# Patient Record
Sex: MALE | Race: WHITE | NOT HISPANIC OR LATINO | Employment: OTHER | URBAN - METROPOLITAN AREA
[De-identification: names, ages, dates, MRNs, and addresses within clinical notes are randomized per-mention and may not be internally consistent; named-entity substitution may affect disease eponyms.]

---

## 2017-01-19 ENCOUNTER — COMMUNICATION - HEALTHEAST (OUTPATIENT)
Dept: FAMILY MEDICINE | Facility: CLINIC | Age: 62
End: 2017-01-19

## 2017-01-19 DIAGNOSIS — K20.90 ESOPHAGITIS: ICD-10-CM

## 2017-06-02 ENCOUNTER — OFFICE VISIT - HEALTHEAST (OUTPATIENT)
Dept: FAMILY MEDICINE | Facility: CLINIC | Age: 62
End: 2017-06-02

## 2017-06-02 ENCOUNTER — COMMUNICATION - HEALTHEAST (OUTPATIENT)
Dept: TELEHEALTH | Facility: CLINIC | Age: 62
End: 2017-06-02

## 2017-06-02 DIAGNOSIS — E03.9 UNSPECIFIED HYPOTHYROIDISM: ICD-10-CM

## 2017-06-02 DIAGNOSIS — Z01.818 PREOP GENERAL PHYSICAL EXAM: ICD-10-CM

## 2017-06-02 LAB
ATRIAL RATE - MUSE: 54 BPM
DIASTOLIC BLOOD PRESSURE - MUSE: NORMAL MMHG
INTERPRETATION ECG - MUSE: NORMAL
P AXIS - MUSE: 54 DEGREES
PR INTERVAL - MUSE: 150 MS
QRS DURATION - MUSE: 76 MS
QT - MUSE: 410 MS
QTC - MUSE: 388 MS
R AXIS - MUSE: -3 DEGREES
SYSTOLIC BLOOD PRESSURE - MUSE: NORMAL MMHG
T AXIS - MUSE: 31 DEGREES
VENTRICULAR RATE- MUSE: 54 BPM

## 2017-06-02 ASSESSMENT — MIFFLIN-ST. JEOR: SCORE: 1923.56

## 2017-06-05 ENCOUNTER — AMBULATORY - HEALTHEAST (OUTPATIENT)
Dept: LAB | Facility: CLINIC | Age: 62
End: 2017-06-05

## 2017-06-05 DIAGNOSIS — Z01.818 PREOP GENERAL PHYSICAL EXAM: ICD-10-CM

## 2017-06-16 ASSESSMENT — MIFFLIN-ST. JEOR: SCORE: 1905.76

## 2017-06-20 ENCOUNTER — ANESTHESIA - HEALTHEAST (OUTPATIENT)
Dept: SURGERY | Facility: CLINIC | Age: 62
End: 2017-06-20

## 2017-06-21 ENCOUNTER — SURGERY - HEALTHEAST (OUTPATIENT)
Dept: SURGERY | Facility: CLINIC | Age: 62
End: 2017-06-21

## 2017-06-21 ASSESSMENT — MIFFLIN-ST. JEOR
SCORE: 1928.44
SCORE: 1928.44

## 2017-06-23 ENCOUNTER — COMMUNICATION - HEALTHEAST (OUTPATIENT)
Dept: FAMILY MEDICINE | Facility: CLINIC | Age: 62
End: 2017-06-23

## 2017-06-26 ENCOUNTER — OFFICE VISIT - HEALTHEAST (OUTPATIENT)
Dept: PHYSICAL THERAPY | Facility: REHABILITATION | Age: 62
End: 2017-06-26

## 2017-06-26 ENCOUNTER — AMBULATORY - HEALTHEAST (OUTPATIENT)
Dept: ADMINISTRATIVE | Facility: REHABILITATION | Age: 62
End: 2017-06-26

## 2017-06-26 DIAGNOSIS — M25.40 EFFUSION OF JOINT: ICD-10-CM

## 2017-06-26 DIAGNOSIS — E03.9 HYPOTHYROIDISM: ICD-10-CM

## 2017-06-26 DIAGNOSIS — E66.9 OBESITY, UNSPECIFIED: ICD-10-CM

## 2017-06-26 DIAGNOSIS — Z98.890 STATUS POST ABLATION OF ATRIAL FIBRILLATION: ICD-10-CM

## 2017-06-26 DIAGNOSIS — M25.661 KNEE STIFFNESS, RIGHT: ICD-10-CM

## 2017-06-26 DIAGNOSIS — R53.1 DECREASED STRENGTH: ICD-10-CM

## 2017-06-26 DIAGNOSIS — R26.89 ANTALGIC GAIT: ICD-10-CM

## 2017-06-26 DIAGNOSIS — Z96.659 KNEE JOINT REPLACEMENT STATUS: ICD-10-CM

## 2017-06-26 DIAGNOSIS — I71.20 ANEURYSM OF THORACIC AORTA (H): ICD-10-CM

## 2017-06-26 DIAGNOSIS — Z86.79 STATUS POST ABLATION OF ATRIAL FIBRILLATION: ICD-10-CM

## 2017-06-26 DIAGNOSIS — Z96.651 STATUS POST TOTAL RIGHT KNEE REPLACEMENT: ICD-10-CM

## 2017-06-28 ENCOUNTER — OFFICE VISIT - HEALTHEAST (OUTPATIENT)
Dept: PHYSICAL THERAPY | Facility: REHABILITATION | Age: 62
End: 2017-06-28

## 2017-06-28 DIAGNOSIS — I71.20 ANEURYSM OF THORACIC AORTA (H): ICD-10-CM

## 2017-06-28 DIAGNOSIS — R26.89 ANTALGIC GAIT: ICD-10-CM

## 2017-06-28 DIAGNOSIS — Z98.890 STATUS POST ABLATION OF ATRIAL FIBRILLATION: ICD-10-CM

## 2017-06-28 DIAGNOSIS — M25.40 EFFUSION OF JOINT: ICD-10-CM

## 2017-06-28 DIAGNOSIS — E66.9 OBESITY, UNSPECIFIED: ICD-10-CM

## 2017-06-28 DIAGNOSIS — M25.661 KNEE STIFFNESS, RIGHT: ICD-10-CM

## 2017-06-28 DIAGNOSIS — R53.1 DECREASED STRENGTH: ICD-10-CM

## 2017-06-28 DIAGNOSIS — Z96.651 STATUS POST TOTAL RIGHT KNEE REPLACEMENT: ICD-10-CM

## 2017-06-28 DIAGNOSIS — E03.9 HYPOTHYROIDISM: ICD-10-CM

## 2017-06-28 DIAGNOSIS — Z86.79 STATUS POST ABLATION OF ATRIAL FIBRILLATION: ICD-10-CM

## 2017-07-05 ENCOUNTER — OFFICE VISIT - HEALTHEAST (OUTPATIENT)
Dept: PHYSICAL THERAPY | Facility: REHABILITATION | Age: 62
End: 2017-07-05

## 2017-07-05 DIAGNOSIS — Z96.651 STATUS POST TOTAL RIGHT KNEE REPLACEMENT: ICD-10-CM

## 2017-07-05 DIAGNOSIS — R53.1 DECREASED STRENGTH: ICD-10-CM

## 2017-07-05 DIAGNOSIS — M25.40 EFFUSION OF JOINT: ICD-10-CM

## 2017-07-05 DIAGNOSIS — M25.661 KNEE STIFFNESS, RIGHT: ICD-10-CM

## 2017-07-10 ENCOUNTER — OFFICE VISIT - HEALTHEAST (OUTPATIENT)
Dept: PHYSICAL THERAPY | Facility: REHABILITATION | Age: 62
End: 2017-07-10

## 2017-07-10 DIAGNOSIS — R53.1 DECREASED STRENGTH: ICD-10-CM

## 2017-07-10 DIAGNOSIS — Z96.651 STATUS POST TOTAL RIGHT KNEE REPLACEMENT: ICD-10-CM

## 2017-07-10 DIAGNOSIS — M25.661 KNEE STIFFNESS, RIGHT: ICD-10-CM

## 2017-07-10 DIAGNOSIS — M25.40 EFFUSION OF JOINT: ICD-10-CM

## 2017-07-13 ENCOUNTER — OFFICE VISIT - HEALTHEAST (OUTPATIENT)
Dept: PHYSICAL THERAPY | Facility: REHABILITATION | Age: 62
End: 2017-07-13

## 2017-07-13 DIAGNOSIS — M25.661 KNEE STIFFNESS, RIGHT: ICD-10-CM

## 2017-07-13 DIAGNOSIS — R53.1 DECREASED STRENGTH: ICD-10-CM

## 2017-07-13 DIAGNOSIS — M25.40 EFFUSION OF JOINT: ICD-10-CM

## 2017-07-13 DIAGNOSIS — Z96.651 STATUS POST TOTAL RIGHT KNEE REPLACEMENT: ICD-10-CM

## 2017-07-13 DIAGNOSIS — R26.89 ANTALGIC GAIT: ICD-10-CM

## 2017-07-17 ENCOUNTER — OFFICE VISIT - HEALTHEAST (OUTPATIENT)
Dept: PHYSICAL THERAPY | Facility: REHABILITATION | Age: 62
End: 2017-07-17

## 2017-07-17 DIAGNOSIS — M25.40 EFFUSION OF JOINT: ICD-10-CM

## 2017-07-17 DIAGNOSIS — R26.89 ANTALGIC GAIT: ICD-10-CM

## 2017-07-17 DIAGNOSIS — M25.661 KNEE STIFFNESS, RIGHT: ICD-10-CM

## 2017-07-17 DIAGNOSIS — R53.1 DECREASED STRENGTH: ICD-10-CM

## 2017-07-17 DIAGNOSIS — Z96.651 STATUS POST TOTAL RIGHT KNEE REPLACEMENT: ICD-10-CM

## 2017-07-20 ENCOUNTER — OFFICE VISIT - HEALTHEAST (OUTPATIENT)
Dept: PHYSICAL THERAPY | Facility: REHABILITATION | Age: 62
End: 2017-07-20

## 2017-07-20 ENCOUNTER — COMMUNICATION - HEALTHEAST (OUTPATIENT)
Dept: ADMINISTRATIVE | Facility: CLINIC | Age: 62
End: 2017-07-20

## 2017-07-20 DIAGNOSIS — R53.1 DECREASED STRENGTH: ICD-10-CM

## 2017-07-20 DIAGNOSIS — R26.89 ANTALGIC GAIT: ICD-10-CM

## 2017-07-20 DIAGNOSIS — M25.661 KNEE STIFFNESS, RIGHT: ICD-10-CM

## 2017-07-20 DIAGNOSIS — M25.40 EFFUSION OF JOINT: ICD-10-CM

## 2017-07-20 DIAGNOSIS — Z96.651 STATUS POST TOTAL RIGHT KNEE REPLACEMENT: ICD-10-CM

## 2017-07-24 ENCOUNTER — OFFICE VISIT - HEALTHEAST (OUTPATIENT)
Dept: PHYSICAL THERAPY | Facility: REHABILITATION | Age: 62
End: 2017-07-24

## 2017-07-24 DIAGNOSIS — M25.661 KNEE STIFFNESS, RIGHT: ICD-10-CM

## 2017-07-24 DIAGNOSIS — Z96.651 STATUS POST TOTAL RIGHT KNEE REPLACEMENT: ICD-10-CM

## 2017-07-24 DIAGNOSIS — R53.1 DECREASED STRENGTH: ICD-10-CM

## 2017-07-27 ENCOUNTER — OFFICE VISIT - HEALTHEAST (OUTPATIENT)
Dept: PHYSICAL THERAPY | Facility: REHABILITATION | Age: 62
End: 2017-07-27

## 2017-07-27 DIAGNOSIS — Z96.651 STATUS POST TOTAL RIGHT KNEE REPLACEMENT: ICD-10-CM

## 2017-07-27 DIAGNOSIS — R26.89 ANTALGIC GAIT: ICD-10-CM

## 2017-07-27 DIAGNOSIS — R53.1 DECREASED STRENGTH: ICD-10-CM

## 2017-07-27 DIAGNOSIS — M25.661 KNEE STIFFNESS, RIGHT: ICD-10-CM

## 2017-07-27 DIAGNOSIS — M25.40 EFFUSION OF JOINT: ICD-10-CM

## 2017-07-31 ENCOUNTER — OFFICE VISIT - HEALTHEAST (OUTPATIENT)
Dept: PHYSICAL THERAPY | Facility: REHABILITATION | Age: 62
End: 2017-07-31

## 2017-07-31 DIAGNOSIS — R53.1 DECREASED STRENGTH: ICD-10-CM

## 2017-07-31 DIAGNOSIS — M25.40 EFFUSION OF JOINT: ICD-10-CM

## 2017-07-31 DIAGNOSIS — M25.661 KNEE STIFFNESS, RIGHT: ICD-10-CM

## 2017-07-31 DIAGNOSIS — Z96.651 STATUS POST TOTAL RIGHT KNEE REPLACEMENT: ICD-10-CM

## 2017-08-02 ENCOUNTER — OFFICE VISIT - HEALTHEAST (OUTPATIENT)
Dept: PHYSICAL THERAPY | Facility: REHABILITATION | Age: 62
End: 2017-08-02

## 2017-08-02 ENCOUNTER — COMMUNICATION - HEALTHEAST (OUTPATIENT)
Dept: FAMILY MEDICINE | Facility: CLINIC | Age: 62
End: 2017-08-02

## 2017-08-02 DIAGNOSIS — M25.40 EFFUSION OF JOINT: ICD-10-CM

## 2017-08-02 DIAGNOSIS — M25.661 KNEE STIFFNESS, RIGHT: ICD-10-CM

## 2017-08-02 DIAGNOSIS — K20.90 ESOPHAGITIS: ICD-10-CM

## 2017-08-02 DIAGNOSIS — R53.1 DECREASED STRENGTH: ICD-10-CM

## 2017-08-02 DIAGNOSIS — R26.89 ANTALGIC GAIT: ICD-10-CM

## 2017-08-02 DIAGNOSIS — Z96.651 STATUS POST TOTAL RIGHT KNEE REPLACEMENT: ICD-10-CM

## 2017-08-09 ENCOUNTER — OFFICE VISIT - HEALTHEAST (OUTPATIENT)
Dept: PHYSICAL THERAPY | Facility: REHABILITATION | Age: 62
End: 2017-08-09

## 2017-08-09 DIAGNOSIS — R26.89 ANTALGIC GAIT: ICD-10-CM

## 2017-08-09 DIAGNOSIS — M25.661 KNEE STIFFNESS, RIGHT: ICD-10-CM

## 2017-08-09 DIAGNOSIS — Z96.651 STATUS POST TOTAL RIGHT KNEE REPLACEMENT: ICD-10-CM

## 2017-08-09 DIAGNOSIS — R53.1 DECREASED STRENGTH: ICD-10-CM

## 2017-08-09 DIAGNOSIS — M25.40 EFFUSION OF JOINT: ICD-10-CM

## 2017-08-14 ENCOUNTER — OFFICE VISIT - HEALTHEAST (OUTPATIENT)
Dept: PHYSICAL THERAPY | Facility: REHABILITATION | Age: 62
End: 2017-08-14

## 2017-08-14 DIAGNOSIS — M25.661 KNEE STIFFNESS, RIGHT: ICD-10-CM

## 2017-08-14 DIAGNOSIS — Z96.651 STATUS POST TOTAL RIGHT KNEE REPLACEMENT: ICD-10-CM

## 2017-08-14 DIAGNOSIS — R53.1 DECREASED STRENGTH: ICD-10-CM

## 2017-08-21 ENCOUNTER — OFFICE VISIT - HEALTHEAST (OUTPATIENT)
Dept: PHYSICAL THERAPY | Facility: REHABILITATION | Age: 62
End: 2017-08-21

## 2017-08-21 DIAGNOSIS — R53.1 DECREASED STRENGTH: ICD-10-CM

## 2017-08-21 DIAGNOSIS — M25.40 EFFUSION OF JOINT: ICD-10-CM

## 2017-08-21 DIAGNOSIS — Z96.651 STATUS POST TOTAL RIGHT KNEE REPLACEMENT: ICD-10-CM

## 2017-08-21 DIAGNOSIS — M25.661 KNEE STIFFNESS, RIGHT: ICD-10-CM

## 2017-08-21 DIAGNOSIS — R26.89 ANTALGIC GAIT: ICD-10-CM

## 2017-08-28 ENCOUNTER — OFFICE VISIT - HEALTHEAST (OUTPATIENT)
Dept: PHYSICAL THERAPY | Facility: REHABILITATION | Age: 62
End: 2017-08-28

## 2017-08-28 DIAGNOSIS — R53.1 DECREASED STRENGTH: ICD-10-CM

## 2017-08-28 DIAGNOSIS — M25.40 EFFUSION OF JOINT: ICD-10-CM

## 2017-08-28 DIAGNOSIS — Z96.651 STATUS POST TOTAL RIGHT KNEE REPLACEMENT: ICD-10-CM

## 2017-08-28 DIAGNOSIS — M25.661 KNEE STIFFNESS, RIGHT: ICD-10-CM

## 2017-08-28 DIAGNOSIS — R26.89 ANTALGIC GAIT: ICD-10-CM

## 2017-10-04 ENCOUNTER — COMMUNICATION - HEALTHEAST (OUTPATIENT)
Dept: FAMILY MEDICINE | Facility: CLINIC | Age: 62
End: 2017-10-04

## 2017-10-04 DIAGNOSIS — E03.9 HYPOTHYROIDISM: ICD-10-CM

## 2017-10-31 ENCOUNTER — OFFICE VISIT - HEALTHEAST (OUTPATIENT)
Dept: CARDIOLOGY | Facility: CLINIC | Age: 62
End: 2017-10-31

## 2017-10-31 DIAGNOSIS — Z86.79 STATUS POST ABLATION OF ATRIAL FIBRILLATION: ICD-10-CM

## 2017-10-31 DIAGNOSIS — Z98.890 STATUS POST ABLATION OF ATRIAL FIBRILLATION: ICD-10-CM

## 2017-10-31 DIAGNOSIS — I48.0 PAROXYSMAL ATRIAL FIBRILLATION (H): ICD-10-CM

## 2017-10-31 DIAGNOSIS — I71.20 ANEURYSM OF THORACIC AORTA (H): ICD-10-CM

## 2017-10-31 ASSESSMENT — MIFFLIN-ST. JEOR: SCORE: 2001.02

## 2017-11-01 ENCOUNTER — COMMUNICATION - HEALTHEAST (OUTPATIENT)
Dept: FAMILY MEDICINE | Facility: CLINIC | Age: 62
End: 2017-11-01

## 2017-11-01 DIAGNOSIS — K20.90 ESOPHAGITIS: ICD-10-CM

## 2017-11-09 ENCOUNTER — RECORDS - HEALTHEAST (OUTPATIENT)
Dept: ADMINISTRATIVE | Facility: OTHER | Age: 62
End: 2017-11-09

## 2018-02-06 ENCOUNTER — COMMUNICATION - HEALTHEAST (OUTPATIENT)
Dept: FAMILY MEDICINE | Facility: CLINIC | Age: 63
End: 2018-02-06

## 2018-02-06 DIAGNOSIS — K20.90 ESOPHAGITIS: ICD-10-CM

## 2018-05-18 ENCOUNTER — RECORDS - HEALTHEAST (OUTPATIENT)
Dept: ADMINISTRATIVE | Facility: OTHER | Age: 63
End: 2018-05-18

## 2018-06-21 ENCOUNTER — RECORDS - HEALTHEAST (OUTPATIENT)
Dept: ADMINISTRATIVE | Facility: OTHER | Age: 63
End: 2018-06-21

## 2018-07-13 ENCOUNTER — COMMUNICATION - HEALTHEAST (OUTPATIENT)
Dept: FAMILY MEDICINE | Facility: CLINIC | Age: 63
End: 2018-07-13

## 2018-07-13 DIAGNOSIS — E03.9 HYPOTHYROIDISM: ICD-10-CM

## 2018-07-26 ENCOUNTER — RECORDS - HEALTHEAST (OUTPATIENT)
Dept: ADMINISTRATIVE | Facility: OTHER | Age: 63
End: 2018-07-26

## 2018-08-14 ENCOUNTER — COMMUNICATION - HEALTHEAST (OUTPATIENT)
Dept: FAMILY MEDICINE | Facility: CLINIC | Age: 63
End: 2018-08-14

## 2018-08-14 DIAGNOSIS — K20.90 ESOPHAGITIS: ICD-10-CM

## 2018-08-28 ENCOUNTER — RECORDS - HEALTHEAST (OUTPATIENT)
Dept: ADMINISTRATIVE | Facility: OTHER | Age: 63
End: 2018-08-28

## 2018-09-10 ENCOUNTER — OFFICE VISIT - HEALTHEAST (OUTPATIENT)
Dept: FAMILY MEDICINE | Facility: CLINIC | Age: 63
End: 2018-09-10

## 2018-09-10 ENCOUNTER — COMMUNICATION - HEALTHEAST (OUTPATIENT)
Dept: FAMILY MEDICINE | Facility: CLINIC | Age: 63
End: 2018-09-10

## 2018-09-10 DIAGNOSIS — E66.9 OBESITY: ICD-10-CM

## 2018-09-10 DIAGNOSIS — M79.672 LEFT FOOT PAIN: ICD-10-CM

## 2018-09-10 DIAGNOSIS — I71.20 THORACIC AORTIC ANEURYSM WITHOUT RUPTURE (H): ICD-10-CM

## 2018-09-10 DIAGNOSIS — Z12.5 SCREENING FOR PROSTATE CANCER: ICD-10-CM

## 2018-09-10 DIAGNOSIS — E03.9 HYPOTHYROIDISM: ICD-10-CM

## 2018-09-10 DIAGNOSIS — Z13.220 SCREENING FOR LIPID DISORDERS: ICD-10-CM

## 2018-09-10 DIAGNOSIS — Z12.11 SCREEN FOR COLON CANCER: ICD-10-CM

## 2018-09-10 DIAGNOSIS — I83.90 ASYMPTOMATIC VARICOSE VEINS: ICD-10-CM

## 2018-09-10 DIAGNOSIS — I48.0 PAROXYSMAL ATRIAL FIBRILLATION (H): ICD-10-CM

## 2018-09-10 DIAGNOSIS — K21.9 GERD (GASTROESOPHAGEAL REFLUX DISEASE): ICD-10-CM

## 2018-09-10 DIAGNOSIS — K20.90 ESOPHAGITIS: ICD-10-CM

## 2018-09-10 DIAGNOSIS — Z00.00 PHYSICAL EXAM: ICD-10-CM

## 2018-09-10 DIAGNOSIS — L63.9 AA (ALOPECIA AREATA): ICD-10-CM

## 2018-09-10 LAB
ANION GAP SERPL CALCULATED.3IONS-SCNC: 8 MMOL/L (ref 5–18)
BUN SERPL-MCNC: 17 MG/DL (ref 8–22)
CALCIUM SERPL-MCNC: 9.3 MG/DL (ref 8.5–10.5)
CHLORIDE BLD-SCNC: 104 MMOL/L (ref 98–107)
CHOLEST SERPL-MCNC: 149 MG/DL
CO2 SERPL-SCNC: 30 MMOL/L (ref 22–31)
CREAT SERPL-MCNC: 0.98 MG/DL (ref 0.7–1.3)
FASTING STATUS PATIENT QL REPORTED: YES
GFR SERPL CREATININE-BSD FRML MDRD: >60 ML/MIN/1.73M2
GLUCOSE BLD-MCNC: 92 MG/DL (ref 70–125)
HDLC SERPL-MCNC: 54 MG/DL
LDLC SERPL CALC-MCNC: 82 MG/DL
MAGNESIUM SERPL-MCNC: 2.1 MG/DL (ref 1.8–2.6)
POTASSIUM BLD-SCNC: 4.9 MMOL/L (ref 3.5–5)
PSA SERPL-MCNC: 1.3 NG/ML (ref 0–4.5)
SODIUM SERPL-SCNC: 142 MMOL/L (ref 136–145)
TRIGL SERPL-MCNC: 67 MG/DL
TSH SERPL DL<=0.005 MIU/L-ACNC: 3.12 UIU/ML (ref 0.3–5)
VIT B12 SERPL-MCNC: 919 PG/ML (ref 213–816)

## 2018-09-10 ASSESSMENT — MIFFLIN-ST. JEOR: SCORE: 2012.35

## 2018-10-30 ENCOUNTER — RECORDS - HEALTHEAST (OUTPATIENT)
Dept: ADMINISTRATIVE | Facility: OTHER | Age: 63
End: 2018-10-30

## 2018-11-06 ENCOUNTER — RECORDS - HEALTHEAST (OUTPATIENT)
Dept: ADMINISTRATIVE | Facility: OTHER | Age: 63
End: 2018-11-06

## 2019-01-10 ENCOUNTER — RECORDS - HEALTHEAST (OUTPATIENT)
Dept: ADMINISTRATIVE | Facility: OTHER | Age: 64
End: 2019-01-10

## 2019-04-03 ENCOUNTER — COMMUNICATION - HEALTHEAST (OUTPATIENT)
Dept: CARDIOLOGY | Facility: CLINIC | Age: 64
End: 2019-04-03

## 2019-04-03 DIAGNOSIS — I71.20 THORACIC AORTIC ANEURYSM WITHOUT RUPTURE (H): ICD-10-CM

## 2019-04-03 DIAGNOSIS — Z01.818 PREOP TESTING: ICD-10-CM

## 2019-04-17 ENCOUNTER — HOSPITAL ENCOUNTER (OUTPATIENT)
Dept: MRI IMAGING | Facility: HOSPITAL | Age: 64
Discharge: HOME | End: 2019-04-17
Attending: INTERNAL MEDICINE

## 2019-04-17 DIAGNOSIS — Z01.818 PREOP TESTING: ICD-10-CM

## 2019-04-17 DIAGNOSIS — I71.20 THORACIC AORTIC ANEURYSM WITHOUT RUPTURE (H): ICD-10-CM

## 2019-04-19 LAB
CCTA EJECTION FRACTION: 49 %
CCTA INTERVENTRICULAR SETPUM: 1.4 CM (ref 0.6–1.1)
CCTA LEFT INTERNAL DIMENSION IN SYSTOLE: 3.5 CM (ref 2.1–4)
CCTA LEFT VENTRICULAR INTERNAL DIMENSION IN DIASTOLE: 4.9 CM (ref 3.5–6)
CCTA LEFT VENTRICULAR MASS: 239.86 G
CCTA POSTERIOR WALL: 1.1 CM (ref 0.6–1.1)
MR CARDIAC LEFT VENTRIAL CARDIAC INDEX: 3.9 L/MIN/M2 (ref 1.7–4.2)
MR CARDIAC LEFT VENTRICAL CARDIAC OUTPUT: 9.1 L/MIN (ref 2.8–8.8)
MR CARDIAC LEFT VENTRICULAR DIASTOLIC VOLUME INDEX: 71.19 ML/M2 (ref 47–92)
MR CARDIAC LEFT VENTRICULAR MASS INDEX: 69.49 G/M2 (ref 70–113)
MR CARDIAC LEFT VENTRICULAR MASS: 164 G (ref 118–238)
MR CARDIAC LEFT VENTRICULAR STROKE VOLUME INDEX: 35.59 ML/M2 (ref 32–62)
MR CARDIAC LEFT VENTRICULAR SYSTOLIC VOLUME INDEX: 35.59 ML/M2 (ref 13–30)
MR EJECTION FRACTION: 50 %
MR HEIGHT: 1.78 M
MR LEFT VENTRICULAR DYSTOLIC VOLUMEN: 168 ML (ref 77–195)
MR LEFT VENTRICULAR STROKE VOLUMEN: 84 ML (ref 51–133)
MR LEFT VENTRICULAR SYSTOLIC VOLUME: 84 ML (ref 19–72)
MR WEIGHT: 121.1 KG

## 2019-04-22 ASSESSMENT — MIFFLIN-ST. JEOR: SCORE: 1980.6

## 2019-04-23 ENCOUNTER — OFFICE VISIT - HEALTHEAST (OUTPATIENT)
Dept: FAMILY MEDICINE | Facility: CLINIC | Age: 64
End: 2019-04-23

## 2019-04-23 ENCOUNTER — COMMUNICATION - HEALTHEAST (OUTPATIENT)
Dept: FAMILY MEDICINE | Facility: CLINIC | Age: 64
End: 2019-04-23

## 2019-04-23 DIAGNOSIS — M16.11 PRIMARY OSTEOARTHRITIS OF RIGHT HIP: ICD-10-CM

## 2019-04-23 DIAGNOSIS — I48.91 A-FIB (H): ICD-10-CM

## 2019-04-23 DIAGNOSIS — I48.0 PAROXYSMAL ATRIAL FIBRILLATION (H): ICD-10-CM

## 2019-04-23 DIAGNOSIS — Z01.818 PREOP EXAMINATION: ICD-10-CM

## 2019-04-23 LAB
ANION GAP SERPL CALCULATED.3IONS-SCNC: 8 MMOL/L (ref 5–18)
ATRIAL RATE - MUSE: 108 BPM
BUN SERPL-MCNC: 25 MG/DL (ref 8–22)
CALCIUM SERPL-MCNC: 9.4 MG/DL (ref 8.5–10.5)
CHLORIDE BLD-SCNC: 106 MMOL/L (ref 98–107)
CO2 SERPL-SCNC: 29 MMOL/L (ref 22–31)
CREAT SERPL-MCNC: 1.07 MG/DL (ref 0.7–1.3)
DIASTOLIC BLOOD PRESSURE - MUSE: NORMAL MMHG
ERYTHROCYTE [DISTWIDTH] IN BLOOD BY AUTOMATED COUNT: 12.8 % (ref 11–14.5)
GFR SERPL CREATININE-BSD FRML MDRD: >60 ML/MIN/1.73M2
GLUCOSE BLD-MCNC: 110 MG/DL (ref 70–125)
HCT VFR BLD AUTO: 46.5 % (ref 40–54)
HGB BLD-MCNC: 15.2 G/DL (ref 14–18)
INTERPRETATION ECG - MUSE: NORMAL
MCH RBC QN AUTO: 30.2 PG (ref 27–34)
MCHC RBC AUTO-ENTMCNC: 32.6 G/DL (ref 32–36)
MCV RBC AUTO: 93 FL (ref 80–100)
P AXIS - MUSE: NORMAL DEGREES
PLATELET # BLD AUTO: 222 THOU/UL (ref 140–440)
PMV BLD AUTO: 8.2 FL (ref 7–10)
POTASSIUM BLD-SCNC: 4.7 MMOL/L (ref 3.5–5)
PR INTERVAL - MUSE: NORMAL MS
QRS DURATION - MUSE: 72 MS
QT - MUSE: 322 MS
QTC - MUSE: 452 MS
R AXIS - MUSE: -17 DEGREES
RBC # BLD AUTO: 5.03 MILL/UL (ref 4.4–6.2)
SODIUM SERPL-SCNC: 143 MMOL/L (ref 136–145)
SYSTOLIC BLOOD PRESSURE - MUSE: NORMAL MMHG
T AXIS - MUSE: 29 DEGREES
TSH SERPL DL<=0.005 MIU/L-ACNC: 3.39 UIU/ML (ref 0.3–5)
VENTRICULAR RATE- MUSE: 119 BPM
WBC: 6.7 THOU/UL (ref 4–11)

## 2019-04-24 ENCOUNTER — COMMUNICATION - HEALTHEAST (OUTPATIENT)
Dept: FAMILY MEDICINE | Facility: CLINIC | Age: 64
End: 2019-04-24

## 2019-04-26 ENCOUNTER — OFFICE VISIT - HEALTHEAST (OUTPATIENT)
Dept: CARDIOLOGY | Facility: CLINIC | Age: 64
End: 2019-04-26

## 2019-04-26 DIAGNOSIS — Z86.79 STATUS POST ABLATION OF ATRIAL FIBRILLATION: ICD-10-CM

## 2019-04-26 DIAGNOSIS — I48.0 PAROXYSMAL ATRIAL FIBRILLATION (H): ICD-10-CM

## 2019-04-26 DIAGNOSIS — I10 BENIGN ESSENTIAL HYPERTENSION: ICD-10-CM

## 2019-04-26 DIAGNOSIS — Z98.890 STATUS POST ABLATION OF ATRIAL FIBRILLATION: ICD-10-CM

## 2019-04-26 DIAGNOSIS — I71.20 THORACIC AORTIC ANEURYSM WITHOUT RUPTURE (H): ICD-10-CM

## 2019-04-26 ASSESSMENT — MIFFLIN-ST. JEOR: SCORE: 2030.5

## 2019-05-08 ENCOUNTER — SURGERY - HEALTHEAST (OUTPATIENT)
Dept: SURGERY | Facility: CLINIC | Age: 64
End: 2019-05-08

## 2019-05-08 ENCOUNTER — ANESTHESIA - HEALTHEAST (OUTPATIENT)
Dept: SURGERY | Facility: CLINIC | Age: 64
End: 2019-05-08

## 2019-05-09 ENCOUNTER — COMMUNICATION - HEALTHEAST (OUTPATIENT)
Dept: CARDIOLOGY | Facility: CLINIC | Age: 64
End: 2019-05-09

## 2019-05-09 DIAGNOSIS — I48.0 PAROXYSMAL ATRIAL FIBRILLATION (H): ICD-10-CM

## 2019-05-10 ENCOUNTER — COMMUNICATION - HEALTHEAST (OUTPATIENT)
Dept: CARE COORDINATION | Facility: CLINIC | Age: 64
End: 2019-05-10

## 2019-05-13 ENCOUNTER — OFFICE VISIT - HEALTHEAST (OUTPATIENT)
Dept: FAMILY MEDICINE | Facility: CLINIC | Age: 64
End: 2019-05-13

## 2019-05-13 DIAGNOSIS — I48.0 PAROXYSMAL ATRIAL FIBRILLATION (H): ICD-10-CM

## 2019-05-13 DIAGNOSIS — Z96.649 AFTERCARE FOLLOWING HIP JOINT REPLACEMENT SURGERY, UNSPECIFIED LATERALITY: ICD-10-CM

## 2019-05-13 DIAGNOSIS — Z47.1 AFTERCARE FOLLOWING HIP JOINT REPLACEMENT SURGERY, UNSPECIFIED LATERALITY: ICD-10-CM

## 2019-05-13 DIAGNOSIS — I10 HYPERTENSION, UNSPECIFIED TYPE: ICD-10-CM

## 2019-05-17 ENCOUNTER — RECORDS - HEALTHEAST (OUTPATIENT)
Dept: ADMINISTRATIVE | Facility: OTHER | Age: 64
End: 2019-05-17

## 2019-06-03 ENCOUNTER — COMMUNICATION - HEALTHEAST (OUTPATIENT)
Dept: FAMILY MEDICINE | Facility: CLINIC | Age: 64
End: 2019-06-03

## 2019-06-03 ENCOUNTER — OFFICE VISIT - HEALTHEAST (OUTPATIENT)
Dept: FAMILY MEDICINE | Facility: CLINIC | Age: 64
End: 2019-06-03

## 2019-06-03 DIAGNOSIS — I10 HYPERTENSION, UNSPECIFIED TYPE: ICD-10-CM

## 2019-06-03 DIAGNOSIS — I48.0 PAROXYSMAL ATRIAL FIBRILLATION (H): ICD-10-CM

## 2019-06-13 ENCOUNTER — RECORDS - HEALTHEAST (OUTPATIENT)
Dept: ADMINISTRATIVE | Facility: OTHER | Age: 64
End: 2019-06-13

## 2019-06-13 ENCOUNTER — AMBULATORY - HEALTHEAST (OUTPATIENT)
Dept: CARDIOLOGY | Facility: CLINIC | Age: 64
End: 2019-06-13

## 2019-06-17 ENCOUNTER — HOSPITAL ENCOUNTER (OUTPATIENT)
Dept: CARDIOLOGY | Facility: CLINIC | Age: 64
Discharge: HOME | End: 2019-06-17
Attending: INTERNAL MEDICINE

## 2019-06-17 DIAGNOSIS — I48.0 PAROXYSMAL ATRIAL FIBRILLATION (H): ICD-10-CM

## 2019-06-20 ENCOUNTER — RECORDS - HEALTHEAST (OUTPATIENT)
Dept: ADMINISTRATIVE | Facility: OTHER | Age: 64
End: 2019-06-20

## 2019-06-25 ENCOUNTER — AMBULATORY - HEALTHEAST (OUTPATIENT)
Dept: CARDIOLOGY | Facility: CLINIC | Age: 64
End: 2019-06-25

## 2019-06-25 ENCOUNTER — OFFICE VISIT - HEALTHEAST (OUTPATIENT)
Dept: CARDIOLOGY | Facility: CLINIC | Age: 64
End: 2019-06-25

## 2019-06-25 ENCOUNTER — COMMUNICATION - HEALTHEAST (OUTPATIENT)
Dept: CARDIOLOGY | Facility: CLINIC | Age: 64
End: 2019-06-25

## 2019-06-25 DIAGNOSIS — Z98.890 STATUS POST ABLATION OF ATRIAL FIBRILLATION: ICD-10-CM

## 2019-06-25 DIAGNOSIS — I10 HYPERTENSION, UNSPECIFIED TYPE: ICD-10-CM

## 2019-06-25 DIAGNOSIS — Z86.79 STATUS POST ABLATION OF ATRIAL FIBRILLATION: ICD-10-CM

## 2019-06-25 DIAGNOSIS — I48.19 PERSISTENT ATRIAL FIBRILLATION (H): ICD-10-CM

## 2019-06-25 ASSESSMENT — MIFFLIN-ST. JEOR: SCORE: 2003.28

## 2019-07-10 ENCOUNTER — ANESTHESIA - HEALTHEAST (OUTPATIENT)
Dept: CARDIOLOGY | Facility: CLINIC | Age: 64
End: 2019-07-10

## 2019-07-15 ENCOUNTER — COMMUNICATION - HEALTHEAST (OUTPATIENT)
Dept: CARDIOLOGY | Facility: CLINIC | Age: 64
End: 2019-07-15

## 2019-07-15 ENCOUNTER — AMBULATORY - HEALTHEAST (OUTPATIENT)
Dept: CARDIOLOGY | Facility: CLINIC | Age: 64
End: 2019-07-15

## 2019-07-15 DIAGNOSIS — I48.19 PERSISTENT ATRIAL FIBRILLATION (H): ICD-10-CM

## 2019-07-15 LAB
ATRIAL RATE - MUSE: NORMAL BPM
DIASTOLIC BLOOD PRESSURE - MUSE: NORMAL MMHG
INTERPRETATION ECG - MUSE: NORMAL
P AXIS - MUSE: NORMAL DEGREES
PR INTERVAL - MUSE: NORMAL MS
QRS DURATION - MUSE: 88 MS
QT - MUSE: 380 MS
QTC - MUSE: 469 MS
R AXIS - MUSE: -25 DEGREES
SYSTOLIC BLOOD PRESSURE - MUSE: NORMAL MMHG
T AXIS - MUSE: 16 DEGREES
VENTRICULAR RATE- MUSE: 92 BPM

## 2019-07-15 ASSESSMENT — MIFFLIN-ST. JEOR: SCORE: 1980.6

## 2019-07-23 ENCOUNTER — OFFICE VISIT - HEALTHEAST (OUTPATIENT)
Dept: CARDIOLOGY | Facility: CLINIC | Age: 64
End: 2019-07-23

## 2019-07-23 DIAGNOSIS — Z98.890 STATUS POST ABLATION OF ATRIAL FIBRILLATION: ICD-10-CM

## 2019-07-23 DIAGNOSIS — Z86.79 STATUS POST ABLATION OF ATRIAL FIBRILLATION: ICD-10-CM

## 2019-07-23 DIAGNOSIS — I10 ESSENTIAL HYPERTENSION: ICD-10-CM

## 2019-07-23 DIAGNOSIS — I48.19 PERSISTENT ATRIAL FIBRILLATION (H): ICD-10-CM

## 2019-07-23 ASSESSMENT — MIFFLIN-ST. JEOR: SCORE: 1989.67

## 2019-07-24 ENCOUNTER — HOSPITAL ENCOUNTER (OUTPATIENT)
Dept: CARDIOLOGY | Facility: CLINIC | Age: 64
Discharge: HOME | End: 2019-07-24
Attending: INTERNAL MEDICINE

## 2019-07-24 DIAGNOSIS — I48.19 PERSISTENT ATRIAL FIBRILLATION (H): ICD-10-CM

## 2019-07-25 ENCOUNTER — RECORDS - HEALTHEAST (OUTPATIENT)
Dept: ADMINISTRATIVE | Facility: OTHER | Age: 64
End: 2019-07-25

## 2019-07-31 ENCOUNTER — OFFICE VISIT - HEALTHEAST (OUTPATIENT)
Dept: CARDIOLOGY | Facility: CLINIC | Age: 64
End: 2019-07-31

## 2019-07-31 DIAGNOSIS — Z86.79 STATUS POST ABLATION OF ATRIAL FIBRILLATION: ICD-10-CM

## 2019-07-31 DIAGNOSIS — I10 HYPERTENSION, UNSPECIFIED TYPE: ICD-10-CM

## 2019-07-31 DIAGNOSIS — I48.19 PERSISTENT ATRIAL FIBRILLATION (H): ICD-10-CM

## 2019-07-31 DIAGNOSIS — Z98.890 STATUS POST ABLATION OF ATRIAL FIBRILLATION: ICD-10-CM

## 2019-07-31 DIAGNOSIS — I10 ESSENTIAL HYPERTENSION: ICD-10-CM

## 2019-07-31 ASSESSMENT — MIFFLIN-ST. JEOR: SCORE: 1971.53

## 2019-08-19 ENCOUNTER — COMMUNICATION - HEALTHEAST (OUTPATIENT)
Dept: CARDIOLOGY | Facility: CLINIC | Age: 64
End: 2019-08-19

## 2019-08-19 DIAGNOSIS — I48.0 PAROXYSMAL ATRIAL FIBRILLATION (H): ICD-10-CM

## 2019-08-19 DIAGNOSIS — Z86.79 STATUS POST ABLATION OF ATRIAL FIBRILLATION: ICD-10-CM

## 2019-08-19 DIAGNOSIS — Z98.890 STATUS POST ABLATION OF ATRIAL FIBRILLATION: ICD-10-CM

## 2019-08-26 ENCOUNTER — OFFICE VISIT - HEALTHEAST (OUTPATIENT)
Dept: CARDIOLOGY | Facility: CLINIC | Age: 64
End: 2019-08-26

## 2019-08-26 ENCOUNTER — AMBULATORY - HEALTHEAST (OUTPATIENT)
Dept: CARDIOLOGY | Facility: CLINIC | Age: 64
End: 2019-08-26

## 2019-08-26 DIAGNOSIS — Z98.890 STATUS POST ABLATION OF ATRIAL FIBRILLATION: ICD-10-CM

## 2019-08-26 DIAGNOSIS — I10 ESSENTIAL HYPERTENSION: ICD-10-CM

## 2019-08-26 DIAGNOSIS — I48.19 PERSISTENT ATRIAL FIBRILLATION (H): ICD-10-CM

## 2019-08-26 DIAGNOSIS — Z86.79 STATUS POST ABLATION OF ATRIAL FIBRILLATION: ICD-10-CM

## 2019-08-26 ASSESSMENT — MIFFLIN-ST. JEOR: SCORE: 1996.48

## 2019-09-05 ENCOUNTER — AMBULATORY - HEALTHEAST (OUTPATIENT)
Dept: CARDIOLOGY | Facility: CLINIC | Age: 64
End: 2019-09-05

## 2019-09-09 ENCOUNTER — RECORDS - HEALTHEAST (OUTPATIENT)
Dept: ADMINISTRATIVE | Facility: OTHER | Age: 64
End: 2019-09-09

## 2019-09-10 ENCOUNTER — AMBULATORY - HEALTHEAST (OUTPATIENT)
Dept: LAB | Facility: CLINIC | Age: 64
End: 2019-09-10

## 2019-09-10 DIAGNOSIS — M25.551 RIGHT HIP PAIN: ICD-10-CM

## 2019-09-10 LAB
C REACTIVE PROTEIN LHE: 0.5 MG/DL (ref 0–0.8)
ERYTHROCYTE [DISTWIDTH] IN BLOOD BY AUTOMATED COUNT: 12.9 % (ref 11–14.5)
ERYTHROCYTE [SEDIMENTATION RATE] IN BLOOD BY WESTERGREN METHOD: 3 MM/HR (ref 0–15)
HCT VFR BLD AUTO: 44.3 % (ref 40–54)
HGB BLD-MCNC: 14.3 G/DL (ref 14–18)
MCH RBC QN AUTO: 29.1 PG (ref 27–34)
MCHC RBC AUTO-ENTMCNC: 32.2 G/DL (ref 32–36)
MCV RBC AUTO: 91 FL (ref 80–100)
PLATELET # BLD AUTO: 248 THOU/UL (ref 140–440)
PMV BLD AUTO: 9.1 FL (ref 7–10)
RBC # BLD AUTO: 4.89 MILL/UL (ref 4.4–6.2)
WBC: 6.3 THOU/UL (ref 4–11)

## 2019-09-12 ENCOUNTER — ANESTHESIA - HEALTHEAST (OUTPATIENT)
Dept: CARDIOLOGY | Facility: CLINIC | Age: 64
End: 2019-09-12

## 2019-09-30 ENCOUNTER — OFFICE VISIT - HEALTHEAST (OUTPATIENT)
Dept: CARDIOLOGY | Facility: CLINIC | Age: 64
End: 2019-09-30

## 2019-09-30 DIAGNOSIS — Z98.890 STATUS POST ABLATION OF ATRIAL FIBRILLATION: ICD-10-CM

## 2019-09-30 DIAGNOSIS — I10 ESSENTIAL HYPERTENSION: ICD-10-CM

## 2019-09-30 DIAGNOSIS — Z86.79 STATUS POST ABLATION OF ATRIAL FIBRILLATION: ICD-10-CM

## 2019-09-30 DIAGNOSIS — I48.19 PERSISTENT ATRIAL FIBRILLATION (H): ICD-10-CM

## 2019-09-30 ASSESSMENT — MIFFLIN-ST. JEOR: SCORE: 1990.33

## 2019-10-02 ENCOUNTER — COMMUNICATION - HEALTHEAST (OUTPATIENT)
Dept: FAMILY MEDICINE | Facility: CLINIC | Age: 64
End: 2019-10-02

## 2019-10-02 DIAGNOSIS — E03.9 HYPOTHYROIDISM: ICD-10-CM

## 2019-10-03 ENCOUNTER — RECORDS - HEALTHEAST (OUTPATIENT)
Dept: ADMINISTRATIVE | Facility: OTHER | Age: 64
End: 2019-10-03

## 2019-10-11 ENCOUNTER — COMMUNICATION - HEALTHEAST (OUTPATIENT)
Dept: CARDIOLOGY | Facility: CLINIC | Age: 64
End: 2019-10-11

## 2019-11-06 ENCOUNTER — OFFICE VISIT - HEALTHEAST (OUTPATIENT)
Dept: FAMILY MEDICINE | Facility: CLINIC | Age: 64
End: 2019-11-06

## 2019-11-06 DIAGNOSIS — Z01.818 PREOP EXAMINATION: ICD-10-CM

## 2019-11-06 DIAGNOSIS — Z96.651 HISTORY OF TOTAL RIGHT KNEE REPLACEMENT: ICD-10-CM

## 2019-11-06 DIAGNOSIS — I10 ESSENTIAL HYPERTENSION: ICD-10-CM

## 2019-11-06 DIAGNOSIS — I48.19 PERSISTENT ATRIAL FIBRILLATION (H): ICD-10-CM

## 2019-11-06 LAB
ANION GAP SERPL CALCULATED.3IONS-SCNC: 8 MMOL/L (ref 5–18)
BUN SERPL-MCNC: 21 MG/DL (ref 8–22)
CALCIUM SERPL-MCNC: 9.2 MG/DL (ref 8.5–10.5)
CHLORIDE BLD-SCNC: 106 MMOL/L (ref 98–107)
CO2 SERPL-SCNC: 28 MMOL/L (ref 22–31)
CREAT SERPL-MCNC: 0.94 MG/DL (ref 0.7–1.3)
ERYTHROCYTE [DISTWIDTH] IN BLOOD BY AUTOMATED COUNT: 13.6 % (ref 11–14.5)
GFR SERPL CREATININE-BSD FRML MDRD: >60 ML/MIN/1.73M2
GLUCOSE BLD-MCNC: 77 MG/DL (ref 70–125)
HCT VFR BLD AUTO: 46.6 % (ref 40–54)
HGB BLD-MCNC: 15.5 G/DL (ref 14–18)
MCH RBC QN AUTO: 30.4 PG (ref 27–34)
MCHC RBC AUTO-ENTMCNC: 33.2 G/DL (ref 32–36)
MCV RBC AUTO: 92 FL (ref 80–100)
PLATELET # BLD AUTO: 219 THOU/UL (ref 140–440)
PMV BLD AUTO: 8.1 FL (ref 7–10)
POTASSIUM BLD-SCNC: 4.8 MMOL/L (ref 3.5–5)
RBC # BLD AUTO: 5.09 MILL/UL (ref 4.4–6.2)
SODIUM SERPL-SCNC: 142 MMOL/L (ref 136–145)
WBC: 5.8 THOU/UL (ref 4–11)

## 2019-11-11 ENCOUNTER — COMMUNICATION - HEALTHEAST (OUTPATIENT)
Dept: FAMILY MEDICINE | Facility: CLINIC | Age: 64
End: 2019-11-11

## 2019-11-17 ENCOUNTER — COMMUNICATION - HEALTHEAST (OUTPATIENT)
Dept: FAMILY MEDICINE | Facility: CLINIC | Age: 64
End: 2019-11-17

## 2019-11-17 DIAGNOSIS — K20.90 ESOPHAGITIS: ICD-10-CM

## 2019-12-06 ENCOUNTER — RECORDS - HEALTHEAST (OUTPATIENT)
Dept: ADMINISTRATIVE | Facility: OTHER | Age: 64
End: 2019-12-06

## 2019-12-19 ENCOUNTER — COMMUNICATION - HEALTHEAST (OUTPATIENT)
Dept: CARDIOLOGY | Facility: CLINIC | Age: 64
End: 2019-12-19

## 2019-12-19 DIAGNOSIS — I10 BENIGN ESSENTIAL HYPERTENSION: ICD-10-CM

## 2019-12-30 ENCOUNTER — OFFICE VISIT - HEALTHEAST (OUTPATIENT)
Dept: CARDIOLOGY | Facility: CLINIC | Age: 64
End: 2019-12-30

## 2019-12-30 DIAGNOSIS — Z86.79 STATUS POST ABLATION OF ATRIAL FIBRILLATION: ICD-10-CM

## 2019-12-30 DIAGNOSIS — I48.0 PAROXYSMAL ATRIAL FIBRILLATION (H): ICD-10-CM

## 2019-12-30 DIAGNOSIS — I48.19 PERSISTENT ATRIAL FIBRILLATION (H): ICD-10-CM

## 2019-12-30 DIAGNOSIS — Z98.890 STATUS POST ABLATION OF ATRIAL FIBRILLATION: ICD-10-CM

## 2019-12-30 DIAGNOSIS — I10 ESSENTIAL HYPERTENSION: ICD-10-CM

## 2019-12-30 DIAGNOSIS — I10 BENIGN ESSENTIAL HYPERTENSION: ICD-10-CM

## 2019-12-30 ASSESSMENT — MIFFLIN-ST. JEOR: SCORE: 1991.95

## 2020-01-07 ENCOUNTER — RECORDS - HEALTHEAST (OUTPATIENT)
Dept: ADMINISTRATIVE | Facility: OTHER | Age: 65
End: 2020-01-07

## 2020-02-07 ENCOUNTER — RECORDS - HEALTHEAST (OUTPATIENT)
Dept: ADMINISTRATIVE | Facility: OTHER | Age: 65
End: 2020-02-07

## 2020-02-21 ENCOUNTER — RECORDS - HEALTHEAST (OUTPATIENT)
Dept: ADMINISTRATIVE | Facility: OTHER | Age: 65
End: 2020-02-21

## 2020-04-08 ENCOUNTER — COMMUNICATION - HEALTHEAST (OUTPATIENT)
Dept: FAMILY MEDICINE | Facility: CLINIC | Age: 65
End: 2020-04-08

## 2020-04-08 DIAGNOSIS — E03.9 HYPOTHYROIDISM: ICD-10-CM

## 2020-05-15 ENCOUNTER — COMMUNICATION - HEALTHEAST (OUTPATIENT)
Dept: CARDIOLOGY | Facility: CLINIC | Age: 65
End: 2020-05-15

## 2020-05-18 ENCOUNTER — COMMUNICATION - HEALTHEAST (OUTPATIENT)
Dept: VASCULAR SURGERY | Facility: CLINIC | Age: 65
End: 2020-05-18

## 2020-05-29 ENCOUNTER — COMMUNICATION - HEALTHEAST (OUTPATIENT)
Dept: VASCULAR SURGERY | Facility: CLINIC | Age: 65
End: 2020-05-29

## 2020-06-01 ENCOUNTER — OFFICE VISIT - HEALTHEAST (OUTPATIENT)
Dept: VASCULAR SURGERY | Facility: CLINIC | Age: 65
End: 2020-06-01

## 2020-06-01 ENCOUNTER — RECORDS - HEALTHEAST (OUTPATIENT)
Dept: VASCULAR ULTRASOUND | Facility: CLINIC | Age: 65
End: 2020-06-01

## 2020-06-01 DIAGNOSIS — M79.605 PAIN IN LEFT LEG: ICD-10-CM

## 2020-06-01 DIAGNOSIS — M24.561 KNEE CONTRACTURE, RIGHT: ICD-10-CM

## 2020-06-01 DIAGNOSIS — M79.604 LEG PAIN, BILATERAL: ICD-10-CM

## 2020-06-01 DIAGNOSIS — M79.89 OTHER SPECIFIED SOFT TISSUE DISORDERS: ICD-10-CM

## 2020-06-01 DIAGNOSIS — L90.5 SCAR CONDITION AND FIBROSIS OF SKIN: ICD-10-CM

## 2020-06-01 DIAGNOSIS — M79.604 PAIN IN RIGHT LEG: ICD-10-CM

## 2020-06-01 DIAGNOSIS — M79.604 RIGHT LEG PAIN: ICD-10-CM

## 2020-06-01 DIAGNOSIS — I89.0 LYMPHEDEMA, NOT ELSEWHERE CLASSIFIED: ICD-10-CM

## 2020-06-01 DIAGNOSIS — M79.89 LEG SWELLING: ICD-10-CM

## 2020-06-01 DIAGNOSIS — M79.605 LEG PAIN, BILATERAL: ICD-10-CM

## 2020-06-01 DIAGNOSIS — I87.303 CHRONIC VENOUS HYPERTENSION (IDIOPATHIC) WITHOUT COMPLICATIONS OF BILATERAL LOWER EXTREMITY: ICD-10-CM

## 2020-06-01 DIAGNOSIS — R14.0 ABDOMINAL DISTENTION: ICD-10-CM

## 2020-06-01 DIAGNOSIS — I89.0 ACQUIRED LYMPHEDEMA OF LEG: ICD-10-CM

## 2020-06-01 DIAGNOSIS — R26.89 FUNCTIONAL GAIT ABNORMALITY: ICD-10-CM

## 2020-06-01 DIAGNOSIS — M21.70 ACQUIRED LEG LENGTH DISCREPANCY: ICD-10-CM

## 2020-06-01 DIAGNOSIS — I87.303 VENOUS HYPERTENSION OF LOWER EXTREMITY, BILATERAL: ICD-10-CM

## 2020-06-01 LAB — PSA SERPL-MCNC: 1.3 NG/ML (ref 0–4.5)

## 2020-06-01 ASSESSMENT — MIFFLIN-ST. JEOR: SCORE: 1973.8

## 2020-06-02 ENCOUNTER — AMBULATORY - HEALTHEAST (OUTPATIENT)
Dept: VASCULAR SURGERY | Facility: CLINIC | Age: 65
End: 2020-06-02

## 2020-06-02 ENCOUNTER — COMMUNICATION - HEALTHEAST (OUTPATIENT)
Dept: OTHER | Facility: CLINIC | Age: 65
End: 2020-06-02

## 2020-06-02 ENCOUNTER — COMMUNICATION - HEALTHEAST (OUTPATIENT)
Dept: VASCULAR SURGERY | Facility: CLINIC | Age: 65
End: 2020-06-02

## 2020-06-03 ENCOUNTER — COMMUNICATION - HEALTHEAST (OUTPATIENT)
Dept: VASCULAR SURGERY | Facility: CLINIC | Age: 65
End: 2020-06-03

## 2020-06-05 ENCOUNTER — OFFICE VISIT - HEALTHEAST (OUTPATIENT)
Dept: PHYSICAL THERAPY | Facility: REHABILITATION | Age: 65
End: 2020-06-05

## 2020-06-05 DIAGNOSIS — I89.0 LYMPHEDEMA: ICD-10-CM

## 2020-06-09 ENCOUNTER — COMMUNICATION - HEALTHEAST (OUTPATIENT)
Dept: PHYSICAL THERAPY | Facility: REHABILITATION | Age: 65
End: 2020-06-09

## 2020-07-20 ENCOUNTER — RECORDS - HEALTHEAST (OUTPATIENT)
Dept: ADMINISTRATIVE | Facility: OTHER | Age: 65
End: 2020-07-20

## 2020-07-24 ENCOUNTER — AMBULATORY - HEALTHEAST (OUTPATIENT)
Dept: CARDIOLOGY | Facility: CLINIC | Age: 65
End: 2020-07-24

## 2020-07-30 ENCOUNTER — COMMUNICATION - HEALTHEAST (OUTPATIENT)
Dept: CARDIOLOGY | Facility: CLINIC | Age: 65
End: 2020-07-30

## 2020-07-31 ENCOUNTER — OFFICE VISIT - HEALTHEAST (OUTPATIENT)
Dept: CARDIOLOGY | Facility: CLINIC | Age: 65
End: 2020-07-31

## 2020-07-31 DIAGNOSIS — I10 ESSENTIAL HYPERTENSION: ICD-10-CM

## 2020-07-31 DIAGNOSIS — I48.19 PERSISTENT ATRIAL FIBRILLATION (H): ICD-10-CM

## 2020-07-31 DIAGNOSIS — Z98.890 STATUS POST ABLATION OF ATRIAL FIBRILLATION: ICD-10-CM

## 2020-07-31 DIAGNOSIS — Z86.79 STATUS POST ABLATION OF ATRIAL FIBRILLATION: ICD-10-CM

## 2020-08-28 ENCOUNTER — COMMUNICATION - HEALTHEAST (OUTPATIENT)
Dept: VASCULAR SURGERY | Facility: CLINIC | Age: 65
End: 2020-08-28

## 2020-08-31 ENCOUNTER — OFFICE VISIT - HEALTHEAST (OUTPATIENT)
Dept: VASCULAR SURGERY | Facility: CLINIC | Age: 65
End: 2020-08-31

## 2020-08-31 ENCOUNTER — COMMUNICATION - HEALTHEAST (OUTPATIENT)
Dept: SCHEDULING | Facility: CLINIC | Age: 65
End: 2020-08-31

## 2020-08-31 ENCOUNTER — COMMUNICATION - HEALTHEAST (OUTPATIENT)
Dept: VASCULAR SURGERY | Facility: CLINIC | Age: 65
End: 2020-08-31

## 2020-08-31 ENCOUNTER — COMMUNICATION - HEALTHEAST (OUTPATIENT)
Dept: CARDIOLOGY | Facility: CLINIC | Age: 65
End: 2020-08-31

## 2020-08-31 DIAGNOSIS — L90.5 SCAR CONDITION AND FIBROSIS OF SKIN: ICD-10-CM

## 2020-08-31 DIAGNOSIS — I89.0 ACQUIRED LYMPHEDEMA OF LEG: ICD-10-CM

## 2020-08-31 DIAGNOSIS — R26.89 FUNCTIONAL GAIT ABNORMALITY: ICD-10-CM

## 2020-08-31 DIAGNOSIS — M24.561 KNEE CONTRACTURE, RIGHT: ICD-10-CM

## 2020-08-31 DIAGNOSIS — M21.70 ACQUIRED LEG LENGTH DISCREPANCY: ICD-10-CM

## 2020-08-31 DIAGNOSIS — M79.89 LEG SWELLING: ICD-10-CM

## 2020-08-31 DIAGNOSIS — I87.303 VENOUS HYPERTENSION OF LOWER EXTREMITY, BILATERAL: ICD-10-CM

## 2020-08-31 ASSESSMENT — MIFFLIN-ST. JEOR: SCORE: 1971.08

## 2020-09-01 ENCOUNTER — OFFICE VISIT - HEALTHEAST (OUTPATIENT)
Dept: FAMILY MEDICINE | Facility: CLINIC | Age: 65
End: 2020-09-01

## 2020-09-01 ENCOUNTER — COMMUNICATION - HEALTHEAST (OUTPATIENT)
Dept: ADMINISTRATIVE | Facility: CLINIC | Age: 65
End: 2020-09-01

## 2020-09-01 DIAGNOSIS — I10 ESSENTIAL HYPERTENSION: ICD-10-CM

## 2020-09-01 DIAGNOSIS — R00.1 BRADYCARDIA: ICD-10-CM

## 2020-09-02 ENCOUNTER — OFFICE VISIT - HEALTHEAST (OUTPATIENT)
Dept: FAMILY MEDICINE | Facility: CLINIC | Age: 65
End: 2020-09-02

## 2020-09-02 DIAGNOSIS — I10 ESSENTIAL HYPERTENSION: ICD-10-CM

## 2020-09-02 DIAGNOSIS — L63.9 AA (ALOPECIA AREATA): ICD-10-CM

## 2020-09-08 ENCOUNTER — AMBULATORY - HEALTHEAST (OUTPATIENT)
Dept: OTHER | Facility: CLINIC | Age: 65
End: 2020-09-08

## 2020-09-09 ENCOUNTER — COMMUNICATION - HEALTHEAST (OUTPATIENT)
Dept: FAMILY MEDICINE | Facility: CLINIC | Age: 65
End: 2020-09-09

## 2020-09-11 ENCOUNTER — AMBULATORY - HEALTHEAST (OUTPATIENT)
Dept: VASCULAR SURGERY | Facility: CLINIC | Age: 65
End: 2020-09-11

## 2020-09-11 DIAGNOSIS — I89.0 ACQUIRED LYMPHEDEMA OF LEG: ICD-10-CM

## 2020-09-11 DIAGNOSIS — M79.89 LEG SWELLING: ICD-10-CM

## 2020-09-22 ENCOUNTER — AMBULATORY - HEALTHEAST (OUTPATIENT)
Dept: OTHER | Facility: CLINIC | Age: 65
End: 2020-09-22

## 2020-10-02 ENCOUNTER — AMBULATORY - HEALTHEAST (OUTPATIENT)
Dept: FAMILY MEDICINE | Facility: CLINIC | Age: 65
End: 2020-10-02

## 2020-10-02 ENCOUNTER — COMMUNICATION - HEALTHEAST (OUTPATIENT)
Dept: FAMILY MEDICINE | Facility: CLINIC | Age: 65
End: 2020-10-02

## 2020-10-02 DIAGNOSIS — I10 ESSENTIAL HYPERTENSION: ICD-10-CM

## 2020-10-02 DIAGNOSIS — I10 BENIGN ESSENTIAL HYPERTENSION: ICD-10-CM

## 2020-10-02 RX ORDER — LISINOPRIL 40 MG/1
TABLET ORAL
Qty: 90 TABLET | Refills: 3 | Status: SHIPPED | OUTPATIENT
Start: 2020-10-02 | End: 2021-09-22

## 2020-10-06 ENCOUNTER — COMMUNICATION - HEALTHEAST (OUTPATIENT)
Dept: FAMILY MEDICINE | Facility: CLINIC | Age: 65
End: 2020-10-06

## 2020-10-06 DIAGNOSIS — E03.9 HYPOTHYROIDISM: ICD-10-CM

## 2020-10-18 ENCOUNTER — AMBULATORY - HEALTHEAST (OUTPATIENT)
Dept: FAMILY MEDICINE | Facility: CLINIC | Age: 65
End: 2020-10-18

## 2020-10-18 DIAGNOSIS — I10 ESSENTIAL HYPERTENSION: ICD-10-CM

## 2020-11-10 ENCOUNTER — COMMUNICATION - HEALTHEAST (OUTPATIENT)
Dept: VASCULAR SURGERY | Facility: CLINIC | Age: 65
End: 2020-11-10

## 2020-11-11 ENCOUNTER — AMBULATORY - HEALTHEAST (OUTPATIENT)
Dept: FAMILY MEDICINE | Facility: CLINIC | Age: 65
End: 2020-11-11

## 2020-11-11 DIAGNOSIS — I10 ESSENTIAL HYPERTENSION: ICD-10-CM

## 2020-11-12 ENCOUNTER — AMBULATORY - HEALTHEAST (OUTPATIENT)
Dept: VASCULAR SURGERY | Facility: CLINIC | Age: 65
End: 2020-11-12

## 2020-11-12 DIAGNOSIS — M79.89 LEG SWELLING: ICD-10-CM

## 2020-11-12 DIAGNOSIS — R14.0 ABDOMINAL DISTENTION: ICD-10-CM

## 2020-11-13 ENCOUNTER — HOSPITAL ENCOUNTER (OUTPATIENT)
Dept: CT IMAGING | Facility: CLINIC | Age: 65
Discharge: HOME | End: 2020-11-13
Attending: PHYSICAL MEDICINE & REHABILITATION

## 2020-11-13 DIAGNOSIS — M79.89 LEG SWELLING: ICD-10-CM

## 2020-11-13 DIAGNOSIS — R14.0 ABDOMINAL DISTENTION: ICD-10-CM

## 2020-11-17 ENCOUNTER — COMMUNICATION - HEALTHEAST (OUTPATIENT)
Dept: VASCULAR SURGERY | Facility: CLINIC | Age: 65
End: 2020-11-17

## 2020-11-20 ENCOUNTER — OFFICE VISIT - HEALTHEAST (OUTPATIENT)
Dept: FAMILY MEDICINE | Facility: CLINIC | Age: 65
End: 2020-11-20

## 2020-11-20 DIAGNOSIS — L63.9 AA (ALOPECIA AREATA): ICD-10-CM

## 2020-11-20 DIAGNOSIS — K21.9 GASTROESOPHAGEAL REFLUX DISEASE WITHOUT ESOPHAGITIS: ICD-10-CM

## 2020-11-20 DIAGNOSIS — K20.90 ESOPHAGITIS: ICD-10-CM

## 2020-11-20 DIAGNOSIS — I48.19 PERSISTENT ATRIAL FIBRILLATION (H): ICD-10-CM

## 2020-11-20 DIAGNOSIS — E03.9 HYPOTHYROIDISM: ICD-10-CM

## 2020-11-20 DIAGNOSIS — Z00.00 PHYSICAL EXAM: ICD-10-CM

## 2020-11-20 DIAGNOSIS — I89.0 LYMPHEDEMA: ICD-10-CM

## 2020-11-20 DIAGNOSIS — Z23 NEED FOR VACCINATION: ICD-10-CM

## 2020-11-20 DIAGNOSIS — I10 ESSENTIAL HYPERTENSION: ICD-10-CM

## 2020-11-20 DIAGNOSIS — E66.01 MORBID OBESITY (H): ICD-10-CM

## 2020-11-20 LAB
ANION GAP SERPL CALCULATED.3IONS-SCNC: 8 MMOL/L (ref 5–18)
BUN SERPL-MCNC: 25 MG/DL (ref 8–22)
CALCIUM SERPL-MCNC: 9.4 MG/DL (ref 8.5–10.5)
CHLORIDE BLD-SCNC: 105 MMOL/L (ref 98–107)
CHOLEST SERPL-MCNC: 147 MG/DL
CO2 SERPL-SCNC: 29 MMOL/L (ref 22–31)
CREAT SERPL-MCNC: 0.92 MG/DL (ref 0.7–1.3)
FASTING STATUS PATIENT QL REPORTED: YES
GFR SERPL CREATININE-BSD FRML MDRD: >60 ML/MIN/1.73M2
GLUCOSE BLD-MCNC: 104 MG/DL (ref 70–125)
HDLC SERPL-MCNC: 43 MG/DL
LDLC SERPL CALC-MCNC: 82 MG/DL
POTASSIUM BLD-SCNC: 4.9 MMOL/L (ref 3.5–5)
SODIUM SERPL-SCNC: 142 MMOL/L (ref 136–145)
TRIGL SERPL-MCNC: 111 MG/DL
TSH SERPL DL<=0.005 MIU/L-ACNC: 0.33 UIU/ML (ref 0.3–5)

## 2020-11-20 ASSESSMENT — MIFFLIN-ST. JEOR: SCORE: 1937.39

## 2020-11-23 ENCOUNTER — COMMUNICATION - HEALTHEAST (OUTPATIENT)
Dept: FAMILY MEDICINE | Facility: CLINIC | Age: 65
End: 2020-11-23

## 2020-11-24 ENCOUNTER — RECORDS - HEALTHEAST (OUTPATIENT)
Dept: ADMINISTRATIVE | Facility: OTHER | Age: 65
End: 2020-11-24

## 2020-12-11 ENCOUNTER — COMMUNICATION - HEALTHEAST (OUTPATIENT)
Dept: FAMILY MEDICINE | Facility: CLINIC | Age: 65
End: 2020-12-11

## 2020-12-15 ENCOUNTER — AMBULATORY - HEALTHEAST (OUTPATIENT)
Dept: FAMILY MEDICINE | Facility: CLINIC | Age: 65
End: 2020-12-15

## 2020-12-15 DIAGNOSIS — I89.0 LYMPHEDEMA: ICD-10-CM

## 2020-12-21 ENCOUNTER — COMMUNICATION - HEALTHEAST (OUTPATIENT)
Dept: SCHEDULING | Facility: CLINIC | Age: 65
End: 2020-12-21

## 2020-12-21 ENCOUNTER — NURSE TRIAGE (OUTPATIENT)
Dept: NURSING | Facility: CLINIC | Age: 65
End: 2020-12-21

## 2020-12-22 ENCOUNTER — RECORDS - HEALTHEAST (OUTPATIENT)
Dept: ADMINISTRATIVE | Facility: OTHER | Age: 65
End: 2020-12-22

## 2020-12-29 ENCOUNTER — COMMUNICATION - HEALTHEAST (OUTPATIENT)
Dept: FAMILY MEDICINE | Facility: CLINIC | Age: 65
End: 2020-12-29

## 2020-12-29 DIAGNOSIS — E03.9 HYPOTHYROIDISM: ICD-10-CM

## 2020-12-30 ENCOUNTER — OFFICE VISIT - HEALTHEAST (OUTPATIENT)
Dept: FAMILY MEDICINE | Facility: CLINIC | Age: 65
End: 2020-12-30

## 2020-12-30 DIAGNOSIS — I89.0 LYMPHEDEMA: ICD-10-CM

## 2020-12-30 DIAGNOSIS — H53.2 DOUBLE VISION: ICD-10-CM

## 2020-12-30 RX ORDER — LEVOTHYROXINE SODIUM 112 UG/1
TABLET ORAL
Qty: 90 TABLET | Refills: 3 | Status: SHIPPED | OUTPATIENT
Start: 2020-12-30 | End: 2021-11-23 | Stop reason: DRUGHIGH

## 2020-12-31 ENCOUNTER — COMMUNICATION - HEALTHEAST (OUTPATIENT)
Dept: FAMILY MEDICINE | Facility: CLINIC | Age: 65
End: 2020-12-31

## 2021-01-06 ENCOUNTER — TELEPHONE (OUTPATIENT)
Dept: PHYSICAL MEDICINE AND REHAB | Facility: CLINIC | Age: 66
End: 2021-01-06

## 2021-01-06 NOTE — TELEPHONE ENCOUNTER
[facility] Call Center    Phone Message    May a detailed message be left on voicemail: yes     Reason for Call: Appointment Intake    Referring Provider Name: Dr. Magdaleno Bennett  Diagnosis and/or Symptoms: Lymphedema    Floresita calling from [facility] in regards to patient. Wondering if any MD in PM&R clinic sees for Lymphedema.     Diagnosis not listed on protocols.    Please advise and call Floresita at your earliest convenience to discuss further     Action Taken: Other: [facility] PM&R    Travel Screening: Not Applicable

## 2021-01-07 NOTE — TELEPHONE ENCOUNTER
Attempted to reach Floresita at the number provided. The phone continues to ring- no VM or answer.    We do not see patients for diagnosis listed below. Dr. Susy Orlando, at [place], may be a good fit. This is per Dr. Johnston, one of the PMR providers within our clinic.

## 2021-01-12 ENCOUNTER — COMMUNICATION - HEALTHEAST (OUTPATIENT)
Dept: CARDIOLOGY | Facility: CLINIC | Age: 66
End: 2021-01-12

## 2021-01-12 DIAGNOSIS — I48.19 PERSISTENT ATRIAL FIBRILLATION (H): ICD-10-CM

## 2021-01-27 ENCOUNTER — OFFICE VISIT - HEALTHEAST (OUTPATIENT)
Dept: CARDIOLOGY | Facility: CLINIC | Age: 66
End: 2021-01-27

## 2021-01-27 DIAGNOSIS — Z86.79 STATUS POST ABLATION OF ATRIAL FIBRILLATION: ICD-10-CM

## 2021-01-27 DIAGNOSIS — Z98.890 STATUS POST ABLATION OF ATRIAL FIBRILLATION: ICD-10-CM

## 2021-01-27 DIAGNOSIS — I10 ESSENTIAL HYPERTENSION: ICD-10-CM

## 2021-01-27 DIAGNOSIS — I48.19 PERSISTENT ATRIAL FIBRILLATION (H): ICD-10-CM

## 2021-01-28 ENCOUNTER — AMBULATORY - HEALTHEAST (OUTPATIENT)
Dept: CARDIOLOGY | Facility: CLINIC | Age: 66
End: 2021-01-28

## 2021-01-28 DIAGNOSIS — Z00.6 EXAMINATION OF PARTICIPANT IN CLINICAL TRIAL: ICD-10-CM

## 2021-01-28 ASSESSMENT — MIFFLIN-ST. JEOR: SCORE: 1977.57

## 2021-02-01 ENCOUNTER — COMMUNICATION - HEALTHEAST (OUTPATIENT)
Dept: FAMILY MEDICINE | Facility: CLINIC | Age: 66
End: 2021-02-01

## 2021-02-01 DIAGNOSIS — I10 ESSENTIAL HYPERTENSION: ICD-10-CM

## 2021-02-18 ENCOUNTER — AMBULATORY - HEALTHEAST (OUTPATIENT)
Dept: CARDIOLOGY | Facility: CLINIC | Age: 66
End: 2021-02-18

## 2021-02-18 DIAGNOSIS — Z00.6 EXAMINATION OF PARTICIPANT IN CLINICAL TRIAL: ICD-10-CM

## 2021-02-18 ASSESSMENT — MIFFLIN-ST. JEOR: SCORE: 1951.12

## 2021-02-24 ENCOUNTER — RECORDS - HEALTHEAST (OUTPATIENT)
Dept: ADMINISTRATIVE | Facility: OTHER | Age: 66
End: 2021-02-24

## 2021-03-05 ENCOUNTER — AMBULATORY - HEALTHEAST (OUTPATIENT)
Dept: CARDIOLOGY | Facility: CLINIC | Age: 66
End: 2021-03-05

## 2021-03-05 DIAGNOSIS — Z00.6 EXAMINATION OF PARTICIPANT IN CLINICAL TRIAL: ICD-10-CM

## 2021-03-05 ASSESSMENT — MIFFLIN-ST. JEOR: SCORE: 1977.2

## 2021-03-08 ENCOUNTER — COMMUNICATION - HEALTHEAST (OUTPATIENT)
Dept: CARDIOLOGY | Facility: CLINIC | Age: 66
End: 2021-03-08

## 2021-03-08 DIAGNOSIS — Z98.890 STATUS POST ABLATION OF ATRIAL FIBRILLATION: ICD-10-CM

## 2021-03-08 DIAGNOSIS — Z86.79 STATUS POST ABLATION OF ATRIAL FIBRILLATION: ICD-10-CM

## 2021-03-09 RX ORDER — APIXABAN 5 MG/1
TABLET, FILM COATED ORAL
Qty: 180 TABLET | Refills: 1 | Status: SHIPPED | OUTPATIENT
Start: 2021-03-09 | End: 2021-09-20

## 2021-03-18 ENCOUNTER — RECORDS - HEALTHEAST (OUTPATIENT)
Dept: ADMINISTRATIVE | Facility: OTHER | Age: 66
End: 2021-03-18

## 2021-03-26 ENCOUNTER — RECORDS - HEALTHEAST (OUTPATIENT)
Dept: ADMINISTRATIVE | Facility: OTHER | Age: 66
End: 2021-03-26

## 2021-04-05 ENCOUNTER — AMBULATORY - HEALTHEAST (OUTPATIENT)
Dept: NURSING | Facility: CLINIC | Age: 66
End: 2021-04-05

## 2021-04-16 ENCOUNTER — COMMUNICATION - HEALTHEAST (OUTPATIENT)
Dept: CARDIOLOGY | Facility: CLINIC | Age: 66
End: 2021-04-16

## 2021-04-16 DIAGNOSIS — I48.19 PERSISTENT ATRIAL FIBRILLATION (H): ICD-10-CM

## 2021-04-16 RX ORDER — SOTALOL HYDROCHLORIDE 80 MG/1
TABLET ORAL
Qty: 180 TABLET | Refills: 1 | Status: SHIPPED | OUTPATIENT
Start: 2021-04-16 | End: 2021-09-20

## 2021-04-26 ENCOUNTER — AMBULATORY - HEALTHEAST (OUTPATIENT)
Dept: NURSING | Facility: CLINIC | Age: 66
End: 2021-04-26

## 2021-05-04 ENCOUNTER — COMMUNICATION - HEALTHEAST (OUTPATIENT)
Dept: FAMILY MEDICINE | Facility: CLINIC | Age: 66
End: 2021-05-04

## 2021-05-04 DIAGNOSIS — I10 ESSENTIAL HYPERTENSION: ICD-10-CM

## 2021-05-04 RX ORDER — HYDROCHLOROTHIAZIDE 25 MG/1
TABLET ORAL
Qty: 90 TABLET | Refills: 0 | Status: SHIPPED | OUTPATIENT
Start: 2021-05-04 | End: 2021-09-01

## 2021-05-21 ENCOUNTER — RECORDS - HEALTHEAST (OUTPATIENT)
Dept: ADMINISTRATIVE | Facility: OTHER | Age: 66
End: 2021-05-21

## 2021-05-27 NOTE — TELEPHONE ENCOUNTER
Spoke to Dr Barfield  He is willing to see pt on 4/26/19 at 11am during his vacation time  He also requests that pt get MR of Aorta to eval AAA  Pt was called  Discussed above information  He again voiced his concerns that this timeframe was very tight and has concerns all of this will be completed by his surgery  Reassurance was given, and discussed limits and set limits with pt regarding acceptable time frame of above  Pt verbalized understanding  Mary

## 2021-05-27 NOTE — TELEPHONE ENCOUNTER
Bobby EGAN from pt  He is having hip replacement surgery on 5/8/19, and his PMD is requesting cardiac clearance prior to surgery  Pt has hx of PVI from 2013, and denies reoccurrence  He does have a hx of Ascending Aortic Aneurysm, last eval by MR 8/23/16-There is a small size of mid ascending aortic aneurysm with maximal measurement of 4.1 cm, no evidence of dissection or coarctation.  He was last seen by Dr Sheets in Oct 2017, and was requested to be seen for yrly f/u in Oct 2018 with yourself  Also every 2-3 yr eval of AAA  Pt is quite upset about issues that he has had in setting up his follow up and requested appointments, this is directed to scheduling leadership to follow up on  I apologized for the frustration and difficulty  He is requesting input from only you or Dr Sheets    Would you advise another MR of the aorta prior to surgery?  Also would you be able to see him after for clearance? Or would he not need to come in for clearance?    Please advise  Thank you  Mary    ----- Message from Magda Smith sent at 4/2/2019  2:58 PM CDT -----  Contact: PATIENT  General phone call:  PATIENT WOULD LIKE DR SHEETS TO CALL HIM PERSONALLY BECAUSE HE IS VERY ANGRY WITH HIS STAFF.  PATIENT STATES THAT HE HAS CALLED THREE TIMES, ALL OF WHICH DR SHEETS EITHER DID NOT HAVE A CALENDAR AVAILABLE OR HIS CALENDAR WAS BOOKED AND HIS STAFF WOULD NOT SCHEDULE HIM FOR A FOLLOW UP. HE ALSO NEEDS TO BE SEEN BETWEEN April 16TH  AND PRIOR TO MAY 8TH, AT WHICH TIME HE IS HAVING HIP SURGERY. I DID OFFER  TO SCHEDULE HIM WITH A GENERAL CARDIOLOGIST FOR A SURGERY CLEARANCE AND PATIENT REFUSED. PLEASE HAVE DR SHEETS CALL PATIENT. THANKS, KARLI  Caller: PATIENT  Primary cardiologist: DR SHEETS  Detailed reason for call: SEE ABOVE  New or active symptoms? NO  Best phone number: [phone]  Best time to contact: ANY TIME  Ok to leave a detailed message? UNKNOWN  Device? NO    Additional Info:

## 2021-05-27 NOTE — TELEPHONE ENCOUNTER
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

## 2021-05-28 ENCOUNTER — OFFICE VISIT - HEALTHEAST (OUTPATIENT)
Dept: FAMILY MEDICINE | Facility: CLINIC | Age: 66
End: 2021-05-28

## 2021-05-28 DIAGNOSIS — I48.19 PERSISTENT ATRIAL FIBRILLATION (H): ICD-10-CM

## 2021-05-28 DIAGNOSIS — H26.9 CATARACT OF BOTH EYES, UNSPECIFIED CATARACT TYPE: ICD-10-CM

## 2021-05-28 DIAGNOSIS — Z01.818 PREOP EXAMINATION: ICD-10-CM

## 2021-05-28 DIAGNOSIS — I10 ESSENTIAL HYPERTENSION: ICD-10-CM

## 2021-05-28 NOTE — TELEPHONE ENCOUNTER
Phone call from patient, he is currently admitted to [facility] post hip replacement.  He states that the Orthopedic nurse from [facility] questioned why he was on ASA 81 mg and Eliquis.  Pt was seen by Dr. Barfield on 4-26-19 and started on Eliquis 5 mg for recent  recurrence of afib.    Will review necessity of ASA with Dr. Barfield and call patient with recommendations.    Pt is scheduled to see Glenna Cardenas on 6-25-19.

## 2021-05-28 NOTE — ANESTHESIA POSTPROCEDURE EVALUATION
Patient: Brett Hopkins  RIGHT TOTAL HIP ARTHROPLASTY DIRECT ANTERIOR APPROACH  Anesthesia type: spinal    Patient location: PACU  Last vitals:   Vitals Value Taken Time   /80 5/8/2019 12:50 PM   Temp 36.6  C (97.8  F) 5/8/2019 12:24 PM   Pulse 90 5/8/2019 12:58 PM   Resp 11 5/8/2019 12:58 PM   SpO2 98 % 5/8/2019 12:58 PM   Vitals shown include unvalidated device data.  Post vital signs: stable  Level of consciousness: awake and responds to simple questions  Post-anesthesia pain: pain controlled  Post-anesthesia nausea and vomiting: no  Pulmonary: unassisted, return to baseline  Cardiovascular: stable and blood pressure at baseline  Hydration: adequate  Anesthetic events: no    QCDR Measures:  ASA# 11 - [place]-op Cardiac Arrest: ASA11B - Patient did NOT experience unanticipated cardiac arrest  ASA# 12 - [place]-op Mortality Rate: ASA12B - Patient did NOT die  ASA# 13 - PACU Re-Intubation Rate: ASA13B - Patient did NOT require a new airway mgmt  ASA# 10 - Composite Anes Safety: ASA10A - No serious adverse event    Additional Notes:

## 2021-05-28 NOTE — PROGRESS NOTES
Preoperative Exam    Scheduled Procedure: R hip replacement   Surgery Date:  5/8/2019   Surgery Location: [facility], fax [phone]    Surgeon:  Dr. Jamison     Assessment/Plan:     1. Preop examination  For right total hip arthroplasty.  - Basic Metabolic Panel  - HM2(CBC w/o Differential)  - Electrocardiogram Perform and Read    2. Primary osteoarthritis of right hip  Patient now with known osteoarthritis, and pain symptoms from the right hip.  - Basic Metabolic Panel  - HM2(CBC w/o Differential)  - Electrocardiogram Perform and Read    3. Paroxysmal atrial fibrillation (H)  Patient had an ablation in 2013 but he is now in atrial fibrillation with rapid ventricular response, asymptomatic.  It is not clear how long the patient has been now in atrial fibrillation.    - metoprolol tartrate (LOPRESSOR) 25 MG tablet; Take 1 pill twice daily for atrial fibrillation.  Dispense: 60 tablet; Refill: 0     PLAN:  1.  Twelve-lead EKG reviewed, patient is in atrial fibrillation with rapid ventricular response with a rate of 119.  2.  Reinstitute metoprolol 25 mg twice daily  3.  I discussed with the patient that it is not clear to me if he will be able to have surgery or not it largely depends on whether or not he remains in atrial fibrillation.  4.  The patient has a follow-up with Dr. Barfield on April 26, for reevaluation of his atrial fibrillation also Dr. Barfield is to have input as to whether or not the patient will be able to undergo surgery, or if he needs other therapy interventions or work-up.  5.  CBC and basic metabolic profile, also add TSH.  Labs essentially normal see below.  6.  If the patient does not fact undergo right hip arthroplasty as scheduled May 8 he is been instructed to hold aspirin and all NSAIDs 1 week prior to surgery, but he can continue on the levothyroxine as well as omeprazole.  7.  Of note, the patient is not yet cleared for surgery, he will need cardiology input as to whether or not he  can have his upcoming right hip arthroplasty or not given his recurrence of atrial fibrillation.      Surgical Procedure Risk: Low (reported cardiac risk generally < 1%)  Have you had prior anesthesia?: Yes  Have you or any family members had a previous anesthesia reaction:  No  Do you or any family members have a history of a clotting or bleeding disorder?: No  Cardiac Risk Assessment: no increased risk for major cardiac complications    Patient is not approved for surgery Patient will need cardiology clearance to determine if he still is a candidate for surgery.    Please Note:  No history of any surgical or anesthetic complications.    Functional Status: Independent  Patient plans to recover in hopsital for 1-2 days and then going home      Subjective:      Brett Hopkins is a 63 y.o. male who presents for a preoperative consultation.  Patient is having right total hip replacement in 15 days (5/08/19). Patient explains that he has been experiencing right thigh pain so he went to an orthopedic specialist. The pain was found to be due to his right hip, not his right knee. He denies any issues with anesthesia during previous surgeries. His blood pressure is 148/106 in the office today. 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 He has a history of atrial fibrillation. He is unable to tell when he is in it.     PFSH:  Surgery: He had right total knee replacement.   Family: He has a strong family history of atrial fibrillation. Two of his siblings have had strokes.     All other systems reviewed and are negative, other than those listed in the HPI.    Pertinent History  Do you have difficulty breathing or chest pain after walking up a flight of stairs: No  History of obstructive sleep apnea: No  Steroid use in the last 6 months: No  Frequent Aspirin/NSAID use: Yes: takes daily ASA   Prior Blood Transfusion: No  Prior Blood Transfusion Reaction: N/A   If for some  reason prior to, during or after the procedure, if it is medically indicated, would you be willing to have a blood transfusion?:  There is no transfusion refusal.    Current Outpatient Medications   Medication Sig Dispense Refill     acetaminophen (TYLENOL) 500 MG tablet Take 1,000 mg by mouth every 6 (six) hours as needed for pain.       aspirin 81 MG EC tablet Take 81 mg by mouth daily.       levothyroxine (SYNTHROID, LEVOTHROID) 112 MCG tablet Take 1 tablet (112 mcg total) by mouth daily. Needs to be seen for more refills. 90 tablet 3     naproxen sodium (ALEVE) 220 MG tablet Take 440 mg by mouth 2 (two) times a day with meals.       omeprazole (PRILOSEC) 20 MG capsule Take 1 capsule (20 mg total) by mouth daily. 90 capsule 3     metoprolol tartrate (LOPRESSOR) 25 MG tablet Take 1 pill twice daily for atrial fibrillation. 60 tablet 0     No current facility-administered medications for this visit.         Allergies   Allergen Reactions     Sulfa (Sulfonamide Antibiotics) Rash     Rash - turned purple    Around age 32       Patient Active Problem List   Diagnosis     Obesity     Varicose Veins     Paroxysmal atrial fibrillation (H)     Aneurysm Of The Ascending Aorta     Hypothyroidism     Status post ablation of atrial fibrillation     GERD (gastroesophageal reflux disease)     AA (alopecia areata)       Past Medical History:   Diagnosis Date     Disease of thyroid gland      Status post ablation of atrial fibrillation 07/28/2015    PVI August 19, 2013 (cryo-PVI only)       Past Surgical History:   Procedure Laterality Date     RI ABLATE HEART DYSRHYTHM FOCUS  08/19/2013    Description: Catheter Ablation Atrial Fibrillation;  Recorded: 11/16/2013;  Comments: PVI Aug 19, 2013 (Cryo to all 4 PV's)     RI DENTAL SURGERY PROCEDURE      Description: Oral Surgery Tooth Extraction;  Recorded: 08/12/2013;     RI TOTAL KNEE ARTHROPLASTY Right 6/21/2017    Procedure: 2)  RIGHT TOTAL KNEE ARTHROPLASTY;  Surgeon: Mario  Kacy VICKERS MD;  Location: [facility] Main OR;  Service: Orthopedics       Social History     Socioeconomic History     Marital status:      Spouse name: Not on file     Number of children: 2     Years of education: Not on file     Highest education level: Not on file   Occupational History     Occupation: CPA   Social Needs     Financial resource strain: Not on file     Food insecurity:     Worry: Not on file     Inability: Not on file     Transportation needs:     Medical: Not on file     Non-medical: Not on file   Tobacco Use     Smoking status: Never Smoker     Smokeless tobacco: Never Used   Substance and Sexual Activity     Alcohol use: Yes     Alcohol/week: 4.2 oz     Types: 7 Cans of beer per week     Drug use: No     Sexual activity: Yes     Partners: Female   Lifestyle     Physical activity:     Days per week: Not on file     Minutes per session: Not on file     Stress: Not on file   Relationships     Social connections:     Talks on phone: Not on file     Gets together: Not on file     Attends Gnosticist service: Not on file     Active member of club or organization: Not on file     Attends meetings of clubs or organizations: Not on file     Relationship status: Not on file     Intimate partner violence:     Fear of current or ex partner: Not on file     Emotionally abused: Not on file     Physically abused: Not on file     Forced sexual activity: Not on file   Other Topics Concern     Not on file   Social History Narrative    Diet-regular        Exercise-personal emelia, walk       Patient Care Team:  Magdaleno Bennett MD as PCP - General (Family Medicine)          Objective:     Vitals:    04/23/19 0746   BP: (!) 148/106   Pulse: (!) 112   SpO2: 98%   Weight: (!) 271 lb 12.8 oz (123.3 kg)         Physical Exam:  Constitutional:   Reveals a healthy appearing male.  Vitals: per nursing notes.  HEENT: Right Ear: External ear normal.   Left Ear: External ear normal.   Nose: Nose normal.   Mouth/Throat:  Oropharynx is clear and moist.   Eyes: Conjunctivae and EOM are normal. Pupils are equal, round, and reactive to light. Right eye exhibits no discharge. Left eye exhibits no discharge.   Neck:  Supple, no carotid bruits or adenopathy.  Back:  No spine or CVA pain.  Thorax:  No bony deformities.  Lungs: Clear to A&P without rales or wheezes.  Respiratory effort normal.  Cardiac:   Heart rate upper 90s some occasional irregular beats.   Abdomen:  Soft, active bowel sounds without bruits, mass, or tenderness.  Extremities:   No peripheral edema, pulses in the feet intact.    Skin:  No jaundice, peripheral cyanosis or lesions to suggest malignancy.  Neuro:  Alert and oriented. Cranial nerves, motor, sensory exams are intact.  No gross focal deficits.  Psychiatric:  Memory intact, mood appropriate.    ADDITIONAL HISTORY SUMMARIZED (2): Reviewed orthopedic note from 1/10/19: Significant arthritis of right knee.   DECISION TO OBTAIN EXTRA INFORMATION (1): None.   RADIOLOGY TESTS (1): None.  LABS (1): Ordered labs.  MEDICINE TESTS (1): Ordered EKG.  INDEPENDENT REVIEW (2 each): Reviewed EKG: I personally reviewed the twelve-lead EKG as well as official cardiology interpretation, atrial fibrillation with rapid ventricular response.    Total data points = 6    Total time was 12 minutes, greater than 50% counseling and coordinating care regarding the above issues.      There are no Patient Instructions on file for this visit.    EKG: I personally reviewed the twelve-lead EKG as well as the official cardiology interpretation.  Atrial fibrillation with rapid ventricular response.    Labs:  Recent Results (from the past 48 hour(s))   Electrocardiogram Perform and Read    Collection Time: 04/23/19  8:11 AM   Result Value Ref Range    SYSTOLIC BLOOD PRESSURE  mmHg    DIASTOLIC BLOOD PRESSURE  mmHg    VENTRICULAR RATE 119 BPM    ATRIAL RATE 108 BPM    P-R INTERVAL  ms    QRS DURATION 72 ms    Q-T INTERVAL 322 ms    QTC CALCULATION  (BEZET) 452 ms    P Axis  degrees    R AXIS -17 degrees    T AXIS 29 degrees    MUSE DIAGNOSIS       Atrial fibrillation with rapid ventricular response  Possible Inferior infarct (cited on or before 26-OCT-2007)  Abnormal ECG  When compared with ECG of 02-JUN-2017 09:35,  Atrial fibrillation has replaced Sinus rhythm  Vent. rate has increased BY  65 BPM  Confirmed by DEEPAK GIANG MD LOC:[facility] ([id number]) on 4/23/2019 4:20:40 PM     Basic Metabolic Panel    Collection Time: 04/23/19  8:16 AM   Result Value Ref Range    Sodium 143 136 - 145 mmol/L    Potassium 4.7 3.5 - 5.0 mmol/L    Chloride 106 98 - 107 mmol/L    CO2 29 22 - 31 mmol/L    Anion Gap, Calculation 8 5 - 18 mmol/L    Glucose 110 70 - 125 mg/dL    Calcium 9.4 8.5 - 10.5 mg/dL    BUN 25 (H) 8 - 22 mg/dL    Creatinine 1.07 0.70 - 1.30 mg/dL    GFR MDRD Af Amer >60 >60 mL/min/1.73m2    GFR MDRD Non Af Amer >60 >60 mL/min/1.73m2   HM2(CBC w/o Differential)    Collection Time: 04/23/19  8:16 AM   Result Value Ref Range    WBC 6.7 4.0 - 11.0 thou/uL    RBC 5.03 4.40 - 6.20 mill/uL    Hemoglobin 15.2 14.0 - 18.0 g/dL    Hematocrit 46.5 40.0 - 54.0 %    MCV 93 80 - 100 fL    MCH 30.2 27.0 - 34.0 pg    MCHC 32.6 32.0 - 36.0 g/dL    RDW 12.8 11.0 - 14.5 %    Platelets 222 140 - 440 thou/uL    MPV 8.2 7.0 - 10.0 fL   Thyroid Stimulating Hormone (TSH)    Collection Time: 04/23/19  8:16 AM   Result Value Ref Range    TSH 3.39 0.30 - 5.00 uIU/mL        Immunization History   Administered Date(s) Administered     DT (pediatric) 05/25/2004     Influenza, Seasonal, Inj PF IIV3 11/05/2010, 01/16/2012     Influenza, inj, historic,unspecified 10/19/2007, 11/23/2012, 10/03/2016     Influenza, seasonal,quad inj 36+ mos 09/10/2018     Influenza, seasonal,quad inj 6-35 mos 10/07/2014     Influenza,seasonal quad, PF, 36+MOS 12/21/2015     Td,adult,historic,unspecified 06/25/2008     Tdap 06/25/2008, 09/10/2018       By signing my name below, I, Yeison Vargas, attest that this  documentation has been prepared under the direction and in the presence of Dr. Magdaleno Bennett.  Electronic Signature: Donna Maldonado. 4/23/2019 7:48 AM.    I, Dr. Bennett, personally performed the services described in this documentation. All medical record entries made by the scribe were at my direction and in my presence. I have reviewed the chart and discharge instructions (if applicable) and agree that the record reflects my personal performance and is accurate and complete.      Electronically signed by Magdaleno Bennett MD 04/23/19 7:40 AM

## 2021-05-28 NOTE — PROGRESS NOTES
"TCM DISCHARGE FOLLOW UP CALL    Discharge Date:  5/9/2019  Reason for hospital stay (discharge diagnosis)::  Right YA  Are you feeling better, the same or worse since your discharge?:  Patient is feeling better (Went outside today, fresh air felt good. Last night had \"horrible pain,\" could hardly walk, worse after laying in bed, better after moving to his recliner; slept 4 hrs. Today moving hourly, doing home exercises. Icing. Had a good BM this morning.)  Do you feel like you have a plan in the event of a health emergency?: Yes (Wife.  RN informed pt of 24/7 nurse triage)    As part of your discharge plan, were  home care services ordered for you?: No    Did you receive any new medications, or was there a change to your medications?: Yes    Are you taking those medications, or do you have any established regiment?:  RN reviewed discharge medications w/pt.  Pt states he is taking 1 oxycodone q 6 hours, and 2 ES Tylenol q 6 hours - takes it about an hour after oxycodone, senna-docusate twice a day & Miralax once a day as prescribed.  RN explained total daily Tylenol dose is 3000 mg/24 hours, & advised he cut back ES Tylenol from 2 tabs q 6 hours to 2 tabs q 8 hours.  Pt verbalized understanding & agrees w/plan.   Do you have any follow up visits scheduled with your PCP or Specialist?:  Yes, with PCP and Yes, with Specialist (Dr. Bennett 5/13/19)  (RN) Is PCP appt scheduled soon enough (within 14 days of discharge date)?: Yes    Who are you seeing and when is it scheduled?:  Dr. Woodruff (ortho) 5/17/19  RN NOTES::  PT scheduled twice next week at [facility]      Tena Colon RN Care Manager, Population Health    "

## 2021-05-28 NOTE — PROGRESS NOTES
ASSESSMENT:  1. Aftercare following hip joint replacement surgery, unspecified laterality  Patient is now status post right total hip arthroplasty overall doing well and as expected.    2. Hypertension  Now well controlled.  - metoprolol tartrate (LOPRESSOR) 50 MG tablet; Take one twice daily for heart rate and blood pressure  Dispense: 60 tablet; Refill: 1    3. Paroxysmal atrial fibrillation (H)  Patient with recent diagnosis of recurrence of atrial fibrillation, he is still in atrial fibrillation with rapid ventricular response with pulse in the low 100s, asymptomatic.  - metoprolol tartrate (LOPRESSOR) 50 MG tablet; Take one twice daily for heart rate and blood pressure  Dispense: 60 tablet; Refill: 1        PLAN:  1.  Patient is going to be doing outpatient physical therapy for his right hip arthroplasty.  2.  Increase metoprolol from 25 mg to 50 mg twice daily, and discussed with the patient the possibility of hypotension.  3.  The patient has follow-up with cardiology end of June.  4.  I would like to see the patient in 2 to 3 weeks, follow-up on heart rate blood pressure and overall symptoms.    No orders of the defined types were placed in this encounter.    There are no discontinued medications.    Return in about 3 months (around 8/13/2019) for Recheck.    CHIEF COMPLAINT:  Chief Complaint   Patient presents with     Follow-up     from hip replacement        SUBJECTIVE:  Brett is a 63 y.o. male who presents for a right hip replacement follow-up. Patient had right total hip replacement five days ago and was in the hospital from 5/08/19-5/09/19. He states that his recovery is going as expected. He has been taking half a 5 mg tablet of oxycodone every six hours for the pain. He has been walking short distances and he starts physical therapy tomorrow. He denies any bowel issues following surgery.      Patient is in atrial fibrillation. He is being followed by cardiology and has a follow-up appointment with  the atrial fibrillation clinic next month. He is unable to feel when he is in atrial fibrillation. He denies any side effects from his blood pressure medications.    REVIEW OF SYSTEMS:   All other systems are negative.    PFSH:  Family: He has a family history of atrial fibrillation.     Immunization History   Administered Date(s) Administered     DT (pediatric) 05/25/2004     Influenza, Seasonal, Inj PF IIV3 11/05/2010, 01/16/2012     Influenza, inj, historic,unspecified 10/19/2007, 11/23/2012, 10/03/2016     Influenza, seasonal,quad inj 36+ mos 09/10/2018     Influenza, seasonal,quad inj 6-35 mos 10/07/2014     Influenza,seasonal quad, PF, 36+MOS 12/21/2015     Td,adult,historic,unspecified 06/25/2008     Tdap 06/25/2008, 09/10/2018     Social History     Socioeconomic History     Marital status:      Spouse name: Not on file     Number of children: 2     Years of education: Not on file     Highest education level: Not on file   Occupational History     Occupation: CPA   Social Needs     Financial resource strain: Not on file     Food insecurity:     Worry: Not on file     Inability: Not on file     Transportation needs:     Medical: Not on file     Non-medical: Not on file   Tobacco Use     Smoking status: Never Smoker     Smokeless tobacco: Never Used   Substance and Sexual Activity     Alcohol use: Yes     Alcohol/week: 4.2 oz     Types: 7 Cans of beer per week     Drug use: No     Sexual activity: Yes     Partners: Female   Lifestyle     Physical activity:     Days per week: Not on file     Minutes per session: Not on file     Stress: Not on file   Relationships     Social connections:     Talks on phone: Not on file     Gets together: Not on file     Attends Tenriism service: Not on file     Active member of club or organization: Not on file     Attends meetings of clubs or organizations: Not on file     Relationship status: Not on file     Intimate partner violence:     Fear of current or ex partner:  Not on file     Emotionally abused: Not on file     Physically abused: Not on file     Forced sexual activity: Not on file   Other Topics Concern     Not on file   Social History Narrative    Diet-regular        Exercise-personal emelia, walk     Past Medical History:   Diagnosis Date     Aortic aneurysm (H)      Benign essential hypertension 4/26/2019     Dx Apr 2019     Disease of thyroid gland      Hiatal hernia      Status post ablation of atrial fibrillation 07/28/2015    PVI August 19, 2013 (cryo-PVI only)     Family History   Problem Relation Age of Onset     Atrial fibrillation Mother      Dementia Mother         dx 80s     Diabetes type II Mother         dx 60s/70s     Atrial fibrillation Father      Rheum arthritis Father      Atrial fibrillation Sister      Stroke Sister      Atrial fibrillation Brother      Rheum arthritis Brother      Diabetes type II Brother         dx 40s     Stroke Brother      Atrial fibrillation Brother      Parkinsonism Sister        MEDICATIONS:  Current Outpatient Medications   Medication Sig Dispense Refill     apixaban (ELIQUIS) 5 mg Tab tablet Take 1 tablet (5 mg total) by mouth 2 (two) times a day. 60 tablet 3     aspirin 81 MG EC tablet Take 1 tablet (81 mg total) by mouth daily.  0     bisacodyl (DULCOLAX) 10 mg suppository Take if no bowel movement in 2- 3 days for relief of constipation.  0     levothyroxine (SYNTHROID, LEVOTHROID) 112 MCG tablet Take 1 tablet (112 mcg total) by mouth daily. Needs to be seen for more refills. 90 tablet 3     lisinopril (PRINIVIL) 10 MG tablet Take 0.5 tablets (5 mg total) by mouth daily. 30 tablet 3     metoprolol tartrate (LOPRESSOR) 25 MG tablet Take 1 pill twice daily for atrial fibrillation. 60 tablet 0     omeprazole (PRILOSEC) 20 MG capsule Take 1 capsule (20 mg total) by mouth daily. 90 capsule 3     oxyCODONE (ROXICODONE) 5 MG immediate release tablet Take 0.5-1 tablets (2.5-5 mg total) by mouth every 6 (six) hours. 20 tablet 0      polyethylene glycol (MIRALAX) 17 gram packet Take 1 packet (17 g total) by mouth daily.  0     senna-docusate (PERICOLACE) 8.6-50 mg tablet Take 1 tablet by mouth 2 (two) times a day.  0     acetaminophen (TYLENOL) 500 MG tablet Take 2 tablets (1,000 mg total) by mouth 3 (three) times a day.  0     metoprolol tartrate (LOPRESSOR) 50 MG tablet Take one twice daily for heart rate and blood pressure 60 tablet 1     No current facility-administered medications for this visit.        TOBACCO USE:  Social History     Tobacco Use   Smoking Status Never Smoker   Smokeless Tobacco Never Used       VITALS:  Vitals:    05/13/19 1320   BP: 114/77   Pulse: (!) 104   SpO2: 94%   Weight: (!) 268 lb 6.4 oz (121.7 kg)     Wt Readings from Last 3 Encounters:   05/13/19 (!) 268 lb 6.4 oz (121.7 kg)   04/22/19 (!) 260 lb (117.9 kg)   04/26/19 (!) 271 lb (122.9 kg)       PHYSICAL EXAM:  Constitutional:   Reveals a healthy appearing male.  Vitals: per nursing notes.  HEENT:  Ears:  External canals, TMs clear.    Eyes:  EOMs full, PERRL.  Lungs: Clear to A&P without rales or wheezes.  Respiratory effort normal.  Cardiac: Rate low 100s, irregular.   Musculoskeletal: No peripheral swelling.  Neuro:  Alert and oriented. Cranial nerves, motor, sensory exams are intact.  No gross focal deficits.  Psychiatric:  Memory intact, mood appropriate.    QUALITY MEASURES:      DATA REVIEWED:  Additional History from Old Records Summarized (2): Reviewed cardiology note from 4/26/19: Atrial fibrillation and hypertension. Follow-up in atrial fibrillation clinic in 6-8 weeks.   Decision to Obtain Records (1):   Radiology Tests Summarized or Ordered (1):   Labs Reviewed or Ordered (1):   Medicine Test Summarized or Ordered (1):   Independent Review of EKG, X-RAY, or RAPID STREP (2 each):     The visit lasted a total of 17 minutes face to face with the patient. Over 50% of the time was spent counseling and educating the patient about his above  concerns.    By signing my name below, I, Yeison Vargas, attest that this documentation has been prepared under the direction and in the presence of Dr. Magdaleno Bennett.  Electronic Signature: Donna Maldonado. 5/13/2019 1:20 PM.    I, Dr. Bennett, personally performed the services described in this documentation. All medical record entries made by the scribe were at my direction and in my presence. I have reviewed the chart and discharge instructions (if applicable) and agree that the record reflects my personal performance and is accurate and complete.      Total data points: 2

## 2021-05-28 NOTE — PROGRESS NOTES
[facility] HEART [facility]  Arrhythmia Clinic  Benedictort MADELAINE Barfield    Referring:      Assessment:         Persistent atrial fibrillation: The patient underwent ablation of his atrial fibrillation approximately 5 years ago.  On recent presentation to his primary care physician, Dr. Magdaleno Bennett he was noted to be back in atrial fibrillation and asymptomatic at that time.  Review of the patient's Fit Bit data suggests that he has been in it at least since mid March.  The addition of beta-blocker therapy is up to slow his ventricular response which is reasonable at this point.  He does have a [id number] score of 1 and we talked about oral anticoagulant therapy.  He is agreeable to starting that therapy today.    Essential hypertension: The patient's blood pressure has been off and on elevated and certainly demonstrates both systolic and diastolic hypertension today.  His recent visit with Dr. Bennett also demonstrates elevation of his blood pressure.  I do think that he should begin medical therapy.    Ascending aortic aneurysm: The patient's recent cardiac MRI demonstrates stable ascending aortic dilatation at 41 mm.  Left ventricular function is normal.      Recommendations:    Okay to proceed with planned hip replacement surgery in 10 days.    Initiate Eliquis 5 mg twice daily.  The patient will stop his Eliquis 3 days prior to his planned surgery and I will ask the orthopedic service to resume his Eliquis as soon as possible post procedure.      Initiate lisinopril 5 mg daily.  The patient should follow-up with Dr. Bennett for ongoing management of his antihypertensive medication.    Follow-up in the atrial fibrillation clinic in 6-8 weeks.  It may be reasonable to have the patient undergo cardioversion to see if he notes any symptomatic change.    Patient Active Problem List   Diagnosis     Obesity     Varicose Veins     Paroxysmal atrial fibrillation (H)     Aneurysm Of The Ascending Aorta     Hypothyroidism      Status post ablation of atrial fibrillation     GERD (gastroesophageal reflux disease)     AA (alopecia areata)     Benign essential hypertension       Subjective:  Brett Hopkins (63 y.o. male) returns to the arrhythmia clinic for interval reevaluation of his atrial fibrillation status.  The patient is approximately 5 and half years out from his ablation procedure to treat his atrial fibrillation.  The patient reported no symptoms of recurrent palpitations.  His initial presentation in atrial fibrillation was dyspnea on exertion with rapid ventricular response with exercise.  He has had none of those symptoms.  When he was seen by Dr. Bennett the patient was noted to be in atrial fibrillation at that time.  The patient does wear a Fit Bit monitor and I reviewed the previous 4-5 weeks of data and it suggests he has been in atrial fibrillation all the time.  Metoprolol was added to his medical regimen and this is been tolerated.  He said no lightheadedness presyncope or syncope.  He has had no chest pain or pressure.    Current Outpatient Medications   Medication Sig Dispense Refill     acetaminophen (TYLENOL) 500 MG tablet Take 1,000 mg by mouth every 6 (six) hours as needed for pain.       aspirin 81 MG EC tablet Take 81 mg by mouth daily.       levothyroxine (SYNTHROID, LEVOTHROID) 112 MCG tablet Take 1 tablet (112 mcg total) by mouth daily. Needs to be seen for more refills. 90 tablet 3     metoprolol tartrate (LOPRESSOR) 25 MG tablet Take 1 pill twice daily for atrial fibrillation. 60 tablet 0     naproxen sodium (ALEVE) 220 MG tablet Take 440 mg by mouth 2 (two) times a day with meals.       omeprazole (PRILOSEC) 20 MG capsule Take 1 capsule (20 mg total) by mouth daily. 90 capsule 3     apixaban (ELIQUIS) 5 mg Tab tablet Take 1 tablet (5 mg total) by mouth 2 (two) times a day. 60 tablet 3     lisinopril (PRINIVIL) 10 MG tablet Take 0.5 tablets (5 mg total) by mouth daily. 30 tablet 3     No current  "facility-administered medications for this visit.        Review of Systems:   General: WNL  Eyes: WNL  Ears/Nose/Throat: WNL  Lungs: WNL  Heart: Irregular Heartbeat  Stomach: WNL  Bladder: WNL  Muscle/Joints: WNL  Skin: WNL  Nervous System: WNL  Mental Health: WNL     Blood: WNL    Family History  Family History   Problem Relation Age of Onset     Atrial fibrillation Mother      Dementia Mother         dx 80s     Diabetes type II Mother         dx 60s/70s     Atrial fibrillation Father      Rheum arthritis Father      Atrial fibrillation Sister      Stroke Sister      Atrial fibrillation Brother      Rheum arthritis Brother      Diabetes type II Brother         dx 40s     Stroke Brother      Atrial fibrillation Brother      Parkinsonism Sister        Social History   reports that he has never smoked. He has never used smokeless tobacco. He reports that he drinks about 4.2 oz of alcohol per week. He reports that he does not use drugs.    Objective:   Vital signs in last 24 hours:  BP (!) 160/108 (Patient Site: Left Arm, Patient Position: Sitting, Cuff Size: Adult Large)   Pulse 60   Resp 16   Ht 5' 10\" (1.778 m)   Wt (!) 271 lb (122.9 kg)   BMI 38.88 kg/m    Weight: Weight: (!) 271 lb (122.9 kg)     Physical Exam:  General: The patient is alert oriented to person place and situation.  The patient is in no acute distress at the time of my evaluation.  Eyes: Pupils are equal, round, and reactive to light.  Conjunctiva and sclera are clear.  ENT: Oral mucosa is moist and without redness. No evident nasal discharge.  Pulmonary: Lungs are clear bilaterally with no rales, rhonchi, or wheezes.    Cardiovascular exam: Rhythm is irregular.  S1 and S2 are normal. No significant murmur is present. JVP is normal. Lower extremities demonstrate trace pretibial edema bilaterally.  Distal pulses are intact bilaterally.  Abdomen is obese, soft, and nontender.  Musculoskeletal: Spine is straight. Extremities without " deformity.  Neuro: Gait is stiff and somewhat painful with the patient favoring his right knee and hip.     Skin is warm, dry, and otherwise intact.      Cardiographics:   Twelve-lead EKG from his recent primary care visit is reviewed and demonstrates atrial fibrillation with a relatively controlled ventricular response.    Lab Results:   Lab Results   Component Value Date     04/23/2019    K 4.7 04/23/2019     04/23/2019    CO2 29 04/23/2019    BUN 25 (H) 04/23/2019    CREATININE 1.07 04/23/2019    CALCIUM 9.4 04/23/2019     Lab Results   Component Value Date    WBC 6.7 04/23/2019    HGB 15.2 04/23/2019    HCT 46.5 04/23/2019    MCV 93 04/23/2019     04/23/2019     Lab Results   Component Value Date    INR 1.04 06/22/2017         Outside record review:

## 2021-05-28 NOTE — ANESTHESIA PROCEDURE NOTES
Spinal Block    Start time: 5/8/2019 10:48 AM  End time: 5/8/2019 10:52 AM  Reason for block: primary anesthetic    Staffing:  Performing  Anesthesiologist: Henry Bonilla MD    Preanesthetic Checklist  Completed: patient identified, risks, benefits, and alternatives discussed, timeout performed, consent obtained, at patient's request, airway assessed, oxygen available, suction available, emergency drugs available and hand hygiene performed  Spinal Block  Patient position: sitting  Prep: ChloraPrep  Patient monitoring: heart rate, cardiac monitor, continuous pulse ox and blood pressure  Approach: midline  Location: L3-4  Injection technique: single-shot  Needle type: pencil-tip   Needle gauge: 24 G

## 2021-05-28 NOTE — PATIENT INSTRUCTIONS - HE
Stop Eliquis after Tavo May 5th morning dose.  Take Lisinopril tonight and then every morning  Check with Ortho clinic regarding whether to take metoprolol and lisinopril morning of your surgery.

## 2021-05-28 NOTE — PATIENT INSTRUCTIONS - HE
See us again in 2 to 3 weeks, if possible have pulse and blood pressure checked with a blood pressure cuff.

## 2021-05-28 NOTE — TELEPHONE ENCOUNTER
Pt is in clinic and waiting to hear back regarding msg below. Will someone please call patient to discuss this.

## 2021-05-28 NOTE — ANESTHESIA PREPROCEDURE EVALUATION
Anesthesia Evaluation      Patient summary reviewed   No history of anesthetic complications     Airway   Mallampati: II   Pulmonary - normal exam    breath sounds clear to auscultation                         Cardiovascular - normal exam  (+) hypertension, dysrhythmias, ,     Rhythm: regular  Rate: normal,      ROS comment: Ascending aortic aneurysm     Neuro/Psych - negative ROS     Endo/Other    (+) hypothyroidism,      GI/Hepatic/Renal    (+) hiatal hernia, GERD well controlled,             Dental                         Anesthesia Plan  Planned anesthetic: spinal    ASA 3     Anesthetic plan and risks discussed with: patient    Post-op plan: routine recovery

## 2021-05-29 NOTE — TELEPHONE ENCOUNTER
I did the renewal, 1-1/2 pills twice a day should be 3 pills a day or 90  for 1 month with 1 refill which should be adequate.

## 2021-05-29 NOTE — TELEPHONE ENCOUNTER
Discussed the below information with Dr. Barfield.  Pt to wear Holter monitor early next week especially during exercise to determine heart rates throughout the day, also to ensure the patient does not have any nocturnal bradycardia.    Phone call to patient and reviewed the below information, he states understanding.  Holter order placed and call transferred to scheduling.

## 2021-05-29 NOTE — TELEPHONE ENCOUNTER
Medication Question or Clarification  Who is calling: Pharmacy: [facility]  What medication are you calling about? (include dose and sig)   metoprolol tartrate (LOPRESSOR) 50 MG tablet 60 tablet 1 6/3/2019     Sig: Take 1-1/2 or 75 mg twice daily for heart rate and blood pressure        Who prescribed the medication?: Dr Bennett  What is your question/concern?: Pharmacy states dispense does not match the sig.  Please clarify and re send to pharmacy.  Pharmacy: [facility].  Okay to leave a detailed message?: Yes  Site CMT - Please call the pharmacy to obtain any additional needed information.

## 2021-05-29 NOTE — TELEPHONE ENCOUNTER
How many months would you like this medication to go out to? I pended 90 pills which is 2 months.

## 2021-05-29 NOTE — PROGRESS NOTES
ASSESSMENT:  1. Hypertension  Suboptimal control  - metoprolol tartrate (LOPRESSOR) 50 MG tablet; Take 1-1/2 or 75 mg twice daily for heart rate and blood pressure  Dispense: 60 tablet; Refill: 1    2. Paroxysmal atrial fibrillation (H)  Patient continues to be in atrial fibrillation with resting pulse rate in the low 100s.  - metoprolol tartrate (LOPRESSOR) 50 MG tablet; Take 1-1/2 or 75 mg twice daily for heart rate and blood pressure  Dispense: 60 tablet; Refill: 1        PLAN:  1.  Increase metoprolol from 50 mg twice a day to 75 mg twice daily.  2.  Patient has follow-up with cardiology later this month for his atrial fibrillation.  3.  I would anticipate seeing the patient sometime after cardiology but either July or August for follow-up.       No orders of the defined types were placed in this encounter.    Medications Discontinued During This Encounter   Medication Reason     senna-docusate (PERICOLACE) 8.6-50 mg tablet Therapy completed     polyethylene glycol (MIRALAX) 17 gram packet Therapy completed     oxyCODONE (ROXICODONE) 5 MG immediate release tablet Therapy completed     bisacodyl (DULCOLAX) 10 mg suppository Therapy completed     acetaminophen (TYLENOL) 500 MG tablet Therapy completed     metoprolol tartrate (LOPRESSOR) 50 MG tablet        Return in about 3 months (around 9/3/2019) for Next scheduled follow up.    CHIEF COMPLAINT:  Chief Complaint   Patient presents with     Hypertension     recheck from a few weeks ago        SUBJECTIVE:  Brett is a 63 y.o. male who presents for hypertension management follow-up. Patient's blood pressure is 137/92 in the office today. He recently went back into atrial fibrillation and has a follow-up cardiology appointment later this month. He usually cannot notice when he is in atrial fibrillation, however, last night he could feel his heart racing while he was sitting in his car.  He is currently on 50 mg of metoprolol twice daily. He had right total hip  replacement 3-4 weeks ago and recovery has been going well.     REVIEW OF SYSTEMS:   All other systems are negative.    PFSH:  Immunization History   Administered Date(s) Administered     DT (pediatric) 05/25/2004     Influenza, Seasonal, Inj PF IIV3 11/05/2010, 01/16/2012     Influenza, inj, historic,unspecified 10/19/2007, 11/23/2012, 10/03/2016     Influenza, seasonal,quad inj 36+ mos 09/10/2018     Influenza, seasonal,quad inj 6-35 mos 10/07/2014     Influenza,seasonal quad, PF, 36+MOS 12/21/2015     Td,adult,historic,unspecified 06/25/2008     Tdap 06/25/2008, 09/10/2018     Social History     Socioeconomic History     Marital status:      Spouse name: Not on file     Number of children: 2     Years of education: Not on file     Highest education level: Not on file   Occupational History     Occupation: CPA   Social Needs     Financial resource strain: Not on file     Food insecurity:     Worry: Not on file     Inability: Not on file     Transportation needs:     Medical: Not on file     Non-medical: Not on file   Tobacco Use     Smoking status: Never Smoker     Smokeless tobacco: Never Used   Substance and Sexual Activity     Alcohol use: Yes     Alcohol/week: 4.2 oz     Types: 7 Cans of beer per week     Drug use: No     Sexual activity: Yes     Partners: Female   Lifestyle     Physical activity:     Days per week: Not on file     Minutes per session: Not on file     Stress: Not on file   Relationships     Social connections:     Talks on phone: Not on file     Gets together: Not on file     Attends [employer] service: Not on file     Active member of club or organization: Not on file     Attends meetings of clubs or organizations: Not on file     Relationship status: Not on file     Intimate partner violence:     Fear of current or ex partner: Not on file     Emotionally abused: Not on file     Physically abused: Not on file     Forced sexual activity: Not on file   Other Topics Concern     Not on file    Social History Narrative    Diet-regular        Exercise-personal emelia, walk     Past Medical History:   Diagnosis Date     Aortic aneurysm (H)      Benign essential hypertension 4/26/2019     Dx Apr 2019     Disease of thyroid gland      Hiatal hernia      Status post ablation of atrial fibrillation 07/28/2015    PVI August 19, 2013 (cryo-PVI only)     Family History   Problem Relation Age of Onset     Atrial fibrillation Mother      Dementia Mother         dx 80s     Diabetes type II Mother         dx 60s/70s     Atrial fibrillation Father      Rheum arthritis Father      Atrial fibrillation Sister      Stroke Sister      Atrial fibrillation Brother      Rheum arthritis Brother      Diabetes type II Brother         dx 40s     Stroke Brother      Atrial fibrillation Brother      Parkinsonism Sister        MEDICATIONS:  Current Outpatient Medications   Medication Sig Dispense Refill     apixaban (ELIQUIS) 5 mg Tab tablet Take 1 tablet (5 mg total) by mouth 2 (two) times a day. 60 tablet 3     aspirin 81 MG EC tablet Take 1 tablet (81 mg total) by mouth daily.  0     levothyroxine (SYNTHROID, LEVOTHROID) 112 MCG tablet Take 1 tablet (112 mcg total) by mouth daily. Needs to be seen for more refills. 90 tablet 3     lisinopril (PRINIVIL) 10 MG tablet Take 0.5 tablets (5 mg total) by mouth daily. 30 tablet 3     metoprolol tartrate (LOPRESSOR) 50 MG tablet Take 1-1/2 or 75 mg twice daily for heart rate and blood pressure 60 tablet 1     omeprazole (PRILOSEC) 20 MG capsule Take 1 capsule (20 mg total) by mouth daily. 90 capsule 3     No current facility-administered medications for this visit.        TOBACCO USE:  Social History     Tobacco Use   Smoking Status Never Smoker   Smokeless Tobacco Never Used       VITALS:  Vitals:    06/03/19 1326   BP: (!) 137/92   Pulse: (!) 106   SpO2: 98%   Weight: (!) 270 lb 6.4 oz (122.7 kg)     Wt Readings from Last 3 Encounters:   06/03/19 (!) 270 lb 6.4 oz (122.7 kg)   05/13/19  (!) 268 lb 6.4 oz (121.7 kg)   04/22/19 (!) 260 lb (117.9 kg)       PHYSICAL EXAM:  Constitutional:   Reveals a healthy appearing male.  Vitals: per nursing notes.  HEENT:  Ears:  External canals, TMs clear.    Eyes:  EOMs full, PERRL.  Lungs: Clear to A&P without rales or wheezes.  Respiratory effort normal.  Cardiac:   Rate irregular, pulse around 110.  Musculoskeletal: No peripheral swelling.  Neuro:  Alert and oriented. Cranial nerves, motor, sensory exams are intact.  No gross focal deficits.  Psychiatric:  Memory intact, mood appropriate.    QUALITY MEASURES:      DATA REVIEWED:  Additional History from Old Records Summarized (2): Reviewed cardiology note from 4/26/19: Persistent atrial fibrillation, cleared for hip replacement surgery.   Decision to Obtain Records (1):   Radiology Tests Summarized or Ordered (1):   Labs Reviewed or Ordered (1):   Medicine Test Summarized or Ordered (1):   Independent Review of EKG, X-RAY, or RAPID STREP (2 each):     The visit lasted a total of 14 minutes face to face with the patient. Over 50% of the time was spent counseling and educating the patient about his above concerns.    By signing my name below, I, Yeison Vargas, attest that this documentation has been prepared under the direction and in the presence of Dr. Magdaleno Bennett.  Electronic Signature: Donna Maldonado. 6/03/2019 1:56 PM.    I, Dr. Bennett, personally performed the services described in this documentation. All medical record entries made by the scribe were at my direction and in my presence. I have reviewed the chart and discharge instructions (if applicable) and agree that the record reflects my personal performance and is accurate and complete.    Total data points: 2

## 2021-05-29 NOTE — TELEPHONE ENCOUNTER
Patient question reviewed.  Patient with documented recurrence of persistent atrial fibrillation and a [id number] score 1.  Recommended oral anticoagulant for this patient which was started.  History of vascular disease (ascending aortic aneurysm) as an indicator for low-dose aspirin therapy.

## 2021-05-29 NOTE — TELEPHONE ENCOUNTER
Return call from patient.  He states understanding of the below information.  He does mention that he is disappointed with the follow up regarding his question.  He has also had trouble with scheduling.    He also is wondering about his heart rates.  He was noted to be in afib prior to his hip surgery, was seen in clinic with Dr. Barfield on 4-26-19 and was instructed to start Eliquis 5 mg and follow up with an NP in 6-8 weeks.  Pt did have hip replacement on 5-8-19.  He has been trying to rehab his leg and is going to the [facility].  He notes yesterday that when he was doing passive ROM with a bike his heart rates elevated to 170 bpm.  He is concerned as he knows he needs to exercise his leg but is worried about his heart rates with exercise.    Pt continues to take Metoprolol Tartrate 75 mg two times a day and Lisinopril 5 mg.    His blood pressure has been normal, 120's systolic.  Pt is scheduled to see Glenna Cardenas on 6-25-19.  Will review with Dr. Barfield and call  patient with recommendations.

## 2021-05-29 NOTE — PATIENT INSTRUCTIONS - HE
Depending on the results of the cardiology visit, I would anticipate having to see you sometime in July or August at the latest.

## 2021-05-30 ENCOUNTER — RECORDS - HEALTHEAST (OUTPATIENT)
Dept: ADMINISTRATIVE | Facility: CLINIC | Age: 66
End: 2021-05-30

## 2021-05-30 NOTE — PROGRESS NOTES
Patient present to the clinic today for follow up post cardioversion. Cardioversion completed on 7/10/19. EKG shows patient is back in atrial fibrillation. Per Glenna Cardenas's cardioversion note if patient in a-fib on f/u EKG reschedule cardioversion. Patient notified of recommendations per Glenna. Patient verbalized understanding, but does not want to have another cardioversion and wondering what other options he has. Spoke with Glenna Cardenas CNP in clinic notified her of  patient's request for other options. Glenna recommended patient be seen by EP MD to discuss treatment options further. Patient notified of recommendations and agrees with plan. No further questions or concerns at this time.     Patient has appointment with Dr. Barfield on 8/21/19 to discuss options further.

## 2021-05-30 NOTE — ANESTHESIA PREPROCEDURE EVALUATION
Anesthesia Evaluation      Patient summary reviewed   No history of anesthetic complications     Airway   Mallampati: II  Neck ROM: full   Pulmonary - normal exam                          Cardiovascular   (+) hypertension, dysrhythmias, ,     ECG reviewed  Rhythm: irregular        Neuro/Psych    (+) neuromuscular disease,      Endo/Other    (+) hypothyroidism, obesity,      GI/Hepatic/Renal    (+) hiatal hernia, GERD,             Dental - normal exam                        Anesthesia Plan  Planned anesthetic: general mask    ASA 2   Induction: intravenous   Anesthetic plan and risks discussed with: patient    Post-op plan: routine recovery

## 2021-05-30 NOTE — ANESTHESIA POSTPROCEDURE EVALUATION
Patient: Brett Hopkins  * No procedures listed *  Anesthesia type: general    Patient location: Telemetry/Step Down Unit  Last vitals: No vitals data found for the desired time range.    Post vital signs: stable  Level of consciousness: awake and responds to simple questions  Post-anesthesia pain: pain controlled  Post-anesthesia nausea and vomiting: no  Pulmonary: unassisted, return to baseline  Cardiovascular: stable and blood pressure at baseline  Hydration: adequate  Anesthetic events: no    QCDR Measures:  ASA# 11 - Catina-op Cardiac Arrest: ASA11B - Patient did NOT experience unanticipated cardiac arrest  ASA# 12 - Catina-op Mortality Rate: ASA12B - Patient did NOT die  ASA# 13 - PACU Re-Intubation Rate: ASA13B - Patient did NOT require a new airway mgmt  ASA# 10 - Composite Anes Safety: ASA10A - No serious adverse event    Additional Notes:

## 2021-05-30 NOTE — ANESTHESIA CARE TRANSFER NOTE
Last vitals:   Vitals:    07/10/19 1317   BP: (!) 170/108   Pulse: 60   Resp: 24   Temp:    SpO2: 100%     Patient's level of consciousness is awake  Spontaneous respirations: yes  Maintains airway independently: yes  Dentition unchanged: yes  Oropharynx: oropharynx clear of all foreign objects    QCDR Measures:  ASA# 20 - Surgical Safety Checklist: WHO surgical safety checklist completed prior to induction    PQRS# 430 - Adult PONV Prevention: NA - Not adult patient, not GA or 3 or more risk factors NOT present  ASA# 8 - Peds PONV Prevention: NA - Not pediatric patient, not GA or 2 or more risk factors NOT present  PQRS# 424 - Catina-op Temp Management: NA - MAC anesthesia or case < 60 minutes  PQRS# 426 - PACU Transfer Protocol: - Transfer of care checklist used  ASA# 14 - Acute Post-op Pain: ASA14B - Patient did NOT experience pain >= 7 out of 10   Declined

## 2021-05-30 NOTE — PATIENT INSTRUCTIONS - HE
Brett Hopkins,    It was a pleasure to see you today at the [facility].     My recommendations after this visit include:    Continue Eliquis 5 mg two times a day.     Increase metoprolol to 100 mg two times a day to slow heart rate.    Plan for cardioversion after 7/4/2019.  AM of cardioversion, decrease metoprolol to 25 mg (1/2 tab)    Follow up in 4-5 weeks    Glenna Cardenas, [facility], Electrophysiology  [phone]  EP nurses [phone]

## 2021-05-30 NOTE — PROGRESS NOTES
[facility] HEART [facility]  Arrhythmia Clinic  Benedicto Barfield    Referring:      Assessment:         Persistent atrial fibrillation: The patient is 6 years out from his ablation procedure to treat his atrial fibrillation.  More recently the patient has had recurrence of atrial fibrillation and underwent cardioversion on July 10 of this year.  The patient had been placed on flecainide prior to that cardioversion however he had early reversion to atrial fibrillation.  The patient's symptoms are somewhat vague and it is not entirely clear to me whether or not he needs better rate control versus restoration of AV synchrony to treat his current symptoms.  As such I recommended initially a trial of rate control to see to what degree his symptoms improve.  If the patient is not significantly better than I think a trial of sinus rhythm using a would be reasonable for this patient.  Long-term if restoration of AV synchrony is successful in improving his symptoms then I would certainly offer a repeat ablation procedure.    Hypertension: The patient's blood pressure is at target on his current medical therapy.        Recommendations:    The patient will remain on current dose of beta-blocker and flecainide.    24-hour Holter monitor to assess his heart rate response to activities of daily living.    Patient will be contacted for further recommendations regarding control of his heart rate response as needed following the Holter monitor.    Follow-up as scheduled in the atrial for ablation clinic with Glenna Cardenas NP.  At that point a decision would be made as to whether or not he will move forward with a trial of sinus rhythm as outlined above.      Patient Active Problem List   Diagnosis     Obesity     Varicose Veins     Persistent atrial fibrillation (H)     Aneurysm Of The Ascending Aorta     Hypothyroidism     Status post ablation of atrial fibrillation     GERD (gastroesophageal reflux disease)     AA (alopecia  "areata)     Hypertension       Subjective:  Brett Hopkins (63 y.o. male) returns to the arrhythmia clinic for interval reevaluation of his persistent atrial fibrillation post ablation.  The patient is 6 years out from his pulmonary vein isolation procedure to treat his atrial fibrillation.  He has had recurrence of atrial fibrillation with most recent cardioversion in July of this year.  He had early reversion of atrial fibrillation within the first 15-20 minutes following his cardioversion procedure.  The patient's symptoms are somewhat vague and he states he feels \"overall fatigue sensation\" over the past few months.  He does report that his knee is not healing following total arthroplasty and that it is \"locking\".  He is not sleeping well and plans to follow-up with his orthopedic surgeon later this week.  He reports no chest pain or pressure.  He is not having any orthopnea PND or change in his trace peripheral edema.    Current Outpatient Medications   Medication Sig Dispense Refill     apixaban (ELIQUIS) 5 mg Tab tablet Take 1 tablet (5 mg total) by mouth 2 (two) times a day. 60 tablet 3     flecainide (TAMBOCOR) 100 MG tablet Take 1 tablet (100 mg total) by mouth 2 (two) times a day. 60 tablet 3     levothyroxine (SYNTHROID, LEVOTHROID) 112 MCG tablet Take 1 tablet (112 mcg total) by mouth daily. Needs to be seen for more refills. 90 tablet 3     lisinopril (PRINIVIL) 10 MG tablet Take 0.5 tablets (5 mg total) by mouth daily. 30 tablet 3     metoprolol tartrate (LOPRESSOR) 50 MG tablet Take 0.5 tablets (25 mg total) by mouth 2 (two) times a day. (Patient taking differently: Take 50 mg by mouth 2 (two) times a day.       ) 90 tablet 3     omeprazole (PRILOSEC) 20 MG capsule Take 1 capsule (20 mg total) by mouth daily. 90 capsule 3     No current facility-administered medications for this visit.        Review of Systems:   General: WNL  Eyes: WNL  Ears/Nose/Throat: WNL  Lungs: WNL  Heart: WNL  Stomach: " "WNL  Bladder: WNL  Muscle/Joints: WNL  Skin: WNL  Nervous System: WNL  Mental Health: WNL     Blood: WNL    Family History  Family History   Problem Relation Age of Onset     Atrial fibrillation Mother      Dementia Mother         dx 80s     Diabetes type II Mother         dx 60s/70s     Atrial fibrillation Father      Rheum arthritis Father      Atrial fibrillation Sister      Stroke Sister      Atrial fibrillation Brother      Rheum arthritis Brother      Diabetes type II Brother         dx 40s     Stroke Brother      Atrial fibrillation Brother      Parkinsonism Sister        Social History   reports that he has never smoked. He has never used smokeless tobacco. He reports that he drinks about 4.2 oz of alcohol per week. He reports that he does not use drugs.    Objective:   Vital signs in last 24 hours:  /72 (Patient Site: Left Arm, Patient Position: Sitting, Cuff Size: Adult Large)   Pulse 100   Resp 16   Ht 5' 10\" (1.778 m)   Wt (!) 262 lb (118.8 kg)   BMI 37.59 kg/m    Weight: Weight: (!) 262 lb (118.8 kg)     Physical Exam:  General: The patient is alert oriented to person place and situation.  The patient is in no acute distress at the time of my evaluation.  Eyes: Pupils are equal, round, and reactive to light.  Conjunctiva and sclera are clear.  ENT: Oral mucosa is moist and without redness. No evident nasal discharge.  Pulmonary: Lungs are clear bilaterally with no rales, rhonchi, or wheezes.    Cardiovascular exam: Rhythm is irregular. S1 and S2 are normal. No significant murmur is present. JVP is normal. Lower extremities demonstrate trace pretibial edema bilaterally.  Distal pulses are intact bilaterally.  Abdomen is morbidly obese, soft, and nontender.  Musculoskeletal: Spine is straight. Extremities without deformity.  Neuro: Gait is notable for a significant limp.     Skin is warm, dry, and otherwise intact.      Cardiographics:       Lab Results:   Lab Results   Component Value Date    "  04/23/2019    K 4.4 05/09/2019     04/23/2019    CO2 29 04/23/2019    BUN 25 (H) 04/23/2019    CREATININE 1.07 04/23/2019    CALCIUM 9.4 04/23/2019     Lab Results   Component Value Date    WBC 6.7 04/23/2019    HGB 13.6 (L) 05/09/2019    HCT 46.5 04/23/2019    MCV 93 04/23/2019     04/23/2019     Lab Results   Component Value Date    INR 1.04 06/22/2017         Outside record review:

## 2021-05-30 NOTE — PROGRESS NOTES
1955  Home:[phone] (home) Cell:[phone] (mobile)  Emergency Contact: Ankita Hopkins [phone]    +++Important patient information for [facility] [place] [place]/Cath Lab staff : None+++    [facility] EP Cath Lab Procedure Order   Cardioversion:  Cardioversion    Diagnosis:  AF  Anticipated Case Duration:  Standard  Scheduling Needs/Timeframe:  Pt ok to have scheduled after 7-4    Current Device: None None  Device Company/Device Rep Needed for Procedure: None    Anesthesia:  General-CV Only  Research Protocol:  No    [facility] EP Cath Lab Prep   Ordering Provider: Glenna Cardenas NP  Ordering Date: 6/25/2019  Orders Status: Intial order placed and Order set placed    H&P:  Compled by Glenna Cardenas  on 6-25-19 if scheduled within 30 days, pt to schedule with PMD if procedure outside of this timeframe  PCP: Magdaleno Bennett MD, [phone]    Pre-op Labs: Ordered AM of procedure    Medical Records Pertinent for Procedure:  Holter 6-17-19 persistent afib with elevated rates, Echo 6-13-19 EF 55%-in media, EKG 4-23-19 Afib @119 and Ablation Record PVI w/ SWA 8-19-13    Patient Education:    Teach with Patient: Will be completed via phone prior to procedure, and letter was also sent to pt via mail/Melodigram with written pre-procedural instructions.    Risks Reviewed:     Cardioversion    >90% acute success rate, <10% failure to convert or   reverts shortly after cardioversion.    <1% embolic event of (CVA, pulmonary embolism, or   other site).    75% risk for superficial burn.  Risks associated with general anesthesia will be addressed by the Anesthesiology Department    Consent: Will be obtained in [facility] [place] [place] day of procedure    Pre-Procedure Instructions that were Reviewed with Patient:  NPO after midnight, Remove all jewelry prior to coming in for procedure, Shower prior to arrival, Notified patient of time and date of procedure by CV , Transportation arrangements needed s/p procedure, Post-procedure follow up process, Sedation  plan/orders and Pre-procedure letter was sent to pt by CV     Pre-Procedure Medication Instructions:  Instructions given to pt regarding anticoagulants: Eliquis- instructed to continue anticoagulation uninterrupted through their procedure  Instructions given to pt regarding antiarrhythmic medication: Beta Blocker; Decrease dose of Metoprolol to 25 mg AM of procedure  Instructions for medication, other than anticoagulants/antiarrhythmics listed above, given to pt: to take all morning medications with small sips of water, with the exception of OTC supplements and MVI    Allergies   Allergen Reactions     Sulfa (Sulfonamide Antibiotics) Rash     Rash - turned purple    Around age 32       Current Outpatient Medications:      apixaban (ELIQUIS) 5 mg Tab tablet, Take 1 tablet (5 mg total) by mouth 2 (two) times a day., Disp: 60 tablet, Rfl: 3     aspirin 81 MG EC tablet, Take 1 tablet (81 mg total) by mouth daily., Disp: , Rfl: 0     levothyroxine (SYNTHROID, LEVOTHROID) 112 MCG tablet, Take 1 tablet (112 mcg total) by mouth daily. Needs to be seen for more refills., Disp: 90 tablet, Rfl: 3     lisinopril (PRINIVIL) 10 MG tablet, Take 0.5 tablets (5 mg total) by mouth daily., Disp: 30 tablet, Rfl: 3     metoprolol tartrate (LOPRESSOR) 50 MG tablet, Take 2 tablets (100 mg total) by mouth 2 (two) times a day., Disp: 180 tablet, Rfl: 6     omeprazole (PRILOSEC) 20 MG capsule, Take 1 capsule (20 mg total) by mouth daily., Disp: 90 capsule, Rfl: 3    Documentation Date:6/25/2019 11:16 AM  Jayna Lopez RN

## 2021-05-31 VITALS — HEIGHT: 71 IN | WEIGHT: 245 LBS | BODY MASS INDEX: 34.3 KG/M2

## 2021-05-31 VITALS — BODY MASS INDEX: 36.54 KG/M2 | HEIGHT: 71 IN | WEIGHT: 261 LBS

## 2021-05-31 VITALS — BODY MASS INDEX: 35.55 KG/M2 | HEIGHT: 70 IN | WEIGHT: 248.3 LBS

## 2021-05-31 NOTE — PATIENT INSTRUCTIONS - HE
Brett Hopkins,    It was a pleasure to see you today at the [facility].     My recommendations after this visit include:    Continue Eliquis 5 mg every 12 hours.    Stay in A fib, but with better controlled heart rates to re-assess symptoms.  How do you feel, especially with activity?    Stop taking flecainide.  Increase metoprolol to 75 mg (1.5 tabs) twice daily.  Check blood pressure and heart rate daily.  Call if heart rate is consistently > 90 at rest or blood pressure is < 100/x    Follow up in 1 month    Glenna Cardenas, [facility], Electrophysiology  [phone]  EP nurses [phone]

## 2021-05-31 NOTE — PROGRESS NOTES
Pt seen in clinic with Glenna and CV ordered today, will be a new sotalol prep  Pt is on Eliquis missed a dose can go on after 9/3  Pt would like the week of 9/16, will call tomorrow to confirm a date  Pt is aware and has my direct number for contact

## 2021-05-31 NOTE — PATIENT INSTRUCTIONS - HE
Brett Hopkins,    It was a pleasure to see you today at the [facility].     My recommendations after this visit include:    Continue Eliquis 5 mg every 12 hours.  Continue lisinopril 5 mg daily.  Will re-evaluate blood pressure after cardioversion.    Plan for cardioversion to see if you feel better in normal rhythm.  Continue metoprolol 75 mg twice daily.    2 days before cardioversion, stop metoprolol and start sotalol 80 mg twice daily.    Instructions for the day of cardioversion:  This is an outpatient procedure done at [facility].  Plan on being at [facility] for 3-4 hrs.  Nothing to eat or drink for 8 hours before your procedure.  This includes gum and/or hard candies.  Take your morning medications with sips of water.  If you take your blood thinner in the morning, please take it.  Please hold vitamins and supplements the day of cardioversion.  You will need a .      You need 21 days of continuous use of Eliquis before cardioversion.  Due to missed dose on 8/13/19, you are eligible for cardioversion after 9/3/19.  *Please call Sharmila ([phone]) if you have questions about the cardioversion or you have missed tablets of your blood thinner.    Cardioversion will be ordered today and you will get a call from the  to schedule your cardioversion.      Follow up 3-4 weeks after cardioversion.    Glenna Cardenas, CNP  [facility], Electrophysiology  [phone]  EP nurses [phone]  Sharmila, nurse for cardioversions, [phone]

## 2021-06-01 NOTE — ANESTHESIA POSTPROCEDURE EVALUATION
Patient: Brett Hopkins  * No procedures listed *  Anesthesia type: general    Patient location: Cardiac care unit   Last vitals:   Vitals Value Taken Time   BP  9/12/2019  2:15 PM   Temp  9/12/2019  2:15 PM   Pulse 75 9/12/2019  2:07 PM   Resp  9/12/2019  2:15 PM   SpO2  9/12/2019  2:15 PM   Vitals shown include unvalidated device data.  Post vital signs: stable  Level of consciousness: awake and responds to simple questions  Post-anesthesia pain: pain controlled  Post-anesthesia nausea and vomiting: no  Pulmonary: unassisted, return to baseline  Cardiovascular: stable and blood pressure at baseline  Hydration: adequate  Anesthetic events: no    QCDR Measures:  ASA# 11 - Catina-op Cardiac Arrest: ASA11B - Patient did NOT experience unanticipated cardiac arrest  ASA# 12 - Catina-op Mortality Rate: ASA12B - Patient did NOT die  ASA# 13 - PACU Re-Intubation Rate: ASA13B - Patient did NOT require a new airway mgmt  ASA# 10 - Composite Anes Safety: ASA10A - No serious adverse event    Additional Notes:

## 2021-06-01 NOTE — ANESTHESIA PREPROCEDURE EVALUATION
Anesthesia Evaluation      Patient summary reviewed   No history of anesthetic complications     Airway   Mallampati: II  Neck ROM: full   Pulmonary - normal exam    breath sounds clear to auscultation                         Cardiovascular   Exercise tolerance: > or = 4 METS  (+) hypertension, dysrhythmias, ,     ECG reviewed  Rhythm: irregular  Rate: normal,      ROS comment: Echo 6/2019: EF 55%, no RWMA, no valvular disease     Neuro/Psych    (+) neuromuscular disease,      Endo/Other    (+) hypothyroidism, obesity,      GI/Hepatic/Renal    (+) hiatal hernia, GERD,             Dental - normal exam                          Anesthesia Plan  Planned anesthetic: general mask    ASA 2   Induction: intravenous   Anesthetic plan and risks discussed with: patient and spouse    Post-op plan: routine recovery

## 2021-06-01 NOTE — ANESTHESIA CARE TRANSFER NOTE
Last vitals:   Vitals:    09/12/19 1315   BP: (!) 131/92   Pulse: 73   Resp: 20   Temp:    SpO2: 100%     Patient's level of consciousness is drowsy  Spontaneous respirations: yes  Maintains airway independently: yes  Dentition unchanged: yes  Oropharynx: oropharynx clear of all foreign objects    QCDR Measures:  ASA# 20 - Surgical Safety Checklist: WHO surgical safety checklist completed prior to induction    PQRS# 430 - Adult PONV Prevention: NA - Not adult patient, not GA or 3 or more risk factors NOT present  ASA# 8 - Peds PONV Prevention: NA - Not pediatric patient, not GA or 2 or more risk factors NOT present  PQRS# 424 - Catina-op Temp Management: NA - MAC anesthesia or case < 60 minutes  PQRS# 426 - PACU Transfer Protocol: - Transfer of care checklist used  ASA# 14 - Acute Post-op Pain: ASA14B - Patient did NOT experience pain >= 7 out of 10

## 2021-06-01 NOTE — PATIENT INSTRUCTIONS - HE
Brett Hopkins,    It was a pleasure to see you today at the [facility].     My recommendations after this visit include:    Continue current medications.    Call if you have frequent or prolonged episodes of A fib.    Taking NSAIDs with Eliquis increases risk for bleed.  Lower risk NSAIDs: meloxicam, Celebrex, ibuprofen    Follow up in 3 months, or sooner if needed.    Glenna Cardenas, [facility], Electrophysiology  [phone]  EP nurses [phone]

## 2021-06-01 NOTE — TELEPHONE ENCOUNTER
Refill Approved    Rx renewed per Medication Renewal Policy. Medication was last renewed on 9/10/18.    Inessa Arroyo, [facility] Triage/Med Refill 10/2/2019     Requested Prescriptions   Pending Prescriptions Disp Refills     levothyroxine (SYNTHROID, LEVOTHROID) 112 MCG tablet [Pharmacy Med Name: LEVOTHYROXINE 0.112MG (112MCG) TABS] 90 tablet 0     Sig: TAKE 1 TABLET(112 MCG) BY MOUTH DAILY       Thyroid Hormones Protocol Passed - 10/2/2019  8:08 AM        Passed - Provider visit in past 12 months or next 3 months     Last office visit with prescriber/PCP: 6/3/2019 Magdaleno Bennett MD OR same dept: 6/3/2019 Magdaleno Bennett MD OR same specialty: 6/3/2019 Magdaleno Bennett MD  Last physical: 4/23/2019 Last MTM visit: Visit date not found   Next visit within 3 mo: Visit date not found  Next physical within 3 mo: Visit date not found  Prescriber OR PCP: Magdaleno Bennett MD  Last diagnosis associated with med order: 1. Hypothyroidism  - levothyroxine (SYNTHROID, LEVOTHROID) 112 MCG tablet [Pharmacy Med Name: LEVOTHYROXINE 0.112MG (112MCG) TABS]; TAKE 1 TABLET(112 MCG) BY MOUTH DAILY  Dispense: 90 tablet; Refill: 0    If protocol passes may refill for 12 months if within 3 months of last provider visit (or a total of 15 months).             Passed - TSH on file in past 12 months for patient age 12 & older     TSH   Date Value Ref Range Status   04/23/2019 3.39 0.30 - 5.00 uIU/mL Final

## 2021-06-01 NOTE — PROGRESS NOTES
1955  Home:[phone] (home) Cell:[phone] (mobile)  Emergency Contact: Ankita Hopkins [phone]    +++Important patient information for CSC/Cath Lab staff : PT IS ON ELIQUIS AND NEW SOTALOL START 9/10+++    [facility] EP Cath Lab Procedure Order     Cardioversion:  Cardioversion   PT IS ON ELIQUIS AND NO MISSED DOSES SINCE 8/13/2019      Diagnosis:  AF  Anticipated Case Duration:  Standard  Scheduling Needs/Timeframe:  PT IS SET FOR THURS 9/12/2019 AT 12 WITH PAOLA      Current Device: None None  Device Company/Device Rep Needed for Procedure: None    Anesthesia:  General-CV Only  Research Protocol:  No    [facility] EP Cath Lab Prep   Ordering Provider: Paola Mora NP  Ordering Date: 8/26/2019  Orders Status: Intial order placed and Order set placed    H&P:  Compled by PAOLA MORA CNP on 8/26/2019 if scheduled within 30 days, pt to schedule with PMD if procedure outside of this timeframe  PCP: Magdaleno Bennett MD, [phone]    Pre-op Labs: N/A for procedure    Medical Records Pertinent for Procedure:  N/A    Patient Education:    PT HAS A  FOR PROCEDURE THAT WILL STAY WITH PT AND BE PRESENT FOR DISCHARGE AND POST CV 24 HR MONITORING  PT INSTRUCTED TO HOLD ANY VITAMINS,MINERALS, CALCIUM, IRON OR SUPPLEMENTS THE MORNING OF CV  PT IS ON ELIQUIS   PT INSTRUCTED TO STOP HIS METOPROLOL ON 9/10/2019 AND START SOTALOL 80 MG two times a day ON 9/10/2019  PT INSTRUCTED TO BATHE OR SHOWER BEFORE COMING IN  PT INSTRUCTED TO LEAVE JEWELRY AT HOME  PT IS TO HAVE FOLLOW UP APPT IN 3-4 WEEKS POST CV WITH PAOLA AND  HAS BEEN NOTIFIED    Teach with Patient: Completed via phone on 9/5/2019    Risks Reviewed:     Cardioversion    >90% acute success rate, <10% failure to convert or   reverts shortly after cardioversion.    <1% embolic event of (CVA, pulmonary embolism, or   1. other site).    75% risk for superficial burn.  Risks associated with general anesthesia will be addressed by the Anesthesiology  Department    Consent: Will be obtained in CSC day of procedure    Pre-Procedure Instructions that were Reviewed with Patient:  NPO after midnight, Remove all jewelry prior to coming in for procedure, Shower prior to arrival, Transportation arrangements needed s/p procedure, Post-procedure follow up process and Sedation plan/orders    Pre-Procedure Medication Instructions:  Instructions given to pt regarding anticoagulants: Eliquis- instructed to continue anticoagulation uninterrupted through their procedure  Instructions given to pt regarding antiarrhythmic medication: Sotalol; Pt instructed to continue medication prior to procedure  Instructions for medication, other than anticoagulants/antiarrhythmics listed above, given to pt: to take all morning medications with small sips of water, with the exception of OTC supplements and MVI    Allergies   Allergen Reactions     Sulfa (Sulfonamide Antibiotics) Rash     Rash - turned purple    Around age 32       Current Outpatient Medications:      ELIQUIS 5 mg Tab tablet, TAKE 1 TABLET(5 MG) BY MOUTH TWICE DAILY, Disp: 180 tablet, Rfl: 2     levothyroxine (SYNTHROID, LEVOTHROID) 112 MCG tablet, Take 1 tablet (112 mcg total) by mouth daily. Needs to be seen for more refills., Disp: 90 tablet, Rfl: 3     lisinopril (PRINIVIL) 10 MG tablet, Take 0.5 tablets (5 mg total) by mouth daily., Disp: 30 tablet, Rfl: 3     metoprolol tartrate (LOPRESSOR) 50 MG tablet, Take 1.5 tablets (75 mg total) by mouth 2 (two) times a day., Disp: 90 tablet, Rfl: 3     omeprazole (PRILOSEC) 20 MG capsule, Take 1 capsule (20 mg total) by mouth daily., Disp: 90 capsule, Rfl: 3     sotalol (BETAPACE) 80 MG tablet, Take 1 tablet (80 mg total) by mouth 2 (two) times a day., Disp: 60 tablet, Rfl: 11    Documentation Date:9/5/2019 4:56 PM  Modesto Suresh LPN

## 2021-06-02 VITALS — BODY MASS INDEX: 39 KG/M2 | WEIGHT: 271.8 LBS

## 2021-06-02 VITALS — WEIGHT: 267 LBS | BODY MASS INDEX: 38.22 KG/M2 | HEIGHT: 70 IN

## 2021-06-03 VITALS
HEIGHT: 71 IN | WEIGHT: 258.7 LBS | SYSTOLIC BLOOD PRESSURE: 138 MMHG | BODY MASS INDEX: 36.22 KG/M2 | RESPIRATION RATE: 16 BRPM | HEART RATE: 64 BPM | DIASTOLIC BLOOD PRESSURE: 82 MMHG

## 2021-06-03 VITALS — WEIGHT: 260 LBS | HEIGHT: 71 IN | BODY MASS INDEX: 36.4 KG/M2

## 2021-06-03 VITALS
DIASTOLIC BLOOD PRESSURE: 85 MMHG | OXYGEN SATURATION: 98 % | BODY MASS INDEX: 36.81 KG/M2 | WEIGHT: 263.8 LBS | SYSTOLIC BLOOD PRESSURE: 144 MMHG | HEART RATE: 58 BPM

## 2021-06-03 VITALS — BODY MASS INDEX: 37.22 KG/M2 | WEIGHT: 260 LBS | HEIGHT: 70 IN

## 2021-06-03 VITALS — BODY MASS INDEX: 37.51 KG/M2 | WEIGHT: 262 LBS | HEIGHT: 70 IN

## 2021-06-03 VITALS — WEIGHT: 265 LBS | HEIGHT: 70 IN | BODY MASS INDEX: 37.94 KG/M2

## 2021-06-03 VITALS — WEIGHT: 271 LBS | BODY MASS INDEX: 38.8 KG/M2 | HEIGHT: 70 IN

## 2021-06-03 VITALS — WEIGHT: 268.4 LBS | BODY MASS INDEX: 38.51 KG/M2

## 2021-06-03 VITALS — WEIGHT: 258 LBS | BODY MASS INDEX: 36.94 KG/M2 | HEIGHT: 70 IN

## 2021-06-03 VITALS — BODY MASS INDEX: 38.8 KG/M2 | WEIGHT: 270.4 LBS

## 2021-06-03 NOTE — TELEPHONE ENCOUNTER
Refill Approved    Rx renewed per Medication Renewal Policy. Medication was last renewed on 9/10/18.    Genoveva Chung, [facility] Triage/Med Refill 11/17/2019     Requested Prescriptions   Pending Prescriptions Disp Refills     omeprazole (PRILOSEC) 20 MG capsule [Pharmacy Med Name: OMEPRAZOLE 20MG CAPSULES] 90 capsule 0     Sig: TAKE 1 CAPSULE(20 MG) BY MOUTH DAILY       GI Medications Refill Protocol Passed - 11/17/2019  9:23 PM        Passed - PCP or prescribing provider visit in last 12 or next 3 months.     Last office visit with prescriber/PCP: 6/3/2019 Magdaleno Bennett MD OR same dept: 6/3/2019 Magdaleno Bennett MD OR same specialty: 6/3/2019 Magdaleno Bennett MD  Last physical: 11/6/2019 Last MTM visit: Visit date not found   Next visit within 3 mo: Visit date not found  Next physical within 3 mo: Visit date not found  Prescriber OR PCP: Magdaleno Bennett MD  Last diagnosis associated with med order: 1. Esophagitis  - omeprazole (PRILOSEC) 20 MG capsule [Pharmacy Med Name: OMEPRAZOLE 20MG CAPSULES]; TAKE 1 CAPSULE(20 MG) BY MOUTH DAILY  Dispense: 90 capsule; Refill: 0    If protocol passes may refill for 12 months if within 3 months of last provider visit (or a total of 15 months).

## 2021-06-03 NOTE — PATIENT INSTRUCTIONS - HE
As discussed, the last dose of Eliquis will be Saturday morning November 23, surgery in the morning of the 25th, resume the Eliquis the 26th.  We should see you for a physical sometime next summer or fall

## 2021-06-03 NOTE — PROGRESS NOTES
Preoperative Exam    Scheduled Procedure: R knee revision    Surgery Date:  11/25/19  Surgery Location: [facility]     Surgeon: Dr. Jamison     Assessment/Plan:     1. Preop examination  For revision of a right knee status post right total knee arthroplasty, patient with ongoing pain inflammation and functional limitation.  - Basic Metabolic Panel  - HM2(CBC w/o Differential)    2. Persistent atrial fibrillation  Longstanding history of atrial fibrillation, status post cardioversion in September, now in sinus rhythm.    3. History of total right knee replacement  Patient is having complications and difficulties and has had suboptimal response to his right knee replacement.  - Basic Metabolic Panel  - HM2(CBC w/o Differential)      PLAN:  1.  Mild lead EKG from September 12, 2019 reviewed, normal sinus rhythm.  2.  CBC and basic metabolic profile reviewed.  Results normal.  3.  Cardiology input about this patient, I felt that patient can safely go off Eliquis and does not need bridging anticoagulation.  4.  Hold Eliquis prior to surgery, 48 hours, last dose of Eliquis will be Saturday morning November 23.  Anticipate resumption Tuesday, November 26.  5.  Patient is otherwise cleared for surgery.  6.  Patient will be due for a physical summer or fall 2020        Surgical Procedure Risk: Low (reported cardiac risk generally < 1%)  Have you had prior anesthesia?: Yes  Have you or any family members had a previous anesthesia reaction:  No  Do you or any family members have a history of a clotting or bleeding disorder?: No  Cardiac Risk Assessment: no increased risk for major cardiac complications    APPROVAL GIVEN to proceed with proposed procedure, without further diagnostic evaluation    Please Note:  No history of any surgical or anesthetic complications.    Functional Status: Independent  Patient plans to recover at home with family.     Subjective:      Brett Hopkins is a 64 y.o. male who presents for a  preoperative consultation.      The exam is requested by Dr. Jamison in preparation for right knee revision to be performed at [facility] on 11/25/2019. Today s examination on 11/6/2019 is done to review the underlying surgical condition of arthrofibrosis, clear for anesthesia, and review medical problems with appropriate changes in medications.    Brett has tolerated previous surgeries well without bleeding or anesthesia difficulty.     Right Knee Pain: It has been getting worse. It is difficult to stand up, walk, and use stairs. He had right total hip arthroplasty in 2019 and a total right knee arthroplasty in 2017.     Hypertension: He does not monitor his blood pressure at home.     Atrial Fibrillation: He is unable to tell if he is in Afib. He had a cardioversion in July that was not successful and another in September that was successful. He was in normal sinus rhythm at his cardioversion follow-up on 9/30/2019.    ROS:  All other systems reviewed and are negative, other than those listed in the HPI.    PSFH:  Never smoked. He drinks occasionally. He works full-time as a CPA.     Pertinent History  Do you have difficulty breathing or chest pain after walking up a flight of stairs: No  History of obstructive sleep apnea: No  Steroid use in the last 6 months: No  Frequent Aspirin/NSAID use: No  Prior Blood Transfusion: No  Prior Blood Transfusion Reaction: No  If for some reason prior to, during or after the procedure, if it is medically indicated, would you be willing to have a blood transfusion?:  There is no transfusion refusal.    Current Outpatient Medications   Medication Sig Dispense Refill     ELIQUIS 5 mg Tab tablet TAKE 1 TABLET(5 MG) BY MOUTH TWICE DAILY 180 tablet 2     levothyroxine (SYNTHROID, LEVOTHROID) 112 MCG tablet TAKE 1 TABLET(112 MCG) BY MOUTH DAILY 90 tablet 1     lisinopril (PRINIVIL) 10 MG tablet Take 0.5 tablets (5 mg total) by mouth daily. 30 tablet 3     omeprazole (PRILOSEC) 20 MG  capsule Take 1 capsule (20 mg total) by mouth daily. 90 capsule 3     sotalol (BETAPACE) 80 MG tablet Take 1 tablet (80 mg total) by mouth 2 (two) times a day. 60 tablet 11     No current facility-administered medications for this visit.         Allergies   Allergen Reactions     Sulfa (Sulfonamide Antibiotics) Rash     Rash - turned purple    Around age 32       Patient Active Problem List   Diagnosis     Obesity     Varicose Veins     Persistent atrial fibrillation     Aneurysm Of The Ascending Aorta     Hypothyroidism     Status post ablation of atrial fibrillation     GERD (gastroesophageal reflux disease)     AA (alopecia areata)     Hypertension       Past Medical History:   Diagnosis Date     Status post ablation of atrial fibrillation 07/28/2015    PVI August 19, 2013 (cryo-PVI only)       Past Surgical History:   Procedure Laterality Date     CARDIOVERSION  07/2019    7/10/2019     CARDIOVERSION  09/12/2019     DENTAL SURGERY      Oral Surgery Tooth Extraction;  Implant     OH ABLATE HEART DYSRHYTHM FOCUS  08/19/2013    Description: Catheter Ablation Atrial Fibrillation;  Recorded: 11/16/2013;  Comments: PVI Aug 19, 2013 (Cryo to all 4 PV's)     OH TOTAL HIP ARTHROPLASTY Right 5/8/2019    Procedure: RIGHT TOTAL HIP ARTHROPLASTY DIRECT ANTERIOR APPROACH;  Surgeon: Mario Woodruff MD;  Location: [facility] OR;  Service: Orthopedics     OH TOTAL KNEE ARTHROPLASTY Right 6/21/2017    Procedure: 2)  RIGHT TOTAL KNEE ARTHROPLASTY;  Surgeon: Mario VICKERS MD;  Location: [facility] OR;  Service: Orthopedics     REVISION TOTAL HIP ARTHROPLASTY Right 05/2019     TOOTH EXTRACTION      implant       Social History     Socioeconomic History     Marital status:      Spouse name: Not on file     Number of children: 2     Years of education: Not on file     Highest education level: Not on file   Occupational History     Occupation: CPA   Social Needs     Financial resource strain: Not on file     Food  insecurity:     Worry: Not on file     Inability: Not on file     Transportation needs:     Medical: Not on file     Non-medical: Not on file   Tobacco Use     Smoking status: Never Smoker     Smokeless tobacco: Never Used   Substance and Sexual Activity     Alcohol use: Yes     Alcohol/week: 7.0 standard drinks     Types: 7 Cans of beer per week     Drug use: No     Sexual activity: Yes     Partners: Female   Lifestyle     Physical activity:     Days per week: Not on file     Minutes per session: Not on file     Stress: Not on file   Relationships     Social connections:     Talks on phone: Not on file     Gets together: Not on file     Attends Bahai service: Not on file     Active member of club or organization: Not on file     Attends meetings of clubs or organizations: Not on file     Relationship status: Not on file     Intimate partner violence:     Fear of current or ex partner: Not on file     Emotionally abused: Not on file     Physically abused: Not on file     Forced sexual activity: Not on file   Other Topics Concern     Not on file   Social History Narrative    Diet-regular        Exercise-personal emelia, walk       Patient Care Team:  Magdaleno Bennett MD as PCP - General (Family Medicine)  Magdaleno Bennett MD as Assigned PCP          Objective:     Vitals:    11/06/19 1543 11/06/19 1554   BP: (!) 150/92 144/85   Pulse: (!) 58    SpO2: 98%    Weight: (!) 263 lb 12.8 oz (119.7 kg)          Physical Exam:  Constitutional:   Reveals an alert and pleasant male.  Vitals: per nursing notes.  HEENT: Right Ear: External ear normal.   Left Ear: External ear normal.   Nose: Nose normal.   Mouth/Throat: Oropharynx is clear and moist.   Eyes: Conjunctivae and EOM are normal. Pupils are equal, round, and reactive to light. Right eye exhibits no discharge. Left eye exhibits no discharge.   Neck:  Supple, no carotid bruits or adenopathy.  Back:  No spine or CVA pain.  Thorax:  No bony deformities.  Lungs:  Clear to A&P without rales or wheezes.  Respiratory effort normal.  Cardiac:   Regular rate and rhythm, normal S1, S2, no murmur or gallop.  Abdomen:  Soft, active bowel sounds without bruits, mass, or tenderness.  Extremities:   No peripheral edema, pulses in the feet intact.    Skin:  No jaundice, peripheral cyanosis or lesions to suggest malignancy.  Neuro:  Alert and oriented. Cranial nerves, motor, sensory exams are intact.  No gross focal deficits.  Psychiatric:  Memory intact, mood appropriate.    There are no Patient Instructions on file for this visit.    Labs:  Recent Results (from the past 240 hour(s))   Basic Metabolic Panel    Collection Time: 11/06/19  4:11 PM   Result Value Ref Range    Sodium 142 136 - 145 mmol/L    Potassium 4.8 3.5 - 5.0 mmol/L    Chloride 106 98 - 107 mmol/L    CO2 28 22 - 31 mmol/L    Anion Gap, Calculation 8 5 - 18 mmol/L    Glucose 77 70 - 125 mg/dL    Calcium 9.2 8.5 - 10.5 mg/dL    BUN 21 8 - 22 mg/dL    Creatinine 0.94 0.70 - 1.30 mg/dL    GFR MDRD Af Amer >60 >60 mL/min/1.73m2    GFR MDRD Non Af Amer >60 >60 mL/min/1.73m2   HM2(CBC w/o Differential)    Collection Time: 11/06/19  4:11 PM   Result Value Ref Range    WBC 5.8 4.0 - 11.0 thou/uL    RBC 5.09 4.40 - 6.20 mill/uL    Hemoglobin 15.5 14.0 - 18.0 g/dL    Hematocrit 46.6 40.0 - 54.0 %    MCV 92 80 - 100 fL    MCH 30.4 27.0 - 34.0 pg    MCHC 33.2 32.0 - 36.0 g/dL    RDW 13.6 11.0 - 14.5 %    Platelets 219 140 - 440 thou/uL    MPV 8.1 7.0 - 10.0 fL       Immunization History   Administered Date(s) Administered     DT (pediatric) 05/25/2004     Influenza, Seasonal, Inj PF IIV3 11/05/2010, 01/16/2012     Influenza, inj, historic,unspecified 10/19/2007, 11/23/2012, 10/03/2016, 09/29/2019     Influenza, seasonal,quad inj 6-35 mos 10/07/2014     Influenza,seasonal quad, PF, =/> 6months 12/21/2015     Influenza,seasonal,quad inj =/> 6months 09/10/2018     Td,adult,historic,unspecified 06/25/2008     Tdap 06/25/2008, 09/10/2018      ADDITIONAL HISTORY SUMMARIZED (2): Reviewed 10/03/2019 Ortho note.  DECISION TO OBTAIN EXTRA INFORMATION (1): None.   RADIOLOGY TESTS (1): Reviewed 9/12/2019 EKG.   LABS (1): Labs ordered.  MEDICINE TESTS (1): None.  INDEPENDENT REVIEW (2 each): None.       The visit lasted a total of 15 minutes face to face with the patient. Over 50% of the time was spent counseling and educating the patient about knee revision.    ILili, am scribing for and in the presence of, Dr. Bennett.    I, Dr. Bennett, personally performed the services described in this documentation, as scribed by Lili Mayer in my presence, and it is both accurate and complete.    Total data points: 4       Electronically signed by Magdaleno Bennett MD 11/06/19 3:35 PM

## 2021-06-04 VITALS
BODY MASS INDEX: 36.26 KG/M2 | RESPIRATION RATE: 16 BRPM | SYSTOLIC BLOOD PRESSURE: 138 MMHG | WEIGHT: 259 LBS | HEART RATE: 68 BPM | HEIGHT: 71 IN | DIASTOLIC BLOOD PRESSURE: 88 MMHG

## 2021-06-04 VITALS
HEIGHT: 71 IN | TEMPERATURE: 98.5 F | SYSTOLIC BLOOD PRESSURE: 172 MMHG | WEIGHT: 254.4 LBS | RESPIRATION RATE: 16 BRPM | HEART RATE: 48 BPM | BODY MASS INDEX: 35.61 KG/M2 | DIASTOLIC BLOOD PRESSURE: 100 MMHG

## 2021-06-04 VITALS
BODY MASS INDEX: 35.43 KG/M2 | DIASTOLIC BLOOD PRESSURE: 101 MMHG | SYSTOLIC BLOOD PRESSURE: 163 MMHG | WEIGHT: 254 LBS | RESPIRATION RATE: 20 BRPM | HEART RATE: 48 BPM | OXYGEN SATURATION: 95 % | TEMPERATURE: 98.5 F

## 2021-06-04 VITALS
BODY MASS INDEX: 35.7 KG/M2 | HEART RATE: 64 BPM | RESPIRATION RATE: 20 BRPM | SYSTOLIC BLOOD PRESSURE: 138 MMHG | TEMPERATURE: 98.3 F | HEIGHT: 71 IN | WEIGHT: 255 LBS | DIASTOLIC BLOOD PRESSURE: 90 MMHG

## 2021-06-04 VITALS
OXYGEN SATURATION: 97 % | WEIGHT: 252 LBS | SYSTOLIC BLOOD PRESSURE: 116 MMHG | RESPIRATION RATE: 16 BRPM | HEART RATE: 64 BPM | BODY MASS INDEX: 35.15 KG/M2 | DIASTOLIC BLOOD PRESSURE: 80 MMHG

## 2021-06-04 NOTE — PATIENT INSTRUCTIONS - HE
Brett Hopkins,    It was a pleasure to see you today at the [facility].     My recommendations after this visit include:    Continue current medications.    Call if you have frequent or prolonged episodes of A fib.    Follow up in 6 months, or sooner if needed.    Glenna Cardenas, CNP  [facility], Electrophysiology  [phone]  EP nurses [phone]        \

## 2021-06-05 ENCOUNTER — HEALTH MAINTENANCE LETTER (OUTPATIENT)
Age: 66
End: 2021-06-05

## 2021-06-05 VITALS
HEART RATE: 57 BPM | HEIGHT: 69 IN | SYSTOLIC BLOOD PRESSURE: 138 MMHG | BODY MASS INDEX: 37.49 KG/M2 | DIASTOLIC BLOOD PRESSURE: 89 MMHG | WEIGHT: 253.1 LBS

## 2021-06-05 VITALS
DIASTOLIC BLOOD PRESSURE: 79 MMHG | TEMPERATURE: 97.7 F | HEART RATE: 53 BPM | BODY MASS INDEX: 36.05 KG/M2 | WEIGHT: 257.5 LBS | SYSTOLIC BLOOD PRESSURE: 129 MMHG | HEIGHT: 71 IN | OXYGEN SATURATION: 100 % | RESPIRATION RATE: 16 BRPM

## 2021-06-05 VITALS
OXYGEN SATURATION: 99 % | HEIGHT: 71 IN | DIASTOLIC BLOOD PRESSURE: 76 MMHG | TEMPERATURE: 97.8 F | SYSTOLIC BLOOD PRESSURE: 121 MMHG | WEIGHT: 256.3 LBS | BODY MASS INDEX: 35.88 KG/M2 | HEART RATE: 54 BPM | RESPIRATION RATE: 16 BRPM

## 2021-06-05 VITALS
HEART RATE: 59 BPM | OXYGEN SATURATION: 100 % | TEMPERATURE: 97.6 F | RESPIRATION RATE: 16 BRPM | HEIGHT: 71 IN | BODY MASS INDEX: 35 KG/M2 | SYSTOLIC BLOOD PRESSURE: 131 MMHG | WEIGHT: 250 LBS | DIASTOLIC BLOOD PRESSURE: 95 MMHG

## 2021-06-08 NOTE — TELEPHONE ENCOUNTER
Physical therapist (PT) called patient today as we discussed at initial evaluation to discuss a plan.  PT explained to patient again today that working through physical therapy to try volume reduction with wrapping, MLD, exercise and eventually appropriate fitting compression garments is an important part in his LE improving.   Patient asked if the order for the compression pump was placed, PT explained that the above physical therapy needs to take place for that to occur. Patient states he would just like to get a compression pump and verbalizing frustration with the medical system in general.   PT did explain that a compression pump will likely not be the only fix to a lymphedema extremity and that he will need to look into compression garments/exercise and self care strategies along with the use of the pump.  PT discussed that typically we need to go through a few sessions/weeks of physical therapy to get to a place where a pump would be ordered/indicated.   Patient verbalizes that his schedule doesn't allow for multiple visits for 3-5 weeks (or as long as is indicated).   PT offered patient early appointment times (7:00 am or 7:30 am) to be able to come into clinic in [place]. PT also suggested a trial of 1 time per week for even 3 weeks (3 more sessions) with the help of his spouse wrapping at home or teaching patient how to self wrap. He verbalized this is not an option either.  At this time, patient states he is just going to buy his compression pump and pay out of pocket for it. He declined adding further PT sessions at this time.  He was encouraged to reach back out to referring physician if he had further questions that she could answer or to call back to PT if he decides to schedule further PT sessions.

## 2021-06-08 NOTE — TELEPHONE ENCOUNTER
Patient states that his orthopedic surgeon, Dr. Woodruff, spoke with Dr. Orlando last week and approved a consultation with her for lymphedema. Nothing noted in chart. Should this be scheduled as a virtual consult, or OK to see patient in clinic? Please advise.

## 2021-06-08 NOTE — PATIENT INSTRUCTIONS - HE
Ultrasound    Thank you for choosing [facility] as partners in your care.  Please read the following information before your appointment.  Feel free to ask questions.    An ultrasound is an exam that uses sound waves to create pictures.  The special camera that takes the pictures is called a transducer.  An ultrasound technologist will perform the exam under the direction of a physician.    Preparation  Ultrasound preps vary.  If you are scheduled for an Aorta Ultrasound, please do not eat or drink anything 8 hours before your exam.  You may take daily medications with a small sip of water.  There is no preparation required for any of the other ultrasound exams performed at [facility].    The Examination  You may or may not need to change clothes for your exam.  The technologist will explain the exam to you.  He or she will ask you a few questions and answer any questions you may have.    You will lie on a table and a gel will be applied to your skin.  A small handheld instrument called a transducer will be moved across the area we are looking at while pictures are taken.  Also, your exam may include measuring your blood pressure on your arms, legs and/or toes.    When the Exam Is Completed  Your physician will receive the results of the exam and explain them to you.  You may return to your normal diet and activities unless otherwise directed by your doctor.  Feel free to ask questions if something is not clear to you.  We welcome comments.    At [facility], we are dedicated to providing the best possible care.  Thank you for taking time to read these instructions.  We hope your experience is as pleasant as possible.      You are scheduled for the following exam(s):    []Carotid Duplex:  Evaluates the carotid arteries in the neck that feed the brain with blood.  Gel is applied to the skin of the neck.  A transducer will be placed on the gel covered areas to obtain images and evaluate  and listen to the blood flow in the arteries.  This exam takes approximately 30 minutes.    [x]Venous Duplex:  Evaluates the veins that carry blood to the heart from the arms and/or legs.  Gel is applied to the skin of the arms and/or legs.  A transducer will be placed on the gel covered areas to obtain images and evaluate flow in the veins.  This exam takes approximately 30 minutes per arm/leg.    []Arterial Duplex:  Evaluates the arteries that feed the arms and legs with blood.  Gel is applied to the skin of the arms and/or legs.  A transducer will be placed on the gel covered areas to obtain images and listen to the blood flow in the arms and/or legs.  This exam takes approximately 30 minutes per arm/leg.    []MADIHA or Segmental Pressures:  Ultrasound and blood pressure cuffs are used to evaluate the arteries that supply the arms and legs with blood.  Several blood pressure cuffs will be place on your arms and legs.  When inflated, the cuffs will provide blood pressure readings that are used to determine where blockages in the arteries may be.  You may be asked to do a moderate level of exercise during this exam.  This exam takes approximately 30-60 minutes.    []Aorta:  Evaluates the abdominal aorta for blockages and aneurysm.  Gel is applied to the stomach.  A transducer will be placed on the gel covered areas to obtain images of the aorta.  This exam takes approximately 30 minutes.    Prep instructions:  Do not eat or drink anything 8 hours before procedure.  You may take daily medications with a small sip of water.      Please call one of the [place] locations below to schedule an appointment. If you received a prescription please bring it with you to your appointment. Some locations are limited to what they carry.    Office Locations    [facility]  [address] [place] [address]  [facility], [address]   Phone: [phone]   Orthotics and  Prosthetics, [address]   Phone: [phone]    [facility]  [address], [address], [place] [address]   Phone: [phone]    Orthotics and Prosthetics ([facility])    [address], [address], [place] [address]  Phone: [phone]        A referral was sent to [facility]. You will receive a phone call in 1-2 business days to schedule you appointment. If for some reason you do not hear from them or wish to schedule sooner please call [phone] to schedule.  [facility] [place]    Address: [address] [place], [place] [address]   Phone: [phone]    If you have wound care that must be done, you will need to bring your wound care supplies with you to your therapy appointments (the very first appointment will not include wound care). If you have not received the supplies, please contact the company they were ordered through or call our clinic for help ([phone])    LYMPHEDEMA THERAPY TREATMENT     - What to expect your first visit     Based on your initial evaluation, you will have an individualized program designed by a certified lymphedema therapist.     It will consist of discussing your prior activity level, goals and limitations.     The therapist will assess your edema, evaluate for swelling, ,measure your motion and strength.      - Treatment usually includes     Lymphedema education and prevention strategies.     Lymphedema massage- A special decompressive massage to help improve the lymphatic drainage.     Lymphatic stimulation exercises     Bandaging or compression garments- To provide increased tissue pressure in the swollen extremity.     Home exercise program instruction     Stretching exercises     Education in how to independently manage edema.        - Follow up care     ONCE FORMAL THERAPY HAS BEEN COMPLETED, THE PATIENT WILL BE EXPECTED TO WEAR THE APPROPRIATE COMPRESSION BANDAGES, BE  CONSISTENT WITH THE SKIN CARE PROGRAM  AND PERFORM THE PRESCRIBED SELF -MASSAGE AND /OR EXERCISES AS PRESCRIBED.

## 2021-06-08 NOTE — TELEPHONE ENCOUNTER
"Writer informed pt that Dr. Orlando recommended to try lymphedema therapy a few times to learn the technique. 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 I'm in so much pain I would love to walk around the block\". He also mentioned that he worked with [facility] in the past and he didn't like them.     Writer informed him to check with his insurance to see if other companies offer it and he said he already reached out to [facility] and [facility] and they don't offer lymphedema therapy.     He said he wants a lymphatic pumps ordered, \"that is the only answer I will take and Dr. Orlando made an agreement with my orthopedic surgeon to order one.     Message with be forwarded to Dr. Orlando.  "

## 2021-06-08 NOTE — TELEPHONE ENCOUNTER
Wellness Screening Tool  Symptom Screening:  Do you have a:    Fever?  no    Cough?  no    Shortness of breath?  no  ? If yes, is this a change? no    Skin rash?  no  Within the past 14 days, have you been in contact with someone who:    Is currently being ruled out for COVID-19 (novel coronavirus)?  no    Has tested positive for COVID-19?  no    Has symptoms of a respiratory illness (fever, cough, shortness of breath)?  no  Have you recently traveled to an area with COVID-19 areas: no    Refer to the [facility] Coronavirus webpage for COVID-19 areas:    Within the past 3 weeks, have you been exposed to the following:    Pertussis?  no    Chicken pox?  no    Measles? no  Patient's appointment status: Pt to come in to clinic to see provider  Patient reminded that no visitors are allowed at this time due to COVID-19 concerns. If determined pt has symptoms and is still needed to be seen pt notified they must wear a mask upon entering the clinic.   GIVEN VIRTUAL CHECK IN #

## 2021-06-08 NOTE — TELEPHONE ENCOUNTER
PC back to pt   Left detailed msg per pt request  I do not see mention by Dr Barfield or our group requesting pump, or ordering this  Advised pt due to the nature of the request and referral, pt was to contact his PMD to arrange further  Contact information provided if further concerns or questions  5/15/2020 8:32 AM  Glo Carcamo RN      ----- Message from Park Bennett sent at 5/15/2020  8:04 AM CDT -----  Regarding: RODDY pt  Caller: Brett     Primary cardiologist: Dr. Barfield     Detailed reason for call: Brett states he his insurance requires he sees a lymphedema specialist in order for him to get a pneumatic pump from [employer] and its to use for his hip to foot edema. Does Dr. Barfield have a recommendation or is this something he can do? Please call back.     Best phone number: [phone]    Best time to contact: Today if possible    Ok to leave a detailed message? Yes    Device? No    Additional Info: Due for f/u with Gl[facility] 6/30/2020, pt declined making appt at this time.

## 2021-06-08 NOTE — TELEPHONE ENCOUNTER
----- Message from Susan Blankenship RN sent at 6/1/2020  8:05 PM CDT -----    ----- Message -----  From: Susy Orlando MD  Sent: 6/1/2020   4:09 PM CDT  To: [facility] Support Pool    Let Mr. Hopkins know his PSA was normal.

## 2021-06-08 NOTE — TELEPHONE ENCOUNTER
Pt was updated by Dr. Orlando's response. He will cancel the CT  I think with the normal PSA we can hold on the CT if he would prefer.  We actually discussed this at his visit.        He also stated that he will not be able to do lymphedema therapy due to his work schedule. He wants to know what the next steps would be?

## 2021-06-08 NOTE — PROGRESS NOTES
Patient upset therapy can't see him before the 15th and can't see him earlier in the morning.  He just wants the pump.  I explained the need to learn the bandaging, massage and exercise techniques to see if he even would need the pump.  If swelling decreases he still needs compression to counteract gravity effects.  Also, for insurance to authorize any pump a conservative program needs to be tried.  After much discussion, he agreed to at least give therapy two tries.

## 2021-06-08 NOTE — TELEPHONE ENCOUNTER
"Pt called with concern and raised his voice several times, he wants to know if it is 100% clinically necessary to get a CT scan done? Writer explained per Dr. Orlando's note why it was ordered-He is at slightly higher risk of proximal obstruction causing the increased swelling in the right leg but most likely it is due to the multiple surgeries he has required.  However, with his age I will check a PSA as that has not been done since 2018 and I will also check a CT scan of the abdomen and pelvis.  This is been ordered.      He stated that he is worried about cost and, \"this is another way for [facility] to get money\".     "

## 2021-06-08 NOTE — PROGRESS NOTES
[facility] Discharge Summary    Patient Name: Brett Hopkins  Date: 7/15/2020  Referral Diagnosis: Lymphedema  Referring provider: Susy Orlando, *      Patient was seen for initial visit in PT.  See most recent PT note for updated status.     Goals Below were not assessed d/t patient not returning for further PT:  Patient will have a decreased volume in : right;LE;by 5%  Patient will have decreased fibrosis, scar tissue for improved lymphatic mobilitiy : in 4 weeks  Patient will perform, verbalize self-management of: exercise;massage;self-compression;compression wear;in 4 weeks    See telephone encounter with PT for further details if needed.    Physical Therapy will be discharged at this time.    Thank you for your referral.  Salina Hou, DPT, CLT  7/15/2020      [facility]  Lymphedema Initial Evaluation  Patient Name: Brett Hopkins  Date of evaluation: 6/5/2020  Today's Date: 6/5/2020  Visit Number: 1 of 12 per PT POC  Referring provider: Dr. Orlando   Referral Diagnosis:   Leg swelling [M79.89]  - Primary        Venous hypertension of lower extremity, bilateral [I87.303]        Leg pain, bilateral [M79.604, M79.605]        Acquired lymphedema of leg [I89.0]        Abdominal distention [R14.0]       Visit Diagnosis:     ICD-10-CM    1. Lymphedema  I89.0        Assessment:        Brett is a 64 y.o. male who presents to physical therapy today with R LE swelling that began over the past few years after having Right TKA, right YA, right TKA revision. Clinical exam today reveals significant limitation in right knee ROM, pitting edema. Due to patient's work schedule (50-60 hours per week at least), he is very limited on what he can do with regard to coming in for further PT sessions. PT discussed that trialing wrapping and doing PT sessions is an important way to help reduce volume, but a long term plan for wearing compression garments and self  management is ultimately what we get to. Patient still reports that he is unable to do multiple sessions in a week for multiple weeks.   He would benefit from compression garments in the future, though he states these are nearly impossible to don d/t his knee ROM.   He was instructed to work on increasing his activity level/knee exercises again to help with fluid movement in his leg.   He also may benefit from a compression pump d/t the limited knee ROM as a long term help to his LE swelling.     At this time, we did a knee high wrap with education on us wrapping up to the thigh if he tolerated the knee high wrap. We did not schedule further visits, but PT will call him next week to check on how the wrap went and if he would like to continue with further PT if at all possible.     Lymphedema Category: VIII - Stage 3 with Mod-High Functional Demand    Patient's expectations/goals are: Realistic  Barriers to Learning or Achieving Goals: chronicity, significant decrease in ROM     Patient's expectations/goals are realistic.    Goals:  Patient will have a decreased volume in : right;LE;by 5%  Patient will have decreased fibrosis, scar tissue for improved lymphatic mobilitiy : in 4 weeks  Patient will perform, verbalize self-management of: exercise;massage;self-compression;compression wear;in 4 weeks         Plan:        Plan for next visit: no further PT scheduled d/t patient's work life, PT will call patient next week to check in on how he did with the wrap.    Plan of Care:   Communication with: Referral Source  Patient Related Instruction: Nature of Condition;Treatment plan and rationale;Self Care instruction;Basis of treatment;Body mechanics;Posture  Times per Week: 1-2  Number of Weeks: 4-6  Number of Visits: 12  Discharge Planning: to self care strategies ie: compression pump, garments, exercise  Therapeutic Exercise: ROM;Stretching;Strengthening;Lymphedema  Neuromuscular Reeducation:  balance/proprioception;core  Manual Therapy: soft tissue mobilization;myofascial release;joint mobilization;muscle energy  Equipment: compression bandages;other  Equipment: compression pump         Subjective:         History of Present Illness:      Dec 2012 - severe right knee pain, May 2013 revealed meniscus tear  2017 - TKA, did PT here at the time. Did not do home exercises, states he wasn't given any home exercises at that time.   Fall of 2017 - went to , then [facility] PT since then. Struggling with pain this whole time.   Jan 2019 - went back to surgeon again and found right hip was severely arthritic  May 2019 - Right YA, didn't feel much better following this, Saw orther surgeons for second opinions.  Nov 2019 - Right TKA revision.   Still having issues with right leg swelling and pain.     PT on/off regularly at [facility] this whole time.     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     Was prescribed a pump for home by ortho, but there has been some issues with being able to get it.    Patient has not had lymphedema therapy in the past.  Patient's swelling doesn't reduce over night    Social information:   Living Situation: lives with [place]   Occupation:works many hours during the week as CPA, sedentary   Equipment Available: has trialed compression stockings in the past, didn't bring them in today. Doesn't use AD for walking.    Pertinent PMH:   Patient Active Problem List   Diagnosis     Obesity     Varicose Veins     Persistent atrial fibrillation     Aneurysm Of The Ascending Aorta     Hypothyroidism     Status post ablation of atrial fibrillation     GERD (gastroesophageal reflux disease)     AA (alopecia areata)     Hypertension     Leg swelling     Venous hypertension of lower extremity, bilateral     Right leg pain     Acquired lymphedema of leg     Abdominal distention     Knee contracture, right     Acquired leg length discrepancy      Functional gait abnormality     Scar condition and fibrosis of skin       Pain rating/Quality:  High levels of pain in his leg when walking too much or when he is initiating walking. Not too much pain at rest.    Functional limitations: wearing shoes, socks, walking       Objective:       Examination  1. Lymphedema       Involved side: Right  Posture Observation:      General sitting posture is  fair.  Surgery: right YA, Right TKA with revision in Nov 2019  Treatments: many rounds of PT at [facility] for his right leg. No previous lymphedema treatment  Precautions/Restrictions: none  Involved area: Right Leg  Edema: Pitting at grade and 1+  Induration: Yes  Fibrosis: mild  Temperature: Normal  Sensation: Normal  Skin color: Normal  Skin texture: Dry  Scars: TKA scar    Wounds: Absent  Muscle tone: normal  Gait: impaired d/t limited knee ROM on R LE.  Tests:  Right knee ROM: lacking 27 degrees of extension to 95 degrees flexion  Lower Extremity Volume measurements:  Right Lower Extremity Total Estimated Volume (cm3): 8818.57  Left Lower Extremity Total Estimated Volume (cm3): 7629.72  Lower Extremity Swelling Comparison (%): 15.58 %    Treatment Today     TREATMENT MINUTES COMMENTS   Evaluation 25 Right LE swelling   Self-care/ Home management 15 Explained how lymphedema therapy works and the importance of wrapping/compresison stockings, exercise and manual lymphatic drainage/compression pump to help improve and manage his condition.   Discussed that patient would need to wear some sort of compression garment in the long term even if he gets a compression pump at home and would need to work on exercising more.    Manual therapy 15 Wrapped right LE as follows:   Non elasticized stockinette 4 inch foot to knee  rosidal foam ankle to knee  [id number] foot to calf (not many layears on the foot d/t no swelling on foot)  [id number] ankle to knee    Di trial comperm tube size G on knee/thigh, but rolled down too much, so decided to  remove it.     Discussed trialing wrapping into thigh in future if he is up for it, but wanted to see his tolerance for the knee high wrap first.   Neuromuscular Re-education     Therapeutic Activity     Therapeutic Exercises     Gait training     Modality__________________                Total 55    Blank areas are intentional and mean the treatment did not include these items.     PT Evaluation Code: (Please list factors)  Patient History/Comorbidities:   Patient Active Problem List   Diagnosis     Obesity     Varicose Veins     Persistent atrial fibrillation     Aneurysm Of The Ascending Aorta     Hypothyroidism     Status post ablation of atrial fibrillation     GERD (gastroesophageal reflux disease)     AA (alopecia areata)     Hypertension     Leg swelling     Venous hypertension of lower extremity, bilateral     Right leg pain     Acquired lymphedema of leg     Abdominal distention     Knee contracture, right     Acquired leg length discrepancy     Functional gait abnormality     Scar condition and fibrosis of skin   Examination: right LE swelling  Clinical Presentation: stable  Clinical Decision Making: low    Patient History/  Comorbidities Examination  (body structures and functions, activity limitations, and/or participation restrictions) Clinical Presentation Clinical Decision Making (Complexity)   No documented Comorbidities or personal factors 1-2 Elements Stable and/or uncomplicated Low   1-2 documented comorbidities or personal factor 3 Elements Evolving clinical presentation with changing characteristics Moderate   3-4 documented comorbidities or personal factors 4 or more Unstable and unpredictable High                Salina Hou, PT, CLT  6/5/2020

## 2021-06-10 NOTE — PATIENT INSTRUCTIONS - HE
Brett Hopkins,    It was a pleasure to see you today at the [facility].     My recommendations after this visit include:    Continue current medications.    Consider repeat ablation as an alternative to sotalol.    Call if you have frequent or prolonged episodes of A fib    Follow up in 6 months, or sooner if needed    Glenna Cardenas, CNP  [facility], Electrophysiology  [phone]  EP nurses [phone]

## 2021-06-10 NOTE — TELEPHONE ENCOUNTER
Wellness Screening Tool  Symptom Screening:  Do you have one of the following NEW symptoms:    Fever (subjective or >100.0)?  No    A new cough?  No    Shortness of breath?  No     Chills? No     New loss of taste or smell? No     Generalized body aches? No     New persistent headache? No     New sore throat? No     Nausea, vomiting, or diarrhea?  No    Within the past 3 weeks, have you been exposed to someone with a known positive illness below:    COVID-19 (known or suspected)?  No    Chicken pox?  No    Mealses?  No    Pertussis?  No    Patient notified of visitor policy- They may have one person accompany them to their appointment, but they will need to wear a mask and will be screened upon arrival for symptoms: Yes  Pt informed to wear a mask: Yes  Pt notified if they develop any symptoms listed above, prior to their appointment, they are to call the clinic directly at [phone] for further instructions.  Yes  Patient's appointment status: Patient will be seen in clinic as scheduled on PT WILL SEE PAOLA AT DT OFFICE ARRIVE AT 5 [phone]

## 2021-06-11 NOTE — PROGRESS NOTES
"SUBJECTIVE:   Brett Hopkins is a 64 y.o. male presenting with a chief complaint of   Chief Complaint   Patient presents with     Hypertension     Unsure for How many days      Patient is on prednisone for a month for alopecia and states that is why his blood pressure has been increased. He has been taking the steroid for 6 days and tapered down to 40 mg today from 60 mg. He states he contacted his cardiologist and states his cardiologist told him to contact his PCP. He called his PCP office and was advised to follow-up in clinic within the week. He contacted his dermatologist who told him to double his lisinopril dose, which he didn't do. He does check his blood pressure at home and it's been running in 160's-170's/100's this past week. He states he is unable to make an appointment with his PCP due to work, as he works as a CPA. He has a history of Afib and had an ablation in 2013. He states he has been taking lisinopril 5 mg since last May. Brett has right leg lymphedema and does not wear compression stockings, though he states he should. He states he is tired of \"getting the run around\" and would like to have Dr. Bennett adjust his blood pressure medications over the phone with this visit in Walk[facility]in Clinic.    He denies headache, blurring of vision, chest pain, shortness of breath, dizziness, numbness, weakness, calf pain, change in leg edema    ROS:  Review of systems negative except for noted above      Past Medical History:   Diagnosis Date     Benign essential hypertension      Paroxysmal atrial fibrillation (H)      Status post ablation of atrial fibrillation 07/28/2015    PVI August 19, 2013 (cryo-PVI only)     Family History   Problem Relation Age of Onset     Atrial fibrillation Mother      Dementia Mother         dx 80s     Diabetes type II Mother         dx 60s/70s     Atrial fibrillation Father      Rheum arthritis Father      Atrial fibrillation Sister      Stroke Sister      Atrial fibrillation " Brother      Rheum arthritis Brother      Diabetes type II Brother         dx 40s     Stroke Brother      Atrial fibrillation Brother      Parkinsonism Sister      Current Outpatient Medications   Medication Sig Dispense Refill     acetaminophen (TYLENOL) 500 MG tablet Take 500 mg by mouth every 6 (six) hours as needed for pain.       apixaban (ELIQUIS) 5 mg Tab tablet Take 1 tablet (5 mg total) by mouth 2 (two) times a day. 180 tablet 3     levothyroxine (SYNTHROID, LEVOTHROID) 112 MCG tablet TAKE 1 TABLET(112 MCG) BY MOUTH DAILY 90 tablet 1     lisinopril (PRINIVIL,ZESTRIL) 5 MG tablet Take 1 tablet (5 mg total) by mouth daily. 90 tablet 3     omeprazole (PRILOSEC) 20 MG capsule TAKE 1 CAPSULE(20 MG) BY MOUTH DAILY 90 capsule 3     predniSONE (DELTASONE) 20 MG tablet Take 20 mg by mouth daily Sliding scale, 60mg, 40mg, 20mg   8/31/20 on 60mg.       sotalol (BETAPACE) 80 MG tablet Take 1 tablet (80 mg total) by mouth 2 (two) times a day. 180 tablet 3     No current facility-administered medications for this visit.      Social History     Tobacco Use     Smoking status: Never Smoker     Smokeless tobacco: Never Used   Substance Use Topics     Alcohol use: Yes     Alcohol/week: 7.0 standard drinks     Types: 7 Cans of beer per week       OBJECTIVE  BP (!) 163/101 (Patient Site: Left Arm, Patient Position: Sitting, Cuff Size: Adult Large)   Pulse (!) 48   Temp 98.5  F (36.9  C) (Oral)   Resp 20   Wt (!) 254 lb (115.2 kg)   SpO2 95%   BMI 35.43 kg/m      Physical Exam  Constitutional:       General: He is not in acute distress.     Appearance: Normal appearance. He is not toxic-appearing.   HENT:      Head: Normocephalic and atraumatic.      Mouth/Throat:      Mouth: Mucous membranes are moist.      Pharynx: Oropharynx is clear. No oropharyngeal exudate.   Eyes:      Extraocular Movements: Extraocular movements intact.      Pupils: Pupils are equal, round, and reactive to light.   Cardiovascular:      Rate and  Rhythm: Regular rhythm. Bradycardia present.      Pulses: Normal pulses.   Pulmonary:      Effort: Pulmonary effort is normal. No respiratory distress.      Breath sounds: Normal breath sounds. No wheezing, rhonchi or rales.   Musculoskeletal:      Right lower leg: He exhibits no tenderness. Edema present.      Left lower leg: He exhibits no tenderness.   Neurological:      General: No focal deficit present.      Mental Status: He is alert and oriented to person, place, and time.      Cranial Nerves: Cranial nerves are intact.      Motor: Motor function is intact.   Psychiatric:         Behavior: Behavior is agitated.       ASSESSMENT:      ICD-10-CM    1. Essential hypertension  I10    2. Bradycardia  R00.1       PLAN:    Patient presenting with elevated blood pressure and bradycardia, asymptomatic. No headache, blurring of vision, chest pain, shortness of breath, dizziness, numbness, or weakness. Second BP reading was also above goal. Discussed chronic medications adjustments like HTN are best managed through primary care and patient agrees not to have walk-in clinic adjust blood pressure medication. Discussed he is stable in clinic today.    Patient instructed to follow up with PCP this week for BP recheck. Instructed to go to emergency department if develops chest pain, shortness of breath, dizziness, vision changes, numbness, weakness. AVS reviewed with patient and questions answered. He requests to have staff escort him to  to schedule appointment with PCP which is completed, but patient did not schedule appointment.      Kathleen Lynn NP

## 2021-06-11 NOTE — TELEPHONE ENCOUNTER
Who is calling:  Patient  Reason for Call:    Patient states he is declining an appointment as he already knows why his blood pressure is high.  He is on prednisone temporarily.  Patient is wanting to know if he needs to adjust his lisinopril dosage.  Patient will leave for the day tomorrow at 7:30am.  Please reach out to patient and advise.  Date of last appointment with primary care: 11/6/2019  Okay to leave a detailed message: Yes

## 2021-06-11 NOTE — TELEPHONE ENCOUNTER
Patient Returning Call  Reason for call:  Patient called back.  Information relayed to patient:  Informed of message listed below.  Patient states understanding.  Patient has additional questions:  No  If YES, what are your questions/concerns:    Okay to leave a detailed message?: No call back needed

## 2021-06-11 NOTE — PROGRESS NOTES
[facility] Daily Progress     Patient Name: Brett Hopkins  Date: 7/5/2017  Visit #: 3  Referral Diagnosis: Primary osteoarthritis of right knee, s/p TKA  Referring provider: Mario Woodruff MD  Visit Diagnosis:     ICD-10-CM    1. Status post total right knee replacement Z96.651    2. Decreased strength M62.81    3. Knee stiffness, right M25.661    4. Effusion of joint M25.40          Assessment:   Patient presented feeling that there has been continuous improvement everyday since surgery. He expressed the want to speed up progression of ROM and strengthening. PT progressed HEP with knee ROM and LE strengthening. Knee flexion ROM increased to 75 degrees this session. PT plan to progress exercise and include more squatting exercise when patient gets staples removed.     Response to Intervention:  Excellent.  Patient is very receptive to cuing and suggestion.  He is motivated for his rehabilitation.    Symptoms are consistent with:  Post op recovery of right TKA.    Patient is appropriate to continue with skilled physical therapy intervention, as indicated by initial plan of care.    Goal Status:  Pt. will demonstrate/verbalize independence in self-management of condition in : 4 weeks  Pt. will be independent with home exercise program in : 4 weeks  Pt. will have improved quality of sleep: with less pain;waking less times/night  Pt. will be able to walk : 10 minutes;on even surfaces;with less pain;with less difficulty;for exercise/recreation;for community mobility;in 4 weeks  Patient will stand : 10 minutes;with less pain;with less difficultty;for home chores;in 4 weeks  Patient will ascend / descend: stairs;with railing;with recipricol gait;with less pain;with less difficulty;in 4 weeks  Patient will sit: 30 minutes;for driving;for work;with less pain;with less difficultty;in 4 weeks  No Data Recorded  Other functional progress:    Patient is able to transfer from sit-supine-sit without using leg   or lifting leg with his hands, if cued and encouraged.      Plan / Patient Education:     Continue with initial plan of care.  Progress with home program as tolerated.  Continue NuStep for ROM, continue wall slides, strengthening and cuing for right leg use without assistance, squats when staples out, progress step ups    Subjective:   Patient is doing okay, he is getting a little better everyday. He has been doing all of his exercises and feels as if he is doing well. Patient feels comfortable walking with his cane and wants to focus on ROM and progressing strengthening of his knee.   Patient is no longer using the leg .     Pain Ratin-2, still taking the oxycodone but has decreased frequency to once every 5 hours.       Objective:       Patient has tendency use cane to raise from sitting.  Instruction given for balancing out weightbearing during transfers and using both legs to stand and sit and if needed putting arm on chair armrest.    Patient instructed on continuing to perform exercises given in the past with progression      Knee ROM              Date:  17    AROM in degrees  Right Right  Right   Right  Right   Right       Knee Flexion  (130 )   45   73 during wall slide   75 during wall slide                Knee Extension  (0 )   Lacking 15                  Peripatellar circumference   58 cm          PROM in degrees  Right Right  Right    Right  Right   Right       Knee Flexion  (130 )   pain                    Knee Extension  (0 )                     Exercises below represent all exercise the patient has done in the clinic and HEP.    Please see today's exercise flow-sheet for specifics of today's exercise performance.   Exercises:  Exercise #1: wall slide  Comment #1: x8 with stationary hold 5 seconds.  No assist given.  Exercise #2: Short arc quad  Comment #2: x 10 with min A for full motion.  Exercise #3: SLR flexion x10 tiffanie no assisst  Comment #3: 5 independently, 5  with min A  Exercise #4: nustep [id number] for knee ROM  Comment #4: SLS tiffanie x30 seconds  Exercise #5: step ups 4 inch x10 tiffanie       Treatment Today    TREATMENT MINUTES COMMENTS   Evaluation     Self-care/ Home management     Manual therapy     Neuromuscular Re-education     Therapeutic Activity     Therapeutic Exercises 25 See exercise flow-sheet for details.    Gait training Not performed this session. See above.   Modality__________________                Total 25    Blank areas are intentional and mean the treatment did not include these items.       Nicolle Rosa, PT  7/5/2017

## 2021-06-11 NOTE — PROGRESS NOTES
[facility] Daily Progress     Patient Name: Brett Hopkins  Date: 7/10/2017  Visit #: 4  Referral Diagnosis: Primary osteoarthritis of right knee, s/p TKA  Referring provider: Mario Woodruff MD  Visit Diagnosis:     ICD-10-CM    1. Status post total right knee replacement Z96.651    2. Decreased strength M62.81    3. Knee stiffness, right M25.661    4. Effusion of joint M25.40          Assessment:   Patient ROM continues to improve.  Swelling is down 6 cm.  Gait is improving.    Response to Intervention:  Excellent.  Patient is very receptive to cuing and suggestion.  He is motivated for his rehabilitation.    Symptoms are consistent with:  Post op recovery of right TKA.    Patient is appropriate to continue with skilled physical therapy intervention, as indicated by initial plan of care.    Goal Status:  Pt. will demonstrate/verbalize independence in self-management of condition in : 4 weeks  Pt. will be independent with home exercise program in : 4 weeks  Pt. will have improved quality of sleep: with less pain;waking less times/night  Pt. will be able to walk : 10 minutes;on even surfaces;with less pain;with less difficulty;for exercise/recreation;for community mobility;in 4 weeks  Patient will stand : 10 minutes;with less pain;with less difficultty;for home chores;in 4 weeks  Patient will ascend / descend: stairs;with railing;with recipricol gait;with less pain;with less difficulty;in 4 weeks  Patient will sit: 30 minutes;for driving;for work;with less pain;with less difficultty;in 4 weeks  No Data Recorded  Other functional progress:    Patient is able to transfer from sit-supine-sit without using leg  or lifting leg with his hands, if cued and encouraged.      Plan / Patient Education:     Continue with initial plan of care.  Progress with home program as tolerated.  Continue NuStep for ROM, continue wall slides, strengthening and cuing for right leg use without assistance, squats when  staples out, progress step ups    Subjective:   Patient is doing okay, he is getting a little better everyday.     Pain Ratin-2, stopped taking Oxycodone.      Objective:       Flexed gait looking at the ground with SEC.  Cues for posture and gaze.      Knee ROM              Date:  6/26/17      6/28/17    7/10/17    AROM in degrees  Right Right  Right   Right  Right   Right       Knee Flexion  (130 )   45   73 during wall slide   75 during wall slide   90             Knee Extension  (0 )   Lacking 15     Lacking  8             Peripatellar circumference   58 cm   52       PROM in degrees  Right Right  Right    Right  Right   Right       Knee Flexion  (130 )   pain                    Knee Extension  (0 )                     Exercises below represent all exercise the patient has done in the clinic and HEP.    Please see today's exercise flow-sheet for specifics of today's exercise performance.   Exercises:  Exercise #1: gravity slide  Comment #1: x10  Exercise #2: LAQ  Comment #2: x 10   Exercise #3: SLR flexion x10 tiffanie no assisst  Comment #3: 5 independently, 5 with min A  Exercise #4: nustep x4 minutes for knee ROM  Comment #4: SLS tiffanie x30 seconds  Exercise #5: step ups 4 inch x10 tiffanie       Treatment Today    TREATMENT MINUTES COMMENTS   Evaluation     Self-care/ Home management     Manual therapy 5   MFR layer 1-3 right hamstrings.   Neuromuscular Re-education     Therapeutic Activity     Therapeutic Exercises 25 See exercise flow-sheet for details.    Gait training     Modality__________________                Total 30    Blank areas are intentional and mean the treatment did not include these items.       Javi Man, PT  7/10/2017

## 2021-06-11 NOTE — PROGRESS NOTES
S: Patient was seen in [place] to be measured for knee high compression stockings/Velcro in 20-30 mmHg and/or 30-40 mmHg. Rx is on-file from Dr. Orlando but I will be requesting an additional modified order.    O: Goal is to evaluate and measure patient for a compression garment that will help control his right-sided lymphedema as well as her bilateral venous hypertension. Observations show there is swelling present from lymphatic fluid. The expected outcome with compliant use is to reduce swelling and control the patient s condition.     A: Champ was very frustrated throughout the appointment. He liked the Compreflex Transition Velcro calf pieces in black as an option to wear during the day but was very upset because he did not want to wear them at night because of the color of the liners. He is insistent on wearing white socks at the end of the day and wants compression stockings but states that compression stockings are impossible for him to don. I showed him different donning options, all of which he states are impossible for him to use. I told him I could talk to Dr. Orlando and see what she thinks of [employer] knee highs 8-15 mmHg to potentially wear under the Velcro pieces and/or on their own. He seems happy with this plan. He also ordered three additional [employer] Compreflex Transition liners (medium) in black that he will pay for OOP with the 20% discount. I measured both legs for the garments (size medium, tall) and pulled these from the  stock supply. I assisted him in donning and once on, they appeared to be fitting well and he stated they were comfortable. He was instructed on the donning and doffing process, the uses of the garments, and how to care for the items. He went home wearing two [employer] Compreflex Transition Velcro calf pieces.     P: He is to call with any further needs. I will call when I receive Dr. Newman's confirmation for the [employer] stockings leading to me ordering  them in. Once those are in and the transition liners, I will call to determine how we will do deliveries.  Goal is to maintain a home program.

## 2021-06-11 NOTE — PATIENT INSTRUCTIONS - HE
"For new compression (if covered by insurance - they will check)  [facility] (Please call to make an appointment)    [facility]  [address]  [place], [place].  Phone: [phone]    [facility]  [address]  [place] [address]  [phone]              If ordering over the counter (online or at medical supply):     Compression needed:  20-30 mmHg    Length:  knee high    Toe Piece:  open toe or closed toe    Measures:        Inches (in)  Centimeters (cm)    Ankle R - 10.5  L - 10.4 R - 26.6  L - 26.3   Calf (Widest Part) R- 18.4  L- 17.8 R - 46.7  L- 45.3                 You may need to search \"compression sock large or extra large\"        Swelling in the legs can be caused by many reasons. No matter what the reason, treatment usually includes some type of compression. You should wear your compression socks as much as you can. Your compression should be put on first thing in the morning. Take the compression off at night. It is especially important to wear them with long periods of sitting/standing, long car rides or if you will be flying. Going without compression for even brief periods of time can be damaging to your legs and your health.  Compression socks should get replaced usually every 4-6 months. They do not need to be worn at night while in bed. If we have seen you in the last year, we can refill your sock prescription, otherwise your primary care provider is able to refill them for you. Call us with any problems or questions.   Compression Velcro may need to be replaced every 9-12 months.  Often the liners and socks need to be replaced every three or four months.  The neoprene velcro may last up to 2 years.  If the velcro becomes worn a tailor may be able to repair it for you inexpensively.    If you do a lot of standing, it is good to do calf raises to help keep the blood pumping. If " "you sit a lot at work, it is good to get up periodically to walk around. Elevation of the foot of your bed 4-6\" helps the blood return back to where it is needed.   Please call us if you have any questions [phone]    Thank you for choosing [facility].      Full list of locations:  [facility] (Please call to make an appointment)    [facility]  [address], [address]  [place], [place].  Phone: [phone]    Other Locations: (Depending on location may be limited in what products are available)    [facility]  [address] [place] [address]  Phone [phone]    [facility]  [address]  [place] [address]  Phone: [phone]    [facility]   [address]  [place] [address]  Phone: [phone]    [facility]  [address]  [place] [address]  Phone: [phone]    [facility]  [address]  [address]  [place] [address]  Phone: [phone]    [facility]  [address]  [address]   [place] [address]   Phone: [phone]    [facility]  [address]  [place] [address] [phone]    [facility]  [address] [place] [address]   Phone: [phone]      "

## 2021-06-11 NOTE — ANESTHESIA PREPROCEDURE EVALUATION
Anesthesia Evaluation      Patient summary reviewed   No history of anesthetic complications     Airway   Mallampati: I  Neck ROM: full   Pulmonary - negative ROS and normal exam    breath sounds clear to auscultation                         Cardiovascular - negative ROS and normal exam  Rhythm: regular  Rate: normal,         Neuro/Psych - negative ROS     Endo/Other       GI/Hepatic/Renal - negative ROS           Dental - normal exam                        Anesthesia Plan  Planned anesthetic: spinal    ASA 2     Anesthetic plan and risks discussed with: patient

## 2021-06-11 NOTE — TELEPHONE ENCOUNTER
Glenna,     Patient was last seen by you 7/31 for persistent afib and hypertension.  Blood pressure well controlled at that visit.  He is on sotalol 80mg two times a day and lisinopril 5mg daily.     He recently saw a dermatologist for alopecia and was started on prednisone, tapering down for a total of 1 month.    Today at his office visit with vascular surgery his BP was noted to be 172/100.    He is wondering if the steroid use it contributing.    He is otherwise feeling well.     He is tapering down on his initial loading dose today and has been taking it for 5 days.    Thoughts/recommendations?    Thanks  Francisca

## 2021-06-11 NOTE — TELEPHONE ENCOUNTER
Called and spoke with patient.  Reviewed Glenna's recommendations.  No further issues or concerns.  JW

## 2021-06-11 NOTE — TELEPHONE ENCOUNTER
Patient calling about his elevated BP while on a tapering dose of Prednisone.  Put on temporary Prednisone last week for alopecia by dermatology consultants in [place] and is on a tapering dose of 60, 40, 30, 20.     In today for lymphedema, and his BP was  172/100 in clinic.  He says he was told to call his clinic, and has been transferred a few times and expressed frustration at not being able to reach his clinic.      Explained that triage was to make sure he was not having red-flag symptoms.  Guidelines recommend seeing provider within three days.      He says he has already spoken to the clinic who prescribed the Prednisone, and that they sent him to his PCP.    He would appreciate a call from someone at his PCP at [phone].    Routed to Dr Sabrina Marti, RN  [facility]    Reason for Disposition    Systolic BP >= 160 OR Diastolic >= 100    Additional Information    Negative: Sounds like a life-threatening emergency to the triager    Negative: Systolic BP >= 160 OR Diastolic >= 100, and any cardiac or neurologic symptoms (e.g., chest pain, difficulty breathing, unsteady gait, blurred vision)    Negative: Patient sounds very sick or weak to the triager    Negative: BP Systolic BP >= 140 OR Diastolic >= 90 and postpartum (from 0 to 6 weeks after delivery)    Negative: Systolic BP >= 180 OR Diastolic >= 110, and missed most recent dose of blood pressure medication    Negative: Systolic BP >= 180 OR Diastolic >= 110    Negative: Patient wants to be seen    Negative: Ran out of BP medications    Negative: Taking BP medications and feels is having side effects (e.g., impotence, cough, dizziness)    Protocols used: HIGH BLOOD PRESSURE-A-OH

## 2021-06-11 NOTE — TELEPHONE ENCOUNTER
Called to Champ to update him that Dr. Bennett's office called our clinic back and said Champ should call to make an appointment with Dr. Bennett. Chmap states he will either do that or see his cardiologist.

## 2021-06-11 NOTE — PROGRESS NOTES
[facility] Daily Progress     Patient Name: Brett Hopkins  Date: 7/13/2017  Visit #: 4  Referral Diagnosis: Primary osteoarthritis of right knee, s/p TKA  Referring provider: Mario Woodruff MD  Visit Diagnosis:     ICD-10-CM    1. Status post total right knee replacement Z96.651    2. Decreased strength M62.81    3. Knee stiffness, right M25.661    4. Effusion of joint M25.40    5. Antalgic gait R26.89          Assessment:   Patient ROM continues to improve.  Swelling is down 6 cm.  Gait is improving.    Response to Intervention:  Excellent.  Patient is very receptive to cuing and suggestion.  He is motivated for his rehabilitation.    Symptoms are consistent with:  Post op recovery of right TKA.    Patient is appropriate to continue with skilled physical therapy intervention, as indicated by initial plan of care.    Goal Status:  Pt. will demonstrate/verbalize independence in self-management of condition in : 4 weeks  Pt. will be independent with home exercise program in : 4 weeks  Pt. will have improved quality of sleep: with less pain;waking less times/night  Pt. will be able to walk : 10 minutes;on even surfaces;with less pain;with less difficulty;for exercise/recreation;for community mobility;in 4 weeks  Patient will stand : 10 minutes;with less pain;with less difficultty;for home chores;in 4 weeks  Patient will ascend / descend: stairs;with railing;with recipricol gait;with less pain;with less difficulty;in 4 weeks  Patient will sit: 30 minutes;for driving;for work;with less pain;with less difficultty;in 4 weeks  No Data Recorded  Other functional progress:    Patient is able to transfer from sit-supine-sit without using leg  or lifting leg with his hands, if cued and encouraged.      Plan / Patient Education:     Continue with initial plan of care.  Progress with home program as tolerated.  Continue NuStep for ROM, continue wall slides, strengthening and cuing for right leg use  "without assistance, squats when staples out, progress step ups    Subjective:   Patient is doing okay, he is getting a little better everyday.     Pain Ratin-2, stopped taking Oxycodone.      Objective:       Flexed gait looking at the ground with SEC.  Cues for posture and gaze.      Knee ROM              Date:  6/26/17      6/28/17    7/10/17    AROM in degrees  Right Right  Right   Right  Right   Right       Knee Flexion  (130 )   45   73 during wall slide   75 during wall slide   90             Knee Extension  (0 )   Lacking 15     Lacking  8             Peripatellar circumference   58 cm   52       PROM in degrees  Right Right  Right    Right  Right   Right       Knee Flexion  (130 )   pain                    Knee Extension  (0 )                     Exercises below represent all exercise the patient has done in the clinic and HEP.    Please see today's exercise flow-sheet for specifics of today's exercise performance.   Exercises:  Exercise #1: gravity slide  Comment #1: x10  Exercise #2: LAQ  Comment #2: x 10   Exercise #3: SLR flexion x10 tiffanie no assisst  Comment #3: 5 independently, 5 with min A  Exercise #4: nustep x3 minutes for knee ROM  Comment #4: bike seat height 7 half circles 5 minutes  Exercise #5: step ups 4 inch x10 tiffanie  Exercise #6: heel/toe raises  Comment #6: 10 bilateral  Exercise #7: gastroc stretch  Comment #7: 30\" x 2  wedge.  Exercise #8: marching and  knee flexion  Comment #8: 10 bilateral each  Exercise #9: standing hamstring stretch  Comment #9: 30\" x 2   Exercise #10: standing knee flexion lunge stretch on plyobox  Comment #10: 30\" x 2   Exercise #11: manual hip flexor stretch in standing  Comment #11: 30\" x 2 right.  Exercise #12: gait assessment:  decreased pushoff on right , decreased knee extension during swing, using cane, flexoed posture, hip flexture contraction       Treatment Today    TREATMENT MINUTES COMMENTS   Evaluation     Self-care/ Home management     Manual therapy  "    Neuromuscular Re-education     Therapeutic Activity     Therapeutic Exercises 25 See exercise flow-sheet for details.    Gait training     Modality__________________                Total 25    Blank areas are intentional and mean the treatment did not include these items.       Javi Man, PT  7/13/2017

## 2021-06-11 NOTE — TELEPHONE ENCOUNTER
I was already contacted about this patient by the vascular clinic and they were instructed to get the patient on my clinic appointment this week.

## 2021-06-11 NOTE — TELEPHONE ENCOUNTER
In the past 14 days have you been in contact with anyone with Covid 19 or Suspected of Covid 19?  no    Have you been experiencing any of the following symptoms?     Fever?  no    Cough?  no    Shortness of breath?  no  ? If yes, is this a change? no    Skin rash?  no  In the past 30 days have you  traveled internationally: no

## 2021-06-11 NOTE — PROGRESS NOTES
[facility] Daily Progress     Patient Name: Brett Hopkins  Date: 7/17/2017  Visit #: 6  Referral Diagnosis: Primary osteoarthritis of right knee, s/p TKA  Referring provider: Mario Woodruff MD  Visit Diagnosis:     ICD-10-CM    1. Status post total right knee replacement Z96.651    2. Decreased strength M62.81    3. Knee stiffness, right M25.661    4. Effusion of joint M25.40    5. Antalgic gait R26.89          Assessment:   Patient ROM continues to improve.  Swelling is down 6 cm.  Gait is improving.    Response to Intervention:  Excellent.  Patient is very receptive to cuing and suggestion.  He is motivated for his rehabilitation.    Symptoms are consistent with:  Post op recovery of right TKA.    Patient is appropriate to continue with skilled physical therapy intervention, as indicated by initial plan of care.    Goal Status:  Pt. will demonstrate/verbalize independence in self-management of condition in : 4 weeks  Pt. will be independent with home exercise program in : 4 weeks  Pt. will have improved quality of sleep: with less pain;waking less times/night  Pt. will be able to walk : 10 minutes;on even surfaces;with less pain;with less difficulty;for exercise/recreation;for community mobility;in 4 weeks  Patient will stand : 10 minutes;with less pain;with less difficultty;for home chores;in 4 weeks  Patient will ascend / descend: stairs;with railing;with recipricol gait;with less pain;with less difficulty;in 4 weeks  Patient will sit: 30 minutes;for driving;for work;with less pain;with less difficultty;in 4 weeks  No Data Recorded  Other functional progress:    Patient is able to transfer from sit-supine-sit without using leg  or lifting leg with his hands, if cued and encouraged.      Plan / Patient Education:     Continue with initial plan of care.  Progress with home program as tolerated.  Continue bike for ROM, continue wall slides, strengthening and cuing for right leg use without  "assistance, squats when staples out, progress step ups    Subjective:     Yesterday was more sore and felt more swollen.  Today is better. No pain today.    Pain Ratin-2,       Objective:       Flexed gait looking at the ground with SEC.  Cues for posture and gaze.      Knee ROM              Date:  6/26/17      6/28/17    7/10/17    AROM in degrees  Right Right  Right   Right  Right   Right       Knee Flexion  (130 )   45   73 during wall slide   75 during wall slide   90             Knee Extension  (0 )   Lacking 15     Lacking  8             Peripatellar circumference   58 cm   52       PROM in degrees  Right Right  Right    Right  Right   Right       Knee Flexion  (130 )   pain                    Knee Extension  (0 )                     Exercises below represent all exercise the patient has done in the clinic and HEP.    Please see today's exercise flow-sheet for specifics of today's exercise performance.   Exercises:  Exercise #1: gravity slide  Comment #1: x10  Exercise #2: LAQ  Comment #2: x 10   Exercise #3: SLR flexion x10 tiffanie no assisst  Comment #3: 5 independently, 5 with min A  Exercise #4: nustep x3 minutes for knee ROM  Comment #4: bike seat height 7 half circles 5 minutes  Exercise #5: step ups 4 inch x10 tiffanie  Exercise #6: heel/toe raises  Comment #6: 10 bilateral  Exercise #7: gastroc stretch  Comment #7: 30\" x 2  wedge.  Exercise #8: marching and  knee flexion  Comment #8: 10 bilateral each  Exercise #9: standing hamstring stretch  Comment #9: 30\" x 2   Exercise #10: standing knee flexion lunge stretch on plyobox  Comment #10: 30\" x 2   Exercise #11: manual hip flexor stretch in left sidelying  Comment #11: 30\" x 2 right.  Exercise #12: gait assessment:  decreased pushoff on right , decreased knee extension during swing, using cane, flexoed posture, hip flexture contraction       Treatment Today    TREATMENT MINUTES COMMENTS   Evaluation     Self-care/ Home management     Manual therapy 15 See " above.   Neuromuscular Re-education     Therapeutic Activity     Therapeutic Exercises 10 See exercise flow-sheet for details.    Gait training     Modality__________________                Total 25    Blank areas are intentional and mean the treatment did not include these items.       Javi Man, PT  7/17/2017

## 2021-06-11 NOTE — PROGRESS NOTES
Reviewed instructions and made follow up appointment. Notified Dr. Bennett's office of elevated blood pressure. Patient to follow up with Dr. Bennett.

## 2021-06-11 NOTE — ANESTHESIA POSTPROCEDURE EVALUATION
Patient: Brett Hopkins  2)  RIGHT TOTAL KNEE ARTHROPLASTY  Anesthesia type: spinal    Patient location: PACU  Last vitals:   Vitals:    06/21/17 1130   BP: (!) 158/95   Pulse: 74   Resp: 19   Temp:    SpO2: 99%     Post vital signs: stable  Level of consciousness: awake, alert, oriented and responds to simple questions  Post-anesthesia pain: pain controlled  Post-anesthesia nausea and vomiting: no  Pulmonary: unassisted, return to baseline  Cardiovascular: stable and blood pressure at baseline  Hydration: adequate  Anesthetic events: no    QCDR Measures:  ASA# 11 - Catina-op Cardiac Arrest: ASA11B - Patient did NOT experience unanticipated cardiac arrest  ASA# 12 - Catina-op Mortality Rate: ASA12B - Patient did NOT die  ASA# 13 - PACU Re-Intubation Rate: NA - No ETT / LMA used for case  ASA# 10 - Composite Anes Safety: ASA10A - No serious adverse event  ASA# 38 - New Corneal Injury: ASA38A - No new exposure keratitis or corneal abrasion in PACU    Additional Notes:spinal receding

## 2021-06-11 NOTE — TELEPHONE ENCOUNTER
This reading is significantly higher than last month in clinic.  Could be due to steroid.  Agree with recommendation to follow up with Dr. Bennett (message between vascular clinic and Dr. Higgins's office).   Thanks,   [place]

## 2021-06-11 NOTE — PROGRESS NOTES
S: I have received Brett' Esther Compreflex Transition liners and BSN [employer] knee highs 8-15 mmHg. RX on-file is current.    A: No assessment was made. Brett paid for the liners over the phone with the 20% discount. I will be shipping items to his chosen address he had me write down.     P: He is to call with any further needs.  Goal is to maintain a home program.

## 2021-06-11 NOTE — TELEPHONE ENCOUNTER
Pt Is very upset. Saying that no one cares about him. Has been calling for hours trying to talk with PCP. He has a visit tomorrow and provided the Customer [employer]acy number as well.

## 2021-06-11 NOTE — ANESTHESIA PROCEDURE NOTES
Spinal Block    Patient location during procedure: OR  Start time: 6/21/2017 8:50 AM  End time: 6/21/2017 8:54 AM  Reason for block: primary anesthetic    Staffing:  Performing  Anesthesiologist: OLVE CASTAÑEDA    Preanesthetic Checklist  Completed: patient identified, risks, benefits, and alternatives discussed, timeout performed, consent obtained, at patient's request, airway assessed, oxygen available, suction available, emergency drugs available and hand hygiene performed  Spinal Block  Patient position: sitting  Prep: ChloraPrep  Patient monitoring: heart rate, cardiac monitor, continuous pulse ox and blood pressure  Approach: right paramedian  Location: L3-4  Injection technique: single-shot  Needle type: pencil-tip   Needle gauge: 24 G      Additional Notes:  Clear csf

## 2021-06-11 NOTE — PROGRESS NOTES
Patient is in clinic today to see MD Susy Orlando.      Patient is here for a 3 month lymphedema follow up to discuss lymphedema    The patient does not smoke.    Pt is currently taking Eliquis.    The patient states he/she are NOT wearing compression. Patient is using flexitouch for his right leg.    Swelling is observed in right leg.  Pt rates pain 3/10 located at right leg.  Pts symptoms include Swelling in Right extremity    Patients condition is improved.    The provider has been notified that the patient has no complaints.       At the end of the visit the patient was provided with education both verbally and on their AVS

## 2021-06-11 NOTE — PROGRESS NOTES
[facility]   Knee Initial Evaluation    Patient Name: Brett Hopkins  Date of evaluation: 6/26/2017  Referral Diagnosis: Primary osteoarthritis of right knee  Referring provider: Mario Woodruff MD  Visit Diagnosis:     ICD-10-CM    1. Status post total right knee replacement Z96.651    2. Effusion of joint M25.40    3. Decreased strength M62.81    4. Knee stiffness, right M25.661    5. Antalgic gait R26.89        Assessment:      Impairments in  pain, posture, ROM, joint mobility, strength, gait/locomotion and balance, swelling  Patient's signs and symptoms are consistent with post op recovery from TKA.  Patient responded well to exercise and manual therapy.  Patient tolerated it well, commenting after today's session that, that was the best he's felt since surgery..  Prognosis to achieve goals is  good   Pt. is appropriate for skilled PT intervention as outlined in the Plan of Care (POC).    Goals:  Pt. will demonstrate/verbalize independence in self-management of condition in : 4 weeks  Pt. will be independent with home exercise program in : 4 weeks  Pt. will have improved quality of sleep: with less pain;waking less times/night  Pt. will be able to walk : 10 minutes;on even surfaces;with less pain;with less difficulty;for exercise/recreation;for community mobility;in 4 weeks  Patient will stand : 10 minutes;with less pain;with less difficultty;for home chores;in 4 weeks  Patient will ascend / descend: stairs;with railing;with recipricol gait;with less pain;with less difficulty;in 4 weeks  Patient will sit: 30 minutes;for driving;for work;with less pain;with less difficultty;in 4 weeks  No Data Recorded    Patient's expectations/goals are realistic.    Barriers to Learning or Achieving Goals:  No Barriers.       Plan / Patient Instructions:      Plan of Care:   Communication with: Referral Source  Patient Related Instruction: Nature of Condition;Treatment plan and rationale;Self Care  instruction;Basis of treatment;Body mechanics;Posture;Precautions;Next steps;Expected outcome  Times per Week: 2  Number of Weeks: 4-6  Number of Visits: 12  Discharge Planning: HEP, assistive device training, pain and swelling management strategies  Precautions / Restrictions :    Therapeutic Exercise: ROM;Stretching  Neuromuscular Reeducation: posture;balance/proprioception;core  Manual Therapy: soft tissue mobilization;myofascial release;joint mobilization;muscle energy  Modalities: cold pack  Gait Training:       Plan for next visit: review HEP, Nustep continue with manual therapy as appropriate.     Subjective:        Social information:   Living Situation:single family home, lives with others , stairs  with railing and has assistance Yes   Occupation:  [employer]   Work Status:Working full time   Equipment Available: SE cane and crutches    History of Present Illness:    Brett is a 61 y.o. male who presents to therapy today with complaints of right knee pain, stiffness and swelling. Date of onset/duration of symptoms is 17. Onset was surgical repair of right knee joint. Symptoms are constant and getting better. He denies history of similar symptoms. He describes their previous level of function as not limited    Pain Ratin  Pain rating at best: 3  Pain rating at worst: 8  Pain description:aching, burning, pain, sharp, soreness, tingling and weakness    Functional limitations are described as occurring with:   ascending and descending stairs or curbs  balance  sitting    sleeping  sports or recreation    standing    transitional movements getting in  bed, chair and car and getting out of  bed, chair and car  walking             Objective:      Note: Items left blank indicates the item was not performed or not indicated at the time of the evaluation.    Patient Outcome Measures :    Lower Extremity Functional Scale (_/80): 21   Scores range from 0-80, where a score of 80 represents maximum function. The minimal  clinically important difference is a positive change of 9 points.    Knee Examination  1. Status post total right knee replacement     2. Effusion of joint     3. Decreased strength     4. Knee stiffness, right     5. Antalgic gait       Precautions/Restrictions:  None  Involved Side: Right  Posture Observation:      Lower extremity:  Knee:  Right genu valgus.  Assistive Device: Crutches  Gait Observation: slightly antalgic, decreased knee extension,  Lumbar Clearing: Does not provoke symptoms  Hip Clearing: Does not provoke symptoms    Knee ROM       Date:  6/26/17    AROM in degrees  Right   Left  Right   Left  Right   Left       Knee Flexion  (130 )   45                      Knee Extension  (0 )   Lacking 15                    PROM in degrees  Right   Left  Right   Left  Right   Left       Knee Flexion  (130 )   pain                      Knee Extension  (0 )                       LE Strength                  Date:  6/26/17 not tested secondary to recent surgery.   Strength (MMT/5)  Right   Left  Right   Left  Right   Left       Hip Flexion                         Hip Abduction                         Hip Adduction                         Hip Extension                         Hip Internal Rotation                         Hip External Rotation                         Knee Extension                         Knee Flexion                         Ankle Dorsiflexion                         Ankle Plantarflexion                       Flexibility:  Limited quad, hamstring gastroc, and likely hip flexors based on postural assessment.    Palpation:  Myofascial restriction right: quad, hamstring, gastroc    Circumferential measurement:  Left 48 cm,   Right 58 cm.     Knee Special Tests (+/-):  NT    Knee OA Cluster   Right   Left   Ligament Tests   Right   Left    1. > 49 y/o           Lachman          2. Stiffness > 30 min.           Anterior Drawer          3. Crepitus           Posterior Drawer          4. Bony tenderness    "        Posterior Sag          5. Bone enlargement           Valgus Stress          6. No warmth to the touch           Varus Stress           Meniscal Tests   Right   Left    Other   Right    Left       Chidi's           Ely's             Joint line tenderness           Maximo             Thessaly Thomas Apley's                      Exercises:  Exercise #1: heel slide, SLR, SAQ  Comment #1: x 10 with mod to min assist each  Exercise #2: ankle pumps, quad sets, glut sets  Comment #2: 5\" x 10 each  Exercise #3: seated march  Comment #3: x 10     MFR layers 1,2 right:  Quad, hamstring  Decongestive massage    Treatment Today      TREATMENT MINUTES COMMENTS   Evaluation 20    Self-care/ Home management     Manual therapy 15 See above   Neuromuscular Re-education     Therapeutic Activity     Therapeutic Exercises 15 See exercise flow-sheet for details.    Gait training     Modality__________________                Total 50    Blank areas are intentional and mean the treatment did not include these items.     PT Evaluation Code: (Please list factors)  Patient History/Comorbidities: AAA, s/p ablation of A-fib  Examination: decreased joint range of motion, joint effusion, decreased strength, difficulty walking, sitting, standing,   Clinical Presentation: stable  Clinical Decision Making:  low    Patient History/  Comorbidities Examination  (body structures and functions, activity limitations, and/or participation restrictions) Clinical Presentation Clinical Decision Making (Complexity)   No documented Comorbidities or personal factors 1-2 Elements Stable and/or uncomplicated Low   1-2 documented comorbidities or personal factor 3 Elements Evolving clinical presentation with changing characteristics Moderate   3-4 documented comorbidities or personal factors 4 or more Unstable and unpredictable High              Javi Man  6/26/2017  10:24 AM                "

## 2021-06-11 NOTE — PATIENT INSTRUCTIONS - HE
As discussed, contact me via my chart in the next 7 to 10 days or so with both your blood pressure values and your dose of prednisone and we can dose adjust the lisinopril based on that.  For now take 10 mg in the morning and 5 mg in the evening.

## 2021-06-11 NOTE — PROGRESS NOTES
ASSESSMENT:  1. Preop general physical exam  - HM2(CBC w/o Differential)  - INR  - metoprolol succinate (TOPROL XL) 25 MG; Take 1 tablet (25 mg total) by mouth daily.  Dispense: 15 tablet; Refill: 0  - Electrocardiogram Perform and Read  - Urinalysis-UC if Indicated; Future    2. Osteoarthritis Of The Knee Right,    3. Hypothyroidism  - Thyroid Stimulating Hormone (TSH)       61 y.o. male with planned surgery right knee total arthroplasty due to severe osteoarthritis.  Known risk factors for perioperative complications: Does not have any major chronic symptom but has a prior history of atrial fibrillation status post ablation.    Difficulty with intubation is not anticipated.    Cardiac Risk Estimation: As noted he has a prior history of atrial fibrillation which is resolved now post ablation.  He is active and has more than 4METs metabolic equivalent.  Limitation is due to pain in his knee.  His surgery is category 1 with perioperative cardiac risk less than 1%.  There is no contraindication for patients of surgery which he understands and is willing to go ahead with.    Current medications which may produce withdrawal symptoms if withheld perioperatively: None.    PLAN:  There are no Patient Instructions on file for this visit.    Orders Placed This Encounter   Procedures     HM2(CBC w/o Differential)     INR     Thyroid Stimulating Hormone (TSH)     Urinalysis-UC if Indicated     Standing Status:   Future     Standing Expiration Date:   6/2/2018     Electrocardiogram Perform and Read     There are no discontinued medications.    No Follow-up on file.       1. Preoperative workup as follows: ECG, urinalysis (urinary tract instrumentation planned), HM2(CBC w/o Differential), INR.   2. Change in medication regimen before surgery: Discussed his medications advised to take metoprolol which was prescribed to lower perioperative risk, he will stop naproxen and aspirin about a week prior to surgery.  He was informed of  these..  3. Prophylaxis for cardiac events with perioperative beta-blockers: Metoprolol 25 mg daily was prescribed.  4. Invasive hemodynamic monitoring perioperatively: at the discretion of anesthesiologist.  5. Deep vein thrombosis prophylaxis postoperatively:regimen to be chosen by surgical team.     Mallampati score: II (hard and soft palate, upper portion of tonsils anduvula visible)    Dentition: No chipped, loose, or missing teeth.       Patient was prescribed 25 mg metoprolol to take from June 14th to 28th. If metoprolol causes him to feel tired, dizzy, or lightheaded, patient will stop taking it and let us know. Advised patient to watch out for lower extremity swelling after surgery.     CHIEF COMPLAINT:  Chief Complaint   Patient presents with     Pre-op Exam     right knee total replacement, DOS: 6/21/17, SHAHA[place], Dr Woodruff       HISTORY OF PRESENT ILLNESS:  Brett is a 61 y.o. male here for a pre-operative consultation. The exam is requested by Dr. Woodruff in preparation for right total knee arthroplasty to be performed at [facility] on June 21, 2017. Today s examination on 6/2/2017 is done to review the underlying surgical condition of osteoarthritis of right knee, clear for anesthesia, and review medical problems with appropriate changes in medications.    Brett has tolerated previous surgeries well without bleeding or anesthesia difficulty.      He has arthritis in both of his knees that is worse is his right knee. He has right knee pain. His wife and daughter will help him at home after surgery during the recovery.     History of Atrial Fibrillation: He was first diagnosed with atrial fibrillation 2007. He took warfarin from 2007 to 2009, and he then switched to aspirin. He had ablation in 2013, and he has not had a recurrence of atrial fibrillation since. He used to take metoprolol for years, and he did not have any problems with metoprolol when he took it.     Hypothyroidism: He uses 112 mcg levothyroxine  daily.     REVIEW OF SYSTEMS:   He had a little right ear pain last week without drainage, and the pain has since cleared. He snores. He has had a sleep study in the past, and he was told that he only has sleep apnea if he sleeps on his back. He does not sleep on his back. He denies neck and lower back pain. He sometimes has pain behind his calves. All other systems are negative.    PFSH:  Family: Two of his siblings have had strokes due to atrial fibrillation.     Reviewed, as below.     History   Smoking Status     Never Smoker   Smokeless Tobacco     Never Used       Family History   Problem Relation Age of Onset     Atrial fibrillation Mother      Dementia Mother      dx 80s     Diabetes type II Mother      dx 60s/70s     Atrial fibrillation Father      Rheum arthritis Father      Atrial fibrillation Sister      Stroke Sister      Atrial fibrillation Brother      Rheum arthritis Brother      Diabetes type II Brother      dx 40s       Past Surgical History:   Procedure Laterality Date     WI ABLATE HEART DYSRHYTHM FOCUS  08/19/2013    Description: Catheter Ablation Atrial Fibrillation;  Recorded: 11/16/2013;  Comments: PVI Aug 19, 2013 (Cryo to all 4 PV's)     WI DENTAL SURGERY PROCEDURE      Description: Oral Surgery Tooth Extraction;  Recorded: 08/12/2013;       Allergies   Allergen Reactions     Sulfa (Sulfonamide Antibiotics)      turned purple         Active Ambulatory Problems     Diagnosis Date Noted     Obesity      Varicose Veins      Osteoarthritis Of The Knee      Aneurysm Of The Ascending Aorta      Hypothyroidism      Status post ablation of atrial fibrillation 07/28/2015     Resolved Ambulatory Problems     Diagnosis Date Noted     Paroxysmal Atrial Fibrillation      Chronic Reflux Esophagitis      Knee Sprain      Past Medical History:   Diagnosis Date     Arthritis      Disease of thyroid gland      Status post ablation of atrial fibrillation 7/28/2015       Current Outpatient Prescriptions  "  Medication Sig Dispense Refill     aspirin 81 MG EC tablet Take 81 mg by mouth daily.       levothyroxine (SYNTHROID, LEVOTHROID) 112 MCG tablet TAKE ONE TABLET BY MOUTH EVERY DAY 90 tablet 2     MULTIVIT &MINERALS/FERROUS FUM (MULTI VITAMIN ORAL) Take 1 tablet by mouth daily.       naproxen sodium (ALEVE) 220 MG tablet Take 440 mg by mouth 2 (two) times a day with meals.       omeprazole (PRILOSEC) 20 MG capsule Take 1 capsule (20 mg total) by mouth daily. 90 capsule 1     metoprolol succinate (TOPROL XL) 25 MG Take 1 tablet (25 mg total) by mouth daily. 15 tablet 0     No current facility-administered medications for this visit.        VITALS:  Vitals:    06/02/17 0852   BP: 124/80   Pulse: 64   Temp: 98  F (36.7  C)   TempSrc: Oral   SpO2: 96%   Weight: (!) 248 lb 4.8 oz (112.6 kg)   Height: 5' 9.75\" (1.772 m)     Wt Readings from Last 3 Encounters:   06/02/17 (!) 248 lb 4.8 oz (112.6 kg)   12/14/16 (!) 228 lb 4.8 oz (103.6 kg)   08/03/16 219 lb (99.3 kg)     Body mass index is 35.88 kg/(m^2).    PHYSICAL EXAM:  General Appearance: Alert, cooperative, no distress, appears stated age  Head: Normocephalic, without obvious abnormality, atraumatic  Eyes: Pupils equal, symmetric  Ears: Normal TMs and external ear canals, both ears  Nose: Nares normal, septum midline, mucosa normal, no drainage  Throat: Lips, mucosa, and tongue normal; teeth and gums normal  Neck: Supple, symmetrical, trachea midline, no adenopathy;  thyroid: not enlarged, symmetric.  Lungs: Clear to auscultation bilaterally, respirations unlabored  Heart: Regular rate and rhythm, S1 and S2 normal, no murmur, rub, or gallop  Abdomen: Soft, non-tender, bowel sounds active all four quadrants, no masses, no organomegaly  Musculoskeletal: Normal range of motion except for the right knee which is also slightly swollen.  Lower Extremities: Right-sided pedal edema.   Lymph nodes: Cervical nodes normal  Neurologic:  Alert and oriented times 3. Normal " reflexes. Cranial nerves II-XII intact.   Psychiatric: Normal mood and affect.    EKG: Reviewed and unchanged compared to EKG on 10/26/2007.       ADDITIONAL HISTORY SUMMARIZED (2): None.  DECISION TO OBTAIN EXTRA INFORMATION (1): None.   RADIOLOGY TESTS (1): Reviewed 8/23/2016 Cardiac Aorta MRI report, indication: Ascending aorta dilation.  LABS (1): Ordered labs.   MEDICINE TESTS (1): Ordered EKG. Reviewed 10/26/2007 EKG report.   INDEPENDENT REVIEW (2 each): Reviewed EKG, as above.       The visit lasted a total of 23 minutes face to face with the patient. Over 50% of the time was spent counseling and educating the patient about osteoarthritis and preoperative measures.    August BANKS, am scribing for and in the presence of, Dr. Antunez.    IDr. Antunez, personally performed the services described in this documentation, as scribed by August Hendricks in my presence, and it is both accurate and complete.    MEDICATIONS:  Current Outpatient Prescriptions   Medication Sig Dispense Refill     aspirin 81 MG EC tablet Take 81 mg by mouth daily.       levothyroxine (SYNTHROID, LEVOTHROID) 112 MCG tablet TAKE ONE TABLET BY MOUTH EVERY DAY 90 tablet 2     MULTIVIT &MINERALS/FERROUS FUM (MULTI VITAMIN ORAL) Take 1 tablet by mouth daily.       naproxen sodium (ALEVE) 220 MG tablet Take 440 mg by mouth 2 (two) times a day with meals.       omeprazole (PRILOSEC) 20 MG capsule Take 1 capsule (20 mg total) by mouth daily. 90 capsule 1     metoprolol succinate (TOPROL XL) 25 MG Take 1 tablet (25 mg total) by mouth daily. 15 tablet 0     No current facility-administered medications for this visit.        Total data points: 5

## 2021-06-11 NOTE — PROGRESS NOTES
[facility] Daily Progress     Patient Name: Brett Hopkins  Date: 6/28/2017  Visit #: 2  Referral Diagnosis: Primary osteoarthritis of right knee, s/p TKA  Referring provider: Mario Woodruff MD  Visit Diagnosis:     ICD-10-CM    1. Status post total right knee replacement Z96.651    2. Decreased strength M62.81    3. Knee stiffness, right M25.661    4. Effusion of joint M25.40    5. Antalgic gait R26.89          Assessment:     Response to Intervention:  Excellent.  Patient is very receptive to cuing and suggestion.  He is motivated for his rehabilitation.    Symptoms are consistent with:  Post op recovery of right TKA.  Patient is appropriate to continue with skilled physical therapy intervention, as indicated by initial plan of care.    Goal Status:  Pt. will demonstrate/verbalize independence in self-management of condition in : 4 weeks  Pt. will be independent with home exercise program in : 4 weeks  Pt. will have improved quality of sleep: with less pain;waking less times/night  Pt. will be able to walk : 10 minutes;on even surfaces;with less pain;with less difficulty;for exercise/recreation;for community mobility;in 4 weeks  Patient will stand : 10 minutes;with less pain;with less difficultty;for home chores;in 4 weeks  Patient will ascend / descend: stairs;with railing;with recipricol gait;with less pain;with less difficulty;in 4 weeks  Patient will sit: 30 minutes;for driving;for work;with less pain;with less difficultty;in 4 weeks  No Data Recorded  Other functional progress:    Patient is able to transfer from sit-supine-sit without using leg  or lifting leg with his hands, if cued and encouraged.      Plan / Patient Education:     Continue with initial plan of care.  Progress with home program as tolerated.  NuStep for ROM?, continue wall slides, strengthening and cuing for right leg use without assistance.    Subjective:     Pain Rating: 3    Patient reports feeling pretty good.   He still has a rare occasion where his leg feels like it might give out while walking, but more and more infrequently.    He continues to sleep in his recliner for comfort, rather than on his side in bed.  Therapist encouraged him to try sleeping in bed again.    His son has been assisting him with his physical therapy exercises 3x per day.    He is no longer using the leg  to help with his exercises, but unfortunately is still using it for help moving his leg with transfers.   Patient was instructed to stop using the leg  if at all possible.  He reports that his knee was so sore before the surgery he had already starting using his hands to help  that leg and move it around.    He is feeling comfortable with walking, and is wondering if he is ready to transition to the cane.      Objective:       Multiple Trials of walking 40' with the SEC went well.  At first, he had tendency to try to use the cane too much for weightbearing, but with cueing for increased weightbearing through his surgical leg, his gait pattern and stability improved.  Gait pattern and posture is better with the SEC than axillary crutches.    Patient has tendency to put leg out in front as he sits down or stands up.  Instruction given for balancing out weightbearing during transfers and using both legs to stand and sit.      Knee ROM              Date:  6/26/17 6/28/17    AROM in degrees  Right Right  Right   Right  Right   Right       Knee Flexion  (130 )   45   73 during wall slide                 Knee Extension  (0 )   Lacking 15                  Peripatellar circumference   58 cm          PROM in degrees  Right Right  Right    Right  Right   Right       Knee Flexion  (130 )   pain                    Knee Extension  (0 )                           Exercises below represent all exercise the patient has done in the clinic and HEP.    Please see today's exercise flow-sheet for specifics of today's exercise performance.    Exercises:  Exercise #1: wall slide  Comment #1: x5 with stationary hold.  Assist given to move patient's leg up.  Exercise #2: Short arc quad  Comment #2: x 10 with min A for full motion.  Exercise #3: SLR flexion  Comment #3: 5 independently, 5 with min A       Treatment Today    TREATMENT MINUTES COMMENTS   Evaluation     Self-care/ Home management     Manual therapy     Neuromuscular Re-education     Therapeutic Activity     Therapeutic Exercises 17 See exercise flow-sheet for details.    Gait training 10 See above.   Modality__________________                Total 27    Blank areas are intentional and mean the treatment did not include these items.       Javi Man, PT  6/28/2017

## 2021-06-11 NOTE — PROGRESS NOTES
"Brett Hopkins is a 64 y.o. male who is being evaluated via a billable telephone visit.      The patient has been notified of following:     \"This telephone visit will be conducted via a call between you and your physician/provider. We have found that certain health care needs can be provided without the need for a physical exam.  This service lets us provide the care you need with a short phone conversation.  If a prescription is necessary we can send it directly to your pharmacy.  If lab work is needed we can place an order for that and you can then stop by our lab to have the test done at a later time.    Telephone visits are billed at different rates depending on your insurance coverage. During this emergency period, for some insurers they may be billed the same as an in-person visit.  Please reach out to your insurance provider with any questions.    If during the course of the call the physician/provider feels a telephone visit is not appropriate, you will not be charged for this service.\"    Patient has given verbal consent to a Telephone visit? Yes    What phone number would you like to be contacted at? [phone]    Patient would like to receive their AVS by AVS Preference: [place].    Additional provider notes: This is a telephone conversation that began at 9:10 AM and ended at 9:22 AM.    The patient has a known history of underlying hypertension, he also has a history of alopecia followed by dermatology, he is on a one-month course of prednisone because of the autoimmune aspect of his alopecia, and has noticed that his blood pressures running quite high he is getting systolics 160s 170s or so, though he is asymptomatic.    He voices frustration at how he is supposed to manage this he was actually seen in walk-in care yesterday they did not do any dose adjustment.    I discussed with the patient that the prednisone is the most likely culprit of his acute elevation of blood pressure, on the other hand " I told him that most likely the blood pressure will normalize with him eventually being off of the prednisone though I cannot guarantee that but in the meantime I want to temporarily dose adjust for now but working to need to continue to dose adjust over the month depending on his blood pressure response and the dose of prednisone.    I discussed with the patient that he does not need to go to the emergency room unless he is having elevated blood pressure in combination with chest pain chest pressure shortness of breath or any other similar concerning symptoms.    Assessment/Plan:  1. Hypertension  Patient has a temporary increase in blood pressure most likely secondary to prednisone superimposed on pre-existing hypertension.    2. AA (alopecia areata)  Followed by dermatology currently on prednisone.    PLAN:  1.  The patient will take 10 mg of lisinopril in the morning and 5 mg in the evening, this is an increase from his baseline of 5 mg daily.  2.  I instructed the patient to contact me via my chart in the next 7 to 10 days both with blood pressure values and his current dose of prednisone and we can continue to dose adjust.        Phone call duration:  12 minutes

## 2021-06-11 NOTE — ANESTHESIA CARE TRANSFER NOTE
Last vitals:   Vitals:    06/21/17 0724   BP: (!) 168/95   Pulse: 74   Resp: 18   Temp: 36.7  C (98  F)   SpO2: 98%     Patient's level of consciousness is drowsy  Spontaneous respirations: yes  Maintains airway independently: yes  Dentition unchanged: yes  Oropharynx: oropharynx clear of all foreign objects    QCDR Measures:  ASA# 20 - Surgical Safety Checklist: ASA20A - Safety Checks Done  PQRS# 430 - Adult PONV Prevention: 4558F - Pt received => 2 anti-emetic agents (different classes) preop & intraop  ASA# 8 - Peds PONV Prevention: NA - Not pediatric patient, not GA or 2 or more risk factors NOT present  PQRS# 424 - [place]-op Temp Management: 4559F - At least one body temp DOCUMENTED => 35.5C or 95.9F within required timeframe  PQRS# 426 - PACU Transfer Protocol: - Transfer of care checklist used  ASA# 14 - Acute Post-op Pain: ASA14B - Patient did NOT experience pain >= 7 out of 10

## 2021-06-12 NOTE — TELEPHONE ENCOUNTER
RN cannot approve Refill Request    RN can NOT refill this medication Protocol failed and NO refill given. Last office visit: 6/3/2019 Magdaleno Bennett MD Last Physical: 11/6/2019 Last MTM visit: Visit date not found Last visit same specialty: 6/3/2019 Magdaleno Bennett MD.  Next visit within 3 mo: Visit date not found  Next physical within 3 mo: Visit date not found      Inessa Arroyo, [facility] Triage/Med Refill 10/8/2020    Requested Prescriptions   Pending Prescriptions Disp Refills     levothyroxine (SYNTHROID, LEVOTHROID) 112 MCG tablet [Pharmacy Med Name: LEVOTHYROXINE 0.112MG (112MCG) TABS] 90 tablet 1     Sig: TAKE 1 TABLET(112 MCG) BY MOUTH DAILY       Thyroid Hormones Protocol Failed - 10/6/2020  7:55 AM        Failed - TSH on file in past 12 months for patient age 12 & older     TSH   Date Value Ref Range Status   04/23/2019 3.39 0.30 - 5.00 uIU/mL Final                   Passed - Provider visit in past 12 months or next 3 months     Last office visit with prescriber/PCP: 6/3/2019 Magdaleno Bennett MD OR same dept: Visit date not found OR same specialty: 6/3/2019 Magdaleno Bennett MD  Last physical: 11/6/2019 Last MTM visit: Visit date not found   Next visit within 3 mo: Visit date not found  Next physical within 3 mo: Visit date not found  Prescriber OR PCP: Magdaleno Bennett MD  Last diagnosis associated with med order: 1. Hypothyroidism  - levothyroxine (SYNTHROID, LEVOTHROID) 112 MCG tablet [Pharmacy Med Name: LEVOTHYROXINE 0.112MG (112MCG) TABS]; TAKE 1 TABLET(112 MCG) BY MOUTH DAILY  Dispense: 90 tablet; Refill: 1    If protocol passes may refill for 12 months if within 3 months of last provider visit (or a total of 15 months).

## 2021-06-12 NOTE — PROGRESS NOTES
[facility] Discharge Summary  Patient Name: Brett Hopkins  Date: 11/22/2017  Referral Diagnosis: Primary osteoarthritis of right knee  Referring provider: Mario Woodruff MD  Visit Diagnosis:   1. Status post total right knee replacement     2. Decreased strength     3. Knee stiffness, right     4. Effusion of joint     5. Antalgic gait         Goals:  No Data Recorded  No Data Recorded    Patient was seen for 14 visits ending on 8/28/17.  A trial of independent self management was initiated.  The patient did not return to continue any physical therapy.  Please see attached note for patient status.    Therapy will be discontinued at this time.     Thank you for your referral.  Javi Man  11/22/2017  10:37 AM           [facility] Daily Progress     Patient Name: Brett Hopkins  Date: 8/28/2017  Visit #: 14  Referral Diagnosis: Primary osteoarthritis of right knee, s/p TKA  Referring provider: Mario Woodruff MD  Visit Diagnosis:     ICD-10-CM    1. Status post total right knee replacement Z96.651    2. Decreased strength M62.81    3. Knee stiffness, right M25.661    4. Effusion of joint M25.40    5. Antalgic gait R26.89          Assessment:   Excellent progress in ROM, strength, and gait.    Response to Intervention:  Knee feels looser post manual therapy.    Symptoms are consistent with:  Post op recovery of right TKA.    Patient is appropriate to continue with skilled physical therapy intervention, as indicated by initial plan of care.    Goal Status:  Pt. will demonstrate/verbalize independence in self-management of condition in : 4 weeks  Pt. will be independent with home exercise program in : 4 weeks  Pt. will have improved quality of sleep: with less pain;waking less times/night  Pt. will be able to walk : 10 minutes;on even surfaces;with less pain;with less difficulty;for exercise/recreation;for community mobility;in 4 weeks  Patient will stand : 10 minutes;with  less pain;with less difficultty;for home chores;in 4 weeks  Patient will ascend / descend: stairs;with railing;with recipricol gait;with less pain;with less difficulty;in 4 weeks  Patient will sit: 30 minutes;for driving;for work;with less pain;with less difficultty;in 4 weeks  No Data Recorded  Other functional progress:    Patient is independent with all transfers.    He is walking well without AD.    Gait on stairs much improved.      Plan / Patient Education:       Trial of independent self-management of condition initiated.  Patient to contact PT by phone or schedule an appointment as needed if symptoms increase or progress stops.  If patient has not returned to continue therapy in 30 days then physical therapy will be discharged.      Subjective:       Pain Ratin/10    No pain, just stiffness.    Walked a mile this morning.    Stairs are still challenging to perform normally and getting out of a low chair.      Objective:       Right knee AROM  5-115    MFR  Right quad, hamstring, gastroc    Manual therapy:  Right  knee mobilization for extension.  Posterior glide of distal femur.    Rate/grade Target  Direction  Relative movement Location in range Patient position   3 Anterior distal thigh. Posterior force  Posterior glide of femur on tibial plateau. End range knee extension Supine.      Manual therapy:  right patella medial mobilization    Rate/grade Target  Direction  Relative movement Location in range Patient position   2,3 Lateral border of patella Medial   Medial glide of patella on the femoral groove. Full knee extension  Supine         Manual therapy:  Right  patella superior mobilization    Rate/grade Target  Direction  Relative movement Location in range Patient position   2,3 Inferior border of patella Superior   Superior glide of patella in the femoral groove. Full knee extension  Supine        Manual therapy:  Right  patella inferior mobilization for knee flexion    Rate/grade Target   "Direction  Relative movement Location in range Patient position   2,3 Superior pole of patella Inferior    Inferior glide of patella in the femoral groove. Varying degrees of knee flexion from full extension to end-range flexion. Supine            Exercises below represent all exercise the patient has done in the clinic and HEP.    Please see today's exercise flow-sheet for specifics of today's exercise performance.     Exercises:  Exercises:  Exercise #1: seated knee flexion  Comment #1: 10  Exercise #2: LAQ  Comment #2: x 10   Exercise #3: SLR flexion x10 tiffanie no assisst  Comment #3: 5 independently, 5 with min A  Exercise #4: nustep x3 minutes for knee ROM  Comment #4: bike seat height 7 full circles 2 min level 5, (1 min level 10-1 min level 5) x 2   Exercise #5: step ups 4 inch x10 tiffanie  Exercise #6: heel/toe raises  Comment #6: 20 bilateral  Exercise #7: gastroc stretch  Comment #7: 30\" x 2  wedge.  Exercise #8: marching and  knee flexion  Comment #8: 10 bilateral each  Exercise #9: standing hamstring stretch  Comment #9: 30\" x 2   Exercise #10: standing knee flexion lunge stretch on plyobox  Comment #10: 30\" x 2   Exercise #11: manual stretching: hip flexor, quad, piriforimis, hamstring  Comment #11: 30\" x 2 bilateral  Exercise #12: gait assessment:  improving posture and push off.  less antalgic looking gait.  Exercise #13: total gym  Comment #13: squats, level 22 x 15, single leg squats level 12 x 15 right.  Exercise #14: step ups/downs  Comment #14: 8\" x 20 with UE support, 4\" x 15 without UE, 6\" x 15 without UE   Exercise #15: stairs 4  Comment #15: x2 with rail and reciprocal gait pattern 1 x without rail          Treatment Today    TREATMENT MINUTES COMMENTS   Evaluation     Self-care/ Home management     Manual therapy 14 MFR right quad and hamstring.   Neuromuscular Re-education     Therapeutic Activity     Therapeutic Exercises 10 See exercise flow-sheet for details.    Gait training   "   Modality__________________                Total 24    Blank areas are intentional and mean the treatment did not include these items.       Javi Man, PT  8/28/2017

## 2021-06-12 NOTE — PROGRESS NOTES
[facility] Daily Progress     Patient Name: Brett Hopkins  Date: 2017  Visit #: 13  Referral Diagnosis: Primary osteoarthritis of right knee, s/p TKA  Referring provider: Mario Woodruff MD  Visit Diagnosis:     ICD-10-CM    1. Status post total right knee replacement Z96.651    2. Decreased strength M62.81    3. Knee stiffness, right M25.661    4. Effusion of joint M25.40    5. Antalgic gait R26.89          Assessment:   Excellent progress in ROM, strength, and gait.    Response to Intervention:  Knee feels looser post manual therapy.    Symptoms are consistent with:  Post op recovery of right TKA.    Patient is appropriate to continue with skilled physical therapy intervention, as indicated by initial plan of care.    Goal Status:  Pt. will demonstrate/verbalize independence in self-management of condition in : 4 weeks  Pt. will be independent with home exercise program in : 4 weeks  Pt. will have improved quality of sleep: with less pain;waking less times/night  Pt. will be able to walk : 10 minutes;on even surfaces;with less pain;with less difficulty;for exercise/recreation;for community mobility;in 4 weeks  Patient will stand : 10 minutes;with less pain;with less difficultty;for home chores;in 4 weeks  Patient will ascend / descend: stairs;with railing;with recipricol gait;with less pain;with less difficulty;in 4 weeks  Patient will sit: 30 minutes;for driving;for work;with less pain;with less difficultty;in 4 weeks  No Data Recorded  Other functional progress:    Patient is independent with all transfers.    He is walking well without AD.    Gait on stairs much improved.      Plan / Patient Education:     Continue with initial plan of care.  Progress with home program as tolerated.  Continue bike for ROM, Squats, progress step ups    Subjective:       Pain Ratin/10    No pain, just stiffness.    Walked a mile this morning.    He also performed many stairs [place] the weekend [place]      Objective:       MFR  Right quad, hamstring, gastroc    Manual therapy:  Right  knee mobilization for extension.  Posterior glide of distal femur.    Rate/grade Target  Direction  Relative movement Location in range Patient position   3 Anterior distal thigh. Posterior force  Posterior glide of femur on tibial plateau. End range knee extension Supine.      Manual therapy:  right patella medial mobilization    Rate/grade Target  Direction  Relative movement Location in range Patient position   2,3 Lateral border of patella Medial   Medial glide of patella on the femoral groove. Full knee extension  Supine         Manual therapy:  Right  patella superior mobilization    Rate/grade Target  Direction  Relative movement Location in range Patient position   2,3 Inferior border of patella Superior   Superior glide of patella in the femoral groove. Full knee extension  Supine        Manual therapy:  Right  patella inferior mobilization for knee flexion    Rate/grade Target  Direction  Relative movement Location in range Patient position   2,3 Superior pole of patella Inferior    Inferior glide of patella in the femoral groove. Varying degrees of knee flexion from full extension to end-range flexion. Supine            Exercises below represent all exercise the patient has done in the clinic and HEP.    Please see today's exercise flow-sheet for specifics of today's exercise performance.     Exercises:  Exercises:  Exercise #1: seated knee flexion  Comment #1: 10  Exercise #2: LAQ  Comment #2: x 10   Exercise #3: SLR flexion x10 tiffanie no assisst  Comment #3: 5 independently, 5 with min A  Exercise #4: nustep x3 minutes for knee ROM  Comment #4: bike seat height 7 full circles 2 min level 5, (1 min level 10-1 min level 5) x 2   Exercise #5: step ups 4 inch x10 tiffanie  Exercise #6: heel/toe raises  Comment #6: 20 bilateral  Exercise #7: gastroc stretch  Comment #7: 30\" x 2  wedge.  Exercise #8: marching and  knee " "flexion  Comment #8: 10 bilateral each  Exercise #9: standing hamstring stretch  Comment #9: 30\" x 2   Exercise #10: standing knee flexion lunge stretch on plyobox  Comment #10: 30\" x 2   Exercise #11: manual stretching: hip flexor, quad, piriforimis, hamstring  Comment #11: 30\" x 2 bilateral  Exercise #12: gait assessment:  improving posture and push off.  less antalgic looking gait.  Exercise #13: total gym  Comment #13: squats, level 22 x 15, single leg squats level 12 x 15 right.  Exercise #14: step ups/downs  Comment #14: 8\" x 20 with UE support, 4\" x 15 without UE, 6\" x 15 without UE   Exercise #15: stairs 4  Comment #15: x2 with rail and reciprocal gait pattern 1 x without rail          Treatment Today    TREATMENT MINUTES COMMENTS   Evaluation     Self-care/ Home management     Manual therapy 12 MFR right quad and hamstring.   Neuromuscular Re-education     Therapeutic Activity     Therapeutic Exercises 15 See exercise flow-sheet for details.    Gait training     Modality__________________                Total 27    Blank areas are intentional and mean the treatment did not include these items.       Javi Man, PT  8/21/2017     "

## 2021-06-12 NOTE — PROGRESS NOTES
[facility] Daily Progress     Patient Name: Brett Hopkins  Date: 7/31/2017  Visit #: 9  Referral Diagnosis: Primary osteoarthritis of right knee, s/p TKA  Referring provider: Mario Woodruff MD  Visit Diagnosis:     ICD-10-CM    1. Status post total right knee replacement Z96.651    2. Decreased strength M62.81    3. Knee stiffness, right M25.661    4. Effusion of joint M25.40          Assessment:   Patient ROM continues to improve.  Gait is improving.  Walking well without AD.  Excellent functional progress.    Response to Intervention:  Very well.  No increased pain reported.    Symptoms are consistent with:  Post op recovery of right TKA.    Patient is appropriate to continue with skilled physical therapy intervention, as indicated by initial plan of care.    Goal Status:  Pt. will demonstrate/verbalize independence in self-management of condition in : 4 weeks  Pt. will be independent with home exercise program in : 4 weeks  Pt. will have improved quality of sleep: with less pain;waking less times/night  Pt. will be able to walk : 10 minutes;on even surfaces;with less pain;with less difficulty;for exercise/recreation;for community mobility;in 4 weeks  Patient will stand : 10 minutes;with less pain;with less difficultty;for home chores;in 4 weeks  Patient will ascend / descend: stairs;with railing;with recipricol gait;with less pain;with less difficulty;in 4 weeks  Patient will sit: 30 minutes;for driving;for work;with less pain;with less difficultty;in 4 weeks  No Data Recorded  Other functional progress:    Patient is independent with all transfers.    He is walking well without AD.    Gait on stairs much improved.      Plan / Patient Education:     Continue with initial plan of care.  Progress with home program as tolerated.  Continue bike for ROM, continue wall slides, strengthening and cuing for right leg use without assistance, squats when staples out, progress step ups    Subjective:  "      Pain Ratin/10    No lateral knee pain.    Able to mow the flat levels of his yard with the push mower.    Able to use reciprocal gait pattern on the stairs with rails.    Able to walk the dog short distance.  Several times over the last week.      Objective:     Knee ROM              Date:  6/26/17      6/28/17    7/10/17    7/20/17   7/24    AROM in degrees  Right Right  Right   Right  Right   Right       Knee Flexion  (130 )   45   73 during wall slide   75 during wall slide   90   102          Knee Extension  (0 )   Lacking 15     Lacking  8  Lacking 8          Peripatellar circumference   58 cm   52 52      PROM in degrees  Right Right  Right    Right  Right   Right       Knee Flexion  (130 )   pain                    Knee Extension  (0 )                           Exercises below represent all exercise the patient has done in the clinic and HEP.    Please see today's exercise flow-sheet for specifics of today's exercise performance.     Exercises:  Exercises:  Exercise #1: seated knee flexion  Comment #1: 10  Exercise #2: LAQ  Comment #2: x 10   Exercise #3: SLR flexion x10 tiffanie no assisst  Comment #3: 5 independently, 5 with min A  Exercise #4: nustep x3 minutes for knee ROM  Comment #4: bike seat height 7 full circles 2 min level 5, (1 min level 10-1 min level 5) x 2   Exercise #5: step ups 4 inch x10 tiffanie  Exercise #6: heel/toe raises  Comment #6: 20 bilateral  Exercise #7: gastroc stretch  Comment #7: 30\" x 2  wedge.  Exercise #8: marching and  knee flexion  Comment #8: 10 bilateral each  Exercise #9: standing hamstring stretch  Comment #9: 30\" x 2   Exercise #10: standing knee flexion lunge stretch on plyobox  Comment #10: 30\" x 2   Exercise #11: manual stretching: hip flexor, quad, piriforimis, hamstring  Comment #11: 30\" x 2 bilateral  Exercise #12: gait assessment:  improving posture and push off.  less antalgic looking gait.  Exercise #13: total gym  Comment #13: squats, level 22 x 15, single " "leg squats level 12 x 15 right.  Exercise #14: step ups/downs  Comment #14: 8\" x 20 with UE support, 4\" x 15 without UE, 6\" x 15 without UE   Exercise #15: stairs 4  Comment #15: x2 with rail and reciprocal gait pattern 1 x without rail          Treatment Today    TREATMENT MINUTES COMMENTS   Evaluation     Self-care/ Home management     Manual therapy     Neuromuscular Re-education     Therapeutic Activity     Therapeutic Exercises 25 See exercise flow-sheet for details.    Gait training     Modality__________________                Total 25    Blank areas are intentional and mean the treatment did not include these items.       Javi Man, PT  7/31/2017     "

## 2021-06-12 NOTE — PROGRESS NOTES
[facility] Daily Progress     Patient Name: Brett Hopkins  Date: 7/24/2017  Visit #: 7  Referral Diagnosis: Primary osteoarthritis of right knee, s/p TKA  Referring provider: Mariam Antunez*  Visit Diagnosis:     ICD-10-CM    1. Status post total right knee replacement Z96.651    2. Decreased strength M62.81    3. Knee stiffness, right M25.661          Assessment:   Patient ROM continues to improve.  Gait is improving.  Recommend SEC for stairs and uneven terrain, but otherwise without AD.    Response to Intervention:  Excellent. No pain or tenderness post treatment.    Symptoms are consistent with:  Post op recovery of right TKA.    Patient is appropriate to continue with skilled physical therapy intervention, as indicated by initial plan of care.    Goal Status:  Pt. will demonstrate/verbalize independence in self-management of condition in : 4 weeks  Pt. will be independent with home exercise program in : 4 weeks  Pt. will have improved quality of sleep: with less pain;waking less times/night  Pt. will be able to walk : 10 minutes;on even surfaces;with less pain;with less difficulty;for exercise/recreation;for community mobility;in 4 weeks  Patient will stand : 10 minutes;with less pain;with less difficultty;for home chores;in 4 weeks  Patient will ascend / descend: stairs;with railing;with recipricol gait;with less pain;with less difficulty;in 4 weeks  Patient will sit: 30 minutes;for driving;for work;with less pain;with less difficultty;in 4 weeks  No Data Recorded  Other functional progress:    Patient is able to transfer from sit-supine-sit without using leg  or lifting leg with his hands, if cued and encouraged.      Plan / Patient Education:     Continue with initial plan of care.  Progress with home program as tolerated.  Continue bike for ROM, continue wall slides, strengthening and cuing for right leg use without assistance, squats when staples out, progress step  ups    Subjective:       Pain Ratin/10    Walked about 0.5 miles yesterday without extra pain.    No pain today, just stiffness.    Able to do some kayaking and swimming over the weekend, that felt good.    Some tenderness and tension right lateral knee.      Objective:     Gait:  Stiff at first this morning.  Slow and limping.  Significant myofascial restriction in goth right quqad and hamstring.    Knee ROM              Date:  6/26/17      6/28/17    7/10/17    7/20/17   7/24    AROM in degrees  Right Right  Right   Right  Right   Right       Knee Flexion  (130 )   45   73 during wall slide   75 during wall slide   90   102          Knee Extension  (0 )   Lacking 15     Lacking  8  Lacking 8          Peripatellar circumference   58 cm   52 52      PROM in degrees  Right Right  Right    Right  Right   Right       Knee Flexion  (130 )   pain                    Knee Extension  (0 )                     Manual therapy   MFR layers 1-3 right: Quad and hamstring.    Manual therapy:  right patella inferior mobilization for knee flexion    Rate/grade Target  Direction  Relative movement Location in range Patient position   2,3 Superior pole of patella Inferior    Inferior glide of patella in the femoral groove. Varying degrees of knee flexion from full extension to end-range flexion. Supine        Manual therapy:  right patella medial mobilization    Rate/grade Target  Direction  Relative movement Location in range Patient position   2,3 Lateral border of patella Medial   Medial glide of patella on the femoral groove. Full knee extension  Supine          Exercises below represent all exercise the patient has done in the clinic and HEP.    Please see today's exercise flow-sheet for specifics of today's exercise performance.   Exercises:  Exercise #1: seated knee flexion  Comment #1: 10  Exercise #2: LAQ  Comment #2: x 10   Exercise #3: SLR flexion x10 tiffanie no assisst  Comment #3: 5 independently, 5 with min A  Exercise  "#4: nustep x3 minutes for knee ROM  Comment #4: bike seat height 7 full circles 3.5 minutes  Exercise #5: step ups 4 inch x10 tiffanie  Exercise #6: heel/toe raises  Comment #6: 20 bilateral  Exercise #7: gastroc stretch  Comment #7: 30\" x 2  wedge.  Exercise #8: marching and  knee flexion  Comment #8: 10 bilateral each  Exercise #9: standing hamstring stretch  Comment #9: 30\" x 2   Exercise #10: standing knee flexion lunge stretch on plyobox  Comment #10: 30\" x 2   Exercise #11: manual hip flexor stretch in left sidelying  Comment #11: 30\" x 2 right.  Exercise #12: gait assessment:  improving posture and push off.  less antalgic looking gait.  Exercise #13: sit to stand  Comment #13: x 15 from lowest setting on manual tan table in gym  Exercise #14: step ups/downs  Comment #14: 8\" x 20 with UE support, 4\" x 15 without UE, 6\" x 15 without UE      MFR layer 1-3 right quad.      Treatment Today    TREATMENT MINUTES COMMENTS   Evaluation     Self-care/ Home management     Manual therapy 15 See above.   Neuromuscular Re-education     Therapeutic Activity     Therapeutic Exercises 10 See exercise flow-sheet for details.    Gait training     Modality__________________                Total 25    Blank areas are intentional and mean the treatment did not include these items.       Javi Man, PT  7/24/2017     "

## 2021-06-12 NOTE — PROGRESS NOTES
[facility] Daily Progress     Patient Name: Brett Hopkins  Date: 8/2/2017  Visit #: 10  Referral Diagnosis: Primary osteoarthritis of right knee, s/p TKA  Referring provider: Mario Woodruff MD  Visit Diagnosis:     ICD-10-CM    1. Status post total right knee replacement Z96.651    2. Decreased strength M62.81    3. Knee stiffness, right M25.661    4. Effusion of joint M25.40    5. Antalgic gait R26.89          Assessment:   Excellent progress in ROM, strength, and gait.    Response to Intervention:  Some discomfort with exercise and mobs, but tolerable.    Symptoms are consistent with:  Post op recovery of right TKA.    Patient is appropriate to continue with skilled physical therapy intervention, as indicated by initial plan of care.    Goal Status:  Pt. will demonstrate/verbalize independence in self-management of condition in : 4 weeks  Pt. will be independent with home exercise program in : 4 weeks  Pt. will have improved quality of sleep: with less pain;waking less times/night  Pt. will be able to walk : 10 minutes;on even surfaces;with less pain;with less difficulty;for exercise/recreation;for community mobility;in 4 weeks  Patient will stand : 10 minutes;with less pain;with less difficultty;for home chores;in 4 weeks  Patient will ascend / descend: stairs;with railing;with recipricol gait;with less pain;with less difficulty;in 4 weeks  Patient will sit: 30 minutes;for driving;for work;with less pain;with less difficultty;in 4 weeks  No Data Recorded  Other functional progress:    Patient is independent with all transfers.    He is walking well without AD.    Gait on stairs much improved.      Plan / Patient Education:     Continue with initial plan of care.  Progress with home program as tolerated.  Continue bike for ROM, continue wall slides, strengthening and cuing for right leg use without assistance, squats when staples out, progress step ups    Subjective:       Pain Rating:  "0/10    70% overall improvement since starting.      Objective:     Knee ROM              Date:  6/26/17      6/28/17    7/10/17    7/20/17   7/24         8/2    AROM in degrees  Right Right  Right   Right  Right   Right right       Knee Flexion  (130 )   45   73 during wall slide   75 during wall slide   90   102           Knee Extension  (0 )   Lacking 15     Lacking  8  Lacking 8    Lacking 5       Peripatellar circumference   58 cm   52 52       PROM in degrees  Right Right  Right    Right  Right   Right        Knee Flexion  (130 )   pain                     Knee Extension  (0 )                            Exercises below represent all exercise the patient has done in the clinic and HEP.    Please see today's exercise flow-sheet for specifics of today's exercise performance.     Exercises:  Exercises:  Exercise #1: seated knee flexion  Comment #1: 10  Exercise #2: LAQ  Comment #2: x 10   Exercise #3: SLR flexion x10 tiffanie no assisst  Comment #3: 5 independently, 5 with min A  Exercise #4: nustep x3 minutes for knee ROM  Comment #4: bike seat height 7 full circles 2 min level 5, (1 min level 10-1 min level 5) x 2   Exercise #5: step ups 4 inch x10 tiffanie  Exercise #6: heel/toe raises  Comment #6: 20 bilateral  Exercise #7: gastroc stretch  Comment #7: 30\" x 2  wedge.  Exercise #8: marching and  knee flexion  Comment #8: 10 bilateral each  Exercise #9: standing hamstring stretch  Comment #9: 30\" x 2   Exercise #10: standing knee flexion lunge stretch on plyobox  Comment #10: 30\" x 2   Exercise #11: manual stretching: hip flexor, quad, piriforimis, hamstring  Comment #11: 30\" x 2 bilateral  Exercise #12: gait assessment:  improving posture and push off.  less antalgic looking gait.  Exercise #13: total gym  Comment #13: squats, level 22 x 15, single leg squats level 12 x 15 right.  Exercise #14: step ups/downs  Comment #14: 8\" x 20 with UE support, 4\" x 15 without UE, 6\" x 15 without UE   Exercise #15: stairs 4  Comment " #15: x2 with rail and reciprocal gait pattern 1 x without rail          Treatment Today    TREATMENT MINUTES COMMENTS   Evaluation     Self-care/ Home management     Manual therapy 2 MFR right quad and hamstring.   Neuromuscular Re-education     Therapeutic Activity     Therapeutic Exercises 25 See exercise flow-sheet for details.    Gait training     Modality__________________                Total 27    Blank areas are intentional and mean the treatment did not include these items.       Javi Man, PT  8/2/2017

## 2021-06-12 NOTE — TELEPHONE ENCOUNTER
Refill Approved    Rx renewed per Medication Renewal Policy. Medication was last renewed on 12/30/19.    Inessa Arroyo, [facility] Triage/Med Refill 10/2/2020     Requested Prescriptions   Pending Prescriptions Disp Refills     lisinopriL (PRINIVIL,ZESTRIL) 5 MG tablet 90 tablet 3     Sig: Take 1 tablet (5 mg total) by mouth daily.       [facility] Inhibitors Refill Protocol Passed - 10/2/2020  8:53 AM        Passed - PCP or prescribing provider visit in past 12 months       Last office visit with prescriber/PCP: 6/3/2019 Magdaleno Bennett MD OR same dept: Visit date not found OR same specialty: 6/3/2019 Magdaleno Bennett MD  Last physical: 11/6/2019 Last MTM visit: Visit date not found   Next visit within 3 mo: Visit date not found  Next physical within 3 mo: Visit date not found  Prescriber OR PCP: Magdaleno Bennett MD  Last diagnosis associated with med order: 1. Benign essential hypertension  - lisinopriL (PRINIVIL,ZESTRIL) 5 MG tablet; Take 1 tablet (5 mg total) by mouth daily.  Dispense: 90 tablet; Refill: 3    If protocol passes may refill for 12 months if within 3 months of last provider visit (or a total of 15 months).             Passed - Serum Potassium in past 12 months     Lab Results   Component Value Date    Potassium 4.8 11/06/2019             Passed - Blood pressure filed in past 12 months     BP Readings from Last 1 Encounters:   09/01/20 (!) 163/101             Passed - Serum Creatinine in past 12 months     Creatinine   Date Value Ref Range Status   11/06/2019 0.94 0.70 - 1.30 mg/dL Final

## 2021-06-12 NOTE — PROGRESS NOTES
[facility] Daily Progress     Patient Name: Brett Hopkins  Date: 7/27/2017  Visit #: 8  Referral Diagnosis: Primary osteoarthritis of right knee, s/p TKA  Referring provider: Mario Woodruff MD  Visit Diagnosis:     ICD-10-CM    1. Status post total right knee replacement Z96.651    2. Decreased strength M62.81    3. Knee stiffness, right M25.661    4. Effusion of joint M25.40    5. Antalgic gait R26.89          Assessment:   Patient ROM continues to improve.  Gait is improving.  Walking well without AD.    Response to Intervention:  Excellent. No pain or tenderness post treatment.    Symptoms are consistent with:  Post op recovery of right TKA.    Patient is appropriate to continue with skilled physical therapy intervention, as indicated by initial plan of care.    Goal Status:  Pt. will demonstrate/verbalize independence in self-management of condition in : 4 weeks  Pt. will be independent with home exercise program in : 4 weeks  Pt. will have improved quality of sleep: with less pain;waking less times/night  Pt. will be able to walk : 10 minutes;on even surfaces;with less pain;with less difficulty;for exercise/recreation;for community mobility;in 4 weeks  Patient will stand : 10 minutes;with less pain;with less difficultty;for home chores;in 4 weeks  Patient will ascend / descend: stairs;with railing;with recipricol gait;with less pain;with less difficulty;in 4 weeks  Patient will sit: 30 minutes;for driving;for work;with less pain;with less difficultty;in 4 weeks  No Data Recorded  Other functional progress:    Patient is able to transfer from sit-supine-sit without using leg  or lifting leg with his hands, if cued and encouraged.      Plan / Patient Education:     Continue with initial plan of care.  Progress with home program as tolerated.  Continue bike for ROM, continue wall slides, strengthening and cuing for right leg use without assistance, squats when staples out, progress step  ups    Subjective:       Pain Ratin/10    No lateral knee tenderness since last visit.    Bed is comfortable for the first time.    He feels like he is getting better everyday.      Objective:     Gait:  Taller more upright gait after manual stretching.  Significant myofascial restriction in goth right quqad and hamstring.    Knee ROM              Date:  6/26/17      6/28/17    7/10/17    7/20/17   7/24    AROM in degrees  Right Right  Right   Right  Right   Right       Knee Flexion  (130 )   45   73 during wall slide   75 during wall slide   90   102          Knee Extension  (0 )   Lacking 15     Lacking  8  Lacking 8          Peripatellar circumference   58 cm   52 52      PROM in degrees  Right Right  Right    Right  Right   Right       Knee Flexion  (130 )   pain                    Knee Extension  (0 )                     Manual therapy   MFR layers 1-3 right: Quad and hamstring.    Manual therapy:  Right knee mobilization for extension.  Posterior glide of distal femur.    Rate/grade Target  Direction  Relative movement Location in range Patient position   3 Anterior distal thigh. Posterior force  Posterior glide of femur on tibial plateau. End range knee extension Supine.         Manual therapy:  right patella medial mobilization    Rate/grade Target  Direction  Relative movement Location in range Patient position   2,3 Lateral border of patella Medial   Medial glide of patella on the femoral groove. Full knee extension  Supine          Exercises below represent all exercise the patient has done in the clinic and HEP.    Please see today's exercise flow-sheet for specifics of today's exercise performance.   Exercises:  Exercise #1: seated knee flexion  Comment #1: 10  Exercise #2: LAQ  Comment #2: x 10   Exercise #3: SLR flexion x10 tiffanie no assisst  Comment #3: 5 independently, 5 with min A  Exercise #4: nustep x3 minutes for knee ROM  Comment #4: bike seat height 7 full circles 3.5 minutes  Exercise #5:  "step ups 4 inch x10 tiffanie  Exercise #6: heel/toe raises  Comment #6: 20 bilateral  Exercise #7: gastroc stretch  Comment #7: 30\" x 2  wedge.  Exercise #8: marching and  knee flexion  Comment #8: 10 bilateral each  Exercise #9: standing hamstring stretch  Comment #9: 30\" x 2   Exercise #10: standing knee flexion lunge stretch on plyobox  Comment #10: 30\" x 2   Exercise #11: manual stretching: hip flexor, quad, piriforimis  Comment #11: 30\" x 2 bilateral  Exercise #12: gait assessment:  improving posture and push off.  less antalgic looking gait.  Exercise #13: total gym  Comment #13: squats, level 20 x 15, single leg squats level 10 x 15 right.  Exercise #14: step ups/downs  Comment #14: 8\" x 20 with UE support, 4\" x 15 without UE, 6\" x 15 without UE      MFR layer 1-3 right quad, hamstrings.      Treatment Today    TREATMENT MINUTES COMMENTS   Evaluation     Self-care/ Home management     Manual therapy 15 See above.   Neuromuscular Re-education     Therapeutic Activity     Therapeutic Exercises 10 See exercise flow-sheet for details.    Gait training     Modality__________________                Total 25    Blank areas are intentional and mean the treatment did not include these items.       Javi Man, PT  7/27/2017     "

## 2021-06-12 NOTE — PROGRESS NOTES
[facility] Daily Progress     Patient Name: Brett Hopkins  Date: 2017  Visit #: 12  Referral Diagnosis: Primary osteoarthritis of right knee, s/p TKA  Referring provider: Mario Woodruff MD  Visit Diagnosis:     ICD-10-CM    1. Status post total right knee replacement Z96.651    2. Decreased strength M62.81    3. Knee stiffness, right M25.661          Assessment:   Excellent progress in ROM, strength, and gait.    Response to Intervention:  Some discomfort with exercise and mobs, but tolerable.    Symptoms are consistent with:  Post op recovery of right TKA.    Patient is appropriate to continue with skilled physical therapy intervention, as indicated by initial plan of care.    Goal Status:  Pt. will demonstrate/verbalize independence in self-management of condition in : 4 weeks  Pt. will be independent with home exercise program in : 4 weeks  Pt. will have improved quality of sleep: with less pain;waking less times/night  Pt. will be able to walk : 10 minutes;on even surfaces;with less pain;with less difficulty;for exercise/recreation;for community mobility;in 4 weeks  Patient will stand : 10 minutes;with less pain;with less difficultty;for home chores;in 4 weeks  Patient will ascend / descend: stairs;with railing;with recipricol gait;with less pain;with less difficulty;in 4 weeks  Patient will sit: 30 minutes;for driving;for work;with less pain;with less difficultty;in 4 weeks  No Data Recorded  Other functional progress:    Patient is independent with all transfers.    He is walking well without AD.    Gait on stairs much improved.      Plan / Patient Education:     Continue with initial plan of care.  Progress with home program as tolerated.  Continue bike for ROM, Squats, progress step ups    Subjective:       Pain Ratin/10    Feeling  Better and better.    Able to walk over a mile with minimal soreness or stiffness.      Objective:       MFR  Right quad, hamstring,     Manual  "therapy:  Right  knee mobilization for extension.  Posterior glide of distal femur.    Rate/grade Target  Direction  Relative movement Location in range Patient position   3 Anterior distal thigh. Posterior force  Posterior glide of femur on tibial plateau. End range knee extension Supine.      Manual therapy:  right patella medial mobilization    Rate/grade Target  Direction  Relative movement Location in range Patient position   2,3 Lateral border of patella Medial   Medial glide of patella on the femoral groove. Full knee extension  Supine               Exercises below represent all exercise the patient has done in the clinic and HEP.    Please see today's exercise flow-sheet for specifics of today's exercise performance.     Exercises:  Exercises:  Exercise #1: seated knee flexion  Comment #1: 10  Exercise #2: LAQ  Comment #2: x 10   Exercise #3: SLR flexion x10 tiffanie no assisst  Comment #3: 5 independently, 5 with min A  Exercise #4: nustep x3 minutes for knee ROM  Comment #4: bike seat height 7 full circles 2 min level 5, (1 min level 10-1 min level 5) x 2   Exercise #5: step ups 4 inch x10 tiffanie  Exercise #6: heel/toe raises  Comment #6: 20 bilateral  Exercise #7: gastroc stretch  Comment #7: 30\" x 2  wedge.  Exercise #8: marching and  knee flexion  Comment #8: 10 bilateral each  Exercise #9: standing hamstring stretch  Comment #9: 30\" x 2   Exercise #10: standing knee flexion lunge stretch on plyobox  Comment #10: 30\" x 2   Exercise #11: manual stretching: hip flexor, quad, piriforimis, hamstring  Comment #11: 30\" x 2 bilateral  Exercise #12: gait assessment:  improving posture and push off.  less antalgic looking gait.  Exercise #13: total gym  Comment #13: squats, level 22 x 15, single leg squats level 12 x 15 right.  Exercise #14: step ups/downs  Comment #14: 8\" x 20 with UE support, 4\" x 15 without UE, 6\" x 15 without UE   Exercise #15: stairs 4  Comment #15: x2 with rail and reciprocal gait pattern 1 x " without rail          Treatment Today    TREATMENT MINUTES COMMENTS   Evaluation     Self-care/ Home management     Manual therapy 10 MFR right quad and hamstring.   Neuromuscular Re-education     Therapeutic Activity     Therapeutic Exercises 15 See exercise flow-sheet for details.    Gait training     Modality__________________                Total 25    Blank areas are intentional and mean the treatment did not include these items.       Javi Man, PT  8/14/2017

## 2021-06-12 NOTE — PROGRESS NOTES
[facility] Daily Progress     Patient Name: Brett Hopkins  Date: 8/9/2017  Visit #: 11  Referral Diagnosis: Primary osteoarthritis of right knee, s/p TKA  Referring provider: Mario Woodruff MD  Visit Diagnosis:     ICD-10-CM    1. Status post total right knee replacement Z96.651    2. Decreased strength M62.81    3. Knee stiffness, right M25.661    4. Effusion of joint M25.40    5. Antalgic gait R26.89          Assessment:   Excellent progress in ROM, strength, and gait.    Response to Intervention:  Some discomfort with exercise and mobs, but tolerable.    Symptoms are consistent with:  Post op recovery of right TKA.    Patient is appropriate to continue with skilled physical therapy intervention, as indicated by initial plan of care.    Goal Status:  Pt. will demonstrate/verbalize independence in self-management of condition in : 4 weeks  Pt. will be independent with home exercise program in : 4 weeks  Pt. will have improved quality of sleep: with less pain;waking less times/night  Pt. will be able to walk : 10 minutes;on even surfaces;with less pain;with less difficulty;for exercise/recreation;for community mobility;in 4 weeks  Patient will stand : 10 minutes;with less pain;with less difficultty;for home chores;in 4 weeks  Patient will ascend / descend: stairs;with railing;with recipricol gait;with less pain;with less difficulty;in 4 weeks  Patient will sit: 30 minutes;for driving;for work;with less pain;with less difficultty;in 4 weeks  No Data Recorded  Other functional progress:    Patient is independent with all transfers.    He is walking well without AD.    Gait on stairs much improved.      Plan / Patient Education:     Continue with initial plan of care.  Progress with home program as tolerated.  Continue bike for ROM, continue wall slides, strengthening and cuing for right leg use without assistance, squats when staples out, progress step ups    Subjective:       Pain Rating:  "0/10    80% overall improvement since starting.      Objective:     Right knee AROM pre-treatment:  4-114. Post treatment 4-117      MFR  Right quad, hamstring, gastroc fibularis longus,     Manual therapy:  Right  knee mobilization for extension.  Posterior glide of distal femur.    Rate/grade Target  Direction  Relative movement Location in range Patient position   3 Anterior distal thigh. Posterior force  Posterior glide of femur on tibial plateau. End range knee extension Supine.      Manual therapy:  Right  knee mobilization for flexion.  Posterior glide of proximal tibia.    Rate/grade Target  Direction  Relative movement Location in range Patient position   3 Anterior proximal tibia Posterior force  Posterior glide of tibial plateau on femur. 75 degrees of knee flexion  Supine            Exercises below represent all exercise the patient has done in the clinic and HEP.    Please see today's exercise flow-sheet for specifics of today's exercise performance.     Exercises:  Exercises:  Exercise #1: seated knee flexion  Comment #1: 10  Exercise #2: LAQ  Comment #2: x 10   Exercise #3: SLR flexion x10 tiffanie no assisst  Comment #3: 5 independently, 5 with min A  Exercise #4: nustep x3 minutes for knee ROM  Comment #4: bike seat height 7 full circles 2 min level 5, (1 min level 10-1 min level 5) x 2   Exercise #5: step ups 4 inch x10 tiffanie  Exercise #6: heel/toe raises  Comment #6: 20 bilateral  Exercise #7: gastroc stretch  Comment #7: 30\" x 2  wedge.  Exercise #8: marching and  knee flexion  Comment #8: 10 bilateral each  Exercise #9: standing hamstring stretch  Comment #9: 30\" x 2   Exercise #10: standing knee flexion lunge stretch on plyobox  Comment #10: 30\" x 2   Exercise #11: manual stretching: hip flexor, quad, piriforimis, hamstring  Comment #11: 30\" x 2 bilateral  Exercise #12: gait assessment:  improving posture and push off.  less antalgic looking gait.  Exercise #13: total gym  Comment #13: squats, level " "22 x 15, single leg squats level 12 x 15 right.  Exercise #14: step ups/downs  Comment #14: 8\" x 20 with UE support, 4\" x 15 without UE, 6\" x 15 without UE   Exercise #15: stairs 4  Comment #15: x2 with rail and reciprocal gait pattern 1 x without rail          Treatment Today    TREATMENT MINUTES COMMENTS   Evaluation     Self-care/ Home management     Manual therapy 15 MFR right quad and hamstring.   Neuromuscular Re-education     Therapeutic Activity     Therapeutic Exercises 10 See exercise flow-sheet for details.    Gait training     Modality__________________                Total 25    Blank areas are intentional and mean the treatment did not include these items.       Javi Man, PT  8/9/2017     "

## 2021-06-12 NOTE — PROGRESS NOTES
[facility] Daily Progress     Patient Name: Brett Hopkins  Date: 7/20/2017  Visit #: 7  Referral Diagnosis: Primary osteoarthritis of right knee, s/p TKA  Referring provider: Mario Woodruff MD  Visit Diagnosis:     ICD-10-CM    1. Status post total right knee replacement Z96.651    2. Decreased strength M62.81    3. Knee stiffness, right M25.661    4. Effusion of joint M25.40    5. Antalgic gait R26.89          Assessment:   Patient ROM continues to improve.  Gait is improving.  Recommend SEC for stairs and uneven terrain, but otherwise without AD.    Response to Intervention:  Excellent.  Patient is very receptive to cuing and suggestion.  He is motivated for his rehabilitation.    Symptoms are consistent with:  Post op recovery of right TKA.    Patient is appropriate to continue with skilled physical therapy intervention, as indicated by initial plan of care.    Goal Status:  Pt. will demonstrate/verbalize independence in self-management of condition in : 4 weeks  Pt. will be independent with home exercise program in : 4 weeks  Pt. will have improved quality of sleep: with less pain;waking less times/night  Pt. will be able to walk : 10 minutes;on even surfaces;with less pain;with less difficulty;for exercise/recreation;for community mobility;in 4 weeks  Patient will stand : 10 minutes;with less pain;with less difficultty;for home chores;in 4 weeks  Patient will ascend / descend: stairs;with railing;with recipricol gait;with less pain;with less difficulty;in 4 weeks  Patient will sit: 30 minutes;for driving;for work;with less pain;with less difficultty;in 4 weeks  No Data Recorded  Other functional progress:    Patient is able to transfer from sit-supine-sit without using leg  or lifting leg with his hands, if cued and encouraged.      Plan / Patient Education:     Continue with initial plan of care.  Progress with home program as tolerated.  Continue bike for ROM, continue wall slides,  "strengthening and cuing for right leg use without assistance, squats when staples out, progress step ups    Subjective:       Pain Ratin/10     [facility] has [facility] to [facility] since last visit.    He is able to do full circles on the bike.      Objective:     Gait:  More upright, quicker pace.      Knee ROM              Date:  6/26/17      6/28/17    7/10/17    7/20/17    AROM in degrees  Right Right  Right   Right  Right   Right       Knee Flexion  (130 )   45   73 during wall slide   75 during wall slide   90   102          Knee Extension  (0 )   Lacking 15     Lacking  8  Lacking 8          Peripatellar circumference   58 cm   52 52      PROM in degrees  Right Right  Right    Right  Right   Right       Knee Flexion  (130 )   pain                    Knee Extension  (0 )                     Exercises below represent all exercise the patient has done in the clinic and HEP.    Please see today's exercise flow-sheet for specifics of today's exercise performance.   Exercises:  Exercise #1: gravity slide  Comment #1: 5  Exercise #2: LAQ  Comment #2: x 10   Exercise #3: SLR flexion x10 tiffanie no assisst  Comment #3: 5 independently, 5 with min A  Exercise #4: nustep x3 minutes for knee ROM  Comment #4: bike seat height 7 full circles 3.5 minutes  Exercise #5: step ups 4 inch x10 tiffanie  Exercise #6: heel/toe raises  Comment #6: 20 bilateral  Exercise #7: gastroc stretch  Comment #7: 30\" x 2  wedge.  Exercise #8: marching and  knee flexion  Comment #8: 10 bilateral each  Exercise #9: standing hamstring stretch  Comment #9: 30\" x 2   Exercise #10: standing knee flexion lunge stretch on plyobox  Comment #10: 30\" x 2   Exercise #11: manual hip flexor stretch in left sidelying  Comment #11: 30\" x 2 right.  Exercise #12: gait assessment:  improving posture and push off.  less antalgic looking gait.  Exercise #13: sit to stand  Comment #13: x 15 from lowest setting on manual tan table in gym  Exercise #14: step ups/downs  Comment #14: " "8\" x 20 with UE support, 4\" x 15 without UE, 6\" x 15 without UE      MFR layer 1-3 right quad.      Treatment Today    TREATMENT MINUTES COMMENTS   Evaluation     Self-care/ Home management     Manual therapy 5 See above.   Neuromuscular Re-education     Therapeutic Activity     Therapeutic Exercises 23 See exercise flow-sheet for details.    Gait training     Modality__________________                Total 28    Blank areas are intentional and mean the treatment did not include these items.       Javi Man, PT  7/20/2017     "

## 2021-06-13 NOTE — TELEPHONE ENCOUNTER
Triage Call:   Pt is calling stating he has double vision, happening since Friday and happening more in the evenings. Pt reported having some dizziness, irritations, mild headache. /89. Pt started on Metolazone on 12/15/2020. Pt was advised to go to the ED if he did not hear back within the next 30 minutes.     Paging Dr Janet Rojas @ 8:59pm Advised for pt to be seen in the ED now.    Pt was called back and advised to go to the ED now, Pt stated he will have his wife bring him to the ED.     COVID 19 Nurse Triage Plan/Patient Instructions    Please be aware that novel coronavirus (COVID-19) may be circulating in the community. If you develop symptoms such as fever, cough, or SOB or if you have concerns about the presence of another infection including coronavirus (COVID-19), please contact your health care provider or visit [email or web address].     Disposition/Instructions    ED Visit recommended. Follow protocol based instructions.      Bring Your Own Device:  Please also bring your smart device(s) (smart phones, tablets, laptops) and their charging cables for your personal use and to communicate with your care team during your visit.      Thank you for taking steps to prevent the spread of this virus.  o Limit your contact with others.  o Wear a simple mask to cover your cough.  o Wash your hands well and often.    Resources    [facility]: About COVID-19: [email or web address]    CDC: What to Do If You're Sick: www.cdc.gov/coronavirus/2019-ncov/about/steps-when-sick.html    CDC: Ending Home Isolation: www.cdc.gov/coronavirus/2019-ncov/hcp/disposition-in-home-patients.html     CDC: Caring for Someone: www.cdc.gov/coronavirus/2019-ncov/if-you-are-sick/care-for-someone.html     [facility]: Interim Guidance for Hospital Discharge to Home: www.health.[facility].mn.us/diseases/coronavirus/hcp/hospdischarge.pdf    [facility] clinical trials (COVID-19 research studies):  clinicalaffairs.[email or web address]-clinical-trials     Below are the COVID-19 hotlines at the [place] [facility] ([facility]). Interpreters are available.   o For health questions: Call 283-085-5054 or 1-981.360.5466 (7 a.m. to 7 p.m.)  o For questions about schools and childcare: Call 782-670-4512 or 1-563.909.8093 (7 a.m. to 7 p.m.)     Glo Brown RN Nurse Triage 12/21/2020 9:10 PM     Reason for Disposition    Double vision    Additional Information    Negative: Weakness of the face, arm or leg on one side of the body    Negative: Followed getting substance in the eye    Negative: Foreign body or object is or was lodged in the eye    Negative: Followed an eye injury    Negative: Followed sun lamp or sun exposure (UV keratitis)    Negative: Yellow or green discharge (pus) in the eye    Negative: Pregnant    Negative: Postpartum (from 0 to 6 weeks after delivery)    Negative: Complete loss of vision in 1 or both eyes    Negative: Severe eye pain    Negative: SEVERE headache    Protocols used: VISION LOSS OR CHANGE-A-AH

## 2021-06-13 NOTE — PROGRESS NOTES
On license of [facility]  Arrhythmia Clinic  Benedicto Barfield    Referring:      Assessment:         Paroxysmal atrial fibrillation: The patient is status post ablation of his atrial fibrillation 4 years ago.  He has had no symptomatic recurrence of atrial for ablation or flutter.  He has had no ECG documented recurrence of atrial fibrillation or flutter.  The patient remains off membrane active antiarrhythmic drug therapy.  He has a low PIB8LL5-IQMn score and remains on low-dose aspirin therapy.    Ascending aorta aneurysm: The patient has minimal dilatation of his ascending aorta.  Screening at this point can be done every 2-3 years.      Recommendations:    No change in current medical therapy.    Follow-up in the A. fib clinic with the EP nurse practitioner in 1 year.      Patient Active Problem List   Diagnosis     Obesity     Varicose Veins     Aneurysm Of The Ascending Aorta     Hypothyroidism     Status post ablation of atrial fibrillation       Subjective:  Brett Hopkins (62 y.o. male) returns to the arrhythmia clinic for interval reevaluation of his atrial fibrillation post ablation.  The patient is 4 years out now from his ablation procedure.  He has had no recurrent symptoms of atrial fibrillation or flutter.  The patient reports that he underwent right knee replacement without any cardiac symptoms earlier this year.  He reports no exertional dyspnea or chest pain.  He is not having any orthopnea PND or ankle edema.    Current Outpatient Prescriptions   Medication Sig Dispense Refill     aspirin 81 MG EC tablet Take 81 mg by mouth daily.       levothyroxine (SYNTHROID, LEVOTHROID) 112 MCG tablet Take 1 tablet (112 mcg total) by mouth daily. 90 tablet 2     naproxen sodium (ALEVE) 220 MG tablet Take 440 mg by mouth 2 (two) times a day with meals.       omeprazole (PRILOSEC) 20 MG capsule Take 1 capsule (20 mg total) by mouth daily. 90 capsule 0     MULTIVIT &MINERALS/FERROUS FUM (MULTI VITAMIN  "ORAL) Take 1 tablet by mouth daily.       No current facility-administered medications for this visit.        Review of Systems:   General: WNL  Eyes: WNL  Ears/Nose/Throat: WNL  Lungs: WNL  Heart: WNL  Stomach: WNL  Bladder: WNL  Muscle/Joints: WNL  Skin: WNL  Nervous System: WNL  Mental Health: WNL     Blood: WNL    Family History  Family History   Problem Relation Age of Onset     Atrial fibrillation Mother      Dementia Mother      dx 80s     Diabetes type II Mother      dx 60s/70s     Atrial fibrillation Father      Rheum arthritis Father      Atrial fibrillation Sister      Stroke Sister      Atrial fibrillation Brother      Rheum arthritis Brother      Diabetes type II Brother      dx 40s       Social History   reports that he has never smoked. He has never used smokeless tobacco. He reports that he drinks about 4.2 oz of alcohol per week  He reports that he does not use illicit drugs.    Objective:   Vital signs in last 24 hours:  /84 (Patient Site: Left Arm, Patient Position: Sitting, Cuff Size: Adult Large)  Pulse 76  Resp 16  Ht 5' 11\" (1.803 m)  Wt (!) 261 lb (118.4 kg)  BMI 36.4 kg/m2  Weight: Weight: (!) 261 lb (118.4 kg)     Physical Exam:  General: The patient is alert oriented to person place and situation.  The patient is in no acute distress at the time of my evaluation.  Eyes: Pupils are equal, round, and reactive to light.  Conjunctiva and sclera are clear.  ENT: Oral mucosa is moist and without redness. No evident nasal discharge.  Pulmonary: Lungs are clear bilaterally with no rales, rhonchi, or wheezes.    Cardiovascular exam: Rhythm is regular. S1 and S2 are normal. No significant murmur is present. JVP is normal. Lower extremities demonstrate no significant edema. Distal pulses are intact bilaterally.  Abdomen is obese, soft, and nontender.  Musculoskeletal: Spine is straight. Extremities notable for mild swelling of the right knee but otherwise no significant abnormality.  "   Neuro: Gait is normal.   Skin is warm, dry, and otherwise intact.      Cardiographics:       Lab Results:   Lab Results   Component Value Date     06/22/2017    K 4.1 06/22/2017     06/22/2017    CO2 26 06/22/2017    BUN 22 06/22/2017    CREATININE 0.93 06/22/2017    CALCIUM 8.4 (L) 06/22/2017     Lab Results   Component Value Date    WBC 12.8 (H) 06/22/2017    HGB 13.6 (L) 06/22/2017    HCT 38.9 (L) 06/22/2017    MCV 93 06/22/2017     06/22/2017     Lab Results   Component Value Date    INR 1.04 06/22/2017         Outside record review:

## 2021-06-14 NOTE — TELEPHONE ENCOUNTER
Refill Approved    Rx renewed per Medication Renewal Policy. Medication was last renewed on 10/9/20.    Inessa Arroyo, [facility] Triage/Med Refill 12/30/2020     Requested Prescriptions   Pending Prescriptions Disp Refills     levothyroxine (SYNTHROID, LEVOTHROID) 112 MCG tablet [Pharmacy Med Name: LEVOTHYROXINE 0.112MG (112MCG) TABS] 90 tablet 0     Sig: TAKE 1 TABLET(112 MCG) BY MOUTH DAILY       Thyroid Hormones Protocol Passed - 12/29/2020  7:34 AM        Passed - Provider visit in past 12 months or next 3 months     Last office visit with prescriber/PCP: 6/3/2019 Magdaleno Bennett MD OR same dept: Visit date not found OR same specialty: 6/3/2019 Magdaleno Bennett MD  Last physical: 11/20/2020 Last MTM visit: Visit date not found   Next visit within 3 mo: Visit date not found  Next physical within 3 mo: Visit date not found  Prescriber OR PCP: Magdaleno Bennett MD  Last diagnosis associated with med order: 1. Hypothyroidism  - levothyroxine (SYNTHROID, LEVOTHROID) 112 MCG tablet [Pharmacy Med Name: LEVOTHYROXINE 0.112MG (112MCG) TABS]; TAKE 1 TABLET(112 MCG) BY MOUTH DAILY  Dispense: 90 tablet; Refill: 0    If protocol passes may refill for 12 months if within 3 months of last provider visit (or a total of 15 months).             Passed - TSH on file in past 12 months for patient age 12 & older     TSH   Date Value Ref Range Status   11/20/2020 0.33 0.30 - 5.00 uIU/mL Final

## 2021-06-14 NOTE — PROGRESS NOTES
Vitals - Patient Reported  Systolic (Patient Reported): 107  Diastolic (Patient Reported): 88  Weight (Patient Reported): 250 lb (113.4 kg)  Pulse (Patient Reported): 72    Review of Systems - ALL WNL    COVID VACCINE QUESTIONS    NO OTHER CONCERNS    VIDEO VISIT  Val Merritt, CMA

## 2021-06-14 NOTE — PROGRESS NOTES
"Brett Hopkins is a 65 y.o. male who is being evaluated via a billable telephone visit.      The patient has been notified of following:     \"This telephone visit will be conducted via a call between you and your physician/provider. We have found that certain health care needs can be provided without the need for a physical exam.  This service lets us provide the care you need with a short phone conversation.  If a prescription is necessary we can send it directly to your pharmacy.  If lab work is needed we can place an order for that and you can then stop by our lab to have the test done at a later time.    Telephone visits are billed at different rates depending on your insurance coverage. During this emergency period, for some insurers they may be billed the same as an in-person visit.  Please reach out to your insurance provider with any questions.    If during the course of the call the physician/provider feels a telephone visit is not appropriate, you will not be charged for this service.\"    Patient has given verbal consent to a Telephone visit? Yes    What phone number would you like to be contacted at?  See Above     Patient would like to receive their AVS by AVS Preference: [place].    Additional provider notes: This is a telephone conversation that began at 2:45 PM and ended at 3:02 PM.    Patient has a significant history of lymphedema he has been seen by a specialist for this he uses compression stockings and a pneumatic device this is somewhat helpful but I started him on Lasix in November this was not helpful I switched him over to metaxalone I gave him 2 weeks of this his last pill was yesterday, unfortunately he did not find this helpful either.    I discussed with the patient that there are other diuretics however I think it is highly unlikely anything else would be more effective and at this point he would like to see a different lymphedema specialist.    He also has recently developed double " vision he was seen in the emergency room on December 21 he had an MRI performed which was negative, laboratory studies including kidney tests and blood counts were also normal.  He saw his optometrist who thought he might have some type of underlying palsy.  Whether or not this is related to the metaxalone is not clear since he is just stopped it yesterday however I think it is more likely that this is some type of cranial nerve palsy in which case there is no specific treatment for this other than time of note the optometrist is going to have him try prism glasses.    I told the patient I would do watchful waiting on this particular issue to see if this does not resolve on its own, if need be we may need to have neurology referral but only if he fails to improve with time or other interventions.    Assessment/Plan:  1. Lymphedema  Right leg after surgery has not responded to diuretic medication.  - Ambulatory referral to Lymphedema Care    2. Double vision  New onset double vision possibly a cranial nerve palsy.    PLAN:  1.  In terms of the double vision, patient is following up with his optometrist, neurology referral only the patient's symptoms persist.  2.  Referral to a different lymphedema specialist.          Phone call duration:  17 minutes    Glo Rowe, [facility]

## 2021-06-14 NOTE — PATIENT INSTRUCTIONS - HE
Brett Hopkins,    It was a pleasure to see you today at the [facility].     My recommendations after this visit include:    Continue current medications    Call if you have recurrent A. Fib    Follow-up in 6 months, or sooner if needed    Glenna Cardenas, [facility], Electrophysiology  [phone]  EP nurses [phone]

## 2021-06-15 NOTE — PROGRESS NOTES
Living Situation  How many people live with the subject (other than subject)? 1  Total people under 18 years of age 0  Total people between 18-64 years of age 1  Total people 65 years of age or older  0      NOVAVAX Day 1 notes updated

## 2021-06-15 NOTE — PROGRESS NOTES
Vitals:    01/28/21 0923   BP: 121/76   Pulse: (!) 54 NCS   Resp: 16   Temp: 97.8  F (36.6  C)   SpO2: 99%

## 2021-06-16 PROBLEM — E66.01 MORBID OBESITY (H): Status: ACTIVE | Noted: 2020-11-20

## 2021-06-16 PROBLEM — L63.9 AA (ALOPECIA AREATA): Status: ACTIVE | Noted: 2018-09-10

## 2021-06-16 PROBLEM — I89.0 LYMPHEDEMA: Status: ACTIVE | Noted: 2020-06-01

## 2021-06-17 NOTE — TELEPHONE ENCOUNTER
Refill Request  Medication name:   Requested Prescriptions     Pending Prescriptions Disp Refills     hydroCHLOROthiazide (HYDRODIURIL) 25 MG tablet 90 tablet 0     Sig: Take 1 tablet each morning for high blood pressure.     Who prescribed the medication: PCP  Last refill on medication: 2/1/21  [facility]ed [facility]  Last appointment with PCP: [facility] 12/20/20  Next appointment: Appointment scheduled for 5/28/21

## 2021-06-18 NOTE — PATIENT INSTRUCTIONS - HE
Patient Instructions by Irvin Goldstein at 2/18/2021 12:30 PM     Author: Irvin Goldstein Service: -- Author Type: Research Associate    Filed: 2/18/2021  3:35 PM Encounter Date: 2/18/2021 Status: Signed    : Irvin Goldstein (Research Associate)           PREVENT-19 Day 21  Thank you for coming in today for your 2nd injection.  Please continue to add your temperature daily in the jorge.  If you have symptoms, please wait to start the nasal swab collections until you have been contacted by the study team and instructed to do so.    Your next visit is Day 35. We have scheduled this for 3/5/21 at 10:30 AM.   If you have questions, you may call the [place]'s Research department at [phone]    Best regards,  Irvin Goldstein, Research Associate

## 2021-06-18 NOTE — PATIENT INSTRUCTIONS - HE
Patient Instructions by Kavitha Cohn RN at 3/5/2021 10:30 AM     Author: Kavitha Cohn RN Service: -- Author Type: Registered Nurse    Filed: 3/5/2021 10:39 AM Encounter Date: 3/5/2021 Status: Signed    : Kavitha Cohn RN (Registered Nurse)           Thank you for coming in today for the Day 35 visit.  Your next study visit is Month 3.   Reminder: this visit will be at the [place], [place] Clinical Research Unit ([facility]).  The address for the D[facility] is: [address] [place] [address]  The phone number is [phone]  We have provided you with a map for your reference too.    The study team from the  [facility] will be in contact with you to schedule your next study visit.     The PREVENT-19 team at [facility] wishes you all the best and appreciates the opportunity to partner with you in this vaccine trial.  We wish you all the best in the future.    Kavitha Cohn RN

## 2021-06-18 NOTE — PATIENT INSTRUCTIONS - HE
Patient Instructions by Kathleen Lynn NP at 9/1/2020  1:00 PM     Author: Kathleen Lynn NP Service: -- Author Type: Nurse Practitioner    Filed: 9/1/2020  1:55 PM Encounter Date: 9/1/2020 Status: Addendum    : Kathleen Lynn NP (Nurse Practitioner)    Related Notes: Original Note by Kathleen Lynn NP (Nurse Practitioner) filed at 9/1/2020  1:37 PM       Follow-up with your primary care provider within the week.     Patient Education     Established High Blood Pressure    High blood pressure (hypertension) is a chronic disease. Often, healthcare providers dont know what causes it. But it can be caused by certain health conditions and medicines.  If you have high blood pressure, you may not have any symptoms. If you do have symptoms, they may include headache, dizziness, changes in your vision, chest pain, and shortness of breath. But even without symptoms, high blood pressure thats not treated raises your risk for heart attack, heart failure, and stroke. High blood pressure is a serious health risk and shouldnt be ignored.  Blood pressure measurements are given as 2 numbers. Systolic blood pressure is the upper number. This is the pressure when the heart contracts. Diastolic blood pressure is the lower number. This is the pressure when the heart relaxes between beats. You will see your blood pressure readings written together. For example, a person with a systolic pressure of 118 and a diastolic pressure of 78 will have 118/78 written in the medical record.  Blood pressure is categorized as normal, elevated, or stage 1 or stage 2 high blood pressure:    Normal blood pressure is systolic of less than 120 and diastolic of less than 80 (120/80)    Elevated blood pressure is systolic of 120 to 129 and diastolic less than 80    Stage 1 high blood pressure is systolic is 130 to 139 or diastolic between 80 to 89    Stage 2 high blood pressure is when systolic is 140 or higher or the diastolic is  90 or higher  Home care  If you have high blood pressure, follow these home care guidelines to help lower your blood pressure. If you are taking medicines for high blood pressure, these methods may reduce or end your need for medicines in the future.    Start a weight-loss program if you are overweight.    Cut back on how much salt you get in your diet. Heres how to do this:  ? Dont eat foods that have a lot of salt. These include olives, pickles, smoked meats, and salted potato chips.  ? Dont add salt to your food at the table.  ? Use only small amounts of salt when cooking.    Start an exercise program. Talk with your healthcare provider about the type of exercise program that would be best for you. It doesn't have to be hard. Even brisk walking for 20 minutes 3 times a week is a good form of exercise.    Dont take medicines that stimulate the heart. This includes many over-the-counter cold and sinus decongestant pills and sprays, as well as diet pills. Check the warnings about high blood pressure on the label. Before buying any over-the-counter medicines or supplements, always ask the pharmacist about the product's potential interaction with your high blood pressure and your high blood pressure medicines.    Stimulants such as amphetamine or cocaine could be deadly for someone with high blood pressure. Never take these.    Limit how much caffeine you get in your diet. Switch to caffeine-free products.    Stop smoking. If you are a long-time smoker, this can be hard. Talk to your healthcare provider about medicines and nicotine replacement options to help you. Also, enroll in a stop-smoking program to make it more likely that you will quit for good.    Learn how to handle stress. This is an important part of any program to lower blood pressure. Learn about relaxation methods like meditation, yoga, or biofeedback.    If your provider prescribed medicines, take them exactly as directed. Missing doses may cause your  blood pressure get out of control.    If you miss a dose or doses, check with your healthcare provider or pharmacist about what to do.    Consider buying an automatic blood pressure machine to check your blood pressure at home. Ask your provider for a recommendation. You can get one of these at most pharmacies.     The American Heart Association recommends the following guidelines for home blood pressure monitoring:    Don't smoke or drink coffee for 30 minutes before taking your blood pressure.    Go to the bathroom before the test.    Relax for 5 minutes before taking the measurement.    Sit with your back supported (don't sit on a couch or soft chair); keep your feet on the floor uncrossed. Place your arm on a solid flat surface (like a table) with the upper part of the arm at heart level. Place the middle of the cuff directly above the bend of the elbow. Check the monitor's instruction manual for an illustration.    Take multiple readings. When you measure, take 2 to 3 readings one minute apart and record all of the results.    Take your blood pressure at the same time every day, or as your healthcare provider recommends.    Record the date, time, and blood pressure reading.    Take the record with you to your next medical appointment. If your blood pressure monitor has a built-in memory, simply take the monitor with you to your next appointment.    Call your provider if you have several high readings. Don't be frightened by a single high blood pressure reading, but if you get several high readings, check in with your healthcare provider.    Note: When blood pressure reaches a systolic (top number) of 180 or higher OR diastolic (bottom number) of 110 or higher, seek emergency medical treatment.  Follow-up care  You will need to see your healthcare provider regularly. This is to check your blood pressure and to make changes to your medicines. Make a follow-up appointment as directed. Bring the record of your home  blood pressure readings to the appointment.  When to seek medical advice  Call your healthcare provider right away if any of these occur:    Blood pressure reaches a systolic (upper number) of 180 or higher OR a diastolic (bottom number) of 110 or higher    Chest pain or shortness of breath    Severe headache    Throbbing or rushing sound in the ears    Nosebleed    Sudden severe pain in your belly (abdomen)    Extreme drowsiness, confusion, or fainting    Dizziness or spinning sensation (vertigo)    Weakness of an arm or leg or one side of the face    You have problems speaking or seeing   Date Last Reviewed: 12/1/2016 2000-2017 [employer]. [address] [place] [id number]. All rights reserved. This information is not intended as a substitute for professional medical care. Always follow your healthcare professional's instructions.

## 2021-06-19 NOTE — LETTER
Letter by Magdaleno Bennett MD at      Author: Magdaleno Bennett MD Service: -- Author Type: --    Filed:  Encounter Date: 4/23/2019 Status: (Other)         Brett Hopkins  [address] [place] [address]             April 23, 2019         Dear Mr. Hopkins,    Below are the results from your recent visit: The EKG confirms that you are now in atrial fibrillation, heart rate of 119, this is why I am restarting you on the metoprolol.    Resulted Orders   Electrocardiogram Perform and Read   Result Value Ref Range    SYSTOLIC BLOOD PRESSURE  mmHg    DIASTOLIC BLOOD PRESSURE  mmHg    VENTRICULAR RATE 119 BPM    ATRIAL RATE 108 BPM    P-R INTERVAL  ms    QRS DURATION 72 ms    Q-T INTERVAL 322 ms    QTC CALCULATION (BEZET) 452 ms    P Axis  degrees    R AXIS -17 degrees    T AXIS 29 degrees    MUSE DIAGNOSIS       Atrial fibrillation with rapid ventricular response  Possible Inferior infarct (cited on or before 26-OCT-2007)  Abnormal ECG  When compared with ECG of 02-JUN-2017 09:35,  Atrial fibrillation has replaced Sinus rhythm  Vent. rate has increased BY  65 BPM  Confirmed by PADMAJA MEDINA, DEEPAK LOC:JN ([id number]) on 4/23/2019 4:20:40 PM              Please call with questions or contact us using [employer].    Sincerely,        Electronically signed by Magdaleno Bennett MD

## 2021-06-19 NOTE — LETTER
Letter by Glo Rose RN at      Author: Glo Rose RN Service: -- Author Type: --    Filed:  Encounter Date: 6/25/2019 Status: (Other)       Dear Brett Hopkins,    Important Information for Your Procedure    You are having a Cardioversion Procedure:    You will be contacted by our cardiovascular procedural  to set this procedure up within the next few days    If your procedure is not scheduled within 30 days from today's date, you will need to contact your primary doctor Magdaleno Bennett MD ([phone]) and schedule a pre-operative history within 30 days of your procedure.  Getting Ready for Your Procedure:    You will need to bring a current list of your medications with you for our admissions process the day of your procedure, please do not bring any of your own medications  with you to the hospital    The hospital cannot store your valuables, so please leave them at home    You will need to take off your jewelry prior to your procedure, we advise leaving your jewelry at home, as we cannot store your valuables    Please shower the morning of your procedure, or the night before    This is a same day procedure, in which you can expect to go home the same day. In any unforseen circumstanced this plan can change, but is unlikely.    Please make arrange for transportation home, as you will not be able to drive following your procedure  The Night Prior to Your Procedure:    Please do not have anything to eat or drink after Midnight (12:00am) prior to your procedure    It is important to stay well hydrated, and consume balanced meals the day prior to your procedure  Arriving for Your Procedure:    Please arrive at [facility], located at: [address] [place] [address]      parking is available after 6:00am for your convenience, parking is also available in the parking ramp located off of [facility] street attached to the hospital    If you park in the ramp located on  [address], take the elevator to level one. Enter the doors to the atrium/lobby area and check in at the main reception desk.     Please check in at the main reception desk in the lobby    You will be assisted to Cardiac [facility] on the third floor.  Going Home:    The physician and/or nurse will review any restrictions, follow-up requirements, and medication instructions prior to going home from the hospital in detail  Clinic Contact Information:    You can contact our main clinic line at any time with questions, concerns, or if you need further information: [facility] [phone]    If you have further questions in regards to the scheduling of you procedure, please contact: The Cardiovascular Procedural Schedulers at [phone]    Medication prior to procedure:    Please take your morning medications the day of your procedure with sips of water, except any multivitamins or supplements.    You may not be able to have this procedure if you skip a dose of your eliquis within 30 days of your procedure. We encourage you to make sure you are taking this medication without skipping any doses, if you happen to miss a dose prior to your procedure please contact us to make further arrangements. and If you have any questions regarding your medication prior to your procedure please contact me at the number listed below.    Sincerely,  Glo Rose RN  Please call with any questions or concerns regarding the procedure at : [phone]    Cardioversion  Cardioversion is a procedure to restore your heart's normal rhythm from a fast or irregular rhythm (arrhythmia) in the top or bottom chambers of your heart. You may have the procedure in a hospital or surgery center. It's often done on an outpatient (same day) basis. During the procedure, your doctor will give you medication to keep you free from pain. Then the doctor gives you a brief electric shock. This helps your heartbeat become normal  again. In most cases, you can go home within hours of the procedure.    Before Your Procedure    Tell your doctor what over-the-counter and prescription medications, herbs, and supplements you are taking.    Take medication as directed. Your doctor may prescribe anticoagulants (blood thinners), depending on your situation. They help prevent blood clots from forming.    Ask your doctor about the risks and benefits of cardioversion.    Sign your consent form.    Dont eat or drink anything for 8  hours before your procedure.    Follow any other instructions your doctor gives you.     Arrange for an adult to drive you home after the procedure.     During Your Procedure    Your health care provider will place small pads (electrodes) on your chest to record your heartbeat at all times.    Your health care provider will place an intravenous (IV) line in your arm. This gives you medication (sedation) that keeps you free of pain. Youll feel sleepy.      After Your Procedure    Your health care provider will monitor you until you are fully awake. Then youll be able to sit up, walk, and eat.    In most cases, youll be able to go home after the sedation wears off. This usually takes a few hours.    For a few days, the skin on your chest may feel a little sore, like a mild sunburn.    DO NOT  drive or operate heavy machinery for 24 hours after the procedure.    The day after your procedure, try to take it easy. Take medication as directed.    Call your doctor if you notice skipped beats, a rapid heartbeat, or chest tightness. These may be signs that an irregular heartbeat has returned.

## 2021-06-19 NOTE — LETTER
Letter by Magdaleno Bennett MD at      Author: Magdaleno Bennett MD Service: -- Author Type: --    Filed:  Encounter Date: 4/24/2019 Status: (Other)         Brett Hopkins  [address] [place] [address]             April 24, 2019         Dear Mr. Hopkins,    Below are the results from your recent visit: Kidney tests are normal.  Blood counts are normal, no anemia.  TSH her thyroid checked, normal, checked both because of the return of atrial fibrillation as well as you being on a thyroid medication, stay on same dose of thyroid medication.    Resulted Orders   Basic Metabolic Panel   Result Value Ref Range    Sodium 143 136 - 145 mmol/L    Potassium 4.7 3.5 - 5.0 mmol/L    Chloride 106 98 - 107 mmol/L    CO2 29 22 - 31 mmol/L    Anion Gap, Calculation 8 5 - 18 mmol/L    Glucose 110 70 - 125 mg/dL    Calcium 9.4 8.5 - 10.5 mg/dL    BUN 25 (H) 8 - 22 mg/dL    Creatinine 1.07 0.70 - 1.30 mg/dL    GFR MDRD Af Amer >60 >60 mL/min/1.73m2    GFR MDRD Non Af Amer >60 >60 mL/min/1.73m2    Narrative    Fasting Glucose reference range is 70-99 mg/dL per  American Diabetes Association (ADA) guidelines.   HM2(CBC w/o Differential)   Result Value Ref Range    WBC 6.7 4.0 - 11.0 thou/uL    RBC 5.03 4.40 - 6.20 mill/uL    Hemoglobin 15.2 14.0 - 18.0 g/dL    Hematocrit 46.5 40.0 - 54.0 %    MCV 93 80 - 100 fL    MCH 30.2 27.0 - 34.0 pg    MCHC 32.6 32.0 - 36.0 g/dL    RDW 12.8 11.0 - 14.5 %    Platelets 222 140 - 440 thou/uL    MPV 8.2 7.0 - 10.0 fL   Thyroid Stimulating Hormone (TSH)   Result Value Ref Range    TSH 3.39 0.30 - 5.00 uIU/mL            Please call with questions or contact us using [employer].    Sincerely,        Electronically signed by Magdaleno Bennett MD

## 2021-06-19 NOTE — LETTER
Letter by Magdaleno Bennett MD at      Author: Magdaleno Bennett MD Service: -- Author Type: --    Filed:  Encounter Date: 11/11/2019 Status: Signed         [place] [address]             November 11, 2019         Dear Mr. Hopkins,    Below are the results from your recent visit: Kidney tests are normal, blood counts are normal, no anemia.    Resulted Orders   Basic Metabolic Panel   Result Value Ref Range    Sodium 142 136 - 145 mmol/L    Potassium 4.8 3.5 - 5.0 mmol/L    Chloride 106 98 - 107 mmol/L    CO2 28 22 - 31 mmol/L    Anion Gap, Calculation 8 5 - 18 mmol/L    Glucose 77 70 - 125 mg/dL    Calcium 9.2 8.5 - 10.5 mg/dL    BUN 21 8 - 22 mg/dL    Creatinine 0.94 0.70 - 1.30 mg/dL    GFR MDRD Af Amer >60 >60 mL/min/1.73m2    GFR MDRD Non Af Amer >60 >60 mL/min/1.73m2    Narrative    Fasting Glucose reference range is 70-99 mg/dL per  American Diabetes Association (ADA) guidelines.   HM2(CBC w/o Differential)   Result Value Ref Range    WBC 5.8 4.0 - 11.0 thou/uL    RBC 5.09 4.40 - 6.20 mill/uL    Hemoglobin 15.5 14.0 - 18.0 g/dL    Hematocrit 46.6 40.0 - 54.0 %    MCV 92 80 - 100 fL    MCH 30.4 27.0 - 34.0 pg    MCHC 33.2 32.0 - 36.0 g/dL    RDW 13.6 11.0 - 14.5 %    Platelets 219 140 - 440 thou/uL    MPV 8.1 7.0 - 10.0 fL            Please call with questions or contact us using [employer].    Sincerely,        Electronically signed by Magdaleno Bennett MD

## 2021-06-20 NOTE — PROGRESS NOTES
MALE PREVENTATIVE EXAM    Assessment and Plan:       1. Screen for colon cancer  Patient is due for colon cancer screening  - Ambulatory referral for Colonoscopy    2. Physical exam  Patient overall has relatively good health habits.    3. Obesity  BMI greater than 35    4. Varicose Veins  Asymptomatic    5. Thoracic aortic aneurysm without rupture (H)  Mild followed by cardiology.    6. Hypothyroidism     - Thyroid Stimulating Hormone (TSH)    7. Paroxysmal atrial fibrillation (H)  Status post ablation, sinus rhythm.    8. GERD (gastroesophageal reflux disease)  Prilosec daily.  - Vitamin B12  - Magnesium    9. Left foot pain  Has been evaluated by podiatry, diagnosis is unclear.  - Ambulatory referral to Orthopedic Surgery    10. Screening for lipid disorders     - Basic Metabolic Panel  - Lipid Cascade    11. Screening for prostate cancer  No family history of prostate cancer.  - PSA (Prostatic-Specific Antigen), Annual Screen    12. AA (alopecia areata)  Area of hair loss back of scalp followed by dermatology.    PLAN:  1.  Influenza and Adacel vaccines.  2.  Discussed with the patient that I recommend he check with his insurance about the new shingles vaccine.  3.  Colonoscopy referral.  4.  Referral back to [facility] orthopedics for the patient to see his previous podiatrist for left foot pain.  5.  Patient sees cardiology yearly for the thoracic artery aneurysm and history of atrial fibrillation.  6.  Patient is followed regularly by dermatology.  7.  Laboratory studies as above.  8.  Discussed with the patient that he needs to stay on the Prilosec daily.  9.  Recommend yearly visits.        Next follow up:  No Follow-up on file.    Immunization Review  Adult Imm Review: Missing doses of tdap,zoster,flu vacc   Documented tobacco use.  Website and phone contact for [employer] given to patient in AVS.    I discussed the following with the patient:   Adult Healthy Living: Importance of regular exercise  Healthy  nutrition    I have had an Advance Directives discussion with the patient.    Subjective:   Chief Complaint: Brett Hopkins is an 62 y.o. male here for a preventative health visit.    Patient has a number of concerns.  He does have a history of atrial fibrillation he underwent an ablation procedure for that and has not had any recurrence, he is followed yearly by cardiology.  He is also been noted to have a mild dilatation of the thoracic aorta also followed by cardiology, watchful waiting.    He has a history of underlying hypothyroidism on thyroid replacement  He is a history of GERD with a history of a hiatal hernia and takes omeprazole daily.  Patient reports that if he does not take omeprazole he has breakthrough symptoms by 2 PM, therefore I discussed with him that he really does need to take the omeprazole daily, there are potential side effects with medication however I think the benefits outweigh the risks.    He has an area of alopecia in the back of the scalp actually noted by his sanchez and is now seen dermatology and has had some injections.    The patient has had left foot pain, one podiatrist told him this is a Chen's neuroma recommended orthotics another podiatrist told him that this is not Chen's neuroma possibly a callus he still has some underlying left foot pain, I told him I think it is reasonable to go back to 1 of his podiatrist that he is seen for reevaluation.    Review the patient's allergies, medications, problem list, past medical history, past surgical history family history and social history.     HPI:  PX, pt is fasting, concerns: hernia issues, leg cramps at times, stomach issues     Healthy Habits  Are you taking a daily aspirin? Yes  Do you typically exercising at least 40 min, 3-4 times per week?  Yes- walk 1 mile daily   Do you usually eat at least 4 servings of fruit and vegetables a day, include whole grains and fiber and avoid regularly eating high fat foods? Yes  Have  "you had an eye exam in the past two years? Yes  Do you see a dentist twice per year? Yes  Do you have any concerns regarding sleep? No    Safety Screen  If you own firearms, are they secured in a locked gun cabinet or with trigger locks? The patient does not own any firearms  Do you feel you are safe where you are living?: Yes (9/10/2018  7:55 AM)  Do you feel you are safe in your relationship(s)?: Yes (9/10/2018  7:55 AM)    Review of Systems:  Please see above.  The rest of the review of systems are negative for all systems.     Cancer Screening       Status Date      COLONOSCOPY Overdue 7/30/2018      Done 7/30/2008 ENDOSCOPY, COLON          Patient Care Team:  Magdaleno Bennett MD as PCP - General (Family Medicine)        History     Reviewed By Date/Time Sections Reviewed    Magdaleno Bennett MD 9/10/2018  8:08 AM Medical, Surgical, Tobacco, Alcohol, Drug Use, Sexual Activity, Family, Social Documentation, Socioeconomic    Glo Mccullough [facility] 9/10/2018  8:02 AM Tobacco            Objective:   Vital Signs:   Visit Vitals     /82     Pulse 78     Ht 5' 10\" (1.778 m)     Wt (!) 267 lb (121.1 kg)     BMI 38.31 kg/m2          PHYSICAL EXAM  Physical Exam   Constitutional: He appears well-developed and well-nourished.   HENT:   Head: Normocephalic and atraumatic.   Right Ear: External ear normal.   Left Ear: External ear normal.   Nose: Nose normal.   Mouth/Throat: Oropharynx is clear and moist.   Eyes: Conjunctivae and EOM are normal. Pupils are equal, round, and reactive to light.   Neck: Normal range of motion. Neck supple.   Cardiovascular: Normal rate, regular rhythm and normal heart sounds.    Pulmonary/Chest: Effort normal and breath sounds normal.   Abdominal: Soft.   Musculoskeletal: Normal range of motion.   Neurological: He is alert.   Skin: Skin is warm and dry.   Psychiatric: He has a normal mood and affect. His behavior is normal. Judgment and thought content normal.       The 10-year ASCVD risk score " (Naomi JORDAN Jr, et al., 2013) is: 6%    Values used to calculate the score:      Age: 62 years      Sex: Male      Is Non- : No      Diabetic: No      Tobacco smoker: No      Systolic Blood Pressure: 130 mmHg      Is BP treated: No      HDL Cholesterol: 73 mg/dL      Total Cholesterol: 175 mg/dL         Medication List          These changes are accurate as of 9/10/18  9:26 AM.  If you have any questions, ask your nurse or doctor.               CONTINUE taking these medications          aspirin 81 MG EC tablet   INSTRUCTIONS:  Take 81 mg by mouth daily.           levothyroxine 112 MCG tablet   Also known as:  SYNTHROID, LEVOTHROID   INSTRUCTIONS:  Take 1 tablet (112 mcg total) by mouth daily. Needs to be seen for more refills.           MULTI VITAMIN ORAL   INSTRUCTIONS:  Take 1 tablet by mouth daily.           naproxen sodium 220 MG tablet   Also known as:  ALEVE   INSTRUCTIONS:  Take 440 mg by mouth 2 (two) times a day with meals.           omeprazole 20 MG capsule   Also known as:  PriLOSEC   INSTRUCTIONS:  Take 1 capsule (20 mg total) by mouth daily.           tacrolimus 0.1 % ointment   Also known as:  PROTOPIC   INSTRUCTIONS:  CHIQUITA EXT AA HS                 Additional Screenings Completed Today:

## 2021-06-21 NOTE — LETTER
Letter by Magdaleno Bennett MD at      Author: Magdaleno Bennett MD Service: -- Author Type: --    Filed:  Encounter Date: 11/23/2020 Status: (Other)         Brett Hopkins  [address] [place] [address]             November 23, 2020         Dear Mr. Hopkins,    Below are the results from your recent visit: Sugar is just the slightest high at 104 ideally under 100, this is not diabetes, but focus on good diet and exercise to help lower.  Kidney tests are normal.  The cholesterol is well controlled.  The TSH or thyroid test is normal, stay on same dose of thyroid medication.    Resulted Orders   Basic Metabolic Panel   Result Value Ref Range    Sodium 142 136 - 145 mmol/L    Potassium 4.9 3.5 - 5.0 mmol/L    Chloride 105 98 - 107 mmol/L    CO2 29 22 - 31 mmol/L    Anion Gap, Calculation 8 5 - 18 mmol/L    Glucose 104 70 - 125 mg/dL    Calcium 9.4 8.5 - 10.5 mg/dL    BUN 25 (H) 8 - 22 mg/dL    Creatinine 0.92 0.70 - 1.30 mg/dL    GFR MDRD Af Amer >60 >60 mL/min/1.73m2    GFR MDRD Non Af Amer >60 >60 mL/min/1.73m2    Narrative    Fasting Glucose reference range is 70-99 mg/dL per  American Diabetes Association (ADA) guidelines.   Lipid Cascade   Result Value Ref Range    Cholesterol 147 <=199 mg/dL    Triglycerides 111 <=149 mg/dL    HDL Cholesterol 43 >=40 mg/dL    LDL Calculated 82 <=129 mg/dL    Patient Fasting > 8hrs? Yes    Thyroid Stimulating Hormone (TSH)   Result Value Ref Range    TSH 0.33 0.30 - 5.00 uIU/mL            Please call with questions or contact us using [employer].    Sincerely,        Electronically signed by Magdaleno Bennett MD

## 2021-06-25 NOTE — PROGRESS NOTES
[facility]  [address] [facility] [place] [address]  Dept: [phone]  Dept Fax: [phone]  Primary Provider: Magdaleno Russo MD  Pre-op Performing Provider: MAGDALENO RUSSO       PREOPERATIVE EVALUATION:  Today's date: 5/28/2021    Brett Hopkins is a 65 y.o. male who presents for a preoperative evaluation.    Surgical Information:  Surgery/Procedure: RT and LFT cataracts.  Surgery Location: [facility] [place]  Surgeon: Dr. Fowler  Surgery Date: 06/04/2021 and 06/18/2021  Time of Surgery: TBD  Where patient plans to recover: At home with family  Fax number for surgical facility: [phone]    Type of Anesthesia Anticipated: Local with MAC    Assessment & Plan      The proposed surgical procedure is considered LOW risk.    Preop examination  For bilateral cataract repair.    Cataract of both eyes  Both eyes affected    Essential hypertension  Currently well controlled.    PLAN:  1.  Patient is otherwise cleared for surgery he does not need any laboratory or other studies.  2.  Patient will be due for a physical later in the year.          Implanted Device:    Risks and Recommendations:  The patient has the following additional risks and recommendations for perioperative complications:   - No identified additional risk factors other than previously addressed    Medication Instructions:  Patient is to take all scheduled medications on the day of surgery EXCEPT for modifications listed below:    RECOMMENDATION:  APPROVAL GIVEN to proceed with proposed procedure, without further diagnostic evaluation.          Subjective     HPI related to upcoming procedure: Patient noticed double vision actually fairly acutely last December he had a work-up in the emergency room, which was essentially negative after evaluation it was determined that he had cataracts both right and left though the right is clearly worse, he has noticed some difficulty with seen and night vision otherwise  he is generally functioning well.    Patient has a history of hypertension currently very well controlled, he has a history of atrial fibrillation status post ablation he is also on sotalol he is in normal sinus rhythm.    Barbi patient has not noticed any change in his health.        No flowsheet data found.    Health Care Directive:  Patient does not have a Health Care Directive or Living Will: Advance Directive received and scanned. Click on Code in the patient header to view.    Preoperative Review of :    reviewed - no record of controlled substances prescribed.     See problem list for active medical problems.  Problems all longstanding and stable, except as noted/documented.  See ROS for pertinent symptoms related to these conditions.      Review of Systems  CONSTITUTIONAL: NEGATIVE for fever, chills, change in weight  INTEGUMENTARY/SKIN: NEGATIVE for worrisome rashes, moles or lesions  EYES: NEGATIVE for vision changes or irritation  ENT/MOUTH: NEGATIVE for ear, mouth and throat problems  RESP: NEGATIVE for significant cough or SOB  BREAST: NEGATIVE for masses, tenderness or discharge  CV: NEGATIVE for chest pain, palpitations or peripheral edema  GI: NEGATIVE for nausea, abdominal pain, heartburn, or change in bowel habits  : NEGATIVE for frequency, dysuria, or hematuria  MUSCULOSKELETAL: NEGATIVE for significant arthralgias or myalgia  NEURO: NEGATIVE for weakness, dizziness or paresthesias  ENDOCRINE: NEGATIVE for temperature intolerance, skin/hair changes  HEME: NEGATIVE for bleeding problems  PSYCHIATRIC: NEGATIVE for changes in mood or affect    Patient Active Problem List    Diagnosis Date Noted     Obesity (BMI 35.0-39.9) with comorbidity (H) 11/20/2020     Lymphedema 06/01/2020     Hypertension 04/26/2019     GERD (gastroesophageal reflux disease) 09/10/2018     AA (alopecia areata) 09/10/2018     Status post ablation of atrial fibrillation 07/28/2015     Varicose Veins      Persistent  atrial fibrillation      Aneurysm Of The Ascending Aorta      Hypothyroidism      Past Medical History:   Diagnosis Date     Status post ablation of atrial fibrillation 07/28/2015    PVI August 19, 2013 (cryo-PVI only)     Past Surgical History:   Procedure Laterality Date     CARDIOVERSION  07/2019    7/10/2019     CARDIOVERSION  09/12/2019     DENTAL SURGERY      Oral Surgery Tooth Extraction;  Implant     GA ABLATE HEART DYSRHYTHM FOCUS  08/19/2013    Description: Catheter Ablation Atrial Fibrillation;  Recorded: 11/16/2013;  Comments: PVI Aug 19, 2013 (Cryo to all 4 PV's)     GA TOTAL HIP ARTHROPLASTY Right 5/8/2019    Procedure: RIGHT TOTAL HIP ARTHROPLASTY DIRECT ANTERIOR APPROACH;  Surgeon: Mario Woodruff MD;  Location: [facility] OR;  Service: Orthopedics     GA TOTAL KNEE ARTHROPLASTY Right 6/21/2017    Procedure: 2)  RIGHT TOTAL KNEE ARTHROPLASTY;  Surgeon: Mario VICKERS MD;  Location: [facility] OR;  Service: Orthopedics     REVISION TOTAL HIP ARTHROPLASTY Right 05/2019     TOOTH EXTRACTION      implant     Current Outpatient Medications   Medication Sig Dispense Refill     ELIQUIS 5 mg Tab tablet TAKE 1 TABLET(5 MG) BY MOUTH TWICE DAILY 180 tablet 1     hydroCHLOROthiazide (HYDRODIURIL) 25 MG tablet Take 1 tablet each morning for high blood pressure. 90 tablet 0     levothyroxine (SYNTHROID, LEVOTHROID) 112 MCG tablet TAKE 1 TABLET(112 MCG) BY MOUTH DAILY 90 tablet 3     lisinopriL (PRINIVIL,ZESTRIL) 40 MG tablet Take 1 pill daily for high blood pressure. 90 tablet 3     omeprazole (PRILOSEC) 20 MG capsule Take 1 capsule (20 mg total) by mouth daily before breakfast. 90 capsule 3     sotaloL (BETAPACE) 80 MG tablet TAKE 1 TABLET(80 MG) BY MOUTH TWICE DAILY 180 tablet 1     No current facility-administered medications for this visit.        Allergies   Allergen Reactions     Other Environmental Allergy      seasonal     Sulfa (Sulfonamide Antibiotics) Rash     Rash - turned purple     Around age 32       Social History     Tobacco Use     Smoking status: Never Smoker     Smokeless tobacco: Never Used   Substance Use Topics     Alcohol use: Yes     Alcohol/week: 7.0 standard drinks     Types: 7 Cans of beer per week      Family History   Problem Relation Age of Onset     Atrial fibrillation Mother      Dementia Mother         dx 80s     Diabetes type II Mother         dx 60s/70s     Atrial fibrillation Father      Rheum arthritis Father      Atrial fibrillation Sister      Stroke Sister      Atrial fibrillation Brother      Rheum arthritis Brother      Diabetes type II Brother         dx 40s     Stroke Brother      Atrial fibrillation Brother      Parkinsonism Sister      Social History     Substance and Sexual Activity   Drug Use No        Objective     /72   Pulse 63   Wt (!) 248 lb 9.6 oz (112.8 kg)   SpO2 98%   BMI 35.17 kg/m    Physical Exam    GENERAL APPEARANCE: healthy, alert and no distress     EYES: EOMI,  PERRL     HENT: ear canals and TM's normal and nose and mouth without ulcers or lesions     NECK: no adenopathy, no asymmetry, masses, or scars and thyroid normal to palpation     RESP: lungs clear to auscultation - no rales, rhonchi or wheezes     CV: regular rates and rhythm, normal S1 S2, no S3 or S4 and no murmur, click or rub     ABDOMEN:  soft, nontender, no HSM or masses and bowel sounds normal     MS: extremities normal- no gross deformities noted, no evidence of inflammation in joints, FROM in all extremities.     SKIN: no suspicious lesions or rashes     NEURO: Normal strength and tone, sensory exam grossly normal, mentation intact and speech normal     PSYCH: mentation appears normal. and affect normal/bright     LYMPHATICS: No cervical adenopathy    Recent Labs   Lab Test 12/21/20  2232 11/20/20  0915 11/06/19  1611   HGB 14.8  --  15.5     --  219   INR 1.12*  --   --     142 142   K 4.4 4.9 4.8   CREATININE 1.16 0.92 0.94        PRE-OP  Diagnostics:   No labs were ordered during this visit.  No EKG required for low risk surgery (cataract, skin procedure, breast biopsy, etc).    REVISED CARDIAC RISK INDEX (RCRI)   The patient has the following serious cardiovascular risks for perioperative complications:   - No serious cardiac risks = 0 points    RCRI INTERPRETATION: 0 points: Class I (very low risk - 0.4% complication rate)         Signed Electronically by: Magdaleno Bennett MD    Copy of this evaluation report is provided to requesting physician.

## 2021-06-27 NOTE — PROGRESS NOTES
Progress Notes by Glenna Cardenas CNP at 6/25/2019  8:30 AM     Author: Glenna Cardenas CNP Service: -- Author Type: Nurse Practitioner    Filed: 6/25/2019 10:46 AM Encounter Date: 6/25/2019 Status: Signed    : Glenna Cardenas CNP (Nurse Practitioner)           Click to link to [facility] Heart Care     [facility] HEART [facility] ELECTROPHYSIOLOGY NOTE      Assessment/Recommendations   Assessment/Plan:    1.  Persistent atrial fibrillation: Recurrence of persistent atrial fibrillation since approximately March.  This is his first recurrence since PVI in August 2013.  Symptoms consist of fatigue and palpitations.  Holter monitor shows elevated ventricular rates, particularly with activity.  Increase metoprolol tartrate to 100 mg twice daily.  We reviewed the physiology natural progression of atrial fibrillation as well as treatment options.  As time goes on, he is more symptomatic, so recommend proceeding with a rhythm control strategy.  Proceed with cardioversion without prior initiation of membrane active antiarrhythmic drug therapy as this is his first recurrence in 6 years.    Reviewed cardioversion, risks, and expected recovery.  Given some information to review at home.  Instructed to decrease his metoprolol to 25 mg in the morning of cardioversion as he has a previous tendency for sinus bradycardia.  He states that his questions were answered to his satisfaction and he wishes to proceed.    He has a [id number] score of 1 for hypertension.  He is on Eliquis 5 mg twice daily for stroke prophylaxis.  He missed a dose on 6/12/2019, but has not missed any doses since that time.  He will be eligible for cardioversion after 7/4/2019.  Continue Eliquis for 1 month following cardioversion.    2.  Hypertension: Blood pressure at target today.  His recent blood pressure elevation may be due to atrial fibrillation and acute knee pain.  Increase metoprolol as above and continue lisinopril 5 mg twice daily.  Reevaluate once  he is back in sinus rhythm.    Follow up in 4 to 5 weeks.     History of Present Illness    Mr. Brett Hopkins is a very pleasant 63 y.o. male who comes in today for EP follow-up of atrial fibrillation.  He was diagnosed with paroxysmal atrial fibrillation in 2007.  He failed antiarrhythmic therapy with propafenone and flecainide.  He underwent pulmonary vein isolation ablation with Dr. Barfield on 8/19/2013 with cryo-to all 4 pulmonary veins.  Preprocedural MRI noted mild dilatation of the ascending aorta, measuring 4.1 cm which has been stable.  He was noted to be back in atrial fibrillation at a preoperative exam in April 2019 at which point he was started on metoprolol.  Review of FitBit data suggests that recurrence was in March.  In hindsight, he reports noting worsening fatigue and occasional palpitations since that time that he had attributed to increased stress.  Blood pressures have also been elevated necessitating up titration of metoprolol and starting low-dose lisinopril.  He was started on Eliquis 5 mg twice daily for stroke prophylaxis.  This was briefly held for hip replacement on 5/8/2019 and restarted postoperatively.    Champ states that he continues to be tired with intermittent episodes of a racing heartbeat.  He denies chest discomfort, abdominal fullness/bloating or peripheral edema, shortness of breath, paroxysmal nocturnal dyspnea, orthopnea, lightheadedness, dizziness, pre-syncope, or syncope.  He states that he missed a dose of Eliquis the evening of 6/12/2019, but has not missed any doses since that time.    Cardiographics (personally reviewed):  EKG, Done 4/23/2019 shows atrial fibrillation with rapid ventricular response at 190 bpm  EKG done 6/2/2017 shows sinus bradycardia 54 bpm, QT/QTc interval measures 410/388 ms    24-hour Holter monitor worn 6/17/2019 shows persistent atrial fibrillation with rapid ventricular response, particularly during the day.  Average ventricular rate of 106  bpm with a range of 61 to 171 bpm.  No significant bradycardia or pauses.  Occasional PVCs.    MR cardiac aorta done 4/17/2019:  1. Normal left ventricular size, wall thickness and slightly reduced global systolic function. The quantified left ventricular ejection fraction is 50%. No myocardial scar is identified.   2. Normal right ventricular size function.   3. Moderately enlarged left atrium.  4. Mild mitral valve regurgitation.  5. Tricuspid aortic valve with no stenosis or regurgitation per velocity encoded images.  6. Mildly dilated mid ascending aorta with maximal measurement 41 mm.   When compared to previous study in 2016, there is no interval change in mid ascending aortic size.       Physical Examination Review of Systems   Vitals:    06/25/19 0833   BP: 122/84   Pulse: 64   Resp: 16     Body mass index is 38.02 kg/m .  Wt Readings from Last 3 Encounters:   06/25/19 (!) 265 lb (120.2 kg)   06/03/19 (!) 270 lb 6.4 oz (122.7 kg)   05/13/19 (!) 268 lb 6.4 oz (121.7 kg)     General Appearance:   Alert, well-appearing and in no acute distress.   HEENT: Atraumatic, normocephalic.  No scleral icterus, normal conjunctivae, EOM intact, PERRL; mucous membranes pink and moist.     Chest: Chest symmetric, spine straight.   Lungs:   Respirations unlabored: Lungs are clear to auscultation.   Cardiovascular:   Normal first and second heart sounds with no murmurs, rubs, or gallops.  Irregularly irregular rate and rhythm.  Radial and posterior tibial pulses are intact, trace bilateral lower extremity edema.   Abdomen:  Soft, nontender, nondistended, bowel sounds present   Extremities: No cyanosis or clubbing   Musculoskeletal: Moves all extremities   Skin: Warm, dry, intact.    Neurologic: Mood and affect are appropriate, alert and oriented to person, place, time, and situation    General: WNL  Eyes: WNL  Ears/Nose/Throat: WNL  Lungs: WNL  Heart: WNL  Stomach: WNL  Bladder: WNL  Muscle/Joints: WNL  Skin: WNL  Nervous  System: WNL  Mental Health: WNL     Blood: WNL     Medical History  Surgical History Family History Social History   Past Medical History:   Diagnosis Date     Aortic aneurysm (H)      Benign essential hypertension 4/26/2019     Dx Apr 2019     Disease of thyroid gland      Hiatal hernia      Status post ablation of atrial fibrillation 07/28/2015    PVI August 19, 2013 (cryo-PVI only)    Past Surgical History:   Procedure Laterality Date     JOINT REPLACEMENT  2017    TKA     IN ABLATE HEART DYSRHYTHM FOCUS  08/19/2013    Description: Catheter Ablation Atrial Fibrillation;  Recorded: 11/16/2013;  Comments: PVI Aug 19, 2013 (Cryo to all 4 PV's)     IN DENTAL SURGERY PROCEDURE      Description: Oral Surgery Tooth Extraction;  Recorded: 08/12/2013;     IN TOTAL HIP ARTHROPLASTY Right 5/8/2019    Procedure: RIGHT TOTAL HIP ARTHROPLASTY DIRECT ANTERIOR APPROACH;  Surgeon: Mario Woodruff MD;  Location: [facility] OR;  Service: Orthopedics     IN TOTAL KNEE ARTHROPLASTY Right 6/21/2017    Procedure: 2)  RIGHT TOTAL KNEE ARTHROPLASTY;  Surgeon: Mario VICKERS MD;  Location: [facility] OR;  Service: Orthopedics     REVISION TOTAL HIP ARTHROPLASTY Right 05/2019    Family History   Problem Relation Age of Onset     Atrial fibrillation Mother      Dementia Mother         dx 80s     Diabetes type II Mother         dx 60s/70s     Atrial fibrillation Father      Rheum arthritis Father      Atrial fibrillation Sister      Stroke Sister      Atrial fibrillation Brother      Rheum arthritis Brother      Diabetes type II Brother         dx 40s     Stroke Brother      Atrial fibrillation Brother      Parkinsonism Sister     Social History     Socioeconomic History     Marital status:      Spouse name: Not on file     Number of children: 2     Years of education: Not on file     Highest education level: Not on file   Occupational History     Occupation: CPA   Social Needs     Financial resource strain: Not on file      Food insecurity:     Worry: Not on file     Inability: Not on file     Transportation needs:     Medical: Not on file     Non-medical: Not on file   Tobacco Use     Smoking status: Never Smoker     Smokeless tobacco: Never Used   Substance and Sexual Activity     Alcohol use: Yes     Alcohol/week: 4.2 oz     Types: 7 Cans of beer per week     Drug use: No     Sexual activity: Yes     Partners: Female   Lifestyle     Physical activity:     Days per week: Not on file     Minutes per session: Not on file     Stress: Not on file   Relationships     Social connections:     Talks on phone: Not on file     Gets together: Not on file     Attends [employer] service: Not on file     Active member of club or organization: Not on file     Attends meetings of clubs or organizations: Not on file     Relationship status: Not on file     Intimate partner violence:     Fear of current or ex partner: Not on file     Emotionally abused: Not on file     Physically abused: Not on file     Forced sexual activity: Not on file   Other Topics Concern     Not on file   Social History Narrative    Diet-regular        Exercise-personal emelia, walk          Medications  Allergies   Current Outpatient Medications   Medication Sig Dispense Refill     apixaban (ELIQUIS) 5 mg Tab tablet Take 1 tablet (5 mg total) by mouth 2 (two) times a day. 60 tablet 3     aspirin 81 MG EC tablet Take 1 tablet (81 mg total) by mouth daily.  0     levothyroxine (SYNTHROID, LEVOTHROID) 112 MCG tablet Take 1 tablet (112 mcg total) by mouth daily. Needs to be seen for more refills. 90 tablet 3     lisinopril (PRINIVIL) 10 MG tablet Take 0.5 tablets (5 mg total) by mouth daily. 30 tablet 3     metoprolol tartrate (LOPRESSOR) 50 MG tablet Take 2 tablets (100 mg total) by mouth 2 (two) times a day. 180 tablet 6     omeprazole (PRILOSEC) 20 MG capsule Take 1 capsule (20 mg total) by mouth daily. 90 capsule 3     No current facility-administered medications for this  visit.       Allergies   Allergen Reactions     Sulfa (Sulfonamide Antibiotics) Rash     Rash - turned purple    Around age 32      Medical, surgical, family, social history, and medications were all reviewed and updated as necessary.   Lab Results    Chemistry CBC Other   Lab Results   Component Value Date    CREATININE 1.07 04/23/2019    BUN 25 (H) 04/23/2019     04/23/2019    K 4.4 05/09/2019     04/23/2019    CO2 29 04/23/2019     Creatinine (mg/dL)   Date Value   04/23/2019 1.07   09/10/2018 0.98   06/22/2017 0.93   06/21/2017 1.07    Lab Results   Component Value Date    WBC 6.7 04/23/2019    HGB 13.6 (L) 05/09/2019    HCT 46.5 04/23/2019    MCV 93 04/23/2019     04/23/2019    Lab Results   Component Value Date    INR 1.04 06/22/2017    INR 1.03 06/02/2017     Lab Results   Component Value Date    TSH 3.39 04/23/2019            This note has been dictated using voice recognition software. Any grammatical, typographical, or context distortions are unintentional and inherent to the software.    Glenna Cardenas, [facility]   Electrophysiology

## 2021-06-27 NOTE — PROGRESS NOTES
Progress Notes by Glenna Cardenas CNP at 7/31/2019  8:30 AM     Author: Glenna Cardenas CNP Service: -- Author Type: Nurse Practitioner    Filed: 7/31/2019  9:15 AM Encounter Date: 7/31/2019 Status: Signed    : Glenna Cardenas CNP (Nurse Practitioner)           Click to link to [facility] HEART [facility] ELECTROPHYSIOLOGY NOTE      Assessment/Recommendations   Assessment/Plan:    1.  Persistent atrial fibrillation: Early recurrence of A. fib after cardioversion.  He remains in A. fib despite flecainide.  We again discussed that medications are not good at converting rhythms. We again reviewed the physiology and natural progression of atrial fibrillation as well as treatment options of rate control versus rhythm control depending upon the presence of symptoms.  As symptoms are vague, we are doing a trial of rate control.  Holter monitor showed that ventricular rates are still mildly elevated.  Discontinue flecainide and increase metoprolol tartrate to 75 mg twice daily.  He will monitor his heart rates for the next week and call if they are consistently >90 at rest.    Plan to reevaluate symptoms status after 1 month of optimal rate control.  At that point, we will decide whether to continue with rate control or switch to rhythm control with sotalol and repeat cardioversion.    He has a [id number] score of 1 for hypertension.  He also has 2 siblings who have had strokes due to A. fib.  Continue Eliquis 5 mg twice daily for stroke prophylaxis.  He denies any side effects, bleeding issues, or missed doses.    2.  Hypertension: Blood pressure at target today.  Check blood pressure daily.  He was instructed to call if systolic blood pressure is less than 100.  Lisinopril may need to be discontinued to allow for up titration of metoprolol.    Follow up in 1 month.     History of Present Illness    Mr. Brett Hopkins is a very pleasant 63 y.o. male who comes in today for EP follow-up of atrial  fibrillation.  He was diagnosed with paroxysmal atrial fibrillation in 2007.  He failed antiarrhythmic therapy with propafenone and flecainide.  He underwent pulmonary vein isolation ablation with Dr. Barfield on 8/19/2013 with cryo-to all 4 pulmonary veins.  Preprocedural MRI noted mild dilatation of the ascending aorta, measuring 4.1 cm which has been stable.  He was noted to be back in atrial fibrillation at a preoperative exam in April 2019 at which point he was started on metoprolol.  Review of FitBit data suggests that recurrence was in March.  In hindsight, he reports noting worsening fatigue and occasional palpitations since that time that he had attributed to increased stress.  Blood pressures were also elevated necessitating up titration of metoprolol and starting low-dose lisinopril.  He was started on Eliquis 5 mg twice daily for stroke prophylaxis.  This was briefly held for hip replacement on 5/8/2019 and restarted postoperatively.  He underwent cardioversion on 7/10/2019, but with early recurrence of atrial fibrillation.  He was started on flecainide 100 mg twice daily, but remained in atrial fibrillation.  He is currently undergoing a trial of rate control for symptom clarification.    Champ states that he feels okay.  He has slept better the last couple of nights, so has a bit more energy.  He has had occasional episodes of a racing heartbeat.  He denies chest discomfort, abdominal fullness/bloating or worsened peripheral edema, shortness of breath, paroxysmal nocturnal dyspnea, orthopnea, lightheadedness, dizziness, pre-syncope, or syncope.  He has a baseline of trace bilateral lower extremity edema.    Cardiographics (personally reviewed):  EKG done 7/15/2019 shows atrial fibrillation with controlled ventricular response at 92 bpm  EKG, Done 4/23/2019 shows atrial fibrillation with rapid ventricular response at 190 bpm  EKG done 6/2/2017 shows sinus bradycardia 54 bpm, QT/QTc interval measures 410/388  ms    24-hour Holter monitor worn 7/24/2019 shows persistent atrial fibrillation with mildly elevated ventricular response with an average ventricular rate of 99 bpm and a range of 63 to 148 bpm.  No significant bradycardia or pauses.    MR cardiac aorta done 4/17/2019:  1. Normal left ventricular size, wall thickness and slightly reduced global systolic function. The quantified left ventricular ejection fraction is 50%. No myocardial scar is identified.   2. Normal right ventricular size function.   3. Moderately enlarged left atrium.  4. Mild mitral valve regurgitation.  5. Tricuspid aortic valve with no stenosis or regurgitation per velocity encoded images.  6. Mildly dilated mid ascending aorta with maximal measurement 41 mm.   When compared to previous study in 2016, there is no interval change in mid ascending aortic size.       Physical Examination Review of Systems   Vitals:    07/31/19 0823   BP: 116/78   Pulse: 80   Resp: 16     Body mass index is 37.02 kg/m .  Wt Readings from Last 3 Encounters:   07/31/19 (!) 258 lb (117 kg)   07/23/19 (!) 262 lb (118.8 kg)   07/15/19 (!) 260 lb (117.9 kg)     General Appearance:   Alert, well-appearing and in no acute distress.   HEENT: Atraumatic, normocephalic.  No scleral icterus, normal conjunctivae, EOM intact, PERRL; mucous membranes pink and moist.     Chest: Chest symmetric, spine straight.   Lungs:   Respirations unlabored: Lungs are clear to auscultation.   Cardiovascular:   Normal first and second heart sounds with no murmurs, rubs, or gallops.  Irregularly irregular rate and rhythm.  Radial and posterior tibial pulses are intact, trace bilateral lower extremity edema.   Abdomen:  Soft, nontender, nondistended, bowel sounds present   Extremities: No cyanosis or clubbing   Musculoskeletal: Moves all extremities   Skin: Warm, dry, intact.    Neurologic: Mood and affect are appropriate, alert and oriented to person, place, time, and situation    General:  WNL  Eyes: WNL  Ears/Nose/Throat: WNL  Lungs: WNL  Heart: WNL  Stomach: WNL  Bladder: WNL  Muscle/Joints: WNL  Skin: WNL  Nervous System: WNL  Mental Health: WNL     Blood: WNL     Medical History  Surgical History Family History Social History   Past Medical History:   Diagnosis Date     Aortic aneurysm (H)      Benign essential hypertension 4/26/2019     Dx Apr 2019     Disease of thyroid gland      Hiatal hernia      Status post ablation of atrial fibrillation 07/28/2015    PVI August 19, 2013 (cryo-PVI only)    Past Surgical History:   Procedure Laterality Date     CARDIOVERSION      7/10/2019     JOINT REPLACEMENT  2017    TKA     ND ABLATE HEART DYSRHYTHM FOCUS  08/19/2013    Description: Catheter Ablation Atrial Fibrillation;  Recorded: 11/16/2013;  Comments: PVI Aug 19, 2013 (Cryo to all 4 PV's)     ND DENTAL SURGERY PROCEDURE      Description: Oral Surgery Tooth Extraction;  Recorded: 08/12/2013;     ND TOTAL HIP ARTHROPLASTY Right 5/8/2019    Procedure: RIGHT TOTAL HIP ARTHROPLASTY DIRECT ANTERIOR APPROACH;  Surgeon: Mario Woodruff MD;  Location: [facility] Main OR;  Service: Orthopedics     ND TOTAL KNEE ARTHROPLASTY Right 6/21/2017    Procedure: 2)  RIGHT TOTAL KNEE ARTHROPLASTY;  Surgeon: Mario VICKERS MD;  Location: [facility] Main OR;  Service: Orthopedics     REVISION TOTAL HIP ARTHROPLASTY Right 05/2019    Family History   Problem Relation Age of Onset     Atrial fibrillation Mother      Dementia Mother         dx 80s     Diabetes type II Mother         dx 60s/70s     Atrial fibrillation Father      Rheum arthritis Father      Atrial fibrillation Sister      Stroke Sister      Atrial fibrillation Brother      Rheum arthritis Brother      Diabetes type II Brother         dx 40s     Stroke Brother      Atrial fibrillation Brother      Parkinsonism Sister     Social History     Socioeconomic History     Marital status:      Spouse name: Not on file     Number of children: 2     Years of  education: Not on file     Highest education level: Not on file   Occupational History     Occupation: CPA   Social Needs     Financial resource strain: Not on file     Food insecurity:     Worry: Not on file     Inability: Not on file     Transportation needs:     Medical: Not on file     Non-medical: Not on file   Tobacco Use     Smoking status: Never Smoker     Smokeless tobacco: Never Used   Substance and Sexual Activity     Alcohol use: Yes     Alcohol/week: 4.2 oz     Types: 7 Cans of beer per week     Drug use: No     Sexual activity: Yes     Partners: Female   Lifestyle     Physical activity:     Days per week: Not on file     Minutes per session: Not on file     Stress: Not on file   Relationships     Social connections:     Talks on phone: Not on file     Gets together: Not on file     Attends Confucianism service: Not on file     Active member of club or organization: Not on file     Attends meetings of clubs or organizations: Not on file     Relationship status: Not on file     Intimate partner violence:     Fear of current or ex partner: Not on file     Emotionally abused: Not on file     Physically abused: Not on file     Forced sexual activity: Not on file   Other Topics Concern     Not on file   Social History Narrative    Diet-regular        Exercise-personal emelia, walk          Medications  Allergies   Current Outpatient Medications   Medication Sig Dispense Refill     apixaban (ELIQUIS) 5 mg Tab tablet Take 1 tablet (5 mg total) by mouth 2 (two) times a day. 60 tablet 3     levothyroxine (SYNTHROID, LEVOTHROID) 112 MCG tablet Take 1 tablet (112 mcg total) by mouth daily. Needs to be seen for more refills. 90 tablet 3     lisinopril (PRINIVIL) 10 MG tablet Take 0.5 tablets (5 mg total) by mouth daily. 30 tablet 3     metoprolol tartrate (LOPRESSOR) 50 MG tablet Take 1.5 tablets (75 mg total) by mouth 2 (two) times a day. 90 tablet 3     omeprazole (PRILOSEC) 20 MG capsule Take 1 capsule (20 mg  total) by mouth daily. 90 capsule 3     No current facility-administered medications for this visit.       Allergies   Allergen Reactions     Sulfa (Sulfonamide Antibiotics) Rash     Rash - turned purple    Around age 32      Medical, surgical, family, social history, and medications were all reviewed and updated as necessary.   Lab Results    Chemistry CBC Other   Lab Results   Component Value Date    CREATININE 1.07 04/23/2019    BUN 25 (H) 04/23/2019     04/23/2019    K 4.4 05/09/2019     04/23/2019    CO2 29 04/23/2019     Creatinine (mg/dL)   Date Value   04/23/2019 1.07   09/10/2018 0.98   06/22/2017 0.93   06/21/2017 1.07    Lab Results   Component Value Date    WBC 6.7 04/23/2019    HGB 13.6 (L) 05/09/2019    HCT 46.5 04/23/2019    MCV 93 04/23/2019     04/23/2019    Lab Results   Component Value Date    INR 1.04 06/22/2017    INR 1.03 06/02/2017     Lab Results   Component Value Date    TSH 3.39 04/23/2019            This note has been dictated using voice recognition software. Any grammatical, typographical, or context distortions are unintentional and inherent to the software.    Glenna Cardenas, [facility]   Electrophysiology

## 2021-06-27 NOTE — PROGRESS NOTES
Progress Notes by Glenna Cardenas CNP at 8/26/2019  9:50 AM     Author: Glenna Cardenas CNP Service: -- Author Type: Nurse Practitioner    Filed: 8/26/2019 10:50 AM Encounter Date: 8/26/2019 Status: Signed    : Glenna Cardenas CNP (Nurse Practitioner)           Click to link to [facility] Care     [facility] HEART [facility] ELECTROPHYSIOLOGY NOTE      Assessment/Recommendations   Assessment/Plan:    1.  Persistent atrial fibrillation: Ventricular rates appear fairly well controlled based on FitBit data with average heart rates in the 80s and 90s, minimally above 110 bpm.  No bradycardia noted.  We again reviewed the physiology and natural progression of atrial fibrillation as well as treatment options of rate control versus rhythm control depending upon the presence of symptoms.  He continues to have intermittent vague symptoms and is really having difficulty with the decision for rate control versus rhythm control as he feels fine right now, but other days is aware of the A. fib and wants to get rid of it.  Recommend switching to rhythm control with sotalol and repeating cardioversion for symptom clarification.  2 days prior to cardioversion, discontinue metoprolol and start sotalol 80 mg twice daily.    We reviewed the cardioversion procedure, risks, preprocedure instructions, and follow-up.  He states that his questions were answered to his satisfaction and he wishes to proceed.  He is going on a big fishing trip 9/6/2019 2 9/8/2019, so may want to wait until after he returns to do his cardioversion.    He has a [id number] score of 1 for hypertension.  He also has 2 siblings who have had strokes due to A. fib.  Continue Eliquis 5 mg twice daily for stroke prophylaxis.  He denies any side effects, bleeding issues, or missed doses.  He missed a dose of Eliquis on 8/13/2019, therefore he will be eligible for cardioversion after 9/3/2019.    2.  Hypertension: Blood pressure at target today.  Continue lisinopril  "5 mg daily.  Will reevaluate blood pressures and need for lisinopril after cardioversion.    Follow up 3 to 4 weeks after cardioversion.     History of Present Illness    Mr. Brett Hopkins is a very pleasant 63 y.o. male who comes in today for EP follow-up of atrial fibrillation.  He was diagnosed with paroxysmal atrial fibrillation in 2007.  He failed antiarrhythmic therapy with propafenone and flecainide.  He underwent pulmonary vein isolation ablation with Dr. Barfield on 8/19/2013 with cryo-to all 4 pulmonary veins.  Preprocedural MRI noted mild dilatation of the ascending aorta, measuring 4.1 cm which has been stable.  He was noted to be back in atrial fibrillation at a preoperative exam in April 2019 at which point he was started on metoprolol.  Review of FitBit data suggests that recurrence was in March.  In hindsight, he reports noting worsening fatigue and occasional palpitations since that time that he had attributed to increased stress.  Blood pressures were also elevated necessitating up titration of metoprolol and starting low-dose lisinopril.  He was started on Eliquis 5 mg twice daily for stroke prophylaxis.  This was briefly held for hip replacement on 5/8/2019 and restarted postoperatively.  He underwent cardioversion on 7/10/2019, but with early recurrence of atrial fibrillation.  He remained in atrial fibrillation despite being started on flecainide.  His symptoms are vague, so he is undergoing a trial of rate control for symptom clarification; flecainide was discontinued and metoprolol tartrate was increased to 75 mg twice daily.    Champ states that he feels fine.  He has had occasional episodes of a pounding heartbeat associated with chest discomfort.  He also finds it emotionally disturbing that his heart is \"not working right\".   He denies exertional chest discomfort, abdominal fullness/bloating or worsened peripheral edema, shortness of breath, paroxysmal nocturnal dyspnea, orthopnea, " lightheadedness, dizziness, pre-syncope, or syncope.  He has a baseline of trace bilateral lower extremity edema.    Cardiographics (personally reviewed):  EKG done 7/15/2019 shows atrial fibrillation with controlled ventricular response at 92 bpm  EKG, Done 4/23/2019 shows atrial fibrillation with rapid ventricular response at 190 bpm  EKG done 6/2/2017 shows sinus bradycardia 54 bpm, QT/QTc interval measures 410/388 ms    24-hour Holter monitor worn 7/24/2019 shows persistent atrial fibrillation with mildly elevated ventricular response with an average ventricular rate of 99 bpm and a range of 63 to 148 bpm.  No significant bradycardia or pauses.    MR cardiac aorta done 4/17/2019:  1. Normal left ventricular size, wall thickness and slightly reduced global systolic function. The quantified left ventricular ejection fraction is 50%. No myocardial scar is identified.   2. Normal right ventricular size function.   3. Moderately enlarged left atrium.  4. Mild mitral valve regurgitation.  5. Tricuspid aortic valve with no stenosis or regurgitation per velocity encoded images.  6. Mildly dilated mid ascending aorta with maximal measurement 41 mm.   When compared to previous study in 2016, there is no interval change in mid ascending aortic size.       Physical Examination Review of Systems   Vitals:    08/26/19 1006   BP: 128/86   Pulse: 84   Resp: 20     Body mass index is 36.26 kg/m .  Wt Readings from Last 3 Encounters:   08/26/19 (!) 260 lb (117.9 kg)   07/31/19 (!) 258 lb (117 kg)   07/23/19 (!) 262 lb (118.8 kg)     General Appearance:   Alert, well-appearing and in no acute distress.   HEENT: Atraumatic, normocephalic.  No scleral icterus, normal conjunctivae, EOM intact, PERRL; mucous membranes pink and moist.     Chest: Chest symmetric, spine straight.   Lungs:   Respirations unlabored: Lungs are clear to auscultation.   Cardiovascular:   Normal first and second heart sounds with no murmurs, rubs, or gallops.   Irregularly irregular rate and rhythm.  Radial and posterior tibial pulses are intact, trace bilateral lower extremity edema.   Abdomen:  Soft, nontender, nondistended, bowel sounds present   Extremities: No cyanosis or clubbing   Musculoskeletal: Moves all extremities   Skin: Warm, dry, intact.    Neurologic: Mood and affect are appropriate, alert and oriented to person, place, time, and situation    General: WNL  Eyes: WNL  Ears/Nose/Throat: WNL  Lungs: WNL  Heart: WNL  Stomach: WNL  Bladder: WNL  Muscle/Joints: WNL  Skin: WNL  Nervous System: WNL  Mental Health: WNL     Blood: WNL     Medical History  Surgical History Family History Social History   Past Medical History:   Diagnosis Date     Aortic aneurysm (H)      Benign essential hypertension 4/26/2019     Dx Apr 2019     Disease of thyroid gland      Hiatal hernia      Status post ablation of atrial fibrillation 07/28/2015    PVI August 19, 2013 (cryo-PVI only)    Past Surgical History:   Procedure Laterality Date     CARDIOVERSION      7/10/2019     JOINT REPLACEMENT  2017    TKA     DC ABLATE HEART DYSRHYTHM FOCUS  08/19/2013    Description: Catheter Ablation Atrial Fibrillation;  Recorded: 11/16/2013;  Comments: PVI Aug 19, 2013 (Cryo to all 4 PV's)     DC DENTAL SURGERY PROCEDURE      Description: Oral Surgery Tooth Extraction;  Recorded: 08/12/2013;     DC TOTAL HIP ARTHROPLASTY Right 5/8/2019    Procedure: RIGHT TOTAL HIP ARTHROPLASTY DIRECT ANTERIOR APPROACH;  Surgeon: Mario Woodruff MD;  Location: [facility] OR;  Service: Orthopedics     DC TOTAL KNEE ARTHROPLASTY Right 6/21/2017    Procedure: 2)  RIGHT TOTAL KNEE ARTHROPLASTY;  Surgeon: Mario VICKERS MD;  Location: [facility];  Service: Orthopedics     REVISION TOTAL HIP ARTHROPLASTY Right 05/2019    Family History   Problem Relation Age of Onset     Atrial fibrillation Mother      Dementia Mother         dx 80s     Diabetes type II Mother         dx 60s/70s     Atrial fibrillation  Father      Rheum arthritis Father      Atrial fibrillation Sister      Stroke Sister      Atrial fibrillation Brother      Rheum arthritis Brother      Diabetes type II Brother         dx 40s     Stroke Brother      Atrial fibrillation Brother      Parkinsonism Sister     Social History     Tobacco Use     Smoking status: Never Smoker     Smokeless tobacco: Never Used   Substance Use Topics     Alcohol use: Yes     Alcohol/week: 4.2 oz     Types: 7 Cans of beer per week     Drug use: No          Medications  Allergies   Current Outpatient Medications   Medication Sig Dispense Refill     ELIQUIS 5 mg Tab tablet TAKE 1 TABLET(5 MG) BY MOUTH TWICE DAILY 180 tablet 2     levothyroxine (SYNTHROID, LEVOTHROID) 112 MCG tablet Take 1 tablet (112 mcg total) by mouth daily. Needs to be seen for more refills. 90 tablet 3     lisinopril (PRINIVIL) 10 MG tablet Take 0.5 tablets (5 mg total) by mouth daily. 30 tablet 3     metoprolol tartrate (LOPRESSOR) 50 MG tablet Take 1.5 tablets (75 mg total) by mouth 2 (two) times a day. 90 tablet 3     omeprazole (PRILOSEC) 20 MG capsule Take 1 capsule (20 mg total) by mouth daily. 90 capsule 3     sotalol (BETAPACE) 80 MG tablet Take 1 tablet (80 mg total) by mouth 2 (two) times a day. 60 tablet 11     No current facility-administered medications for this visit.       Allergies   Allergen Reactions     Sulfa (Sulfonamide Antibiotics) Rash     Rash - turned purple    Around age 32      Medical, surgical, family, social history, and medications were all reviewed and updated as necessary.   Lab Results    Chemistry CBC Other   Lab Results   Component Value Date    CREATININE 1.07 04/23/2019    BUN 25 (H) 04/23/2019     04/23/2019    K 4.4 05/09/2019     04/23/2019    CO2 29 04/23/2019     Creatinine (mg/dL)   Date Value   04/23/2019 1.07   09/10/2018 0.98   06/22/2017 0.93   06/21/2017 1.07    Lab Results   Component Value Date    WBC 6.7 04/23/2019    HGB 13.6 (L) 05/09/2019     HCT 46.5 04/23/2019    MCV 93 04/23/2019     04/23/2019    Lab Results   Component Value Date    INR 1.04 06/22/2017    INR 1.03 06/02/2017     Lab Results   Component Value Date    TSH 3.39 04/23/2019            This note has been dictated using voice recognition software. Any grammatical, typographical, or context distortions are unintentional and inherent to the software.    Glenna Cardenas, [facility]   Electrophysiology

## 2021-06-28 NOTE — PROGRESS NOTES
"Progress Notes by Glenna Cardenas CNP at 9/30/2019 10:30 AM     Author: Glenna Cardenas CNP Service: -- Author Type: Nurse Practitioner    Filed: 9/30/2019 11:33 AM Encounter Date: 9/30/2019 Status: Signed    : Glenna Cardenas CNP (Nurse Practitioner)           Click to link to [facility]     [facility] HEART [facility] ELECTROPHYSIOLOGY NOTE      Assessment/Recommendations   Assessment/Plan:    1.  Persistent atrial fibrillation: He reports feeling better \"at times\" since cardioversion, though continues to have very brief episodes of the sensation that his heart is racing.  No sustained heart rates noted on his Fitbit.  We again reviewed the physiology and natural progression of atrial fibrillation as well as treatment options of rate control versus rhythm control depending upon the presence of symptoms.  We also discussed the importance of avoiding possible triggers, in particular dehydration.  Symptom status continues to be vague.  Continue sotalol 80 mg twice daily.    He has a [id number] score of 1 for hypertension.  He also has 2 siblings who have had strokes due to A. fib.  We had a lengthy discussion regarding the risk for stroke with A. fib versus the risk for bleeding on a blood thinner, particularly as he has need for NSAID use.  We discussed the increased risk for bleed with Eliquis and NSAIDs, though meloxicam and Celebrex have a lower risk for GI bleed than other NSAIDs.  He was advised to use with caution.  Continue Eliquis 5 mg twice daily for stroke prophylaxis.  He denies any side effects, bleeding issues, or missed doses.      2.  Hypertension: Blood pressure at target today.  Continue lisinopril 5 mg daily with sotalol as above.      Follow up in 3 months.     History of Present Illness    Mr. Brett Hopkins is a very pleasant 63 y.o. male who comes in today for EP follow-up of atrial fibrillation.  He was diagnosed with paroxysmal atrial fibrillation in 2007.  He failed " antiarrhythmic therapy with propafenone and flecainide.  He underwent pulmonary vein isolation ablation with Dr. Barfield on 8/19/2013 with cryo-to all 4 pulmonary veins.  Preprocedural MRI noted mild dilatation of the ascending aorta, measuring 4.1 cm which has been stable.  He was noted to be back in atrial fibrillation at a preoperative exam in April 2019 at which point he was started on metoprolol.  Review of FitBit data suggests that recurrence was in March.  In hindsight, he reports noting worsening fatigue and occasional palpitations since that time that he had attributed to increased stress.  Blood pressures were also elevated necessitating up titration of metoprolol and starting low-dose lisinopril.  He was started on Eliquis 5 mg twice daily for stroke prophylaxis.  This was briefly held for hip replacement on 5/8/2019 and restarted postoperatively.  He underwent cardioversion on 7/10/2019, but with early recurrence of atrial fibrillation.  He remained in atrial fibrillation despite being started on flecainide.  His symptoms are vague; we did a trial of rate control during which he felt that he was at least intermittently symptomatic with pounding heartbeat associated with chest discomfort.  He was started on sotalol and underwent cardioversion on 9/12/2019.    Champ states that he thinks he feels better since his cardioversion, but continues to have occasional brief symptoms of his heart racing, though no sustained arrhythmia.   He denies exertional chest discomfort, sustained palpitations, abdominal fullness/bloating or worsened peripheral edema, shortness of breath, paroxysmal nocturnal dyspnea, orthopnea, lightheadedness, dizziness, pre-syncope, or syncope.  He has a baseline of trace bilateral lower extremity edema.  His most limiting factor at this time is knee pain.    Cardiographics (personally reviewed):  EKG done 9/12/2019, post cardioversion, shows sinus rhythm at 73 bpm, QT/QTc interval measures  436/480 ms  EKG done 7/15/2019 shows atrial fibrillation with controlled ventricular response at 92 bpm  EKG, Done 4/23/2019 shows atrial fibrillation with rapid ventricular response at 190 bpm  EKG done 6/2/2017 shows sinus bradycardia 54 bpm, QT/QTc interval measures 410/388 ms    24-hour Holter monitor worn 7/24/2019 shows persistent atrial fibrillation with mildly elevated ventricular response with an average ventricular rate of 99 bpm and a range of 63 to 148 bpm.  No significant bradycardia or pauses.    MR cardiac aorta done 4/17/2019:  1. Normal left ventricular size, wall thickness and slightly reduced global systolic function. The quantified left ventricular ejection fraction is 50%. No myocardial scar is identified.   2. Normal right ventricular size function.   3. Moderately enlarged left atrium.  4. Mild mitral valve regurgitation.  5. Tricuspid aortic valve with no stenosis or regurgitation per velocity encoded images.  6. Mildly dilated mid ascending aorta with maximal measurement 41 mm.   When compared to previous study in 2016, there is no interval change in mid ascending aortic size.       Physical Examination Review of Systems   Vitals:    09/30/19 1037   BP: 138/82   Pulse: 64   Resp: 16     Body mass index is 36.1 kg/m .  Wt Readings from Last 3 Encounters:   09/30/19 (!) 258 lb 11.2 oz (117.3 kg)   09/12/19 (!) 266 lb (120.7 kg)   08/26/19 (!) 260 lb (117.9 kg)     General Appearance:   Alert, well-appearing and in no acute distress.   HEENT: Atraumatic, normocephalic.  No scleral icterus, normal conjunctivae, EOM intact, PERRL; mucous membranes pink and moist.     Chest: Chest symmetric, spine straight.   Lungs:   Respirations unlabored: Lungs are clear to auscultation.   Cardiovascular:   Normal first and second heart sounds with no murmurs, rubs, or gallops.  Regular rate and rhythm.  Radial and posterior tibial pulses are intact, trace bilateral lower extremity edema.   Abdomen:  Soft,  nontender, nondistended, bowel sounds present   Extremities: No cyanosis or clubbing   Musculoskeletal: Moves all extremities   Skin: Warm, dry, intact.    Neurologic: Mood and affect are appropriate, alert and oriented to person, place, time, and situation    General: WNL  Eyes: WNL  Ears/Nose/Throat: WNL  Lungs: WNL  Heart: WNL  Stomach: WNL  Bladder: WNL  Muscle/Joints: WNL  Skin: WNL  Nervous System: WNL  Mental Health: WNL     Blood: WNL     Medical History  Surgical History Family History Social History   Past Medical History:   Diagnosis Date     Aortic aneurysm (H)      Atrial fibrillation (H)      Benign essential hypertension 4/26/2019     Dx Apr 2019     Disease of thyroid gland      Hiatal hernia      Status post ablation of atrial fibrillation 07/28/2015    PVI August 19, 2013 (cryo-PVI only)    Past Surgical History:   Procedure Laterality Date     CARDIOVERSION      7/10/2019     CARDIOVERSION  09/12/2019     JOINT REPLACEMENT  2017    TKA     IA ABLATE HEART DYSRHYTHM FOCUS  08/19/2013    Description: Catheter Ablation Atrial Fibrillation;  Recorded: 11/16/2013;  Comments: PVI Aug 19, 2013 (Cryo to all 4 PV's)     IA DENTAL SURGERY PROCEDURE      Description: Oral Surgery Tooth Extraction;  Recorded: 08/12/2013;     IA TOTAL HIP ARTHROPLASTY Right 5/8/2019    Procedure: RIGHT TOTAL HIP ARTHROPLASTY DIRECT ANTERIOR APPROACH;  Surgeon: Mario Woodruff MD;  Location: [facility] OR;  Service: Orthopedics     IA TOTAL KNEE ARTHROPLASTY Right 6/21/2017    Procedure: 2)  RIGHT TOTAL KNEE ARTHROPLASTY;  Surgeon: Mario VICKERS MD;  Location: [facility] OR;  Service: Orthopedics     REVISION TOTAL HIP ARTHROPLASTY Right 05/2019     TOOTH EXTRACTION      implant    Family History   Problem Relation Age of Onset     Atrial fibrillation Mother      Dementia Mother         dx 80s     Diabetes type II Mother         dx 60s/70s     Atrial fibrillation Father      Rheum arthritis Father      Atrial  fibrillation Sister      Stroke Sister      Atrial fibrillation Brother      Rheum arthritis Brother      Diabetes type II Brother         dx 40s     Stroke Brother      Atrial fibrillation Brother      Parkinsonism Sister     Social History     Tobacco Use     Smoking status: Never Smoker     Smokeless tobacco: Never Used   Substance Use Topics     Alcohol use: Yes     Alcohol/week: 7.0 standard drinks     Types: 7 Cans of beer per week     Drug use: No          Medications  Allergies   Current Outpatient Medications   Medication Sig Dispense Refill     ELIQUIS 5 mg Tab tablet TAKE 1 TABLET(5 MG) BY MOUTH TWICE DAILY 180 tablet 2     levothyroxine (SYNTHROID, LEVOTHROID) 112 MCG tablet Take 1 tablet (112 mcg total) by mouth daily. Needs to be seen for more refills. 90 tablet 3     lisinopril (PRINIVIL) 10 MG tablet Take 0.5 tablets (5 mg total) by mouth daily. 30 tablet 3     omeprazole (PRILOSEC) 20 MG capsule Take 1 capsule (20 mg total) by mouth daily. 90 capsule 3     sotalol (BETAPACE) 80 MG tablet Take 1 tablet (80 mg total) by mouth 2 (two) times a day. 60 tablet 11     No current facility-administered medications for this visit.       Allergies   Allergen Reactions     Sulfa (Sulfonamide Antibiotics) Rash     Rash - turned purple    Around age 32      Medical, surgical, family, social history, and medications were all reviewed and updated as necessary.   Lab Results    Chemistry CBC Other   Lab Results   Component Value Date    CREATININE 1.07 04/23/2019    BUN 25 (H) 04/23/2019     04/23/2019    K 4.4 05/09/2019     04/23/2019    CO2 29 04/23/2019     Creatinine (mg/dL)   Date Value   04/23/2019 1.07   09/10/2018 0.98   06/22/2017 0.93   06/21/2017 1.07    Lab Results   Component Value Date    WBC 6.3 09/10/2019    HGB 14.3 09/10/2019    HCT 44.3 09/10/2019    MCV 91 09/10/2019     09/10/2019    Lab Results   Component Value Date    INR 1.04 06/22/2017    INR 1.03 06/02/2017     Lab  Results   Component Value Date    TSH 3.39 04/23/2019            This note has been dictated using voice recognition software. Any grammatical, typographical, or context distortions are unintentional and inherent to the software.    Glenna Cardenas, [facility]   Electrophysiology

## 2021-06-28 NOTE — PROGRESS NOTES
Progress Notes by Glenna Cardenas CNP at 12/30/2019  8:30 AM     Author: Glenna Cardenas CNP Service: -- Author Type: Nurse Practitioner    Filed: 12/30/2019  8:57 AM Encounter Date: 12/30/2019 Status: Signed    : Glenna Cardenas CNP (Nurse Practitioner)             Assessment/Recommendations   Assessment/Plan:    1.  Persistent atrial fibrillation: No symptomatology or evidence of recurrence of A. fib.  No inappropriately elevated heart rates noted on his Fitbit.  We again reviewed the physiology and natural progression of atrial fibrillation as well as treatment options of rate control versus rhythm control depending upon the presence of symptoms.  We also discussed the importance of avoiding possible triggers, in particular dehydration.  Symptom status continues to be vague.  Continue sotalol 80 mg twice daily.     He has a [id number] score of 1 for hypertension.  He has 2 siblings who have had strokes due to A. fib, so wishes to remain on anticoagulation.  Since his knee surgery, he is no longer using NSAIDs.  Continue Eliquis 5 mg twice daily for stroke prophylaxis.  He denies any side effects, bleeding issues, or missed doses.       2.  Hypertension: Blood pressure at target today.  Continue lisinopril 5 mg daily with sotalol as above.       Follow up in 6 months.     History of Present Illness/Subjective    Mr. Brett Hopkins is a 64 y.o. male who comes in today for EP follow-up of atrial fibrillation.  He was diagnosed with paroxysmal atrial fibrillation in 2007.  He failed antiarrhythmic therapy with propafenone and flecainide.  He underwent pulmonary vein isolation ablation with Dr. Barfield on 8/19/2013 with cryo-to all 4 pulmonary veins.  Preprocedural MRI noted mild dilatation of the ascending aorta, measuring 4.1 cm which has been stable.  He was noted to be back in atrial fibrillation at a preoperative exam in April 2019 at which point he was started on metoprolol.  Review of FitBit data suggests  "that recurrence was in March.  In hindsight, he reports noting worsening fatigue and occasional palpitations since that time that he had attributed to increased stress.  Blood pressures were also elevated necessitating up titration of metoprolol and starting low-dose lisinopril.  He was started on Eliquis 5 mg twice daily for stroke prophylaxis.  This was briefly held for hip replacement on 5/8/2019 and restarted postoperatively.  He underwent cardioversion on 7/10/2019, but with early recurrence of atrial fibrillation.  He remained in atrial fibrillation despite being started on flecainide.  His symptoms are vague; we did a trial of rate control during which he felt that he was at least intermittently symptomatic with pounding heartbeat associated with chest discomfort.  He was started on sotalol and underwent cardioversion on 9/12/2019 after which he reported some symptomatic improvement.  He underwent right total knee revision on 11/25/2019.     Champ states that he has been feeling well.  He states his recovery from surgery is \"on schedule\".  He has not had any symptomatic episodes of A. fib and his Fitbit consistently reports resting heart rates in the 60s without unexplained heart rate elevations.   He denies exertional chest discomfort, sustained palpitations, abdominal fullness/bloating or worsened peripheral edema, shortness of breath, paroxysmal nocturnal dyspnea, orthopnea, lightheadedness, dizziness, pre-syncope, or syncope.      Cardiographics (EKGs personally reviewed):  EKG done 9/12/2019, post cardioversion, shows sinus rhythm at 73 bpm, QT/QTc interval measures 436/480 ms  EKG done 7/15/2019 shows atrial fibrillation with controlled ventricular response at 92 bpm  EKG, Done 4/23/2019 shows atrial fibrillation with rapid ventricular response at 190 bpm  EKG done 6/2/2017 shows sinus bradycardia 54 bpm, QT/QTc interval measures 410/388 ms     24-hour Holter monitor worn 7/24/2019 shows persistent atrial " fibrillation with mildly elevated ventricular response with an average ventricular rate of 99 bpm and a range of 63 to 148 bpm.  No significant bradycardia or pauses.     MR cardiac aorta done 4/17/2019:  1. Normal left ventricular size, wall thickness and slightly reduced global systolic function. The quantified left ventricular ejection fraction is 50%. No myocardial scar is identified.   2. Normal right ventricular size function.   3. Moderately enlarged left atrium.  4. Mild mitral valve regurgitation.  5. Tricuspid aortic valve with no stenosis or regurgitation per velocity encoded images.  6. Mildly dilated mid ascending aorta with maximal measurement 41 mm.   When compared to previous study in 2016, there is no interval change in mid ascending aortic size.       Physical Examination Review of Systems   Vitals:    12/30/19 0830   BP: 138/88   Pulse: 68   Resp: 16     Body mass index is 36.12 kg/m .  Wt Readings from Last 3 Encounters:   12/30/19 (!) 259 lb (117.5 kg)   11/06/19 (!) 263 lb 12.8 oz (119.7 kg)   09/30/19 (!) 258 lb 11.2 oz (117.3 kg)       General Appearance:   Alert, well-appearing and in no acute distress.   HEENT: Atraumatic, normocephalic.  No scleral icterus, normal conjunctivae, EOMs intact, PERRL.  Mucous membranes pink and moist.    Chest/Lungs:   Chest symmetric, spine straight.  Respirations unlabored.  Lungs are clear to auscultation.   Cardiovascular:   Regular rate and rhythm.  Normal first and second heart sounds with no murmurs, rubs, or gallops; radial and posterior tibial pulses are intact, No edema.   Abdomen:  Soft, nontender, nondistended, bowel sounds present.   Extremities: No cyanosis or clubbing.   Musculoskeletal: Moves all extremities.  Ambulating with cane due to knee surgery.   Skin: Warm, dry, intact.    Neurologic: Mood and affect are appropriate.  Alert and oriented to person, place, time, and situation.    General: WNL  Eyes: WNL  Ears/Nose/Throat: WNL  Lungs:  WNL  Heart: WNL  Stomach: WNL  Bladder: WNL  Muscle/Joints: WNL  Skin: WNL  Nervous System: WNL  Mental Health: WNL     Blood: WNL       Medical History  Surgical History Family History Social History   Past Medical History:   Diagnosis Date     Status post ablation of atrial fibrillation 07/28/2015    PVI August 19, 2013 (cryo-PVI only)    Past Surgical History:   Procedure Laterality Date     CARDIOVERSION  07/2019    7/10/2019     CARDIOVERSION  09/12/2019     DENTAL SURGERY      Oral Surgery Tooth Extraction;  Implant     MD ABLATE HEART DYSRHYTHM FOCUS  08/19/2013    Description: Catheter Ablation Atrial Fibrillation;  Recorded: 11/16/2013;  Comments: PVI Aug 19, 2013 (Cryo to all 4 PV's)     MD TOTAL HIP ARTHROPLASTY Right 5/8/2019    Procedure: RIGHT TOTAL HIP ARTHROPLASTY DIRECT ANTERIOR APPROACH;  Surgeon: Mario Woodruff MD;  Location: [facility] Main OR;  Service: Orthopedics     MD TOTAL KNEE ARTHROPLASTY Right 6/21/2017    Procedure: 2)  RIGHT TOTAL KNEE ARTHROPLASTY;  Surgeon: Mario VICKERS MD;  Location: [facility] Main OR;  Service: Orthopedics     REVISION TOTAL HIP ARTHROPLASTY Right 05/2019     TOOTH EXTRACTION      implant    Family History   Problem Relation Age of Onset     Atrial fibrillation Mother      Dementia Mother         dx 80s     Diabetes type II Mother         dx 60s/70s     Atrial fibrillation Father      Rheum arthritis Father      Atrial fibrillation Sister      Stroke Sister      Atrial fibrillation Brother      Rheum arthritis Brother      Diabetes type II Brother         dx 40s     Stroke Brother      Atrial fibrillation Brother      Parkinsonism Sister     Social History     Tobacco Use     Smoking status: Never Smoker     Smokeless tobacco: Never Used   Substance Use Topics     Alcohol use: Yes     Alcohol/week: 7.0 standard drinks     Types: 7 Cans of beer per week     Drug use: No          Medications  Allergies   Current Outpatient Medications   Medication Sig  Dispense Refill     acetaminophen (TYLENOL) 500 MG tablet Take 500 mg by mouth every 6 (six) hours as needed for pain.       apixaban (ELIQUIS) 5 mg Tab tablet Take 1 tablet (5 mg total) by mouth 2 (two) times a day. 180 tablet 3     levothyroxine (SYNTHROID, LEVOTHROID) 112 MCG tablet TAKE 1 TABLET(112 MCG) BY MOUTH DAILY 90 tablet 1     lisinopril (PRINIVIL,ZESTRIL) 5 MG tablet Take 1 tablet (5 mg total) by mouth daily. 90 tablet 3     omeprazole (PRILOSEC) 20 MG capsule TAKE 1 CAPSULE(20 MG) BY MOUTH DAILY 90 capsule 3     sotalol (BETAPACE) 80 MG tablet Take 1 tablet (80 mg total) by mouth 2 (two) times a day. 180 tablet 3     No current facility-administered medications for this visit.     Allergies   Allergen Reactions     Sulfa (Sulfonamide Antibiotics) Rash     Rash - turned purple    Around age 32         Lab Results    Chemistry/lipid CBC Other   Lab Results   Component Value Date    CREATININE 0.94 11/06/2019    BUN 21 11/06/2019     11/06/2019    K 4.8 11/06/2019     11/06/2019    CO2 28 11/06/2019     Creatinine (mg/dL)   Date Value   11/06/2019 0.94   04/23/2019 1.07   09/10/2018 0.98   06/22/2017 0.93    Lab Results   Component Value Date    WBC 5.8 11/06/2019    HGB 15.5 11/06/2019    HCT 46.6 11/06/2019    MCV 92 11/06/2019     11/06/2019    Lab Results   Component Value Date    TSH 3.39 04/23/2019     Lab Results   Component Value Date    INR 1.04 06/22/2017    INR 1.03 06/02/2017        This note has been dictated using voice recognition software. Any grammatical, typographical, or context distortions are unintentional and inherent to the software.

## 2021-06-29 NOTE — PROGRESS NOTES
Progress Notes by Glenna Cardenas CNP at 7/31/2020  8:30 AM     Author: Glenna Cardenas CNP Service: -- Author Type: Nurse Practitioner    Filed: 7/31/2020  9:01 AM Encounter Date: 7/31/2020 Status: Signed    : Glenna Cardenas CNP (Nurse Practitioner)             Assessment/Recommendations   Assessment/Plan:    1.  Persistent atrial fibrillation: No symptomatology or evidence of recurrence of A. fib.  No inappropriately elevated heart rates noted on his Fitbit.  We again reviewed the physiology and natural progression of atrial fibrillation as well as treatment options of rate control versus rhythm control depending upon the presence of symptoms.  We also discussed rhythm control with antiarrhythmic medications versus repeat ablation.  We  discussed the importance of avoiding possible triggers, in particular dehydration.  Symptom status continues to be vague.  Continue sotalol 80 mg twice daily.     He has a [id number] score of 1 for hypertension.  He has 2 siblings who have had strokes due to A. fib, so wishes to remain on anticoagulation.  Since his knee surgery, he is no longer using NSAIDs.  Continue Eliquis 5 mg twice daily for stroke prophylaxis.  He denies any side effects, bleeding issues, or missed doses.       2.  Hypertension: Blood pressure at target today.  Continue lisinopril 5 mg daily with sotalol as above.       Follow up in 6 months.     History of Present Illness/Subjective    Mr. Brett Hopkins is a 64 y.o. male who comes in today for EP follow-up of atrial fibrillation.  He was diagnosed with paroxysmal atrial fibrillation in 2007.  He failed antiarrhythmic therapy with propafenone and flecainide.  He underwent pulmonary vein isolation ablation with Dr. Barfield on 8/19/2013 with cryo-to all 4 pulmonary veins.  Preprocedural MRI noted mild dilatation of the ascending aorta, measuring 4.1 cm which has been stable.  He was noted to be back in atrial fibrillation at a preoperative exam in  April 2019 at which point he was started on metoprolol.  Review of FitBit data suggests that recurrence was in March.  In hindsight, he reports noting worsening fatigue and occasional palpitations since that time that he had attributed to increased stress.  Blood pressures were also elevated necessitating up titration of metoprolol and starting low-dose lisinopril.  He was started on Eliquis 5 mg twice daily for stroke prophylaxis.  This was briefly held for hip replacement on 5/8/2019 and restarted postoperatively.  He underwent cardioversion on 7/10/2019, but with early recurrence of atrial fibrillation.  He remained in atrial fibrillation despite being started on flecainide.  His symptoms are vague; we did a trial of rate control during which he felt that he was at least intermittently symptomatic with pounding heartbeat associated with chest discomfort.  He was started on sotalol and underwent cardioversion on 9/12/2019 after which he reported some symptomatic improvement.  He underwent right total knee revision on 11/25/2019.     Champ states that he has been feeling well.  He has been dealing with lymphedema in his left upper leg since his knee surgery, though it is improving with treatment.  He has not had any symptomatic episodes of A. fib and his Fitbit consistently reports resting heart rates in the 60s without unexplained heart rate elevations.   He denies exertional chest discomfort, sustained palpitations, abdominal fullness/bloating or worsened peripheral edema, shortness of breath, paroxysmal nocturnal dyspnea, orthopnea, lightheadedness, dizziness, pre-syncope, or syncope.      Cardiographics (EKGs personally reviewed):  EKG done 2/21/2020 shows sinus rhythm at 74 bpm, QT/QTc interval measures 368/408 ms  EKG done 7/15/2019 shows atrial fibrillation with controlled ventricular response at 92 bpm  EKG, Done 4/23/2019 shows atrial fibrillation with rapid ventricular response at 190 bpm  EKG done 6/2/2017  shows sinus bradycardia 54 bpm, QT/QTc interval measures 410/388 ms     24-hour Holter monitor worn 7/24/2019 shows persistent atrial fibrillation with mildly elevated ventricular response with an average ventricular rate of 99 bpm and a range of 63 to 148 bpm.  No significant bradycardia or pauses.     MR cardiac aorta done 4/17/2019:  1. Normal left ventricular size, wall thickness and slightly reduced global systolic function. The quantified left ventricular ejection fraction is 50%. No myocardial scar is identified.   2. Normal right ventricular size function.   3. Moderately enlarged left atrium.  4. Mild mitral valve regurgitation.  5. Tricuspid aortic valve with no stenosis or regurgitation per velocity encoded images.  6. Mildly dilated mid ascending aorta with maximal measurement 41 mm.   When compared to previous study in 2016, there is no interval change in mid ascending aortic size.       Physical Examination Review of Systems   Vitals:    07/31/20 0830   BP: 116/80   Pulse: 64   Resp: 16   SpO2: 97%     Body mass index is 35.15 kg/m .  Wt Readings from Last 3 Encounters:   07/31/20 (!) 252 lb (114.3 kg)   06/01/20 (!) 255 lb (115.7 kg)   12/30/19 (!) 259 lb (117.5 kg)       General Appearance:   Alert, well-appearing and in no acute distress.   HEENT: Atraumatic, normocephalic.  No scleral icterus, normal conjunctivae, EOMs intact, PERRL.  Mucous membranes pink and moist.    Chest/Lungs:   Chest symmetric, spine straight.  Respirations unlabored.  Lungs are clear to auscultation.   Cardiovascular:   Regular rate and rhythm.  Normal first and second heart sounds with no murmurs, rubs, or gallops; radial and posterior tibial pulses are intact, left upper leg lymphedema, no other peripheral edema.   Abdomen:  Soft, nontender, nondistended, bowel sounds present.   Extremities: No cyanosis or clubbing.   Musculoskeletal: Moves all extremities.    Skin: Warm, dry, intact.    Neurologic: Mood and affect are  appropriate.  Alert and oriented to person, place, time, and situation.    General: WNL  Eyes: WNL  Ears/Nose/Throat: WNL  Lungs: WNL  Heart: Leg Swelling  Stomach: WNL  Bladder: WNL  Muscle/Joints: WNL  Skin: WNL  Nervous System: WNL  Mental Health: WNL     Blood: WNL       Medical History  Surgical History Family History Social History   Past Medical History:   Diagnosis Date     Benign essential hypertension      Paroxysmal atrial fibrillation (H)      Status post ablation of atrial fibrillation 07/28/2015    PVI August 19, 2013 (cryo-PVI only)    Past Surgical History:   Procedure Laterality Date     CARDIOVERSION  07/2019    7/10/2019     CARDIOVERSION  09/12/2019     DENTAL SURGERY      Oral Surgery Tooth Extraction;  Implant     SC ABLATE HEART DYSRHYTHM FOCUS  08/19/2013    Description: Catheter Ablation Atrial Fibrillation;  Recorded: 11/16/2013;  Comments: PVI Aug 19, 2013 (Cryo to all 4 PV's)     SC TOTAL HIP ARTHROPLASTY Right 5/8/2019    Procedure: RIGHT TOTAL HIP ARTHROPLASTY DIRECT ANTERIOR APPROACH;  Surgeon: Mario Woodruff MD;  Location: [facility] Main OR;  Service: Orthopedics     SC TOTAL KNEE ARTHROPLASTY Right 6/21/2017    Procedure: 2)  RIGHT TOTAL KNEE ARTHROPLASTY;  Surgeon: Mario VICKERS MD;  Location: [facility] Main OR;  Service: Orthopedics     REVISION TOTAL HIP ARTHROPLASTY Right 05/2019     TOOTH EXTRACTION      implant    Family History   Problem Relation Age of Onset     Atrial fibrillation Mother      Dementia Mother         dx 80s     Diabetes type II Mother         dx 60s/70s     Atrial fibrillation Father      Rheum arthritis Father      Atrial fibrillation Sister      Stroke Sister      Atrial fibrillation Brother      Rheum arthritis Brother      Diabetes type II Brother         dx 40s     Stroke Brother      Atrial fibrillation Brother      Parkinsonism Sister     Social History     Tobacco Use     Smoking status: Never Smoker     Smokeless tobacco: Never Used    Substance Use Topics     Alcohol use: Yes     Alcohol/week: 7.0 standard drinks     Types: 7 Cans of beer per week     Drug use: No          Medications  Allergies   Current Outpatient Medications   Medication Sig Dispense Refill     apixaban (ELIQUIS) 5 mg Tab tablet Take 1 tablet (5 mg total) by mouth 2 (two) times a day. 180 tablet 3     levothyroxine (SYNTHROID, LEVOTHROID) 112 MCG tablet TAKE 1 TABLET(112 MCG) BY MOUTH DAILY 90 tablet 1     lisinopril (PRINIVIL,ZESTRIL) 5 MG tablet Take 1 tablet (5 mg total) by mouth daily. 90 tablet 3     omeprazole (PRILOSEC) 20 MG capsule TAKE 1 CAPSULE(20 MG) BY MOUTH DAILY 90 capsule 3     sotalol (BETAPACE) 80 MG tablet Take 1 tablet (80 mg total) by mouth 2 (two) times a day. 180 tablet 3     acetaminophen (TYLENOL) 500 MG tablet Take 500 mg by mouth every 6 (six) hours as needed for pain.       No current facility-administered medications for this visit.     Allergies   Allergen Reactions     Sulfa (Sulfonamide Antibiotics) Rash     Rash - turned purple    Around age 32         Lab Results    Chemistry/lipid CBC Other   Lab Results   Component Value Date    CREATININE 0.94 11/06/2019    BUN 21 11/06/2019     11/06/2019    K 4.8 11/06/2019     11/06/2019    CO2 28 11/06/2019     Creatinine (mg/dL)   Date Value   11/06/2019 0.94   04/23/2019 1.07   09/10/2018 0.98   06/22/2017 0.93    Lab Results   Component Value Date    WBC 5.8 11/06/2019    HGB 15.5 11/06/2019    HCT 46.6 11/06/2019    MCV 92 11/06/2019     11/06/2019    Lab Results   Component Value Date    TSH 3.39 04/23/2019     Lab Results   Component Value Date    INR 1.04 06/22/2017    INR 1.03 06/02/2017        This note has been dictated using voice recognition software. Any grammatical, typographical, or context distortions are unintentional and inherent to the software.

## 2021-06-29 NOTE — PROGRESS NOTES
Progress Notes by Susy Orlando MD at 2020 10:15 AM     Author: uSsy Orlando MD Service: -- Author Type: Physician    Filed: 2020 12:31 PM Encounter Date: 2020 Status: Signed    : Susy Orlando MD (Physician)             Date of Service: 2020     Date last seen by Dr. Orlando:  2020    PCP: Magdaleno Bennett MD    Impression:    1. Right leg swelling  2. Venous insufficiency and hypertension of both legs  3. Acquired lymphedema mainly affecting right leg  4. Osteoarthritis status post right total knee and right total hip replacements.  5. Scarring and fibrosis  6. History of atrial fibrillation    Plan:  1. Questions were answered.  2. Venous insufficiency study with bilateral insufficiency demonstrated-reviewed.   3. PSA reviewed-normal.    4. Continue pump and compression.  New compression written for.  OTC info for extra socks also given. Techniques to ease donning and doffing reviewed.   5. Patient will follow up in 6 months, or when needed.  If any questions or concerns they are to contact the clinic.    Time spent with patient 15 minutes with greater than 50% time in consultation, education and coordination of care, excluding procedures.     ---------------------------------------------------------------------------------------------------------------------    Chief Complaint: Right leg swelling    History of Present Illness:    Brett Hopkins returns to the [facility] for his right leg swelling due to venous insufficiency and hypertension with acquired lymphedema of the right leg after multiple surgeries for osteoarthritis with right total knee and right total hip replacements.  This was further complicated by scarring and fibrosis with hip and right knee contracture along with significant gait impairment with right leg shortening.  Venous insufficiency study was ordered and with his past medical history of  PSA was ordered along with abdominal pelvic CT scan to look for any proximal obstruction especially in view of the right leg being so much more enlarged than the left.  I then sent him to therapy to get the swelling down followed by compression and then look at the possibility of a lymphedema pump.  Today at follow-up he reports the swelling feels better with the pump.  He is not wearing compression because of difficulty getting it on.  He did not get any new compression.  He went to one therapy session but felt he didn't have time so he ordered the pump himself.  There has been no new numbness, tingling or weakness.  There have been no new masses, rashes, or swellings of any other joints. There has been no new decrease in appetite, unexplained weight loss, abdominal bloating and bowel or bladder changes      Past Medical History:   Diagnosis Date   ? Benign essential hypertension    ? Paroxysmal atrial fibrillation (H)    ? Status post ablation of atrial fibrillation 07/28/2015    PVI August 19, 2013 (cryo-PVI only)       Past Surgical History:   Procedure Laterality Date   ? CARDIOVERSION  07/2019    7/10/2019   ? CARDIOVERSION  09/12/2019   ? DENTAL SURGERY      Oral Surgery Tooth Extraction;  Implant   ? CO ABLATE HEART DYSRHYTHM FOCUS  08/19/2013    Description: Catheter Ablation Atrial Fibrillation;  Recorded: 11/16/2013;  Comments: PVI Aug 19, 2013 (Cryo to all 4 PV's)   ? CO TOTAL HIP ARTHROPLASTY Right 5/8/2019    Procedure: RIGHT TOTAL HIP ARTHROPLASTY DIRECT ANTERIOR APPROACH;  Surgeon: Mario Woodruff MD;  Location: [facility];  Service: Orthopedics   ? CO TOTAL KNEE ARTHROPLASTY Right 6/21/2017    Procedure: 2)  RIGHT TOTAL KNEE ARTHROPLASTY;  Surgeon: Mario VICKERS MD;  Location: [facility] OR;  Service: Orthopedics   ? REVISION TOTAL HIP ARTHROPLASTY Right 05/2019   ? TOOTH EXTRACTION      implant         Current Outpatient Medications:   ?  acetaminophen (TYLENOL) 500 MG tablet,  Take 500 mg by mouth every 6 (six) hours as needed for pain., Disp: , Rfl:   ?  apixaban (ELIQUIS) 5 mg Tab tablet, Take 1 tablet (5 mg total) by mouth 2 (two) times a day., Disp: 180 tablet, Rfl: 3  ?  levothyroxine (SYNTHROID, LEVOTHROID) 112 MCG tablet, TAKE 1 TABLET(112 MCG) BY MOUTH DAILY, Disp: 90 tablet, Rfl: 1  ?  lisinopril (PRINIVIL,ZESTRIL) 5 MG tablet, Take 1 tablet (5 mg total) by mouth daily., Disp: 90 tablet, Rfl: 3  ?  omeprazole (PRILOSEC) 20 MG capsule, TAKE 1 CAPSULE(20 MG) BY MOUTH DAILY, Disp: 90 capsule, Rfl: 3  ?  predniSONE (DELTASONE) 20 MG tablet, Take 20 mg by mouth daily Sliding scale, 60mg, 40mg, 20mg  8/31/20 on 60mg., Disp: , Rfl:   ?  sotalol (BETAPACE) 80 MG tablet, Take 1 tablet (80 mg total) by mouth 2 (two) times a day., Disp: 180 tablet, Rfl: 3    Allergies   Allergen Reactions   ? Other Environmental Allergy      seasonal   ? Sulfa (Sulfonamide Antibiotics) Rash     Rash - turned purple    Around age 32       Social History     Socioeconomic History   ? Marital status:      Spouse name: Not on file   ? Number of children: 2   ? Years of education: Not on file   ? Highest education level: Not on file   Occupational History   ? Occupation: CPA   Social Needs   ? Financial resource strain: Not on file   ? Food insecurity     Worry: Not on file     Inability: Not on file   ? Transportation needs     Medical: Not on file     Non-medical: Not on file   Tobacco Use   ? Smoking status: Never Smoker   ? Smokeless tobacco: Never Used   Substance and Sexual Activity   ? Alcohol use: Yes     Alcohol/week: 7.0 standard drinks     Types: 7 Cans of beer per week   ? Drug use: No   ? Sexual activity: Yes     Partners: Female   Lifestyle   ? Physical activity     Days per week: Not on file     Minutes per session: Not on file   ? Stress: Not on file   Relationships   ? Social connections     Talks on phone: Not on file     Gets together: Not on file     Attends Islam service: Not on  file     Active member of club or organization: Not on file     Attends meetings of clubs or organizations: Not on file     Relationship status: Not on file   ? Intimate partner violence     Fear of current or ex partner: Not on file     Emotionally abused: Not on file     Physically abused: Not on file     Forced sexual activity: Not on file   Other Topics Concern   ? Not on file   Social History Narrative    Diet-regular        Exercise-personal emelia, walk       Family History   Problem Relation Age of Onset   ? Atrial fibrillation Mother    ? Dementia Mother         dx 80s   ? Diabetes type II Mother         dx 60s/70s   ? Atrial fibrillation Father    ? Rheum arthritis Father    ? Atrial fibrillation Sister    ? Stroke Sister    ? Atrial fibrillation Brother    ? Rheum arthritis Brother    ? Diabetes type II Brother         dx 40s   ? Stroke Brother    ? Atrial fibrillation Brother    ? Parkinsonism Sister        Review of Systems:    See HPI    Imaging:    I personally reviewed the following imaging results today and those on care everywhere, if indicated    MR cardiac aorta done 4/17/2019:  1. Normal left ventricular size, wall thickness and slightly reduced global systolic function. The quantified left ventricular ejection fraction is 50%. No myocardial scar is identified.   2. Normal right ventricular size function.   3. Moderately enlarged left atrium.  4. Mild mitral valve regurgitation.  5. Tricuspid aortic valve with no stenosis or regurgitation per velocity encoded images.  6. Mildly dilated mid ascending aorta with maximal measurement 41 mm.   When compared to previous study in 2016, there is no interval change in mid ascending aortic size    Nothing new to review    Labs:    I personally reviewed the following lab results today and those on care everywhere, if indicated    Lab Results   Component Value Date    SEDRATE 3 09/10/2019         Lab Results   Component Value Date    CRP 0.5 09/10/2019            Lab Results   Component Value Date    CREATININE 0.94 11/06/2019      No results found for: HGBA1C        Lab Results   Component Value Date    BUN 21 11/06/2019              Lab Results   Component Value Date    ALBUMIN 3.9 12/14/2016       No results found for: [id number]    Lab Results   Component Value Date    TSH 3.39 04/23/2019     Lab Results   Component Value Date    WBC 5.8 11/06/2019    HGB 15.5 11/06/2019    HCT 46.6 11/06/2019    MCV 92 11/06/2019     11/06/2019     Nothing new to review    Physical Exam:  Vitals:    08/31/20 1124   BP: (!) 172/100   Pulse:    Resp:    Temp:      BMI 35.48   Weight 254 pounds     Circumferential measures:    Vasc Edema 6/1/2020 8/31/2020   Right just above MTP 25 24.5   Right Ankle 26.6 26.6   Right Widest Calf 46.8 46.7   Right Thigh Up 10cm 59.4 58.6   Left - just above MTP 24.3 24.6   Left Ankle 27 26.3   Left Widest Calf 45 45.3   Left Thigh Up 10cm 54.8 53       Incision 06/21/17 Knee Right (Active)       Incision 05/08/19 Surgical Hip Right (Active)      Measures stable.    General:  64 y.o. male in no apparent distress.      Psych: Alert and oriented x 3.  Cooperative. Affect normal.    HEENT: Atraumatic, normocephalic    Musculoskeletal:  Normal range of motion in ankles bilaterally.     Neurological:  Sensation is intact to pin prick and light touch in both legs.  Strength testing is normal in hip flexion, knee flexion, knee extension, ankle dorsiflexion and great toe extension bilaterally.      Vascular: Dorsalis pedis and posterior tibialis pulses are strong and equal bilaterally. There are significant telangietasias, medial ankle venous flares, venous varicosities  and spider veins .     Integumentary: Skin of the legs is uniformly warm and dry, and hyperpigmentated and with positive Stemmer's Sign which is slight bilaterally.  There is tightness with fibrosis in the right knee and proximal calf area, but this has decreased.  Nails are  thickened, decreased hair growth on toes, discolored and brittle.         6/1/2020      Susy Orlando MD, Diplomate ABWMS, FACCWS, FAAPMR  Medical Director Wound Care and Lymphedema  [facility]  [phone]      This note was dictated using a voice recognition software.  Any grammatical or context distortion are unintentional and inherent to the software.

## 2021-06-29 NOTE — PROGRESS NOTES
Progress Notes by Susy Orlando MD at 6/1/2020  8:15 AM     Author: Susy Orlando MD Service: -- Author Type: Physician    Filed: 6/1/2020  9:23 AM Encounter Date: 6/1/2020 Status: Signed    : Susy Orlando MD (Physician)       Referred By: Magdaleno Bennett MD    Date Of Service: 06/01/2020    Chief Complaint: Right leg swelling    History of Present Illness:    Brett Hopkins presents to the [facility] as a new consult to evaluate right leg swelling.   He has had problems with osteoarthritis.  He had a right total knee replacement originally in June 2017 followed by revision in November 2019.  It is also noted on that same side he had a right total hip replacement in May 2019 . The swelling began after the second right knee surgery.  Before that he noted some swelling in both ankles rarely.  He tried compression stockings without benefit.  His course is been further complicated by underlying atrial fibrillation which now is under good control.  He continues to be on anticoagulation with Eliquis because of family history of clots.  There was no other associated trauma, medication change or major illness.  Previous treatments have included elevation and compression socks.  The swelling been stable since onset.  Pain is rated a 3 on a 10 point scale.  Pain is described as stabbing and achey in the right knee.  The swelling is unchanged with elevation.  The patient sleeps in a bed.  There has been no new numbness, tingling or weakness.  There have been no new masses, rashes, or swellings of any other joints. There has been no new decrease in appetite, unexplained weight loss, abdominal bloating and bowel or bladder changes    Past Medical History:   Diagnosis Date   ? Benign essential hypertension    ? Paroxysmal atrial fibrillation (H)    ? Status post ablation of atrial fibrillation 07/28/2015    PVI August 19, 2013 (cryo-PVI only)       Past  Surgical History:   Procedure Laterality Date   ? CARDIOVERSION  07/2019    7/10/2019   ? CARDIOVERSION  09/12/2019   ? DENTAL SURGERY      Oral Surgery Tooth Extraction;  Implant   ? NH ABLATE HEART DYSRHYTHM FOCUS  08/19/2013    Description: Catheter Ablation Atrial Fibrillation;  Recorded: 11/16/2013;  Comments: PVI Aug 19, 2013 (Cryo to all 4 PV's)   ? NH TOTAL HIP ARTHROPLASTY Right 5/8/2019    Procedure: RIGHT TOTAL HIP ARTHROPLASTY DIRECT ANTERIOR APPROACH;  Surgeon: Mario Woodruff MD;  Location: [facility] OR;  Service: Orthopedics   ? NH TOTAL KNEE ARTHROPLASTY Right 6/21/2017    Procedure: 2)  RIGHT TOTAL KNEE ARTHROPLASTY;  Surgeon: Mario VICKERS MD;  Location: [facility] OR;  Service: Orthopedics   ? REVISION TOTAL HIP ARTHROPLASTY Right 05/2019   ? TOOTH EXTRACTION      implant         Current Outpatient Medications:   ?  apixaban (ELIQUIS) 5 mg Tab tablet, Take 1 tablet (5 mg total) by mouth 2 (two) times a day., Disp: 180 tablet, Rfl: 3  ?  levothyroxine (SYNTHROID, LEVOTHROID) 112 MCG tablet, TAKE 1 TABLET(112 MCG) BY MOUTH DAILY, Disp: 90 tablet, Rfl: 1  ?  lisinopril (PRINIVIL,ZESTRIL) 5 MG tablet, Take 1 tablet (5 mg total) by mouth daily., Disp: 90 tablet, Rfl: 3  ?  omeprazole (PRILOSEC) 20 MG capsule, TAKE 1 CAPSULE(20 MG) BY MOUTH DAILY, Disp: 90 capsule, Rfl: 3  ?  sotalol (BETAPACE) 80 MG tablet, Take 1 tablet (80 mg total) by mouth 2 (two) times a day., Disp: 180 tablet, Rfl: 3  ?  acetaminophen (TYLENOL) 500 MG tablet, Take 500 mg by mouth every 6 (six) hours as needed for pain., Disp: , Rfl:     Allergies   Allergen Reactions   ? Sulfa (Sulfonamide Antibiotics) Rash     Rash - turned purple    Around age 32       Social History     Socioeconomic History   ? Marital status:      Spouse name: Not on file   ? Number of children: 2   ? Years of education: Not on file   ? Highest education level: Not on file   Occupational History   ? Occupation: CPA   Social Needs   ?  Financial resource strain: Not on file   ? Food insecurity     Worry: Not on file     Inability: Not on file   ? Transportation needs     Medical: Not on file     Non-medical: Not on file   Tobacco Use   ? Smoking status: Never Smoker   ? Smokeless tobacco: Never Used   Substance and Sexual Activity   ? Alcohol use: Yes     Alcohol/week: 7.0 standard drinks     Types: 7 Cans of beer per week   ? Drug use: No   ? Sexual activity: Yes     Partners: Female   Lifestyle   ? Physical activity     Days per week: Not on file     Minutes per session: Not on file   ? Stress: Not on file   Relationships   ? Social connections     Talks on phone: Not on file     Gets together: Not on file     Attends Sabianism service: Not on file     Active member of club or organization: Not on file     Attends meetings of clubs or organizations: Not on file     Relationship status: Not on file   ? Intimate partner violence     Fear of current or ex partner: Not on file     Emotionally abused: Not on file     Physically abused: Not on file     Forced sexual activity: Not on file   Other Topics Concern   ? Not on file   Social History Narrative    Diet-regular        Exercise-personal emelia, walk       Family History   Problem Relation Age of Onset   ? Atrial fibrillation Mother    ? Dementia Mother         dx 80s   ? Diabetes type II Mother         dx 60s/70s   ? Atrial fibrillation Father    ? Rheum arthritis Father    ? Atrial fibrillation Sister    ? Stroke Sister    ? Atrial fibrillation Brother    ? Rheum arthritis Brother    ? Diabetes type II Brother         dx 40s   ? Stroke Brother    ? Atrial fibrillation Brother    ? Parkinsonism Sister        Review of Systems:  Review of systems is otherwise negative, except as noted above.  Full 12 point review of systems was completed.    Radiology/Diagnostic Studies:    I personally reviewed the following imaging results today and those on care everywhere, if indicated    MR cardiac aorta  done 4/17/2019:  1. Normal left ventricular size, wall thickness and slightly reduced global systolic function. The quantified left ventricular ejection fraction is 50%. No myocardial scar is identified.   2. Normal right ventricular size function.   3. Moderately enlarged left atrium.  4. Mild mitral valve regurgitation.  5. Tricuspid aortic valve with no stenosis or regurgitation per velocity encoded images.  6. Mildly dilated mid ascending aorta with maximal measurement 41 mm.   When compared to previous study in 2016, there is no interval change in mid ascending aortic size       Laboratory Studies:  I personally reviewed the following lab results today and those on care everywhere, if indicated      Lab Results   Component Value Date    SEDRATE 3 09/10/2019         Lab Results   Component Value Date    CRP 0.5 09/10/2019           Lab Results   Component Value Date    CREATININE 0.94 11/06/2019      No results found for: HGBA1C        Lab Results   Component Value Date    BUN 21 11/06/2019              Lab Results   Component Value Date    ALBUMIN 3.9 12/14/2016       No results found for: [id number]    Lab Results   Component Value Date    TSH 3.39 04/23/2019     Lab Results   Component Value Date    WBC 5.8 11/06/2019    HGB 15.5 11/06/2019    HCT 46.6 11/06/2019    MCV 92 11/06/2019     11/06/2019 2/21/2020 common chemistry within normal limits BUN 17 creatinine 0.9 glucose 108 CBC essentially within normal limits    Physical Exam:  Vitals:    06/01/20 0809   BP: 138/90   Pulse: 64   Resp: 20   Temp: 98.3  F (36.8  C)     BMI 35.57 weight 255 pounds    See flow chart for circumferential measures.    Vasc Edema 6/1/2020   Right just above MTP 25   Right Ankle 26.6   Right Widest Calf 46.8   Right Thigh Up 10cm 59.4   Left - just above MTP 24.3   Left Ankle 27   Left Widest Calf 45   Left Thigh Up 10cm 54.8       General:  64 y.o. male in no apparent distress.      Psych: Alert and oriented x 3.   Cooperative. Affect normal.    HEENT: Atraumatic, normocephalic    Respiratory:  Lungs are clear to auscultation throughout with full inspiration    Cardiovascular: Normal S1, S2 without murmur, gallop or rub.  No audible carotid bruits. Regular rhythm.     Abdominal: Normal bowel sounds without any pain, guarding or rigidity.  Slight abdominal fullness.  No inguinal lymphadenopathy palpated.     Musculoskeletal:  Normal range of motion in ankles bilaterally.  Right hip to 3 degrees external rotation and 5 degrees internal rotation.  Left 10 degrees internal rotation 15 degrees external rotation.  Left knee range of motion full.  Right knee range of motion to approximately 90 flexion and missing 13 degrees from full extension.  There is no active joint synovitis, erythema, swelling or joint laxity.  There is a good quarter inch shortening of the right leg with gait.  He has an antalgic gait pattern leaning to the right.     Neurological:  Sensation is intact to pin prick and light touch in both legs.  Strength testing is normal in hip flexion, knee flexion, knee extension, ankle dorsiflexion and great toe extension bilaterally. Deep tendon reflexes knee jerks and ankle jerks are normal bilaterally except for the right knee jerk which is not present.      Vascular: Dorsalis pedis and posterior tibialis pulses are strong and equal bilaterally. There are significant telangietasias, medial ankle venous flares, venous varicosities  and spider veins .     Integumentary: Skin of the legs is uniformly warm and dry, and hyperpigmentated and with positive Stemmer's Sign Which is slight bilaterally.  There is significant tightness with fibrosis in the right knee and proximal calf area.  Nails are thickened, decreased hair growth on toes, discolored and brittle         6/1/2020      Impression:    1. Right leg swelling  2. Venous insufficiency and hypertension of both legs  3. Acquired lymphedema mainly affecting right  leg  4. Osteoarthritis status post right total knee and right total hip replacements.  5. Scarring and fibrosis  6. Bilateral hip and right knee contracture.  7. Significant gait impairment with resultant right leg shortening  8. Right knee pain  9. Slight abdominal distention  10. History of atrial fibrillation    Plan:  1. Type of swelling was reviewed in detail with the patient.  Questions were answered and education was completed.  2. On first can order a venous insufficiency study to check status of the veins and also to rule out DVT.  This was ordered.  3. He is at slightly higher risk of proximal obstruction causing the increased swelling in the right leg but most likely it is due to the multiple surgeries he has required.  However, with his age I will check a PSA as that has not been done since 2018 and I will also check a CT scan of the abdomen and pelvis.  This is been ordered.  I am going to send him to therapy to first really work on getting swelling down.  The swelling can be better controlled then work on range of motion and gait mechanics can be also, hopefully, improved.    4. Once the swelling is down I want to see if we can get in better compression.  We talked about compression in detail.  We discussed how often it has to be replaced and why it needs to be worn.  We also discussed why the swelling first needs to be controlled before it is ordered.  5. He was interested in the lymphatic pumps.  We discussed the need to first see how he responds to the treatment as outlined above and see if we can get his gait and his other symptoms better.  He may or may not even need the compression pump.  6. Patient will follow up in 3 months, or when needed.  If any questions or concerns they are to contact the clinic.     Thank you very much for referring Brett Hopkins.  If you have any questions please feel free to contact me at [phone].    Time spent with patient 60 minutes with greater than 50% time  in consultation, education and coordination of care, excluding procedures.     Susy Orlando MD, FABWMS, FACCWS, FAAPMR  Medical Director Wound Care and Lymphedema  [facility]  [phone]    This note was dictated using a voice recognition software.  Any grammatical or context distortion are unintentional and inherent to the software.

## 2021-06-30 NOTE — PROGRESS NOTES
Progress Notes by Iva Gonzales PA-C at 1/28/2021  8:00 AM     Author: Iva Gonzales PA-C Service: -- Author Type: Physician Assistant    Filed: 1/28/2021  9:03 AM Encounter Date: 1/28/2021 Status: Signed    : Iva Gonzales PA-C (Physician Assistant)             What was the body system examined: Defaulted value based on protocol requirements and will not require .   System  Normal/Abnormal  If abnormal, CS?   If abnormal, describe    Skin  Normal  Not applicable     HEENT Normal  Not applicable     Neck  Normal  Not applicable     Thyroid  Normal  Not applicable      Lungs  Normal  Not applicable     Heart  Normal  Not applicable     Abdomen  Normal  Not applicable     Lymph Nodes  Normal  Not applicable     Musculoskeletal/Extremities  Abnormal  No Moderate non pitting swelling medial right knee; no erythema; not warm to touch; no pain. Trace edema left lower leg. Mild varicose veins bilateral lower legs.    Other  Normal  Not applicable        What was the overall interpretation: Please select from the dropdown list.  Normal    BRITTNI Pascual PA-C

## 2021-06-30 NOTE — PROGRESS NOTES
"Progress Notes by Irvin Goldstein at 2/18/2021 12:30 PM     Author: Irvin Goldstein Service: -- Author Type: Research Associate    Filed: 3/3/2021  2:23 PM Encounter Date: 2/18/2021 Status: Addendum    : Irvin Goldstein (Research Associate)    Related Notes: Original Note by Irvin Goldstein (Research Associate) filed at 2/26/2021  1:46 PM           Visit 2  2/18/21  In person, scheduled  Time seated: 12:13 PM   In the last 2 weeks, did the participant attend any large gatherings (> 10 people), or come in close contact (<6 feet) with a person known to have COVID-19, returned to school or to work in-person?  [x] Yes   [] No    Reviewed Inclusion/Exclusion criteria--please refer to that note for details and for co-sign by Dr. Adame.   Participants meeting the following criterion may be delayed for subsequent vaccination:     Respiratory symptoms in the past 3 days (ie, body temperature of > 38.0 C, cough, sore throat, difficulty breathing).   o Participant may be vaccinated when all symptoms have been resolved for > 3 days.   o Out of window vaccination is allowed for this reason.     Ongoing consent process: review schedule of events for today, and upcoming appointments; provided him with an update on known overall study progress; reviewed eDiary expectations; reiterated expected AEs (especially associated with injection site).  he has no further questions or concerns at this time.     Adverse Event/Serious Adverse Event: asked participant if any AE/SAEs occurred since last contact (1/28/21)  Patient reports no AE/SAEs since the first injection.         Physical Exam   Please refer to Iva Gonzales) note for PE details    Visit Vitals  BP (!) 131/95 (Patient Site: Left Arm, Patient Position: Sitting, Cuff Size: Adult Large) (NCS)   Pulse (!) 59 (NCS)   Temp 97.6  F (36.4  C) (Oral)   Resp 16   Ht 5' 11\" (1.803 m)   Wt (!) 250 lb (113.4 kg)   SpO2 100%   BMI 34.87 kg/m               Study labs SARS-CoV-2 " vaccine immunogenicity (IgG antibody to SARS-CoV-2 S protein, MN, hACE2 inhibition) drawn   [x] Yes   Time 1:20 PM [] No, why?   Requisition number: [id number]    Administrations This Visit     Study SARS-CoV-2 vaccine vs. placebo (IDS# [id number]) injection injection 0.5 mL     Admin Date  02/18/2021  @ 1:55 PM Action  Given Dose  0.5 mL Route  Intramuscular Administered By  Iva Gonzales PA-C                Reviewed eDiary with participant.  Study team reviewed completion instructions with participant and questions answered to his satisfaction.  Participant waited in Clinical Research Unit (CRU) for @ least 30 minutes after receiving injection.  Participant experienced no adverse symptoms prior to leaving CRU  Discharge time: 2:25 PM    Plan: Next study visit (Visit 3) scheduled Visit date not found      Irvin Goldstein

## 2021-06-30 NOTE — PROGRESS NOTES
Progress Notes by Magdaleno Bennett MD at 11/20/2020  8:40 AM     Author: Magdaleno Bennett MD Service: -- Author Type: Physician    Filed: 11/20/2020  9:21 AM Encounter Date: 11/20/2020 Status: Signed    : Magdaleno Bennett MD (Physician)       MALE PREVENTATIVE EXAM    Assessment and Plan:     Patient has been advised of split billing requirements and indicates understanding: No    1. GERD  Patient benefits and needs daily omeprazole.  - omeprazole (PRILOSEC) 20 MG capsule; Take 1 capsule (20 mg total) by mouth daily before breakfast.  Dispense: 90 capsule; Refill: 3    2. Hypertension  Adequate though perhaps not optimal control.  - Basic Metabolic Panel  - Lipid Cascade    3. Hypothyroidism  On thyroid replacement.  - Thyroid Stimulating Hormone (TSH)    4. Physical exam  Patient overall does have good health habits.    5. Morbid obesity (H)  BMI greater than 35.    6. AA (alopecia areata)  Followed by dermatology, underwent a month of prednisone.    7. Lymphedema  Right leg knee areas, causes prior surgery.  - furosemide (LASIX) 20 MG tablet; Take 1 pill each morning for right leg swelling.  Dispense: 30 tablet; Refill: 0    8. Persistent atrial fibrillation  Patient is currently in sinus rhythm, followed by cardiology, on both rate control and blood thinner.    9. Need for vaccination     - Influenza, Recombinant, Inj, Quadrivalent, PF, 18+YRS    PLAN:  1.  Influenza vaccine.  2.  For the right leg lymphedema, the patient will continue his pneumatic pump, compression stockings, I am adding Lasix 20 mg each morning.  3.  The patient will give me an update in 3 to 4 weeks about the status of his lymphedema, at that time I will make a decision whether or not to continue the Lasix or not, if so he would need a basic metabolic profile.  4.  Laboratory studies as above.  5.  Continue the patient on his same medications, I anticipate some type of interval follow-up given the hypertension lymphedema.    Next  follow up:  No follow-ups on file.    Immunization Review  Adult Imm Review: Due today, orders placed  Documented tobacco use.  Website and phone contact for [employer] given to patient in AVS.    I discussed the following with the patient:   Adult Healthy Living: Importance of regular exercise  Healthy nutrition        Subjective:   Chief Complaint: Brett Hopkins is an 65 y.o. male here for a preventative health visit.     Patient has been advised of split billing requirements and indicates understanding: Yes     HPI: Patient has a history of hypertension we have been doing adjustments to his high blood pressure medication, he is now on the maximum dose of hydrochlorothiazide and lisinopril, his blood pressure is at least adequately controlled at this time.    He has a history of alopecia areata followed by dermatology underwent a month of prednisone, he is off of that now he is not sure he is noticed any improvement.    He has a history of reflux he is on daily omeprazole if he misses even 1 dose he does have breakthrough symptoms.    Patient's main concern is that he has lymphedema of the right leg in the vicinity of the right knee this is from right knee surgery.  He has been followed by a lymphedema specialist he has been on a pneumatic pump and compression stockings he did get some not full relief from this.  He is spoken to his specialist on this who basically says there is really not any other options, I told the patient the only thing I can think of is trying him on a water pill, I would give this 3 to 4 weeks and reassess.    Patient has a history of atrial fibrillation he does not have a sense of when he is or is not in this rhythm he is on sotalol as well as Eliquis and followed by cardiology.    He also has a history of a thoracic aneurysm followed by cardiology.    Otherwise no other change in the patient's health status, I reviewed the patient's allergies medications active medical problems past  "medical history past surgical history family history and social history.    Healthy Habits  Are you taking a daily aspirin? No   Do you typically exercising at least 40 min, 3-4 times per week?  Yes  Do you usually eat at least 4 servings of fruit and vegetables a day, include whole grains and fiber and avoid regularly eating high fat foods? Yes  Have you had an eye exam in the past two years? Yes  Do you see a dentist twice per year? Yes  Do you have any concerns regarding sleep? No    Safety Screen  If you own firearms, are they secured in a locked gun cabinet or with trigger locks? The patient does not own any firearms  Do you feel you are safe where you are living?: Yes (11/20/2020  8:32 AM)  Do you feel you are safe in your relationship(s)?: Yes (11/20/2020  8:32 AM)      Review of Systems:  Please see above.  The rest of the review of systems are negative for all systems.     Cancer Screening     Patient has no health maintenance due at this time          Patient Care Team:  Magdaleno Bennett MD as PCP - General (Family Medicine)  Magdaleno Bennett MD as Assigned PCP  Glenna Cardenas CNP as Assigned Heart and Vascular Provider        History     Reviewed By Date/Time Sections Reviewed    Magdaleno Bennett MD 11/20/2020  8:55 AM Medical    Magdaleno Bennett MD 11/20/2020  8:46 AM Surgical    Terry Rasheed CMA 11/20/2020  8:34 AM Tobacco            Objective:   Vital Signs:   Visit Vitals  /89   Pulse (!) 57   Ht 5' 9.25\" (1.759 m)   Wt (!) 253 lb 1.6 oz (114.8 kg)   BMI 37.11 kg/m           PHYSICAL EXAM  Physical Exam   Constitutional: He is oriented to person, place, and time. He appears well-developed and well-nourished.   HENT:   Head: Normocephalic and atraumatic.   Right Ear: External ear normal.   Left Ear: External ear normal.   Eyes: Pupils are equal, round, and reactive to light. EOM are normal.   Cardiovascular: Normal rate, regular rhythm and normal heart sounds.   Pulmonary/Chest: Effort " normal.   Musculoskeletal: Normal range of motion.      Comments: There is obvious visible as well as palpable diffuse swelling the medial aspect of the right knee and the lower thigh.   Neurological: He is alert and oriented to person, place, and time.   Skin: Skin is warm and dry.   Psychiatric: He has a normal mood and affect. His behavior is normal. Judgment and thought content normal.         The 10-year ASCVD risk score ([place] JORDAN Jr., et al., 2013) is: 11%    Values used to calculate the score:      Age: 65 years      Sex: Male      Is Non- : No      Diabetic: No      Tobacco smoker: No      Systolic Blood Pressure: 138 mmHg      Is BP treated: Yes      HDL Cholesterol: 54 mg/dL      Total Cholesterol: 149 mg/dL         Medication List          Accurate as of November 20, 2020  9:17 AM. If you have any questions, ask your nurse or doctor.            START taking these medications    furosemide 20 MG tablet  Also known as: Lasix  INSTRUCTIONS: Take 1 pill each morning for right leg swelling.  Started by: Magdaleno Bennett MD           CHANGE how you take these medications    omeprazole 20 MG capsule  Also known as: PriLOSEC  INSTRUCTIONS: Take 1 capsule (20 mg total) by mouth daily before breakfast.  What changed: when to take this  Changed by: Magdaleno Bennett MD           CONTINUE taking these medications    apixaban ANTICOAGULANT 5 mg Tab tablet  Also known as: Eliquis  INSTRUCTIONS: Take 1 tablet (5 mg total) by mouth 2 (two) times a day.        hydroCHLOROthiazide 25 MG tablet  Also known as: HYDRODIURIL  INSTRUCTIONS: Take 1 tablet each morning for high blood pressure.        levothyroxine 112 MCG tablet  Also known as: SYNTHROID, LEVOTHROID  INSTRUCTIONS: TAKE 1 TABLET(112 MCG) BY MOUTH DAILY  Doctor's comments: NO MORE REFILLS UNTIL SEEN FOR A PHYSICAL        lisinopriL 40 MG tablet  Also known as: PRINIVIL,ZESTRIL  INSTRUCTIONS: Take 1 pill daily for high blood pressure.         sotaloL 80 MG tablet  Also known as: BETAPACE  INSTRUCTIONS: Take 1 tablet (80 mg total) by mouth 2 (two) times a day.           STOP taking these medications    acetaminophen 500 MG tablet  Also known as: TYLENOL  Stopped by: Magdaleno Bennett MD     predniSONE 20 MG tablet  Also known as: DELTASONE  Stopped by: Magdaleno Bennett MD           Where to Get Your Medications      These medications were sent to [id number] - [place], [place] - [address] AT [facility] [address] [place] [address] [address] [facility] [address]    Hours: 24-hours Phone: [phone]     furosemide 20 MG tablet    omeprazole 20 MG capsule         Additional Screenings Completed Today:

## 2021-06-30 NOTE — PROGRESS NOTES
Progress Notes by Iva Gonzales PA-C at 2/18/2021 12:30 PM     Author: Iva Gonzales PA-C Service: -- Author Type: Physician Assistant    Filed: 2/18/2021  1:20 PM Encounter Date: 2/18/2021 Status: Signed    : Iva Gonzales PA-C (Physician Assistant)             What was the body system examined: Defaulted value based on protocol requirements and will not require .   System  Normal/Abnormal  If abnormal, CS?   If abnormal, describe    Skin  Normal  Not applicable     HEENT Normal  Not applicable     Neck  Normal  Not applicable     Thyroid  Normal  Not applicable      Lungs  Normal  Not applicable     Heart  Normal  Not applicable     Abdomen  Normal  Not applicable     Lymph Nodes  Normal  Not applicable     Musculoskeletal/Extremities  Abnormal  No Moderate non pitting swelling medial right knee; no erythema; not warm to touch; no pain. Trace edema left lower leg. Mild varicose veins bilateral lower legs.    Other  Normal  Not applicable        What was the overall interpretation: Please select from the dropdown list.  Normal    BRITTNI Pascual PA-C

## 2021-06-30 NOTE — PROGRESS NOTES
Progress Notes by Iva Gonzales PA-C at 3/5/2021 10:30 AM     Author: Iva Gonzales PA-C Service: -- Author Type: Physician Assistant    Filed: 3/5/2021 11:06 AM Encounter Date: 3/5/2021 Status: Signed    : Iva Gonzales PA-C (Physician Assistant)             What was the body system examined: Defaulted value based on protocol requirements and will not require .   System  Normal/Abnormal  If abnormal, CS?   If abnormal, describe    Skin  Normal  Not applicable     HEENT Normal  Not applicable     Neck  Normal  Not applicable     Thyroid  Normal  Not applicable      Lungs  Normal  Not applicable     Heart  Normal  Not applicable     Abdomen  Normal  Not applicable     Lymph Nodes  Normal  Not applicable     Musculoskeletal/Extremities  Abnormal  No Moderate non pitting swelling medial right knee; no erythema; not warm to touch; no pain.  Mild varicose veins bilateral lower legs.    Other  Normal  Not applicable        What was the overall interpretation: Please select from the dropdown list.  Normal    BRITTNI Pascual PA-C

## 2021-06-30 NOTE — PROGRESS NOTES
"Progress Notes by Kavitha Cohn RN at 3/5/2021 10:30 AM     Author: Kavitha Cohn RN Service: -- Author Type: Registered Nurse    Filed: 3/5/2021 10:44 AM Encounter Date: 3/5/2021 Status: Signed    : Kavitha Cohn RN (Registered Nurse)           Visit 3  3/5/2021   In person, scheduled  Time seated: 10:12   In the last 2 weeks, did the participant attend any large gatherings (> 10 people), or come in close contact (<6 feet) with a person known to  have COVID-19, returned to school or to work in-person?  [] Yes   [x] No    Ongoing consent process: review schedule of events for today, and upcoming appointments; provided him with an update on known overall study progress; reviewed eDiary expectations; reiterated expected AEs (especially associated with injection site).  he has no further questions or concerns at this time.     Adverse Event/Serious Adverse Event: asked participant if any AE/SAEs occurred since last contact (18 Feb 2021 date of last contact)    he denies AE/SAEs    Physical Exam   Please refer to Advanced Practice Provider note for PE details  VS-T,P, R, BP  Visit Vitals  /79 (Patient Site: Left Arm, Patient Position: Sitting, Cuff Size: Adult Regular)   Pulse (!) 53   Temp 97.7  F (36.5  C) (Oral)   Resp 16   Ht 5' 10.5\" (1.791 m)   Wt (!) 257 lb 8 oz (116.8 kg)   SpO2 100%   BMI 36.43 kg/m          Study labs SARS-CoV-2 vaccine immunogenicity (IgG antibody to SARS-CoV-2 S protein, [place], hACE2 inhibition) drawn   [x] Yes   Time 10:34 [] No, why?   Requisition number: [id number]      Reviewed eDiary with participant.  Study team reviewed completion instructions with participant and questions answered to his satisfaction.    Plan: Next study visit (Visit 4) will be scheduled for next visit by [facility] research staff.       Kavitha Cohn RN       "

## 2021-06-30 NOTE — PROGRESS NOTES
"Progress Notes by Adia Austin RN at 1/28/2021  8:00 AM     Author: Adia Austin RN Service: -- Author Type: Registered Nurse    Filed: 1/28/2021 10:37 AM Encounter Date: 1/28/2021 Status: Cosign Needed    : Adia Austin RN (Registered Nurse) Cosign Required: Yes       .    Study Name: PREVENT-19  : Inessa Lares MD   Sub Investigator: Elliott Adame MD    Protocol version: 3.0, 16 NOV 2020    Inclusion Criteria  Criteria #   Inclusion Criteria (all must be yes)    1  Adults ? 18 years of age at screening who, by virtue of age, race, ethnicity or life circumstances, are considered at substantial risk of exposure to and infection with SARS-CoV-2   Yes   2  Willing and able to give informed consent prior to study enrollment and to comply with study procedu   Yes   3  Participants of childbearing potential (defined as any participant who has experienced menarche and who is NOT surgically sterile [ie, hysterectomy, bilateral tubal ligation, or bilateral oophorectomy] or postmenopausal [defined as amenorrhea at least 12 consecutive months]) must agree to be heterosexually inactive from at least 28 days prior to enrollment and through 3 months after the last vaccination OR agree to consistently use a medically acceptable method of contraception from at least 28 days prior to enrollment and through 3 months after the last vaccination   Yes   4  Is medically stable, as determined by the investigator (based on review of health status, vital signs (TPRBP) medical history, & targeted physical examination (weight)  VS must be within medically acceptable ranges prior to the first vaccination.   Yes   5  Agree to not participate in any other SARS-CoV-2 prevention trial during the study follow-up.   Yes     Exclusion Criteria  Criteria #  -all must be \"no\"    1  Unstable acute or chronic illness. Criteria for unstable medical conditions include   a. Substantive changes in chronic " prescribed medication (change in class or significant change in dose) in the past 2 months  b. Currently undergoing workup of undiagnosed illness that could lead to diagnosis of a new condition   Note: Well-controlled human immunodeficiency virus [HIV] with undetectable HIV RNA and CD4 count > 200 cells/?L for at least 1 year, documented within the last 6 months, is NOT considered an unstable chronic illness.    No   2  Participation in research involving an investigational product (drug/biologic/device) within 45 days prior to first study vaccination   No   3  History of a previous laboratory-confirmed diagnosis of SARS-CoV-2 infection or COVID-19   No   4  Received influenza vaccination or any other adult vaccine within 4 days prior to or within 7 days after either study vaccination   No   5  Autoimmune or immunodeficiency disease/condition (iatrogenic or congenital) requiring ongoing immunomodulatory therapy NOTE: Stable endocrine disorders (eg, thyroiditis, pancreatitis), including stable diabetes mellitus with no history of diabetic ketoacidosis) are NOT excluded   No   6  Chronic administration (defined as > 14 continuous days) of immunosuppressant, systemic glucocorticoids, or other immune-modifying drugs within 90 days prior to first study vaccination. NOTE: An immunosuppressant dose of glucocorticoid is defined as a systemic dose ? 20 mg of prednisone per day or equivalent. The use of topical, inhaled, and nasal glucocorticoids is permitted. Topical tacrolimus and ocular cyclosporin are permitted   No   7  Received immunoglobulin, blood-derived products, or immunosuppressant drugs within 90 days prior to first study vaccination   No   8  Active cancer (malignancy) on therapy within 1 year prior to first study vaccination (with the exception of malignancy cured via excision, at the discretion of the investigator)   No   9  Any known allergies to products contained in the investigational product.   No   10   Participants who are breastfeeding, pregnant or who plan to become pregnant within 3 months following last study vaccination   No   11  Any other condition that, in the opinion of the investigator, would pose a health risk to the participant if enrolled or could interfere with evaluation of the trial vaccine or interpretation of study results.   No   12  Study team member or first-degree relative of any study team member (inclusive of Sponsor, and study site personnel involved in the study)   No   13  Current participation in any other COVID-19 prevention clinical trial.   No       Subject has met all inclusion criteria and no exclusion criteria have been met.   Subject is ready to fully enrolled in the PREVENT-19 study.    Adia Austin RN

## 2021-06-30 NOTE — PROGRESS NOTES
"Progress Notes by Adia Austin RN at 1/28/2021  8:00 AM     Author: Adia Austin RN Service: -- Author Type: Registered Nurse    Filed: 2/4/2021 10:16 AM Encounter Date: 1/28/2021 Status: Addendum    : Adia Austin RN (Registered Nurse)    Related Notes: Original Note by Adia Austin RN (Registered Nurse) filed at 1/28/2021 10:37 AM           Visits Screening/Baseline (1)    Time seated: 0800    The study discussion continued with an introduction of the study purpose and the qualifications for participation.     The consent discussion included the following:    Description of trial    Number of Participants    Risks and Discomforts    Unforeseeable Risks    Benefits    Alternative Procedures or Treatments    Confidentiality    Compensation and Medical Treatments in Event of Injury    Contacts    Voluntary Participation    Involuntary Termination of Participant's Participation    Additional Costs to Participant    Consequences of Participant's Decision to Withdraw    Providing Significant New Findings to Participants      Brett Hopkins was provided time to consider participation and ask questions.  All questions answered to his satisfaction.  Brett Hopkins was queried regarding their understanding of the trial. he was able to correctly answer the following questions:    Double blind placebo control    Follow up visits    Need to report side effects and/or possible COVID-19 symptoms       Brett Hopkins has agreed to participate in the \"PREVENT-19\" COVID-19 vaccine clinical trial.  Consent form signed (version 3, IRB approved Nov 25, 2020), copies of consent signatures provided to participant.  No study procedures performed prior to obtaining consent to participate.       In person, scheduled  In the last 2 weeks, did the participant attend any large gatherings (> 10 people), or come in close contact (<6 feet) with a person known to  have COVID-19, returned to school or to work " "in-person?  [] Yes   [x] No    Reviewed Inclusion/Exclusion criteria--please refer to that note for details and for co-sign by Dr. Adame/CHIQUITA.    1955   male  Ethnicity   []  or    [x] Not  or   Race   []  or    []    [] Black or   []  or other    [x] White    Occupation   Attending school in person   [] Yes   [x] No   Currently working    [x] Yes   [] No    If yes: Required to be in close proximity (<6 ft) to other people?   [x] Yes   [] No   How often is participant required to be present in workplace (I.e., not work from home [[employer]])    [] 0 days/week  [] 1 day/week  [] 2-4 days/week  [x] >5 days/week  Do people in participant's main workplace use PPE? [x] Yes   [] No    Living Situation  How many people live with the subject (other than subject)? 1  Total people under 18 years of age 0  Total people between 18-64 years of age 2  Total people 65 years of age or older  0    How many people that you live with, currently attend school or  facilities outside of the home? 0  If more than 1 person reported in question above,   Does anyone that the subject lives with, work in a job that requires in close proximity (less than 6 Unknown  feet) to other people, such as the jobs listed earlier [x] Yes   [] No    Lifestyle  Does the subject have a history of smoking or vaping? [] Yes   [x] No  Is the subject currently smoking or vaping?   [] Yes   [x] No      Has the subject experienced any past and/or concomitant medical conditions or significant surgical procedures? [x] Yes   [] No  If yes, please see medical history below.    Randomization  Date of randomization: 1/28/21  Randomization number: [id number]  Age group  [x] 18-64 years  [] > 65 years    Physical Exam   Please refer to Iva Gonzales PA-C note for PE details  97.8, 54,16, 121/76, 99%, 256.3lb    Height: 5' 10.8\"        Study labs " (Immunogenicity, SARS-CoV-2 (anti-NP))  drawn   [x] Yes   Time 0910 [] No, why?     Nasal swab collection:  [x] Yes   Time 0928 [] No, why?     Administrations This Visit     Study SARS-CoV-2 vaccine vs. placebo (IDS# [id number]) injection injection 0.5 mL     Admin Date  01/28/2021 at 0953 Action  Given Left Deltoid Dose  0.5 mL Route  Intramuscular Administered By  Adia Austin RN              09:53        Participant waited in Clinical Research Unit (CRU) for @ least 30 minutes after receiving injection.  Participant experienced no adverse symptoms prior to leaving CRU  Study team reviewed completion instructions with participant  Participant provided with eDiary to record all subsequent AEs and COVID-19 symptomatology.  Participant provided with nasal swab home collection kit and instructions on completion (refer to XXX for complete details)  Participant provided with thermometer and reviewed instructions on how to take oral temperature.  Participant provided with a ruler to measure any site reactions that occur; instructed on how to use.  Participant provided with After Visit Summary that includes these instructions and next appointments.  he confirms that all questions have been answered to his satisfaction.    Plan: Next study visit (Visit 2) scheduled Visit date 2-18-21 12:30    Discharge time 10:30    Adia Austin RN    Current Outpatient Medications   Medication Sig   ? apixaban ANTICOAGULANT (ELIQUIS) 5 mg Tab tablet Take 1 tablet (5 mg total) by mouth 2 (two) times a day. Indication:  Atrial Fibrillation   ? hydroCHLOROthiazide (HYDRODIURIL) 25 MG tablet Take 1 tablet each morning for high blood pressure.  Indication:  Hypertension   ? levothyroxine (SYNTHROID, LEVOTHROID) 112 MCG tablet TAKE 1 TABLET(112 MCG) BY MOUTH DAILY  Indication:  Low Thyroid   ? lisinopriL (PRINIVIL,ZESTRIL) 40 MG tablet Take 1 pill daily for high blood pressure.  Hypertension   ? omeprazole (PRILOSEC) 20 MG capsule Take 1  capsule (20 mg total) by mouth daily before breakfast.  Indication:  Hiatal Hernia   ? sotaloL (BETAPACE) 80 MG tablet TAKE 1 TABLET(80 MG) BY MOUTH TWICE DAILY  Indication:  Atrial Fibrillation       Past Medical History:   Hypertension 2019   Cardioversion   2019   Hypothyroidism 2019   Diagnosis Date   ? Status post ablation of atrial fibrillation 07/28/2015    PVI August 19, 2013 (cryo-PVI only)

## 2021-06-30 NOTE — PROGRESS NOTES
"Progress Notes by Glenna Cardenas CNP at 1/27/2021  7:50 AM     Author: Glenna Cardenas CNP Service: -- Author Type: Nurse Practitioner    Filed: 1/27/2021  8:28 AM Encounter Date: 1/27/2021 Status: Signed    : Glenna Cardenas CNP (Nurse Practitioner)           The patient has been notified of following:     \"This video visit will be conducted via a call between you and your physician/provider. We have found that certain health care needs can be provided without the need for an in-person physical exam.  This service lets us provide the care you need with a video conversation.  If a prescription is necessary we can send it directly to your pharmacy.  If lab work is needed we can place an order for that and you can then stop by our lab to have the test done at a later time.      Patient has given verbal consent to a Video visit? Yes    HEART CARE VIDEO ENCOUNTER        The patient has chosen to have the visit conducted as a video visit, to reduce risk of exposure given the current status of Coronavirus in our community. This video visit is being conducted via a call between the patient and physician/provider. Health care needs are being provided without a physical exam.     Assessment/Recommendations   Assessment/Plan:  1.  Persistent atrial fibrillation: No symptomatology or evidence of recurrence of A. fib.  No inappropriately elevated heart rates noted on his Fitbit.  We again reviewed the physiology and natural progression of atrial fibrillation as well as treatment options of rate control versus rhythm control depending upon the presence of symptoms.  We also discussed rhythm control with antiarrhythmic medications versus repeat ablation.  Symptom status continues to be vague.  We will revisit the possibility of repeat ablation after his busy season at work and once his other medical issues have been taken care of.  Continue sotalol 80 mg twice daily.     He has a [id number] score of 1 for hypertension.  He " has 2 siblings who have had strokes due to A. fib, so wishes to remain on anticoagulation.  Continue Eliquis 5 mg twice daily for stroke prophylaxis.  He denies any side effects, bleeding issues, or missed doses.       2.  Hypertension: Blood pressure at target today.  Continue lisinopril, hydrochlorothiazide, and sotalol.    Follow Up Plan:  6 months  I have reviewed the note as documented.  This accurately captures the substance of my conversation with the patient.    Total time of video between patient and provider was 13 minutes   Start Time:0759   Stop Time: 0812    Originating Location (pt. Location): Home    Distant Location (provider location):  [facility]     Mode of Communication:  Video Conference via [employer]       History of Present Illness/Subjective    Brett Hopkins is a 65 y.o. male who is being evaluated via a billable video visit and has consented to a video visit.  He has a history of atrial fibrillation, hypertension, hypothyroidism, and chronic right lower extremity lymphedema since his right total knee revision in November 2019.  Diuretics were unhelpful.  He continues to use a pneumatic boot and compression stockings.  He is potentially going to [place] for evaluation.  He was seen in the ED in December 2020 for double vision and has been diagnosed with cataract as well as some sort of cranial nerve palsy for which he has new glasses with prisms.    He was diagnosed with paroxysmal atrial fibrillation in 2007.  He failed antiarrhythmic therapy with propafenone and flecainide.  He underwent pulmonary vein isolation ablation with Dr. Barfield on 8/19/2013 with cryo-to all 4 pulmonary veins.  Preprocedural MRI noted mild dilatation of the ascending aorta, measuring 4.1 cm which has been stable.  He was noted to be back in atrial fibrillation at a preoperative exam in April 2019 at which point he was started on metoprolol.  Review of FitBit data suggests that recurrence  was in March.  In hindsight, he reports noting worsening fatigue and occasional palpitations since that time that he had attributed to increased stress.  Blood pressures were also elevated necessitating up titration of metoprolol and starting low-dose lisinopril.  He was started on Eliquis 5 mg twice daily for stroke prophylaxis.  This was briefly held for hip replacement on 5/8/2019 and restarted postoperatively.  He underwent cardioversion on 7/10/2019, but with early recurrence of atrial fibrillation.  He remained in atrial fibrillation despite being started on flecainide.  His symptoms are vague; we did a trial of rate control during which he felt that he was at least intermittently symptomatic with pounding heartbeat associated with chest discomfort.  He was started on sotalol and underwent cardioversion on 9/12/2019 after which he reported some symptomatic improvement.        Champ states that he has been feeling well.  He has not had any symptomatic episodes of A. fib and his Fitbit consistently reports resting heart rates in the 60s without unexplained heart rate elevations.   He denies exertional chest discomfort, sustained palpitations, abdominal fullness/bloating or worsened peripheral edema, shortness of breath, paroxysmal nocturnal dyspnea, orthopnea, lightheadedness, dizziness, pre-syncope, or syncope.       Cardiographics (EKGs personally reviewed):  EKG done 12/21/2020 shows sinus rhythm at 60 bpm, QT/QTc interval measures 426/426 ms  EKG done 7/15/2019 shows atrial fibrillation with controlled ventricular response at 92 bpm  EKG, Done 4/23/2019 shows atrial fibrillation with rapid ventricular response at 190 bpm  EKG done 6/2/2017 shows sinus bradycardia 54 bpm, QT/QTc interval measures 410/388 ms     24-hour Holter monitor worn 7/24/2019 shows persistent atrial fibrillation with mildly elevated ventricular response with an average ventricular rate of 99 bpm and a range of 63 to 148 bpm.  No  significant bradycardia or pauses.     MR cardiac aorta done 4/17/2019:  1. Normal left ventricular size, wall thickness and slightly reduced global systolic function. The quantified left ventricular ejection fraction is 50%. No myocardial scar is identified.   2. Normal right ventricular size function.   3. Moderately enlarged left atrium.  4. Mild mitral valve regurgitation.  5. Tricuspid aortic valve with no stenosis or regurgitation per velocity encoded images.  6. Mildly dilated mid ascending aorta with maximal measurement 41 mm.   When compared to previous study in 2016, there is no interval change in mid ascending aortic size.    I have reviewed and updated the patient's Past Medical History, Social History, Family History and Medication List.     Physical Examination performed via live video encounter Review of Systems   General Appearance:    And, in no distress, normal body habitus, upright.   ENT/Mouth: membranes moist, no nasal discharge or bleeding gums.  Normal head shape, no evidence of injury or laceration.     EYES:  no scleral icterus, normal conjunctivae   Neck: no evidence of thyromegaly.  Supple   Chest/Lungs:   No audible wheezing equal chest wall expansion. Non labored breathing.  No cough.   Cardiovascular:   No evidence of elevated jugular venous pressure.  No evidence of pitting edema, right lower extremity lymphedema   Abdomen:  no evidence of abdominal distention. No observe juandice.     Extremities: no cyanosis or clubbing noted.    Skin: no xanthelasma, normal skin color. No evidence of facial lacerations.      Neurologic: Normal arm motion bilateral, no tremors.  No evidence of focal defect.       Psychiatric: alert and oriented x3, calm, mood and affect appropriate           See CMA note attached, personally reviewed                                   Medical History  Surgical History Family History Social History   Past Medical History:   Diagnosis Date     Status post ablation of  atrial fibrillation 07/28/2015    PVI August 19, 2013 (cryo-PVI only)    Past Surgical History:   Procedure Laterality Date     CARDIOVERSION  07/2019    7/10/2019     CARDIOVERSION  09/12/2019     DENTAL SURGERY      Oral Surgery Tooth Extraction;  Implant     KS ABLATE HEART DYSRHYTHM FOCUS  08/19/2013    Description: Catheter Ablation Atrial Fibrillation;  Recorded: 11/16/2013;  Comments: PVI Aug 19, 2013 (Cryo to all 4 PV's)     KS TOTAL HIP ARTHROPLASTY Right 5/8/2019    Procedure: RIGHT TOTAL HIP ARTHROPLASTY DIRECT ANTERIOR APPROACH;  Surgeon: Mario Woodruff MD;  Location: [facility] Main OR;  Service: Orthopedics     KS TOTAL KNEE ARTHROPLASTY Right 6/21/2017    Procedure: 2)  RIGHT TOTAL KNEE ARTHROPLASTY;  Surgeon: Mario VICKERS MD;  Location: [facility] Main OR;  Service: Orthopedics     REVISION TOTAL HIP ARTHROPLASTY Right 05/2019     TOOTH EXTRACTION      implant    Family History   Problem Relation Age of Onset     Atrial fibrillation Mother      Dementia Mother         dx 80s     Diabetes type II Mother         dx 60s/70s     Atrial fibrillation Father      Rheum arthritis Father      Atrial fibrillation Sister      Stroke Sister      Atrial fibrillation Brother      Rheum arthritis Brother      Diabetes type II Brother         dx 40s     Stroke Brother      Atrial fibrillation Brother      Parkinsonism Sister       Social History     Socioeconomic History     Marital status:      Spouse name: Not on file     Number of children: 2     Years of education: Not on file     Highest education level: Not on file   Occupational History     Occupation: CPA   Social Needs     Financial resource strain: Not on file     Food insecurity     Worry: Not on file     Inability: Not on file     Transportation needs     Medical: Not on file     Non-medical: Not on file   Tobacco Use     Smoking status: Never Smoker     Smokeless tobacco: Never Used   Substance and Sexual Activity     Alcohol use: Yes      Alcohol/week: 7.0 standard drinks     Types: 7 Cans of beer per week     Drug use: No     Sexual activity: Yes     Partners: Female   Lifestyle     Physical activity     Days per week: Not on file     Minutes per session: Not on file     Stress: Not on file   Relationships     Social connections     Talks on phone: Not on file     Gets together: Not on file     Attends Synagogue service: Not on file     Active member of club or organization: Not on file     Attends meetings of clubs or organizations: Not on file     Relationship status: Not on file     Intimate partner violence     Fear of current or ex partner: Not on file     Emotionally abused: Not on file     Physically abused: Not on file     Forced sexual activity: Not on file   Other Topics Concern     Not on file   Social History Narrative    Diet-regular        Exercise-personal emelia, walk          Medications  Allergies   Current Outpatient Medications   Medication Sig Dispense Refill     apixaban ANTICOAGULANT (ELIQUIS) 5 mg Tab tablet Take 1 tablet (5 mg total) by mouth 2 (two) times a day. 180 tablet 3     hydroCHLOROthiazide (HYDRODIURIL) 25 MG tablet Take 1 tablet each morning for high blood pressure. 90 tablet 0     levothyroxine (SYNTHROID, LEVOTHROID) 112 MCG tablet TAKE 1 TABLET(112 MCG) BY MOUTH DAILY 90 tablet 3     lisinopriL (PRINIVIL,ZESTRIL) 40 MG tablet Take 1 pill daily for high blood pressure. 90 tablet 3     omeprazole (PRILOSEC) 20 MG capsule Take 1 capsule (20 mg total) by mouth daily before breakfast. 90 capsule 3     sotaloL (BETAPACE) 80 MG tablet TAKE 1 TABLET(80 MG) BY MOUTH TWICE DAILY 180 tablet 3     No current facility-administered medications for this visit.     Allergies   Allergen Reactions     Other Environmental Allergy      seasonal     Sulfa (Sulfonamide Antibiotics) Rash     Rash - turned purple    Around age 32         Lab Results    Chemistry/lipid CBC Cardiac Enzymes/BNP/TSH/INR   Lab Results   Component Value  Date    CHOL 147 11/20/2020    HDL 43 11/20/2020    LDLCALC 82 11/20/2020    TRIG 111 11/20/2020    CREATININE 1.16 12/21/2020    BUN 37 (H) 12/21/2020    K 4.4 12/21/2020     12/21/2020     12/21/2020    CO2 25 12/21/2020    Lab Results   Component Value Date    WBC 7.1 12/21/2020    HGB 14.8 12/21/2020    HCT 45.3 12/21/2020    MCV 91 12/21/2020     12/21/2020    Lab Results   Component Value Date    TSH 0.33 11/20/2020    INR 1.12 (H) 12/21/2020        Glenna Cardenas CNP  Cardiac Electrophysiology

## 2021-07-06 VITALS
OXYGEN SATURATION: 98 % | HEART RATE: 63 BPM | SYSTOLIC BLOOD PRESSURE: 128 MMHG | WEIGHT: 248.6 LBS | DIASTOLIC BLOOD PRESSURE: 72 MMHG | BODY MASS INDEX: 35.17 KG/M2

## 2021-08-30 ENCOUNTER — TRANSFERRED RECORDS (OUTPATIENT)
Dept: HEALTH INFORMATION MANAGEMENT | Facility: CLINIC | Age: 66
End: 2021-08-30

## 2021-08-31 DIAGNOSIS — I10 ESSENTIAL HYPERTENSION: ICD-10-CM

## 2021-08-31 RX ORDER — HYDROCHLOROTHIAZIDE 12.5 MG/1
TABLET ORAL
Qty: 30 TABLET | OUTPATIENT
Start: 2021-08-31

## 2021-08-31 NOTE — TELEPHONE ENCOUNTER
Disp Refills Start End LEANDRA    hydroCHLOROthiazide (HYDRODIURIL) 12.5 MG tablet (Discontinued) 30 tablet 1 10/18/2020 11/11/2020 --   Sig: Take one tablet each morning for high blood pressure   Sent to pharmacy as: hydroCHLOROthiazide 12.5 mg tablet (HYDRODIURIL)   Reason for Discontinue: Alternate therapy   E-Prescribing Status: Receipt confirmed by pharmacy (10/18/2020 11:36 AM CDT)   hydroCHLOROthiazide (HYDRODIURIL) 12.5 MG tablet [[id number]]    Electronically signed by: Magdaleno Bennett MD on 10/18/20 1135 Status: Discontinued   Ordering user: Magdaleno Bennett MD 10/18/20 1135 Authorized by: Magdaleno Bennett MD   Frequency:  10/18/20 - 11/11/20 Discontinued by: Magdaleno Bennett MD 11/11/20 1040 [Alternate therapy]   Diagnoses  Essential hypertension [I10]

## 2021-09-01 RX ORDER — HYDROCHLOROTHIAZIDE 25 MG/1
TABLET ORAL
Qty: 30 TABLET | Refills: 0 | Status: SHIPPED | OUTPATIENT
Start: 2021-09-01 | End: 2021-09-28

## 2021-09-01 NOTE — TELEPHONE ENCOUNTER
Patient called checking on status of refill request for  hydroCHLOROthiazide (HYDRODIURIL) 25 MG tablet.  Patient is out of his medication.    RN declined stating discontinued, patient not aware that this was ever discontinued would like MD to review.

## 2021-09-01 NOTE — TELEPHONE ENCOUNTER
I called and spoke with patient, informing him that once Dr. Bennett is back in office to address we will have him look at the remaining follow up.    Patient is unhappy with his care here, says it absolutely sucks, patient disconnected the call.

## 2021-09-01 NOTE — TELEPHONE ENCOUNTER
I called and spoke with patient, and stated that I have a pended refill sent to PCP / covering provider for his 25 mg. Yes, his 12.5 mg dose was denied because that is not accurate for his dose. Patient verbalized understanding.

## 2021-09-01 NOTE — TELEPHONE ENCOUNTER
Patient called back and has scheduled his annual wellness visit for 11/22/2021.  He would like his RX refill through this date  hydroCHLOROthiazide (HYDRODIURIL) 25 MG tablet  Take 1 tablet each morning for high blood pressure.

## 2021-09-20 ENCOUNTER — OFFICE VISIT (OUTPATIENT)
Dept: CARDIOLOGY | Facility: CLINIC | Age: 66
End: 2021-09-20
Payer: COMMERCIAL

## 2021-09-20 VITALS
HEART RATE: 58 BPM | HEIGHT: 71 IN | BODY MASS INDEX: 34.86 KG/M2 | SYSTOLIC BLOOD PRESSURE: 102 MMHG | RESPIRATION RATE: 12 BRPM | WEIGHT: 249 LBS | DIASTOLIC BLOOD PRESSURE: 60 MMHG

## 2021-09-20 DIAGNOSIS — I10 ESSENTIAL HYPERTENSION: ICD-10-CM

## 2021-09-20 DIAGNOSIS — I48.19 PERSISTENT ATRIAL FIBRILLATION (H): ICD-10-CM

## 2021-09-20 DIAGNOSIS — I48.19 PERSISTENT ATRIAL FIBRILLATION (H): Primary | ICD-10-CM

## 2021-09-20 DIAGNOSIS — Z86.79 STATUS POST ABLATION OF ATRIAL FIBRILLATION: ICD-10-CM

## 2021-09-20 DIAGNOSIS — Z98.890 STATUS POST ABLATION OF ATRIAL FIBRILLATION: ICD-10-CM

## 2021-09-20 PROCEDURE — 99214 OFFICE O/P EST MOD 30 MIN: CPT | Performed by: NURSE PRACTITIONER

## 2021-09-20 RX ORDER — SOTALOL HYDROCHLORIDE 80 MG/1
TABLET ORAL
Qty: 180 TABLET | Refills: 3 | Status: SHIPPED | OUTPATIENT
Start: 2021-09-20 | End: 2022-11-22

## 2021-09-20 ASSESSMENT — MIFFLIN-ST. JEOR: SCORE: 1946.59

## 2021-09-20 NOTE — PATIENT INSTRUCTIONS
Brett Hopkins,    It was a pleasure to see you today at the [facility].     My recommendations after this visit include:    Continue current medications    Call if you have frequent or prolonged A fib    Follow up in 6 months    Glenna Cardenas CNP  [facility], Electrophysiology  [phone]  EP nurses [phone]

## 2021-09-20 NOTE — LETTER
9/20/2021    Magdaleno Bennett MD  [address] [place] [address]    RE: Brett Hopkins       Dear Colleague,    I had the pleasure of seeing Brett Hopkins in the [facility].      HEART CARE ENCOUNTER CONSULTATON NOTE      [facility]  [phone]      Assessment/Recommendations   Assessment/Plan:  1.  Persistent atrial fibrillation: No symptomatology or evidence of recurrence of A. fib.  No inappropriately elevated heart rates noted on his Fitbit.  We again reviewed the physiology and natural progression of atrial fibrillation as well as treatment options of rate control versus rhythm control depending upon the presence of symptoms.  We also discussed rhythm control with antiarrhythmic medications versus repeat ablation.  Symptom status continues to be vague.  Continue sotalol 80 mg twice daily.     He has a [id number] score of 1 for hypertension.  He has 2 siblings who have had strokes due to A. fib, so wishes to remain on anticoagulation.  Continue Eliquis 5 mg twice daily for stroke prophylaxis.  He denies any side effects, bleeding issues, or missed doses.        2.  Hypertension: Blood pressure at target.  Continue lisinopril, hydrochlorothiazide, and sotalol.    Follow up in 6 months     History of Present Illness/Subjective    HPI: Brett Hopkins is a 65 year old male who comes in today for EP follow-up of atrial fibrillation.  He has a history of atrial fibrillation, hypertension, hypothyroidism, and chronic right lower extremity lymphedema since his right total knee revision in November 2019.  Diuretics were unhelpful.  He continues to use a pneumatic boot and compression stockings.  He is now also trying therapeutic lymphedema massage.  He was seen in the ED in December 2020 for double vision and has been diagnosed with cataract as well as some sort of cranial nerve palsy for which he has new glasses with  stevan.     He was diagnosed with paroxysmal atrial fibrillation in 2007.  He failed antiarrhythmic therapy with propafenone and flecainide.  He underwent pulmonary vein isolation ablation with Dr. Barfield on 8/19/2013 with cryo-to all 4 pulmonary veins.  Preprocedural MRI noted mild dilatation of the ascending aorta, measuring 4.1 cm which has been stable.  He was noted to be back in atrial fibrillation at a preoperative exam in April 2019 at which point he was started on metoprolol.  Review of FitBit data suggests that recurrence was in March.  In hindsight, he reports noting worsening fatigue and occasional palpitations since that time that he had attributed to increased stress.  Blood pressures were also elevated necessitating up titration of metoprolol and starting low-dose lisinopril.  He was started on Eliquis 5 mg twice daily for stroke prophylaxis.  This was briefly held for hip replacement on 5/8/2019 and restarted postoperatively.  He underwent cardioversion on 7/10/2019, but with early recurrence of atrial fibrillation.  He remained in atrial fibrillation despite being started on flecainide.  His symptoms are vague; we did a trial of rate control during which he felt that he was at least intermittently symptomatic with pounding heartbeat associated with chest discomfort.  He was started on sotalol and underwent cardioversion on 9/12/2019 after which he reported some symptomatic improvement.        Champ states that he has been feeling well.  He has not had any symptomatic episodes of A. fib and his Fitbit consistently reports resting heart rates in the 60s without unexplained heart rate elevations.   He denies exertional chest discomfort, sustained palpitations, abdominal fullness/bloating or worsened peripheral edema, shortness of breath, paroxysmal nocturnal dyspnea, orthopnea, lightheadedness, dizziness, pre-syncope, or syncope.       Cardiographics (EKGs personally reviewed):  EKG done 12/21/2020 shows  "sinus rhythm at 60 bpm, QT/QTc interval measures 426/426 ms  EKG done 7/15/2019 shows atrial fibrillation with controlled ventricular response at 92 bpm  EKG, Done 4/23/2019 shows atrial fibrillation with rapid ventricular response at 190 bpm  EKG done 6/2/2017 shows sinus bradycardia 54 bpm, QT/QTc interval measures 410/388 ms     24-hour Holter monitor worn 7/24/2019 shows persistent atrial fibrillation with mildly elevated ventricular response with an average ventricular rate of 99 bpm and a range of 63 to 148 bpm.  No significant bradycardia or pauses.     MR cardiac aorta done 4/17/2019:  1. Normal left ventricular size, wall thickness and slightly reduced global systolic function. The quantified left ventricular ejection fraction is 50%. No myocardial scar is identified.   2. Normal right ventricular size function.   3. Moderately enlarged left atrium.  4. Mild mitral valve regurgitation.  5. Tricuspid aortic valve with no stenosis or regurgitation per velocity encoded images.  6. Mildly dilated mid ascending aorta with maximal measurement 41 mm.   When compared to previous study in 2016, there is no interval change in mid ascending aortic size.    I have reviewed and updated the patient's Past Medical History, Social History, Family History and Medication List.  Outside records personally reviewed.     Physical Examination  Review of Systems   Vitals: /60 (BP Location: Left arm, Patient Position: Sitting, Cuff Size: Adult Regular)   Pulse 58   Resp 12   Ht 1.803 m (5' 11\")   Wt 112.9 kg (249 lb)   BMI 34.73 kg/m    BMI= Body mass index is 34.73 kg/m .  Wt Readings from Last 3 Encounters:   09/20/21 112.9 kg (249 lb)   05/28/21 112.8 kg (248 lb 9.6 oz)   03/05/21 116.8 kg (257 lb 8 oz)       General Appearance:   Alert, well-appearing and in no acute distress.   HEENT: Atraumatic, normocephalic.  No scleral icterus, normal conjunctivae, EOMs intact, PERRL.  Wearing a mask.   Chest/Lungs:   Chest " symmetric, spine straight.  Respirations unlabored.  Lungs are clear to auscultation.   Cardiovascular:   Regular rate and rhythm.  Normal first and second heart sounds with no murmurs, rubs, or gallops; radial and posterior tibial pulses are intact, right lower extremity lymphedema.   Abdomen:  Soft, nondistended, bowel sounds present.   Extremities: No cyanosis or clubbing.   Musculoskeletal: Moves all extremities.   Skin: Warm, dry, intact.    Neurologic: Mood and affect are appropriate.  Alert and oriented to person, place, time, and situation.        ROS: 10 point ROS neg other than the symptoms noted above in the HPI.         Medical History  Surgical History Family History Social History   Past Medical History:   Diagnosis Date     Status post ablation of atrial fibrillation 07/28/2015    PVI August 19, 2013 (cryo-PVI only)     Past Surgical History:   Procedure Laterality Date     ARTHROPLASTY REVISION HIP Right 05/2019     C TOTAL HIP ARTHROPLASTY Right 5/8/2019    Procedure: RIGHT TOTAL HIP ARTHROPLASTY DIRECT ANTERIOR APPROACH;  Surgeon: Mario Woodruff MD;  Location: [facility] OR;  Service: Orthopedics     C TOTAL KNEE ARTHROPLASTY Right 6/21/2017    Procedure: 2)  RIGHT TOTAL KNEE ARTHROPLASTY;  Surgeon: Mario VICKERS MD;  Location: [facility] OR;  Service: Orthopedics     CARDIOVERSION  07/2019    7/10/2019     CARDIOVERSION  09/12/2019     DENTAL SURGERY      Oral Surgery Tooth Extraction;  Implant     [place] ABLATE HEART DYSRHYTHM FOCUS  08/19/2013    Description: Catheter Ablation Atrial Fibrillation;  Recorded: 11/16/2013;  Comments: PVI Aug 19, 2013 (Cryo to all 4 PV's)     TOOTH EXTRACTION      implant     Family History   Problem Relation Age of Onset     Atrial fibrillation Mother      Dementia Mother         dx 80s     Diabetes Type 2  Mother         dx 60s/70s     Atrial fibrillation Father      Rheumatoid Arthritis Father      Atrial fibrillation Sister      Cerebrovascular  Disease Sister      Atrial fibrillation Brother      Rheumatoid Arthritis Brother      Diabetes Type 2  Brother         dx 40s     Cerebrovascular Disease Brother      Atrial fibrillation Brother      Parkinsonism Sister         Social History     Socioeconomic History     Marital status:      Spouse name: Not on file     Number of children: 2     Years of education: Not on file     Highest education level: Not on file   Occupational History     Not on file   Tobacco Use     Smoking status: Never Smoker     Smokeless tobacco: Never Used   Substance and Sexual Activity     Alcohol use: Yes     Alcohol/week: 7.0 standard drinks     Drug use: No     Sexual activity: Yes     Partners: Female   Other Topics Concern     Not on file   Social History Narrative    Diet-regular    Exercise-personal emelia, walk     Social Determinants of Health     Financial Resource Strain:      Difficulty of Paying Living Expenses:    Food Insecurity:      Worried About Running Out of Food in the Last Year:      Ran Out of Food in the Last Year:    Transportation Needs:      Lack of Transportation (Medical):      Lack of Transportation (Non-Medical):    Physical Activity:      Days of Exercise per Week:      Minutes of Exercise per Session:    Stress:      Feeling of Stress :    Social Connections:      Frequency of Communication with Friends and Family:      Frequency of Social Gatherings with Friends and Family:      Attends [employer] Services:      Active Member of Clubs or Organizations:      Attends Club or Organization Meetings:      Marital Status:    Intimate Partner Violence:      Fear of Current or Ex-Partner:      Emotionally Abused:      Physically Abused:      Sexually Abused:            Medications  Allergies   Current Outpatient Medications   Medication Sig Dispense Refill     apixaban ANTICOAGULANT (ELIQUIS ANTICOAGULANT) 5 MG tablet [ELIQUIS 5 MG TAB TABLET] TAKE 1 TABLET(5 MG) BY MOUTH TWICE DAILY 180 tablet 3      hydrochlorothiazide (HYDRODIURIL) 25 MG tablet [HYDROCHLOROTHIAZIDE (HYDRODIURIL) 25 MG TABLET] Take 1 tablet each morning for high blood pressure. 30 tablet 0     levothyroxine (SYNTHROID, LEVOTHROID) 112 MCG tablet [LEVOTHYROXINE (SYNTHROID, LEVOTHROID) 112 MCG TABLET] TAKE 1 TABLET(112 MCG) BY MOUTH DAILY 90 tablet 3     lisinopriL (PRINIVIL,ZESTRIL) 40 MG tablet [LISINOPRIL (PRINIVIL,ZESTRIL) 40 MG TABLET] Take 1 pill daily for high blood pressure. 90 tablet 3     omeprazole (PRILOSEC) 20 MG capsule [OMEPRAZOLE (PRILOSEC) 20 MG CAPSULE] Take 1 capsule (20 mg total) by mouth daily before breakfast. 90 capsule 3     sotalol (BETAPACE) 80 MG tablet [SOTALOL (BETAPACE) 80 MG TABLET] TAKE 1 TABLET(80 MG) BY MOUTH TWICE DAILY 180 tablet 3       Allergies   Allergen Reactions     Other Environmental Allergy Unknown     seasonal     Sulfa (Sulfonamide Antibiotics) [Sulfa Drugs] Rash     Rash - turned purple  , Around age 32          Lab Results    Chemistry/lipid CBC Cardiac Enzymes/BNP/TSH/INR   Recent Labs   Lab Test 11/20/20  0915   CHOL 147   HDL 43   LDL 82   TRIG 111     Recent Labs   Lab Test 11/20/20  0915 09/10/18  0852 12/14/16  0854   LDL 82 82 92     Recent Labs   Lab Test 12/21/20 2232      POTASSIUM 4.4   CHLORIDE 105   CO2 25      BUN 37*   CR 1.16   GFRESTIMATED >60   YFN 8.9     Recent Labs   Lab Test 12/21/20 2232 11/20/20  0915 11/06/19  1611   CR 1.16 0.92 0.94     No results for input(s): A1C in the last 91117 hours.   Recent Labs   Lab Test 12/21/20 2232   WBC 7.1   HGB 14.8   HCT 45.3   MCV 91        Recent Labs   Lab Test 12/21/20 2232 11/06/19  1611 09/10/19  1045   HGB 14.8 15.5 14.3    No results for input(s): TROPONINI in the last 82591 hours.  No results for input(s): BNP, NTBNPI, NTBNP in the last 16829 hours.  Recent Labs   Lab Test 11/20/20  0915   TSH 0.33     Recent Labs   Lab Test 12/21/20  2232   INR 1.12*                                                  Thank  you for allowing me to participate in the care of your patient.      Sincerely,     CRISTIANO Rasmussen [facility]  cc:   No referring provider defined for this encounter.

## 2021-09-20 NOTE — PROGRESS NOTES
HEART CARE ENCOUNTER CONSULTATON NOTE      [facility]  [phone]      Assessment/Recommendations   Assessment/Plan:  1.  Persistent atrial fibrillation: No symptomatology or evidence of recurrence of A. fib.  No inappropriately elevated heart rates noted on his Fitbit.  We again reviewed the physiology and natural progression of atrial fibrillation as well as treatment options of rate control versus rhythm control depending upon the presence of symptoms.  We also discussed rhythm control with antiarrhythmic medications versus repeat ablation.  Symptom status continues to be vague.  Continue sotalol 80 mg twice daily.     He has a [id number] score of 1 for hypertension.  He has 2 siblings who have had strokes due to A. fib, so wishes to remain on anticoagulation.  Continue Eliquis 5 mg twice daily for stroke prophylaxis.  He denies any side effects, bleeding issues, or missed doses.        2.  Hypertension: Blood pressure at target.  Continue lisinopril, hydrochlorothiazide, and sotalol.    Follow up in 6 months     History of Present Illness/Subjective    HPI: Brett Hopkins is a 65 year old male who comes in today for EP follow-up of atrial fibrillation.  He has a history of atrial fibrillation, hypertension, hypothyroidism, and chronic right lower extremity lymphedema since his right total knee revision in November 2019.  Diuretics were unhelpful.  He continues to use a pneumatic boot and compression stockings.  He is now also trying therapeutic lymphedema massage.  He was seen in the ED in December 2020 for double vision and has been diagnosed with cataract as well as some sort of cranial nerve palsy for which he has new glasses with prisms.     He was diagnosed with paroxysmal atrial fibrillation in 2007.  He failed antiarrhythmic therapy with propafenone and flecainide.  He underwent pulmonary vein isolation ablation with Dr. Barfield on 8/19/2013 with cryo-to all 4 pulmonary veins.   Preprocedural MRI noted mild dilatation of the ascending aorta, measuring 4.1 cm which has been stable.  He was noted to be back in atrial fibrillation at a preoperative exam in April 2019 at which point he was started on metoprolol.  Review of FitBit data suggests that recurrence was in March.  In hindsight, he reports noting worsening fatigue and occasional palpitations since that time that he had attributed to increased stress.  Blood pressures were also elevated necessitating up titration of metoprolol and starting low-dose lisinopril.  He was started on Eliquis 5 mg twice daily for stroke prophylaxis.  This was briefly held for hip replacement on 5/8/2019 and restarted postoperatively.  He underwent cardioversion on 7/10/2019, but with early recurrence of atrial fibrillation.  He remained in atrial fibrillation despite being started on flecainide.  His symptoms are vague; we did a trial of rate control during which he felt that he was at least intermittently symptomatic with pounding heartbeat associated with chest discomfort.  He was started on sotalol and underwent cardioversion on 9/12/2019 after which he reported some symptomatic improvement.        Champ states that he has been feeling well.  He has not had any symptomatic episodes of A. fib and his Fitbit consistently reports resting heart rates in the 60s without unexplained heart rate elevations.   He denies exertional chest discomfort, sustained palpitations, abdominal fullness/bloating or worsened peripheral edema, shortness of breath, paroxysmal nocturnal dyspnea, orthopnea, lightheadedness, dizziness, pre-syncope, or syncope.       Cardiographics (EKGs personally reviewed):  EKG done 12/21/2020 shows sinus rhythm at 60 bpm, QT/QTc interval measures 426/426 ms  EKG done 7/15/2019 shows atrial fibrillation with controlled ventricular response at 92 bpm  EKG, Done 4/23/2019 shows atrial fibrillation with rapid ventricular response at 190 bpm  EKG done  "6/2/2017 shows sinus bradycardia 54 bpm, QT/QTc interval measures 410/388 ms     24-hour Holter monitor worn 7/24/2019 shows persistent atrial fibrillation with mildly elevated ventricular response with an average ventricular rate of 99 bpm and a range of 63 to 148 bpm.  No significant bradycardia or pauses.     MR cardiac aorta done 4/17/2019:  1. Normal left ventricular size, wall thickness and slightly reduced global systolic function. The quantified left ventricular ejection fraction is 50%. No myocardial scar is identified.   2. Normal right ventricular size function.   3. Moderately enlarged left atrium.  4. Mild mitral valve regurgitation.  5. Tricuspid aortic valve with no stenosis or regurgitation per velocity encoded images.  6. Mildly dilated mid ascending aorta with maximal measurement 41 mm.   When compared to previous study in 2016, there is no interval change in mid ascending aortic size.    I have reviewed and updated the patient's Past Medical History, Social History, Family History and Medication List.  Outside records personally reviewed.     Physical Examination  Review of Systems   Vitals: /60 (BP Location: Left arm, Patient Position: Sitting, Cuff Size: Adult Regular)   Pulse 58   Resp 12   Ht 1.803 m (5' 11\")   Wt 112.9 kg (249 lb)   BMI 34.73 kg/m    BMI= Body mass index is 34.73 kg/m .  Wt Readings from Last 3 Encounters:   09/20/21 112.9 kg (249 lb)   05/28/21 112.8 kg (248 lb 9.6 oz)   03/05/21 116.8 kg (257 lb 8 oz)       General Appearance:   Alert, well-appearing and in no acute distress.   HEENT: Atraumatic, normocephalic.  No scleral icterus, normal conjunctivae, EOMs intact, PERRL.  Wearing a mask.   Chest/Lungs:   Chest symmetric, spine straight.  Respirations unlabored.  Lungs are clear to auscultation.   Cardiovascular:   Regular rate and rhythm.  Normal first and second heart sounds with no murmurs, rubs, or gallops; radial and posterior tibial pulses are intact, right " lower extremity lymphedema.   Abdomen:  Soft, nondistended, bowel sounds present.   Extremities: No cyanosis or clubbing.   Musculoskeletal: Moves all extremities.   Skin: Warm, dry, intact.    Neurologic: Mood and affect are appropriate.  Alert and oriented to person, place, time, and situation.        ROS: 10 point ROS neg other than the symptoms noted above in the HPI.         Medical History  Surgical History Family History Social History   Past Medical History:   Diagnosis Date     Status post ablation of atrial fibrillation 07/28/2015    PVI August 19, 2013 (cryo-PVI only)     Past Surgical History:   Procedure Laterality Date     ARTHROPLASTY REVISION HIP Right 05/2019     C TOTAL HIP ARTHROPLASTY Right 5/8/2019    Procedure: RIGHT TOTAL HIP ARTHROPLASTY DIRECT ANTERIOR APPROACH;  Surgeon: Mario Woodruff MD;  Location: [facility] Main OR;  Service: Orthopedics     C TOTAL KNEE ARTHROPLASTY Right 6/21/2017    Procedure: 2)  RIGHT TOTAL KNEE ARTHROPLASTY;  Surgeon: Mario VICKERS MD;  Location: [facility] OR;  Service: Orthopedics     CARDIOVERSION  07/2019    7/10/2019     CARDIOVERSION  09/12/2019     DENTAL SURGERY      Oral Surgery Tooth Extraction;  Implant     [place] ABLATE HEART DYSRHYTHM FOCUS  08/19/2013    Description: Catheter Ablation Atrial Fibrillation;  Recorded: 11/16/2013;  Comments: PVI Aug 19, 2013 (Cryo to all 4 PV's)     TOOTH EXTRACTION      implant     Family History   Problem Relation Age of Onset     Atrial fibrillation Mother      Dementia Mother         dx 80s     Diabetes Type 2  Mother         dx 60s/70s     Atrial fibrillation Father      Rheumatoid Arthritis Father      Atrial fibrillation Sister      Cerebrovascular Disease Sister      Atrial fibrillation Brother      Rheumatoid Arthritis Brother      Diabetes Type 2  Brother         dx 40s     Cerebrovascular Disease Brother      Atrial fibrillation Brother      Parkinsonism Sister         Social History     Socioeconomic  History     Marital status:      Spouse name: Not on file     Number of children: 2     Years of education: Not on file     Highest education level: Not on file   Occupational History     Not on file   Tobacco Use     Smoking status: Never Smoker     Smokeless tobacco: Never Used   Substance and Sexual Activity     Alcohol use: Yes     Alcohol/week: 7.0 standard drinks     Drug use: No     Sexual activity: Yes     Partners: Female   Other Topics Concern     Not on file   Social History Narrative    Diet-regular    Exercise-personal emelia, walk     Social Determinants of Health     Financial Resource Strain:      Difficulty of Paying Living Expenses:    Food Insecurity:      Worried About Running Out of Food in the Last Year:      Ran Out of Food in the Last Year:    Transportation Needs:      Lack of Transportation (Medical):      Lack of Transportation (Non-Medical):    Physical Activity:      Days of Exercise per Week:      Minutes of Exercise per Session:    Stress:      Feeling of Stress :    Social Connections:      Frequency of Communication with Friends and Family:      Frequency of Social Gatherings with Friends and Family:      Attends [employer] Services:      Active Member of Clubs or Organizations:      Attends Club or Organization Meetings:      Marital Status:    Intimate Partner Violence:      Fear of Current or Ex-Partner:      Emotionally Abused:      Physically Abused:      Sexually Abused:            Medications  Allergies   Current Outpatient Medications   Medication Sig Dispense Refill     apixaban ANTICOAGULANT (ELIQUIS ANTICOAGULANT) 5 MG tablet [ELIQUIS 5 MG TAB TABLET] TAKE 1 TABLET(5 MG) BY MOUTH TWICE DAILY 180 tablet 3     hydrochlorothiazide (HYDRODIURIL) 25 MG tablet [HYDROCHLOROTHIAZIDE (HYDRODIURIL) 25 MG TABLET] Take 1 tablet each morning for high blood pressure. 30 tablet 0     levothyroxine (SYNTHROID, LEVOTHROID) 112 MCG tablet [LEVOTHYROXINE (SYNTHROID, LEVOTHROID) 112  MCG TABLET] TAKE 1 TABLET(112 MCG) BY MOUTH DAILY 90 tablet 3     lisinopriL (PRINIVIL,ZESTRIL) 40 MG tablet [LISINOPRIL (PRINIVIL,ZESTRIL) 40 MG TABLET] Take 1 pill daily for high blood pressure. 90 tablet 3     omeprazole (PRILOSEC) 20 MG capsule [OMEPRAZOLE (PRILOSEC) 20 MG CAPSULE] Take 1 capsule (20 mg total) by mouth daily before breakfast. 90 capsule 3     sotalol (BETAPACE) 80 MG tablet [SOTALOL (BETAPACE) 80 MG TABLET] TAKE 1 TABLET(80 MG) BY MOUTH TWICE DAILY 180 tablet 3       Allergies   Allergen Reactions     Other Environmental Allergy Unknown     seasonal     Sulfa (Sulfonamide Antibiotics) [Sulfa Drugs] Rash     Rash - turned purple  , Around age 32          Lab Results    Chemistry/lipid CBC Cardiac Enzymes/BNP/TSH/INR   Recent Labs   Lab Test 11/20/20 0915   CHOL 147   HDL 43   LDL 82   TRIG 111     Recent Labs   Lab Test 11/20/20 0915 09/10/18  0852 12/14/16  0854   LDL 82 82 92     Recent Labs   Lab Test 12/21/20 2232      POTASSIUM 4.4   CHLORIDE 105   CO2 25      BUN 37*   CR 1.16   GFRESTIMATED >60   YFN 8.9     Recent Labs   Lab Test 12/21/20 2232 11/20/20  0915 11/06/19  1611   CR 1.16 0.92 0.94     No results for input(s): A1C in the last 03522 hours.   Recent Labs   Lab Test 12/21/20 2232   WBC 7.1   HGB 14.8   HCT 45.3   MCV 91        Recent Labs   Lab Test 12/21/20 2232 11/06/19  1611 09/10/19  1045   HGB 14.8 15.5 14.3    No results for input(s): TROPONINI in the last 73601 hours.  No results for input(s): BNP, NTBNPI, NTBNP in the last 45218 hours.  Recent Labs   Lab Test 11/20/20 0915   TSH 0.33     Recent Labs   Lab Test 12/21/20 2232   INR 1.12*

## 2021-09-22 DIAGNOSIS — I10 ESSENTIAL HYPERTENSION: ICD-10-CM

## 2021-09-22 RX ORDER — LISINOPRIL 40 MG/1
TABLET ORAL
Qty: 90 TABLET | Refills: 2 | Status: SHIPPED | OUTPATIENT
Start: 2021-09-22 | End: 2022-06-21

## 2021-09-22 NOTE — TELEPHONE ENCOUNTER
"Last Written Prescription Date:  10/2/20  Last Fill Quantity: 90,  # refills: 3   Last office visit provider:  5/28/21     Requested Prescriptions   Pending Prescriptions Disp Refills     lisinopril (ZESTRIL) 40 MG tablet [Pharmacy Med Name: LISINOPRIL 40MG TABLETS] 90 tablet 3     Sig: TAKE 1 TABLET BY MOUTH DAILY FOR HIGH BLOOD PRESSURE       ACE Inhibitors (Including Combos) Protocol Passed - 9/22/2021  7:48 AM        Passed - Blood pressure under 140/90 in past 12 months     BP Readings from Last 3 Encounters:   09/20/21 102/60   05/28/21 128/72   03/05/21 129/79                 Passed - Recent (12 mo) or future (30 days) visit within the authorizing provider's specialty     Patient has had an office visit with the authorizing provider or a provider within the authorizing providers department within the previous 12 mos or has a future within next 30 days. See \"Patient Info\" tab in inbasket, or \"Choose Columns\" in Meds & Orders section of the refill encounter.              Passed - Medication is active on med list        Passed - Patient is age 18 or older        Passed - Normal serum creatinine on file in past 12 months     Recent Labs   Lab Test 12/21/20  2232   CR 1.16       Ok to refill medication if creatinine is low          Passed - Normal serum potassium on file in past 12 months     Recent Labs   Lab Test 12/21/20  2232   POTASSIUM 4.4                  Haim Baptiste RN 09/22/21 10:59 AM  "

## 2021-09-25 ENCOUNTER — HEALTH MAINTENANCE LETTER (OUTPATIENT)
Age: 66
End: 2021-09-25

## 2021-11-22 ENCOUNTER — OFFICE VISIT (OUTPATIENT)
Dept: FAMILY MEDICINE | Facility: CLINIC | Age: 66
End: 2021-11-22
Payer: COMMERCIAL

## 2021-11-22 VITALS
WEIGHT: 249.8 LBS | BODY MASS INDEX: 35.76 KG/M2 | HEART RATE: 72 BPM | SYSTOLIC BLOOD PRESSURE: 126 MMHG | HEIGHT: 70 IN | DIASTOLIC BLOOD PRESSURE: 68 MMHG

## 2021-11-22 DIAGNOSIS — I10 PRIMARY HYPERTENSION: ICD-10-CM

## 2021-11-22 DIAGNOSIS — Z23 NEED FOR VACCINATION: ICD-10-CM

## 2021-11-22 DIAGNOSIS — Z23 HIGH PRIORITY FOR 2019-NCOV VACCINE: ICD-10-CM

## 2021-11-22 DIAGNOSIS — Z00.00 PHYSICAL EXAM: Primary | ICD-10-CM

## 2021-11-22 DIAGNOSIS — E06.3 HYPOTHYROIDISM DUE TO HASHIMOTO'S THYROIDITIS: ICD-10-CM

## 2021-11-22 DIAGNOSIS — I71.20 THORACIC AORTIC ANEURYSM WITHOUT RUPTURE (H): ICD-10-CM

## 2021-11-22 DIAGNOSIS — I89.0 LYMPHEDEMA: ICD-10-CM

## 2021-11-22 DIAGNOSIS — I10 ESSENTIAL HYPERTENSION: ICD-10-CM

## 2021-11-22 LAB
ANION GAP SERPL CALCULATED.3IONS-SCNC: 8 MMOL/L (ref 5–18)
BUN SERPL-MCNC: 26 MG/DL (ref 8–22)
CALCIUM SERPL-MCNC: 9.4 MG/DL (ref 8.5–10.5)
CHLORIDE BLD-SCNC: 105 MMOL/L (ref 98–107)
CHOLEST SERPL-MCNC: 157 MG/DL
CO2 SERPL-SCNC: 28 MMOL/L (ref 22–31)
CREAT SERPL-MCNC: 0.97 MG/DL (ref 0.7–1.3)
FASTING STATUS PATIENT QL REPORTED: YES
GFR SERPL CREATININE-BSD FRML MDRD: 82 ML/MIN/1.73M2
GLUCOSE BLD-MCNC: 104 MG/DL (ref 70–125)
HDLC SERPL-MCNC: 51 MG/DL
LDLC SERPL CALC-MCNC: 93 MG/DL
POTASSIUM BLD-SCNC: 4.7 MMOL/L (ref 3.5–5)
SODIUM SERPL-SCNC: 141 MMOL/L (ref 136–145)
TRIGL SERPL-MCNC: 66 MG/DL
TSH SERPL DL<=0.005 MIU/L-ACNC: 0.02 UIU/ML (ref 0.3–5)

## 2021-11-22 PROCEDURE — 84443 ASSAY THYROID STIM HORMONE: CPT | Performed by: FAMILY MEDICINE

## 2021-11-22 PROCEDURE — 90662 IIV NO PRSV INCREASED AG IM: CPT | Performed by: FAMILY MEDICINE

## 2021-11-22 PROCEDURE — 91300 COVID-19,PF,PFIZER (12+ YRS): CPT | Performed by: FAMILY MEDICINE

## 2021-11-22 PROCEDURE — 90471 IMMUNIZATION ADMIN: CPT | Performed by: FAMILY MEDICINE

## 2021-11-22 PROCEDURE — 80061 LIPID PANEL: CPT | Performed by: FAMILY MEDICINE

## 2021-11-22 PROCEDURE — 36415 COLL VENOUS BLD VENIPUNCTURE: CPT | Performed by: FAMILY MEDICINE

## 2021-11-22 PROCEDURE — 0004A COVID-19,PF,PFIZER (12+ YRS): CPT | Performed by: FAMILY MEDICINE

## 2021-11-22 PROCEDURE — 80048 BASIC METABOLIC PNL TOTAL CA: CPT | Performed by: FAMILY MEDICINE

## 2021-11-22 PROCEDURE — 99396 PREV VISIT EST AGE 40-64: CPT | Mod: 25 | Performed by: FAMILY MEDICINE

## 2021-11-22 RX ORDER — HYDROCHLOROTHIAZIDE 25 MG/1
TABLET ORAL
Qty: 90 TABLET | Refills: 0 | Status: SHIPPED | OUTPATIENT
Start: 2021-11-22 | End: 2022-02-22

## 2021-11-22 ASSESSMENT — MIFFLIN-ST. JEOR: SCORE: 1921.4

## 2021-11-22 NOTE — LETTER
November 23, 2021      Brett Hopkins  [address] [place] [address]        Dear MichaelSandeep,    We are writing to inform you of your test results.  TSH or thyroid is off, this indicates actually getting too much thyroid medication.  I decreased the thyroid from 112 mcg to 100 mcg and fax in a new prescription.  Sugar just barely high at 104 ideally under 100, this is not diabetes, but focus on good diet and exercise to help control.  Liver and kidney tests are normal.  Overall the cholesterol is well controlled.      Resulted Orders   Basic metabolic panel   Result Value Ref Range    Sodium 141 136 - 145 mmol/L    Potassium 4.7 3.5 - 5.0 mmol/L    Chloride 105 98 - 107 mmol/L    Carbon Dioxide (CO2) 28 22 - 31 mmol/L    Anion Gap 8 5 - 18 mmol/L    Urea Nitrogen 26 (H) 8 - 22 mg/dL    Creatinine 0.97 0.70 - 1.30 mg/dL    Calcium 9.4 8.5 - 10.5 mg/dL    Glucose 104 70 - 125 mg/dL    GFR Estimate 82 >60 mL/min/1.73m2      Comment:      As of July 11, 2021, eGFR is calculated by the CKD-EPI creatinine equation, without race adjustment. eGFR can be influenced by muscle mass, exercise, and diet. The reported eGFR is an estimation only and is only applicable if the renal function is stable.   Lipid panel reflex to direct LDL Fasting   Result Value Ref Range    Cholesterol 157 <=199 mg/dL    Triglycerides 66 <=149 mg/dL    Direct Measure HDL 51 >=40 mg/dL      Comment:      HDL Cholesterol Reference Range:     0-2 years:   No reference ranges established for patients under 2 years old  [facility] for lipid analytes.    2-8 years:  Greater than 45 mg/dL     18 years and older:   Female: Greater than or equal to 50 mg/dL   Male:   Greater than or equal to 40 mg/dL    LDL Cholesterol Calculated 93 <=129 mg/dL    Patient Fasting > 8hrs? Yes    TSH   Result Value Ref Range    TSH 0.02 (L) 0.30 - 5.00 uIU/mL       If you have any questions or concerns, please call the clinic at the number listed above.        Sincerely,      Magdaleno Bennett MD
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
## 2021-11-23 DIAGNOSIS — E06.3 HYPOTHYROIDISM DUE TO HASHIMOTO'S THYROIDITIS: Primary | ICD-10-CM

## 2021-11-23 RX ORDER — LEVOTHYROXINE SODIUM 100 UG/1
100 TABLET ORAL DAILY
Qty: 90 TABLET | Refills: 3 | Status: SHIPPED | OUTPATIENT
Start: 2021-11-23 | End: 2022-11-02

## 2021-12-02 ENCOUNTER — HOSPITAL ENCOUNTER (OUTPATIENT)
Dept: CT IMAGING | Facility: CLINIC | Age: 66
Discharge: HOME | End: 2021-12-02
Attending: FAMILY MEDICINE | Admitting: FAMILY MEDICINE
Payer: COMMERCIAL

## 2021-12-02 DIAGNOSIS — I71.20 THORACIC AORTIC ANEURYSM WITHOUT RUPTURE (H): ICD-10-CM

## 2021-12-02 PROCEDURE — 250N000011 HC RX IP 250 OP 636: Performed by: FAMILY MEDICINE

## 2021-12-02 PROCEDURE — 71275 CT ANGIOGRAPHY CHEST: CPT

## 2021-12-02 RX ORDER — IOPAMIDOL 755 MG/ML
100 INJECTION, SOLUTION INTRAVASCULAR ONCE
Status: COMPLETED | OUTPATIENT
Start: 2021-12-02 | End: 2021-12-02

## 2021-12-02 RX ADMIN — IOPAMIDOL 100 ML: 755 INJECTION, SOLUTION INTRAVENOUS at 20:21

## 2021-12-03 ENCOUNTER — TELEPHONE (OUTPATIENT)
Dept: FAMILY MEDICINE | Facility: CLINIC | Age: 66
End: 2021-12-03
Payer: COMMERCIAL

## 2021-12-03 ENCOUNTER — TRANSFERRED RECORDS (OUTPATIENT)
Dept: HEALTH INFORMATION MANAGEMENT | Facility: CLINIC | Age: 66
End: 2021-12-03
Payer: COMMERCIAL

## 2021-12-03 NOTE — TELEPHONE ENCOUNTER
Reason for Call:  Other call back    Detailed comments: Pt is requesting for a preop to be virtual due to him having surgery 12/30/21. He just came in 11/22/21 for an annual. Please call back and advise.    Phone Number Patient can be reached at: Home number on file [phone] (home)    Best Time: any    Can we leave a detailed message on this number? YES    Call taken on 12/3/2021 at 11:49 AM by Eusebio Chua

## 2021-12-07 NOTE — TELEPHONE ENCOUNTER
Patient returned call. TC relayed message and offered to help schedule a virtual visit for pre op. Patient stated he would call back to schedule a virtual visit for his pre op. Patient understood message. Nothing else needed at this time.     Yue Rapp

## 2021-12-07 NOTE — TELEPHONE ENCOUNTER
I called and LVMTCB, if/when patient calls back please assist in scheduling his pre-op VIRTUALLY.

## 2021-12-10 DIAGNOSIS — E04.1 THYROID NODULE: Primary | ICD-10-CM

## 2021-12-28 ENCOUNTER — VIRTUAL VISIT (OUTPATIENT)
Dept: FAMILY MEDICINE | Facility: CLINIC | Age: 66
End: 2021-12-28
Payer: COMMERCIAL

## 2021-12-28 DIAGNOSIS — Z01.818 PREOP EXAMINATION: Primary | ICD-10-CM

## 2021-12-28 PROCEDURE — 99214 OFFICE O/P EST MOD 30 MIN: CPT | Mod: 95 | Performed by: FAMILY MEDICINE

## 2021-12-28 NOTE — PROGRESS NOTES
[address] [address]  Phone: [phone]  Fax: [phone]  Primary Provider: Magdaleno Russo  Pre-op Performing Provider: MAGDALENO RUSSO        PREOPERATIVE EVALUATION:  Today's date: 12/28/2021    Brett Hopkins is a 66 year old male who presents for a preoperative evaluation.    Surgical Information:  Surgery/Procedure: manipulation of right knee  Sur[facility]: [facility]  Surgeon:   Surgery Date: 12/30/21  Time of Surgery: 815am  Where patient plans to recover: At home with family  Fax number for surgical facility: [phone]    Type of Anesthesia Anticipated: General    Assessment & Plan   Encounter Diagnoses   Name Primary?     Preop examination Yes     The patient is scheduled for right knee manipulation presumed underlying scar tissue, status post right total knee arthroplasty.    PLAN:  1.  Patient was seen for a physical on November 22, basic metabolic profile at that time was also normal.  2.  The patient has not had any change in his overall health status, and he is cleared for surgery.  3.  Patient otherwise should be seen again in 1 year for a physical.    The proposed surgical procedure is considered LOW risk.    There are no diagnoses linked to this encounter.         Risks and Recommendations:  The patient has the following additional risks and recommendations for perioperative complications:   - No identified additional risk factors other than previously addressed    Medication Instructions:  Patient is to take all scheduled medications on the day of surgery    RECOMMENDATION:  APPROVAL GIVEN to proceed with proposed procedure, without further diagnostic evaluation.          Subjective     HPI related to upcoming procedure: Patient is being seen via a video conference prior to manipulation of his right knee scheduled for December 30.  Patient had a right total knee arthroplasty in 2017 and revision in 2019.   He has significant diminishment in range of motion, it is felt by his orthopedic surgeon that there is underlying scar tissue and hence is felt that manipulation of the knee under anesthesia may be able to break up that scar tissue and increase range of motion.    Patient has had numerous surgical procedures previously, he has not had any issues or problems with anesthesia.    Patient was seen for physical examination on November 22, he had laboratory studies performed which were within normal limits.    Additionally the patient has had full vaccination against Covid including the booster vaccine.  He has not had any interval change in his health, he does not feel ill or sick in any way.  Additionally the patient has some underlying comorbidities including atrial fibrillation controlled with sotalol he is also on a blood thinner, historically his blood pressure has been well controlled.      The patient has a history of underlying atrial fibrillation he is both on beta-blockers and also an anticoagulant is atrial fibrillation has been well controlled historically his cholesterol and other comorbidities have been well controlled and stable.        Preop Questions 12/28/2021   1. Have you ever had a heart attack or stroke? No   2. Have you ever had surgery on your heart or blood vessels, such as a stent placement, a coronary artery bypass, or surgery on an artery in your head, neck, heart, or legs? No   3. Do you have chest pain with activity? No   4. Do you have a history of  heart failure? No   5. Do you currently have a cold, bronchitis or symptoms of other infection? No   6. Do you have a cough, shortness of breath, or wheezing? No   7. Do you or anyone in your family have previous history of blood clots? No   8. Do you or does anyone in your family have a serious bleeding problem such as prolonged bleeding following surgeries or cuts? No   9. Have you ever had problems with anemia or been told to take iron pills?  No   10. Have you had any abnormal blood loss such as black, tarry or bloody stools? No   11. Have you ever had a blood transfusion? No   12. Are you willing to have a blood transfusion if it is medically needed before, during, or after your surgery? Yes   13. Have you or any of your relatives ever had problems with anesthesia? No   14. Do you have sleep apnea, excessive snoring or daytime drowsiness? No   15. Do you have any artifical heart valves or other implanted medical devices like a pacemaker, defibrillator, or continuous glucose monitor? No   16. Do you have artificial joints? YES -prior right total knee arthroplasty   17. Are you allergic to latex? No       Health Care Directive:  Patient does not have a Health Care Directive or Living Will: Advance Directive received and scanned. Click on Code in the patient header to view.    Preoperative Review of :   reviewed - no record of controlled substances prescribed.       Status of Chronic Conditions:  See problem list for active medical problems.  Problems all longstanding and stable, except as noted/documented.  See ROS for pertinent symptoms related to these conditions.      Review of Systems  CONSTITUTIONAL: NEGATIVE for fever, chills, change in weight  INTEGUMENTARY/SKIN: NEGATIVE for worrisome rashes, moles or lesions  EYES: NEGATIVE for vision changes or irritation  ENT/MOUTH: NEGATIVE for ear, mouth and throat problems  RESP: NEGATIVE for significant cough or SOB  CV: NEGATIVE for chest pain, palpitations or peripheral edema  GI: NEGATIVE for nausea, abdominal pain, heartburn, or change in bowel habits  : NEGATIVE for frequency, dysuria, or hematuria  MUSCULOSKELETAL: NEGATIVE for significant arthralgias or myalgia  NEURO: NEGATIVE for weakness, dizziness or paresthesias  ENDOCRINE: NEGATIVE for temperature intolerance, skin/hair changes  HEME: NEGATIVE for bleeding problems  PSYCHIATRIC: NEGATIVE for changes in mood or affect    Patient Active  Problem List    Diagnosis Date Noted     Obesity (BMI 35.0-39.9) with comorbidity (H) 11/20/2020     Priority: Medium     Lymphedema 06/01/2020     Priority: Medium     Right leg, after surgery  Dr. Orlando, et al  Compression stockings, pneumatic pump-somewhat helpful  Lasix, Nov 2020, but as long December 2020, neither were helpful.  Massage       Persistent atrial fibrillation      Priority: Medium     Dx 2007  Symptomatic, [facility] -NO longer symptomatic  CV with first episode  [id number] - VASc risk score = 1 (HTN)  Failed Rhythmol > Flecainide    PVI August 19, 2013 April 2019 recurrent AF-Eliquis  Cardioversion, September 2019  Eliquis         Hypertension 04/26/2019     Priority: Medium     Dx Apr 2019  Metoprolol, also for atrial fibrillation.  Lisinopril, April 2019  hydrochlorothiazide Oct 2020         GERD (gastroesophageal reflux disease) 09/10/2018     Priority: Medium     History of a hiatal hernia  Omeprazole daily  Breakthrough symptoms by 2 PM daily         AA (alopecia areata) 09/10/2018     Priority: Medium     Area of hair loss back of scalp  Dermatology         Varicose Veins      Priority: Medium     no symptoms         Aneurysm Of The Ascending Aorta      Priority: Medium     Mild dilatation (4.1 cm) by MRI in Aug 2013  CT December 2021, not clearly visualized  [facility]         Hypothyroidism      Priority: Medium     Status post ablation of atrial fibrillation 07/28/2015     Priority: Medium     PVI August 19, 2013 (cryo-PVI only)          Past Medical History:   Diagnosis Date     Status post ablation of atrial fibrillation 07/28/2015    PVI August 19, 2013 (cryo-PVI only)     Past Surgical History:   Procedure Laterality Date     ARTHROPLASTY REVISION HIP Right 05/01/2019     CARDIOVERSION  07/01/2019    7/10/2019     CARDIOVERSION  09/12/2019     CATARACT EXTRACTION Bilateral 06/2021     DENTAL SURGERY      Oral Surgery Tooth Extraction;  Implant     [place] ABLATE HEART DYSRHYTHM FOCUS   08/19/2013    Description: Catheter Ablation Atrial Fibrillation;  Recorded: 11/16/2013;  Comments: PVI Aug 19, 2013 (Cryo to all 4 PV's)     TOOTH EXTRACTION      implant     [facility] TOTAL HIP ARTHROPLASTY Right 05/08/2019    Procedure: RIGHT TOTAL HIP ARTHROPLASTY DIRECT ANTERIOR APPROACH;  Surgeon: Mario Woodruff MD;  Location: [facility];  Service: Orthopedics     [facility] TOTAL KNEE ARTHROPLASTY Right 06/21/2017    Procedure: 2)  RIGHT TOTAL KNEE ARTHROPLASTY;  Surgeon: Mario VICKERS MD;  Location: [facility];  Service: Orthopedics     Current Outpatient Medications   Medication Sig Dispense Refill     apixaban ANTICOAGULANT (ELIQUIS ANTICOAGULANT) 5 MG tablet [ELIQUIS 5 MG TAB TABLET] TAKE 1 TABLET(5 MG) BY MOUTH TWICE DAILY 180 tablet 3     hydrochlorothiazide (HYDRODIURIL) 25 MG tablet [HYDROCHLOROTHIAZIDE (HYDRODIURIL) 25 MG TABLET] Take 1 tablet each morning for high blood pressure. 90 tablet 0     levothyroxine (SYNTHROID/LEVOTHROID) 100 MCG tablet Take 1 tablet (100 mcg) by mouth daily 90 tablet 3     lisinopril (ZESTRIL) 40 MG tablet TAKE 1 TABLET BY MOUTH DAILY FOR HIGH BLOOD PRESSURE 90 tablet 2     omeprazole (PRILOSEC) 20 MG DR capsule TAKE 1 CAPSULE(20 MG) BY MOUTH DAILY BEFORE BREAKFAST 90 capsule 3     sotalol (BETAPACE) 80 MG tablet [SOTALOL (BETAPACE) 80 MG TABLET] TAKE 1 TABLET(80 MG) BY MOUTH TWICE DAILY 180 tablet 3       Allergies   Allergen Reactions     Other Environmental Allergy Unknown     seasonal     Sulfa (Sulfonamide Antibiotics) [Sulfa Drugs] Rash     Rash - turned purple  , Around age 32        Social History     Tobacco Use     Smoking status: Never Smoker     Smokeless tobacco: Never Used   Substance Use Topics     Alcohol use: Yes     Alcohol/week: 5.0 standard drinks     Types: 5 Cans of beer per week     Family History   Problem Relation Age of Onset     Atrial fibrillation Mother      Dementia Mother         dx 80s     Diabetes Type 2  Mother         dx 60s/70s      Atrial fibrillation Father      Rheumatoid Arthritis Father      Atrial fibrillation Sister      Cerebrovascular Disease Sister      Parkinsonism Sister      Parkinsonism Sister      Atrial fibrillation Brother      Rheumatoid Arthritis Brother      Diabetes Type 2  Brother         dx 40s     Cerebrovascular Disease Brother      CABG Brother      Atrial fibrillation Brother      History   Drug Use No         Objective     There were no vitals taken for this visit.    Physical Exam  GENERAL APPEARANCE: healthy, alert and no distress  HENT: ear canals and TM's normal and nose and mouth without ulcers or lesions  RESP: lungs clear to auscultation - no rales, rhonchi or wheezes  CV: regular rate and rhythm, normal S1 S2, no S3 or S4 and no murmur, click or rub   ABDOMEN: soft, nontender, no HSM or masses and bowel sounds normal  NEURO: Normal strength and tone, sensory exam grossly normal, mentation intact and speech normal    Recent Labs   Lab Test 11/22/21  0747 12/21/20  2232   HGB  --  14.8   PLT  --  256   INR  --  1.12*    141   POTASSIUM 4.7 4.4   CR 0.97 1.16        Diagnostics:  No labs were ordered during this visit.   No EKG required for low risk surgery (cataract, skin procedure, breast biopsy, etc).    Revised Cardiac Risk Index (RCRI):  The patient has the following serious cardiovascular risks for perioperative complications:   - No serious cardiac risks = 0 points     RCRI Interpretation: 0 points: Class I (very low risk - 0.4% complication rate)           Signed Electronically by: Magdaelno Bennett MD  Copy of this evaluation report is provided to requesting physician.

## 2022-01-03 ENCOUNTER — HOSPITAL ENCOUNTER (OUTPATIENT)
Dept: ULTRASOUND IMAGING | Facility: CLINIC | Age: 67
Discharge: HOME | End: 2022-01-03
Attending: FAMILY MEDICINE | Admitting: FAMILY MEDICINE
Payer: COMMERCIAL

## 2022-01-03 DIAGNOSIS — E04.1 THYROID NODULE: ICD-10-CM

## 2022-01-03 PROCEDURE — 76536 US EXAM OF HEAD AND NECK: CPT

## 2022-01-05 PROBLEM — E04.1 THYROID NODULE: Status: ACTIVE | Noted: 2022-01-05

## 2022-01-10 ENCOUNTER — TRANSFERRED RECORDS (OUTPATIENT)
Dept: HEALTH INFORMATION MANAGEMENT | Facility: CLINIC | Age: 67
End: 2022-01-10
Payer: COMMERCIAL

## 2022-02-04 ENCOUNTER — OFFICE VISIT (OUTPATIENT)
Dept: FAMILY MEDICINE | Facility: CLINIC | Age: 67
End: 2022-02-04
Payer: COMMERCIAL

## 2022-02-04 ENCOUNTER — NURSE TRIAGE (OUTPATIENT)
Dept: CARDIOLOGY | Facility: CLINIC | Age: 67
End: 2022-02-04

## 2022-02-04 ENCOUNTER — TELEPHONE (OUTPATIENT)
Dept: FAMILY MEDICINE | Facility: CLINIC | Age: 67
End: 2022-02-04
Payer: COMMERCIAL

## 2022-02-04 ENCOUNTER — TELEPHONE (OUTPATIENT)
Dept: CARDIOLOGY | Facility: CLINIC | Age: 67
End: 2022-02-04

## 2022-02-04 ENCOUNTER — ANCILLARY PROCEDURE (OUTPATIENT)
Dept: GENERAL RADIOLOGY | Facility: CLINIC | Age: 67
End: 2022-02-04
Attending: PHYSICIAN ASSISTANT
Payer: COMMERCIAL

## 2022-02-04 VITALS
OXYGEN SATURATION: 100 % | HEART RATE: 50 BPM | DIASTOLIC BLOOD PRESSURE: 99 MMHG | WEIGHT: 252 LBS | SYSTOLIC BLOOD PRESSURE: 160 MMHG | TEMPERATURE: 97.7 F | BODY MASS INDEX: 36.68 KG/M2 | RESPIRATION RATE: 16 BRPM

## 2022-02-04 DIAGNOSIS — R07.89 CHEST TIGHTNESS: ICD-10-CM

## 2022-02-04 DIAGNOSIS — R00.1 SINUS BRADYCARDIA: Primary | ICD-10-CM

## 2022-02-04 DIAGNOSIS — E66.01 MORBID OBESITY (H): ICD-10-CM

## 2022-02-04 DIAGNOSIS — Z79.01 ANTICOAGULATED: ICD-10-CM

## 2022-02-04 DIAGNOSIS — E03.9 HYPOTHYROIDISM (ACQUIRED): ICD-10-CM

## 2022-02-04 DIAGNOSIS — I10 ESSENTIAL HYPERTENSION: ICD-10-CM

## 2022-02-04 LAB
ANION GAP SERPL CALCULATED.3IONS-SCNC: 8 MMOL/L (ref 5–18)
ATRIAL RATE - MUSE: 45 BPM
BNP SERPL-MCNC: 56 PG/ML (ref 0–57)
BUN SERPL-MCNC: 20 MG/DL (ref 8–22)
CALCIUM SERPL-MCNC: 9.4 MG/DL (ref 8.5–10.5)
CHLORIDE BLD-SCNC: 103 MMOL/L (ref 98–107)
CO2 SERPL-SCNC: 29 MMOL/L (ref 22–31)
CREAT SERPL-MCNC: 1.05 MG/DL (ref 0.7–1.3)
DIASTOLIC BLOOD PRESSURE - MUSE: NORMAL MMHG
ERYTHROCYTE [DISTWIDTH] IN BLOOD BY AUTOMATED COUNT: 13.6 % (ref 10–15)
GFR SERPL CREATININE-BSD FRML MDRD: 79 ML/MIN/1.73M2
GLUCOSE BLD-MCNC: 89 MG/DL (ref 70–125)
HCT VFR BLD AUTO: 48.6 % (ref 40–53)
HGB BLD-MCNC: 15.7 G/DL (ref 13.3–17.7)
INTERPRETATION ECG - MUSE: NORMAL
MCH RBC QN AUTO: 30.4 PG (ref 26.5–33)
MCHC RBC AUTO-ENTMCNC: 32.3 G/DL (ref 31.5–36.5)
MCV RBC AUTO: 94 FL (ref 78–100)
P AXIS - MUSE: 9 DEGREES
PLATELET # BLD AUTO: 247 10E3/UL (ref 150–450)
POTASSIUM BLD-SCNC: 4.7 MMOL/L (ref 3.5–5)
PR INTERVAL - MUSE: 154 MS
QRS DURATION - MUSE: 74 MS
QT - MUSE: 462 MS
QTC - MUSE: 399 MS
R AXIS - MUSE: 64 DEGREES
RBC # BLD AUTO: 5.16 10E6/UL (ref 4.4–5.9)
SODIUM SERPL-SCNC: 140 MMOL/L (ref 136–145)
SYSTOLIC BLOOD PRESSURE - MUSE: NORMAL MMHG
T AXIS - MUSE: 36 DEGREES
TROPONIN I SERPL-MCNC: <0.01 NG/ML (ref 0–0.29)
TSH SERPL DL<=0.005 MIU/L-ACNC: 0.26 UIU/ML (ref 0.3–5)
VENTRICULAR RATE- MUSE: 45 BPM
WBC # BLD AUTO: 7.3 10E3/UL (ref 4–11)

## 2022-02-04 PROCEDURE — 84443 ASSAY THYROID STIM HORMONE: CPT | Performed by: PHYSICIAN ASSISTANT

## 2022-02-04 PROCEDURE — 93010 ELECTROCARDIOGRAM REPORT: CPT | Performed by: INTERNAL MEDICINE

## 2022-02-04 PROCEDURE — 36415 COLL VENOUS BLD VENIPUNCTURE: CPT | Performed by: PHYSICIAN ASSISTANT

## 2022-02-04 PROCEDURE — 83880 ASSAY OF NATRIURETIC PEPTIDE: CPT | Performed by: PHYSICIAN ASSISTANT

## 2022-02-04 PROCEDURE — 71046 X-RAY EXAM CHEST 2 VIEWS: CPT | Mod: TC | Performed by: RADIOLOGY

## 2022-02-04 PROCEDURE — 93005 ELECTROCARDIOGRAM TRACING: CPT | Performed by: PHYSICIAN ASSISTANT

## 2022-02-04 PROCEDURE — 80048 BASIC METABOLIC PNL TOTAL CA: CPT | Performed by: PHYSICIAN ASSISTANT

## 2022-02-04 PROCEDURE — 99215 OFFICE O/P EST HI 40 MIN: CPT | Performed by: PHYSICIAN ASSISTANT

## 2022-02-04 PROCEDURE — 84484 ASSAY OF TROPONIN QUANT: CPT | Performed by: PHYSICIAN ASSISTANT

## 2022-02-04 PROCEDURE — 85027 COMPLETE CBC AUTOMATED: CPT | Performed by: PHYSICIAN ASSISTANT

## 2022-02-04 ASSESSMENT — ENCOUNTER SYMPTOMS
WHEEZING: 0
COUGH: 0
FEVER: 0
CHEST TIGHTNESS: 1
NAUSEA: 0
SHORTNESS OF BREATH: 1
LIGHT-HEADEDNESS: 0
PALPITATIONS: 0
BACK PAIN: 0
VOMITING: 0
RHINORRHEA: 1
FATIGUE: 0

## 2022-02-04 NOTE — PROGRESS NOTES
Patient presents with:  Chest Pain: chest tightness on left side for 2 weeks. History of a-fib.      Clinical Decision Making:  Patient w/ left sided chest pain.  Patient has cardiac risk factors that include: HTN, Obesity, hx of afib, aortic aneurysm.      Differential Diagnosis for chest pain includes ischemic chest pain (STEMI, NonSTEMI, or unstable angina), pulmonary embolism, aortic dissection, pericarditis, chest wall pain (rib strain, muscle strain, shingles), pneumonia, esophageal rupture, referred pain from the abdomen (biliary colic, cholecystitis, gastritis, gas pains, pancreatitis), anxiety or other causes of chest pain (GERD, esophageal spasm).       EKG reviewed does not show acute ischemia and no signs of pericarditis. It does show bradycardia. He is asymptomatic fo this and tends to run slow due to his Sotalol    I do not believe this patient has a thoracic aortic dissection, as the pain is not ripping or tearing, it does not go through to his back.       I do not believe the patient's pain represents a PE the pain is not pleuritic and patient's vitals are normal.    This is not chest pain from pneumonia as this patient does not have productive cough or fever and the CXR did not demonstrate infiltrates.     This is not chest pain from esophageal rupture as there was preceding forceful vomiting or retching and the CXR does not show mediastinal free air.     CBC is without severe anemia.     A single troponin was ordered for atypical chest pain. This test was added to 'rule in' an atypical presentation for cardiac ischemia, not to 'rule out' cardiac ischemia. If positive/abnormal, this would have altered the disposition of the patient. Troponin is in process.    BNP and BMP are also in process today    Given patient's heart history he was referred to rapid access clinic for further evaluation.         ICD-10-CM    1. Sinus bradycardia  R00.1 Rapid Access Clinic Referral   2. Chest tightness  R07.89 EKG  12-lead, tracing only     XR Chest 2 Views     Troponin I     B-Type Natriuretic Peptide ( [place] Only)     Basic metabolic panel  (Ca, Cl, CO2, Creat, Gluc, K, Na, BUN)     TSH     CBC with platelets     Rapid Access Clinic Referral     Troponin I     B-Type Natriuretic Peptide ( [place] Only)     Basic metabolic panel  (Ca, Cl, CO2, Creat, Gluc, K, Na, BUN)     TSH     CBC with platelets   3. Anticoagulated  Z79.01    4. Morbid obesity (H)  E66.01    5. Hypothyroidism (acquired)  E03.9    6. Essential hypertension  I10        Patient Instructions   1.  Your complete blood count does not show any signs of severe anemia or infection.  2.  You will be notified of your other lab results when they become available.  3.  Your x-ray looks clear of any signs of infection in your heart border has not had any significant changes in size.  4.  I recommend that you follow-up with our rapid access cardiac clinic for further evaluation.  May be time to get a stress test to make sure there are no ischemic changes to your heart when you are under exertion.  In the meantime I recommend avoiding any significantly strenuous activity such as shoveling heavy snow, long hikes, or significant cardio exercise.  5.  Seek emergency medical attention if you develop severe chest pain, fainting, or worsening/persistent shortness of breath.      HPI:  Brett Hopkins is a 66 year old male with PMHx of HTN, GERD, status post ablation for persistent A fib, Obesity, ascending aortic aneurysm who presents today complaining of left sided chest tightness x 3-4 weeks.  Patient's chest tightness is intermittent and nonexertional.  At times he has felt short of breath with exertion, but it is mainly with extreme exertion such as hiking on a mountain.  He denies any sick symptoms such as fever, cough, wheezing.  He denies any radiation of chest discomfort into the left arm.  He has not had any left arm numbness or tingling.  He denies any nausea or  vomiting.  He denies any radiation of his pain to the back.  He suffers from lymphedema, but has not had any new leg swelling or pain.  He denies any associated lightheadedness.  He suffers from hypothyroidism and had a medication decrease about 2 months ago, but has not had his thyroid rechecked since.  Even though he is previously had an ablation he does have intermittent A. fib still and is on Eliquis.    History obtained from the patient.    Problem List:  2022-01: Thyroid nodule  2020-11: Obesity (BMI 35.0-39.9) with comorbidity (H)  2020-06: Lymphedema  2019-04: Hypertension  2018-09: GERD (gastroesophageal reflux disease)  2018-09: AA (alopecia areata)  2015-07: Status post ablation of atrial fibrillation  Varicose Veins  Persistent atrial fibrillation  Aneurysm Of The Ascending Aorta  Hypothyroidism      Past Medical History:   Diagnosis Date     Status post ablation of atrial fibrillation 07/28/2015    PVI August 19, 2013 (cryo-PVI only)       Social History     Tobacco Use     Smoking status: Never Smoker     Smokeless tobacco: Never Used   Substance Use Topics     Alcohol use: Yes     Alcohol/week: 5.0 standard drinks     Types: 5 Cans of beer per week       Review of Systems   Constitutional: Negative for fatigue and fever.   HENT: Positive for rhinorrhea (constant especially when eating).    Respiratory: Positive for chest tightness and shortness of breath (with excertion). Negative for cough and wheezing.    Cardiovascular: Negative for chest pain, palpitations and leg swelling.   Gastrointestinal: Negative for nausea and vomiting.   Musculoskeletal: Negative for back pain.   Neurological: Negative for light-headedness.       Vitals:    02/04/22 1115   BP: (!) 160/99   Pulse: 50   Resp: 16   Temp: 97.7  F (36.5  C)   TempSrc: Oral   SpO2: 100%   Weight: 114.3 kg (252 lb)       Physical Exam  Vitals and nursing note reviewed.   Constitutional:       General: He is not in acute distress.     Appearance:  He is not toxic-appearing or diaphoretic.   HENT:      Head: Normocephalic and atraumatic.      Right Ear: External ear normal.      Left Ear: External ear normal.   Eyes:      Conjunctiva/sclera: Conjunctivae normal.   Cardiovascular:      Rate and Rhythm: Regular rhythm. Bradycardia present.      Pulses: Normal pulses.      Heart sounds: No murmur heard.      Pulmonary:      Effort: Pulmonary effort is normal. No respiratory distress.      Breath sounds: No stridor. No wheezing, rhonchi or rales.   Chest:      Chest wall: No tenderness.   Neurological:      Mental Status: He is alert.   Psychiatric:         Mood and Affect: Mood normal.         Behavior: Behavior normal.         Thought Content: Thought content normal.         Judgment: Judgment normal.         Labs:  Results for orders placed or performed in visit on 02/04/22   XR Chest 2 Views     Status: None    Narrative    EXAM: XR CHEST 2 VW  LOCATION: [facility] [place]  DATE/TIME: 2/4/2022 12:02 PM    INDICATION: Left sided chest tightness x 3 weeks. Bradycardia currently. Hx of ascending aortic aneurysm  COMPARISON: 02/21/2020      Impression    IMPRESSION: Heart size normal. Moderate-sized hiatal hernia. Lungs appear clear.   Results for orders placed or performed in visit on 02/04/22   CBC with platelets     Status: Normal   Result Value Ref Range    WBC Count 7.3 4.0 - 11.0 10e3/uL    RBC Count 5.16 4.40 - 5.90 10e6/uL    Hemoglobin 15.7 13.3 - 17.7 g/dL    Hematocrit 48.6 40.0 - 53.0 %    MCV 94 78 - 100 fL    MCH 30.4 26.5 - 33.0 pg    MCHC 32.3 31.5 - 36.5 g/dL    RDW 13.6 10.0 - 15.0 %    Platelet Count 247 150 - 450 10e3/uL   EKG 12-lead, tracing only     Status: None (Preliminary result)   Result Value Ref Range    Systolic Blood Pressure  mmHg    Diastolic Blood Pressure  mmHg    Ventricular Rate 45 BPM    Atrial Rate 45 BPM    LA Interval 154 ms    QRS Duration 74 ms     ms    QTc 399 ms    P Axis 9 degrees    R AXIS 64  degrees    T Axis 36 degrees    Interpretation ECG       Sinus bradycardia  Otherwise normal ECG  When compared with ECG of 21-DEC-2020 22:18,  Criteria for Inferior infarct are no longer Present  ST elevation now present in Inferior leads         At the end of the encounter, I discussed results, diagnosis, medications. Discussed red flags for immediate return to clinic/ER, as well as indications for follow up if no improvement. Patient understood and agreed to plan. Patient was stable for discharge.

## 2022-02-04 NOTE — TELEPHONE ENCOUNTER
"Received a call from the call center to discuss A fib.  Spoke to Brett who says for the last 2 weeks he has had an intermittent discomfort in his left upper nicholas that started when he was traveling via car. He wonders if it could be his lung. He denies straining or injuring this area. He says it is mild and intermittent and \"comes and goes\". He is wondering if it could be his A fib. He says he has had A fib since 2007 and has always been asymptomatic, and never has any palpitations. He has a device on his phone and checks his EKG daily which today showed normal sinus rhythm. He denies  dizziness, lightheadedness, feeling faint. He says every once in a while he will get dyspnea on exertion with heavy exertion, but not currently. He says he called his PCP at [facility] today who recommend he go to their urgent care, which he plans to do.   Advised him that urgent care follow up is recommended. He verbalized agreement and understanding of the plan.  Advised him a message will be sent to his [place] team to update.  o1. LOCATION: \"Where does it hurt?\" above left nicholas area  2. RADIATION: \"Does the pain go anywhere else?\" (e.g., into neck, jaw, arms, back) No  3. ONSET: \"When did the chest pain begin?\" (Minutes, hours or days) Two weeks ago while traveling  4. PATTERN \"Does the pain come and go, or has it been constant since it started?\" \"Does it get worse with exertion?\"Comes and goes   5. DURATION: \"How long does it last\" (e.g., seconds, minutes, hours)  6. SEVERITY: \"How bad is the pain?\" (e.g., Scale 1-10; mild, moderate, or severe) mild  - MILD (1-3): doesn't interfere with normal activities   - MODERATE (4-7): interferes with normal activities or awakens from sleep  - SEVERE (8-10): excruciating pain, unable to do any normal activities   7. CARDIAC RISK FACTORS: \"Do you have any history of heart problems or risk factors for heart disease?\" (e.g., angina, prior heart attack; diabetes, high blood pressure, high " "cholesterol, smoker, or strong family history of heart disease) Persistent A fib  8. PULMONARY RISK FACTORS: \"Do you have any history of lung disease?\" (e.g., blood clots in lung, asthma, emphysema, birth control pills)  9. CAUSE: \"What do you think is causing the chest pain?\" possible A fib  10. OTHER SYMPTOMS: \"Do you have any other symptoms?\" (e.g., dizziness, nausea, vomiting, sweating, fever, difficulty breathing, cough) Denies  .    Additional Information    Negative: Severe difficulty breathing (e.g., struggling for each breath, speaks in single words)    Negative: Passed out (i.e., fainted, collapsed and was not responding)    Negative: Difficult to awaken or acting confused (e.g., disoriented, slurred speech)    Negative: Shock suspected (e.g., cold/pale/clammy skin, too weak to stand, low BP, rapid pulse)    Negative: Chest pain lasting longer than 5 minutes and ANY of the following:* Over 45 years old* Over 30 years old and at least one cardiac risk factor (e.g., diabetes, high blood pressure, high cholesterol, smoker, or strong family history of heart disease)* History of heart disease (i.e., angina, heart attack, heart failure, bypass surgery, takes nitroglycerin)* Pain is crushing, pressure-like, or heavy    Negative: Heart beating < 50 beats per minute OR > 140 beats per minute    Negative: Visible sweat on face or sweat dripping down face    Negative: Sounds like a life-threatening emergency to the triager    Negative: SEVERE chest pain    Negative: Pain also in shoulder(s) or arm(s) or jaw    Negative: Difficulty breathing    Negative: Cocaine use within last 3 days    Negative: Major surgery in the past month    Negative: Hip or leg fracture (broken bone) in past month (or had cast on leg or ankle in past month)    Negative: Illness requiring prolonged bedrest in past month (e.g., immobilization, long hospital stay)    Negative: Long-distance travel in past month (e.g., car, bus, train, plane; with " trip lasting 6 or more hours)    Negative: History of prior 'blood clot' in leg or lungs (i.e., deep vein thrombosis, pulmonary embolism)    Negative: History of inherited increased risk of blood clots (e.g., Factor 5 Leiden, Anti-thrombin 3, Protein C or Protein S deficiency, Prothrombin mutation)    Negative: Heart beating irregularly or very rapidly    Negative: Chest pain lasting longer than 5 minutes and occurred in last 3 days (72 hours) (Exception: feels exactly the same as previously diagnosed heartburn and has accompanying sour taste in mouth)    Negative: Chest pain or 'angina' comes and goes and is happening more often (increasing in frequency) or getting worse (increasing in severity) (Exception: chest pains that last only a few seconds)    Negative: Dizziness or lightheadedness    Negative: Coughing up blood    Negative: Patient sounds very sick or weak to the triager    Protocols used: CHEST PAIN-A-[place]

## 2022-02-04 NOTE — PATIENT INSTRUCTIONS
1.  Your complete blood count does not show any signs of severe anemia or infection.  2.  You will be notified of your other lab results when they become available.  3.  Your x-ray looks clear of any signs of infection in your heart border has not had any significant changes in size.  4.  I recommend that you follow-up with our rapid access cardiac clinic for further evaluation.  May be time to get a stress test to make sure there are no ischemic changes to your heart when you are under exertion.  In the meantime I recommend avoiding any significantly strenuous activity such as shoveling heavy snow, long hikes, or significant cardio exercise.  5.  Seek emergency medical attention if you develop severe chest pain, fainting, or worsening/persistent shortness of breath.

## 2022-02-04 NOTE — TELEPHONE ENCOUNTER
[facility] Call Center    Phone Message    May a detailed message be left on voicemail: yes     Reason for Call: Appointment Intake    Referring Provider Name: Allie Dotson PA-C  Diagnosis and/or Symptoms: Chest tightness & Sinus Bradycardia - Pt is currently scheduled for 02/08/22 in [place]. Pt is wondering if we are able to get him in this afternoon or Monday in either [place] or [place]. Please reach out to [facility] to discuss earlier scheduling options. Thank you!    Action Taken: Message routed to:  Other: [facility]    Travel Screening: Not Applicable

## 2022-02-08 ENCOUNTER — OFFICE VISIT (OUTPATIENT)
Dept: CARDIOLOGY | Facility: CLINIC | Age: 67
End: 2022-02-08
Attending: PHYSICIAN ASSISTANT
Payer: COMMERCIAL

## 2022-02-08 VITALS
HEART RATE: 62 BPM | BODY MASS INDEX: 36.39 KG/M2 | OXYGEN SATURATION: 100 % | RESPIRATION RATE: 18 BRPM | WEIGHT: 250 LBS | SYSTOLIC BLOOD PRESSURE: 94 MMHG | DIASTOLIC BLOOD PRESSURE: 60 MMHG

## 2022-02-08 DIAGNOSIS — I25.10 CORONARY ARTERY DISEASE INVOLVING NATIVE CORONARY ARTERY WITHOUT ANGINA PECTORIS, UNSPECIFIED WHETHER NATIVE OR TRANSPLANTED HEART: Primary | ICD-10-CM

## 2022-02-08 DIAGNOSIS — I48.19 PERSISTENT ATRIAL FIBRILLATION (H): ICD-10-CM

## 2022-02-08 DIAGNOSIS — R00.1 SINUS BRADYCARDIA: ICD-10-CM

## 2022-02-08 DIAGNOSIS — R07.89 CHEST TIGHTNESS: ICD-10-CM

## 2022-02-08 PROCEDURE — 99204 OFFICE O/P NEW MOD 45 MIN: CPT | Performed by: INTERNAL MEDICINE

## 2022-02-08 NOTE — LETTER
"2/8/2022    Magdaleno Bennett MD  [address] [place] [address]    RE: Brett Castroenson       Dear Colleague,     I had the pleasure of seeing Brett Hopkins in the [facility].         [facility]   [address], [place] [address]   [email or web address]   OFFICE: [phone]          Thank you Dr. Dotson for asking the [facility] team to participate in the care of your patient, Brett Hopkins.     Impression and Plan     1.  Atrial fibrillation (persistent).  Champ has a history of persistent atrial fibrillation.  He has been followed in the Atrial Fibrillation clinic with his atrial fibrillation history outlined below in History of Present Illness.  He has been maintained on sotalol and also apixaban 5 mg twice daily.  Twelve-lead ECG 4 February 2022 revealed sinus rhythm though bradycardic with heart rate of 45 bpm.  QTc 399 ms.    2.  Chest discomfort.  I agree with Allie Dotson - TERENCE that certain features of Champ's chest discomfort seem somewhat atypical for cardiac etiology, ischemic or otherwise.  He did have a CT scan of chest 2 December 2021 which revealed some coronary calcification though described as \"minimal\". Ultimately, cannot exclude possible ischemic contribution to his symptoms. Plan:    Regadenoson nuclear perfusion study.    3.  Enlargement of proximal ascending aorta.  Cardiac MRI 17 April 2019 revealed mild enlargement of the proximal ascending aorta measuring 41 mm.  Relatively recent CT imaging to December 2021 revealed the following: Aorta measures approximately 20 x 27 mm at the annulus, 32 mm at the sinuses of Valsalva, 28 mm at the sinuses of Valsalva, 38 mm at the mid ascending aorta, 29 mm in the arch and 27 the descending thoracic aorta.    4.  Hypertension. Blood pressure is quite reasonable in the office today at 94/60 mmHg.    Follow-up and further recommendations pending test results.    35 " "minutes spent reviewing prior records (including documentation, laboratory studies, cardiac testing/imaging), interview with patient along with physical exam, planning, and subsequent documentation/crafting of note.       History of Present Illness    Once again I would like to thank you again for asking me to participate in the care of your patient, Brett Hopkins.  As you know, but to reiterate for my own records, Brett Hopkins is a 66 year old male with history of persistent atrial fibrillation.  Review of medical records indicate that he has history of atrial fibrillation dating back to 2007.  Champ had failed antiarrhythmic therapy with propafenone and flecainide.  He underwent pulmonary vein isolation ablation with Dr. Barfield on 19 August 2013.  He had recurrent atrial fibrillation identified in April 2019.  He underwent direct-current cardioversion 10 July 2019, but had fairly early recurrence.  He was started on sotalol therapy and underwent repeat cardioversion 12 September 2019.    More recently, patient had been seen by Allie PRATT for symptoms of chest discomfort.  He had reported left-sided chest pain.  It was felt by Allie PRATT symptoms were somewhat atypical for cardiac etiology.  He had reported chest \"tightness\" x3-4 weeks.  Symptoms were intermittent and not necessarily exertionally related.    On interview, Champ confirms the aforementioned. He denies associated shortness of breath. He states there does not seem to be any predictable relationship with exertion. He denies any fevers, chills, or other constitutional symptoms.    Further review of systems is otherwise negative/noncontributory (medical record and 13 point review of systems reviewed as well and pertinent positives noted).       Cardiac Diagnostics     Cardiac MRI 17 April 2019:  1. Normal left ventricular size, wall thickness and slightly reduced global systolic function. The quantified left ventricular ejection " fraction is 50%. No myocardial scar is identified.  2. Normal right ventricular size function.  3. Moderately enlarged left atrium.  4. Mild mitral valve regurgitation.  5. Tricuspid aortic valve with no stenosis or regurgitation per velocity encoded images.  6. Mildly dilated mid ascending aorta with maximal measurement 41 mm.  o When compared to previous study in 2016, there is no interval change in mid ascending    Holter monitor 24 July 2019:  1. Persistent atrial fibrillation with somewhat elevated ventricular response.  2. No symptoms recorded on diary.    Twelve-lead ECG (personally reviewed) 4 February 2022: Sinus rhythm.  Heart rate 45 bpm.  QTc 399 ms.       Physical Examination       BP 94/60 (BP Location: Right arm, Patient Position: Sitting, Cuff Size: Adult Large)   Pulse 62   Resp 18   Wt 113.4 kg (250 lb)   SpO2 100%   BMI 36.39 kg/m          Wt Readings from Last 3 Encounters:   02/08/22 113.4 kg (250 lb)   02/04/22 114.3 kg (252 lb)   11/22/21 113.3 kg (249 lb 12.8 oz)     The patient is alert and oriented times three. Sclerae are anicteric. Mucosal membranes are moist. Jugular venous pressure is normal. No significant adenopathy/thyromegally appreciated. Lungs are clear with good expansion. On cardiovascular exam, the patient has a regular S1 and S2. Abdomen is soft and non-tender. Extremities reveal no clubbing, cyanosis, or edema.       Imaging     CT scan of chest/abdomen/pelvis 2 December 2021:  1. No acute arterial abnormality. Specifically no arterial dissection or aneurysm.  2. Very mild asymmetric groundglass attenuation in the right upper lobe may reflect an early pulmonary infectious infiltrate.  3. Enlarged right gland of the thyroid with a 28 mm nodule. A dedicated thyroid ultrasound is recommended to better characterize.  4. Large esophageal hiatal hernia.  5. Hepatic cysts.  6. Aorta measures approximately 20 x 27 mm at the annulus, 32 mm at the sinuses of Valsalva, 28 mm at the  "sinuses of Valsalva, 38 mm at the mid ascending aorta, 29 mm in the arch and 27 the descending thoracic aorta.  7. Coronary calcification \"minimal\".    Chest radiograph 4 February 2022:  1. Heart size normal. Moderate-sized hiatal hernia. Lungs appear clear.          Family History/Social History/Risk Factors   Patient does not smoke.  Family history reviewed, and family history includes Atrial fibrillation in his brother, brother, father, mother, and sister; CABG in his brother; Cerebrovascular Disease in his brother and sister; Dementia in his mother; Diabetes Type 2  in his brother and mother; Parkinsonism in his sister and sister; Rheumatoid Arthritis in his brother and father.        Medical History  Surgical History Family History Social History   Past Medical History:   Diagnosis Date     Status post ablation of atrial fibrillation 07/28/2015    PVI August 19, 2013 (cryo-PVI only)     Past Surgical History:   Procedure Laterality Date     ARTHROPLASTY REVISION HIP Right 05/01/2019     ARTHROPLASTY REVISION KNEE Right 2019     CARDIOVERSION  07/01/2019    7/10/2019     CARDIOVERSION  09/12/2019     CATARACT EXTRACTION Bilateral 06/2021     DENTAL SURGERY      Oral Surgery Tooth Extraction;  Implant     HI ABLATE HEART DYSRHYTHM FOCUS  08/19/2013    Description: Catheter Ablation Atrial Fibrillation;  Recorded: 11/16/2013;  Comments: PVI Aug 19, 2013 (Cryo to all 4 PV's)     TOOTH EXTRACTION      implant     [facility] TOTAL HIP ARTHROPLASTY Right 05/08/2019    Procedure: RIGHT TOTAL HIP ARTHROPLASTY DIRECT ANTERIOR APPROACH;  Surgeon: Mario Woodruff MD;  Location: [facility];  Service: Orthopedics     [facility] TOTAL KNEE ARTHROPLASTY Right 06/21/2017    Procedure: 2)  RIGHT TOTAL KNEE ARTHROPLASTY;  Surgeon: Mario VICKERS MD;  Location: [facility] OR;  Service: Orthopedics     Family History   Problem Relation Age of Onset     Atrial fibrillation Mother      Dementia Mother         dx 80s     Diabetes " Type 2  Mother         dx 60s/70s     Atrial fibrillation Father      Rheumatoid Arthritis Father      Atrial fibrillation Sister      Cerebrovascular Disease Sister      Parkinsonism Sister      Parkinsonism Sister      Atrial fibrillation Brother      Rheumatoid Arthritis Brother      Diabetes Type 2  Brother         dx 40s     Cerebrovascular Disease Brother      CABG Brother      Atrial fibrillation Brother         Social History     Socioeconomic History     Marital status:      Spouse name: Not on file     Number of children: 2     Years of education: Not on file     Highest education level: Not on file   Occupational History     Occupation: CPA   Tobacco Use     Smoking status: Never Smoker     Smokeless tobacco: Never Used   Substance and Sexual Activity     Alcohol use: Yes     Alcohol/week: 5.0 standard drinks     Types: 5 Cans of beer per week     Drug use: No     Sexual activity: Yes     Partners: Female   Other Topics Concern     Not on file   Social History Narrative    Diet-regular            Exercise-walk, limited by R leg, R knee     Social Determinants of Health     Financial Resource Strain: Not on file   Food Insecurity: Not on file   Transportation Needs: Not on file   Physical Activity: Not on file   Stress: Not on file   Social Connections: Not on file   Intimate Partner Violence: Not on file   Housing Stability: Not on file           Medications  Allergies   Current Outpatient Medications   Medication Sig Dispense Refill     apixaban ANTICOAGULANT (ELIQUIS ANTICOAGULANT) 5 MG tablet [ELIQUIS 5 MG TAB TABLET] TAKE 1 TABLET(5 MG) BY MOUTH TWICE DAILY 180 tablet 3     hydrochlorothiazide (HYDRODIURIL) 25 MG tablet [HYDROCHLOROTHIAZIDE (HYDRODIURIL) 25 MG TABLET] Take 1 tablet each morning for high blood pressure. 90 tablet 0     levothyroxine (SYNTHROID/LEVOTHROID) 100 MCG tablet Take 1 tablet (100 mcg) by mouth daily 90 tablet 3     lisinopril (ZESTRIL) 40 MG tablet TAKE 1 TABLET BY  MOUTH DAILY FOR HIGH BLOOD PRESSURE 90 tablet 2     omeprazole (PRILOSEC) 20 MG DR capsule TAKE 1 CAPSULE(20 MG) BY MOUTH DAILY BEFORE BREAKFAST 90 capsule 3     sotalol (BETAPACE) 80 MG tablet [SOTALOL (BETAPACE) 80 MG TABLET] TAKE 1 TABLET(80 MG) BY MOUTH TWICE DAILY 180 tablet 3       Allergies   Allergen Reactions     Other Environmental Allergy Unknown     seasonal     Sulfa (Sulfonamide Antibiotics) [Sulfa Drugs] Rash     Rash - turned purple  , Around age 32          Lab Results    Chemistry/lipid CBC Cardiac Enzymes/BNP/TSH/INR   Recent Labs   Lab Test 11/22/21  0747   CHOL 157   HDL 51   LDL 93   TRIG 66     Recent Labs   Lab Test 11/22/21  0747 11/20/20  0915 09/10/18  0852   LDL 93 82 82     Recent Labs   Lab Test 02/04/22  1214      POTASSIUM 4.7   CHLORIDE 103   CO2 29   GLC 89   BUN 20   CR 1.05   GFRESTIMATED 79   YFN 9.4     Recent Labs   Lab Test 02/04/22  1214 11/22/21  0747 12/21/20  2232   CR 1.05 0.97 1.16     No results for input(s): A1C in the last 72053 hours.       Recent Labs   Lab Test 02/04/22  1214   WBC 7.3   HGB 15.7   HCT 48.6   MCV 94        Recent Labs   Lab Test 02/04/22  1214 12/21/20  2232 11/06/19  1611   HGB 15.7 14.8 15.5    Recent Labs   Lab Test 02/04/22  1214   TROPONINI <0.01     Recent Labs   Lab Test 02/04/22  1214   BNP 56     Recent Labs   Lab Test 02/04/22  1214   TSH 0.26*     Recent Labs   Lab Test 12/21/20  2232   INR 1.12*          Medications  Allergies   Current Outpatient Medications   Medication Sig Dispense Refill     apixaban ANTICOAGULANT (ELIQUIS ANTICOAGULANT) 5 MG tablet [ELIQUIS 5 MG TAB TABLET] TAKE 1 TABLET(5 MG) BY MOUTH TWICE DAILY 180 tablet 3     hydrochlorothiazide (HYDRODIURIL) 25 MG tablet [HYDROCHLOROTHIAZIDE (HYDRODIURIL) 25 MG TABLET] Take 1 tablet each morning for high blood pressure. 90 tablet 0     levothyroxine (SYNTHROID/LEVOTHROID) 100 MCG tablet Take 1 tablet (100 mcg) by mouth daily 90 tablet 3     lisinopril (ZESTRIL)  40 MG tablet TAKE 1 TABLET BY MOUTH DAILY FOR HIGH BLOOD PRESSURE 90 tablet 2     omeprazole (PRILOSEC) 20 MG DR capsule TAKE 1 CAPSULE(20 MG) BY MOUTH DAILY BEFORE BREAKFAST 90 capsule 3     sotalol (BETAPACE) 80 MG tablet [SOTALOL (BETAPACE) 80 MG TABLET] TAKE 1 TABLET(80 MG) BY MOUTH TWICE DAILY 180 tablet 3      Allergies   Allergen Reactions     Other Environmental Allergy Unknown     seasonal     Sulfa (Sulfonamide Antibiotics) [Sulfa Drugs] Rash     Rash - turned purple  , Around age 32        Lab Results   Lab Results   Component Value Date     02/04/2022    CO2 29 02/04/2022    BUN 20 02/04/2022     Lab Results   Component Value Date    WBC 7.3 02/04/2022    HGB 15.7 02/04/2022    HCT 48.6 02/04/2022    MCV 94 02/04/2022     02/04/2022     Lab Results   Component Value Date    CHOL 157 11/22/2021    TRIG 66 11/22/2021    HDL 51 11/22/2021     Lab Results   Component Value Date    INR 1.12 12/21/2020     Lab Results   Component Value Date    BNP 56 02/04/2022     Lab Results   Component Value Date    TROPONINI <0.01 02/04/2022     Lab Results   Component Value Date    TSH 0.26 02/04/2022         Medical History  Surgical History   Past Medical History:   Diagnosis Date     Status post ablation of atrial fibrillation 07/28/2015    PVI August 19, 2013 (cryo-PVI only)      Past Surgical History:   Procedure Laterality Date     ARTHROPLASTY REVISION HIP Right 05/01/2019     ARTHROPLASTY REVISION KNEE Right 2019     CARDIOVERSION  07/01/2019    7/10/2019     CARDIOVERSION  09/12/2019     CATARACT EXTRACTION Bilateral 06/2021     DENTAL SURGERY      Oral Surgery Tooth Extraction;  Implant     [facility] ABLATE HEART DYSRHYTHM FOCUS  08/19/2013    Description: Catheter Ablation Atrial Fibrillation;  Recorded: 11/16/2013;  Comments: PVI Aug 19, 2013 (Cryo to all 4 PV's)     TOOTH EXTRACTION      implant     [facility] TOTAL HIP ARTHROPLASTY Right 05/08/2019    Procedure: RIGHT TOTAL HIP ARTHROPLASTY DIRECT ANTERIOR  APPROACH;  Surgeon: Mario Woodruff MD;  Location: [facility];  Service: Orthopedics     [facility] TOTAL KNEE ARTHROPLASTY Right 06/21/2017    Procedure: 2)  RIGHT TOTAL KNEE ARTHROPLASTY;  Surgeon: Mario VICKESR MD;  Location: [facility];  Service: Orthopedics                       [facility]  cc:   Allie Dotson PA-C  [address] [address]

## 2022-02-08 NOTE — PROGRESS NOTES
"       [facility] HEART CARE   [address], [place] [address]   [email or web address]   OFFICE: [phone]          Thank you Dr. Dotson for asking the [facility] Heart Care team to participate in the care of your patient, Brett Hopkins.     Impression and Plan     1.  Atrial fibrillation (persistent).  Champ has a history of persistent atrial fibrillation.  He has been followed in the Atrial Fibrillation clinic with his atrial fibrillation history outlined below in History of Present Illness.  He has been maintained on sotalol and also apixaban 5 mg twice daily.  Twelve-lead ECG 4 February 2022 revealed sinus rhythm though bradycardic with heart rate of 45 bpm.  QTc 399 ms.    2.  Chest discomfort.  I agree with Allie Dotson - TERENCE that certain features of Champ's chest discomfort seem somewhat atypical for cardiac etiology, ischemic or otherwise.  He did have a CT scan of chest 2 December 2021 which revealed some coronary calcification though described as \"minimal\". Ultimately, cannot exclude possible ischemic contribution to his symptoms. Plan:  Regadenoson nuclear perfusion study.    3.  Enlargement of proximal ascending aorta.  Cardiac MRI 17 April 2019 revealed mild enlargement of the proximal ascending aorta measuring 41 mm.  Relatively recent CT imaging to December 2021 revealed the following: Aorta measures approximately 20 x 27 mm at the annulus, 32 mm at the sinuses of Valsalva, 28 mm at the sinuses of Valsalva, 38 mm at the mid ascending aorta, 29 mm in the arch and 27 the descending thoracic aorta.    4.  Hypertension. Blood pressure is quite reasonable in the office today at 94/60 mmHg.    Follow-up and further recommendations pending test results.    35 minutes spent reviewing prior records (including documentation, laboratory studies, cardiac testing/imaging), interview with patient along with physical exam, planning, and subsequent documentation/crafting of " "note.       History of Present Illness    Once again I would like to thank you again for asking me to participate in the care of your patient, Brett Hopkins.  As you know, but to reiterate for my own records, Brett Hopkins is a 66 year old male with history of persistent atrial fibrillation.  Review of medical records indicate that he has history of atrial fibrillation dating back to 2007.  Champ had failed antiarrhythmic therapy with propafenone and flecainide.  He underwent pulmonary vein isolation ablation with Dr. Barfield on 19 August 2013.  He had recurrent atrial fibrillation identified in April 2019.  He underwent direct-current cardioversion 10 July 2019, but had fairly early recurrence.  He was started on sotalol therapy and underwent repeat cardioversion 12 September 2019.    More recently, patient had been seen by Allie PRATT for symptoms of chest discomfort.  He had reported left-sided chest pain.  It was felt by Allie PRATT symptoms were somewhat atypical for cardiac etiology.  He had reported chest \"tightness\" x3-4 weeks.  Symptoms were intermittent and not necessarily exertionally related.    On interview, Champ confirms the aforementioned. He denies associated shortness of breath. He states there does not seem to be any predictable relationship with exertion. He denies any fevers, chills, or other constitutional symptoms.    Further review of systems is otherwise negative/noncontributory (medical record and 13 point review of systems reviewed as well and pertinent positives noted).       Cardiac Diagnostics     Cardiac MRI 17 April 2019:  Normal left ventricular size, wall thickness and slightly reduced global systolic function. The quantified left ventricular ejection fraction is 50%. No myocardial scar is identified.  Normal right ventricular size function.  Moderately enlarged left atrium.  Mild mitral valve regurgitation.  Tricuspid aortic valve with no stenosis or regurgitation " "per velocity encoded images.  Mildly dilated mid ascending aorta with maximal measurement 41 mm.  When compared to previous study in 2016, there is no interval change in mid ascending    Holter monitor 24 July 2019:  Persistent atrial fibrillation with somewhat elevated ventricular response.  No symptoms recorded on diary.    Twelve-lead ECG (personally reviewed) 4 February 2022: Sinus rhythm.  Heart rate 45 bpm.  QTc 399 ms.       Physical Examination       BP 94/60 (BP Location: Right arm, Patient Position: Sitting, Cuff Size: Adult Large)   Pulse 62   Resp 18   Wt 113.4 kg (250 lb)   SpO2 100%   BMI 36.39 kg/m          Wt Readings from Last 3 Encounters:   02/08/22 113.4 kg (250 lb)   02/04/22 114.3 kg (252 lb)   11/22/21 113.3 kg (249 lb 12.8 oz)     The patient is alert and oriented times three. Sclerae are anicteric. Mucosal membranes are moist. Jugular venous pressure is normal. No significant adenopathy/thyromegally appreciated. Lungs are clear with good expansion. On cardiovascular exam, the patient has a regular S1 and S2. Abdomen is soft and non-tender. Extremities reveal no clubbing, cyanosis, or edema.       Imaging     CT scan of chest/abdomen/pelvis 2 December 2021:  No acute arterial abnormality. Specifically no arterial dissection or aneurysm.  Very mild asymmetric groundglass attenuation in the right upper lobe may reflect an early pulmonary infectious infiltrate.  Enlarged right gland of the thyroid with a 28 mm nodule. A dedicated thyroid ultrasound is recommended to better characterize.  Large esophageal hiatal hernia.  Hepatic cysts.  Aorta measures approximately 20 x 27 mm at the annulus, 32 mm at the sinuses of Valsalva, 28 mm at the sinuses of Valsalva, 38 mm at the mid ascending aorta, 29 mm in the arch and 27 the descending thoracic aorta.  Coronary calcification \"minimal\".    Chest radiograph 4 February 2022:  Heart size normal. Moderate-sized hiatal hernia. Lungs appear clear.     "      Family History/Social History/Risk Factors   Patient does not smoke.  Family history reviewed, and family history includes Atrial fibrillation in his brother, brother, father, mother, and sister; CABG in his brother; Cerebrovascular Disease in his brother and sister; Dementia in his mother; Diabetes Type 2  in his brother and mother; Parkinsonism in his sister and sister; Rheumatoid Arthritis in his brother and father.        Medical History  Surgical History Family History Social History   Past Medical History:   Diagnosis Date     Status post ablation of atrial fibrillation 07/28/2015    PVI August 19, 2013 (cryo-PVI only)     Past Surgical History:   Procedure Laterality Date     ARTHROPLASTY REVISION HIP Right 05/01/2019     ARTHROPLASTY REVISION KNEE Right 2019     CARDIOVERSION  07/01/2019    7/10/2019     CARDIOVERSION  09/12/2019     CATARACT EXTRACTION Bilateral 06/2021     DENTAL SURGERY      Oral Surgery Tooth Extraction;  Implant     SD ABLATE HEART DYSRHYTHM FOCUS  08/19/2013    Description: Catheter Ablation Atrial Fibrillation;  Recorded: 11/16/2013;  Comments: PVI Aug 19, 2013 (Cryo to all 4 PV's)     TOOTH EXTRACTION      implant     [facility] TOTAL HIP ARTHROPLASTY Right 05/08/2019    Procedure: RIGHT TOTAL HIP ARTHROPLASTY DIRECT ANTERIOR APPROACH;  Surgeon: Mario Woodruff MD;  Location: [facility] OR;  Service: Orthopedics     [facility] TOTAL KNEE ARTHROPLASTY Right 06/21/2017    Procedure: 2)  RIGHT TOTAL KNEE ARTHROPLASTY;  Surgeon: Mario VICKERS MD;  Location: [facility] OR;  Service: Orthopedics     Family History   Problem Relation Age of Onset     Atrial fibrillation Mother      Dementia Mother         dx 80s     Diabetes Type 2  Mother         dx 60s/70s     Atrial fibrillation Father      Rheumatoid Arthritis Father      Atrial fibrillation Sister      Cerebrovascular Disease Sister      Parkinsonism Sister      Parkinsonism Sister      Atrial fibrillation Brother      Rheumatoid  Arthritis Brother      Diabetes Type 2  Brother         dx 40s     Cerebrovascular Disease Brother      CABG Brother      Atrial fibrillation Brother         Social History     Socioeconomic History     Marital status:      Spouse name: Not on file     Number of children: 2     Years of education: Not on file     Highest education level: Not on file   Occupational History     Occupation: CPA   Tobacco Use     Smoking status: Never Smoker     Smokeless tobacco: Never Used   Substance and Sexual Activity     Alcohol use: Yes     Alcohol/week: 5.0 standard drinks     Types: 5 Cans of beer per week     Drug use: No     Sexual activity: Yes     Partners: Female   Other Topics Concern     Not on file   Social History Narrative    Diet-regular            Exercise-walk, limited by R leg, R knee     Social Determinants of Health     Financial Resource Strain: Not on file   Food Insecurity: Not on file   Transportation Needs: Not on file   Physical Activity: Not on file   Stress: Not on file   Social Connections: Not on file   Intimate Partner Violence: Not on file   Housing Stability: Not on file           Medications  Allergies   Current Outpatient Medications   Medication Sig Dispense Refill     apixaban ANTICOAGULANT (ELIQUIS ANTICOAGULANT) 5 MG tablet [ELIQUIS 5 MG TAB TABLET] TAKE 1 TABLET(5 MG) BY MOUTH TWICE DAILY 180 tablet 3     hydrochlorothiazide (HYDRODIURIL) 25 MG tablet [HYDROCHLOROTHIAZIDE (HYDRODIURIL) 25 MG TABLET] Take 1 tablet each morning for high blood pressure. 90 tablet 0     levothyroxine (SYNTHROID/LEVOTHROID) 100 MCG tablet Take 1 tablet (100 mcg) by mouth daily 90 tablet 3     lisinopril (ZESTRIL) 40 MG tablet TAKE 1 TABLET BY MOUTH DAILY FOR HIGH BLOOD PRESSURE 90 tablet 2     omeprazole (PRILOSEC) 20 MG DR capsule TAKE 1 CAPSULE(20 MG) BY MOUTH DAILY BEFORE BREAKFAST 90 capsule 3     sotalol (BETAPACE) 80 MG tablet [SOTALOL (BETAPACE) 80 MG TABLET] TAKE 1 TABLET(80 MG) BY MOUTH TWICE DAILY  180 tablet 3       Allergies   Allergen Reactions     Other Environmental Allergy Unknown     seasonal     Sulfa (Sulfonamide Antibiotics) [Sulfa Drugs] Rash     Rash - turned purple  , Around age 32          Lab Results    Chemistry/lipid CBC Cardiac Enzymes/BNP/TSH/INR   Recent Labs   Lab Test 11/22/21  0747   CHOL 157   HDL 51   LDL 93   TRIG 66     Recent Labs   Lab Test 11/22/21  0747 11/20/20  0915 09/10/18  0852   LDL 93 82 82     Recent Labs   Lab Test 02/04/22  1214      POTASSIUM 4.7   CHLORIDE 103   CO2 29   GLC 89   BUN 20   CR 1.05   GFRESTIMATED 79   YFN 9.4     Recent Labs   Lab Test 02/04/22  1214 11/22/21  0747 12/21/20  2232   CR 1.05 0.97 1.16     No results for input(s): A1C in the last 37319 hours.       Recent Labs   Lab Test 02/04/22  1214   WBC 7.3   HGB 15.7   HCT 48.6   MCV 94        Recent Labs   Lab Test 02/04/22  1214 12/21/20  2232 11/06/19  1611   HGB 15.7 14.8 15.5    Recent Labs   Lab Test 02/04/22  1214   TROPONINI <0.01     Recent Labs   Lab Test 02/04/22  1214   BNP 56     Recent Labs   Lab Test 02/04/22  1214   TSH 0.26*     Recent Labs   Lab Test 12/21/20  2232   INR 1.12*          Medications  Allergies   Current Outpatient Medications   Medication Sig Dispense Refill     apixaban ANTICOAGULANT (ELIQUIS ANTICOAGULANT) 5 MG tablet [ELIQUIS 5 MG TAB TABLET] TAKE 1 TABLET(5 MG) BY MOUTH TWICE DAILY 180 tablet 3     hydrochlorothiazide (HYDRODIURIL) 25 MG tablet [HYDROCHLOROTHIAZIDE (HYDRODIURIL) 25 MG TABLET] Take 1 tablet each morning for high blood pressure. 90 tablet 0     levothyroxine (SYNTHROID/LEVOTHROID) 100 MCG tablet Take 1 tablet (100 mcg) by mouth daily 90 tablet 3     lisinopril (ZESTRIL) 40 MG tablet TAKE 1 TABLET BY MOUTH DAILY FOR HIGH BLOOD PRESSURE 90 tablet 2     omeprazole (PRILOSEC) 20 MG DR capsule TAKE 1 CAPSULE(20 MG) BY MOUTH DAILY BEFORE BREAKFAST 90 capsule 3     sotalol (BETAPACE) 80 MG tablet [SOTALOL (BETAPACE) 80 MG TABLET] TAKE 1  TABLET(80 MG) BY MOUTH TWICE DAILY 180 tablet 3      Allergies   Allergen Reactions     Other Environmental Allergy Unknown     seasonal     Sulfa (Sulfonamide Antibiotics) [Sulfa Drugs] Rash     Rash - turned purple  , Around age 32        Lab Results   Lab Results   Component Value Date     02/04/2022    CO2 29 02/04/2022    BUN 20 02/04/2022     Lab Results   Component Value Date    WBC 7.3 02/04/2022    HGB 15.7 02/04/2022    HCT 48.6 02/04/2022    MCV 94 02/04/2022     02/04/2022     Lab Results   Component Value Date    CHOL 157 11/22/2021    TRIG 66 11/22/2021    HDL 51 11/22/2021     Lab Results   Component Value Date    INR 1.12 12/21/2020     Lab Results   Component Value Date    BNP 56 02/04/2022     Lab Results   Component Value Date    TROPONINI <0.01 02/04/2022     Lab Results   Component Value Date    TSH 0.26 02/04/2022         Medical History  Surgical History   Past Medical History:   Diagnosis Date     Status post ablation of atrial fibrillation 07/28/2015    PVI August 19, 2013 (cryo-PVI only)      Past Surgical History:   Procedure Laterality Date     ARTHROPLASTY REVISION HIP Right 05/01/2019     ARTHROPLASTY REVISION KNEE Right 2019     CARDIOVERSION  07/01/2019    7/10/2019     CARDIOVERSION  09/12/2019     CATARACT EXTRACTION Bilateral 06/2021     DENTAL SURGERY      Oral Surgery Tooth Extraction;  Implant     [facility] ABLATE HEART DYSRHYTHM FOCUS  08/19/2013    Description: Catheter Ablation Atrial Fibrillation;  Recorded: 11/16/2013;  Comments: PVI Aug 19, 2013 (Cryo to all 4 PV's)     TOOTH EXTRACTION      implant     [facility] TOTAL HIP ARTHROPLASTY Right 05/08/2019    Procedure: RIGHT TOTAL HIP ARTHROPLASTY DIRECT ANTERIOR APPROACH;  Surgeon: Mario Woodruff MD;  Location: [facility];  Service: Orthopedics     [facility] TOTAL KNEE ARTHROPLASTY Right 06/21/2017    Procedure: 2)  RIGHT TOTAL KNEE ARTHROPLASTY;  Surgeon: Mario VICKERS MD;  Location: [facility] OR;  Service:  Orthopedics

## 2022-02-09 ENCOUNTER — TELEPHONE (OUTPATIENT)
Dept: CARDIOLOGY | Facility: CLINIC | Age: 67
End: 2022-02-09
Payer: COMMERCIAL

## 2022-02-11 ENCOUNTER — HOSPITAL ENCOUNTER (OUTPATIENT)
Dept: NUCLEAR MEDICINE | Facility: CLINIC | Age: 67
End: 2022-02-11
Attending: INTERNAL MEDICINE
Payer: COMMERCIAL

## 2022-02-11 ENCOUNTER — HOSPITAL ENCOUNTER (OUTPATIENT)
Dept: CARDIOLOGY | Facility: CLINIC | Age: 67
End: 2022-02-11
Attending: INTERNAL MEDICINE
Payer: COMMERCIAL

## 2022-02-11 DIAGNOSIS — R00.1 SINUS BRADYCARDIA: ICD-10-CM

## 2022-02-11 DIAGNOSIS — I25.10 CORONARY ARTERY DISEASE INVOLVING NATIVE CORONARY ARTERY WITHOUT ANGINA PECTORIS, UNSPECIFIED WHETHER NATIVE OR TRANSPLANTED HEART: ICD-10-CM

## 2022-02-11 DIAGNOSIS — R07.89 CHEST TIGHTNESS: Primary | ICD-10-CM

## 2022-02-11 DIAGNOSIS — I48.19 PERSISTENT ATRIAL FIBRILLATION (H): ICD-10-CM

## 2022-02-11 DIAGNOSIS — R07.89 CHEST TIGHTNESS: ICD-10-CM

## 2022-02-11 LAB
CV BLOOD PRESSURE: 70 MMHG
CV STRESS CURRENT BP HE: NORMAL
CV STRESS CURRENT HR HE: 46
CV STRESS CURRENT HR HE: 47
CV STRESS CURRENT HR HE: 59
CV STRESS CURRENT HR HE: 61
CV STRESS CURRENT HR HE: 62
CV STRESS CURRENT HR HE: 62
CV STRESS CURRENT HR HE: 65
CV STRESS CURRENT HR HE: 68
CV STRESS CURRENT HR HE: 69
CV STRESS CURRENT HR HE: 70
CV STRESS CURRENT HR HE: 72
CV STRESS CURRENT HR HE: 72
CV STRESS DEVIATION TIME HE: NORMAL
CV STRESS ECHO PERCENT HR HE: NORMAL
CV STRESS EXERCISE STAGE HE: NORMAL
CV STRESS FINAL RESTING BP HE: NORMAL
CV STRESS FINAL RESTING HR HE: 62
CV STRESS MAX HR HE: 74
CV STRESS MAX TREADMILL GRADE HE: 0
CV STRESS MAX TREADMILL SPEED HE: 0
CV STRESS PEAK DIA BP HE: NORMAL
CV STRESS PEAK SYS BP HE: NORMAL
CV STRESS PHASE HE: NORMAL
CV STRESS PROTOCOL HE: NORMAL
CV STRESS ST DEVIATION AMOUNT HE: NORMAL
CV STRESS ST DEVIATION ELEVATION HE: NORMAL
CV STRESS ST EVELATION AMOUNT HE: NORMAL
CV STRESS TEST TYPE HE: NORMAL
CV STRESS TOTAL STAGE TIME MIN 1 HE: NORMAL
RATE PRESSURE PRODUCT: 8140
STRESS ECHO BASELINE DIASTOLIC HE: 79
STRESS ECHO BASELINE HR: 48
STRESS ECHO BASELINE SYSTOLIC BP: 140
STRESS ECHO CALCULATED PERCENT HR: 47 %
STRESS ECHO LAST STRESS DIASTOLIC BP: 71
STRESS ECHO LAST STRESS HR: 65
STRESS ECHO LAST STRESS SYSTOLIC BP: 110
STRESS ECHO TARGET HR: 156

## 2022-02-11 PROCEDURE — 343N000001 HC RX 343: Performed by: INTERNAL MEDICINE

## 2022-02-11 PROCEDURE — 250N000011 HC RX IP 250 OP 636: Performed by: INTERNAL MEDICINE

## 2022-02-11 PROCEDURE — A9500 TC99M SESTAMIBI: HCPCS | Performed by: INTERNAL MEDICINE

## 2022-02-11 PROCEDURE — 78452 HT MUSCLE IMAGE SPECT MULT: CPT

## 2022-02-11 PROCEDURE — 93018 CV STRESS TEST I&R ONLY: CPT | Performed by: INTERNAL MEDICINE

## 2022-02-11 PROCEDURE — 93017 CV STRESS TEST TRACING ONLY: CPT | Performed by: INTERNAL MEDICINE

## 2022-02-11 PROCEDURE — 93016 CV STRESS TEST SUPVJ ONLY: CPT | Performed by: INTERNAL MEDICINE

## 2022-02-11 PROCEDURE — 78452 HT MUSCLE IMAGE SPECT MULT: CPT | Mod: 26 | Performed by: INTERNAL MEDICINE

## 2022-02-11 RX ORDER — REGADENOSON 0.08 MG/ML
0.4 INJECTION, SOLUTION INTRAVENOUS ONCE
Status: COMPLETED | OUTPATIENT
Start: 2022-02-11 | End: 2022-02-11

## 2022-02-11 RX ORDER — AMINOPHYLLINE 25 MG/ML
50 INJECTION, SOLUTION INTRAVENOUS
Status: DISCONTINUED | OUTPATIENT
Start: 2022-02-11 | End: 2022-02-11 | Stop reason: HOSPADM

## 2022-02-11 RX ADMIN — Medication 36.1 MILLICURIE: at 08:00

## 2022-02-11 RX ADMIN — Medication 8.2 MILLICURIE: at 07:10

## 2022-02-11 RX ADMIN — REGADENOSON 0.4 MG: 0.08 INJECTION, SOLUTION INTRAVENOUS at 08:00

## 2022-02-11 NOTE — PROGRESS NOTES
C/O left sided chest pain onset for one month.  No associated activity associated with sx.  SANDERS no c/o increase fatigue.  Hx of CAD, hx of A-Fib.  Ailyn Jhaveri RN

## 2022-02-18 ENCOUNTER — TRANSFERRED RECORDS (OUTPATIENT)
Dept: HEALTH INFORMATION MANAGEMENT | Facility: CLINIC | Age: 67
End: 2022-02-18
Payer: COMMERCIAL

## 2022-02-21 DIAGNOSIS — I10 ESSENTIAL HYPERTENSION: ICD-10-CM

## 2022-02-22 RX ORDER — HYDROCHLOROTHIAZIDE 25 MG/1
TABLET ORAL
Qty: 90 TABLET | Refills: 2 | Status: SHIPPED | OUTPATIENT
Start: 2022-02-22 | End: 2022-11-02

## 2022-02-22 NOTE — TELEPHONE ENCOUNTER
"Last Written Prescription Date:  11/22/2021  Last Fill Quantity: 90,  # refills: 0   Last office visit provider:  12/28/2021 Dr. Bennett     Requested Prescriptions   Pending Prescriptions Disp Refills     hydrochlorothiazide (HYDRODIURIL) 25 MG tablet [Pharmacy Med Name: HYDROCHLOROTHIAZIDE 25MG TABLETS] 90 tablet 0     Sig: TAKE 1 TABLET BY MOUTH ONCE DAILY IN THE MORNING FOR HIGH BLOOD PRESSURE       Diuretics (Including Combos) Protocol Passed - 2/21/2022  7:24 AM        Passed - Blood pressure under 140/90 in past 12 months     BP Readings from Last 3 Encounters:   02/08/22 94/60   02/04/22 (!) 160/99   11/22/21 126/68                 Passed - Recent (12 mo) or future (30 days) visit within the authorizing provider's specialty     Patient has had an office visit with the authorizing provider or a provider within the authorizing providers department within the previous 12 mos or has a future within next 30 days. See \"Patient Info\" tab in inbasket, or \"Choose Columns\" in Meds & Orders section of the refill encounter.              Passed - Medication is active on med list        Passed - Patient is age 18 or older        Passed - Normal serum creatinine on file in past 12 months     Recent Labs   Lab Test 02/04/22  1214   CR 1.05              Passed - Normal serum potassium on file in past 12 months     Recent Labs   Lab Test 02/04/22  1214   POTASSIUM 4.7                    Passed - Normal serum sodium on file in past 12 months     Recent Labs   Lab Test 02/04/22  1214                      Genoveva Chung RN 02/22/22 9:14 AM  "

## 2022-02-23 ENCOUNTER — OFFICE VISIT (OUTPATIENT)
Dept: FAMILY MEDICINE | Facility: CLINIC | Age: 67
End: 2022-02-23
Payer: COMMERCIAL

## 2022-02-23 VITALS
DIASTOLIC BLOOD PRESSURE: 60 MMHG | SYSTOLIC BLOOD PRESSURE: 112 MMHG | OXYGEN SATURATION: 99 % | HEART RATE: 51 BPM | WEIGHT: 256 LBS | BODY MASS INDEX: 37.26 KG/M2

## 2022-02-23 DIAGNOSIS — I10 PRIMARY HYPERTENSION: ICD-10-CM

## 2022-02-23 DIAGNOSIS — Z11.59 NEED FOR HEPATITIS C SCREENING TEST: Primary | ICD-10-CM

## 2022-02-23 DIAGNOSIS — R07.89 CHEST DISCOMFORT: ICD-10-CM

## 2022-02-23 DIAGNOSIS — E04.1 THYROID NODULE: ICD-10-CM

## 2022-02-23 PROCEDURE — 99214 OFFICE O/P EST MOD 30 MIN: CPT | Performed by: FAMILY MEDICINE

## 2022-02-23 NOTE — PROGRESS NOTES
ASSESSMENT:       (I10) Hypertension  Comment: Currently well controlled  Plan:      (R07.89) Chest discomfort  Comment: At least a 1 month history of intermittent recurrent left upper chest pain, presumed musculoskeletal  Plan:      (E04.1) Thyroid nodule  Comment: Incidentally noted, surveillance recommended for follow-up  Plan:        PLAN:  1.  Overall reassurance, discussed with the patient that given his significant testing I would not do any further work-up at this time.  2.  Patient is otherwise due for a physical in November see sooner if needed  3.  Of note 30 minutes were spent with the patient reviewing his history previously work-up diagnosis and prognosis.      No orders of the defined types were placed in this encounter.    There are no discontinued medications.    No follow-ups on file.    CHIEF COMPLAINT:  chief complaint    SUBJECTIVE:  Brett is a 66 year old male who comes in for follow-up of some underlying comorbidities and his sensation of chest discomfort.  I saw the patient back in November for his physical examination.  Because of a history of thoracic aneurysm I did a CT scan which showed a stable aneurysm however it showed a groundglass opacity in the right upper lung, since the patient was utterly asymptomatic I did not pursue any further work-up or treatment.  Incidentally thyroid nodules were noted the patient had an ultrasound which shows multiple thyroid nodules but interval follow-up also recommended.    In February 4 patient was seen in walk-in care because of at that time about a month history of intermittent recurrent left upper chest pain, chest x-ray was negative, he was seen by cardiology who felt this was not likely to be cardiac ischemia however he did order a stress test which was completely normal.    I discussed with the patient that since the pulmonic and cardiac work-up are essentially normal I think it is likely that this chest discomfort is musculoskeletal.  Of note  there is not any pattern or trend that can come on, and last for an unknown determine duration.    Patient also has a history of atrial fibrillation status post ablation as well as cardioversion but he checks his pulse with a home device that is linked to his phone and that does an EKG and tells him that he is in normal sinus rhythm.    Overall the patient is reassured that the work-up he has had is sufficient and adequate I would not pursue any further work-up at this time.    REVIEW OF SYSTEMS:      All other systems are negative.    PFSH:  Immunization History   Administered Date(s) Administered     COVID-19,PF,Pfizer (12+ Yrs) 04/05/2021, 04/26/2021, 11/22/2021     DT (PEDS <7y) 05/25/2004     FLU 6-35 months 11/05/2010, 01/16/2012     Flu, Unspecified 10/19/2007, 11/23/2012, 10/03/2016, 09/29/2019     Influenza (IIV3) PF 10/19/2007, 11/23/2012, 10/07/2014     Influenza Quad, Recombinant, pf(RIV4) (Flublok) 11/20/2020     Influenza Vaccine IM > 6 months Valent IIV4 (Alfuria,Fluzone) 12/21/2015, 10/03/2016, 10/02/2019     Influenza Vaccine, 6+MO IM (QUADRIVALENT W/PRESERVATIVES) 10/07/2014, 09/10/2018     Influenza, Quad, High Dose, Pf, 65yr+ (Fluzone HD) 11/22/2021     Td,adult,historic,unspecified 06/25/2008     Tdap (Adacel,Boostrix) 06/25/2008, 09/10/2018     Social History     Socioeconomic History     Marital status:      Spouse name: Not on file     Number of children: 2     Years of education: Not on file     Highest education level: Not on file   Occupational History     Occupation: CPA   Tobacco Use     Smoking status: Never Smoker     Smokeless tobacco: Never Used   Substance and Sexual Activity     Alcohol use: Yes     Alcohol/week: 5.0 standard drinks     Types: 5 Cans of beer per week     Drug use: No     Sexual activity: Yes     Partners: Female   Other Topics Concern     Not on file   Social History Narrative    Diet-regular            Exercise-walk, limited by R leg, R knee     Social  Determinants of Health     Financial Resource Strain: Not on file   Food Insecurity: Not on file   Transportation Needs: Not on file   Physical Activity: Not on file   Stress: Not on file   Social Connections: Not on file   Intimate Partner Violence: Not on file   Housing Stability: Not on file     Past Medical History:   Diagnosis Date     Status post ablation of atrial fibrillation 07/28/2015    PVI August 19, 2013 (cryo-PVI only)     Family History   Problem Relation Age of Onset     Atrial fibrillation Mother      Dementia Mother         dx 80s     Diabetes Type 2  Mother         dx 60s/70s     Atrial fibrillation Father      Rheumatoid Arthritis Father      Atrial fibrillation Sister      Cerebrovascular Disease Sister      Parkinsonism Sister      Parkinsonism Sister      Atrial fibrillation Brother      Rheumatoid Arthritis Brother      Diabetes Type 2  Brother         dx 40s     Cerebrovascular Disease Brother      CABG Brother      Atrial fibrillation Brother        MEDICATIONS:  Current Outpatient Medications   Medication Sig Dispense Refill     apixaban ANTICOAGULANT (ELIQUIS ANTICOAGULANT) 5 MG tablet [ELIQUIS 5 MG TAB TABLET] TAKE 1 TABLET(5 MG) BY MOUTH TWICE DAILY 180 tablet 3     hydrochlorothiazide (HYDRODIURIL) 25 MG tablet TAKE 1 TABLET BY MOUTH ONCE DAILY IN THE MORNING FOR HIGH BLOOD PRESSURE 90 tablet 2     levothyroxine (SYNTHROID/LEVOTHROID) 100 MCG tablet Take 1 tablet (100 mcg) by mouth daily 90 tablet 3     lisinopril (ZESTRIL) 40 MG tablet TAKE 1 TABLET BY MOUTH DAILY FOR HIGH BLOOD PRESSURE 90 tablet 2     omeprazole (PRILOSEC) 20 MG DR capsule TAKE 1 CAPSULE(20 MG) BY MOUTH DAILY BEFORE BREAKFAST 90 capsule 3     sotalol (BETAPACE) 80 MG tablet [SOTALOL (BETAPACE) 80 MG TABLET] TAKE 1 TABLET(80 MG) BY MOUTH TWICE DAILY 180 tablet 3       TOBACCO USE:  History   Smoking Status     Never Smoker   Smokeless Tobacco     Never Used       VITALS:  Vitals:    02/23/22 1737   BP: 112/60   BP  Location: Right arm   Patient Position: Sitting   Cuff Size: Adult Large   Pulse: 51   SpO2: 99%   Weight: 116.1 kg (256 lb)     Wt Readings from Last 3 Encounters:   02/23/22 116.1 kg (256 lb)   02/08/22 113.4 kg (250 lb)   02/04/22 114.3 kg (252 lb)       PHYSICAL EXAM:  Constitutional:   Reveals a male who appears his stated age.  Vitals: per nursing notes.  Eyes:  EOMs full, PERRL  Musculoskeletal: No peripheral swelling.  Neuro:  Alert and oriented. Cranial nerves, motor, sensory exams are intact.  No gross focal deficits.  Psychiatric:  Memory intact, mood appropriate.    QUALITY MEASURES:      DATA REVIEWED:  Answers for HPI/ROS submitted by the patient on 2/21/2022  How many servings of fruits and vegetables do you eat daily?: 2-3  On average, how many sweetened beverages do you drink each day (Examples: soda, juice, sweet tea, etc.  Do NOT count diet or artificially sweetened beverages)?: 0  How many minutes a day do you exercise enough to make your heart beat faster?: 20 to 29  How many days a week do you exercise enough to make your heart beat faster?: 5  How many days per week do you miss taking your medication?: 0  What is the reason for your visit today?: Intermittent chest pains  When did your symptoms begin?: More than a month  What are your symptoms?: Intermittent pain in upper left chest  How would you describe these symptoms?: Mild  Are your symptoms:: Improving  Have you had these symptoms before?: Yes  Have you tried or received treatment for these symptoms before?: Yes  Did that treatment work? : No  Is there anything that makes you feel worse?: Sitting- especially in vehicle  Is there anything that makes you feel better?: Intermittent

## 2022-06-20 DIAGNOSIS — I10 ESSENTIAL HYPERTENSION: ICD-10-CM

## 2022-06-21 RX ORDER — LISINOPRIL 40 MG/1
TABLET ORAL
Qty: 90 TABLET | Refills: 2 | Status: SHIPPED | OUTPATIENT
Start: 2022-06-21 | End: 2023-03-20

## 2022-06-21 NOTE — TELEPHONE ENCOUNTER
"Last Written Prescription Date:  9/22/21  Last Fill Quantity: 90,  # refills: 2   Last office visit provider:  2/23/22     Requested Prescriptions   Pending Prescriptions Disp Refills     lisinopril (ZESTRIL) 40 MG tablet [Pharmacy Med Name: LISINOPRIL 40MG TABLETS] 90 tablet 2     Sig: TAKE 1 TABLET BY MOUTH DAILY FOR HIGH BLOOD PRESSURE       ACE Inhibitors (Including Combos) Protocol Passed - 6/21/2022 11:56 AM        Passed - Blood pressure under 140/90 in past 12 months     BP Readings from Last 3 Encounters:   02/23/22 112/60   02/08/22 94/60   02/04/22 (!) 160/99                 Passed - Recent (12 mo) or future (30 days) visit within the authorizing provider's specialty     Patient has had an office visit with the authorizing provider or a provider within the authorizing providers department within the previous 12 mos or has a future within next 30 days. See \"Patient Info\" tab in inbasket, or \"Choose Columns\" in Meds & Orders section of the refill encounter.              Passed - Medication is active on med list        Passed - Patient is age 18 or older        Passed - Normal serum creatinine on file in past 12 months     Recent Labs   Lab Test 02/04/22  1214   CR 1.05       Ok to refill medication if creatinine is low          Passed - Normal serum potassium on file in past 12 months     Recent Labs   Lab Test 02/04/22  1214   POTASSIUM 4.7                  Haim Baptiste RN 06/21/22 11:56 AM  "

## 2022-07-26 DIAGNOSIS — I48.19 PERSISTENT ATRIAL FIBRILLATION (H): Primary | ICD-10-CM

## 2022-09-02 ENCOUNTER — TRANSFERRED RECORDS (OUTPATIENT)
Dept: HEALTH INFORMATION MANAGEMENT | Facility: CLINIC | Age: 67
End: 2022-09-02

## 2022-09-07 ENCOUNTER — OFFICE VISIT (OUTPATIENT)
Dept: CARDIOLOGY | Facility: CLINIC | Age: 67
End: 2022-09-07
Attending: INTERNAL MEDICINE
Payer: COMMERCIAL

## 2022-09-07 VITALS
DIASTOLIC BLOOD PRESSURE: 58 MMHG | HEIGHT: 71 IN | HEART RATE: 73 BPM | RESPIRATION RATE: 16 BRPM | SYSTOLIC BLOOD PRESSURE: 100 MMHG | WEIGHT: 255 LBS | BODY MASS INDEX: 35.7 KG/M2

## 2022-09-07 DIAGNOSIS — Z86.79 STATUS POST ABLATION OF ATRIAL FIBRILLATION: ICD-10-CM

## 2022-09-07 DIAGNOSIS — I48.19 PERSISTENT ATRIAL FIBRILLATION (H): Primary | ICD-10-CM

## 2022-09-07 DIAGNOSIS — I10 PRIMARY HYPERTENSION: ICD-10-CM

## 2022-09-07 DIAGNOSIS — Z98.890 STATUS POST ABLATION OF ATRIAL FIBRILLATION: ICD-10-CM

## 2022-09-07 PROCEDURE — 99215 OFFICE O/P EST HI 40 MIN: CPT | Performed by: INTERNAL MEDICINE

## 2022-09-07 RX ORDER — AMOXICILLIN 500 MG/1
CAPSULE ORAL
COMMUNITY
Start: 2021-10-26 | End: 2023-07-12

## 2022-09-07 NOTE — LETTER
9/7/2022    Magdaleno Bennett MD  [address] [place] [address]    RE: Brett Hopkins       Dear Colleague,     I had the pleasure of seeing Brett Hopkins in the [facility].    [facility]  Cardiac Electrophysiology     Progress Note: Benedicto Barfield MD    Primary Care: Magdaleno Miranda    Primary Cardiologist: Nicola Cotter MD    Primary Electrophysiologist: Benedicto Barfield MD    Assessment:         Persistent atrial fibrillation: The patient is status post ablation in 2013.  The patient has had recurrent atrial fibrillation and currently remains on sotalol therapy.  He currently reports no symptoms of atrial fibrillation or flutter.  The patient has a [id number] score of 2 which we will increase to 3 next month when he turns 67.  The patient has bilateral knee pain (arthritis left and post knee replacement right) and would like to be able to take NSAID medications.  The patient also notes that his gait is not entirely independent due to his previous knee replacement and he has had a significant fall in the past 12 months.  The patient is a candidate for consideration of left atrial appendage closure.  The risks, benefits and expected outcomes were discussed and he was given additional literature to review.    Chest pain: Work-up by Dr. Cotter included nuclear stress imaging study in February which demonstrated no evidence of fixed or reversible ischemic defect.  Patient currently reports the chest pain resolved entirely shortly after his visit with Dr. Cotter.    Enlargement of the proximal ascending aorta: Mild enlargement, followed by Dr. Cotter.    Essential hypertension: Chronic problem and the patient's blood pressure today is at target on his current medical therapy.      Recommendations:    Holter monitor will be performed next summer prior to follow-up in the arrhythmia clinic.    Follow-up in the arrhythmia clinic with the EP CHIQUITA in 12  months.    Follow-up with Dr. Cotter in February 2023.    Time spent: 40 minutes spent on the date of the encounter doing chart review, history and exam, documentation and further activities as noted above.    Subjective:  Brett Hopkins (66 year old male) returns to the arrhythmia clinic for interval reevaluation of his rhythm status post ablation.  The patient is 9 years out from his ablation procedure to treat his atrial fibrillation.  The patient had late recurrence of atrial fibrillation and has been well suppressed on sotalol therapy.  He reports no side effects on the medication and has had no recurrence of his symptomatic atrial fibrillation.  The patient does have a Kardia at and checks his rhythm periodically with no recordings categorized as atrial fibrillation.  Patient did have some chest pain this past winter and ultimately was seen by Dr. Cotter and additional work-up was performed including nuclear stress imaging study as he has some coronary calcifications.  The patient reports that his symptoms have resolved entirely.  The patient did have a slip and bad fall 18 months ago on the ice.  He struck his head and he reports that he was concerned about potential damage/bleeding as result of his Eliquis therapy.  He also reports that he has chronic bilateral knee pain and would like to be able to take an NSAID but his Eliquis prevents him from taking that medication category.    Current Outpatient Medications   Medication Sig Dispense Refill     apixaban ANTICOAGULANT (ELIQUIS ANTICOAGULANT) 5 MG tablet [ELIQUIS 5 MG TAB TABLET] TAKE 1 TABLET(5 MG) BY MOUTH TWICE DAILY 180 tablet 3     hydrochlorothiazide (HYDRODIURIL) 25 MG tablet TAKE 1 TABLET BY MOUTH ONCE DAILY IN THE MORNING FOR HIGH BLOOD PRESSURE 90 tablet 2     levothyroxine (SYNTHROID/LEVOTHROID) 100 MCG tablet Take 1 tablet (100 mcg) by mouth daily 90 tablet 3     lisinopril (ZESTRIL) 40 MG tablet TAKE 1 TABLET BY MOUTH DAILY FOR HIGH  "BLOOD PRESSURE 90 tablet 2     omeprazole (PRILOSEC) 20 MG DR capsule TAKE 1 CAPSULE(20 MG) BY MOUTH DAILY BEFORE BREAKFAST 90 capsule 3     sotalol (BETAPACE) 80 MG tablet [SOTALOL (BETAPACE) 80 MG TABLET] TAKE 1 TABLET(80 MG) BY MOUTH TWICE DAILY 180 tablet 3       Review of Systems:     Family History  Family History   Problem Relation Age of Onset     Atrial fibrillation Mother      Dementia Mother         dx 80s     Diabetes Type 2  Mother         dx 60s/70s     Atrial fibrillation Father      Rheumatoid Arthritis Father      Atrial fibrillation Sister      Cerebrovascular Disease Sister      Parkinsonism Sister      Parkinsonism Sister      Atrial fibrillation Brother      Rheumatoid Arthritis Brother      Diabetes Type 2  Brother         dx 40s     Cerebrovascular Disease Brother      CABG Brother      Atrial fibrillation Brother        Social History   reports that he has never smoked. He has never used smokeless tobacco. He reports current alcohol use of about 5.0 standard drinks of alcohol per week. He reports that he does not use drugs.    Objective:   Vital signs in last 24 hours:  /58 (BP Location: Left arm, Patient Position: Sitting, Cuff Size: Adult Regular)   Pulse 73   Resp 16   Ht 1.803 m (5' 11\")   Wt 115.7 kg (255 lb)   BMI 35.57 kg/m    Physical Exam:  General: The patient is alert oriented to person place and situation.  The patient is in no acute distress at the time of my evaluation.  Eyes: Pupils are equal, round, and reactive to light.  Conjunctiva and sclera are clear.  ENT: Oral mucosa is moist and without redness. No evident nasal discharge.  Pulmonary: Lungs are clear bilaterally with no rales, rhonchi, or wheezes.    Cardiovascular exam: Rhythm is regular. S1 and S2 are normal. No significant murmur is present. JVP is normal. Lower extremities demonstrate no significant edema. Distal pulses are intact bilaterally.  Abdomen is soft, nontender, no organomegaly, and bowel " sounds present.  Musculoskeletal: Spine is straight. Extremities without deformity.  Neuro: Gait is asymmetric with right knee/leg limp.     Skin is warm, dry, and otherwise intact.      Cardiographics:   Nuclear stress imaging study February 2022     The nuclear stress test is negative for inducible myocardial ischemia.     Left ventricular function is normal.     The left ventricular ejection fraction at rest is 70%.     Nuclear Study Quality: The  images demonstrate diaphragmatic attenuation.    Twelve-lead EKG (February 2022)  Normal sinus rhythm/sinus bradycardia.  No other acute changes.    Lab Results:       Outside record review:      Thank you for allowing me to participate in the care of your patient.      Sincerely,     Benedicto Barfield MD     [facility]  cc:   Benedicto Barfield MD  [address]  [place] [address]

## 2022-09-07 NOTE — PROGRESS NOTES
[facility]  Cardiac Electrophysiology     Progress Note: Benedicto Barfield MD    Primary Care: Magdaleno Miranda    Primary Cardiologist: Nicola Cotter MD    Primary Electrophysiologist: Benedicto Barfield MD    Assessment:         Persistent atrial fibrillation: The patient is status post ablation in 2013.  The patient has had recurrent atrial fibrillation and currently remains on sotalol therapy.  He currently reports no symptoms of atrial fibrillation or flutter.  The patient has a [id number]-[id number] score of 2 which we will increase to 3 next month when he turns 67.  The patient has bilateral knee pain (arthritis left and post knee replacement right) and would like to be able to take NSAID medications.  The patient also notes that his gait is not entirely independent due to his previous knee replacement and he has had a significant fall in the past 12 months.  The patient is a candidate for consideration of left atrial appendage closure.  The risks, benefits and expected outcomes were discussed and he was given additional literature to review.    Chest pain: Work-up by Dr. Cotter included nuclear stress imaging study in February which demonstrated no evidence of fixed or reversible ischemic defect.  Patient currently reports the chest pain resolved entirely shortly after his visit with Dr. Cotter.    Enlargement of the proximal ascending aorta: Mild enlargement, followed by Dr. Cotter.    Essential hypertension: Chronic problem and the patient's blood pressure today is at target on his current medical therapy.      Recommendations:    Holter monitor will be performed next summer prior to follow-up in the arrhythmia clinic.    Follow-up in the arrhythmia clinic with the EP CHIQUITA in 12 months.    Follow-up with Dr. Cotter in February 2023.    Time spent: 40 minutes spent on the date of the encounter doing chart review, history and exam, documentation and further activities as noted  above.    Subjective:  Brett Hopkins (66 year old male) returns to the arrhythmia clinic for interval reevaluation of his rhythm status post ablation.  The patient is 9 years out from his ablation procedure to treat his atrial fibrillation.  The patient had late recurrence of atrial fibrillation and has been well suppressed on sotalol therapy.  He reports no side effects on the medication and has had no recurrence of his symptomatic atrial fibrillation.  The patient does have a Kardia at and checks his rhythm periodically with no recordings categorized as atrial fibrillation.  Patient did have some chest pain this past winter and ultimately was seen by Dr. Cotter and additional work-up was performed including nuclear stress imaging study as he has some coronary calcifications.  The patient reports that his symptoms have resolved entirely.  The patient did have a slip and bad fall 18 months ago on the ice.  He struck his head and he reports that he was concerned about potential damage/bleeding as result of his Eliquis therapy.  He also reports that he has chronic bilateral knee pain and would like to be able to take an NSAID but his Eliquis prevents him from taking that medication category.    Current Outpatient Medications   Medication Sig Dispense Refill     apixaban ANTICOAGULANT (ELIQUIS ANTICOAGULANT) 5 MG tablet [ELIQUIS 5 MG TAB TABLET] TAKE 1 TABLET(5 MG) BY MOUTH TWICE DAILY 180 tablet 3     hydrochlorothiazide (HYDRODIURIL) 25 MG tablet TAKE 1 TABLET BY MOUTH ONCE DAILY IN THE MORNING FOR HIGH BLOOD PRESSURE 90 tablet 2     levothyroxine (SYNTHROID/LEVOTHROID) 100 MCG tablet Take 1 tablet (100 mcg) by mouth daily 90 tablet 3     lisinopril (ZESTRIL) 40 MG tablet TAKE 1 TABLET BY MOUTH DAILY FOR HIGH BLOOD PRESSURE 90 tablet 2     omeprazole (PRILOSEC) 20 MG DR capsule TAKE 1 CAPSULE(20 MG) BY MOUTH DAILY BEFORE BREAKFAST 90 capsule 3     sotalol (BETAPACE) 80 MG tablet [SOTALOL (BETAPACE) 80 MG  "TABLET] TAKE 1 TABLET(80 MG) BY MOUTH TWICE DAILY 180 tablet 3       Review of Systems:     Family History  Family History   Problem Relation Age of Onset     Atrial fibrillation Mother      Dementia Mother         dx 80s     Diabetes Type 2  Mother         dx 60s/70s     Atrial fibrillation Father      Rheumatoid Arthritis Father      Atrial fibrillation Sister      Cerebrovascular Disease Sister      Parkinsonism Sister      Parkinsonism Sister      Atrial fibrillation Brother      Rheumatoid Arthritis Brother      Diabetes Type 2  Brother         dx 40s     Cerebrovascular Disease Brother      CABG Brother      Atrial fibrillation Brother        Social History   reports that he has never smoked. He has never used smokeless tobacco. He reports current alcohol use of about 5.0 standard drinks of alcohol per week. He reports that he does not use drugs.    Objective:   Vital signs in last 24 hours:  /58 (BP Location: Left arm, Patient Position: Sitting, Cuff Size: Adult Regular)   Pulse 73   Resp 16   Ht 1.803 m (5' 11\")   Wt 115.7 kg (255 lb)   BMI 35.57 kg/m    Physical Exam:  General: The patient is alert oriented to person place and situation.  The patient is in no acute distress at the time of my evaluation.  Eyes: Pupils are equal, round, and reactive to light.  Conjunctiva and sclera are clear.  ENT: Oral mucosa is moist and without redness. No evident nasal discharge.  Pulmonary: Lungs are clear bilaterally with no rales, rhonchi, or wheezes.    Cardiovascular exam: Rhythm is regular. S1 and S2 are normal. No significant murmur is present. JVP is normal. Lower extremities demonstrate no significant edema. Distal pulses are intact bilaterally.  Abdomen is soft, nontender, no organomegaly, and bowel sounds present.  Musculoskeletal: Spine is straight. Extremities without deformity.  Neuro: Gait is asymmetric with right knee/leg limp.     Skin is warm, dry, and otherwise intact.      Cardiographics: "   Nuclear stress imaging study February 2022     The nuclear stress test is negative for inducible myocardial ischemia.     Left ventricular function is normal.     The left ventricular ejection fraction at rest is 70%.     Nuclear Study Quality: The  images demonstrate diaphragmatic attenuation.    Twelve-lead EKG (February 2022)  Normal sinus rhythm/sinus bradycardia.  No other acute changes.    Lab Results:         Outside record review:

## 2022-09-30 ENCOUNTER — TELEPHONE (OUTPATIENT)
Dept: CARDIOLOGY | Facility: CLINIC | Age: 67
End: 2022-09-30

## 2022-09-30 NOTE — TELEPHONE ENCOUNTER
[facility] Call Center    Phone Message    May a detailed message be left on voicemail: no     Reason for Call: Other: Champ advised he saw Dr. Barfield in office on 9/7/22, during that visit they discussed Champ being a candidate for the watchman procedure. Please reach out to Champ to assist in scheduling.      Action Taken: Other: [facility]    Travel Screening: Not Applicable

## 2022-10-03 NOTE — TELEPHONE ENCOUNTER
"Phone call to patient regarding LAAC referral from Dr. Barfield. Patient is very upset that he has not heard from anyone since his September 7th appt with Dr. Barfield regarding scheduling the LAAC procedure. He states he was supposed to get a call the next week. Writer apologized profusely, explained that writer just received their information today. We discussed usual LAAC work-up process, including need for consult and possible pre-LAAC imaging. Patient is upset, \"I need another appointment?! How is she more qualified with Dr. Barfield?\" Writer apologized, explained the usual process for LAAC work-up and approval includes a visit with an CHIQUITA to discuss the procedure and plan moving forward. He is agreeable but upset still. Writer informed him scheduling will be in touch to arrange for LAAC Consult but provided him with FYI that procedure is currently booking into January 2023. He is upset again, says \"This is ridiculous, I can't wait. I have arthritis and can't take aleve that I need to because of my blood thinner. I hit my head and I'm afraid of having a brain bleed.\" Writer apologized again, let him know we will accommodate his requests as much as possible. He is agreeable to consult but wants to know how long he will wait until he hears from scheduling since he didn't hear from anyone last time. He also wants to know if he can be seen at [place]. Writer informed him consult is only at [place] location with Scarlett, he is upset, again. Writer apologized, again. Writer explained she will send message directly to  to contact patient today to arrange for consult. Verbalized understanding, no further questions at this time.   "

## 2022-10-03 NOTE — TELEPHONE ENCOUNTER
----- Message -----  From: Alfredito Diana  Sent: 10/3/2022   1:45 PM CDT  To: SOPHIE Camp,     This pt has been scheduled for his LAAC consult on 10/7. Let me know if you need anything else.     Thanks!   Alfredito  ----- Message -----  From: Anila Maradiaga RN  Sent: 10/3/2022   1:05 PM CDT  To: Alfredito Diana    ----- Message from Anila Maradiaga RN sent at 10/3/2022  1:05 PM CDT -----  Please call patient to schedule LAAC consult with Scarlett botello. Thanks!    Anila

## 2022-10-03 NOTE — TELEPHONE ENCOUNTER
[facility] Call Center    Phone Message    May a detailed message be left on voicemail: no     Reason for Call: Other: Champ called to speak with Anila FITCH RN regarding scheduling a watchman procedure, please reach out to him as soon as possible at [phone].     Action Taken: Other: [facility]    Travel Screening: Not Applicable

## 2022-10-07 ENCOUNTER — TELEPHONE (OUTPATIENT)
Dept: CARDIOLOGY | Facility: CLINIC | Age: 67
End: 2022-10-07

## 2022-10-07 ENCOUNTER — OFFICE VISIT (OUTPATIENT)
Dept: CARDIOLOGY | Facility: CLINIC | Age: 67
End: 2022-10-07
Payer: COMMERCIAL

## 2022-10-07 VITALS
HEART RATE: 58 BPM | DIASTOLIC BLOOD PRESSURE: 66 MMHG | RESPIRATION RATE: 16 BRPM | WEIGHT: 260 LBS | SYSTOLIC BLOOD PRESSURE: 106 MMHG | BODY MASS INDEX: 36.4 KG/M2 | HEIGHT: 71 IN

## 2022-10-07 DIAGNOSIS — Z86.79 STATUS POST ABLATION OF ATRIAL FIBRILLATION: ICD-10-CM

## 2022-10-07 DIAGNOSIS — E06.3 HYPOTHYROIDISM DUE TO HASHIMOTO'S THYROIDITIS: ICD-10-CM

## 2022-10-07 DIAGNOSIS — Z98.890 STATUS POST ABLATION OF ATRIAL FIBRILLATION: ICD-10-CM

## 2022-10-07 DIAGNOSIS — I71.21 ANEURYSM OF ASCENDING AORTA WITHOUT RUPTURE (H): ICD-10-CM

## 2022-10-07 DIAGNOSIS — I48.19 PERSISTENT ATRIAL FIBRILLATION (H): Primary | ICD-10-CM

## 2022-10-07 DIAGNOSIS — E66.01 MORBID OBESITY (H): ICD-10-CM

## 2022-10-07 LAB
ANION GAP SERPL CALCULATED.3IONS-SCNC: 7 MMOL/L (ref 7–15)
BUN SERPL-MCNC: 21.6 MG/DL (ref 8–23)
CALCIUM SERPL-MCNC: 9.2 MG/DL (ref 8.8–10.2)
CHLORIDE SERPL-SCNC: 102 MMOL/L (ref 98–107)
CREAT SERPL-MCNC: 1.05 MG/DL (ref 0.67–1.17)
DEPRECATED HCO3 PLAS-SCNC: 31 MMOL/L (ref 22–29)
GFR SERPL CREATININE-BSD FRML MDRD: 79 ML/MIN/1.73M2
GLUCOSE SERPL-MCNC: 85 MG/DL (ref 70–99)
POTASSIUM SERPL-SCNC: 4.5 MMOL/L (ref 3.4–5.3)
SODIUM SERPL-SCNC: 140 MMOL/L (ref 136–145)

## 2022-10-07 PROCEDURE — 80048 BASIC METABOLIC PNL TOTAL CA: CPT | Performed by: INTERNAL MEDICINE

## 2022-10-07 PROCEDURE — 36415 COLL VENOUS BLD VENIPUNCTURE: CPT | Performed by: INTERNAL MEDICINE

## 2022-10-07 PROCEDURE — 99215 OFFICE O/P EST HI 40 MIN: CPT | Performed by: INTERNAL MEDICINE

## 2022-10-07 NOTE — TELEPHONE ENCOUNTER
Phone call to patient. Informed him that based on recent BMP results he may be scheduled for CT scan to assess THOMAS suitability for LAAC procedure. Informed him of need for SDM appt as well. He is agreeable to plan moving forward. Informed him scheduling will be in touch to arrange for appts. Verbalized understanding, no further questions or concerns at this time. [facility]    ----- Message from Scarlett Troncoso PA-C sent at 10/7/2022 11:34 AM CDT -----  Ok for CT pulm vein

## 2022-10-07 NOTE — LETTER
10/7/2022    Magdaleno Bennett MD  [address] [place] [address]    RE: Brett Hopkins       Dear Colleague,     I had the pleasure of seeing Brett Hopkins in the [facility].  HEART CARE ENCOUNTER NOTE       [facility] [place]  [phone]      Assessment/Recommendations   1.  Persistent atrial fibrillation - s/p ablation in 2013, with recurrence in 2019 and now on sotalol.      I have personally reviewed this patient's chart and have spoken with the patient about the treatment options, including THOMAS device.  He has a [id number] score of 3 for age 65-74, hypertension and vascular disease.  He has a HAS-BLED score of 2 for age and bleeding disposition.   He is not a candidate for long-term anticoagulation due to gait instability with falls in the past.  One fall resulted in him hitting the back of his head and fortunately no significant injury.  He will need screening of his anatomy to make sure his appendage is amenable for the device.      Check BMP today.  If kidney function is adequate, will order CT pulmonary vein.  Patient would like to have implant with Dr. Barfield possible     He understands that he will stay on his Eliquis up until and through implant.  After implant, patient will be started on aspirin 81 mg daily along with his Eliquis.  Approximately 45 days after implant, he will have a post procedure DIONTE. If the post DIONTE is negative for leaks and no thrombus is seen on the surface of the device, he would be instructed to stop the Eliquis.  At that time he would continue the aspirin 81 mg and add Plavix 75 mg by mouth daily for an additional 4 months.  After being on DAPT for approximately 4 months, he will stop the Plavix and continue on just aspirin 81 mg daily indefinitely.  He understands that the risks of the procedure are <2% and include, but are not limited to device embolization, air embolism, myocardial perforation, device thrombosis, ASD, stroke, or  death.  We discussed expected recovery and follow-up.       The patient is a good candidate for proceeding with left atrial appendage screening and implant.  His questions were answered to his satisfaction.    2.  Hypertension - blood pressure today is at goal.  Patient should continue taking hydrochlorothiazide 25 mg daily and lisinopril 40 mg daily    3.  Vascular disease -with minimal/mild coronary calcification on CT in December 2021 and with a sending aortic aneurysm being followed yearly by MR or CT       History of Present Illness/Subjective    Brett Hopkins is a 67 year old male who comes in today for discussion regarding his interest in the left atrial pended occlusion device.    Brett Hopkins has a past history of persistent atrial fibrillation and had ablation in 2013.  He had recurrence of his atrial fibrillation in 2019 and has now been on sotalol.  He has gait instability, hypertension, ascending aortic aneurysm and falls in the past.  He is still on Eliquis for stroke prophylaxis but would like to get off of it due to risk of more falls.  He also has osteoarthritis and his gait is poor because of this.  He would like to be able to take NSAIDs to treat his osteoarthritis, which may make his gait/balance better, but can't do it currently b/c of bleeding risk while on OAC.    Brett Hopkins denies chest discomfort, palpitations, shortness of breath, paroxysmal nocturnal dyspnea, orthopnea, lightheadedness, dizziness, pre-syncope, or syncope.  Brett Hopkins also denies any weight loss, changes in appetite, nausea or vomiting.     Medical, surgical, family, social history, and medications were reviewed and updated as necessary.    Nuclear stress test results (from 2/11/22):     The nuclear stress test is negative for inducible myocardial ischemia.     Left ventricular function is normal.     The left ventricular ejection fraction at rest is 70%.     Nuclear Study Quality: The   "images demonstrate diaphragmatic attenuation.     Physical Examination Review of Systems   Vitals: /66 (BP Location: Right arm, Patient Position: Sitting, Cuff Size: Adult Regular)   Pulse 58   Resp 16   Ht 1.803 m (5' 11\")   Wt 117.9 kg (260 lb)   BMI 36.26 kg/m    BMI= Body mass index is 36.26 kg/m .  Wt Readings from Last 3 Encounters:   10/07/22 117.9 kg (260 lb)   09/07/22 115.7 kg (255 lb)   02/23/22 116.1 kg (256 lb)       General Appearance:   Alert, cooperative and in no acute distress   ENT/Mouth: membranes moist, no oral lesions or bleeding gums.      EYES:  no scleral icterus, normal conjunctivae   Neck: Thyroid not visualized   Chest/Lungs:   lungs are clear to auscultation, no rales or wheezing   Cardiovascular:   Regular . Normal first and second heart sounds with no murmurs, rubs or gallops; the carotid, radial and posterior tibial pulses are intact, no edema bilaterally    Abdomen:  Soft and nontender. Bowel sounds are present in all quadrants   Extremities: no cyanosis or clubbing   Skin: no xanthelasma, warm.    Neurologic: normal gait, normal  bilateral, no tremors   Psychiatric: Normal mood and affect       Please refer above for cardiac ROS details.      Medical History  Surgical History Family History Social History   Past Medical History:   Diagnosis Date     Status post ablation of atrial fibrillation 07/28/2015    PVI August 19, 2013 (cryo-PVI only)     Past Surgical History:   Procedure Laterality Date     ARTHROPLASTY REVISION HIP Right 05/01/2019     ARTHROPLASTY REVISION KNEE Right 2019     CARDIOVERSION  07/01/2019    7/10/2019     CARDIOVERSION  09/12/2019     CATARACT EXTRACTION Bilateral 06/2021     DENTAL SURGERY      Oral Surgery Tooth Extraction;  Implant     [place] ABLATE HEART DYSRHYTHM FOCUS  08/19/2013    Description: Catheter Ablation Atrial Fibrillation;  Recorded: 11/16/2013;  Comments: PVI Aug 19, 2013 (Cryo to all 4 PV's)     TOOTH EXTRACTION      implant     " [facility] TOTAL HIP ARTHROPLASTY Right 05/08/2019    Procedure: RIGHT TOTAL HIP ARTHROPLASTY DIRECT ANTERIOR APPROACH;  Surgeon: Mario Woodruff MD;  Location: [facility] Main OR;  Service: Orthopedics     [facility] TOTAL KNEE ARTHROPLASTY Right 06/21/2017    Procedure: 2)  RIGHT TOTAL KNEE ARTHROPLASTY;  Surgeon: Mario VICKERS MD;  Location: [facility] Main OR;  Service: Orthopedics     Family History   Problem Relation Age of Onset     Atrial fibrillation Mother      Dementia Mother         dx 80s     Diabetes Type 2  Mother         dx 60s/70s     Atrial fibrillation Father      Rheumatoid Arthritis Father      Atrial fibrillation Sister      Cerebrovascular Disease Sister      Parkinsonism Sister      Parkinsonism Sister      Atrial fibrillation Brother      Rheumatoid Arthritis Brother      Diabetes Type 2  Brother         dx 40s     Cerebrovascular Disease Brother      CABG Brother      Atrial fibrillation Brother     Social History     Socioeconomic History     Marital status:      Spouse name: Not on file     Number of children: 2     Years of education: Not on file     Highest education level: Not on file   Occupational History     Occupation: CPA   Tobacco Use     Smoking status: Never Smoker     Smokeless tobacco: Never Used   Substance and Sexual Activity     Alcohol use: Yes     Alcohol/week: 5.0 standard drinks     Types: 5 Cans of beer per week     Drug use: No     Sexual activity: Yes     Partners: Female   Other Topics Concern     Not on file   Social History Narrative    Diet-regular            Exercise-walk, limited by R leg, R knee     Social Determinants of Health     Financial Resource Strain: Not on file   Food Insecurity: Not on file   Transportation Needs: Not on file   Physical Activity: Not on file   Stress: Not on file   Social Connections: Not on file   Intimate Partner Violence: Not on file   Housing Stability: Not on file          Medications  Allergies   Current Outpatient Medications    Medication Sig Dispense Refill     amoxicillin (AMOXIL) 500 MG capsule TAKE 4 CAPSULES BY MOUTH 1 HOUR BEFORE DENTAL APPOINTMENT       apixaban ANTICOAGULANT (ELIQUIS ANTICOAGULANT) 5 MG tablet [ELIQUIS 5 MG TAB TABLET] TAKE 1 TABLET(5 MG) BY MOUTH TWICE DAILY 180 tablet 3     hydrochlorothiazide (HYDRODIURIL) 25 MG tablet TAKE 1 TABLET BY MOUTH ONCE DAILY IN THE MORNING FOR HIGH BLOOD PRESSURE 90 tablet 2     levothyroxine (SYNTHROID/LEVOTHROID) 100 MCG tablet Take 1 tablet (100 mcg) by mouth daily 90 tablet 3     lisinopril (ZESTRIL) 40 MG tablet TAKE 1 TABLET BY MOUTH DAILY FOR HIGH BLOOD PRESSURE 90 tablet 2     omeprazole (PRILOSEC) 20 MG DR capsule TAKE 1 CAPSULE(20 MG) BY MOUTH DAILY BEFORE BREAKFAST 90 capsule 3     sotalol (BETAPACE) 80 MG tablet [SOTALOL (BETAPACE) 80 MG TABLET] TAKE 1 TABLET(80 MG) BY MOUTH TWICE DAILY 180 tablet 3    Allergies   Allergen Reactions     Other Environmental Allergy Unknown     seasonal     Sulfa (Sulfonamide Antibiotics) [Sulfa Drugs] Rash     Rash - turned purple  , Around age 32         Lab Results    Chemistry/lipid CBC Cardiac Enzymes/BNP/TSH/INR   Recent Labs   Lab Test 11/22/21  0747   CHOL 157   HDL 51   LDL 93   TRIG 66     Recent Labs   Lab Test 11/22/21  0747 11/20/20  0915 09/10/18  0852   LDL 93 82 82     Recent Labs   Lab Test 02/04/22  1214      POTASSIUM 4.7   CHLORIDE 103   CO2 29   GLC 89   BUN 20   CR 1.05   GFRESTIMATED 79   YFN 9.4     Recent Labs   Lab Test 02/04/22  1214 11/22/21  0747 12/21/20  2232   CR 1.05 0.97 1.16     No results for input(s): A1C in the last 19490 hours. Recent Labs   Lab Test 02/04/22  1214   WBC 7.3   HGB 15.7   HCT 48.6   MCV 94        Recent Labs   Lab Test 02/04/22  1214 12/21/20  2232 11/06/19  1611   HGB 15.7 14.8 15.5    Recent Labs   Lab Test 02/04/22  1214   TROPONINI <0.01     Recent Labs   Lab Test 02/04/22  1214   BNP 56     Recent Labs   Lab Test 02/04/22  1214   TSH 0.26*     Recent Labs   Lab Test  12/21/20  2232   INR 1.12*        45 minutes spent on the date of encounter doing education, chart prep/review, review of outside records, review of test results, patient visit and documentation.      This note has been dictated using voice recognition software. Any grammatical or context distortions are unintentional and inherent to the software.            Thank you for allowing me to participate in the care of your patient.      Sincerely,     Scarlett Troncoso PA-C     [facility]  cc:   No referring provider defined for this encounter.

## 2022-10-07 NOTE — PATIENT INSTRUCTIONS
Brett Hopkins,    It was a pleasure to see you today in the clinic regarding your interest in the Watchman device.     My recommendations after this visit include:     - if labs today show your kidneys are ok, then we will proceed with getting a CT of your chest to make sure your appendage will fit one of the device sizes      If you have questions or concerns, please call using the numbers below:    [facility]  [phone]    After Hours/Scheduling  [phone]    Otherwise you can dial the nurse directly at:                Anila Maradiaga RN  [phone]

## 2022-10-07 NOTE — PROGRESS NOTES
HEART CARE ENCOUNTER NOTE       [facility]  [phone]      Assessment/Recommendations   1.  Persistent atrial fibrillation - s/p ablation in 2013, with recurrence in 2019 and now on sotalol.      I have personally reviewed this patient's chart and have spoken with the patient about the treatment options, including THOMAS device.  He has a [id number] score of 3 for age 65-74, hypertension and vascular disease.  He has a HAS-BLED score of 2 for age and bleeding disposition.   He is not a candidate for long-term anticoagulation due to gait instability with falls in the past.  One fall resulted in him hitting the back of his head and fortunately no significant injury.  He will need screening of his anatomy to make sure his appendage is amenable for the device.      Check BMP today.  If kidney function is adequate, will order CT pulmonary vein.  Patient would like to have implant with Dr. Barfield possible     He understands that he will stay on his Eliquis up until and through implant.  After implant, patient will be started on aspirin 81 mg daily along with his Eliquis.  Approximately 45 days after implant, he will have a post procedure DIONTE. If the post DIONTE is negative for leaks and no thrombus is seen on the surface of the device, he would be instructed to stop the Eliquis.  At that time he would continue the aspirin 81 mg and add Plavix 75 mg by mouth daily for an additional 4 months.  After being on DAPT for approximately 4 months, he will stop the Plavix and continue on just aspirin 81 mg daily indefinitely.  He understands that the risks of the procedure are <2% and include, but are not limited to device embolization, air embolism, myocardial perforation, device thrombosis, ASD, stroke, or death.  We discussed expected recovery and follow-up.       The patient is a good candidate for proceeding with left atrial appendage screening and implant.  His questions were answered to his  satisfaction.    2.  Hypertension - blood pressure today is at goal.  Patient should continue taking hydrochlorothiazide 25 mg daily and lisinopril 40 mg daily    3.  Vascular disease -with minimal/mild coronary calcification on CT in December 2021 and with a sending aortic aneurysm being followed yearly by MR or CT       History of Present Illness/Subjective    Brett Hopkins is a 67 year old male who comes in today for discussion regarding his interest in the left atrial pended occlusion device.    Brett Hopkins has a past history of persistent atrial fibrillation and had ablation in 2013.  He had recurrence of his atrial fibrillation in 2019 and has now been on sotalol.  He has gait instability, hypertension, ascending aortic aneurysm and falls in the past.  He is still on Eliquis for stroke prophylaxis but would like to get off of it due to risk of more falls.  He also has osteoarthritis and his gait is poor because of this.  He would like to be able to take NSAIDs to treat his osteoarthritis, which may make his gait/balance better, but can't do it currently b/c of bleeding risk while on OAC.    Brett Hopkins denies chest discomfort, palpitations, shortness of breath, paroxysmal nocturnal dyspnea, orthopnea, lightheadedness, dizziness, pre-syncope, or syncope.  Brett Hopkins also denies any weight loss, changes in appetite, nausea or vomiting.     Medical, surgical, family, social history, and medications were reviewed and updated as necessary.    Nuclear stress test results (from 2/11/22):     The nuclear stress test is negative for inducible myocardial ischemia.     Left ventricular function is normal.     The left ventricular ejection fraction at rest is 70%.     Nuclear Study Quality: The  images demonstrate diaphragmatic attenuation.     Physical Examination Review of Systems   Vitals: /66 (BP Location: Right arm, Patient Position: Sitting, Cuff Size: Adult Regular)    "Pulse 58   Resp 16   Ht 1.803 m (5' 11\")   Wt 117.9 kg (260 lb)   BMI 36.26 kg/m    BMI= Body mass index is 36.26 kg/m .  Wt Readings from Last 3 Encounters:   10/07/22 117.9 kg (260 lb)   09/07/22 115.7 kg (255 lb)   02/23/22 116.1 kg (256 lb)       General Appearance:   Alert, cooperative and in no acute distress   ENT/Mouth: membranes moist, no oral lesions or bleeding gums.      EYES:  no scleral icterus, normal conjunctivae   Neck: Thyroid not visualized   Chest/Lungs:   lungs are clear to auscultation, no rales or wheezing   Cardiovascular:   Regular . Normal first and second heart sounds with no murmurs, rubs or gallops; the carotid, radial and posterior tibial pulses are intact, no edema bilaterally    Abdomen:  Soft and nontender. Bowel sounds are present in all quadrants   Extremities: no cyanosis or clubbing   Skin: no xanthelasma, warm.    Neurologic: normal gait, normal  bilateral, no tremors   Psychiatric: Normal mood and affect       Please refer above for cardiac ROS details.      Medical History  Surgical History Family History Social History   Past Medical History:   Diagnosis Date     Status post ablation of atrial fibrillation 07/28/2015    PVI August 19, 2013 (cryo-PVI only)     Past Surgical History:   Procedure Laterality Date     ARTHROPLASTY REVISION HIP Right 05/01/2019     ARTHROPLASTY REVISION KNEE Right 2019     CARDIOVERSION  07/01/2019    7/10/2019     CARDIOVERSION  09/12/2019     CATARACT EXTRACTION Bilateral 06/2021     DENTAL SURGERY      Oral Surgery Tooth Extraction;  Implant     [place] ABLATE HEART DYSRHYTHM FOCUS  08/19/2013    Description: Catheter Ablation Atrial Fibrillation;  Recorded: 11/16/2013;  Comments: PVI Aug 19, 2013 (Cryo to all 4 PV's)     TOOTH EXTRACTION      implant     ZZC TOTAL HIP ARTHROPLASTY Right 05/08/2019    Procedure: RIGHT TOTAL HIP ARTHROPLASTY DIRECT ANTERIOR APPROACH;  Surgeon: Mario Woodruff MD;  Location: [facility] OR;  Service: " Orthopedics     RUST TOTAL KNEE ARTHROPLASTY Right 06/21/2017    Procedure: 2)  RIGHT TOTAL KNEE ARTHROPLASTY;  Surgeon: Mario VICKERS MD;  Location: [facility] Main OR;  Service: Orthopedics     Family History   Problem Relation Age of Onset     Atrial fibrillation Mother      Dementia Mother         dx 80s     Diabetes Type 2  Mother         dx 60s/70s     Atrial fibrillation Father      Rheumatoid Arthritis Father      Atrial fibrillation Sister      Cerebrovascular Disease Sister      Parkinsonism Sister      Parkinsonism Sister      Atrial fibrillation Brother      Rheumatoid Arthritis Brother      Diabetes Type 2  Brother         dx 40s     Cerebrovascular Disease Brother      CABG Brother      Atrial fibrillation Brother     Social History     Socioeconomic History     Marital status:      Spouse name: Not on file     Number of children: 2     Years of education: Not on file     Highest education level: Not on file   Occupational History     Occupation: CPA   Tobacco Use     Smoking status: Never Smoker     Smokeless tobacco: Never Used   Substance and Sexual Activity     Alcohol use: Yes     Alcohol/week: 5.0 standard drinks     Types: 5 Cans of beer per week     Drug use: No     Sexual activity: Yes     Partners: Female   Other Topics Concern     Not on file   Social History Narrative    Diet-regular            Exercise-walk, limited by R leg, R knee     Social Determinants of Health     Financial Resource Strain: Not on file   Food Insecurity: Not on file   Transportation Needs: Not on file   Physical Activity: Not on file   Stress: Not on file   Social Connections: Not on file   Intimate Partner Violence: Not on file   Housing Stability: Not on file          Medications  Allergies   Current Outpatient Medications   Medication Sig Dispense Refill     amoxicillin (AMOXIL) 500 MG capsule TAKE 4 CAPSULES BY MOUTH 1 HOUR BEFORE DENTAL APPOINTMENT       apixaban ANTICOAGULANT (ELIQUIS ANTICOAGULANT) 5  MG tablet [ELIQUIS 5 MG TAB TABLET] TAKE 1 TABLET(5 MG) BY MOUTH TWICE DAILY 180 tablet 3     hydrochlorothiazide (HYDRODIURIL) 25 MG tablet TAKE 1 TABLET BY MOUTH ONCE DAILY IN THE MORNING FOR HIGH BLOOD PRESSURE 90 tablet 2     levothyroxine (SYNTHROID/LEVOTHROID) 100 MCG tablet Take 1 tablet (100 mcg) by mouth daily 90 tablet 3     lisinopril (ZESTRIL) 40 MG tablet TAKE 1 TABLET BY MOUTH DAILY FOR HIGH BLOOD PRESSURE 90 tablet 2     omeprazole (PRILOSEC) 20 MG DR capsule TAKE 1 CAPSULE(20 MG) BY MOUTH DAILY BEFORE BREAKFAST 90 capsule 3     sotalol (BETAPACE) 80 MG tablet [SOTALOL (BETAPACE) 80 MG TABLET] TAKE 1 TABLET(80 MG) BY MOUTH TWICE DAILY 180 tablet 3    Allergies   Allergen Reactions     Other Environmental Allergy Unknown     seasonal     Sulfa (Sulfonamide Antibiotics) [Sulfa Drugs] Rash     Rash - turned purple  , Around age 32         Lab Results    Chemistry/lipid CBC Cardiac Enzymes/BNP/TSH/INR   Recent Labs   Lab Test 11/22/21  0747   CHOL 157   HDL 51   LDL 93   TRIG 66     Recent Labs   Lab Test 11/22/21  0747 11/20/20  0915 09/10/18  0852   LDL 93 82 82     Recent Labs   Lab Test 02/04/22  1214      POTASSIUM 4.7   CHLORIDE 103   CO2 29   GLC 89   BUN 20   CR 1.05   GFRESTIMATED 79   YFN 9.4     Recent Labs   Lab Test 02/04/22  1214 11/22/21  0747 12/21/20  2232   CR 1.05 0.97 1.16     No results for input(s): A1C in the last 09914 hours. Recent Labs   Lab Test 02/04/22  1214   WBC 7.3   HGB 15.7   HCT 48.6   MCV 94        Recent Labs   Lab Test 02/04/22  1214 12/21/20  2232 11/06/19  1611   HGB 15.7 14.8 15.5    Recent Labs   Lab Test 02/04/22  1214   TROPONINI <0.01     Recent Labs   Lab Test 02/04/22  1214   BNP 56     Recent Labs   Lab Test 02/04/22  1214   TSH 0.26*     Recent Labs   Lab Test 12/21/20  2232   INR 1.12*        45 minutes spent on the date of encounter doing education, chart prep/review, review of outside records, review of test results, patient visit and  documentation.      This note has been dictated using voice recognition software. Any grammatical or context distortions are unintentional and inherent to the software.

## 2022-10-12 NOTE — TELEPHONE ENCOUNTER
----- Message -----  From: Dacia Oseguera  Sent: 10/12/2022   2:50 PM CDT  To: Anila Maradiaga RN    CT 10/20  White 10/28  Patient would like to go ASAP.  ----- Message -----  From: Anila Maradiaga RN  Sent: 10/10/2022  12:39 PM CDT  To: Dacia Oseguera    ----- Message from Anila Maradiaga RN sent at 10/10/2022 12:39 PM CDT -----  Please call patient to schedule SDM and pre-LAAC CT. Thanks!    Anila

## 2022-10-21 ENCOUNTER — HOSPITAL ENCOUNTER (OUTPATIENT)
Dept: CT IMAGING | Facility: CLINIC | Age: 67
Discharge: HOME | End: 2022-10-21
Attending: INTERNAL MEDICINE | Admitting: INTERNAL MEDICINE
Payer: COMMERCIAL

## 2022-10-21 DIAGNOSIS — I48.19 PERSISTENT ATRIAL FIBRILLATION (H): ICD-10-CM

## 2022-10-21 PROCEDURE — 75572 CT HRT W/3D IMAGE: CPT | Mod: 26 | Performed by: GENERAL ACUTE CARE HOSPITAL

## 2022-10-21 PROCEDURE — 250N000011 HC RX IP 250 OP 636: Performed by: INTERNAL MEDICINE

## 2022-10-21 PROCEDURE — 75572 CT HRT W/3D IMAGE: CPT

## 2022-10-21 RX ORDER — IOPAMIDOL 755 MG/ML
120 INJECTION, SOLUTION INTRAVASCULAR ONCE
Status: COMPLETED | OUTPATIENT
Start: 2022-10-21 | End: 2022-10-21

## 2022-10-21 RX ADMIN — IOPAMIDOL 120 ML: 755 INJECTION, SOLUTION INTRAVENOUS at 17:41

## 2022-10-27 LAB
BSA FOR ECHO PROCEDURE: 2.36 M2
CCTA ASCENDING AORTA: 4
CCTA SINUS: 3.6

## 2022-10-28 ENCOUNTER — TELEPHONE (OUTPATIENT)
Dept: CARDIOLOGY | Facility: CLINIC | Age: 67
End: 2022-10-28

## 2022-10-28 ENCOUNTER — OFFICE VISIT (OUTPATIENT)
Dept: CARDIOLOGY | Facility: CLINIC | Age: 67
End: 2022-10-28
Payer: COMMERCIAL

## 2022-10-28 VITALS
DIASTOLIC BLOOD PRESSURE: 80 MMHG | SYSTOLIC BLOOD PRESSURE: 130 MMHG | HEART RATE: 58 BPM | BODY MASS INDEX: 35.98 KG/M2 | RESPIRATION RATE: 16 BRPM | HEIGHT: 71 IN | WEIGHT: 257 LBS

## 2022-10-28 DIAGNOSIS — I10 ESSENTIAL HYPERTENSION: ICD-10-CM

## 2022-10-28 DIAGNOSIS — I77.810 DILATATION OF THORACIC AORTA (H): ICD-10-CM

## 2022-10-28 DIAGNOSIS — I48.19 PERSISTENT ATRIAL FIBRILLATION (H): Primary | ICD-10-CM

## 2022-10-28 DIAGNOSIS — I25.10 NONOBSTRUCTIVE ATHEROSCLEROSIS OF CORONARY ARTERY: ICD-10-CM

## 2022-10-28 PROCEDURE — 99214 OFFICE O/P EST MOD 30 MIN: CPT | Performed by: GENERAL ACUTE CARE HOSPITAL

## 2022-10-28 NOTE — PROGRESS NOTES
HEART CARE ENCOUNTER NOTE        Assessment/Recommendations   Assessment:    1. Persistent atrial fibrillation status post direct current cardioversion 10/26/2007, pulmonary vein isolation 8/19/2013, and direct current cardioversions 7/10/2019 and 9/12/2019. He is currently in sinus rhythm. [id number] score is 3 (hypertension, age 65-74, coronary artery disease). HAS-BLED score is 2 (age, bleeding predisposition).  2. Mild nonobstructive coronary artery disease seen on cardiac CT angiography 10/21/2022. No angina and nuclear stress testing 2/11/2022 showed no ischemia.  3. Stable mild dilatation of the ascending thoracic aorta last measured at 4.0 cm on CT angiography 10/21/2022.  4. Essential hypertension. Controlled.  5. BMI 35.84.    Plan:  1. Patient is a good candidate for a Watchman left atrial appendage closure device and would like to proceed.  2. Although his cholesterol numbers are actually quite good, he may still benefit from statin therapy given the presence of coronary atherosclerosis. Recommended rosuvastatin 5 mg daily. He will discuss this further with his primary care physician.  3. Continue apixaban 5 mg twice daily and sotalol 80 mg twice daily for now.    (Patient) Has documented nonvalvular atrial fibrillation (NVAF) and is currently on oral anticoagulant therapy Eliquis   [id number] -VASc = 3 (hypertension, age 65-74, coronary artery disease) HAS-BLED risk score: 2 (age, bleeding predisposition)  Indication(s) to consider non-oral anticoagulation therapy: bleeding predisposition  Pt has no contra-indication to come off oral anti-coagulant therapy if LAAC device is successfully placed.     I have personally reviewed the patients chart and discussed the following with the patient/family; 1) The choices available for reducing stroke risk from atrial fibrillation, 2) Treatment options available including respective risk/benefits, and 3) What factors are most important for the patient in making  their decision.  The [facility] shared decision making tool https://www.cardiosmart.org/SDM/Decision-Aids/Find-Decision-Aids/Atrial-Fibrillation  was used to guide this conversation.   The patient was counseled that their decision could be made at this time or in the future if more time was needed to consider their decision.       The patient is an appropriate candidate to proceed with left atrial appendage screening and implant.          History of Present Illness   Mr. Brett Hopkins is a 67 year old male with a significant past history of persistent AF s/p DCCV 10/26/2007, PVI 8/19/2013, then DCCV again 7/10/2019 and 9/12/2019, dilated thoracic aorta, and HTN presenting for a shared decision-making visit prior to possible Watchman left atrial appendage closure device placement.    He has never really felt his AF. He was off anticoagulation since his ablation in 2013, but resumed it once AF recurred in 2019. He takes apixaban and has not had any bleeding issues, but he worries about following and cannot take NSAIDs which he would like to use for knee pain.    He denies any chest pain/pressure/tightness, shortness of breath at rest or with exertion, light headedness/dizziness, pre-syncope, syncope, lower extremity swelling, palpitations, paroxysmal nocturnal dyspnea (PND), or orthopnea.     Cardiac Problems and Cardiac Diagnostics     Most Recent Cardiac testing:  ECG dated 2/4/2022 (personaly reviewed and interpreted): sinus bradycardia, HR 45 bpm, QTc 399 ms    Holter monitor 7/24/2019 (report reviewed):  1.  A 24-hour Holter monitor was applied 07/24/2019 with date of interpretation  07/26/2019.  2.  Atrial fibrillation is seen throughout the recording; the average ventricular  rate is 99 beats per minute and ranges between 63 to 148 beats per minute.  3.  No bradycardia or pauses.  4.  Rare noncomplex ventricular ectopy; only 87 single PVCs are noted without  repetitive forms.  5.  Patient activity diary was  reviewed with a number of entries, but no symptoms.     DISCUSSION:    1.  Persistent atrial fibrillation with somewhat elevated ventricular response.  2.  No symptoms recorded on diary.    Stress test 2/11/2022 (report reviewed):      The nuclear stress test is negative for inducible myocardial ischemia.     Left ventricular function is normal.     The left ventricular ejection fraction at rest is 70%.     Nuclear Study Quality: The  images demonstrate diaphragmatic attenuation.    Cardiac CT angiogram 10/21/2022 (report reviewed):  1. The following measurements were made of the left atrial appendage at 35% cardiac phase at the level of the bifurcation of the left circumflex artery:     -Orifice diameter: 28 x 25 mm (average 26 mm)  -Orifice circumference: 83 mm  -Orifice area: 5.42 cm   -Useful depth: 17 mm  -Maximum depth: 35 mm    2. No thrombus in the left atrium or left atrial appendage.  3. Mild coronary atherosclerosis with no obvious obstructive lesions.  4. Borderline enlargement of the ascending aorta measuring 4.0 cm.       Medications  Allergies   Current Outpatient Medications   Medication Sig Dispense Refill     amoxicillin (AMOXIL) 500 MG capsule TAKE 4 CAPSULES BY MOUTH 1 HOUR BEFORE DENTAL APPOINTMENT       apixaban ANTICOAGULANT (ELIQUIS ANTICOAGULANT) 5 MG tablet [ELIQUIS 5 MG TAB TABLET] TAKE 1 TABLET(5 MG) BY MOUTH TWICE DAILY 180 tablet 3     hydrochlorothiazide (HYDRODIURIL) 25 MG tablet TAKE 1 TABLET BY MOUTH ONCE DAILY IN THE MORNING FOR HIGH BLOOD PRESSURE 90 tablet 2     levothyroxine (SYNTHROID/LEVOTHROID) 100 MCG tablet Take 1 tablet (100 mcg) by mouth daily 90 tablet 3     lisinopril (ZESTRIL) 40 MG tablet TAKE 1 TABLET BY MOUTH DAILY FOR HIGH BLOOD PRESSURE 90 tablet 2     omeprazole (PRILOSEC) 20 MG DR capsule TAKE 1 CAPSULE(20 MG) BY MOUTH DAILY BEFORE BREAKFAST 90 capsule 3     sotalol (BETAPACE) 80 MG tablet [SOTALOL (BETAPACE) 80 MG TABLET] TAKE 1 TABLET(80 MG) BY  "MOUTH TWICE DAILY 180 tablet 3      Allergies   Allergen Reactions     Other Environmental Allergy Unknown     seasonal     Sulfa (Sulfonamide Antibiotics) [Sulfa Drugs] Rash     Rash - turned purple  , Around age 32        Physical Examination Review of Systems   /80 (BP Location: Left arm, Patient Position: Sitting, Cuff Size: Adult Regular)   Pulse 58   Resp 16   Ht 1.803 m (5' 11\")   Wt 116.6 kg (257 lb)   BMI 35.84 kg/m    Body mass index is 35.84 kg/m .  Wt Readings from Last 3 Encounters:   10/28/22 116.6 kg (257 lb)   10/07/22 117.9 kg (260 lb)   09/07/22 115.7 kg (255 lb)       General Appearance:   Pleasant  male, appears  stated age. no acute distress, normal body habitus   ENT/Mouth: Facemask.    EYES:  no scleral icterus, normal conjunctivae   Neck: no carotid bruits. No anterior cervical lymphadenopaty   Respiratory:   lungs are clear to auscultation, no rales or wheezing, equal chest wall expansion    Cardiovascular:   Regular rhythm, normal rate. Normal first and second heart sounds with no murmurs, rubs, or gallops; the carotid, radial and posterior tibial pulses are intact, Jugular venous pressure normal, no edema bilaterally    Abdomen/GI:  no organomegaly, masses, bruits, or tenderness; bowel sounds are present   Extremities: no cyanosis or clubbing   Skin: no xanthelasma, warm.    Heme/lymph/ Immunology No apparent bleeding noted.   Neurologic: Alert and oriented. normal gait, no tremors     Psychiatric: Pleasant, calm, appropriate affect.    A complete 10 system review of systems was performed and is negative except as mentioned in the HPI/subjective.         Past History   Past Medical History:   Past Medical History:   Diagnosis Date     Status post ablation of atrial fibrillation 07/28/2015    PVI August 19, 2013 (cryo-PVI only)       Past Surgical History:   Past Surgical History:   Procedure Laterality Date     ARTHROPLASTY REVISION HIP Right 05/01/2019     ARTHROPLASTY REVISION " KNEE Right 2019     CARDIOVERSION  07/01/2019    7/10/2019     CARDIOVERSION  09/12/2019     CATARACT EXTRACTION Bilateral 06/2021     DENTAL SURGERY      Oral Surgery Tooth Extraction;  Implant     [facility] ABLATE HEART DYSRHYTHM FOCUS  08/19/2013    Description: Catheter Ablation Atrial Fibrillation;  Recorded: 11/16/2013;  Comments: PVI Aug 19, 2013 (Cryo to all 4 PV's)     TOOTH EXTRACTION      implant     [facility] TOTAL HIP ARTHROPLASTY Right 05/08/2019    Procedure: RIGHT TOTAL HIP ARTHROPLASTY DIRECT ANTERIOR APPROACH;  Surgeon: Mario Woodruff MD;  Location: [facility] OR;  Service: Orthopedics     [facility] TOTAL KNEE ARTHROPLASTY Right 06/21/2017    Procedure: 2)  RIGHT TOTAL KNEE ARTHROPLASTY;  Surgeon: Mario VICKERS MD;  Location: [facility] Main OR;  Service: Orthopedics       Family History:   Family History   Problem Relation Age of Onset     Atrial fibrillation Mother      Dementia Mother         dx 80s     Diabetes Type 2  Mother         dx 60s/70s     Atrial fibrillation Father      Rheumatoid Arthritis Father      Atrial fibrillation Sister      Cerebrovascular Disease Sister      Parkinsonism Sister      Parkinsonism Sister      Atrial fibrillation Brother      Rheumatoid Arthritis Brother      Diabetes Type 2  Brother         dx 40s     Cerebrovascular Disease Brother      CABG Brother      Atrial fibrillation Brother        Social History:   Social History     Socioeconomic History     Marital status:      Spouse name: Not on file     Number of children: 2     Years of education: Not on file     Highest education level: Not on file   Occupational History     Occupation: CPA   Tobacco Use     Smoking status: Never     Smokeless tobacco: Never   Substance and Sexual Activity     Alcohol use: Yes     Alcohol/week: 5.0 standard drinks     Types: 5 Cans of beer per week     Drug use: No     Sexual activity: Yes     Partners: Female   Other Topics Concern     Not on file   Social History Narrative     Diet-regular            Exercise-walk, limited by R leg, R knee     Social Determinants of Health     Financial Resource Strain: Not on file   Food Insecurity: Not on file   Transportation Needs: Not on file   Physical Activity: Not on file   Stress: Not on file   Social Connections: Not on file   Intimate Partner Violence: Not on file   Housing Stability: Not on file              Lab Results    Chemistry/lipid CBC Cardiac Enzymes/BNP/TSH/INR   Lab Results   Component Value Date    CHOL 157 11/22/2021    HDL 51 11/22/2021    LDL 93 11/22/2021    TRIG 66 11/22/2021    CR 1.05 10/07/2022    BUN 21.6 10/07/2022    POTASSIUM 4.5 10/07/2022     10/07/2022    CO2 31 (H) 10/07/2022      Lab Results   Component Value Date    WBC 7.3 02/04/2022    HGB 15.7 02/04/2022    HCT 48.6 02/04/2022    MCV 94 02/04/2022     02/04/2022      Lab Results   Component Value Date    TROPONINI <0.01 02/04/2022    BNP 56 02/04/2022    TSH 0.26 (L) 02/04/2022    INR 1.12 (H) 12/21/2020          Wes Del Cid MD [facility]  Non-Invasive Cardiologist  [facility]  Pager [phone]

## 2022-10-28 NOTE — LETTER
10/28/2022    Magdaleno Bennett MD  [address] [place] [address]    RE: Brett Hopkins       Dear Colleague,     I had the pleasure of seeing Brett Hopkins in the [facility].  HEART CARE ENCOUNTER NOTE        Assessment/Recommendations   Assessment:    1. Persistent atrial fibrillation status post direct current cardioversion 10/26/2007, pulmonary vein isolation 8/19/2013, and direct current cardioversions 7/10/2019 and 9/12/2019. He is currently in sinus rhythm. [id number] score is 3 (hypertension, age 65-74, coronary artery disease). HAS-BLED score is 2 (age, bleeding predisposition).  2. Mild nonobstructive coronary artery disease seen on cardiac CT angiography 10/21/2022. No angina and nuclear stress testing 2/11/2022 showed no ischemia.  3. Stable mild dilatation of the ascending thoracic aorta last measured at 4.0 cm on CT angiography 10/21/2022.  4. Essential hypertension. Controlled.  5. BMI 35.84.    Plan:  1. Patient is a good candidate for a Watchman left atrial appendage closure device and would like to proceed.  2. Although his cholesterol numbers are actually quite good, he may still benefit from statin therapy given the presence of coronary atherosclerosis. Recommended rosuvastatin 5 mg daily. He will discuss this further with his primary care physician.  3. Continue apixaban 5 mg twice daily and sotalol 80 mg twice daily for now.    (Patient) Has documented nonvalvular atrial fibrillation (NVAF) and is currently on oral anticoagulant therapy Eliquis   [id number] -VASc = 3 (hypertension, age 65-74, coronary artery disease) HAS-BLED risk score: 2 (age, bleeding predisposition)  Indication(s) to consider non-oral anticoagulation therapy: bleeding predisposition  Pt has no contra-indication to come off oral anti-coagulant therapy if LAAC device is successfully placed.     I have personally reviewed the patients chart and discussed the following with the patient/family; 1)  The choices available for reducing stroke risk from atrial fibrillation, 2) Treatment options available including respective risk/benefits, and 3) What factors are most important for the patient in making their decision.  The [facility] shared decision making tool https://www.cardiosmart.org/SDM/Decision-Aids/Find-Decision-Aids/Atrial-Fibrillation  was used to guide this conversation.   The patient was counseled that their decision could be made at this time or in the future if more time was needed to consider their decision.       The patient is an appropriate candidate to proceed with left atrial appendage screening and implant.          History of Present Illness   Mr. Brett Hopkins is a 67 year old male with a significant past history of persistent AF s/p DCCV 10/26/2007, PVI 8/19/2013, then DCCV again 7/10/2019 and 9/12/2019, dilated thoracic aorta, and HTN presenting for a shared decision-making visit prior to possible Watchman left atrial appendage closure device placement.    He has never really felt his AF. He was off anticoagulation since his ablation in 2013, but resumed it once AF recurred in 2019. He takes apixaban and has not had any bleeding issues, but he worries about following and cannot take NSAIDs which he would like to use for knee pain.    He denies any chest pain/pressure/tightness, shortness of breath at rest or with exertion, light headedness/dizziness, pre-syncope, syncope, lower extremity swelling, palpitations, paroxysmal nocturnal dyspnea (PND), or orthopnea.     Cardiac Problems and Cardiac Diagnostics     Most Recent Cardiac testing:  ECG dated 2/4/2022 (personaly reviewed and interpreted): sinus bradycardia, HR 45 bpm, QTc 399 ms    Holter monitor 7/24/2019 (report reviewed):  1.  A 24-hour Holter monitor was applied 07/24/2019 with date of interpretation  07/26/2019.  2.  Atrial fibrillation is seen throughout the recording; the average ventricular  rate is 99 beats per minute and  ranges between 63 to 148 beats per minute.  3.  No bradycardia or pauses.  4.  Rare noncomplex ventricular ectopy; only 87 single PVCs are noted without  repetitive forms.  5.  Patient activity diary was reviewed with a number of entries, but no symptoms.     DISCUSSION:    1.  Persistent atrial fibrillation with somewhat elevated ventricular response.  2.  No symptoms recorded on diary.    Stress test 2/11/2022 (report reviewed):      The nuclear stress test is negative for inducible myocardial ischemia.     Left ventricular function is normal.     The left ventricular ejection fraction at rest is 70%.     Nuclear Study Quality: The  images demonstrate diaphragmatic attenuation.    Cardiac CT angiogram 10/21/2022 (report reviewed):  1. The following measurements were made of the left atrial appendage at 35% cardiac phase at the level of the bifurcation of the left circumflex artery:     -Orifice diameter: 28 x 25 mm (average 26 mm)  -Orifice circumference: 83 mm  -Orifice area: 5.42 cm   -Useful depth: 17 mm  -Maximum depth: 35 mm    2. No thrombus in the left atrium or left atrial appendage.  3. Mild coronary atherosclerosis with no obvious obstructive lesions.  4. Borderline enlargement of the ascending aorta measuring 4.0 cm.       Medications  Allergies   Current Outpatient Medications   Medication Sig Dispense Refill     amoxicillin (AMOXIL) 500 MG capsule TAKE 4 CAPSULES BY MOUTH 1 HOUR BEFORE DENTAL APPOINTMENT       apixaban ANTICOAGULANT (ELIQUIS ANTICOAGULANT) 5 MG tablet [ELIQUIS 5 MG TAB TABLET] TAKE 1 TABLET(5 MG) BY MOUTH TWICE DAILY 180 tablet 3     hydrochlorothiazide (HYDRODIURIL) 25 MG tablet TAKE 1 TABLET BY MOUTH ONCE DAILY IN THE MORNING FOR HIGH BLOOD PRESSURE 90 tablet 2     levothyroxine (SYNTHROID/LEVOTHROID) 100 MCG tablet Take 1 tablet (100 mcg) by mouth daily 90 tablet 3     lisinopril (ZESTRIL) 40 MG tablet TAKE 1 TABLET BY MOUTH DAILY FOR HIGH BLOOD PRESSURE 90 tablet 2  "    omeprazole (PRILOSEC) 20 MG DR capsule TAKE 1 CAPSULE(20 MG) BY MOUTH DAILY BEFORE BREAKFAST 90 capsule 3     sotalol (BETAPACE) 80 MG tablet [SOTALOL (BETAPACE) 80 MG TABLET] TAKE 1 TABLET(80 MG) BY MOUTH TWICE DAILY 180 tablet 3      Allergies   Allergen Reactions     Other Environmental Allergy Unknown     seasonal     Sulfa (Sulfonamide Antibiotics) [Sulfa Drugs] Rash     Rash - turned purple  , Around age 32        Physical Examination Review of Systems   /80 (BP Location: Left arm, Patient Position: Sitting, Cuff Size: Adult Regular)   Pulse 58   Resp 16   Ht 1.803 m (5' 11\")   Wt 116.6 kg (257 lb)   BMI 35.84 kg/m    Body mass index is 35.84 kg/m .  Wt Readings from Last 3 Encounters:   10/28/22 116.6 kg (257 lb)   10/07/22 117.9 kg (260 lb)   09/07/22 115.7 kg (255 lb)       General Appearance:   Pleasant  male, appears  stated age. no acute distress, normal body habitus   ENT/Mouth: Facemask.    EYES:  no scleral icterus, normal conjunctivae   Neck: no carotid bruits. No anterior cervical lymphadenopaty   Respiratory:   lungs are clear to auscultation, no rales or wheezing, equal chest wall expansion    Cardiovascular:   Regular rhythm, normal rate. Normal first and second heart sounds with no murmurs, rubs, or gallops; the carotid, radial and posterior tibial pulses are intact, Jugular venous pressure normal, no edema bilaterally    Abdomen/GI:  no organomegaly, masses, bruits, or tenderness; bowel sounds are present   Extremities: no cyanosis or clubbing   Skin: no xanthelasma, warm.    Heme/lymph/ Immunology No apparent bleeding noted.   Neurologic: Alert and oriented. normal gait, no tremors     Psychiatric: Pleasant, calm, appropriate affect.    A complete 10 system review of systems was performed and is negative except as mentioned in the HPI/subjective.         Past History   Past Medical History:   Past Medical History:   Diagnosis Date     Status post ablation of atrial fibrillation " 07/28/2015    PVI August 19, 2013 (cryo-PVI only)       Past Surgical History:   Past Surgical History:   Procedure Laterality Date     ARTHROPLASTY REVISION HIP Right 05/01/2019     ARTHROPLASTY REVISION KNEE Right 2019     CARDIOVERSION  07/01/2019    7/10/2019     CARDIOVERSION  09/12/2019     CATARACT EXTRACTION Bilateral 06/2021     DENTAL SURGERY      Oral Surgery Tooth Extraction;  Implant     [facility] ABLATE HEART DYSRHYTHM FOCUS  08/19/2013    Description: Catheter Ablation Atrial Fibrillation;  Recorded: 11/16/2013;  Comments: PVI Aug 19, 2013 (Cryo to all 4 PV's)     TOOTH EXTRACTION      implant     [facility] TOTAL HIP ARTHROPLASTY Right 05/08/2019    Procedure: RIGHT TOTAL HIP ARTHROPLASTY DIRECT ANTERIOR APPROACH;  Surgeon: Mario Woodruff MD;  Location: [facility] Main OR;  Service: Orthopedics     [facility] TOTAL KNEE ARTHROPLASTY Right 06/21/2017    Procedure: 2)  RIGHT TOTAL KNEE ARTHROPLASTY;  Surgeon: Mario VICKERS MD;  Location: [facility] Main OR;  Service: Orthopedics       Family History:   Family History   Problem Relation Age of Onset     Atrial fibrillation Mother      Dementia Mother         dx 80s     Diabetes Type 2  Mother         dx 60s/70s     Atrial fibrillation Father      Rheumatoid Arthritis Father      Atrial fibrillation Sister      Cerebrovascular Disease Sister      Parkinsonism Sister      Parkinsonism Sister      Atrial fibrillation Brother      Rheumatoid Arthritis Brother      Diabetes Type 2  Brother         dx 40s     Cerebrovascular Disease Brother      CABG Brother      Atrial fibrillation Brother        Social History:   Social History     Socioeconomic History     Marital status:      Spouse name: Not on file     Number of children: 2     Years of education: Not on file     Highest education level: Not on file   Occupational History     Occupation: CPA   Tobacco Use     Smoking status: Never     Smokeless tobacco: Never   Substance and Sexual Activity     Alcohol use: Yes      Alcohol/week: 5.0 standard drinks     Types: 5 Cans of beer per week     Drug use: No     Sexual activity: Yes     Partners: Female   Other Topics Concern     Not on file   Social History Narrative    Diet-regular            Exercise-walk, limited by R leg, R knee     Social Determinants of Health     Financial Resource Strain: Not on file   Food Insecurity: Not on file   Transportation Needs: Not on file   Physical Activity: Not on file   Stress: Not on file   Social Connections: Not on file   Intimate Partner Violence: Not on file   Housing Stability: Not on file              Lab Results    Chemistry/lipid CBC Cardiac Enzymes/BNP/TSH/INR   Lab Results   Component Value Date    CHOL 157 11/22/2021    HDL 51 11/22/2021    LDL 93 11/22/2021    TRIG 66 11/22/2021    CR 1.05 10/07/2022    BUN 21.6 10/07/2022    POTASSIUM 4.5 10/07/2022     10/07/2022    CO2 31 (H) 10/07/2022      Lab Results   Component Value Date    WBC 7.3 02/04/2022    HGB 15.7 02/04/2022    HCT 48.6 02/04/2022    MCV 94 02/04/2022     02/04/2022      Lab Results   Component Value Date    TROPONINI <0.01 02/04/2022    BNP 56 02/04/2022    TSH 0.26 (L) 02/04/2022    INR 1.12 (H) 12/21/2020          Wes Del Cid MD [facility]  Non-Invasive Cardiologist  [facility]  Pager [phone]      Thank you for allowing me to participate in the care of your patient.      Sincerely,     Wes Del Cid MD     [facility]  cc:   No referring provider defined for this encounter.

## 2022-10-28 NOTE — PATIENT INSTRUCTIONS
You are a good candidate for a Watchman.  Even though your cholesterol is good, consider starting rosuvastatin 5 mg daily (low perkins statin) to control the early plaque buildup in your heart.

## 2022-10-28 NOTE — TELEPHONE ENCOUNTER
Called patient to give him an update on his work up for watchman implant.  His CT was done on October 21 and just got read yesterday.  It will now be sent to the implanting doctors to review the images and once they look at it and let us know that he is okay for implant, someone will call to schedule.    He was unhappy that there were still no answers and I politely explained that there is a process that we have to follow and we can't schedule his implant until the implanters approve him.  He wanted to know when they would be looking at the images and I told him I hoped it would be by next week.    cSarlett Troncoso PA-C, MPAS  Structural Heart Program  [facility]

## 2022-10-31 ENCOUNTER — TRANSFERRED RECORDS (OUTPATIENT)
Dept: HEALTH INFORMATION MANAGEMENT | Facility: CLINIC | Age: 67
End: 2022-10-31

## 2022-11-01 ENCOUNTER — TELEPHONE (OUTPATIENT)
Dept: CARDIOLOGY | Facility: CLINIC | Age: 67
End: 2022-11-01

## 2022-11-01 DIAGNOSIS — I48.19 PERSISTENT ATRIAL FIBRILLATION (H): Primary | ICD-10-CM

## 2022-11-01 NOTE — TELEPHONE ENCOUNTER
LM for patient to return call. [facility]    ----- Message from Mitesh Graff MD sent at 10/31/2022  2:23 PM CDT -----  I would measure a fair bit smaller at [id number]. Should be doable as LAAO, let's confirm w/ Alpesh Sci.    Thx!  Mitesh  ----- Message -----  From: Anila Maradiaga RN  Sent: 10/30/2022  10:33 AM CDT  To: Anila Maradiaga RN, Mitesh Graff MD, #    Please review patient pre-LAAC CT.    Ok to schedule patient for LAAC procedure? Thanks!    Anila STOVALL RN

## 2022-11-02 ENCOUNTER — TELEPHONE (OUTPATIENT)
Dept: FAMILY MEDICINE | Facility: CLINIC | Age: 67
End: 2022-11-02

## 2022-11-02 DIAGNOSIS — E06.3 HYPOTHYROIDISM DUE TO HASHIMOTO'S THYROIDITIS: ICD-10-CM

## 2022-11-02 DIAGNOSIS — K20.90 ESOPHAGITIS: ICD-10-CM

## 2022-11-02 DIAGNOSIS — I10 ESSENTIAL HYPERTENSION: ICD-10-CM

## 2022-11-02 RX ORDER — LEVOTHYROXINE SODIUM 100 UG/1
100 TABLET ORAL DAILY
Qty: 90 TABLET | Refills: 0 | Status: SHIPPED | OUTPATIENT
Start: 2022-11-02 | End: 2023-02-28

## 2022-11-02 RX ORDER — HYDROCHLOROTHIAZIDE 25 MG/1
TABLET ORAL
Qty: 90 TABLET | Refills: 0 | Status: SHIPPED | OUTPATIENT
Start: 2022-11-02 | End: 2023-02-28

## 2022-11-02 NOTE — TELEPHONE ENCOUNTER
Incoming call from patient. We discussed that his anatomy is borderline, concern for small appendage. Per Dr. Graff, patient CT images to be reviewed by [facility] for [facility] to see if his THOMAS can fit Watchman device. 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 He was very hopeful to have his procedure in December so he and his wife can travel in January before his busy work period in February. Writer extended apologies, told him [facility] imaging/[facility] review could take 3-4 weeks. He verbalized understanding. Writer informed patient his images will be sent to [facility] for review today and that he will receive in call in three weeks to discuss the plan moving forward. He is welcome to call writer with questions or concerns in the meantime. No further questions at this time. [facility]

## 2022-11-02 NOTE — TELEPHONE ENCOUNTER
Reason for Call:  Other prescription    Detailed comments: pt can not get in till January for px. Pt stated he will be out of his meds (some of them) prior to that date and needs and extension to get him through till that date.    Phone Number Patient can be reached at: Home number on file [phone] (home)    Best Time: any    Can we leave a detailed message on this number? Not Applicable    Call taken on 11/2/2022 at 9:22 AM by Nadiya Resendiz

## 2022-11-08 NOTE — TELEPHONE ENCOUNTER
"Patient Images have been reviewed by [facility], as below;                    Images reviewed by Dr. Graff: \"That sounds reasonable for an attempt but with the understanding success would be lower than average.\" Will call patient with update. [facility]  "

## 2022-11-09 ENCOUNTER — IMMUNIZATION (OUTPATIENT)
Dept: FAMILY MEDICINE | Facility: CLINIC | Age: 67
End: 2022-11-09
Payer: COMMERCIAL

## 2022-11-09 PROCEDURE — 90471 IMMUNIZATION ADMIN: CPT

## 2022-11-09 PROCEDURE — 90662 IIV NO PRSV INCREASED AG IM: CPT

## 2022-11-09 NOTE — TELEPHONE ENCOUNTER
Phone call to patient to discuss results of pre-LAAC CT. Informed patient of suitable anatomy for LAAC procedure. We did discuss that per both Dr. Graff and [facility] review, his anatomy may be challenging and he may proceed with LAAC procedure with the understanding that his difficult anatomy may indicate lower than average success rate. He verbalized understanding. Discussed pre and post procedure expectations, including appts the week of the procedure, COVID testing, need for responsible adult at home the night of the procedure, driving restrictions post-procedure, and 6 week post-LAAC appt, and 3 month post-LAAC DIONTE. Patient agreeable to plan moving forward. Would like to have LAAC procedure 1/12/23 with Dr. Grfaf. Informed patient scheduling will be in touch to finalize procedure appointments but that they may return call to writer at their leisure. Patient has writer's direct office number for further questions or concerns. No further questions or concerns at this time.

## 2022-11-10 ENCOUNTER — LAB (OUTPATIENT)
Dept: LAB | Facility: CLINIC | Age: 67
End: 2022-11-10
Payer: COMMERCIAL

## 2022-11-10 DIAGNOSIS — H53.2 DIPLOPIA: Primary | ICD-10-CM

## 2022-11-10 PROCEDURE — 83516 IMMUNOASSAY NONANTIBODY: CPT | Mod: 90 | Performed by: OPTOMETRIST

## 2022-11-10 PROCEDURE — 86255 FLUORESCENT ANTIBODY SCREEN: CPT | Mod: 90 | Performed by: OPTOMETRIST

## 2022-11-10 PROCEDURE — 99000 SPECIMEN HANDLING OFFICE-LAB: CPT | Performed by: OPTOMETRIST

## 2022-11-10 PROCEDURE — 36415 COLL VENOUS BLD VENIPUNCTURE: CPT | Performed by: OPTOMETRIST

## 2022-11-10 PROCEDURE — 83519 RIA NONANTIBODY: CPT | Mod: 90 | Performed by: OPTOMETRIST

## 2022-11-10 NOTE — TELEPHONE ENCOUNTER
----- Message -----  From: Dacia Oseguera  Sent: 11/10/2022   2:57 PM CST  To: Anila Maradiaga RN    done  ----- Message -----  From: Anila Maradiaga RN  Sent: 11/9/2022   4:08 PM CST  To: Dacia Oseguera    ----- Message from Anila Maradiaga RN sent at 11/9/2022  4:08 PM CST -----  Please call patient to schedule pre-ops, post-op, and LAAC procedure 1/12/23 with Dr. Graff  Patient does not have a device. SDM with Dr. Del Cid 10/28/22  Case request, intra-Op DIONTE, and 3 month post-LAAC DIONTE orders in.   Thanks!    Anila STOVALL RN

## 2022-11-13 LAB
ACHR BIND AB SER-SCNC: 0 NMOL/L
STRIA MUS IGG SER QL IF: NORMAL

## 2022-11-15 ENCOUNTER — TELEPHONE (OUTPATIENT)
Dept: CARDIOLOGY | Facility: CLINIC | Age: 67
End: 2022-11-15

## 2022-11-15 LAB — ACHR MOD AB/ACHR TOTAL SFR SER: 0 %

## 2022-11-15 NOTE — TELEPHONE ENCOUNTER
[facility] Call Center    Phone Message    May a detailed message be left on voicemail: yes     Reason for Call: Other:     Pt is requesting a call back to discuss when air travel can begin after watchman appt.    Action Taken: Other: cardio    Travel Screening: Not Applicable     Thank you!  [facility]

## 2022-11-15 NOTE — TELEPHONE ENCOUNTER
11/15/2022  Azalea Marin is a 62 y.o., female.      Pre-op Assessment          Review of Systems      Physical Exam  General: Well nourished, Cooperative, Alert and Oriented    Airway:  Mallampati: II   Mouth Opening: Normal  TM Distance: Normal  Tongue: Normal  Neck ROM: Normal ROM    Dental:  Edentulous        Anesthesia Plan  Type of Anesthesia, risks & benefits discussed:    Anesthesia Type: Gen Natural Airway  Intra-op Monitoring Plan: Standard ASA Monitors  Post Op Pain Control Plan: multimodal analgesia  Induction:  IV  Informed Consent: Informed consent signed with the Patient and all parties understand the risks and agree with anesthesia plan.  All questions answered. Patient consented to blood products? Yes  ASA Score: 3  Day of Surgery Review of History & Physical: H&P Update referred to the surgeon/provider.    Ready For Surgery From Anesthesia Perspective.     .       He needs to see me in order for me to discuss his blood pressure medication.

## 2022-11-15 NOTE — TELEPHONE ENCOUNTER
Return call to patient. Explained that there are lifting restrictions of no pushing, pulling or lifting greater than 10lbs for 5 days after his procedure. If he is feeling well enough he would be able to travel after this time. He reports that he is taking a trip the following Wednesday after the procedure. Patient verbalized understanding and no further questions. -SC

## 2022-11-16 LAB — ACHR BLOCK AB/ACHR TOTAL SFR SER: 6 %

## 2022-11-21 ENCOUNTER — TRANSFERRED RECORDS (OUTPATIENT)
Dept: HEALTH INFORMATION MANAGEMENT | Facility: CLINIC | Age: 67
End: 2022-11-21

## 2022-11-22 DIAGNOSIS — I48.19 PERSISTENT ATRIAL FIBRILLATION (H): ICD-10-CM

## 2022-11-22 RX ORDER — SOTALOL HYDROCHLORIDE 80 MG/1
TABLET ORAL
Qty: 180 TABLET | Refills: 3 | Status: SHIPPED | OUTPATIENT
Start: 2022-11-22 | End: 2023-10-27 | Stop reason: DRUGHIGH

## 2022-11-23 ENCOUNTER — TELEPHONE (OUTPATIENT)
Dept: FAMILY MEDICINE | Facility: CLINIC | Age: 67
End: 2022-11-23

## 2022-11-23 ENCOUNTER — TELEPHONE (OUTPATIENT)
Dept: NEUROLOGY | Facility: CLINIC | Age: 67
End: 2022-11-23

## 2022-11-23 DIAGNOSIS — H53.2 DOUBLE VISION: Primary | ICD-10-CM

## 2022-11-23 NOTE — TELEPHONE ENCOUNTER
I could see the patient on November 30 at 1 PM in the afternoon.  See if that is agreeable with the patient.  Cayetano Traore MD on 11/23/2022 at 3:30 PM

## 2022-11-23 NOTE — TELEPHONE ENCOUNTER
Spoke to pt, appt scheduled on 11/31/22 at 1pm with Dr. Traore. Pt states he was referred by his PCP as well as [facility] in [place]. Writer requested office visit notes from [facility].    Analisa Tang LPN on 11/23/2022 at 3:43 PM

## 2022-11-23 NOTE — TELEPHONE ENCOUNTER
"Dr Lisy Hernandez with [facility].  [phone] main  [phone] \"Direct\" if she's not with a patient.    Patient has been in and out of eye clinic with double visions since June. Negative test for Nystagmus Gravis, but Dr Hernandez believed patient could still have it and just test negative. Dr Hernandez referred patient to [facility], but can't get in until 1/4/2023.    Dr Hernandez was wondering if there is a Neurology group that Dr Bennett could recommend and be able to get pt in sooner than 1/4/2023; she's hoping for a neurologist that works directly with patient with Nystagmus Gravis.    Please advise and call her with any other questions.    Lam Goldstein, ARABELLAN, RN  [facility]    "

## 2022-11-23 NOTE — TELEPHONE ENCOUNTER
[facility] Call Center    Phone Message    May a detailed message be left on voicemail: yes     Reason for Call: Other: Pt calling because he was referred to [facility] for double vision. Priority says 1-2 weeks. Neurology is booked April into May. Pt will only go to [place].     Action Taken: Message routed to:  Other: [facility]    Travel Screening: Not Applicable

## 2022-11-28 DIAGNOSIS — I10 ESSENTIAL HYPERTENSION: ICD-10-CM

## 2022-11-29 RX ORDER — HYDROCHLOROTHIAZIDE 25 MG/1
TABLET ORAL
Qty: 90 TABLET | Refills: 0 | OUTPATIENT
Start: 2022-11-29

## 2022-11-29 NOTE — PROGRESS NOTES
"In person evaluation    HPI  11/30/2022, inpatient consultation      67-year-old being evaluated neurologically for:  Diplopia    A.  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       Onset after starting steroids in September 2020 for alopecia (symptoms came on 2 months later       Variable diplopia followed by ophthalmology       Does have glasses with prism         Patient been following with ophthalmology over the last 2 years       Vision is split more in a skew deviation sometimes can be horizontal sometimes vertical but sometimes mixed       Has had 8-10 different corrections for his glasses seems to work for a while but then it does not       Saw another eye physician and it looks like his prescription was \"backwards for the adjustment\"       They redid the prisms and when he came back they thought that they were done wrong again which would signify that he has changing diplopia not fixed.       This is most consistent with ocular myasthenia gravis         Initial acetylcholine receptor antibodies were negative       We will send MuSK antibodies       Some people have a \"antibody negative ocular myasthenia gravis\"         No dysphagia/no dysarthria/no difficulty shaving/no difficulty with stairs except for his arthritic knees          Discussed the use of low-dose Mestinon for symptomatic treatment.        Reviewed side effects of the medicine such as increased saliva, GI rumbling, increased diarrhea or loose stool        Usually people get used to these after they are on the med for a while          We will start a low-dose Mestinon half of a 60 mg tablet in the evening around 5 PM his diplopia is worse in the evening if it helps with no side effects then when he follows up we can make further adjustments      B.  Autoimmune disorder       Alopecia September 2020       Treated with steroids blood pressure went up.       Diplopia seem to happen 2 months after      Past medical history  Atrial fibrillation " "(pulmonary vein isolation 8/19/2013  CAD  Hypertension  Alopecia autoimmune  Posterior capsular cataract  Lymphedema right leg      Habits  Non-smoker  5 cans of beer per week  Works as a CPA    Family history  Mother atrial fibrillation/diabetes/dementia (80s)  Father rheumatoid arthritis/atrial fibrillation  Sister CVA  Sister parkinsonism  Brother atrial fibrillation/rheumatoid arthritis  Brother diabetes  Brother CVA/CABG/atrial fibrillation        Work-up  HEAD MRI: 12/22/2020  1.  No acute intracranial process.  2.  Generalized brain atrophy and presumed microvascular ischemic.  HEAD MRA: 12/22/2020  1.  No aneurysm, high flow AVM or significant stenosis identified.  2.  Variant [place] of [place] anatomy see official report  B12 919, (9/10/2018)  ESR 3, CRP 0.5, (9/10/2019)  TSH 0.26 (2/4/2022)  Acetylcholine blocking antibody, 6 negative (11/10/2022)  Acetylcholine modulating antibody, 0 negative (11/10/2022)  Acetylcholine binding antibodies, 0 negative (11/10/2022)  Striatal antibodies, <1:40 negative (11/10/2022)  Musk antibody negative (11/30/2022)        Exam    Review of system    Diplopia fluctuating  No dysarthria  No dysphagia  No trouble shaving  Dressing okay  Can walk up and down stairs but difficult due to his arthritic knees    No chest pain no shortness of breath  No nausea vomiting or diarrhea    Does have some mild neck pain  He get headaches more at the end of the day question whether this is \"eyestrain from his diplopia    Does snore    No focal weakness    Otherwise review of systems negative    General exam  Blood pressure 146/89, pulse 53  Lungs clear  Heart rate seemed regular has history of A. Fib  Abdomen soft  Arthritic knees bilaterally    Neurologic exam  Alert orient x3  Normal prosody speech  Normal naming  Normal comprehension  Normal repetition  No aphasia  No neglect  Memory recall normal    Cranials 2 through 12  With glasses on no hemiplegia seen  No ptosis  Eye closure " strong  Visual fields intact  No nystagmus  Tongue twisters good  Lip closure strong  Face symmetrical    MG testing  Talks in long paragraphs  Can whistle  Eye closure strong  No ptosis  Mild closure strong  Neck flexors and extensors good    Upper extremities  No drift no tremor finger-nose okay   reported right over left  Deltoid 5/5  Biceps 5/5  Triceps 5/5  Wrist and finger extensors 5/5  Wrist and finger flexors 5/5    Lower extremities reported right over left  Iliopsoas 5/5  Quadriceps 5/5  Hamstring 5/5  Anterotibial 5/5    Reflexes symmetrical    Gait  Antalgic, stiff right knee limps on left leg      Impression    1.  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  Atrial fibrillation getting watchman procedure in January 2023       Pulse 53 cautious dosing of medication    4.  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

## 2022-11-30 ENCOUNTER — OFFICE VISIT (OUTPATIENT)
Dept: NEUROLOGY | Facility: CLINIC | Age: 67
End: 2022-11-30
Payer: COMMERCIAL

## 2022-11-30 ENCOUNTER — APPOINTMENT (OUTPATIENT)
Dept: LAB | Facility: CLINIC | Age: 67
End: 2022-11-30
Payer: COMMERCIAL

## 2022-11-30 VITALS
SYSTOLIC BLOOD PRESSURE: 146 MMHG | BODY MASS INDEX: 36.4 KG/M2 | DIASTOLIC BLOOD PRESSURE: 89 MMHG | HEIGHT: 71 IN | HEART RATE: 53 BPM | WEIGHT: 260 LBS

## 2022-11-30 DIAGNOSIS — G70.00 MYASTHENIA GRAVIS WITHOUT EXACERBATION (H): Primary | ICD-10-CM

## 2022-11-30 PROCEDURE — 99205 OFFICE O/P NEW HI 60 MIN: CPT | Performed by: PSYCHIATRY & NEUROLOGY

## 2022-11-30 RX ORDER — PYRIDOSTIGMINE BROMIDE 60 MG/1
TABLET ORAL
Qty: 45 TABLET | Refills: 3 | Status: SHIPPED | OUTPATIENT
Start: 2022-11-30 | End: 2023-01-26

## 2022-11-30 NOTE — LETTER
December 9, 2022      Champ Hopkins  [address] [place] [address]        Dear ,    We are writing to inform you of your test results.    Musk antibody negative (11/30/2022)  Continue as planned keep follow-up visit 2/8/2023  If you have any questions or concerns, please call the clinic at the number listed above.       Sincerely,    Dr. Cayetano Traore

## 2022-11-30 NOTE — NURSING NOTE
Chief Complaint   Patient presents with     Eye Problem     Double vision. Pt has had been experiencing double vision for the past 2 years, mainly in the evening.      Analisa Tang LPN on 11/30/2022 at 1:02 PM

## 2022-11-30 NOTE — LETTER
"    11/30/2022         RE: Brett Hopkins  [address] [place] [address]        Dear Colleague,    Thank you for referring your patient, Brett Hopkins, to the [facility] [place]. Please see a copy of my visit note below.    In person evaluation    HPI  11/30/2022, inpatient consultation      67-year-old being evaluated neurologically for:  Diplopia    A.  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       Onset after starting steroids in September 2020 for alopecia (symptoms came on 2 months later       Variable diplopia followed by ophthalmology       Does have glasses with prism         Patient been following with ophthalmology over the last 2 years       Vision is split more in a skew deviation sometimes can be horizontal sometimes vertical but sometimes mixed       Has had 8-10 different corrections for his glasses seems to work for a while but then it does not       Saw another eye physician and it looks like his prescription was \"backwards for the adjustment\"       They redid the prisms and when he came back they thought that they were done wrong again which would signify that he has changing diplopia not fixed.       This is most consistent with ocular myasthenia gravis         Initial acetylcholine receptor antibodies were negative       We will send MuSK antibodies       Some people have a \"antibody negative ocular myasthenia gravis\"         No dysphagia/no dysarthria/no difficulty shaving/no difficulty with stairs except for his arthritic knees          Discussed the use of low-dose Mestinon for symptomatic treatment.        Reviewed side effects of the medicine such as increased saliva, GI rumbling, increased diarrhea or loose stool        Usually people get used to these after they are on the med for a while          We will start a low-dose Mestinon half of a 60 mg tablet in the evening around 5 PM his diplopia is worse in the evening if it helps with no side " "effects then when he follows up we can make further adjustments      B.  Autoimmune disorder       Alopecia September 2020       Treated with steroids blood pressure went up.       Diplopia seem to happen 2 months after      Past medical history  Atrial fibrillation (pulmonary vein isolation 8/19/2013  CAD  Hypertension  Alopecia autoimmune  Posterior capsular cataract  Lymphedema right leg      Habits  Non-smoker  5 cans of beer per week  Works as a CPA    Family history  Mother atrial fibrillation/diabetes/dementia (80s)  Father rheumatoid arthritis/atrial fibrillation  Sister CVA  Sister parkinsonism  Brother atrial fibrillation/rheumatoid arthritis  Brother diabetes  Brother CVA/CABG/atrial fibrillation        Work-up  HEAD MRI: 12/22/2020  1.  No acute intracranial process.  2.  Generalized brain atrophy and presumed microvascular ischemic.  HEAD MRA: 12/22/2020  1.  No aneurysm, high flow AVM or significant stenosis identified.  2.  Variant [place] of [place] anatomy see official report  B12 919, (9/10/2018)  ESR 3, CRP 0.5, (9/10/2019)  TSH 0.26 (2/4/2022)  Acetylcholine blocking antibody, 6 negative (11/10/2022)  Acetylcholine modulating antibody, 0 negative (11/10/2022)  Acetylcholine binding antibodies, 0 negative (11/10/2022)  Striatal antibodies, <1:40 negative (11/10/2022)      Exam    Review of system    Diplopia fluctuating  No dysarthria  No dysphagia  No trouble shaving  Dressing okay  Can walk up and down stairs but difficult due to his arthritic knees    No chest pain no shortness of breath  No nausea vomiting or diarrhea    Does have some mild neck pain  He get headaches more at the end of the day question whether this is \"eyestrain from his diplopia    Does snore    No focal weakness    Otherwise review of systems negative    General exam  Blood pressure 146/89, pulse 53  Lungs clear  Heart rate seemed regular has history of A. Fib  Abdomen soft  Arthritic knees bilaterally    Neurologic " exam  Alert orient x3  Normal prosody speech  Normal naming  Normal comprehension  Normal repetition  No aphasia  No neglect  Memory recall normal    Cranials 2 through 12  With glasses on no hemiplegia seen  No ptosis  Eye closure strong  Visual fields intact  No nystagmus  Tongue twisters good  Lip closure strong  Face symmetrical    MG testing  Talks in long paragraphs  Can whistle  Eye closure strong  No ptosis  Mild closure strong  Neck flexors and extensors good    Upper extremities  No drift no tremor finger-nose okay   reported right over left  Deltoid 5/5  Biceps 5/5  Triceps 5/5  Wrist and finger extensors 5/5  Wrist and finger flexors 5/5    Lower extremities reported right over left  Iliopsoas 5/5  Quadriceps 5/5  Hamstring 5/5  Anterotibial 5/5    Reflexes symmetrical    Gait  Antalgic, stiff right knee limps on left leg      Impression    1.  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  Atrial fibrillation getting watchman procedure in January 2023       Pulse 53 cautious dosing of medication    4.  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        Sincerely,        Cayetano Traore MD

## 2022-12-12 ENCOUNTER — TELEPHONE (OUTPATIENT)
Dept: CARDIOLOGY | Facility: CLINIC | Age: 67
End: 2022-12-12

## 2022-12-12 NOTE — TELEPHONE ENCOUNTER
Called patient to review recent elevated blood pressure reading.  Per [place] Community Measures guidelines, patients blood pressure is out of parameters and recheck blood pressure is recommended. BP is still highly elevated and he stated his PCP is monitoring his BP. Will forward to nurse.       Last Blood Pressure: 146/89  Last Heart Rate: 53  Date: 11/30  Location: Other Specialty    Today's Blood Pressure:140/85  Today's Heart Rate: N/A  Date:12/12/22  Location: Home BP    Patient reported blood pressure updated in Epic. Blood pressure continues to fall outside of the [place] Community Measures guidelines.  Message sent to primary cardiology team for further review.     Kati Gunderson MA

## 2022-12-26 ENCOUNTER — HEALTH MAINTENANCE LETTER (OUTPATIENT)
Age: 67
End: 2022-12-26

## 2023-01-03 ASSESSMENT — ENCOUNTER SYMPTOMS
COUGH: 0
FREQUENCY: 0
EYE PAIN: 0
HEARTBURN: 0
DIZZINESS: 0
HEMATOCHEZIA: 0
DYSURIA: 0
PALPITATIONS: 0
SORE THROAT: 0
ARTHRALGIAS: 1
SHORTNESS OF BREATH: 0
PARESTHESIAS: 0
HEMATURIA: 0
HEADACHES: 0
DIARRHEA: 0
MYALGIAS: 0
CHILLS: 0
JOINT SWELLING: 0
NAUSEA: 0
NERVOUS/ANXIOUS: 0
ABDOMINAL PAIN: 0
CONSTIPATION: 0
WEAKNESS: 0
FEVER: 0

## 2023-01-03 ASSESSMENT — ACTIVITIES OF DAILY LIVING (ADL): CURRENT_FUNCTION: NO ASSISTANCE NEEDED

## 2023-01-04 ENCOUNTER — OFFICE VISIT (OUTPATIENT)
Dept: FAMILY MEDICINE | Facility: CLINIC | Age: 68
End: 2023-01-04
Payer: COMMERCIAL

## 2023-01-04 VITALS
BODY MASS INDEX: 38.36 KG/M2 | WEIGHT: 259 LBS | SYSTOLIC BLOOD PRESSURE: 122 MMHG | OXYGEN SATURATION: 98 % | HEART RATE: 56 BPM | TEMPERATURE: 97.9 F | HEIGHT: 69 IN | DIASTOLIC BLOOD PRESSURE: 80 MMHG | RESPIRATION RATE: 19 BRPM

## 2023-01-04 DIAGNOSIS — Z00.00 PHYSICAL EXAM: ICD-10-CM

## 2023-01-04 DIAGNOSIS — Z11.59 NEED FOR HEPATITIS C SCREENING TEST: Primary | ICD-10-CM

## 2023-01-04 DIAGNOSIS — I71.21 ANEURYSM OF ASCENDING AORTA WITHOUT RUPTURE (H): ICD-10-CM

## 2023-01-04 DIAGNOSIS — I48.19 PERSISTENT ATRIAL FIBRILLATION (H): ICD-10-CM

## 2023-01-04 DIAGNOSIS — I89.0 LYMPHEDEMA: ICD-10-CM

## 2023-01-04 DIAGNOSIS — G70.00 MYASTHENIA GRAVIS (H): ICD-10-CM

## 2023-01-04 DIAGNOSIS — E04.1 THYROID NODULE: ICD-10-CM

## 2023-01-04 DIAGNOSIS — Z23 NEED FOR VACCINATION: ICD-10-CM

## 2023-01-04 DIAGNOSIS — E06.3 HYPOTHYROIDISM DUE TO HASHIMOTO'S THYROIDITIS: ICD-10-CM

## 2023-01-04 DIAGNOSIS — I10 PRIMARY HYPERTENSION: ICD-10-CM

## 2023-01-04 LAB
ANION GAP SERPL CALCULATED.3IONS-SCNC: 10 MMOL/L (ref 7–15)
BUN SERPL-MCNC: 22.4 MG/DL (ref 8–23)
CALCIUM SERPL-MCNC: 9.3 MG/DL (ref 8.8–10.2)
CHLORIDE SERPL-SCNC: 103 MMOL/L (ref 98–107)
CHOLEST SERPL-MCNC: 167 MG/DL
CREAT SERPL-MCNC: 1.03 MG/DL (ref 0.67–1.17)
DEPRECATED HCO3 PLAS-SCNC: 29 MMOL/L (ref 22–29)
ERYTHROCYTE [DISTWIDTH] IN BLOOD BY AUTOMATED COUNT: 13 % (ref 10–15)
GFR SERPL CREATININE-BSD FRML MDRD: 81 ML/MIN/1.73M2
GLUCOSE SERPL-MCNC: 107 MG/DL (ref 70–99)
HCT VFR BLD AUTO: 47.1 % (ref 40–53)
HDLC SERPL-MCNC: 46 MG/DL
HGB BLD-MCNC: 15.3 G/DL (ref 13.3–17.7)
LDLC SERPL CALC-MCNC: 99 MG/DL
MCH RBC QN AUTO: 30.8 PG (ref 26.5–33)
MCHC RBC AUTO-ENTMCNC: 32.5 G/DL (ref 31.5–36.5)
MCV RBC AUTO: 95 FL (ref 78–100)
NONHDLC SERPL-MCNC: 121 MG/DL
PLATELET # BLD AUTO: 237 10E3/UL (ref 150–450)
POTASSIUM SERPL-SCNC: 4.4 MMOL/L (ref 3.4–5.3)
RBC # BLD AUTO: 4.96 10E6/UL (ref 4.4–5.9)
SODIUM SERPL-SCNC: 142 MMOL/L (ref 136–145)
TRIGL SERPL-MCNC: 112 MG/DL
TSH SERPL DL<=0.005 MIU/L-ACNC: 3.14 UIU/ML (ref 0.3–4.2)
WBC # BLD AUTO: 8.3 10E3/UL (ref 4–11)

## 2023-01-04 PROCEDURE — 90677 PCV20 VACCINE IM: CPT | Performed by: FAMILY MEDICINE

## 2023-01-04 PROCEDURE — 36415 COLL VENOUS BLD VENIPUNCTURE: CPT | Performed by: FAMILY MEDICINE

## 2023-01-04 PROCEDURE — 80061 LIPID PANEL: CPT | Performed by: FAMILY MEDICINE

## 2023-01-04 PROCEDURE — 99214 OFFICE O/P EST MOD 30 MIN: CPT | Mod: 25 | Performed by: FAMILY MEDICINE

## 2023-01-04 PROCEDURE — 99397 PER PM REEVAL EST PAT 65+ YR: CPT | Mod: 25 | Performed by: FAMILY MEDICINE

## 2023-01-04 PROCEDURE — 84443 ASSAY THYROID STIM HORMONE: CPT | Performed by: FAMILY MEDICINE

## 2023-01-04 PROCEDURE — 80048 BASIC METABOLIC PNL TOTAL CA: CPT | Performed by: FAMILY MEDICINE

## 2023-01-04 PROCEDURE — 90471 IMMUNIZATION ADMIN: CPT | Performed by: FAMILY MEDICINE

## 2023-01-04 PROCEDURE — 85027 COMPLETE CBC AUTOMATED: CPT | Performed by: FAMILY MEDICINE

## 2023-01-04 ASSESSMENT — ENCOUNTER SYMPTOMS
PARESTHESIAS: 0
PALPITATIONS: 0
FEVER: 0
HEADACHES: 0
DIARRHEA: 0
SORE THROAT: 0
CONSTIPATION: 0
ARTHRALGIAS: 1
WEAKNESS: 0
HEARTBURN: 0
DIZZINESS: 0
SHORTNESS OF BREATH: 0
MYALGIAS: 0
HEMATURIA: 0
ABDOMINAL PAIN: 0
COUGH: 0
NAUSEA: 0
NERVOUS/ANXIOUS: 0
FREQUENCY: 0
DYSURIA: 0
EYE PAIN: 0
CHILLS: 0
JOINT SWELLING: 0
HEMATOCHEZIA: 0

## 2023-01-04 ASSESSMENT — ACTIVITIES OF DAILY LIVING (ADL): CURRENT_FUNCTION: NO ASSISTANCE NEEDED

## 2023-01-04 NOTE — PROGRESS NOTES
"SUBJECTIVE:   CC: Champ is an 67 year old who presents for preventative health visit.   Patient has been advised of split billing requirements and indicates understanding: Yes     HPI:  Patient comes in for his annual physical examination.  Of note the patient is going to start Medicare but not until around April of this year.    Patient has a history of underlying atrial fibrillation followed by cardiology, he is scheduled to have a watchman procedure done in the fairly near future.    Patient has a history of hypertension well controlled on hydrochlorothiazide and lisinopril.    Patient has a history of both hypothyroidism but also incidentally 2 thyroid nodules were noted, recommended to have a 1 3 and then also 5-year follow-up ultrasounds.    Patient also has a history of a thoracic arterial aneurysm this is also being followed and monitored regularly.    Patient has had about a 2-year history of double vision.  Particularly at night, finally after a 2-year work-up he was eventually diagnosed with myasthenia gravis and is being treated for this of note the double vision has essentially resolved.    Patient does try to walk regularly sometimes arthritis does limit him somewhat.    Patient has underlying reflux he does take omeprazole daily if he misses doses he does have breakthrough symptoms.            Healthy Habits:     In general, how would you rate your overall health?  Good    Frequency of exercise:  1 day/week    Duration of exercise:  Less than 15 minutes    Do you usually eat at least 4 servings of fruit and vegetables a day, include whole grains    & fiber and avoid regularly eating high fat or \"junk\" foods?  Yes    Taking medications regularly:  Yes    Medication side effects:  None    Ability to successfully perform activities of daily living:  No assistance needed    Home Safety:  No safety concerns identified    Hearing Impairment:  Difficulty following a conversation in a noisy restaurant or " crowded room    In the past 6 months, have you been bothered by leaking of urine?  No    In general, how would you rate your overall mental or emotional health?  Excellent      PHQ-2 Total Score: 0    Additional concerns today:  No       Today's PHQ-2 Score:   PHQ-2 ( 1999 Pfizer) 1/3/2023   Q1: Little interest or pleasure in doing things 0   Q2: Feeling down, depressed or hopeless 0   PHQ-2 Score 0   Q1: Little interest or pleasure in doing things Not at all   Q2: Feeling down, depressed or hopeless Not at all   PHQ-2 Score 0           Social History     Tobacco Use     Smoking status: Never     Smokeless tobacco: Never   Substance Use Topics     Alcohol use: Yes     Alcohol/week: 5.0 standard drinks     Types: 5 Cans of beer per week     Comment: minimal     If you drink alcohol do you typically have >3 drinks per day or >7 drinks per week? No    Alcohol Use 1/3/2023   Prescreen: >3 drinks/day or >7 drinks/week? No   Prescreen: >3 drinks/day or >7 drinks/week? -       Last PSA:   Prostate Specific Antigen Screen   Date Value Ref Range Status   06/01/2020 1.3 0.0 - 4.5 ng/mL Final       Reviewed orders with patient. Reviewed health maintenance and updated orders accordingly - Yes  Lab work is in process    Reviewed and updated as needed this visit by clinical staff   Tobacco  Allergies  Meds  Problems             Reviewed and updated as needed this visit by Provider    Allergies   Problems            Past Medical History:   Diagnosis Date     Status post ablation of atrial fibrillation 07/28/2015    PVI August 19, 2013 (cryo-PVI only)      Past Surgical History:   Procedure Laterality Date     ARTHROPLASTY REVISION HIP Right 05/01/2019     ARTHROPLASTY REVISION KNEE Right 2019     CARDIOVERSION  07/01/2019    7/10/2019     CARDIOVERSION  09/12/2019     CATARACT EXTRACTION Bilateral 06/2021     DENTAL SURGERY      Oral Surgery Tooth Extraction;  Implant     MI ABLATE HEART DYSRHYTHM FOCUS  08/19/2013     Description: Catheter Ablation Atrial Fibrillation;  Recorded: 11/16/2013;  Comments: PVI Aug 19, 2013 (Cryo to all 4 PV's)     TOOTH EXTRACTION      implant     [facility] TOTAL HIP ARTHROPLASTY Right 05/08/2019    Procedure: RIGHT TOTAL HIP ARTHROPLASTY DIRECT ANTERIOR APPROACH;  Surgeon: Mario Woodruff MD;  Location: [facility];  Service: Orthopedics     [facility] TOTAL KNEE ARTHROPLASTY Right 06/21/2017    Procedure: 2)  RIGHT TOTAL KNEE ARTHROPLASTY;  Surgeon: Mario VICKERS MD;  Location: [facility];  Service: Orthopedics       Review of Systems   Constitutional: Negative for chills and fever.   HENT: Negative for congestion, ear pain, hearing loss and sore throat.    Eyes: Positive for visual disturbance. Negative for pain.   Respiratory: Negative for cough and shortness of breath.    Cardiovascular: Negative for chest pain, palpitations and peripheral edema.   Gastrointestinal: Negative for abdominal pain, constipation, diarrhea, heartburn, hematochezia and nausea.   Genitourinary: Positive for impotence. Negative for dysuria, frequency, genital sores, hematuria, penile discharge and urgency.   Musculoskeletal: Positive for arthralgias. Negative for joint swelling and myalgias.   Skin: Negative for rash.   Neurological: Negative for dizziness, weakness, headaches and paresthesias.   Psychiatric/Behavioral: Negative for mood changes. The patient is not nervous/anxious.      CONSTITUTIONAL: NEGATIVE for fever, chills, change in weight  INTEGUMENTARY/SKIN: NEGATIVE for worrisome rashes, moles or lesions  EYES: NEGATIVE for vision changes or irritation  ENT: NEGATIVE for ear, mouth and throat problems  RESP: NEGATIVE for significant cough or SOB  CV: NEGATIVE for chest pain, palpitations or peripheral edema  GI: NEGATIVE for nausea, abdominal pain, heartburn, or change in bowel habits   male: negative for dysuria, hematuria, decreased urinary stream, erectile dysfunction, urethral  "discharge  MUSCULOSKELETAL: NEGATIVE for significant arthralgias or myalgia  NEURO: NEGATIVE for weakness, dizziness or paresthesias  PSYCHIATRIC: NEGATIVE for changes in mood or affect    OBJECTIVE:   /80   Pulse 56   Temp 97.9  F (36.6  C) (Temporal)   Resp 19   Ht 1.753 m (5' 9\")   Wt 117.5 kg (259 lb)   SpO2 98%   BMI 38.25 kg/m      Physical Exam  GENERAL: healthy, alert and no distress  EYES: Eyes grossly normal to inspection, PERRL and conjunctivae and sclerae normal  HENT: ear canals and TM's normal, nose and mouth without ulcers or lesions  NECK: no adenopathy, no asymmetry, masses, or scars and thyroid normal to palpation  RESP: lungs clear to auscultation - no rales, rhonchi or wheezes  CV: regular rate and rhythm, normal S1 S2, no S3 or S4, no murmur, click or rub, no peripheral edema and peripheral pulses strong  ABDOMEN: soft, nontender, no hepatosplenomegaly, no masses and bowel sounds normal  MS: no gross musculoskeletal defects noted, no edema  SKIN: no suspicious lesions or rashes  NEURO: Normal strength and tone, mentation intact and speech normal  PSYCH: mentation appears normal, affect normal/bright    Diagnostic Test Results:  Labs reviewed in Epic    ASSESSMENT/PLAN:       (I10) Hypertension  Comment: Well-controlled on lisinopril and hydrochlorothiazide   Plan: Basic metabolic panel, Lipid panel reflex to         direct LDL Fasting             (Z00.00) Physical exam  Comment: Patient has suboptimal exercise.  Plan:      (G70.00) Myasthenia gravis (H)  Comment: Followed by ophthalmology ocular involvement only  Plan:      (I48.19) Persistent atrial fibrillation  Comment: On Eliquis and sotalol followed by cardiology  Plan: CBC with platelets               (I71.21) Aneurysm of ascending aorta without rupture  Comment: Stable  Plan:      (E04.1) Thyroid nodule  Comment: Incidentally noted   Plan: US Thyroid             (Z23) Need for vaccination  Comment:    Plan: Pneumococcal 20 " "Valent Conjugate (PCV20)             (E03.8,  E06.3) Hypothyroidism   Comment: On thyroid replaced  Plan: TSH             PLAN:  1.  Patient is scheduled in the near future for placement of a watchman device.  2.  Pneumonia 20 vaccine  3.  Patient is going to in the future get the COVID booster shot  4.  Thyroid ultrasound  5.  Patient is also followed regularly by ophthalmology  6.  Laboratory studies as above  7.  Patient otherwise should be seen yearly, but he also should be seen for a welcome to Medicare exam sometime in the next year.      Patient has been advised of split billing requirements and indicates understanding: Yes      COUNSELING:   Reviewed preventive health counseling, as reflected in patient instructions       Regular exercise       Healthy diet/nutrition      BMI:   Estimated body mass index is 38.25 kg/m  as calculated from the following:    Height as of this encounter: 1.753 m (5' 9\").    Weight as of this encounter: 117.5 kg (259 lb).   Weight management plan: Discussed healthy diet and exercise guidelines      He reports that he has never smoked. He has never used smokeless tobacco.         Magdaleno Bennett MD  [facility]  "

## 2023-01-04 NOTE — LETTER
January 4, 2023      Champ Hopkins  [address] [place] [address]        Dear MichaelSandeep,    We are writing to inform you of your test results.  The TSH or thyroid test is normal stay on same dose of thyroid medication.  Sugar is just slightly high at 107, ideally under 100, this is not diabetes but focus on good diet and exercise to help keep blood sugar low.  Liver and kidney tests are normal.  Cholesterol remains very well controlled.  Blood counts are normal, no anemia.      Resulted Orders   TSH   Result Value Ref Range    TSH 3.14 0.30 - 4.20 uIU/mL   Basic metabolic panel   Result Value Ref Range    Sodium 142 136 - 145 mmol/L    Potassium 4.4 3.4 - 5.3 mmol/L    Chloride 103 98 - 107 mmol/L    Carbon Dioxide (CO2) 29 22 - 29 mmol/L    Anion Gap 10 7 - 15 mmol/L    Urea Nitrogen 22.4 8.0 - 23.0 mg/dL    Creatinine 1.03 0.67 - 1.17 mg/dL    Calcium 9.3 8.8 - 10.2 mg/dL    Glucose 107 (H) 70 - 99 mg/dL    GFR Estimate 81 >60 mL/min/1.73m2      Comment:      Effective December 21, 2021 eGFRcr in adults is calculated using the 2021 CKD-EPI creatinine equation which includes age and gender (Emilia et al., NEJM, DOI: 10.1056/EHMTdd8211674)   Lipid panel reflex to direct LDL Fasting   Result Value Ref Range    Cholesterol 167 <200 mg/dL    Triglycerides 112 <150 mg/dL    Direct Measure HDL 46 >=40 mg/dL    LDL Cholesterol Calculated 99 <=100 mg/dL    Non HDL Cholesterol 121 <130 mg/dL    Narrative    Cholesterol  Desirable:  <200 mg/dL    Triglycerides  Normal:  Less than 150 mg/dL  Borderline High:  150-199 mg/dL  High:  200-499 mg/dL  Very High:  Greater than or equal to 500 mg/dL    Direct Measure HDL  Female:  Greater than or equal to 50 mg/dL   Male:  Greater than or equal to 40 mg/dL    LDL Cholesterol  Desirable:  <100mg/dL  Above Desirable:  100-129 mg/dL   Borderline High:  130-159 mg/dL   High:  160-189 mg/dL   Very High:  >= 190 mg/dL    Non HDL Cholesterol  Desirable:  130 mg/dL  Above  Desirable:  130-159 mg/dL  Borderline High:  160-189 mg/dL  High:  190-219 mg/dL  Very High:  Greater than or equal to 220 mg/dL   CBC with platelets   Result Value Ref Range    WBC Count 8.3 4.0 - 11.0 10e3/uL    RBC Count 4.96 4.40 - 5.90 10e6/uL    Hemoglobin 15.3 13.3 - 17.7 g/dL    Hematocrit 47.1 40.0 - 53.0 %    MCV 95 78 - 100 fL    MCH 30.8 26.5 - 33.0 pg    MCHC 32.5 31.5 - 36.5 g/dL    RDW 13.0 10.0 - 15.0 %    Platelet Count 237 150 - 450 10e3/uL       If you have any questions or concerns, please call the clinic at the number listed above.       Sincerely,      Magdaleno Bennett MD

## 2023-01-04 NOTE — PROGRESS NOTES
"SUBJECTIVE:   Champ is a 67 year old who presents for Preventive Visit.  {Split Bill scripting  The purpose of this visit is to discuss your medical history and prevent health problems before you are sick. You may be responsible for a co-pay, coinsurance, or deductible if your visit today includes services such as checking on a sore throat, having an x-ray or lab test, or treating and evaluating a new or existing condition :992356}  Patient has been advised of split billing requirements and indicates understanding: Yes  Are you in the first 12 months of your Medicare coverage?  No    Healthy Habits:     In general, how would you rate your overall health?  Good    Frequency of exercise:  1 day/week    Duration of exercise:  Less than 15 minutes    Do you usually eat at least 4 servings of fruit and vegetables a day, include whole grains    & fiber and avoid regularly eating high fat or \"junk\" foods?  Yes    Taking medications regularly:  Yes    Medication side effects:  None    Ability to successfully perform activities of daily living:  No assistance needed    Home Safety:  No safety concerns identified    Hearing Impairment:  Difficulty following a conversation in a noisy restaurant or crowded room    In the past 6 months, have you been bothered by leaking of urine?  No    In general, how would you rate your overall mental or emotional health?  Excellent      PHQ-2 Total Score: 0    Additional concerns today:  No      Have you ever done Advance Care Planning? (For example, a Health Directive, POLST, or a discussion with a medical provider or your loved ones about your wishes): Yes, advance care planning is on file.    {Hearing Test Done (Optional):266844}   Fall risk  Fallen 2 or more times in the past year?: Yes  Any fall with injury in the past year?: No    Cognitive Screening   1) Repeat 3 items   2) Clock draw: NORMAL  3) 3 item recall: Recalls 3 objects  Results: NORMAL clock, 1-2 items recalled: COGNITIVE " IMPAIRMENT LESS LIKELY    Mini-CogTM Copyright ADELINE Abdi. Licensed by the author for use in [facility]; reprinted with permission (michell@.[facility]). All rights reserved.      Do you have sleep apnea, excessive snoring or daytime drowsiness?: no    Reviewed and updated as needed this visit by clinical staff   Tobacco  Allergies  Meds              Reviewed and updated as needed this visit by Provider                 Social History     Tobacco Use     Smoking status: Never     Smokeless tobacco: Never   Substance Use Topics     Alcohol use: Yes     Alcohol/week: 5.0 standard drinks     Types: 5 Cans of beer per week     Comment: minimal         Alcohol Use 1/3/2023   Prescreen: >3 drinks/day or >7 drinks/week? No   Prescreen: >3 drinks/day or >7 drinks/week? -       {Outside tests to abstract? :975795}    {additional problems to add (Optional):969750}    Current providers sharing in care for this patient include: {Rooming staff:  Please update Care Team from storyboard, refresh this note and then delete this statement}  Patient Care Team:  Magdaleno Bennett MD as PCP - General  Magdaleno Bennett MD as Assigned PCP  Wes Del Cid MD as Assigned Heart and Vascular Provider  Cayetano Traore MD as Assigned Neuroscience Provider    The following health maintenance items are reviewed in Epic and correct as of today:  Health Maintenance   Topic Date Due     ANNUAL REVIEW OF  ORDERS  Never done     Pneumococcal Vaccine: 65+ Years (1 - PCV) Never done     HIV SCREENING  Never done     HEPATITIS C SCREENING  Never done     COVID-19 Vaccine (5 - Booster for Pfizer series) 08/07/2022     MEDICARE ANNUAL WELLNESS VISIT  11/22/2022     ADVANCE CARE PLANNING  09/10/2023     FALL RISK ASSESSMENT  01/04/2024     LIPID  11/22/2026     DTAP/TDAP/TD IMMUNIZATION (4 - Td or Tdap) 09/10/2028     COLORECTAL CANCER SCREENING  10/30/2028     PHQ-2 (once per calendar year)  Completed     INFLUENZA VACCINE  Completed      "AORTIC ANEURYSM SCREENING (SYSTEM ASSIGNED)  Completed     IPV IMMUNIZATION  Aged Out     MENINGITIS IMMUNIZATION  Aged Out     ZOSTER IMMUNIZATION  Discontinued     {Chronicprobdata (optional):944409}  {Decision Support (Optional):872738}        Review of Systems   Constitutional: Negative for chills and fever.   HENT: Negative for congestion, ear pain, hearing loss and sore throat.    Eyes: Positive for visual disturbance. Negative for pain.   Respiratory: Negative for cough and shortness of breath.    Cardiovascular: Negative for chest pain, palpitations and peripheral edema.   Gastrointestinal: Negative for abdominal pain, constipation, diarrhea, heartburn, hematochezia and nausea.   Genitourinary: Positive for impotence. Negative for dysuria, frequency, genital sores, hematuria, penile discharge and urgency.   Musculoskeletal: Positive for arthralgias. Negative for joint swelling and myalgias.   Skin: Negative for rash.   Neurological: Negative for dizziness, weakness, headaches and paresthesias.   Psychiatric/Behavioral: Negative for mood changes. The patient is not nervous/anxious.      {ROS COMP (Optional):073384}    OBJECTIVE:   /80   Pulse 56   Temp 97.9  F (36.6  C) (Temporal)   Resp 19   Ht 1.753 m (5' 9\")   Wt 117.5 kg (259 lb)   SpO2 98%   BMI 38.25 kg/m   Estimated body mass index is 38.25 kg/m  as calculated from the following:    Height as of this encounter: 1.753 m (5' 9\").    Weight as of this encounter: 117.5 kg (259 lb).  Physical Exam  {Exam (Optional) :465041}    {Diagnostic Test Results (Optional):015164::\"Diagnostic Test Results:\",\"Labs reviewed in Epic\"}    ASSESSMENT / PLAN:   {Diag Picklist:689816}    {Patient advised of split billing (Optional):377872}      COUNSELING:  {Medicare Counselin}      BMI:   Estimated body mass index is 38.25 kg/m  as calculated from the following:    Height as of this encounter: 1.753 m (5' 9\").    Weight as of this encounter: 117.5 kg " (259 lb).   {Weight Management Plan needed for ACO:148938}      He reports that he has never smoked. He has never used smokeless tobacco.      Appropriate preventive services were discussed with this patient, including applicable screening as appropriate for cardiovascular disease, diabetes, osteopenia/osteoporosis, and glaucoma.  As appropriate for age/gender, discussed screening for colorectal cancer, prostate cancer, breast cancer, and cervical cancer. Checklist reviewing preventive services available has been given to the patient.    Reviewed patients plan of care and provided an AVS. The {CarePlan:825200} for Brett meets the Care Plan requirement. This Care Plan has been established and reviewed with the {PATIENT, FAMILY MEMBER, CAREGIVER:872779}.    {Counseling Resources  [place] Preventive Services Task Force  Cholesterol Screening  Health diet/nutrition  Pooled Cohorts Equation Calculator  USDA's MyPlate  ASA Prophylaxis  Lung CA Screening  Osteoporosis prevention/bone health :985871}  {Prostate Cancer Screening  Consider for men 55-69 per guidance from USPSTF :232572}    Magdaleno Bennett MD  [facility]    Identified Health Risks:

## 2023-01-06 DIAGNOSIS — I48.19 PERSISTENT ATRIAL FIBRILLATION (H): Primary | ICD-10-CM

## 2023-01-08 LAB
ABO/RH(D): NORMAL
ANTIBODY SCREEN: NEGATIVE
SPECIMEN EXPIRATION DATE: NORMAL

## 2023-01-09 ENCOUNTER — LAB (OUTPATIENT)
Dept: CARDIOLOGY | Facility: CLINIC | Age: 68
End: 2023-01-09
Payer: COMMERCIAL

## 2023-01-09 ENCOUNTER — OFFICE VISIT (OUTPATIENT)
Dept: CARDIOLOGY | Facility: CLINIC | Age: 68
End: 2023-01-09
Payer: COMMERCIAL

## 2023-01-09 ENCOUNTER — PREP FOR PROCEDURE (OUTPATIENT)
Dept: CARDIOLOGY | Facility: CLINIC | Age: 68
End: 2023-01-09

## 2023-01-09 ENCOUNTER — DOCUMENTATION ONLY (OUTPATIENT)
Dept: CARDIOLOGY | Facility: CLINIC | Age: 68
End: 2023-01-09

## 2023-01-09 ENCOUNTER — ALLIED HEALTH/NURSE VISIT (OUTPATIENT)
Dept: CARDIOLOGY | Facility: CLINIC | Age: 68
End: 2023-01-09
Payer: COMMERCIAL

## 2023-01-09 VITALS
SYSTOLIC BLOOD PRESSURE: 118 MMHG | BODY MASS INDEX: 38.1 KG/M2 | HEART RATE: 56 BPM | OXYGEN SATURATION: 97 % | RESPIRATION RATE: 18 BRPM | DIASTOLIC BLOOD PRESSURE: 68 MMHG | WEIGHT: 258 LBS

## 2023-01-09 DIAGNOSIS — I48.19 PERSISTENT ATRIAL FIBRILLATION (H): Primary | ICD-10-CM

## 2023-01-09 DIAGNOSIS — I48.19 PERSISTENT ATRIAL FIBRILLATION (H): ICD-10-CM

## 2023-01-09 LAB
ANION GAP SERPL CALCULATED.3IONS-SCNC: 9 MMOL/L (ref 7–15)
BUN SERPL-MCNC: 19.8 MG/DL (ref 8–23)
CALCIUM SERPL-MCNC: 9.4 MG/DL (ref 8.8–10.2)
CHLORIDE SERPL-SCNC: 102 MMOL/L (ref 98–107)
CREAT SERPL-MCNC: 1.09 MG/DL (ref 0.67–1.17)
DEPRECATED HCO3 PLAS-SCNC: 29 MMOL/L (ref 22–29)
ERYTHROCYTE [DISTWIDTH] IN BLOOD BY AUTOMATED COUNT: 13.3 % (ref 10–15)
GFR SERPL CREATININE-BSD FRML MDRD: 75 ML/MIN/1.73M2
GLUCOSE SERPL-MCNC: 103 MG/DL (ref 70–99)
HCT VFR BLD AUTO: 47.1 % (ref 40–53)
HGB BLD-MCNC: 15.3 G/DL (ref 13.3–17.7)
MCH RBC QN AUTO: 30.9 PG (ref 26.5–33)
MCHC RBC AUTO-ENTMCNC: 32.5 G/DL (ref 31.5–36.5)
MCV RBC AUTO: 95 FL (ref 78–100)
PLATELET # BLD AUTO: 242 10E3/UL (ref 150–450)
POTASSIUM SERPL-SCNC: 4.9 MMOL/L (ref 3.4–5.3)
RBC # BLD AUTO: 4.95 10E6/UL (ref 4.4–5.9)
SODIUM SERPL-SCNC: 140 MMOL/L (ref 136–145)
WBC # BLD AUTO: 6.6 10E3/UL (ref 4–11)

## 2023-01-09 PROCEDURE — 99215 OFFICE O/P EST HI 40 MIN: CPT | Performed by: INTERNAL MEDICINE

## 2023-01-09 PROCEDURE — 93000 ELECTROCARDIOGRAM COMPLETE: CPT | Performed by: INTERNAL MEDICINE

## 2023-01-09 PROCEDURE — 86900 BLOOD TYPING SEROLOGIC ABO: CPT

## 2023-01-09 PROCEDURE — 86901 BLOOD TYPING SEROLOGIC RH(D): CPT

## 2023-01-09 PROCEDURE — 80048 BASIC METABOLIC PNL TOTAL CA: CPT

## 2023-01-09 PROCEDURE — 86850 RBC ANTIBODY SCREEN: CPT

## 2023-01-09 PROCEDURE — 99207 PR NO CHARGE NURSE ONLY: CPT

## 2023-01-09 PROCEDURE — 85027 COMPLETE CBC AUTOMATED: CPT

## 2023-01-09 PROCEDURE — 36415 COLL VENOUS BLD VENIPUNCTURE: CPT

## 2023-01-09 RX ORDER — CEFAZOLIN SODIUM/WATER 2 G/20 ML
2 SYRINGE (ML) INTRAVENOUS
Status: CANCELLED | OUTPATIENT
Start: 2023-01-12

## 2023-01-09 RX ORDER — LIDOCAINE 40 MG/G
CREAM TOPICAL
Status: CANCELLED | OUTPATIENT
Start: 2023-01-09

## 2023-01-09 RX ORDER — ASPIRIN 81 MG/1
81 TABLET ORAL ONCE
Status: CANCELLED | OUTPATIENT
Start: 2023-01-09 | End: 2023-01-09

## 2023-01-09 RX ORDER — SODIUM CHLORIDE 9 MG/ML
1000 INJECTION, SOLUTION INTRAVENOUS CONTINUOUS
Status: CANCELLED | OUTPATIENT
Start: 2023-01-12

## 2023-01-09 NOTE — PROGRESS NOTES
HEART CARE ENCOUNTER NOTE       [facility]  [phone]      Assessment/Recommendations   1.  Persistent atrial fibrillation - s/p ablation in 2013, with recurrence in 2019 and now on sotalol.      I have personally reviewed this patient's chart and have spoken with the patient about the treatment options, including THOMAS device.  He has a [id number] score of 3 for age 65-74, hypertension and vascular disease.  He has a HAS-BLED score of 2 for age and bleeding disposition.   He is not a candidate for long-term anticoagulation due to gait instability with falls in the past.  One fall resulted in him hitting the back of his head and fortunately no significant injury.  He had a  CT pulmonary vein study and images showed that his anatomy is amenable for a device.    He is scheduled for this Thursday.  He understands that he will stay on Eliquis up until implant.  After implant, patient will stop Eliquis.  He will start taking aspirin 81 mg daily and Plavix 75 mg daily starting on Friday.  He will continue DAPT for 6 months.  After 6 months, patient will be taken off of Plavix and he will continue aspirin 81 mg daily, indefinitely.  He will have a follow-up DIONTE done around 3 months post implant.  He understands that the risks of the procedure are <2% and include, but are not limited to device embolization, air embolism, myocardial perforation, device thrombosis, ASD, stroke, or death.  We discussed expected recovery and follow-up.       The patient is a good candidate for proceeding with left atrial appendage implant.  His questions were answered to his satisfaction.  His consents were signed and witnessed by me.  His labs will be reviewed once resulted.  His EKG has been reviewed.    2.  Hypertension - blood pressure today is at goal.  Patient should continue taking hydrochlorothiazide 25 mg daily and lisinopril 40 mg daily    3.  Vascular disease -with minimal/mild coronary calcification on CT in  December 2021 and with a sending aortic aneurysm being followed yearly by MR or CT    4. Hypothyroidism - stable on current dose of levothyroxine       History of Present Illness/Subjective    Brett Hopkins is a 67 year old male who comes in today for history and physical prior to left atrial appendage occlusion device implantation    Brett Hopkins has a past history of persistent atrial fibrillation and had ablation in 2013.  He had recurrence of his atrial fibrillation in 2019 and has now been on sotalol.  He has gait instability, hypertension, ascending aortic aneurysm and falls in the past.  He is still on Eliquis for stroke prophylaxis but would like to get off of it due to risk of more falls.  He also has osteoarthritis and his gait is poor because of this.  He would like to be able to take NSAIDs to treat his osteoarthritis, which may make his gait/balance better, but can't do it currently b/c of bleeding risk while on OAC.    He has had no significant changes since he saw me in October.    Brett Hopkins denies chest discomfort, palpitations, shortness of breath, paroxysmal nocturnal dyspnea, orthopnea, lightheadedness, dizziness, pre-syncope, or syncope.  Brett Hopkins also denies any weight loss, changes in appetite, nausea or vomiting.     Medical, surgical, family, social history, and medications were reviewed and updated as necessary.    CT pulm vein results (from 10/21/22):  1. The following measurements were made of the left atrial appendage at 35% cardiac phase at the level of the bifurcation of the left circumflex artery:     -Orifice diameter: 28 x 25 mm (average 26 mm)  -Orifice circumference: 83 mm  -Orifice area: 5.42 cm   -Useful depth: 17 mm  -Maximum depth: 35 mm      2. No thrombus in the left atrium or left atrial appendage.  3. Mild coronary atherosclerosis with no obvious obstructive lesions.  4. Borderline enlargement of the ascending aorta measuring 4.0 cm.       Physical  Examination Review of Systems   Vitals: /68 (BP Location: Left arm, Patient Position: Sitting, Cuff Size: Adult Regular)   Pulse 56   Resp 18   Wt 117 kg (258 lb)   SpO2 97%   BMI 38.10 kg/m    BMI= Body mass index is 38.1 kg/m .  Wt Readings from Last 3 Encounters:   01/09/23 117 kg (258 lb)   01/04/23 117.5 kg (259 lb)   11/30/22 117.9 kg (260 lb)       General Appearance:   Alert, cooperative and in no acute distress   ENT/Mouth: membranes moist, no oral lesions or bleeding gums.      EYES:  no scleral icterus, normal conjunctivae   Neck: Thyroid not visualized   Chest/Lungs:   lungs are clear to auscultation, no rales or wheezing   Cardiovascular:   Regular . Normal first and second heart sounds with no murmurs, rubs or gallops; the carotid, radial and posterior tibial pulses are intact, no edema bilaterally    Abdomen:  Soft and nontender. Bowel sounds are present in all quadrants   Extremities: no cyanosis or clubbing   Skin: no xanthelasma, warm.    Neurologic: normal gait, normal  bilateral, no tremors   Psychiatric: Normal mood and affect       Please refer above for cardiac ROS details.      Medical History  Surgical History Family History Social History   Past Medical History:   Diagnosis Date     Status post ablation of atrial fibrillation 07/28/2015    PVI August 19, 2013 (cryo-PVI only)     Past Surgical History:   Procedure Laterality Date     ARTHROPLASTY REVISION HIP Right 05/01/2019     ARTHROPLASTY REVISION KNEE Right 2019     CARDIOVERSION  07/01/2019    7/10/2019     CARDIOVERSION  09/12/2019     CATARACT EXTRACTION Bilateral 06/2021     DENTAL SURGERY      Oral Surgery Tooth Extraction;  Implant     MD ABLATE HEART DYSRHYTHM FOCUS  08/19/2013    Description: Catheter Ablation Atrial Fibrillation;  Recorded: 11/16/2013;  Comments: PVI Aug 19, 2013 (Cryo to all 4 PV's)     TOOTH EXTRACTION      implant     ZZC TOTAL HIP ARTHROPLASTY Right 05/08/2019    Procedure: RIGHT TOTAL HIP  ARTHROPLASTY DIRECT ANTERIOR APPROACH;  Surgeon: Mario Woodruff MD;  Location: [facility] Main OR;  Service: Orthopedics     [facility] TOTAL KNEE ARTHROPLASTY Right 06/21/2017    Procedure: 2)  RIGHT TOTAL KNEE ARTHROPLASTY;  Surgeon: Mario VICKERS MD;  Location: [facility] Main OR;  Service: Orthopedics     Family History   Problem Relation Age of Onset     Atrial fibrillation Mother      Dementia Mother         dx 80s     Diabetes Type 2  Mother         dx 60s/70s     Atrial fibrillation Father      Rheumatoid Arthritis Father      Atrial fibrillation Sister      Cerebrovascular Disease Sister      Parkinsonism Sister      Parkinsonism Sister      Atrial fibrillation Brother      Rheumatoid Arthritis Brother      Cerebrovascular Disease Brother      CABG Brother      Atrial fibrillation Brother      Diabetes Type 2  Brother         Dx 40's    Social History     Socioeconomic History     Marital status:      Spouse name: Not on file     Number of children: 2     Years of education: Not on file     Highest education level: Not on file   Occupational History     Occupation: CPA   Tobacco Use     Smoking status: Never     Smokeless tobacco: Never   Substance and Sexual Activity     Alcohol use: Yes     Alcohol/week: 5.0 standard drinks     Types: 5 Cans of beer per week     Comment: minimal     Drug use: No     Sexual activity: Yes     Partners: Female   Other Topics Concern     Not on file   Social History Narrative    Diet-regular            Exercise-walk, limited by R leg, R knee     Social Determinants of Health     Financial Resource Strain: Not on file   Food Insecurity: Not on file   Transportation Needs: Not on file   Physical Activity: Not on file   Stress: Not on file   Social Connections: Not on file   Intimate Partner Violence: Not on file   Housing Stability: Not on file          Medications  Allergies   Current Outpatient Medications   Medication Sig Dispense Refill     amoxicillin (AMOXIL) 500 MG  capsule TAKE 4 CAPSULES BY MOUTH 1 HOUR BEFORE DENTAL APPOINTMENT       ELIQUIS ANTICOAGULANT 5 MG tablet TAKE 1 TABLET BY MOUTH TWICE DAILY 180 tablet 0     hydrochlorothiazide (HYDRODIURIL) 25 MG tablet TAKE 1 TABLET BY MOUTH ONCE DAILY IN THE MORNING FOR HIGH BLOOD PRESSURE 90 tablet 0     levothyroxine (SYNTHROID/LEVOTHROID) 100 MCG tablet Take 1 tablet (100 mcg) by mouth daily 90 tablet 0     lisinopril (ZESTRIL) 40 MG tablet TAKE 1 TABLET BY MOUTH DAILY FOR HIGH BLOOD PRESSURE 90 tablet 2     omeprazole (PRILOSEC) 20 MG DR capsule TAKE 1 CAPSULE(20 MG) BY MOUTH DAILY BEFORE BREAKFAST Strength: 20 mg 90 capsule 0     pyridostigmine (MESTINON) 60 MG tablet 0.5 tab po q day 45 tablet 3     sotalol (BETAPACE) 80 MG tablet TAKE 1 TABLET BY MOUTH TWICE DAILY 180 tablet 3    Allergies   Allergen Reactions     Sulfa (Sulfonamide Antibiotics) [Sulfa Drugs] Rash     Rash - turned purple  , Around age 32         Lab Results    Chemistry/lipid CBC Cardiac Enzymes/BNP/TSH/INR   Recent Labs   Lab Test 11/22/21  0747   CHOL 157   HDL 51   LDL 93   TRIG 66     Recent Labs   Lab Test 11/22/21  0747 11/20/20  0915 09/10/18  0852   LDL 93 82 82     Recent Labs   Lab Test 02/04/22  1214      POTASSIUM 4.7   CHLORIDE 103   CO2 29   GLC 89   BUN 20   CR 1.05   GFRESTIMATED 79   YFN 9.4     Recent Labs   Lab Test 02/04/22  1214 11/22/21  0747 12/21/20  2232   CR 1.05 0.97 1.16     No results for input(s): A1C in the last 72485 hours. Recent Labs   Lab Test 02/04/22  1214   WBC 7.3   HGB 15.7   HCT 48.6   MCV 94        Recent Labs   Lab Test 02/04/22  1214 12/21/20  2232 11/06/19  1611   HGB 15.7 14.8 15.5    Recent Labs   Lab Test 02/04/22  1214   TROPONINI <0.01     Recent Labs   Lab Test 02/04/22  1214   BNP 56     Recent Labs   Lab Test 02/04/22  1214   TSH 0.26*     Recent Labs   Lab Test 12/21/20  2232   INR 1.12*        41 minutes spent on the date of encounter doing education, chart prep/review, review of outside  records, review of test results, patient visit and documentation, consent signing     This note has been dictated using voice recognition software. Any grammatical or context distortions are unintentional and inherent to the software.

## 2023-01-09 NOTE — H&P (VIEW-ONLY)
HEART CARE ENCOUNTER NOTE       [facility]  [phone]      Assessment/Recommendations   1.  Persistent atrial fibrillation - s/p ablation in 2013, with recurrence in 2019 and now on sotalol.      I have personally reviewed this patient's chart and have spoken with the patient about the treatment options, including THOMAS device.  He has a [id number] score of 3 for age 65-74, hypertension and vascular disease.  He has a HAS-BLED score of 2 for age and bleeding disposition.   He is not a candidate for long-term anticoagulation due to gait instability with falls in the past.  One fall resulted in him hitting the back of his head and fortunately no significant injury.  He had a  CT pulmonary vein study and images showed that his anatomy is amenable for a device.    He is scheduled for this Thursday.  He understands that he will stay on Eliquis up until implant.  After implant, patient will stop Eliquis.  He will start taking aspirin 81 mg daily and Plavix 75 mg daily starting on Friday.  He will continue DAPT for 6 months.  After 6 months, patient will be taken off of Plavix and he will continue aspirin 81 mg daily, indefinitely.  He will have a follow-up DIONTE done around 3 months post implant.  He understands that the risks of the procedure are <2% and include, but are not limited to device embolization, air embolism, myocardial perforation, device thrombosis, ASD, stroke, or death.  We discussed expected recovery and follow-up.       The patient is a good candidate for proceeding with left atrial appendage implant.  His questions were answered to his satisfaction.  His consents were signed and witnessed by me.  His labs will be reviewed once resulted.  His EKG has been reviewed.    2.  Hypertension - blood pressure today is at goal.  Patient should continue taking hydrochlorothiazide 25 mg daily and lisinopril 40 mg daily    3.  Vascular disease -with minimal/mild coronary calcification on CT in  December 2021 and with a sending aortic aneurysm being followed yearly by MR or CT    4. Hypothyroidism - stable on current dose of levothyroxine       History of Present Illness/Subjective    Brett Hopkins is a 67 year old male who comes in today for history and physical prior to left atrial appendage occlusion device implantation    Brett Hopkins has a past history of persistent atrial fibrillation and had ablation in 2013.  He had recurrence of his atrial fibrillation in 2019 and has now been on sotalol.  He has gait instability, hypertension, ascending aortic aneurysm and falls in the past.  He is still on Eliquis for stroke prophylaxis but would like to get off of it due to risk of more falls.  He also has osteoarthritis and his gait is poor because of this.  He would like to be able to take NSAIDs to treat his osteoarthritis, which may make his gait/balance better, but can't do it currently b/c of bleeding risk while on OAC.    He has had no significant changes since he saw me in October.    Brett Hopkins denies chest discomfort, palpitations, shortness of breath, paroxysmal nocturnal dyspnea, orthopnea, lightheadedness, dizziness, pre-syncope, or syncope.  Brett Hopkins also denies any weight loss, changes in appetite, nausea or vomiting.     Medical, surgical, family, social history, and medications were reviewed and updated as necessary.    CT pulm vein results (from 10/21/22):  1. The following measurements were made of the left atrial appendage at 35% cardiac phase at the level of the bifurcation of the left circumflex artery:     -Orifice diameter: 28 x 25 mm (average 26 mm)  -Orifice circumference: 83 mm  -Orifice area: 5.42 cm   -Useful depth: 17 mm  -Maximum depth: 35 mm      2. No thrombus in the left atrium or left atrial appendage.  3. Mild coronary atherosclerosis with no obvious obstructive lesions.  4. Borderline enlargement of the ascending aorta measuring 4.0 cm.       Physical  Examination Review of Systems   Vitals: /68 (BP Location: Left arm, Patient Position: Sitting, Cuff Size: Adult Regular)   Pulse 56   Resp 18   Wt 117 kg (258 lb)   SpO2 97%   BMI 38.10 kg/m    BMI= Body mass index is 38.1 kg/m .  Wt Readings from Last 3 Encounters:   01/09/23 117 kg (258 lb)   01/04/23 117.5 kg (259 lb)   11/30/22 117.9 kg (260 lb)       General Appearance:   Alert, cooperative and in no acute distress   ENT/Mouth: membranes moist, no oral lesions or bleeding gums.      EYES:  no scleral icterus, normal conjunctivae   Neck: Thyroid not visualized   Chest/Lungs:   lungs are clear to auscultation, no rales or wheezing   Cardiovascular:   Regular . Normal first and second heart sounds with no murmurs, rubs or gallops; the carotid, radial and posterior tibial pulses are intact, no edema bilaterally    Abdomen:  Soft and nontender. Bowel sounds are present in all quadrants   Extremities: no cyanosis or clubbing   Skin: no xanthelasma, warm.    Neurologic: normal gait, normal  bilateral, no tremors   Psychiatric: Normal mood and affect       Please refer above for cardiac ROS details.      Medical History  Surgical History Family History Social History   Past Medical History:   Diagnosis Date     Status post ablation of atrial fibrillation 07/28/2015    PVI August 19, 2013 (cryo-PVI only)     Past Surgical History:   Procedure Laterality Date     ARTHROPLASTY REVISION HIP Right 05/01/2019     ARTHROPLASTY REVISION KNEE Right 2019     CARDIOVERSION  07/01/2019    7/10/2019     CARDIOVERSION  09/12/2019     CATARACT EXTRACTION Bilateral 06/2021     DENTAL SURGERY      Oral Surgery Tooth Extraction;  Implant     WA ABLATE HEART DYSRHYTHM FOCUS  08/19/2013    Description: Catheter Ablation Atrial Fibrillation;  Recorded: 11/16/2013;  Comments: PVI Aug 19, 2013 (Cryo to all 4 PV's)     TOOTH EXTRACTION      implant     ZZC TOTAL HIP ARTHROPLASTY Right 05/08/2019    Procedure: RIGHT TOTAL HIP  ARTHROPLASTY DIRECT ANTERIOR APPROACH;  Surgeon: Mario Woodruff MD;  Location: [facility] Main OR;  Service: Orthopedics     [facility] TOTAL KNEE ARTHROPLASTY Right 06/21/2017    Procedure: 2)  RIGHT TOTAL KNEE ARTHROPLASTY;  Surgeon: Mario VICKERS MD;  Location: [facility] Main OR;  Service: Orthopedics     Family History   Problem Relation Age of Onset     Atrial fibrillation Mother      Dementia Mother         dx 80s     Diabetes Type 2  Mother         dx 60s/70s     Atrial fibrillation Father      Rheumatoid Arthritis Father      Atrial fibrillation Sister      Cerebrovascular Disease Sister      Parkinsonism Sister      Parkinsonism Sister      Atrial fibrillation Brother      Rheumatoid Arthritis Brother      Cerebrovascular Disease Brother      CABG Brother      Atrial fibrillation Brother      Diabetes Type 2  Brother         Dx 40's    Social History     Socioeconomic History     Marital status:      Spouse name: Not on file     Number of children: 2     Years of education: Not on file     Highest education level: Not on file   Occupational History     Occupation: CPA   Tobacco Use     Smoking status: Never     Smokeless tobacco: Never   Substance and Sexual Activity     Alcohol use: Yes     Alcohol/week: 5.0 standard drinks     Types: 5 Cans of beer per week     Comment: minimal     Drug use: No     Sexual activity: Yes     Partners: Female   Other Topics Concern     Not on file   Social History Narrative    Diet-regular            Exercise-walk, limited by R leg, R knee     Social Determinants of Health     Financial Resource Strain: Not on file   Food Insecurity: Not on file   Transportation Needs: Not on file   Physical Activity: Not on file   Stress: Not on file   Social Connections: Not on file   Intimate Partner Violence: Not on file   Housing Stability: Not on file          Medications  Allergies   Current Outpatient Medications   Medication Sig Dispense Refill     amoxicillin (AMOXIL) 500 MG  capsule TAKE 4 CAPSULES BY MOUTH 1 HOUR BEFORE DENTAL APPOINTMENT       ELIQUIS ANTICOAGULANT 5 MG tablet TAKE 1 TABLET BY MOUTH TWICE DAILY 180 tablet 0     hydrochlorothiazide (HYDRODIURIL) 25 MG tablet TAKE 1 TABLET BY MOUTH ONCE DAILY IN THE MORNING FOR HIGH BLOOD PRESSURE 90 tablet 0     levothyroxine (SYNTHROID/LEVOTHROID) 100 MCG tablet Take 1 tablet (100 mcg) by mouth daily 90 tablet 0     lisinopril (ZESTRIL) 40 MG tablet TAKE 1 TABLET BY MOUTH DAILY FOR HIGH BLOOD PRESSURE 90 tablet 2     omeprazole (PRILOSEC) 20 MG DR capsule TAKE 1 CAPSULE(20 MG) BY MOUTH DAILY BEFORE BREAKFAST Strength: 20 mg 90 capsule 0     pyridostigmine (MESTINON) 60 MG tablet 0.5 tab po q day 45 tablet 3     sotalol (BETAPACE) 80 MG tablet TAKE 1 TABLET BY MOUTH TWICE DAILY 180 tablet 3    Allergies   Allergen Reactions     Sulfa (Sulfonamide Antibiotics) [Sulfa Drugs] Rash     Rash - turned purple  , Around age 32         Lab Results    Chemistry/lipid CBC Cardiac Enzymes/BNP/TSH/INR   Recent Labs   Lab Test 11/22/21  0747   CHOL 157   HDL 51   LDL 93   TRIG 66     Recent Labs   Lab Test 11/22/21  0747 11/20/20  0915 09/10/18  0852   LDL 93 82 82     Recent Labs   Lab Test 02/04/22  1214      POTASSIUM 4.7   CHLORIDE 103   CO2 29   GLC 89   BUN 20   CR 1.05   GFRESTIMATED 79   YFN 9.4     Recent Labs   Lab Test 02/04/22  1214 11/22/21  0747 12/21/20  2232   CR 1.05 0.97 1.16     No results for input(s): A1C in the last 13773 hours. Recent Labs   Lab Test 02/04/22  1214   WBC 7.3   HGB 15.7   HCT 48.6   MCV 94        Recent Labs   Lab Test 02/04/22  1214 12/21/20  2232 11/06/19  1611   HGB 15.7 14.8 15.5    Recent Labs   Lab Test 02/04/22  1214   TROPONINI <0.01     Recent Labs   Lab Test 02/04/22  1214   BNP 56     Recent Labs   Lab Test 02/04/22  1214   TSH 0.26*     Recent Labs   Lab Test 12/21/20  2232   INR 1.12*        41 minutes spent on the date of encounter doing education, chart prep/review, review of outside  records, review of test results, patient visit and documentation, consent signing     This note has been dictated using voice recognition software. Any grammatical or context distortions are unintentional and inherent to the software.

## 2023-01-09 NOTE — PROGRESS NOTES
MODIFIED SCOTT SCALE   Timepoint: Pre-LAAC    Previous score: initial SCOTT    Score Description   0 No symptoms at all   1 No significant disability despite symptoms; able to carry out all usual duties and activities   2 Slight disability; unable to carry out all previous activities, but able to look after own affairs without assistance   3 Moderate disability; requiring some help, but able to walk without assistance   4 Moderately severe disability; unable to walk without assistance and unable to attend to own bodily needs without assistance   5 Severe disability; bedridden, incontinent and requiring constant nursing care and attention   6 Dead    Total score (0 - 6):  0    Change in score if s/p LAAC? N/A  If yes, notify implanting cardiologist.    No dx of stroke or TIA    Laura Terry RN BSN  Structural Heart Coordinator   [facility]  [phone]

## 2023-01-09 NOTE — PATIENT INSTRUCTIONS
Brett Hopkins,    It was a pleasure to see you today in the clinic regarding your upcoming Watchman implant.     My recommendations after this visit include:     - report to [facility] on Thursday morning at the instructed time      If you have questions or concerns, please call using the numbers below:                Anila Maradiaga RN  [phone]    Laura Terry RN  [phone]

## 2023-01-09 NOTE — NURSING NOTE
Brett Hopkins  [address] [place] [address]  [phone] (home) [phone] (work)    Patient in to see RN for Pre-LAAC visit on 1/9/2023    All pre-procedure labs drawn: 1/9/23   EKG obtained: 1/9/23   Labs reviewed: Labs in process  Renal Issues: No  Diabetic?: No  Device?: No  Type:  N/A    Patient instructed to be NPO after midnight the evening prior to procedure.  Patient instructed to shower the evening before or the morning of the procedure.  Leave all valuables at home (jewlery, rings, watches, large amounts of money).  Patient understands there is one visitor allowed during patients stay. Visitor will need to wear a mask their entire stay and remain in the restricted area per guidelines.   Patient instructed to arrange for transportation home following procedure from a responsible family member of friend. No driving for at least 72 hours post-procedure.  Patient instructed to have a responsible adult with them for 24 hours post-procedure.  Post-procedure follow up process.    Patient instructed on medications:   Take Eliquis, Synthroid, Lisinopril, Sotolol and Omeprazole    Aspirin 81mg morning of procedure: in [facility]    Education was given to patient regarding what to expect pre-procedure.     Patient was informed procedure will be done at [facility], [address] [place] [address]. They have been instructed to check in at the  at the Hospital and their arrival time is at 10:00am.     LAAC procedure, DIONTE  and blood consent signed at the time of the appt: 1/9/23    All questions were answered to family and patient by RN.    No family present at the time of appointment. Massiel-Wife will be present day of procedure.    Laura Terry RN BSN  Structural Heart Coordinator   [facility]  [phone]    Patient Active Problem List   Diagnosis     Varicose Veins     Persistent atrial fibrillation     Aneurysm Of The Ascending Aorta      Hypothyroidism     Status post ablation of atrial fibrillation     GERD (gastroesophageal reflux disease)     AA (alopecia areata)     Hypertension     Lymphedema     Obesity (BMI 35.0-39.9) with comorbidity (H)     Thyroid nodule     Myasthenia gravis (H)     Current Outpatient Medications   Medication Sig     amoxicillin (AMOXIL) 500 MG capsule TAKE 4 CAPSULES BY MOUTH 1 HOUR BEFORE DENTAL APPOINTMENT     ELIQUIS ANTICOAGULANT 5 MG tablet TAKE 1 TABLET BY MOUTH TWICE DAILY     hydrochlorothiazide (HYDRODIURIL) 25 MG tablet TAKE 1 TABLET BY MOUTH ONCE DAILY IN THE MORNING FOR HIGH BLOOD PRESSURE     levothyroxine (SYNTHROID/LEVOTHROID) 100 MCG tablet Take 1 tablet (100 mcg) by mouth daily     lisinopril (ZESTRIL) 40 MG tablet TAKE 1 TABLET BY MOUTH DAILY FOR HIGH BLOOD PRESSURE     omeprazole (PRILOSEC) 20 MG DR capsule TAKE 1 CAPSULE(20 MG) BY MOUTH DAILY BEFORE BREAKFAST Strength: 20 mg     pyridostigmine (MESTINON) 60 MG tablet 0.5 tab po q day     sotalol (BETAPACE) 80 MG tablet TAKE 1 TABLET BY MOUTH TWICE DAILY     No current facility-administered medications for this visit.      Allergies   Allergen Reactions     Sulfa (Sulfonamide Antibiotics) [Sulfa Drugs] Rash     Rash - turned purple  , Around age 32

## 2023-01-09 NOTE — LETTER
1/9/2023    Magdaleno Bennett MD  [address] [place] [address]    RE: Brett Hopkins       Dear Colleague,     [facility] the [facility].  HEART CARE ENCOUNTER NOTE       [facility]  [phone]      Assessment/Recommendations   1.  Persistent atrial fibrillation - s/p ablation in 2013, with recurrence in 2019 and now on sotalol.      I have personally reviewed this patient's chart and have spoken with the patient about the treatment options, including THOMAS device.  He has a [id number] score of 3 for age 65-74, hypertension and vascular disease.  He has a HAS-BLED score of 2 for age and bleeding disposition.   He is not a candidate for long-term anticoagulation due to gait instability with falls in the past.  One fall resulted in him hitting the back of his head and fortunately no significant injury.  He had a  CT pulmonary vein study and images showed that his anatomy is amenable for a device.    He is scheduled for this Thursday.  He understands that he will stay on Eliquis up until implant.  After implant, patient will stop Eliquis.  He will start taking aspirin 81 mg daily and Plavix 75 mg daily starting on Friday.  He will continue DAPT for 6 months.  After 6 months, patient will be taken off of Plavix and he will continue aspirin 81 mg daily, indefinitely.  He will have a follow-up DIONTE done around 3 months post implant.  He understands that the risks of the procedure are <2% and include, but are not limited to device embolization, air embolism, myocardial perforation, device thrombosis, ASD, stroke, or death.  We discussed expected recovery and follow-up.       The patient is a good candidate for proceeding with left atrial appendage implant.  His questions were answered to his satisfaction.  His consents were signed and witnessed by me.  His labs will be reviewed once resulted.  His EKG has been reviewed.    2.   Hypertension - blood pressure today is at goal.  Patient should continue taking hydrochlorothiazide 25 mg daily and lisinopril 40 mg daily    3.  Vascular disease -with minimal/mild coronary calcification on CT in December 2021 and with a sending aortic aneurysm being followed yearly by MR or CT    4. Hypothyroidism - stable on current dose of levothyroxine       History of Present Illness/Subjective    Brett Hopkins is a 67 year old male who comes in today for history and physical prior to left atrial appendage occlusion device implantation    Brett Hopkins has a past history of persistent atrial fibrillation and had ablation in 2013.  He had recurrence of his atrial fibrillation in 2019 and has now been on sotalol.  He has gait instability, hypertension, ascending aortic aneurysm and falls in the past.  He is still on Eliquis for stroke prophylaxis but would like to get off of it due to risk of more falls.  He also has osteoarthritis and his gait is poor because of this.  He would like to be able to take NSAIDs to treat his osteoarthritis, which may make his gait/balance better, but can't do it currently b/c of bleeding risk while on OAC.    He has had no significant changes since he saw me in October.    Brett Hopkins denies chest discomfort, palpitations, shortness of breath, paroxysmal nocturnal dyspnea, orthopnea, lightheadedness, dizziness, pre-syncope, or syncope.  Brett Hopkins also denies any weight loss, changes in appetite, nausea or vomiting.     Medical, surgical, family, social history, and medications were reviewed and updated as necessary.    CT pulm vein results (from 10/21/22):  1. The following measurements were made of the left atrial appendage at 35% cardiac phase at the level of the bifurcation of the left circumflex artery:     -Orifice diameter: 28 x 25 mm (average 26 mm)  -Orifice circumference: 83 mm  -Orifice area: 5.42 cm   -Useful depth: 17 mm  -Maximum depth: 35  mm      2. No thrombus in the left atrium or left atrial appendage.  3. Mild coronary atherosclerosis with no obvious obstructive lesions.  4. Borderline enlargement of the ascending aorta measuring 4.0 cm.       Physical Examination Review of Systems   Vitals: /68 (BP Location: Left arm, Patient Position: Sitting, Cuff Size: Adult Regular)   Pulse 56   Resp 18   Wt 117 kg (258 lb)   SpO2 97%   BMI 38.10 kg/m    BMI= Body mass index is 38.1 kg/m .  Wt Readings from Last 3 Encounters:   01/09/23 117 kg (258 lb)   01/04/23 117.5 kg (259 lb)   11/30/22 117.9 kg (260 lb)       General Appearance:   Alert, cooperative and in no acute distress   ENT/Mouth: membranes moist, no oral lesions or bleeding gums.      EYES:  no scleral icterus, normal conjunctivae   Neck: Thyroid not visualized   Chest/Lungs:   lungs are clear to auscultation, no rales or wheezing   Cardiovascular:   Regular . Normal first and second heart sounds with no murmurs, rubs or gallops; the carotid, radial and posterior tibial pulses are intact, no edema bilaterally    Abdomen:  Soft and nontender. Bowel sounds are present in all quadrants   Extremities: no cyanosis or clubbing   Skin: no xanthelasma, warm.    Neurologic: normal gait, normal  bilateral, no tremors   Psychiatric: Normal mood and affect       Please refer above for cardiac ROS details.      Medical History  Surgical History Family History Social History   Past Medical History:   Diagnosis Date     Status post ablation of atrial fibrillation 07/28/2015    PVI August 19, 2013 (cryo-PVI only)     Past Surgical History:   Procedure Laterality Date     ARTHROPLASTY REVISION HIP Right 05/01/2019     ARTHROPLASTY REVISION KNEE Right 2019     CARDIOVERSION  07/01/2019    7/10/2019     CARDIOVERSION  09/12/2019     CATARACT EXTRACTION Bilateral 06/2021     DENTAL SURGERY      Oral Surgery Tooth Extraction;  Implant     VA ABLATE HEART DYSRHYTHM FOCUS  08/19/2013    Description:  Catheter Ablation Atrial Fibrillation;  Recorded: 11/16/2013;  Comments: PVI Aug 19, 2013 (Cryo to all 4 PV's)     TOOTH EXTRACTION      implant     [facility] TOTAL HIP ARTHROPLASTY Right 05/08/2019    Procedure: RIGHT TOTAL HIP ARTHROPLASTY DIRECT ANTERIOR APPROACH;  Surgeon: Mario Woodruff MD;  Location: [facility] Main OR;  Service: Orthopedics     [facility] TOTAL KNEE ARTHROPLASTY Right 06/21/2017    Procedure: 2)  RIGHT TOTAL KNEE ARTHROPLASTY;  Surgeon: Mario VICKERS MD;  Location: [facility] Main OR;  Service: Orthopedics     Family History   Problem Relation Age of Onset     Atrial fibrillation Mother      Dementia Mother         dx 80s     Diabetes Type 2  Mother         dx 60s/70s     Atrial fibrillation Father      Rheumatoid Arthritis Father      Atrial fibrillation Sister      Cerebrovascular Disease Sister      Parkinsonism Sister      Parkinsonism Sister      Atrial fibrillation Brother      Rheumatoid Arthritis Brother      Cerebrovascular Disease Brother      CABG Brother      Atrial fibrillation Brother      Diabetes Type 2  Brother         Dx 40's    Social History     Socioeconomic History     Marital status:      Spouse name: Not on file     Number of children: 2     Years of education: Not on file     Highest education level: Not on file   Occupational History     Occupation: CPA   Tobacco Use     Smoking status: Never     Smokeless tobacco: Never   Substance and Sexual Activity     Alcohol use: Yes     Alcohol/week: 5.0 standard drinks     Types: 5 Cans of beer per week     Comment: minimal     Drug use: No     Sexual activity: Yes     Partners: Female   Other Topics Concern     Not on file   Social History Narrative    Diet-regular            Exercise-walk, limited by R leg, R knee     Social Determinants of Health     Financial Resource Strain: Not on file   Food Insecurity: Not on file   Transportation Needs: Not on file   Physical Activity: Not on file   Stress: Not on file   Social  Connections: Not on file   Intimate Partner Violence: Not on file   Housing Stability: Not on file          Medications  Allergies   Current Outpatient Medications   Medication Sig Dispense Refill     amoxicillin (AMOXIL) 500 MG capsule TAKE 4 CAPSULES BY MOUTH 1 HOUR BEFORE DENTAL APPOINTMENT       ELIQUIS ANTICOAGULANT 5 MG tablet TAKE 1 TABLET BY MOUTH TWICE DAILY 180 tablet 0     hydrochlorothiazide (HYDRODIURIL) 25 MG tablet TAKE 1 TABLET BY MOUTH ONCE DAILY IN THE MORNING FOR HIGH BLOOD PRESSURE 90 tablet 0     levothyroxine (SYNTHROID/LEVOTHROID) 100 MCG tablet Take 1 tablet (100 mcg) by mouth daily 90 tablet 0     lisinopril (ZESTRIL) 40 MG tablet TAKE 1 TABLET BY MOUTH DAILY FOR HIGH BLOOD PRESSURE 90 tablet 2     omeprazole (PRILOSEC) 20 MG DR capsule TAKE 1 CAPSULE(20 MG) BY MOUTH DAILY BEFORE BREAKFAST Strength: 20 mg 90 capsule 0     pyridostigmine (MESTINON) 60 MG tablet 0.5 tab po q day 45 tablet 3     sotalol (BETAPACE) 80 MG tablet TAKE 1 TABLET BY MOUTH TWICE DAILY 180 tablet 3    Allergies   Allergen Reactions     Sulfa (Sulfonamide Antibiotics) [Sulfa Drugs] Rash     Rash - turned purple  , Around age 32         Lab Results    Chemistry/lipid CBC Cardiac Enzymes/BNP/TSH/INR   Recent Labs   Lab Test 11/22/21  0747   CHOL 157   HDL 51   LDL 93   TRIG 66     Recent Labs   Lab Test 11/22/21  0747 11/20/20  0915 09/10/18  0852   LDL 93 82 82     Recent Labs   Lab Test 02/04/22  1214      POTASSIUM 4.7   CHLORIDE 103   CO2 29   GLC 89   BUN 20   CR 1.05   GFRESTIMATED 79   YFN 9.4     Recent Labs   Lab Test 02/04/22  1214 11/22/21  0747 12/21/20  2232   CR 1.05 0.97 1.16     No results for input(s): A1C in the last 53317 hours. Recent Labs   Lab Test 02/04/22  1214   WBC 7.3   HGB 15.7   HCT 48.6   MCV 94        Recent Labs   Lab Test 02/04/22  1214 12/21/20  2232 11/06/19  1611   HGB 15.7 14.8 15.5    Recent Labs   Lab Test 02/04/22  1214   TROPONINI <0.01     Recent Labs   Lab Test  02/04/22  1214   BNP 56     Recent Labs   Lab Test 02/04/22  1214   TSH 0.26*     Recent Labs   Lab Test 12/21/20  2232   INR 1.12*        41 minutes spent on the date of encounter doing education, chart prep/review, review of outside records, review of test results, patient visit and documentation, consent signing     This note has been dictated using voice recognition software. Any grammatical or context distortions are unintentional and inherent to the software.          Thank you for allowing me to participate in the care of your patient.      Sincerely,     Scarlett Troncoso PA-C     [facility]  cc:   No referring provider defined for this encounter.

## 2023-01-10 LAB
ATRIAL RATE - MUSE: 59 BPM
DIASTOLIC BLOOD PRESSURE - MUSE: NORMAL MMHG
INTERPRETATION ECG - MUSE: NORMAL
P AXIS - MUSE: 36 DEGREES
PR INTERVAL - MUSE: 160 MS
QRS DURATION - MUSE: 82 MS
QT - MUSE: 430 MS
QTC - MUSE: 425 MS
R AXIS - MUSE: -28 DEGREES
SYSTOLIC BLOOD PRESSURE - MUSE: NORMAL MMHG
T AXIS - MUSE: 39 DEGREES
VENTRICULAR RATE- MUSE: 59 BPM

## 2023-01-11 ENCOUNTER — ANESTHESIA EVENT (OUTPATIENT)
Dept: CARDIOLOGY | Facility: HOSPITAL | Age: 68
End: 2023-01-11
Payer: COMMERCIAL

## 2023-01-11 NOTE — DISCHARGE INSTRUCTIONS
Going home after Left Atrial Appendage Occlusion Device Implant    Once Home:  You should arrange for someone to stay with you for the first 24 hours after discharge  Make sure you are taking all of your medications as directed in your discharge summary.  Do not stop taking these medications (especially your blood thinner and baby aspirin) without speaking to your provider.   Increase fluid intake for two days    No lifting more than 10 pounds for 5 days  No aggressive exercise for 5 days  No driving x 3 day  You may shower tomorrow; no bath x 5 days  Return to work in 3 days if you have a sedentary job or 5 days if you do manual labor      Care of groin site  It is normal to have a small bruise or lump at the site.  Do not scrub the site.  For the first 2 days: Do not stoop or squat. When you cough, sneeze or move your bowels, hold your hand over the puncture site and press gently.  Do not use lotion or powder near the puncture site for 3 days.  Ok to use ice packs at groin sites 20 minutes at a time for groin discomfort    If you start bleeding from the site in your groin, lie down flat and press firmly on the site. Call your doctor as soon as you can.    Call your doctor if:  You have a large or growing hard lump around the site.  The site is red, swollen, hot or tender.  Blood or fluid is draining from the site.  You have chills or a fever greater than 101 F (38 C).  Your leg or arm feels numb or cool.  You have hives, a rash or unusual itching.    To reduce the risk of infection, avoid dental procedures (including cleanings) for the first 6 weeks.  Contact your cardiology clinic for an antibiotic should you need to see the dentist in the first 6 weeks post left atrial appendage implant.    Dial 911 if you have bleeding that is heavy or does not stop OR for any NEW signs/symptoms of a stroke:  Visual disturbance  Problems with talking  Smile only occurs on half your face  Numbness on one side of your face or  body  Sudden headache  Confusion  Problems with walking or balance    Follow up appointments:   Post-Op Appointment Date: January 13 at 10:30 am with RN Coordinator   At [address], [address]  [facility] [address]    6 Week Post Implant Visit Date: Feb 16 at 1:30 pm with Scarlett Troncoso PA-C  At [address], [address]  [facility] [address]    Post-procedure Transesophageal Echocardiogram: Please arrange for a responsible adult to drive you to and from this appointment. There will be nothing to eat or drink for at least 6 hours prior to your arrival time for the DIONTE.  Date & Time: TBD -  Location: [facility]      Your Procedural Physician was: Dr. Graff     To reach the THOMAS nurse coordinators please call:   [phone] for Anila Maradiaga    Or  [phone] for Laura Terry        If you have issues tonight when you get home:      Call [phone] and enter your phone number.  Savannah Pantoja will call you back.      If after 9 pm, call the [facility] at [phone] and you will be connected to the on call doctor                                                                    If you are calling after hours, please listen to the entire voicemail, a live  will answer at the end of the message

## 2023-01-12 ENCOUNTER — HOSPITAL ENCOUNTER (INPATIENT)
Facility: HOSPITAL | Age: 68
LOS: 1 days | Discharge: HOME | End: 2023-01-12
Attending: INTERNAL MEDICINE | Admitting: INTERNAL MEDICINE
Payer: COMMERCIAL

## 2023-01-12 ENCOUNTER — HOSPITAL ENCOUNTER (OUTPATIENT)
Dept: CARDIOLOGY | Facility: HOSPITAL | Age: 68
Discharge: HOME | End: 2023-01-12
Attending: INTERNAL MEDICINE | Admitting: INTERNAL MEDICINE
Payer: COMMERCIAL

## 2023-01-12 ENCOUNTER — ANESTHESIA (OUTPATIENT)
Dept: CARDIOLOGY | Facility: HOSPITAL | Age: 68
End: 2023-01-12
Payer: COMMERCIAL

## 2023-01-12 VITALS
WEIGHT: 258 LBS | SYSTOLIC BLOOD PRESSURE: 164 MMHG | OXYGEN SATURATION: 97 % | TEMPERATURE: 97.7 F | RESPIRATION RATE: 18 BRPM | HEART RATE: 74 BPM | HEIGHT: 71 IN | DIASTOLIC BLOOD PRESSURE: 94 MMHG | BODY MASS INDEX: 36.12 KG/M2

## 2023-01-12 DIAGNOSIS — I48.19 PERSISTENT ATRIAL FIBRILLATION (H): ICD-10-CM

## 2023-01-12 DIAGNOSIS — Z95.818 PRESENCE OF WATCHMAN LEFT ATRIAL APPENDAGE CLOSURE DEVICE: Primary | ICD-10-CM

## 2023-01-12 PROBLEM — K21.9 GERD (GASTROESOPHAGEAL REFLUX DISEASE): Status: ACTIVE | Noted: 2018-09-10

## 2023-01-12 PROBLEM — I10 HYPERTENSION: Status: ACTIVE | Noted: 2019-04-26

## 2023-01-12 LAB
ACT BLD: 254 SECONDS (ref 74–150)
ACT BLD: 263 SECONDS (ref 74–150)
CREAT SERPL-MCNC: 1.02 MG/DL (ref 0.67–1.17)
GFR SERPL CREATININE-BSD FRML MDRD: 82 ML/MIN/1.73M2
HGB BLD-MCNC: 13.9 G/DL (ref 13.3–17.7)
LVEF ECHO: NORMAL

## 2023-01-12 PROCEDURE — 82565 ASSAY OF CREATININE: CPT | Performed by: INTERNAL MEDICINE

## 2023-01-12 PROCEDURE — 93355 ECHO TRANSESOPHAGEAL (TEE): CPT

## 2023-01-12 PROCEDURE — 02L73DK OCCLUSION OF LEFT ATRIAL APPENDAGE WITH INTRALUMINAL DEVICE, PERCUTANEOUS APPROACH: ICD-10-PCS | Performed by: INTERNAL MEDICINE

## 2023-01-12 PROCEDURE — 258N000003 HC RX IP 258 OP 636

## 2023-01-12 PROCEDURE — 250N000011 HC RX IP 250 OP 636

## 2023-01-12 PROCEDURE — 93355 ECHO TRANSESOPHAGEAL (TEE): CPT | Performed by: INTERNAL MEDICINE

## 2023-01-12 PROCEDURE — 250N000011 HC RX IP 250 OP 636: Performed by: INTERNAL MEDICINE

## 2023-01-12 PROCEDURE — 272N000001 HC OR GENERAL SUPPLY STERILE: Performed by: INTERNAL MEDICINE

## 2023-01-12 PROCEDURE — 258N000003 HC RX IP 258 OP 636: Performed by: INTERNAL MEDICINE

## 2023-01-12 PROCEDURE — 85018 HEMOGLOBIN: CPT | Performed by: INTERNAL MEDICINE

## 2023-01-12 PROCEDURE — 120N000001 HC R&B MED SURG/OB

## 2023-01-12 PROCEDURE — 250N000013 HC RX MED GY IP 250 OP 250 PS 637: Performed by: INTERNAL MEDICINE

## 2023-01-12 PROCEDURE — 250N000009 HC RX 250

## 2023-01-12 PROCEDURE — 33340 PERQ CLSR TCAT L ATR APNDGE: CPT | Performed by: INTERNAL MEDICINE

## 2023-01-12 PROCEDURE — 370N000017 HC ANESTHESIA TECHNICAL FEE, PER MIN: Performed by: INTERNAL MEDICINE

## 2023-01-12 PROCEDURE — 710N000010 HC RECOVERY PHASE 1, LEVEL 2, PER MIN

## 2023-01-12 PROCEDURE — C1894 INTRO/SHEATH, NON-LASER: HCPCS | Performed by: INTERNAL MEDICINE

## 2023-01-12 PROCEDURE — 255N000002 HC RX 255 OP 636: Performed by: INTERNAL MEDICINE

## 2023-01-12 PROCEDURE — 36415 COLL VENOUS BLD VENIPUNCTURE: CPT | Performed by: INTERNAL MEDICINE

## 2023-01-12 PROCEDURE — C1760 CLOSURE DEV, VASC: HCPCS | Performed by: INTERNAL MEDICINE

## 2023-01-12 PROCEDURE — 85347 COAGULATION TIME ACTIVATED: CPT

## 2023-01-12 DEVICE — OCCLUDER CV WATCHMAN FLX OD27 MM M635WU50270: Type: IMPLANTABLE DEVICE | Site: HEART | Status: FUNCTIONAL

## 2023-01-12 RX ORDER — SODIUM CHLORIDE, SODIUM LACTATE, POTASSIUM CHLORIDE, CALCIUM CHLORIDE 600; 310; 30; 20 MG/100ML; MG/100ML; MG/100ML; MG/100ML
INJECTION, SOLUTION INTRAVENOUS CONTINUOUS
Status: DISCONTINUED | OUTPATIENT
Start: 2023-01-12 | End: 2023-01-12 | Stop reason: HOSPADM

## 2023-01-12 RX ORDER — ONDANSETRON 4 MG/1
4 TABLET, ORALLY DISINTEGRATING ORAL EVERY 6 HOURS PRN
Status: DISCONTINUED | OUTPATIENT
Start: 2023-01-12 | End: 2023-01-12 | Stop reason: HOSPADM

## 2023-01-12 RX ORDER — HYDROMORPHONE HYDROCHLORIDE 1 MG/ML
0.4 INJECTION, SOLUTION INTRAMUSCULAR; INTRAVENOUS; SUBCUTANEOUS EVERY 5 MIN PRN
Status: DISCONTINUED | OUTPATIENT
Start: 2023-01-12 | End: 2023-01-12 | Stop reason: HOSPADM

## 2023-01-12 RX ORDER — LIDOCAINE 40 MG/G
CREAM TOPICAL
Status: DISCONTINUED | OUTPATIENT
Start: 2023-01-12 | End: 2023-01-12 | Stop reason: HOSPADM

## 2023-01-12 RX ORDER — ACETAMINOPHEN 325 MG/1
650 TABLET ORAL EVERY 4 HOURS PRN
Status: DISCONTINUED | OUTPATIENT
Start: 2023-01-12 | End: 2023-01-12 | Stop reason: HOSPADM

## 2023-01-12 RX ORDER — IODIXANOL 320 MG/ML
INJECTION, SOLUTION INTRAVASCULAR
Status: DISCONTINUED | OUTPATIENT
Start: 2023-01-12 | End: 2023-01-12 | Stop reason: HOSPADM

## 2023-01-12 RX ORDER — PROPOFOL 10 MG/ML
INJECTION, EMULSION INTRAVENOUS PRN
Status: DISCONTINUED | OUTPATIENT
Start: 2023-01-12 | End: 2023-01-12

## 2023-01-12 RX ORDER — CEFAZOLIN SODIUM/WATER 2 G/20 ML
2 SYRINGE (ML) INTRAVENOUS
Status: DISCONTINUED | OUTPATIENT
Start: 2023-01-12 | End: 2023-01-12 | Stop reason: HOSPADM

## 2023-01-12 RX ORDER — ONDANSETRON 2 MG/ML
4 INJECTION INTRAMUSCULAR; INTRAVENOUS EVERY 6 HOURS PRN
Status: DISCONTINUED | OUTPATIENT
Start: 2023-01-12 | End: 2023-01-12 | Stop reason: HOSPADM

## 2023-01-12 RX ORDER — ONDANSETRON 4 MG/1
4 TABLET, ORALLY DISINTEGRATING ORAL EVERY 30 MIN PRN
Status: DISCONTINUED | OUTPATIENT
Start: 2023-01-12 | End: 2023-01-12 | Stop reason: HOSPADM

## 2023-01-12 RX ORDER — NALOXONE HYDROCHLORIDE 0.4 MG/ML
0.2 INJECTION, SOLUTION INTRAMUSCULAR; INTRAVENOUS; SUBCUTANEOUS
Status: DISCONTINUED | OUTPATIENT
Start: 2023-01-12 | End: 2023-01-12 | Stop reason: HOSPADM

## 2023-01-12 RX ORDER — HEPARIN SODIUM 1000 [USP'U]/ML
INJECTION, SOLUTION INTRAVENOUS; SUBCUTANEOUS PRN
Status: DISCONTINUED | OUTPATIENT
Start: 2023-01-12 | End: 2023-01-12

## 2023-01-12 RX ORDER — ASPIRIN 81 MG/1
81 TABLET ORAL ONCE
Status: COMPLETED | OUTPATIENT
Start: 2023-01-12 | End: 2023-01-12

## 2023-01-12 RX ORDER — HYDROMORPHONE HYDROCHLORIDE 1 MG/ML
0.2 INJECTION, SOLUTION INTRAMUSCULAR; INTRAVENOUS; SUBCUTANEOUS EVERY 5 MIN PRN
Status: DISCONTINUED | OUTPATIENT
Start: 2023-01-12 | End: 2023-01-12 | Stop reason: HOSPADM

## 2023-01-12 RX ORDER — KETAMINE HYDROCHLORIDE 10 MG/ML
INJECTION INTRAMUSCULAR; INTRAVENOUS PRN
Status: DISCONTINUED | OUTPATIENT
Start: 2023-01-12 | End: 2023-01-12

## 2023-01-12 RX ORDER — MEPERIDINE HYDROCHLORIDE 25 MG/ML
12.5 INJECTION INTRAMUSCULAR; INTRAVENOUS; SUBCUTANEOUS
Status: DISCONTINUED | OUTPATIENT
Start: 2023-01-12 | End: 2023-01-12 | Stop reason: HOSPADM

## 2023-01-12 RX ORDER — OXYCODONE HYDROCHLORIDE 5 MG/1
5 TABLET ORAL EVERY 4 HOURS PRN
Status: DISCONTINUED | OUTPATIENT
Start: 2023-01-12 | End: 2023-01-12 | Stop reason: HOSPADM

## 2023-01-12 RX ORDER — ASPIRIN 81 MG/1
81 TABLET ORAL DAILY
Status: DISCONTINUED | OUTPATIENT
Start: 2023-01-13 | End: 2023-01-12 | Stop reason: HOSPADM

## 2023-01-12 RX ORDER — FENTANYL CITRATE 50 UG/ML
50 INJECTION, SOLUTION INTRAMUSCULAR; INTRAVENOUS EVERY 5 MIN PRN
Status: DISCONTINUED | OUTPATIENT
Start: 2023-01-12 | End: 2023-01-12 | Stop reason: HOSPADM

## 2023-01-12 RX ORDER — NALOXONE HYDROCHLORIDE 0.4 MG/ML
0.4 INJECTION, SOLUTION INTRAMUSCULAR; INTRAVENOUS; SUBCUTANEOUS
Status: DISCONTINUED | OUTPATIENT
Start: 2023-01-12 | End: 2023-01-12 | Stop reason: HOSPADM

## 2023-01-12 RX ORDER — DEXAMETHASONE SODIUM PHOSPHATE 10 MG/ML
INJECTION, SOLUTION INTRAMUSCULAR; INTRAVENOUS PRN
Status: DISCONTINUED | OUTPATIENT
Start: 2023-01-12 | End: 2023-01-12

## 2023-01-12 RX ORDER — SODIUM CHLORIDE 9 MG/ML
INJECTION, SOLUTION INTRAVENOUS CONTINUOUS PRN
Status: DISCONTINUED | OUTPATIENT
Start: 2023-01-12 | End: 2023-01-12

## 2023-01-12 RX ORDER — CLOPIDOGREL BISULFATE 75 MG/1
75 TABLET ORAL DAILY
Qty: 90 TABLET | Refills: 1 | Status: SHIPPED | OUTPATIENT
Start: 2023-01-12 | End: 2023-03-15

## 2023-01-12 RX ORDER — FENTANYL CITRATE 50 UG/ML
INJECTION, SOLUTION INTRAMUSCULAR; INTRAVENOUS PRN
Status: DISCONTINUED | OUTPATIENT
Start: 2023-01-12 | End: 2023-01-12

## 2023-01-12 RX ORDER — SODIUM CHLORIDE 9 MG/ML
1000 INJECTION, SOLUTION INTRAVENOUS CONTINUOUS
Status: DISCONTINUED | OUTPATIENT
Start: 2023-01-12 | End: 2023-01-12 | Stop reason: HOSPADM

## 2023-01-12 RX ORDER — ONDANSETRON 2 MG/ML
4 INJECTION INTRAMUSCULAR; INTRAVENOUS EVERY 30 MIN PRN
Status: DISCONTINUED | OUTPATIENT
Start: 2023-01-12 | End: 2023-01-12 | Stop reason: HOSPADM

## 2023-01-12 RX ORDER — FENTANYL CITRATE 50 UG/ML
25 INJECTION, SOLUTION INTRAMUSCULAR; INTRAVENOUS
Status: DISCONTINUED | OUTPATIENT
Start: 2023-01-12 | End: 2023-01-12 | Stop reason: HOSPADM

## 2023-01-12 RX ORDER — FENTANYL CITRATE 50 UG/ML
25 INJECTION, SOLUTION INTRAMUSCULAR; INTRAVENOUS EVERY 5 MIN PRN
Status: DISCONTINUED | OUTPATIENT
Start: 2023-01-12 | End: 2023-01-12 | Stop reason: HOSPADM

## 2023-01-12 RX ORDER — GLYCOPYRROLATE 0.2 MG/ML
INJECTION, SOLUTION INTRAMUSCULAR; INTRAVENOUS PRN
Status: DISCONTINUED | OUTPATIENT
Start: 2023-01-12 | End: 2023-01-12

## 2023-01-12 RX ORDER — OXYCODONE HYDROCHLORIDE 5 MG/1
10 TABLET ORAL EVERY 4 HOURS PRN
Status: DISCONTINUED | OUTPATIENT
Start: 2023-01-12 | End: 2023-01-12 | Stop reason: HOSPADM

## 2023-01-12 RX ORDER — LIDOCAINE HYDROCHLORIDE 10 MG/ML
INJECTION, SOLUTION INFILTRATION; PERINEURAL PRN
Status: DISCONTINUED | OUTPATIENT
Start: 2023-01-12 | End: 2023-01-12

## 2023-01-12 RX ORDER — SODIUM CHLORIDE 9 MG/ML
INJECTION, SOLUTION INTRAVENOUS CONTINUOUS
Status: ACTIVE | OUTPATIENT
Start: 2023-01-12 | End: 2023-01-12

## 2023-01-12 RX ADMIN — LIDOCAINE HYDROCHLORIDE 3 ML: 10 INJECTION, SOLUTION INFILTRATION; PERINEURAL at 12:30

## 2023-01-12 RX ADMIN — FENTANYL CITRATE 100 MCG: 50 INJECTION, SOLUTION INTRAMUSCULAR; INTRAVENOUS at 12:30

## 2023-01-12 RX ADMIN — GLYCOPYRROLATE 0.2 MG: 0.2 INJECTION INTRAMUSCULAR; INTRAVENOUS at 12:37

## 2023-01-12 RX ADMIN — Medication 2 G: at 12:22

## 2023-01-12 RX ADMIN — SODIUM CHLORIDE: 9 INJECTION, SOLUTION INTRAVENOUS at 13:12

## 2023-01-12 RX ADMIN — PHENYLEPHRINE HYDROCHLORIDE 0.3 MCG/KG/MIN: 10 INJECTION INTRAVENOUS at 12:42

## 2023-01-12 RX ADMIN — SUGAMMADEX 200 MG: 100 INJECTION, SOLUTION INTRAVENOUS at 13:27

## 2023-01-12 RX ADMIN — DEXAMETHASONE SODIUM PHOSPHATE 10 MG: 10 INJECTION, SOLUTION INTRAMUSCULAR; INTRAVENOUS at 12:30

## 2023-01-12 RX ADMIN — HEPARIN SODIUM 2000 UNITS: 1000 INJECTION INTRAVENOUS; SUBCUTANEOUS at 13:13

## 2023-01-12 RX ADMIN — PHENYLEPHRINE HYDROCHLORIDE 100 MCG: 10 INJECTION INTRAVENOUS at 13:04

## 2023-01-12 RX ADMIN — PROPOFOL 170 MG: 10 INJECTION, EMULSION INTRAVENOUS at 12:30

## 2023-01-12 RX ADMIN — APIXABAN 5 MG: 5 TABLET, FILM COATED ORAL at 11:46

## 2023-01-12 RX ADMIN — KETAMINE HYDROCHLORIDE 50 MG: 10 INJECTION, SOLUTION INTRAMUSCULAR; INTRAVENOUS at 12:30

## 2023-01-12 RX ADMIN — SODIUM CHLORIDE: 9 INJECTION, SOLUTION INTRAVENOUS at 14:16

## 2023-01-12 RX ADMIN — Medication 81 MG: at 11:46

## 2023-01-12 RX ADMIN — HEPARIN SODIUM 11000 UNITS: 1000 INJECTION INTRAVENOUS; SUBCUTANEOUS at 13:02

## 2023-01-12 RX ADMIN — GLYCOPYRROLATE 0.2 MG: 0.2 INJECTION INTRAMUSCULAR; INTRAVENOUS at 12:30

## 2023-01-12 RX ADMIN — PHENYLEPHRINE HYDROCHLORIDE 100 MCG: 10 INJECTION INTRAVENOUS at 13:16

## 2023-01-12 RX ADMIN — ROCURONIUM BROMIDE 50 MG: 50 INJECTION, SOLUTION INTRAVENOUS at 12:30

## 2023-01-12 RX ADMIN — SODIUM CHLORIDE 500 ML: 9 INJECTION, SOLUTION INTRAVENOUS at 11:48

## 2023-01-12 RX ADMIN — SODIUM CHLORIDE: 9 INJECTION, SOLUTION INTRAVENOUS at 12:22

## 2023-01-12 RX ADMIN — PHENYLEPHRINE HYDROCHLORIDE 100 MCG: 10 INJECTION INTRAVENOUS at 12:30

## 2023-01-12 ASSESSMENT — ACTIVITIES OF DAILY LIVING (ADL)
ADLS_ACUITY_SCORE: 35

## 2023-01-12 ASSESSMENT — ENCOUNTER SYMPTOMS: DYSRHYTHMIAS: 1

## 2023-01-12 NOTE — Clinical Note
0 : 22. 45 : 19.7. 90 : 18.4. 135 : 22.2. 0 : 27.7. 45 : 26. 90 : 18.9. 135 : 25.2. 0 : 22.2 . 45 : 21.6 . 90 : 21.6 . 135 : 23.7 . THOMAS type used: Chicken WingPosition: Passed. Spring: Passed. Size: Accepted. Seal: Complete. Jet: 0.Deployed.
6 Fr ProGlide utilized for closure of site.  ; hemostasis achieved.
Check Implants.
Check Procedures.
Delivery system prepped and inserted per 's instructions. 
Delivery system removed.
Family has been updated.
General anesthesia is planned for this procedure.    Provider immediate assessment completed. 
Grounding pads are removed from the patient. The skin is clean, dry, intact, and free from redness. 
Grounding pads were placed on the left hip
Hemodynamic equipment used: 5 lead ECG, LIKEPAK Hands Off Patches, LIFEPAK With 3 Leads, Machine BP Cuff and pulse oximeter probe.
Lab results reviewed and abnormals discussed with physician.
Patient education provided. 
Patient education provided. 
Patient to floor Post-Procedure.
Prepped: groin. Prepped with: ChloraPrep. The patient was draped. .Pre-procedure site marking:Insertion site not predetermined
Procedure scheduled as Elective.
See Anesthesia documentation. 
Sheath prepped and inserted in the right femoral vein.
The DP pulses are detected w/ doppler bilaterally. The PT pulses are detected w/ doppler bilaterally.
The ECG shows a sinus rhythm. ECG rate  = 62 bpm.
Total contrast given (ml) 45
Transesophogeal Echocardiogram performed.Probe placed by DEANNA, DIONTE done by Dr Byers.
adhesive bandage applied to wound securely.
dry, intact, no bleeding and no hematoma.
left atrial appendage Cine(s)  injected and visualized utilizing power injector system.
left atrial appendage Cine(s)  injected and visualized utilizing power injector system.
suture utilizedfor a figure 8 purse-string for closure of site.  ; hemostasis achieved.
nontender/no tenderness to moderate palpation/soft/bowel sounds normal/no rigidity/no guarding

## 2023-01-12 NOTE — ANESTHESIA PROCEDURE NOTES
Airway       Patient location during procedure: OR       Procedure Start/Stop Times: 1/12/2023 12:35 PM  Staff -        Anesthesiologist:  Lawson Ho MD       CRNA: Mike Christianson APRN CRNA       Performed By: CRNA  Consent for Airway        Urgency: elective  Indications and Patient Condition       Indications for airway management: glory-procedural       Induction type:intravenous       Mask difficulty assessment: 2 - vent by mask + OA or adjuvant +/- NMBA    Final Airway Details       Final airway type: endotracheal airway       Successful airway: ETT - single  Endotracheal Airway Details        ETT size (mm): 7.5       Cuffed: yes       Cuff volume (mL): 8       Successful intubation technique: video laryngoscopy       VL Blade Size: Glidescope 4       Grade View of Cords: 1       Adjucts: stylet       Position: Right       Measured from: lips       Secured at (cm): 23       Bite block used: None    Post intubation assessment        Placement verified by: capnometry, equal breath sounds and chest rise        Number of attempts at approach: 1       Number of other approaches attempted: 0       Secured with: silk tape       Ease of procedure: easy       Dentition: Intact and Unchanged    Medication(s) Administered   Medication Administration Time: 1/12/2023 12:35 PM

## 2023-01-12 NOTE — ANESTHESIA PROCEDURE NOTES
Perioperative DIONTE Procedure Note    Staff -        Anesthesiologist:  Lawson Ho MD       Performed By: anesthesiologist  Preanesthesia Checklist:  Patient identified, IV assessed, risks and benefits discussed, monitors and equipment assessed, procedure being performed at surgeon's request and anesthesia consent obtained.    DIONTE Probe Insertion    Probe Status PRE Insertion: NO obvious damage  Probe type:  Adult 3D  Bite block used:   Soft  Insertion Technique: Easy, no oropharyngeal manipulation  Insertion complications: None obvious  Billing Report: A DIONTE report is being generated by the cardiology department.  Probe Status POST Removal: NO obvious damage    DIONTE Report  General Procedure Information  Images for this study have been archived.  Modalities: 2D, 3D, CW Doppler, PW Doppler and Color flow mapping  Echocardiographic and Doppler Measurements  Right Ventricle:  Cavity size normal.       Global function normal.     Left Ventricle:  Cavity size normal.      Global Function normal.         Valves  Aortic Valve: Regurgitation absent.  Leaflets normal.  Leaflet motions normal.    Mitral Valve: Regurgitation absent.  Leaflets normal.  Leaflet motions normal.    Tricuspid Valve: Regurgitation absent.  Leaflets normal.  Leaflet motions normal.        Aorta: Ascending Aorta: Size normal.      Right Atrium:  Size normal.       Left Atrium: Left atrial appendage normal.     Atrial Septum: Intra-atrial septal morphology contains patent foramen ovale.   Patent foramen ovale shunts right to left.            Post Intervention Findings  Procedure(s) performed:  Other (see comments).  Regional wall motion:. Surgeon(s) notified of all postintervention findings: Yes.                 Echocardiogram Comments

## 2023-01-12 NOTE — ANESTHESIA PREPROCEDURE EVALUATION
Anesthesia Pre-Procedure Evaluation    Patient: Brett Hopkins   MRN: [id number] : 1955        Procedure : Procedure(s):  Left Atrial Appendage Closure          Past Medical History:   Diagnosis Date     Status post ablation of atrial fibrillation 2015    PVI 2013 (cryo-PVI only)      Past Surgical History:   Procedure Laterality Date     ARTHROPLASTY REVISION HIP Right 2019     ARTHROPLASTY REVISION KNEE Right 2019     CARDIOVERSION  2019    7/10/2019     CARDIOVERSION  2019     CATARACT EXTRACTION Bilateral 2021     DENTAL SURGERY      Oral Surgery Tooth Extraction;  Implant     OK ABLATE HEART DYSRHYTHM FOCUS  2013    Description: Catheter Ablation Atrial Fibrillation;  Recorded: 2013;  Comments: PVI Aug 19, 2013 (Cryo to all 4 PV's)     TOOTH EXTRACTION      implant     ZZC TOTAL HIP ARTHROPLASTY Right 2019    Procedure: RIGHT TOTAL HIP ARTHROPLASTY DIRECT ANTERIOR APPROACH;  Surgeon: Mario Woodruff MD;  Location: [facility];  Service: Orthopedics     [facility] TOTAL KNEE ARTHROPLASTY Right 2017    Procedure: 2)  RIGHT TOTAL KNEE ARTHROPLASTY;  Surgeon: Mario VICKERS MD;  Location: [facility] OR;  Service: Orthopedics      Allergies   Allergen Reactions     Sulfa (Sulfonamide Antibiotics) [Sulfa Drugs] Rash     Rash - turned purple  , Around age 32      Social History     Tobacco Use     Smoking status: Never     Smokeless tobacco: Never   Substance Use Topics     Alcohol use: Yes     Alcohol/week: 5.0 standard drinks     Types: 5 Cans of beer per week     Comment: minimal      Wt Readings from Last 1 Encounters:   23 117 kg (258 lb)        Anesthesia Evaluation   Pt has had prior anesthetic.     No history of anesthetic complications       ROS/MED HX  ENT/Pulmonary:     (+) recent URI, resolved,     Neurologic:  - neg neurologic ROS     Cardiovascular:     (+) hypertension-Peripheral Vascular Disease----dysrhythmias,  a-fib,     METS/Exercise Tolerance: >4 METS    Hematologic:  - neg hematologic  ROS     Musculoskeletal: Comment: myasthenia gravis; only affecting eyes      GI/Hepatic:     (+) GERD, Asymptomatic on medication,     Renal/Genitourinary:  - neg Renal ROS     Endo:     (+) thyroid problem, hypothyroidism, Obesity,     Psychiatric/Substance Use:  - neg psychiatric ROS     Infectious Disease:  - neg infectious disease ROS     Malignancy:  - neg malignancy ROS     Other:  - neg other ROS          Physical Exam    Airway        Mallampati: III   TM distance: > 3 FB   Neck ROM: limited   Mouth opening: > 3 cm    Respiratory Devices and Support         Dental  no notable dental history         Cardiovascular          Rhythm and rate: irregular     Pulmonary   pulmonary exam normal                OUTSIDE LABS:  CBC:   Lab Results   Component Value Date    WBC 6.6 01/09/2023    WBC 8.3 01/04/2023    HGB 15.3 01/09/2023    HGB 15.3 01/04/2023    HCT 47.1 01/09/2023    HCT 47.1 01/04/2023     01/09/2023     01/04/2023     BMP:   Lab Results   Component Value Date     01/09/2023     01/04/2023    POTASSIUM 4.9 01/09/2023    POTASSIUM 4.4 01/04/2023    CHLORIDE 102 01/09/2023    CHLORIDE 103 01/04/2023    CO2 29 01/09/2023    CO2 29 01/04/2023    BUN 19.8 01/09/2023    BUN 22.4 01/04/2023    CR 1.09 01/09/2023    CR 1.03 01/04/2023     (H) 01/09/2023     (H) 01/04/2023     COAGS:   Lab Results   Component Value Date    PTT 31 12/21/2020    INR 1.12 (H) 12/21/2020     POC: No results found for: BGM, HCG, HCGS  HEPATIC: No results found for: ALBUMIN, PROTTOTAL, ALT, AST, GGT, ALKPHOS, BILITOTAL, BILIDIRECT, AUDRA  OTHER:   Lab Results   Component Value Date    YFN 9.4 01/09/2023    MAG 2.1 09/10/2018    TSH 3.14 01/04/2023    CRP 0.5 09/10/2019    SED 3 09/10/2019       Anesthesia Plan    ASA Status:  3   NPO Status:  NPO Appropriate    Anesthesia Type: General.     - Airway: ETT   Induction:  Intravenous, Propofol.   Maintenance: Balanced.   Techniques and Equipment:     - Airway: Video-Laryngoscope         Consents    Anesthesia Plan(s) and associated risks, benefits, and realistic alternatives discussed. Questions answered and patient/representative(s) expressed understanding.    - Discussed:     - Discussed with:  Patient, Spouse      - Extended Intubation/Ventilatory Support Discussed: No.      - Patient is DNR/DNI Status: No    Use of blood products discussed: No .     Postoperative Care    Pain management: IV analgesics, Multi-modal analgesia.   PONV prophylaxis: Ondansetron (or other 5HT-3), Dexamethasone or Solumedrol, Droperidol or Haldol     Comments:    Other Comments: 50 mg ketamine IV on induction.  Suggamedex for reversal.            Lawson Ho MD

## 2023-01-12 NOTE — ANESTHESIA CARE TRANSFER NOTE
Patient: Brett Hopkins    Procedure: Procedure(s):  Left Atrial Appendage Closure       Diagnosis: other  Diagnosis Additional Information: No value filed.    Anesthesia Type:   General     Note:    Oropharynx: oropharynx clear of all foreign objects and spontaneously breathing  Level of Consciousness: drowsy  Oxygen Supplementation: face mask  Level of Supplemental Oxygen (L/min / FiO2): 10    Dentition: dentition unchanged  Vital Signs Stable: post-procedure vital signs reviewed and stable  Report to RN Given: handoff report given  Patient transferred to: Cardiac Special Care          Vitals:  Vitals Value Taken Time   /75 01/12/23 1350   Temp 36.5  C (97.7  F) 01/12/23 1350   Pulse 87 01/12/23 1350   Resp 12 01/12/23 1350   SpO2 99 % 01/12/23 1350       Electronically Signed By: CRISTIANO England CRNA  January 12, 2023  1:50 PM

## 2023-01-12 NOTE — ANESTHESIA POSTPROCEDURE EVALUATION
Patient: Brett Hopkins    Procedure: Procedure(s):  Left Atrial Appendage Closure       Anesthesia Type:  General    Note:  Disposition: Outpatient   Postop Pain Control: Uneventful            Sign Out: Well controlled pain   PONV: No   Neuro/Psych: Uneventful            Sign Out: Acceptable/Baseline neuro status   Airway/Respiratory: Uneventful            Sign Out: Acceptable/Baseline resp. status   CV/Hemodynamics: Uneventful            Sign Out: Acceptable CV status; No obvious hypovolemia; No obvious fluid overload   Other NRE: NONE   DID A NON-ROUTINE EVENT OCCUR?            Last vitals:  Vitals Value Taken Time   /73 01/12/23 1515   Temp 36.5  C (97.7  F) 01/12/23 1350   Pulse 68 01/12/23 1519   Resp 14 01/12/23 1519   SpO2 92 % 01/12/23 1519   Vitals shown include unvalidated device data.    Electronically Signed By: Lawson Ho MD  January 12, 2023  3:21 PM

## 2023-01-12 NOTE — DISCHARGE SUMMARY
HEART CARE INPATIENT ENCOUNTER NOTE      Structural Discharge Summary    Primary Care Physician:  Magdaleno Bennett    Discharge Provider: Scarlett Troncoso PA-C     Admission Date: 1/12/2023. Admission Diagnoses: Persistent atrial fibrillation (H) [I48.19]   Discharge Date: January 12, 2023   Disposition: Home   Condition at Discharge: Stable  Code Status: Prior     Principal Diagnosis:  Persistent atrial fibrillation    Discharge Diagnoses:  Principal Problem:    Persistent atrial fibrillation  Active Problems:    Aneurysm Of The Ascending Aorta    Hypothyroidism    Status post ablation of atrial fibrillation    GERD (gastroesophageal reflux disease)    Hypertension    Myasthenia gravis (H)    Presence of Watchman left atrial appendage closure device      Consult/s: none  Significant Diagnostic Studies:   Procedural DIONTE - Interpretation Summary     Left ventricular size, wall motion and function are normal. The ejection  fraction is 60-65%.  Normal right ventricle size and systolic function.  No thrombus is detected in the left atrial appendage.  Watchman device noted in left atrial appendage. The device is well seated with  no leakage by color flow Doppler imaging.  There is no pericardial effusion.       Treatments: procedures: LAAO implant (Watchman FLX)    Discharge Medications:   Discharge Medication List as of 1/12/2023  6:25 PM      START taking these medications    Details   aspirin (ASA) 81 MG EC tablet Take 1 tablet (81 mg) by mouth daily, OTC      clopidogrel (PLAVIX) 75 MG tablet Take 1 tablet (75 mg) by mouth daily, Disp-90 tablet, R-1, E-Prescribe         CONTINUE these medications which have NOT CHANGED    Details   amoxicillin (AMOXIL) 500 MG capsule TAKE 4 CAPSULES BY MOUTH 1 HOUR BEFORE DENTAL APPOINTMENT, Historical      hydrochlorothiazide (HYDRODIURIL) 25 MG tablet TAKE 1 TABLET BY MOUTH ONCE DAILY IN THE MORNING FOR HIGH BLOOD PRESSURE, Disp-90 tablet, R-0, E-Prescribe      levothyroxine  (SYNTHROID/LEVOTHROID) 100 MCG tablet Take 1 tablet (100 mcg) by mouth daily, Disp-90 tablet, R-0, E-Prescribe      lisinopril (ZESTRIL) 40 MG tablet TAKE 1 TABLET BY MOUTH DAILY FOR HIGH BLOOD PRESSURE, Disp-90 tablet, R-2, E-Prescribe      omeprazole (PRILOSEC) 20 MG DR capsule TAKE 1 CAPSULE(20 MG) BY MOUTH DAILY BEFORE BREAKFAST Strength: 20 mg, Disp-90 capsule, R-0, E-Prescribe      pyridostigmine (MESTINON) 60 MG tablet 0.5 tab po q day, Disp-45 tablet, R-3, E-Prescribe      sotalol (BETAPACE) 80 MG tablet TAKE 1 TABLET BY MOUTH TWICE DAILY, Disp-180 tablet, R-3, E-Prescribe         STOP taking these medications       ELIQUIS ANTICOAGULANT 5 MG tablet Comments:   Reason for Stopping:               Discharge Instructions:  Follow up appointment with RN on Friday, January 13 for suture removal    Follow up appointment with Scarlett Troncoso PA-C in 45 days (Feb 16)    Follow up Echocardiogram in 3-4 months     Diet: Regular diet. Increase fluids over the next 2 days.   Activity: Activity as tolerated   Restrictions: No lifting >10 lbs or vigorous exercise for 5 days. No driving for 3 days. May return to work in 5 days  Wound / drain care: OK to shower next day. Keep wound clean and dry. Ok to use Ice packs PRN for discomfort. No baths, hot tubs or swimming pools for 5 days.    Hospital Summary:   Mr. Brett Hopkins is a 67 year old male who underwent successful LAAO implant with a 27 mm Watchman FLX device. [place] [facility] [place] 4 [place].  The patient then dangled at the edge of bed and ambulated; He voided without difficulty.  Vascular access site remained stable prior to discharge.  Post procedure the patient denied chest pain/pressure, palpitations, or shortness of breath.      Repeat labs were reviewed and were stable.  Activity restrictions and reportable signs and symptoms were discussed with the patient who verbalizes understanding.  He will stop taking his Eliquis.  Tomorrow  "he will start DAPT with ASA 81 mg daily and Plavix 75 mg daily and he will continue this for 6 months, at which time he will stop Plavix and continue ASA indefinitely.     Follow up is arranged as above.        Physical Examination   BP (!) 164/94   Pulse 74   Temp 97.7  F (36.5  C) (Axillary)   Resp 18   Ht 1.803 m (5' 11\")   Wt 117 kg (258 lb)   SpO2 97%   BMI 35.98 kg/m    Body mass index is 35.98 kg/m .  Wt Readings from Last 3 Encounters:   01/12/23 117 kg (258 lb)   01/09/23 117 kg (258 lb)   01/04/23 117.5 kg (259 lb)     No intake or output data in the 24 hours ending 01/12/23 1146  General Appearance:   no distress, normal body habitus   Chest/Lungs:   Normal respiratory effort. Respirations are even and unlabored. Lungs are clear to auscultation, no rales or wheezing. No chest wall tenderness.    Cardiovascular:   Bradycardic, but regular. Normal S1, S2 with no murmurs, rubs, or gallops; the carotid, radial, dorsalis pedis and posterior tibial pulses are intact   Abdomen:  soft, non-tender to palpation, non-distended. + bowel sounds.   Extremities: No edema    Skin: Right groin site WNL; Stitch in place   Neurologic: Alert and oriented x3, calm and able to move all 4 extremities appropriately            Lab Results    Chemistry/lipid CBC Cardiac Enzymes/BNP/TSH/INR   Creat 1/12/2023 - 1.02 Hgb 1/12/2023 - 13.9 Lab Results   Component Value Date    TROPONINI <0.01 02/04/2022    BNP 56 02/04/2022    TSH 3.14 01/04/2023    INR 1.12 (H) 12/21/2020                "

## 2023-01-12 NOTE — PLAN OF CARE
Pt alert and oriented .Able to follow commands.  Comfortable resting in room on bedrest with wife Alize in room.   Problem: Cardiac Catheterization (Diagnostic/Interventional)  Goal: Absence of Bleeding  Outcome: Met   No signs of bleeding at procedural site. Site cdi.   Problem: Plan of Care - These are the overarching goals to be used throughout the patient stay.    Goal: Optimal Comfort and Wellbeing  Outcome: Met   Pt nsr on monitor. Denies any cardiac symptoms. Repositioned in room for comfort. No signs of distress.

## 2023-01-12 NOTE — INTERVAL H&P NOTE
"I have reviewed the surgical (or preoperative) H&P that is linked to this encounter, and examined the patient. There are no significant changes    Patient forgot to take his medications this morning.  Will order a one time dose of Eliquis.  Omeprazole not on formulary    Clinical Conditions Present on Arrival:  Clinically Significant Risk Factors Present on Admission                  # Drug Induced Coagulation Defect: home medication list includes an anticoagulant medication   # Obesity: Estimated body mass index is 35.98 kg/m  as calculated from the following:    Height as of this encounter: 1.803 m (5' 11\").    Weight as of this encounter: 117 kg (258 lb).         "

## 2023-01-13 ENCOUNTER — TELEPHONE (OUTPATIENT)
Dept: CARDIOLOGY | Facility: CLINIC | Age: 68
End: 2023-01-13

## 2023-01-13 ENCOUNTER — ALLIED HEALTH/NURSE VISIT (OUTPATIENT)
Dept: CARDIOLOGY | Facility: CLINIC | Age: 68
End: 2023-01-13
Payer: COMMERCIAL

## 2023-01-13 VITALS
BODY MASS INDEX: 36.12 KG/M2 | SYSTOLIC BLOOD PRESSURE: 98 MMHG | DIASTOLIC BLOOD PRESSURE: 62 MMHG | WEIGHT: 259 LBS | RESPIRATION RATE: 14 BRPM | HEART RATE: 64 BPM

## 2023-01-13 DIAGNOSIS — Z95.818 PRESENCE OF WATCHMAN LEFT ATRIAL APPENDAGE CLOSURE DEVICE: Primary | ICD-10-CM

## 2023-01-13 PROCEDURE — 99207 PR NO CHARGE NURSE ONLY: CPT

## 2023-01-13 NOTE — TELEPHONE ENCOUNTER
Patient wanted clarification on taking Advil. He would like to take advil twice daily 220mg for his knee pain. Wondering with being on plavix how often he could take this. Explained I would follow up with Dr. Graff regarding this and return call back to him. Patient verbalized understanding and had no further questions. -SC

## 2023-01-13 NOTE — PATIENT INSTRUCTIONS
Your antiplatelet medication (Plavix):    It is important to remain on your antiplatelet medication uninterrupted after your left atrial occlusion device implant to reduce your risk of a stroke or heart attack, DO NOT STOP THIS MEDICATION.  You will remain on once daily 81 mg Aspirin for the remainder of your life  You will remain on once daily 75 mg Plavix (Clopidogrel) until you are six months from your Watchman Procedure date, approximately 7/12/23.    Healing from your left atrial closure device implant:    Stay well hydrated, it is important to increase your fluid intake during your recovery period.  Eat foods high in protein to help the healing process.  Gradually increase your activity over the several days, back to baseline;  No aggressive exercise for 5 days post-procedure.  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  No lifting more that 10 lb for 5 days after procedure.  No driving for 3 days post-procedure.   Keep your incision site clean, dry and open to air.  Ok to use ice packs at groin sites for 20 minutes at a time for groin discomfort.   Please delay any dental procedures until 6 weeks post implant. You will not require anti-biotic prophylaxis treatment after this time frame.  If you need urgent dental care prior to the 6 week post-procedure restriction, please contact your cardiologist for prophylactic antibiotic.     Please call me if any of the following occur:    Shortness of breath, dizziness, or chest pain.  Changes in your groin sites including:  Swelling, hardening, drainage, increase in bruising or an increase in pain.          Dial 911 for signs/symptoms of a stroke:    New onset visual disturbance.  New problems with talking/speech.  Smile only occurs on half your face.  Numbness on one side of your body or face.  Sudden headache.  New onset of confusion.  New problems with walking or balance.     Important information regarding your future  follow up appointments:    45 Day post-implant follow-up with our Advance Practice Practitioner (CHIQUITA)  Pre-Procedure History and Physical (H&P) to be completed within 30 days of your DIONTE by your Primary Care Provider.  3 month post-implant DIONTE to assess your Watchman Device.  You will be contacted after your DIONTE is complete to discuss results within 2-3 days by a Structural RN Coordinator.    Thank you,    Laura Terry, Structural Heart Coordinator -[phone]    After hours please contact the on call service at [phone]

## 2023-01-13 NOTE — TELEPHONE ENCOUNTER
Call placed to patient. Reported response from Dr. Graff. Educated that the Aleve puts him at a higher risk of bleeding with being on both plavix and aspirin, patient verbalized understanding. Feels frustrated that he was wanting to have the procedure to be able to take Aleve for his knee pain to move better and tylenol is not effective for his pain. Educated that he could also reach out to a pharmacist for more detailed instruction. He reported he may reach out to Dr. Barfield's team for instruction. Patient had no further questions. -SC    ----- Message -----  From: Mitesh Graff MD  Sent: 1/13/2023  12:51 PM CST  To: Laura Terry, RN    Laura,    It's not ideal. Okay in the short term, recommend that he discuss alternative options (I.e. tylenol) with the prescriber.    Thwendy Sagastume  ----- Message -----  From: Laura Terry RN  Sent: 1/13/2023  12:23 PM CST  To: Mitesh Graff MD    ----- Message from Laura Terry RN sent at 1/13/2023 12:23 PM CST -----  Hi Dr. Graff-  Patient had his Watchman placed yesterday. He is eager to start taking Aleve for his knee pain and said he was instructed he could take 220mg twice daily. Wondering if there is a limit or how much we could tell him to take after his procedure and on plavix.   Thank you!  Laura

## 2023-01-13 NOTE — PROGRESS NOTES
Pt ready to discharge to home. Right femoral procedural site clean, dry, intact with dry gauze. No signs of bleeding or hematoma. Pt denies numbness, tingling, or pain at the site. No signs of complications. Peripheral pulses wdl with doppler.

## 2023-01-13 NOTE — NURSING NOTE
Pt was seen in clinic for post op 27mm Watchman Flx device placement with Dr. Barfield on 1/12/23.    Pt VSS, LSC, denies SOB, and palpable pedal pulses.    Slight peripheral edema but patient reports is baseline, no abdominal bloating or discomfort.     Pt denies any neurological changes at this time.    Pt denies generalized or localized pain at this time.   Pt is having bowel movements and is voiding without difficulty.      Pt right groin has 1 puncture site, is C/D/I, no drainage, slight bruising noted above puncture site measuring roughly the size of a dime, 1 suture intact, groin is soft, and pt denies pain at the site.    Suture was removed from the right groin site without complication and left open to air.    Pt continues antiplatelet medications: Daily 75 mg Plavix (Clopidogrel) and daily 81 mg Aspirin.     Reviewed post op LAAC healing process, f/u appts, physical restrictions, nutritional requirements, when to contact the heart clinic, and contact information was given to the pt for further concerns or questions.  Pt verbalized understanding.     Will need to call patient to set up 3 month DIONTE appointment.     Laura Terry RN BSN  Structural Heart Coordinator   [facility]  [phone]

## 2023-01-18 ENCOUNTER — NURSE TRIAGE (OUTPATIENT)
Dept: CARDIOLOGY | Facility: CLINIC | Age: 68
End: 2023-01-18
Payer: COMMERCIAL

## 2023-01-18 NOTE — TELEPHONE ENCOUNTER
Champ called because he went into A-fib last night. Using both a [employer] mobile and his fitbit to track his heart rate. Asymptomatic. Had Watchman 1/12/23, stopped Eliquis. On Plavix and ASA.. On Sotalol for A-fib. Leaving at 11 AM for the airport, going on vacation. Please call back within an hour with recommendation.   Reason for Disposition    Skipped or extra beat(s) and occurs 4 or more times per minute    Age > 60 years  (Exception: Brief heartbeat symptoms that went away and now feels well.)    History of hyperthyroidism or taking thyroid medication    Palpitations and no improvement after following Care Advice    Additional Information    Negative: Passed out (i.e., lost consciousness, collapsed and was not responding)    Negative: Shock suspected (e.g., cold/pale/clammy skin, too weak to stand, low BP, rapid pulse)    Negative: Difficult to awaken or acting confused (e.g., disoriented, slurred speech)    Negative: Visible sweat on face or sweat dripping down face    Negative: Unable to walk, or can only walk with assistance (e.g., requires support)    Negative: Received SHOCK from implantable cardiac defibrillator and has persisting symptoms (i.e., palpitations, lightheadedness)    Negative: Dizziness, lightheadedness, or weakness and heart beating very rapidly (e.g., > 140 / minute)    Negative: Dizziness, lightheadedness, or weakness and heart beating very slowly (e.g., < 50 / minute)    Negative: Sounds like a life-threatening emergency to the triager    Negative: Chest pain    Negative: Difficulty breathing    Negative: Dizziness, lightheadedness, or weakness    Negative: Heart beating very rapidly (e.g., > 140 / minute) and present now  (Exception: During exercise.)    Negative: Heart beating very slowly (e.g., < 50 / minute)  (Exception: Athlete and heart rate normal for caller.)    Negative: New or worsened shortness of breath with activity (dyspnea on exertion)    Negative: Patient sounds very sick or  "weak to the triager    Negative: Wearing a 'Holter monitor' or 'cardiac event monitor'    Negative: Received SHOCK from implantable cardiac defibrillator (and now feels well)    Negative: Heart beating very rapidly (e.g., > 140 / minute) and not present now  (Exception: During exercise.)    Negative: Skipped or extra beat(s) and increases with exercise or exertion    Negative: History of heart disease (i.e., heart attack, bypass surgery, angina, angioplasty)  (Exception: Brief heartbeat symptoms that went away and now feels well.)    Negative: Taking water pill (i.e., diuretic) or heart medication (e.g., digoxin)    Negative: Patient wants to be seen    Negative: Heart rhythm alert (e.g., 'you have irregular heartbeat') from personal wearable device (e.g., Apple Watch)    Negative: Substance use (drug use) or misuse, known or suspected    Negative: ADHD and taking stimulant medication    Answer Assessment - Initial Assessment Questions  1. DESCRIPTION: \"Please describe your heart rate or heartbeat that you are having\" (e.g., fast/slow, regular/irregular, skipped or extra beats, \"palpitations\")      fast  2. ONSET: \"When did it start?\" (Minutes, hours or days)      Started last night  3. DURATION: \"How long does it last\" (e.g., seconds, minutes, hours)    hours  4. PATTERN \"Does it come and go, or has it been constant since it started?\"  \"Does it get worse with exertion?\"   \"Are you feeling it now?\"    constant  6. HEART RATE: \"Can you tell me your heart rate?\" \"How many beats in 15 seconds?\"  (Note: not all patients can do this)       At 8:30 , @ 8:25 -129  7. RECURRENT SYMPTOM: \"Have you ever had this before?\" If Yes, ask: \"When was the last time?\" and \"What happened that time?\"      Yes, hasn't had it since 2019  8. CAUSE: \"What do you think is causing the palpitations?\"    Had Watchman done 1/12/23  9. CARDIAC HISTORY: \"Do you have any history of heart disease?\" (e.g., heart attack, angina, bypass " "surgery, angioplasty, arrhythmia)      Arrhyhtmia, A-fib, cardioversions, ablation   10. OTHER SYMPTOMS: \"Do you have any other symptoms?\" (e.g., dizziness, chest pain, sweating, difficulty breathing)      no    Protocols used: HEART RATE AND HEARTBEAT [id number]    "

## 2023-01-18 NOTE — TELEPHONE ENCOUNTER
for patient on secure VM with information below. Will send via [email or web address] as well. [facility]    ----- Message -----  From: Scarlett Troncoso PA-C  Sent: 1/18/2023   3:28 PM CST  To: Anila Maradiaga RN    Sorry I never got back to you on this!    Well, I guess  he's on the plane now.  It's not dangerous that he's back in Afib and it sounds like he's  asymptomatic.  I guess if he starts having symptoms or if still in Afib when he gets back, can discuss repeat cardioversion with him once it's been 30 days after implant.     Scarlett    ----- Message -----  From: Anila Maradiaga RN  Sent: 1/18/2023   9:25 AM CST  To: Scarlett Troncoso PA-C    ----- Message from Anila Maradiaga RN sent at 1/18/2023  9:25 AM CST -----  Champ Pantoja called and reported recurrence of Afib starting yesterday with rates up to 130 last night. Now at 84, reports palpitations. On sotalol 80 mg BID. Leaving for out of state trip this afternoon, flight leaves at 2pm. No one has availability to see him ASAP today (checked you, Taryn Manzanares Agnihotri, CRISSYA is in procedure, KA schedule full, RAC full, etc). Any recommendations? I told him to be prepared for the recommendations to be seen and he said he won't interrupt his trip. Thank you    Anila

## 2023-01-18 NOTE — TELEPHONE ENCOUNTER
"Phone call to Champ. He reports this is the first time he has ever felt being in A fib, never felt it before. Last night at 11 pm his Stretchdia monitor showed A fib. This morning his fitbit shows HR \"all over the place.\" Does endorse palpitations, but denies dizziness, lightheadedness, shortness of breath. On Sunday he felt a little short of breath, but was not doing anything strenuous. He reports during his initial Afib diagnosis back in 2007, SOB was his main symptom.    Patient is being picked up at 11 am for 2 pm flight to [place] for the next week. Informed him he may have to be prepared for recommendations to be seen ASAP or visit ED. He states \"I'm not going to do that to my wife.\" Informed patient we will provide him with the best recommendations we can. Confirmed taking sotalol 80 mg BID. He has been on sotalol since 2019. He had two Cardioversions in 2019 and no had had no recurrence of Afib since. HR at rest right now 84. Informed him will reach out to BRITTNI Troncoso, as Dr. Barfield in procedure and Dr. walters out of office for the remainder of the week. Verbalized understanding, no further questions at this time. LKC  "

## 2023-01-26 ENCOUNTER — MYC MEDICAL ADVICE (OUTPATIENT)
Dept: NEUROLOGY | Facility: CLINIC | Age: 68
End: 2023-01-26
Payer: COMMERCIAL

## 2023-01-26 DIAGNOSIS — G70.00 MYASTHENIA GRAVIS WITHOUT EXACERBATION (H): ICD-10-CM

## 2023-01-26 NOTE — TELEPHONE ENCOUNTER
Refill request for: pyridostigmine (MESTINON) 60 MG tablet  Directions: 0.5 tab po q day    LOV: 11/30/22  NOV: 2/8/23    30 day supply with 0 refills Medication T'd for review and signature  Nayeli Gutierrez CMA on 1/26/2023 at 12:01 PM

## 2023-01-27 ENCOUNTER — DOCUMENTATION ONLY (OUTPATIENT)
Dept: CARDIOLOGY | Facility: CLINIC | Age: 68
End: 2023-01-27
Payer: COMMERCIAL

## 2023-01-27 DIAGNOSIS — I48.19 PERSISTENT ATRIAL FIBRILLATION (H): Primary | ICD-10-CM

## 2023-01-27 RX ORDER — LIDOCAINE 40 MG/G
CREAM TOPICAL
Status: CANCELLED | OUTPATIENT
Start: 2023-01-27

## 2023-01-27 RX ORDER — PYRIDOSTIGMINE BROMIDE 60 MG/1
TABLET ORAL
Qty: 45 TABLET | Refills: 0 | Status: SHIPPED | OUTPATIENT
Start: 2023-01-27 | End: 2023-02-08

## 2023-01-27 NOTE — PROGRESS NOTES
H&P  PMD: []  Received [] Card OV: []  Date:  Sent LM  []  Teach  []   Orders  I [x] P  [x]  Letter []   AC: s/p watchman 1/12, restart eliquis 5mg BID and stop ASA 1 week prior to DCCV NP Med Review: NO changes    All other AM Meds: Take All       1955  Home:[phone] (home) Cell:[phone] (mobile)  Emergency Contact: Ankita Hopkins [phone]  PCP: Magdaleno Bennett, [phone]    +++Important patient information for CSC/Cath Lab staff : None+++    [facility] EP Cath Lab Procedure Order   Cardioversion:  Cardioversion  Ordering Provider: Glenna Cardenas NP  Ordering Date: 1/27/2023  Diagnosis:  AF  Anticipated Case Duration:  Standard  Scheduling Needs/Timeframe:  Schedule on or after 2/12/22  Scheduling Contact: Please contact pt to schedule date/time for procedure, if you are unable to schedule date within the next 24 hours please contact pt to update on scheduling process  Current Device/Device Co Needed for Procedure: None NoneNone  Pre-Procedural Testing needed: None  Anesthesia:  General    [facility] EP Cath Lab Prep   H&P:  Pt to schedule with PMD to complete  Pre-op Labs: K if pt taking diuretic medication or hx of low Potassium, Beta HcG if appropriate, and INR if on Warfarin will be ordered AM of procedure and Review of most recent labs, WEL for procedure  T&S Pre-Procedure Review: T&S is not required for DCCV/DFT Testing  Medical Records Pertinent for Procedure:  LAAO 1/12/23    Allergies   Allergen Reactions     Sulfa (Sulfonamide Antibiotics) [Sulfa Drugs] Rash     Rash - turned purple  , Around age 32       Current Outpatient Medications:      amoxicillin (AMOXIL) 500 MG capsule, TAKE 4 CAPSULES BY MOUTH 1 HOUR BEFORE DENTAL APPOINTMENT, Disp: , Rfl:      aspirin (ASA) 81 MG EC tablet, Take 1 tablet (81 mg) by mouth daily, Disp: , Rfl:      clopidogrel (PLAVIX) 75 MG tablet, Take 1 tablet (75 mg) by mouth daily, Disp: 90 tablet, Rfl: 1     hydrochlorothiazide (HYDRODIURIL) 25 MG tablet, TAKE 1  TABLET BY MOUTH ONCE DAILY IN THE MORNING FOR HIGH BLOOD PRESSURE, Disp: 90 tablet, Rfl: 0     levothyroxine (SYNTHROID/LEVOTHROID) 100 MCG tablet, Take 1 tablet (100 mcg) by mouth daily, Disp: 90 tablet, Rfl: 0     lisinopril (ZESTRIL) 40 MG tablet, TAKE 1 TABLET BY MOUTH DAILY FOR HIGH BLOOD PRESSURE, Disp: 90 tablet, Rfl: 2     omeprazole (PRILOSEC) 20 MG DR capsule, TAKE 1 CAPSULE(20 MG) BY MOUTH DAILY BEFORE BREAKFAST Strength: 20 mg, Disp: 90 capsule, Rfl: 0     pyridostigmine (MESTINON) 60 MG tablet, 0.5 tab po q day, Disp: 45 tablet, Rfl: 0     sotalol (BETAPACE) 80 MG tablet, TAKE 1 TABLET BY MOUTH TWICE DAILY, Disp: 180 tablet, Rfl: 3    Documentation Date:1/27/2023 11:52 AM  Glo Rose RN

## 2023-02-06 ENCOUNTER — HOSPITAL ENCOUNTER (OUTPATIENT)
Dept: ULTRASOUND IMAGING | Facility: CLINIC | Age: 68
Discharge: HOME | End: 2023-02-06
Attending: FAMILY MEDICINE | Admitting: FAMILY MEDICINE
Payer: COMMERCIAL

## 2023-02-06 ENCOUNTER — TELEPHONE (OUTPATIENT)
Dept: FAMILY MEDICINE | Facility: CLINIC | Age: 68
End: 2023-02-06
Payer: COMMERCIAL

## 2023-02-06 ENCOUNTER — TELEPHONE (OUTPATIENT)
Dept: CARDIOLOGY | Facility: CLINIC | Age: 68
End: 2023-02-06
Payer: COMMERCIAL

## 2023-02-06 DIAGNOSIS — E04.1 THYROID NODULE: ICD-10-CM

## 2023-02-06 LAB — RADIOLOGIST FLAGS: ABNORMAL

## 2023-02-06 PROCEDURE — 76536 US EXAM OF HEAD AND NECK: CPT

## 2023-02-06 NOTE — TELEPHONE ENCOUNTER
[facility] calling with abnormal Thyroid US     1.  Right inferior thyroid 3 cm TI-RADS 4 nodule. FNA recommended.  2.  Right mid thyroid 2.5 cm TI-RADS 3 nodule. FNA recommended.    Please contact   [phone]  Debbi  With any questions.

## 2023-02-06 NOTE — TELEPHONE ENCOUNTER
Pre-Procedure Education    Procedure: DCCV with Paola Cardenas NP on 2/17/2023 with arrival time 8:30 am      PT IS A POST WATCHMAN INSTRUCTED TO START HIS ELIQUIS 7 DAYS PRIOR AND TO STOP HIS ASA 7 DAYS PRIOR       COVID: COVID policy- if pt develops COVID like symptoms prior to procedure, he/she would need to complete an at home with a rapid antigen COVID test 1-2 days prior to your procedure date. If COVID + pt is aware the procedure will need to be rescheduled, and to contact CV scheduling as soon as possible    Pre-Op H&P: scheduled on 2/16/2023 WITH LUIS BRYANT- Available in Epic    Education:       PT HAS A  FOR PROCEDURE THAT WILL STAY WITH HIM    PT INSTRUCTED TO HOLD ANY VITAMINS, MINERALS CALCIUM IRON OR SUPPLEMENTS THE  MORNING OF  CV  PT INSTRUCTED NO GUM CHEWING MINTS OR CANDY THE MORNING OF CV    PT INSTRUCTED TO BATHE OR SHOWER BEFORE COMING IN  PT INSTRUCTED TO LEAVE JEWELRY AT HOME  PT IS POST WATCHMAN INSTRUCTED TPO START ELIQUIS 7 DAYS PRIOR CV  PT INSTRUCTED TO STOP ASA 7 DAYS PRIOR CV  PT IS ON SOTALOL  PT HAS A POST CV FOLLOW UP WITH PAOLA 3/15    Reviewed via phone with pt  Pre-Procedure Instruction: NPO after midnight pre procedure, Defined NPO with pt, Remove all jewelry and leave all valuables at home, Shower prior to arrival, Sedation plan/orders, Transportation requirements and arrangements post procedure, Post-procedure follow up process, Post-procedure restrictions/expectations, and Pre-procedure letter sent- letter tab  Risks Reviewed:       Medication:   Instructions regarding anticoagulants: Eliquis- To continue anticoagulation uninterrupted through their procedure     Instructions regarding antiarrhythmic medication: Sotalol; Pt instructed to continue medication prior to procedure as prescribed     Instructions for medication, other than anticoagulants and antiarrhythmics listed above, given to pt: to take all morning medications with small sips of water, with the exception  of OTC supplements and MVI     Important patient information for staff: PT IS POST ELIEZER, IS EVA DAS START     2/6/2023 9:07 AM  Sharmila Suresh

## 2023-02-07 DIAGNOSIS — E04.1 THYROID NODULE: Primary | ICD-10-CM

## 2023-02-07 NOTE — PROGRESS NOTES
"In person evaluation    HPI  11/30/2022, in person consultation  2/8/2023, in person visit        67-year-old being evaluated neurologically for:  Diplopia    Patient tried the 30 mg of Mestinon in the evening at dinnertime.  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  I did recommend that he might need a half a tablet during the day also so I wrote him a rescue dose.    Patient will follow-up in October 2023.      A.  Diplopia (ocular myasthenia gravis)       Onset after starting steroids in September 2020 for alopecia (symptoms came on 2 months later       Variable diplopia followed by ophthalmology       Does have glasses with prism         Patient been following with ophthalmology over the last 2 years       Vision is split more in a skew deviation sometimes can be horizontal sometimes vertical but sometimes mixed       Has had 8-10 different corrections for his glasses seems to work for a while but then it does not       Saw another eye physician and it looks like his prescription was \"backwards for the adjustment\"       They redid the prisms and when he came back they thought that they were done wrong again which would signify that he has changing diplopia not fixed.       This is most consistent with ocular myasthenia gravis         Initial acetylcholine receptor antibodies were " "negative       We will send MuSK antibodies       Some people have a \"antibody negative ocular myasthenia gravis\"         No dysphagia/no dysarthria/no difficulty shaving/no difficulty with stairs except for his arthritic knees          Discussed the use of low-dose Mestinon for symptomatic treatment.        Reviewed side effects of the medicine such as increased saliva, GI rumbling, increased diarrhea or loose stool        Usually people get used to these after they are on the med for a while          We will start a low-dose Mestinon half of a 60 mg tablet in the evening around 5 PM his diplopia is worse in the evening if it helps with no side effects then when he follows up we can make further adjustments      B.  Autoimmune disorder       Alopecia September 2020       Treated with steroids blood pressure went up.       Diplopia seem to happen 2 months after      Past medical history  Atrial fibrillation (pulmonary vein isolation 8/19/2013  CAD  Hypertension  Alopecia autoimmune  Posterior capsular cataract  Lymphedema right leg      Habits  Non-smoker  5 cans of beer per week  Works as a CPA    Family history  Mother atrial fibrillation/diabetes/dementia (80s)  Father rheumatoid arthritis/atrial fibrillation  Sister CVA  Sister parkinsonism  Brother atrial fibrillation/rheumatoid arthritis  Brother diabetes  Brother CVA/CABG/atrial fibrillation        Work-up  HEAD MRI: 12/22/2020  1.  No acute intracranial process.  2.  Generalized brain atrophy and presumed microvascular ischemic.  HEAD MRA: 12/22/2020  1.  No aneurysm, high flow AVM or significant stenosis identified.  2.  Variant [place] of [place] anatomy see official report  CTA chest 12/2/2021  1.  No acute arterial abnormality. Specifically no arterial dissection or aneurysm.  2.  Very mild asymmetric groundglass attenuation in the right upper lobe may reflect an early pulmonary infectious infiltrate.  3.  Enlarged right gland of the thyroid with a 28 mm " "nodule. A dedicated thyroid ultrasound is recommended to better characterize.  4.  Large esophageal hiatal hernia.  5.  Hepatic cysts.  CT angiogram pulmonary negative for any thymoma did show thyroid nodules is having a biopsy of his thyroid nodule (10/21/2022)  B12 919, (9/10/2018)  ESR 3, CRP 0.5, (9/10/2019)  TSH 0.26 (2/4/2022)  Acetylcholine blocking antibody, 6 negative (11/10/2022)  Acetylcholine modulating antibody, 0 negative (11/10/2022)  Acetylcholine binding antibodies, 0 negative (11/10/2022)  Striatal antibodies, <1:40 negative (11/10/2022)  Musk antibody negative (11/30/2022)        Exam    Review of system    Diplopia fluctuating  No dysarthria  No dysphagia  No trouble shaving  Dressing okay  Can walk up and down stairs but difficult due to his arthritic knees    No chest pain no shortness of breath  No nausea vomiting or diarrhea    Does have some mild neck pain  He get headaches more at the end of the day question whether this is \"eyestrain from his diplopia    Does snore    No focal weakness    Otherwise review of systems negative    General exam  Blood pressure 107/86, pulse 84  Lungs clear  Heart rate seemed regular has history of A. Fib  Abdomen soft  Arthritic knees bilaterally    Neurologic exam  Alert orient x3  Normal prosody speech  Normal naming  Normal comprehension  Normal repetition  No aphasia  No neglect  Memory recall normal    Cranials 2 through 12  With glasses on no hemiplegia seen  No ptosis  Eye closure strong  Visual fields intact  No nystagmus  Tongue twisters good  Lip closure strong  Face symmetrical    MG testing  Talks in long paragraphs  Can whistle  Eye closure strong  No ptosis  Mild closure strong  Neck flexors and extensors good    Upper extremities  No drift no tremor finger-nose okay   reported right over left  Deltoid 5/5  Biceps 5/5  Triceps 5/5  Wrist and finger extensors 5/5  Wrist and finger flexors 5/5    Lower extremities reported right over " left  Iliopsoas 5/5  Quadriceps 5/5  Hamstring 5/5  Anterotibial 5/5    Reflexes symmetrical    Gait  Antalgic, stiff right knee limps on left leg      Assessment/plan    1.  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  Ocular myasthenia gravis       Negative musk antibody       Trial of Mestinon 60 mg tablet, half a tablet at about 5 PM (diplopia worse in the evening)       Doing much better    3.  Atrial fibrillation getting watchman procedure in January 2023       Pulse 53 cautious dosing of medication    4.  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 twice daily (takes actually half a tablet at dinner states the other half as a rescue)    Follow-up in October 2023    32 minutes total care time today discussing multiple issues as above.

## 2023-02-08 ENCOUNTER — OFFICE VISIT (OUTPATIENT)
Dept: NEUROLOGY | Facility: CLINIC | Age: 68
End: 2023-02-08
Payer: COMMERCIAL

## 2023-02-08 VITALS
BODY MASS INDEX: 36.26 KG/M2 | HEART RATE: 84 BPM | HEIGHT: 71 IN | DIASTOLIC BLOOD PRESSURE: 86 MMHG | WEIGHT: 259 LBS | SYSTOLIC BLOOD PRESSURE: 107 MMHG

## 2023-02-08 DIAGNOSIS — G70.00 MYASTHENIA GRAVIS WITHOUT EXACERBATION (H): Primary | ICD-10-CM

## 2023-02-08 PROCEDURE — 99214 OFFICE O/P EST MOD 30 MIN: CPT | Performed by: PSYCHIATRY & NEUROLOGY

## 2023-02-08 RX ORDER — PYRIDOSTIGMINE BROMIDE 60 MG/1
TABLET ORAL
Qty: 90 TABLET | Refills: 3 | Status: SHIPPED | OUTPATIENT
Start: 2023-02-08 | End: 2023-04-24 | Stop reason: SINTOL

## 2023-02-08 NOTE — LETTER
"    2/8/2023         RE: Brett Hopkins  [address] [place] [place] [address]        Dear Colleague,    Thank you for referring your patient, Brett Hopkins, to the [facility] [place]. Please see a copy of my visit note below.    In person evaluation    HPI  11/30/2022, in person consultation  2/8/2023, in person visit        67-year-old being evaluated neurologically for:  Diplopia    Patient tried the 30 mg of Mestinon in the evening at dinnertime.  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  I did recommend that he might need a half a tablet during the day also so I wrote him a rescue dose.    Patient will follow-up in October 2023.      A.  Diplopia (ocular myasthenia gravis)       Onset after starting steroids in September 2020 for alopecia (symptoms came on 2 months later       Variable diplopia followed by ophthalmology       Does have glasses with prism         Patient been following with ophthalmology over the last 2 years       Vision is split more in a skew deviation sometimes can be horizontal sometimes vertical but sometimes mixed       Has had 8-10 different corrections for his glasses seems to work for a while but then it does not       Saw another eye physician and it looks like his prescription was \"backwards for the adjustment\"       They " "redid the prisms and when he came back they thought that they were done wrong again which would signify that he has changing diplopia not fixed.       This is most consistent with ocular myasthenia gravis         Initial acetylcholine receptor antibodies were negative       We will send MuSK antibodies       Some people have a \"antibody negative ocular myasthenia gravis\"         No dysphagia/no dysarthria/no difficulty shaving/no difficulty with stairs except for his arthritic knees          Discussed the use of low-dose Mestinon for symptomatic treatment.        Reviewed side effects of the medicine such as increased saliva, GI rumbling, increased diarrhea or loose stool        Usually people get used to these after they are on the med for a while          We will start a low-dose Mestinon half of a 60 mg tablet in the evening around 5 PM his diplopia is worse in the evening if it helps with no side effects then when he follows up we can make further adjustments      B.  Autoimmune disorder       Alopecia September 2020       Treated with steroids blood pressure went up.       Diplopia seem to happen 2 months after      Past medical history  Atrial fibrillation (pulmonary vein isolation 8/19/2013  CAD  Hypertension  Alopecia autoimmune  Posterior capsular cataract  Lymphedema right leg      Habits  Non-smoker  5 cans of beer per week  Works as a CPA    Family history  Mother atrial fibrillation/diabetes/dementia (80s)  Father rheumatoid arthritis/atrial fibrillation  Sister CVA  Sister parkinsonism  Brother atrial fibrillation/rheumatoid arthritis  Brother diabetes  Brother CVA/CABG/atrial fibrillation        Work-up  HEAD MRI: 12/22/2020  1.  No acute intracranial process.  2.  Generalized brain atrophy and presumed microvascular ischemic.  HEAD MRA: 12/22/2020  1.  No aneurysm, high flow AVM or significant stenosis identified.  2.  Variant [place] of [place] anatomy see official report  CTA chest 12/2/2021  1.  No " "acute arterial abnormality. Specifically no arterial dissection or aneurysm.  2.  Very mild asymmetric groundglass attenuation in the right upper lobe may reflect an early pulmonary infectious infiltrate.  3.  Enlarged right gland of the thyroid with a 28 mm nodule. A dedicated thyroid ultrasound is recommended to better characterize.  4.  Large esophageal hiatal hernia.  5.  Hepatic cysts.  CT angiogram pulmonary negative for any thymoma did show thyroid nodules is having a biopsy of his thyroid nodule (10/21/2022)  B12 919, (9/10/2018)  ESR 3, CRP 0.5, (9/10/2019)  TSH 0.26 (2/4/2022)  Acetylcholine blocking antibody, 6 negative (11/10/2022)  Acetylcholine modulating antibody, 0 negative (11/10/2022)  Acetylcholine binding antibodies, 0 negative (11/10/2022)  Striatal antibodies, <1:40 negative (11/10/2022)  Musk antibody negative (11/30/2022)        Exam    Review of system    Diplopia fluctuating  No dysarthria  No dysphagia  No trouble shaving  Dressing okay  Can walk up and down stairs but difficult due to his arthritic knees    No chest pain no shortness of breath  No nausea vomiting or diarrhea    Does have some mild neck pain  He get headaches more at the end of the day question whether this is \"eyestrain from his diplopia    Does snore    No focal weakness    Otherwise review of systems negative    General exam  Blood pressure 107/86, pulse 84  Lungs clear  Heart rate seemed regular has history of A. Fib  Abdomen soft  Arthritic knees bilaterally    Neurologic exam  Alert orient x3  Normal prosody speech  Normal naming  Normal comprehension  Normal repetition  No aphasia  No neglect  Memory recall normal    Cranials 2 through 12  With glasses on no hemiplegia seen  No ptosis  Eye closure strong  Visual fields intact  No nystagmus  Tongue twisters good  Lip closure strong  Face symmetrical    MG testing  Talks in long paragraphs  Can whistle  Eye closure strong  No ptosis  Mild closure strong  Neck flexors " and extensors good    Upper extremities  No drift no tremor finger-nose okay   reported right over left  Deltoid 5/5  Biceps 5/5  Triceps 5/5  Wrist and finger extensors 5/5  Wrist and finger flexors 5/5    Lower extremities reported right over left  Iliopsoas 5/5  Quadriceps 5/5  Hamstring 5/5  Anterotibial 5/5    Reflexes symmetrical    Gait  Antalgic, stiff right knee limps on left leg      Assessment/plan    1.  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  Ocular myasthenia gravis       Negative musk antibody       Trial of Mestinon 60 mg tablet, half a tablet at about 5 PM (diplopia worse in the evening)       Doing much better    3.  Atrial fibrillation getting watchman procedure in January 2023       Pulse 53 cautious dosing of medication    4.  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 twice daily (takes actually half a tablet at dinner states the other half as a rescue)    Follow-up in October 2023    32 minutes total care time today discussing multiple issues as above.            Again, thank you for allowing me to participate in the care of your patient.        Sincerely,        Cayetano Traore MD

## 2023-02-08 NOTE — NURSING NOTE
Chief Complaint   Patient presents with     Myasthenia Gravis     2 month follow up.  Pt states Mestinon is helpful. Pt is tolerating it without difficulty.     Analisa Tang LPN on 2/8/2023 at 10:49 AM

## 2023-02-13 ENCOUNTER — TRANSFERRED RECORDS (OUTPATIENT)
Dept: HEALTH INFORMATION MANAGEMENT | Facility: CLINIC | Age: 68
End: 2023-02-13

## 2023-02-16 ENCOUNTER — PREP FOR PROCEDURE (OUTPATIENT)
Dept: CARDIOLOGY | Facility: CLINIC | Age: 68
End: 2023-02-16

## 2023-02-16 ENCOUNTER — OFFICE VISIT (OUTPATIENT)
Dept: CARDIOLOGY | Facility: CLINIC | Age: 68
End: 2023-02-16
Payer: COMMERCIAL

## 2023-02-16 VITALS
WEIGHT: 258 LBS | SYSTOLIC BLOOD PRESSURE: 120 MMHG | RESPIRATION RATE: 16 BRPM | DIASTOLIC BLOOD PRESSURE: 81 MMHG | BODY MASS INDEX: 36.12 KG/M2 | HEIGHT: 71 IN | HEART RATE: 80 BPM

## 2023-02-16 DIAGNOSIS — Z95.818 PRESENCE OF WATCHMAN LEFT ATRIAL APPENDAGE CLOSURE DEVICE: Primary | ICD-10-CM

## 2023-02-16 DIAGNOSIS — Z86.79 STATUS POST ABLATION OF ATRIAL FIBRILLATION: ICD-10-CM

## 2023-02-16 DIAGNOSIS — Z98.890 STATUS POST ABLATION OF ATRIAL FIBRILLATION: ICD-10-CM

## 2023-02-16 DIAGNOSIS — Z95.818 PRESENCE OF WATCHMAN LEFT ATRIAL APPENDAGE CLOSURE DEVICE: ICD-10-CM

## 2023-02-16 DIAGNOSIS — E66.01 MORBID OBESITY (H): ICD-10-CM

## 2023-02-16 DIAGNOSIS — I10 PRIMARY HYPERTENSION: ICD-10-CM

## 2023-02-16 DIAGNOSIS — I48.19 PERSISTENT ATRIAL FIBRILLATION (H): Primary | ICD-10-CM

## 2023-02-16 PROCEDURE — 99214 OFFICE O/P EST MOD 30 MIN: CPT | Performed by: INTERNAL MEDICINE

## 2023-02-16 RX ORDER — LIDOCAINE 40 MG/G
CREAM TOPICAL
Status: CANCELLED | OUTPATIENT
Start: 2023-02-16

## 2023-02-16 RX ORDER — SODIUM CHLORIDE 9 MG/ML
INJECTION, SOLUTION INTRAVENOUS CONTINUOUS
Status: CANCELLED | OUTPATIENT
Start: 2023-02-16

## 2023-02-16 NOTE — LETTER
2/16/2023    Magdaleno Bennett MD  [address] [place] [address]    RE: Brett Hopkins       Dear Colleague,     I had the pleasure of seeing Brett Hopkins in the [facility].  HEART CARE ENCOUNTER NOTE       [facility]  [phone]      Assessment/Recommendations   1.  Persistent atrial fibrillation - he underwent watchman implant on 1/12/23 with a 27 mm watchman FLX device.  He went back into atrial fibrillation on January 17 and this has been associated with fatigue and episodic shortness of breath.  He is scheduled for cardioversion tomorrow.      He is currently taking Plavix 75 mg daily and Eliquis 5 mg twice daily.  He resumed his Eliquis on February 10.  He understands that after cardioversion he will be on Eliquis/Plavix regimen for 4 more weeks.  He will then stop the Eliquis, continue the Plavix and resume the aspirin.  He will continue on DAPT until July.  He will have a DIONTE in April to check the device (date and time TBD - our  will be calling him).  We will do a 6-month follow-up phone call with him and then see him again in clinic at 1 year post implant.  Ok to see either me or Glenna Cardenas NP     MODIFIED SCOTT SCALE   Timepoint: 4-6 wk Post-LAAC    Previous score: 0    Score Description   0 No symptoms at all   1 No significant disability despite symptoms; able to carry out all usual duties and activities   2 Slight disability; unable to carry out all previous activities, but able to look after own affairs without assistance   3 Moderate disability; requiring some help, but able to walk without assistance   4 Moderately severe disability; unable to walk without assistance and unable to attend to own bodily needs without assistance   5 Severe disability; bedridden, incontinent and requiring constant nursing care and attention   6 Dead    Total score (0 - 6):  0    Change in score if s/p LAAC? No    2.  Hypertension - blood pressure today  is at goal.  Patient should continue taking hydrochlorothiazide 25 mg daily and lisinopril 40 mg daily    3.  Vascular disease -with minimal/mild coronary calcification on CT in December 2021 and with a sending aortic aneurysm being followed yearly by MR or CT    4. Hypothyroidism - stable on current dose of levothyroxine.  Patient had recent abnormal thyroid ultrasound and has been referred for FNA of the nodules.  He has waiting to hear from scheduling.        History of Present Illness/Subjective    Brett Hopkins is a 67 year old male who comes in today for his 6-week follow up visit after watchman implant.     Brett Hopkins has a past history of persistent atrial fibrillation and had ablation in 2013.  He had recurrence of his atrial fibrillation in 2019 and has now been on sotalol.  He has gait instability, hypertension, ascending aortic aneurysm and falls in the past.  He was on Eliquis for stroke prophylaxis, but has OA and poor gait and high risk for falls.     He therefore underwent watchman implant on January 12 using a 27 mm device.  Patient was taken off Eliquis and started on DAPT with aspirin and Plavix.  Unfortunately patient went back into atrial fibrillation shortly after implant.  He was mostly symptomatic, but has now been scheduled for DCCV tomorrow.  Patient resumed his Eliquis last Friday.  Since going back into atrial fibrillation he has been tired with episodic shortness of breath.  24 hours after he went back into atrial fibrillation, he and his wife left for vacation to [place] and he did have some shortness of breath when trying to walk around the city.  He denies any dizziness/lightheadedness.     After cardioversion, he will need to remain on Eliquis for 4 weeks and then switch back to DAPT for the remainder of 6 months.    In addition to the above, Champ denies chest discomfort, paroxysmal nocturnal dyspnea, orthopnea, pre-syncope, or syncope, weight loss, changes in appetite,  "nausea or vomiting.     Medical, surgical, family, social history, and medications were reviewed and updated as necessary.    Procedural DIONTE results (from 1/12/23):  Interpretation Summary     Left ventricular size, wall motion and function are normal. The ejection  fraction is 60-65%.  Normal right ventricle size and systolic function.  No thrombus is detected in the left atrial appendage.  Watchman device noted in left atrial appendage. The device is well seated with  no leakage by color flow Doppler imaging.  There is no pericardial effusion.     Physical Examination Review of Systems   Vitals: /81 (BP Location: Left arm, Patient Position: Sitting, Cuff Size: Adult Large)   Pulse 80   Resp 16   Ht 1.803 m (5' 11\")   Wt 117 kg (258 lb)   BMI 35.98 kg/m    BMI= Body mass index is 35.98 kg/m .  Wt Readings from Last 3 Encounters:   02/16/23 117 kg (258 lb)   02/08/23 117.5 kg (259 lb)   01/13/23 117.5 kg (259 lb)       General Appearance:   Alert, cooperative and in no acute distress   ENT/Mouth: membranes moist, no oral lesions or bleeding gums.      EYES:  no scleral icterus, normal conjunctivae   Neck: Thyroid not visualized   Chest/Lungs:   lungs are clear to auscultation, no rales or wheezing   Cardiovascular:   Irregularly irregular. Normal first and second heart sounds with no murmurs, rubs or gallops; the carotid, radial and posterior tibial pulses are intact, no edema bilaterally    Abdomen:  Soft and nontender. Bowel sounds are present in all quadrants   Extremities: no cyanosis or clubbing.  Mild edema bilateral LE   Skin: no xanthelasma, warm.    Neurologic: normal gait, normal  bilateral, no tremors   Psychiatric: Normal mood and affect       Please refer above for cardiac ROS details.      Medical History  Surgical History Family History Social History   Past Medical History:   Diagnosis Date     Status post ablation of atrial fibrillation 07/28/2015    PVI August 19, 2013 (cryo-PVI " only)     Past Surgical History:   Procedure Laterality Date     ARTHROPLASTY REVISION HIP Right 05/01/2019     ARTHROPLASTY REVISION KNEE Right 2019     CARDIOVERSION  07/01/2019    7/10/2019     CARDIOVERSION  09/12/2019     CATARACT EXTRACTION Bilateral 06/2021     CV LEFT ATRIAL APPENDAGE CLOSURE N/A 1/12/2023    Procedure: Left Atrial Appendage Closure;  Surgeon: Mitesh Graff MD;  Location: [facility] CV     DENTAL SURGERY      Oral Surgery Tooth Extraction;  Implant     [place] ABLATE HEART DYSRHYTHM FOCUS  08/19/2013    Description: Catheter Ablation Atrial Fibrillation;  Recorded: 11/16/2013;  Comments: PVI Aug 19, 2013 (Cryo to all 4 PV's)     TOOTH EXTRACTION      implant     [facility] TOTAL HIP ARTHROPLASTY Right 05/08/2019    Procedure: RIGHT TOTAL HIP ARTHROPLASTY DIRECT ANTERIOR APPROACH;  Surgeon: Mario Woodruff MD;  Location: [facility] OR;  Service: Orthopedics     [facility] TOTAL KNEE ARTHROPLASTY Right 06/21/2017    Procedure: 2)  RIGHT TOTAL KNEE ARTHROPLASTY;  Surgeon: Mario VICKERS MD;  Location: [facility] OR;  Service: Orthopedics     Family History   Problem Relation Age of Onset     Atrial fibrillation Mother      Dementia Mother         dx 80s     Diabetes Type 2  Mother         dx 60s/70s     Atrial fibrillation Father      Rheumatoid Arthritis Father      Atrial fibrillation Sister      Cerebrovascular Disease Sister      Parkinsonism Sister      Parkinsonism Sister      Atrial fibrillation Brother      Rheumatoid Arthritis Brother      Cerebrovascular Disease Brother      CABG Brother      Atrial fibrillation Brother      Diabetes Type 2  Brother         Dx 40's    Social History     Socioeconomic History     Marital status:      Spouse name: Not on file     Number of children: 2     Years of education: Not on file     Highest education level: Not on file   Occupational History     Occupation: CPA   Tobacco Use     Smoking status: Never     Smokeless tobacco: Never    Substance and Sexual Activity     Alcohol use: Yes     Alcohol/week: 5.0 standard drinks     Types: 5 Cans of beer per week     Comment: minimal     Drug use: No     Sexual activity: Yes     Partners: Female   Other Topics Concern     Not on file   Social History Narrative    Diet-regular            Exercise-walk, limited by R leg, R knee     Social Determinants of Health     Financial Resource Strain: Not on file   Food Insecurity: Not on file   Transportation Needs: Not on file   Physical Activity: Not on file   Stress: Not on file   Social Connections: Not on file   Intimate Partner Violence: Not on file   Housing Stability: Not on file          Medications  Allergies   Current Outpatient Medications   Medication Sig Dispense Refill     amoxicillin (AMOXIL) 500 MG capsule TAKE 4 CAPSULES BY MOUTH 1 HOUR BEFORE DENTAL APPOINTMENT       apixaban ANTICOAGULANT (ELIQUIS) 2.5 MG tablet Take 2.5 mg by mouth 2 times daily Last Friday 2/10/23 up until the procedure this Friday 2/17/23       clopidogrel (PLAVIX) 75 MG tablet Take 1 tablet (75 mg) by mouth daily 90 tablet 1     hydrochlorothiazide (HYDRODIURIL) 25 MG tablet TAKE 1 TABLET BY MOUTH ONCE DAILY IN THE MORNING FOR HIGH BLOOD PRESSURE 90 tablet 0     levothyroxine (SYNTHROID/LEVOTHROID) 100 MCG tablet Take 1 tablet (100 mcg) by mouth daily 90 tablet 0     lisinopril (ZESTRIL) 40 MG tablet TAKE 1 TABLET BY MOUTH DAILY FOR HIGH BLOOD PRESSURE 90 tablet 2     omeprazole (PRILOSEC) 20 MG DR capsule TAKE 1 CAPSULE(20 MG) BY MOUTH DAILY BEFORE BREAKFAST Strength: 20 mg 90 capsule 0     pyridostigmine (MESTINON) 60 MG tablet 0.5 tab po BID 90 tablet 3     sotalol (BETAPACE) 80 MG tablet TAKE 1 TABLET BY MOUTH TWICE DAILY 180 tablet 3    Allergies   Allergen Reactions     Sulfa (Sulfonamide Antibiotics) [Sulfa Drugs] Rash     Rash - turned purple  , Around age 32         Lab Results    Chemistry/lipid CBC Cardiac Enzymes/BNP/TSH/INR   Recent Labs   Lab Test  11/22/21  0747   CHOL 157   HDL 51   LDL 93   TRIG 66     Recent Labs   Lab Test 11/22/21  0747 11/20/20  0915 09/10/18  0852   LDL 93 82 82     Recent Labs   Lab Test 02/04/22  1214      POTASSIUM 4.7   CHLORIDE 103   CO2 29   GLC 89   BUN 20   CR 1.05   GFRESTIMATED 79   YFN 9.4     Recent Labs   Lab Test 02/04/22  1214 11/22/21  0747 12/21/20  2232   CR 1.05 0.97 1.16     No results for input(s): A1C in the last 88852 hours. Recent Labs   Lab Test 02/04/22  1214   WBC 7.3   HGB 15.7   HCT 48.6   MCV 94        Recent Labs   Lab Test 02/04/22  1214 12/21/20  2232 11/06/19  1611   HGB 15.7 14.8 15.5    Recent Labs   Lab Test 02/04/22  1214   TROPONINI <0.01     Recent Labs   Lab Test 02/04/22  1214   BNP 56     Recent Labs   Lab Test 02/04/22  1214   TSH 0.26*     Recent Labs   Lab Test 12/21/20  2232   INR 1.12*        30 minutes spent on the date of encounter doing education, chart prep/review, review of outside records, review of test results, patient visit and documentation    This note has been dictated using voice recognition software. Any grammatical or context distortions are unintentional and inherent to the software.          Thank you for allowing me to participate in the care of your patient.      Sincerely,     Scarlett Troncoso PA-C     [facility]  cc:   No referring provider defined for this encounter.

## 2023-02-16 NOTE — PATIENT INSTRUCTIONS
Brett Hopkins,    It was a pleasure to see you today in the clinic regarding your recent Watchman implant.     My recommendations after this visit include:     - nothing to eat or drink after midnight  - take all your medications tomorrow morning with a sip of water prior to coming to the hospital    - you will stay on your Eliquis/Plavix for 4 weeks after cardioversion.  When you finish 4 weeks of Eliquis, you will continue the Plavix and resume the aspirin.  You will continue those medications then until July    You should followup with Glenna on March 15      If you have questions or concerns, please call using the numbers below:                Anila Maradiaga RN  [phone]    Laura Terry RN  [phone]

## 2023-02-16 NOTE — H&P (VIEW-ONLY)
HEART CARE ENCOUNTER NOTE       [facility]  [phone]      Assessment/Recommendations   1.  Persistent atrial fibrillation - he underwent watchman implant on 1/12/23 with a 27 mm watchman FLX device.  He went back into atrial fibrillation on January 17 and this has been associated with fatigue and episodic shortness of breath.  He is scheduled for cardioversion tomorrow.      He is currently taking Plavix 75 mg daily and Eliquis 5 mg twice daily.  He resumed his Eliquis on February 10.  He understands that after cardioversion he will be on Eliquis/Plavix regimen for 4 more weeks.  He will then stop the Eliquis, continue the Plavix and resume the aspirin.  He will continue on DAPT until July.  He will have a DIONTE in April to check the device (date and time TBD - our  will be calling him).  We will do a 6-month follow-up phone call with him and then see him again in clinic at 1 year post implant.  Ok to see either me or Glenna Cardenas NP     MODIFIED SCOTT SCALE   Timepoint: 4-6 wk Post-LAAC    Previous score: 0    Score Description   0 No symptoms at all   1 No significant disability despite symptoms; able to carry out all usual duties and activities   2 Slight disability; unable to carry out all previous activities, but able to look after own affairs without assistance   3 Moderate disability; requiring some help, but able to walk without assistance   4 Moderately severe disability; unable to walk without assistance and unable to attend to own bodily needs without assistance   5 Severe disability; bedridden, incontinent and requiring constant nursing care and attention   6 Dead    Total score (0 - 6):  0    Change in score if s/p LAAC? No    2.  Hypertension - blood pressure today is at goal.  Patient should continue taking hydrochlorothiazide 25 mg daily and lisinopril 40 mg daily    3.  Vascular disease -with minimal/mild coronary calcification on CT in December 2021 and with a  sending aortic aneurysm being followed yearly by MR or CT    4. Hypothyroidism - stable on current dose of levothyroxine.  Patient had recent abnormal thyroid ultrasound and has been referred for FNA of the nodules.  He has waiting to hear from scheduling.        History of Present Illness/Subjective    Brett Hopkins is a 67 year old male who comes in today for his 6-week follow up visit after watchman implant.     Brett Hopkins has a past history of persistent atrial fibrillation and had ablation in 2013.  He had recurrence of his atrial fibrillation in 2019 and has now been on sotalol.  He has gait instability, hypertension, ascending aortic aneurysm and falls in the past.  He was on Eliquis for stroke prophylaxis, but has OA and poor gait and high risk for falls.     He therefore underwent watchman implant on January 12 using a 27 mm device.  Patient was taken off Eliquis and started on DAPT with aspirin and Plavix.  Unfortunately patient went back into atrial fibrillation shortly after implant.  He was mostly symptomatic, but has now been scheduled for DCCV tomorrow.  Patient resumed his Eliquis last Friday.  Since going back into atrial fibrillation he has been tired with episodic shortness of breath.  24 hours after he went back into atrial fibrillation, he and his wife left for vacation to [place] and he did have some shortness of breath when trying to walk around the city.  He denies any dizziness/lightheadedness.     After cardioversion, he will need to remain on Eliquis for 4 weeks and then switch back to DAPT for the remainder of 6 months.    In addition to the above, Champ denies chest discomfort, paroxysmal nocturnal dyspnea, orthopnea, pre-syncope, or syncope, weight loss, changes in appetite, nausea or vomiting.     Medical, surgical, family, social history, and medications were reviewed and updated as necessary.    Procedural DIONTE results (from 1/12/23):  Interpretation Summary     Left  "ventricular size, wall motion and function are normal. The ejection  fraction is 60-65%.  Normal right ventricle size and systolic function.  No thrombus is detected in the left atrial appendage.  Watchman device noted in left atrial appendage. The device is well seated with  no leakage by color flow Doppler imaging.  There is no pericardial effusion.     Physical Examination Review of Systems   Vitals: /81 (BP Location: Left arm, Patient Position: Sitting, Cuff Size: Adult Large)   Pulse 80   Resp 16   Ht 1.803 m (5' 11\")   Wt 117 kg (258 lb)   BMI 35.98 kg/m    BMI= Body mass index is 35.98 kg/m .  Wt Readings from Last 3 Encounters:   02/16/23 117 kg (258 lb)   02/08/23 117.5 kg (259 lb)   01/13/23 117.5 kg (259 lb)       General Appearance:   Alert, cooperative and in no acute distress   ENT/Mouth: membranes moist, no oral lesions or bleeding gums.      EYES:  no scleral icterus, normal conjunctivae   Neck: Thyroid not visualized   Chest/Lungs:   lungs are clear to auscultation, no rales or wheezing   Cardiovascular:   Irregularly irregular. Normal first and second heart sounds with no murmurs, rubs or gallops; the carotid, radial and posterior tibial pulses are intact, no edema bilaterally    Abdomen:  Soft and nontender. Bowel sounds are present in all quadrants   Extremities: no cyanosis or clubbing.  Mild edema bilateral LE   Skin: no xanthelasma, warm.    Neurologic: normal gait, normal  bilateral, no tremors   Psychiatric: Normal mood and affect       Please refer above for cardiac ROS details.      Medical History  Surgical History Family History Social History   Past Medical History:   Diagnosis Date     Status post ablation of atrial fibrillation 07/28/2015    PVI August 19, 2013 (cryo-PVI only)     Past Surgical History:   Procedure Laterality Date     ARTHROPLASTY REVISION HIP Right 05/01/2019     ARTHROPLASTY REVISION KNEE Right 2019     CARDIOVERSION  07/01/2019    7/10/2019     " CARDIOVERSION  09/12/2019     CATARACT EXTRACTION Bilateral 06/2021     CV LEFT ATRIAL APPENDAGE CLOSURE N/A 1/12/2023    Procedure: Left Atrial Appendage Closure;  Surgeon: Mitesh Graff MD;  Location: [facility] CV     DENTAL SURGERY      Oral Surgery Tooth Extraction;  Implant     [place] ABLATE HEART DYSRHYTHM FOCUS  08/19/2013    Description: Catheter Ablation Atrial Fibrillation;  Recorded: 11/16/2013;  Comments: PVI Aug 19, 2013 (Cryo to all 4 PV's)     TOOTH EXTRACTION      implant     [facility] TOTAL HIP ARTHROPLASTY Right 05/08/2019    Procedure: RIGHT TOTAL HIP ARTHROPLASTY DIRECT ANTERIOR APPROACH;  Surgeon: Mario Woodruff MD;  Location: [facility];  Service: Orthopedics     [facility] TOTAL KNEE ARTHROPLASTY Right 06/21/2017    Procedure: 2)  RIGHT TOTAL KNEE ARTHROPLASTY;  Surgeon: Mario VICKERS MD;  Location: [facility] OR;  Service: Orthopedics     Family History   Problem Relation Age of Onset     Atrial fibrillation Mother      Dementia Mother         dx 80s     Diabetes Type 2  Mother         dx 60s/70s     Atrial fibrillation Father      Rheumatoid Arthritis Father      Atrial fibrillation Sister      Cerebrovascular Disease Sister      Parkinsonism Sister      Parkinsonism Sister      Atrial fibrillation Brother      Rheumatoid Arthritis Brother      Cerebrovascular Disease Brother      CABG Brother      Atrial fibrillation Brother      Diabetes Type 2  Brother         Dx 40's    Social History     Socioeconomic History     Marital status:      Spouse name: Not on file     Number of children: 2     Years of education: Not on file     Highest education level: Not on file   Occupational History     Occupation: CPA   Tobacco Use     Smoking status: Never     Smokeless tobacco: Never   Substance and Sexual Activity     Alcohol use: Yes     Alcohol/week: 5.0 standard drinks     Types: 5 Cans of beer per week     Comment: minimal     Drug use: No     Sexual activity: Yes      Partners: Female   Other Topics Concern     Not on file   Social History Narrative    Diet-regular            Exercise-walk, limited by R leg, R knee     Social Determinants of Health     Financial Resource Strain: Not on file   Food Insecurity: Not on file   Transportation Needs: Not on file   Physical Activity: Not on file   Stress: Not on file   Social Connections: Not on file   Intimate Partner Violence: Not on file   Housing Stability: Not on file          Medications  Allergies   Current Outpatient Medications   Medication Sig Dispense Refill     amoxicillin (AMOXIL) 500 MG capsule TAKE 4 CAPSULES BY MOUTH 1 HOUR BEFORE DENTAL APPOINTMENT       apixaban ANTICOAGULANT (ELIQUIS) 2.5 MG tablet Take 2.5 mg by mouth 2 times daily Last Friday 2/10/23 up until the procedure this Friday 2/17/23       clopidogrel (PLAVIX) 75 MG tablet Take 1 tablet (75 mg) by mouth daily 90 tablet 1     hydrochlorothiazide (HYDRODIURIL) 25 MG tablet TAKE 1 TABLET BY MOUTH ONCE DAILY IN THE MORNING FOR HIGH BLOOD PRESSURE 90 tablet 0     levothyroxine (SYNTHROID/LEVOTHROID) 100 MCG tablet Take 1 tablet (100 mcg) by mouth daily 90 tablet 0     lisinopril (ZESTRIL) 40 MG tablet TAKE 1 TABLET BY MOUTH DAILY FOR HIGH BLOOD PRESSURE 90 tablet 2     omeprazole (PRILOSEC) 20 MG DR capsule TAKE 1 CAPSULE(20 MG) BY MOUTH DAILY BEFORE BREAKFAST Strength: 20 mg 90 capsule 0     pyridostigmine (MESTINON) 60 MG tablet 0.5 tab po BID 90 tablet 3     sotalol (BETAPACE) 80 MG tablet TAKE 1 TABLET BY MOUTH TWICE DAILY 180 tablet 3    Allergies   Allergen Reactions     Sulfa (Sulfonamide Antibiotics) [Sulfa Drugs] Rash     Rash - turned purple  , Around age 32         Lab Results    Chemistry/lipid CBC Cardiac Enzymes/BNP/TSH/INR   Recent Labs   Lab Test 11/22/21  0747   CHOL 157   HDL 51   LDL 93   TRIG 66     Recent Labs   Lab Test 11/22/21  0747 11/20/20  0915 09/10/18  0852   LDL 93 82 82     Recent Labs   Lab Test 02/04/22  1214      POTASSIUM  4.7   CHLORIDE 103   CO2 29   GLC 89   BUN 20   CR 1.05   GFRESTIMATED 79   YFN 9.4     Recent Labs   Lab Test 02/04/22  1214 11/22/21  0747 12/21/20 2232   CR 1.05 0.97 1.16     No results for input(s): A1C in the last 24185 hours. Recent Labs   Lab Test 02/04/22  1214   WBC 7.3   HGB 15.7   HCT 48.6   MCV 94        Recent Labs   Lab Test 02/04/22  1214 12/21/20  2232 11/06/19  1611   HGB 15.7 14.8 15.5    Recent Labs   Lab Test 02/04/22  1214   TROPONINI <0.01     Recent Labs   Lab Test 02/04/22  1214   BNP 56     Recent Labs   Lab Test 02/04/22  1214   TSH 0.26*     Recent Labs   Lab Test 12/21/20 2232   INR 1.12*        30 minutes spent on the date of encounter doing education, chart prep/review, review of outside records, review of test results, patient visit and documentation    This note has been dictated using voice recognition software. Any grammatical or context distortions are unintentional and inherent to the software.

## 2023-02-16 NOTE — PROGRESS NOTES
HEART CARE ENCOUNTER NOTE       [facility]  [phone]      Assessment/Recommendations   1.  Persistent atrial fibrillation - he underwent watchman implant on 1/12/23 with a 27 mm watchman FLX device.  He went back into atrial fibrillation on January 17 and this has been associated with fatigue and episodic shortness of breath.  He is scheduled for cardioversion tomorrow.      He is currently taking Plavix 75 mg daily and Eliquis 5 mg twice daily.  He resumed his Eliquis on February 10.  He understands that after cardioversion he will be on Eliquis/Plavix regimen for 4 more weeks.  He will then stop the Eliquis, continue the Plavix and resume the aspirin.  He will continue on DAPT until July.  He will have a DIONTE in April to check the device (date and time TBD - our  will be calling him).  We will do a 6-month follow-up phone call with him and then see him again in clinic at 1 year post implant.  Ok to see either me or Glenna Cardenas NP     MODIFIED SCOTT SCALE   Timepoint: 4-6 wk Post-LAAC    Previous score: 0    Score Description   0 No symptoms at all   1 No significant disability despite symptoms; able to carry out all usual duties and activities   2 Slight disability; unable to carry out all previous activities, but able to look after own affairs without assistance   3 Moderate disability; requiring some help, but able to walk without assistance   4 Moderately severe disability; unable to walk without assistance and unable to attend to own bodily needs without assistance   5 Severe disability; bedridden, incontinent and requiring constant nursing care and attention   6 Dead    Total score (0 - 6):  0    Change in score if s/p LAAC? No    2.  Hypertension - blood pressure today is at goal.  Patient should continue taking hydrochlorothiazide 25 mg daily and lisinopril 40 mg daily    3.  Vascular disease -with minimal/mild coronary calcification on CT in December 2021 and with a  sending aortic aneurysm being followed yearly by MR or CT    4. Hypothyroidism - stable on current dose of levothyroxine.  Patient had recent abnormal thyroid ultrasound and has been referred for FNA of the nodules.  He has waiting to hear from scheduling.        History of Present Illness/Subjective    Brett Hopkins is a 67 year old male who comes in today for his 6-week follow up visit after watchman implant.     Brett Hopkins has a past history of persistent atrial fibrillation and had ablation in 2013.  He had recurrence of his atrial fibrillation in 2019 and has now been on sotalol.  He has gait instability, hypertension, ascending aortic aneurysm and falls in the past.  He was on Eliquis for stroke prophylaxis, but has OA and poor gait and high risk for falls.     He therefore underwent watchman implant on January 12 using a 27 mm device.  Patient was taken off Eliquis and started on DAPT with aspirin and Plavix.  Unfortunately patient went back into atrial fibrillation shortly after implant.  He was mostly symptomatic, but has now been scheduled for DCCV tomorrow.  Patient resumed his Eliquis last Friday.  Since going back into atrial fibrillation he has been tired with episodic shortness of breath.  24 hours after he went back into atrial fibrillation, he and his wife left for vacation to [place] and he did have some shortness of breath when trying to walk around the city.  He denies any dizziness/lightheadedness.     After cardioversion, he will need to remain on Eliquis for 4 weeks and then switch back to DAPT for the remainder of 6 months.    In addition to the above, Champ denies chest discomfort, paroxysmal nocturnal dyspnea, orthopnea, pre-syncope, or syncope, weight loss, changes in appetite, nausea or vomiting.     Medical, surgical, family, social history, and medications were reviewed and updated as necessary.    Procedural DIONTE results (from 1/12/23):  Interpretation Summary     Left  "ventricular size, wall motion and function are normal. The ejection  fraction is 60-65%.  Normal right ventricle size and systolic function.  No thrombus is detected in the left atrial appendage.  Watchman device noted in left atrial appendage. The device is well seated with  no leakage by color flow Doppler imaging.  There is no pericardial effusion.     Physical Examination Review of Systems   Vitals: /81 (BP Location: Left arm, Patient Position: Sitting, Cuff Size: Adult Large)   Pulse 80   Resp 16   Ht 1.803 m (5' 11\")   Wt 117 kg (258 lb)   BMI 35.98 kg/m    BMI= Body mass index is 35.98 kg/m .  Wt Readings from Last 3 Encounters:   02/16/23 117 kg (258 lb)   02/08/23 117.5 kg (259 lb)   01/13/23 117.5 kg (259 lb)       General Appearance:   Alert, cooperative and in no acute distress   ENT/Mouth: membranes moist, no oral lesions or bleeding gums.      EYES:  no scleral icterus, normal conjunctivae   Neck: Thyroid not visualized   Chest/Lungs:   lungs are clear to auscultation, no rales or wheezing   Cardiovascular:   Irregularly irregular. Normal first and second heart sounds with no murmurs, rubs or gallops; the carotid, radial and posterior tibial pulses are intact, no edema bilaterally    Abdomen:  Soft and nontender. Bowel sounds are present in all quadrants   Extremities: no cyanosis or clubbing.  Mild edema bilateral LE   Skin: no xanthelasma, warm.    Neurologic: normal gait, normal  bilateral, no tremors   Psychiatric: Normal mood and affect       Please refer above for cardiac ROS details.      Medical History  Surgical History Family History Social History   Past Medical History:   Diagnosis Date     Status post ablation of atrial fibrillation 07/28/2015    PVI August 19, 2013 (cryo-PVI only)     Past Surgical History:   Procedure Laterality Date     ARTHROPLASTY REVISION HIP Right 05/01/2019     ARTHROPLASTY REVISION KNEE Right 2019     CARDIOVERSION  07/01/2019    7/10/2019     " CARDIOVERSION  09/12/2019     CATARACT EXTRACTION Bilateral 06/2021     CV LEFT ATRIAL APPENDAGE CLOSURE N/A 1/12/2023    Procedure: Left Atrial Appendage Closure;  Surgeon: Mitesh Graff MD;  Location: [facility] CV     DENTAL SURGERY      Oral Surgery Tooth Extraction;  Implant     DC ABLATE HEART DYSRHYTHM FOCUS  08/19/2013    Description: Catheter Ablation Atrial Fibrillation;  Recorded: 11/16/2013;  Comments: PVI Aug 19, 2013 (Cryo to all 4 PV's)     TOOTH EXTRACTION      implant     [facility] TOTAL HIP ARTHROPLASTY Right 05/08/2019    Procedure: RIGHT TOTAL HIP ARTHROPLASTY DIRECT ANTERIOR APPROACH;  Surgeon: Mario Woodruff MD;  Location: [facility];  Service: Orthopedics     [facility] TOTAL KNEE ARTHROPLASTY Right 06/21/2017    Procedure: 2)  RIGHT TOTAL KNEE ARTHROPLASTY;  Surgeon: Mario VICKERS MD;  Location: [facility] OR;  Service: Orthopedics     Family History   Problem Relation Age of Onset     Atrial fibrillation Mother      Dementia Mother         dx 80s     Diabetes Type 2  Mother         dx 60s/70s     Atrial fibrillation Father      Rheumatoid Arthritis Father      Atrial fibrillation Sister      Cerebrovascular Disease Sister      Parkinsonism Sister      Parkinsonism Sister      Atrial fibrillation Brother      Rheumatoid Arthritis Brother      Cerebrovascular Disease Brother      CABG Brother      Atrial fibrillation Brother      Diabetes Type 2  Brother         Dx 40's    Social History     Socioeconomic History     Marital status:      Spouse name: Not on file     Number of children: 2     Years of education: Not on file     Highest education level: Not on file   Occupational History     Occupation: CPA   Tobacco Use     Smoking status: Never     Smokeless tobacco: Never   Substance and Sexual Activity     Alcohol use: Yes     Alcohol/week: 5.0 standard drinks     Types: 5 Cans of beer per week     Comment: minimal     Drug use: No     Sexual activity: Yes      Partners: Female   Other Topics Concern     Not on file   Social History Narrative    Diet-regular            Exercise-walk, limited by R leg, R knee     Social Determinants of Health     Financial Resource Strain: Not on file   Food Insecurity: Not on file   Transportation Needs: Not on file   Physical Activity: Not on file   Stress: Not on file   Social Connections: Not on file   Intimate Partner Violence: Not on file   Housing Stability: Not on file          Medications  Allergies   Current Outpatient Medications   Medication Sig Dispense Refill     amoxicillin (AMOXIL) 500 MG capsule TAKE 4 CAPSULES BY MOUTH 1 HOUR BEFORE DENTAL APPOINTMENT       apixaban ANTICOAGULANT (ELIQUIS) 2.5 MG tablet Take 2.5 mg by mouth 2 times daily Last Friday 2/10/23 up until the procedure this Friday 2/17/23       clopidogrel (PLAVIX) 75 MG tablet Take 1 tablet (75 mg) by mouth daily 90 tablet 1     hydrochlorothiazide (HYDRODIURIL) 25 MG tablet TAKE 1 TABLET BY MOUTH ONCE DAILY IN THE MORNING FOR HIGH BLOOD PRESSURE 90 tablet 0     levothyroxine (SYNTHROID/LEVOTHROID) 100 MCG tablet Take 1 tablet (100 mcg) by mouth daily 90 tablet 0     lisinopril (ZESTRIL) 40 MG tablet TAKE 1 TABLET BY MOUTH DAILY FOR HIGH BLOOD PRESSURE 90 tablet 2     omeprazole (PRILOSEC) 20 MG DR capsule TAKE 1 CAPSULE(20 MG) BY MOUTH DAILY BEFORE BREAKFAST Strength: 20 mg 90 capsule 0     pyridostigmine (MESTINON) 60 MG tablet 0.5 tab po BID 90 tablet 3     sotalol (BETAPACE) 80 MG tablet TAKE 1 TABLET BY MOUTH TWICE DAILY 180 tablet 3    Allergies   Allergen Reactions     Sulfa (Sulfonamide Antibiotics) [Sulfa Drugs] Rash     Rash - turned purple  , Around age 32         Lab Results    Chemistry/lipid CBC Cardiac Enzymes/BNP/TSH/INR   Recent Labs   Lab Test 11/22/21  0747   CHOL 157   HDL 51   LDL 93   TRIG 66     Recent Labs   Lab Test 11/22/21  0747 11/20/20  0915 09/10/18  0852   LDL 93 82 82     Recent Labs   Lab Test 02/04/22  1214      POTASSIUM  4.7   CHLORIDE 103   CO2 29   GLC 89   BUN 20   CR 1.05   GFRESTIMATED 79   YFN 9.4     Recent Labs   Lab Test 02/04/22  1214 11/22/21  0747 12/21/20 2232   CR 1.05 0.97 1.16     No results for input(s): A1C in the last 55788 hours. Recent Labs   Lab Test 02/04/22  1214   WBC 7.3   HGB 15.7   HCT 48.6   MCV 94        Recent Labs   Lab Test 02/04/22  1214 12/21/20  2232 11/06/19  1611   HGB 15.7 14.8 15.5    Recent Labs   Lab Test 02/04/22  1214   TROPONINI <0.01     Recent Labs   Lab Test 02/04/22  1214   BNP 56     Recent Labs   Lab Test 02/04/22  1214   TSH 0.26*     Recent Labs   Lab Test 12/21/20 2232   INR 1.12*        30 minutes spent on the date of encounter doing education, chart prep/review, review of outside records, review of test results, patient visit and documentation    This note has been dictated using voice recognition software. Any grammatical or context distortions are unintentional and inherent to the software.

## 2023-02-17 ENCOUNTER — HOSPITAL ENCOUNTER (OUTPATIENT)
Dept: CARDIOLOGY | Facility: HOSPITAL | Age: 68
Discharge: HOME | End: 2023-02-17
Attending: NURSE PRACTITIONER | Admitting: INTERNAL MEDICINE
Payer: COMMERCIAL

## 2023-02-17 ENCOUNTER — ANESTHESIA EVENT (OUTPATIENT)
Dept: CARDIOLOGY | Facility: HOSPITAL | Age: 68
End: 2023-02-17
Payer: COMMERCIAL

## 2023-02-17 ENCOUNTER — ANESTHESIA (OUTPATIENT)
Dept: CARDIOLOGY | Facility: HOSPITAL | Age: 68
End: 2023-02-17
Payer: COMMERCIAL

## 2023-02-17 VITALS
RESPIRATION RATE: 18 BRPM | DIASTOLIC BLOOD PRESSURE: 83 MMHG | HEIGHT: 71 IN | SYSTOLIC BLOOD PRESSURE: 128 MMHG | BODY MASS INDEX: 36.12 KG/M2 | HEART RATE: 70 BPM | OXYGEN SATURATION: 98 % | WEIGHT: 258 LBS | TEMPERATURE: 97.2 F

## 2023-02-17 DIAGNOSIS — I48.19 PERSISTENT ATRIAL FIBRILLATION (H): ICD-10-CM

## 2023-02-17 LAB
ATRIAL RATE - MUSE: 72 BPM
DIASTOLIC BLOOD PRESSURE - MUSE: NORMAL MMHG
INTERPRETATION ECG - MUSE: NORMAL
P AXIS - MUSE: 52 DEGREES
PR INTERVAL - MUSE: 166 MS
QRS DURATION - MUSE: 100 MS
QT - MUSE: 442 MS
QTC - MUSE: 483 MS
R AXIS - MUSE: -20 DEGREES
SYSTOLIC BLOOD PRESSURE - MUSE: NORMAL MMHG
T AXIS - MUSE: 20 DEGREES
VENTRICULAR RATE- MUSE: 72 BPM

## 2023-02-17 PROCEDURE — 250N000009 HC RX 250: Performed by: NURSE ANESTHETIST, CERTIFIED REGISTERED

## 2023-02-17 PROCEDURE — 92960 CARDIOVERSION ELECTRIC EXT: CPT | Performed by: NURSE PRACTITIONER

## 2023-02-17 PROCEDURE — 999N000054 HC STATISTIC EKG NON-CHARGEABLE

## 2023-02-17 PROCEDURE — 93010 ELECTROCARDIOGRAM REPORT: CPT | Performed by: INTERNAL MEDICINE

## 2023-02-17 PROCEDURE — 370N000003 HC ANESTHESIA WARD SERVICE

## 2023-02-17 PROCEDURE — 93005 ELECTROCARDIOGRAM TRACING: CPT

## 2023-02-17 PROCEDURE — 92960 CARDIOVERSION ELECTRIC EXT: CPT

## 2023-02-17 RX ORDER — LIDOCAINE 40 MG/G
CREAM TOPICAL
Status: DISCONTINUED | OUTPATIENT
Start: 2023-02-17 | End: 2023-02-17 | Stop reason: HOSPADM

## 2023-02-17 RX ADMIN — METHOHEXITAL SODIUM 80 MG: 500 INJECTION, POWDER, LYOPHILIZED, FOR SOLUTION INTRAMUSCULAR; INTRAVENOUS; RECTAL at 09:56

## 2023-02-17 ASSESSMENT — ACTIVITIES OF DAILY LIVING (ADL)
ADLS_ACUITY_SCORE: 35

## 2023-02-17 ASSESSMENT — ENCOUNTER SYMPTOMS: DYSRHYTHMIAS: 1

## 2023-02-17 NOTE — ANESTHESIA POSTPROCEDURE EVALUATION
Patient: Brett Hopkins    Procedure: * No procedures listed *  Cardioversion External    Anesthesia Type:  General    Note:  Disposition: Outpatient   Postop Pain Control: Uneventful            Sign Out: Well controlled pain   PONV: No   Neuro/Psych: Uneventful            Sign Out: Acceptable/Baseline neuro status   Airway/Respiratory: Uneventful            Sign Out: Acceptable/Baseline resp. status   CV/Hemodynamics: Uneventful            Sign Out: Acceptable CV status; No obvious hypovolemia; No obvious fluid overload   Other NRE: NONE   DID A NON-ROUTINE EVENT OCCUR? No           Last vitals:  Vitals:    02/17/23 0945 02/17/23 1000 02/17/23 1001   BP:  127/83 127/83   Pulse: 82 63 60   Resp: 22 23 17   Temp:   36.2  C (97.2  F)   SpO2: 98% 99% 98%       Electronically Signed By: Nette Renee MD  February 17, 2023  10:32 AM

## 2023-02-17 NOTE — ANESTHESIA PREPROCEDURE EVALUATION
Anesthesia Pre-Procedure Evaluation    Patient: Brett Hopkins   MRN: [id number] : 1955        Procedure : * No procedures listed *  Cardioversion External       Past Medical History:   Diagnosis Date     Status post ablation of atrial fibrillation 2015    PVI 2013 (cryo-PVI only)      Past Surgical History:   Procedure Laterality Date     ARTHROPLASTY REVISION HIP Right 2019     ARTHROPLASTY REVISION KNEE Right 2019     CARDIOVERSION  2019    7/10/2019     CARDIOVERSION  2019     CATARACT EXTRACTION Bilateral 2021     CV LEFT ATRIAL APPENDAGE CLOSURE N/A 2023    Procedure: Left Atrial Appendage Closure;  Surgeon: Mitesh Graff MD;  Location: [facility] CV     DENTAL SURGERY      Oral Surgery Tooth Extraction;  Implant     [facility] ABLATE HEART DYSRHYTHM FOCUS  2013    Description: Catheter Ablation Atrial Fibrillation;  Recorded: 2013;  Comments: PVI Aug 19, 2013 (Cryo to all 4 PV's)     TOOTH EXTRACTION      implant     [facility] TOTAL HIP ARTHROPLASTY Right 2019    Procedure: RIGHT TOTAL HIP ARTHROPLASTY DIRECT ANTERIOR APPROACH;  Surgeon: Mario Woodruff MD;  Location: [facility];  Service: Orthopedics     [facility] TOTAL KNEE ARTHROPLASTY Right 2017    Procedure: 2)  RIGHT TOTAL KNEE ARTHROPLASTY;  Surgeon: Mario VICKERS MD;  Location: [facility];  Service: Orthopedics      Allergies   Allergen Reactions     Sulfa (Sulfonamide Antibiotics) [Sulfa Drugs] Rash     Rash - turned purple  , Around age 32      Social History     Tobacco Use     Smoking status: Never     Smokeless tobacco: Never   Substance Use Topics     Alcohol use: Yes     Alcohol/week: 5.0 standard drinks     Types: 5 Cans of beer per week     Comment: minimal      Wt Readings from Last 1 Encounters:   23 117 kg (258 lb)        Anesthesia Evaluation   Pt has had prior anesthetic.     No history of anesthetic complications       ROS/MED  HX  ENT/Pulmonary:       Neurologic:       Cardiovascular:     (+) hypertension-----dysrhythmias,     METS/Exercise Tolerance:     Hematologic:       Musculoskeletal:       GI/Hepatic:     (+) GERD,     Renal/Genitourinary:       Endo:     (+) thyroid problem, Obesity,     Psychiatric/Substance Use:       Infectious Disease:       Malignancy:       Other:            Physical Exam    Airway        Mallampati: II    Neck ROM: full     Respiratory Devices and Support         Dental       (+) Minor Abnormalities - some fillings, tiny chips      Cardiovascular          Rhythm and rate: irregular     Pulmonary   pulmonary exam normal                OUTSIDE LABS:  CBC:   Lab Results   Component Value Date    WBC 6.6 01/09/2023    WBC 8.3 01/04/2023    HGB 13.9 01/12/2023    HGB 15.3 01/09/2023    HCT 47.1 01/09/2023    HCT 47.1 01/04/2023     01/09/2023     01/04/2023     BMP:   Lab Results   Component Value Date     01/09/2023     01/04/2023    POTASSIUM 4.9 01/09/2023    POTASSIUM 4.4 01/04/2023    CHLORIDE 102 01/09/2023    CHLORIDE 103 01/04/2023    CO2 29 01/09/2023    CO2 29 01/04/2023    BUN 19.8 01/09/2023    BUN 22.4 01/04/2023    CR 1.02 01/12/2023    CR 1.09 01/09/2023     (H) 01/09/2023     (H) 01/04/2023     COAGS:   Lab Results   Component Value Date    PTT 31 12/21/2020    INR 1.12 (H) 12/21/2020     POC: No results found for: BGM, HCG, HCGS  HEPATIC: No results found for: ALBUMIN, PROTTOTAL, ALT, AST, GGT, ALKPHOS, BILITOTAL, BILIDIRECT, AUDRA  OTHER:   Lab Results   Component Value Date    YFN 9.4 01/09/2023    MAG 2.1 09/10/2018    TSH 3.14 01/04/2023    CRP 0.5 09/10/2019    SED 3 09/10/2019       Anesthesia Plan    ASA Status:  2      Anesthesia Type: General.     - Airway: Mask Only              Consents    Anesthesia Plan(s) and associated risks, benefits, and realistic alternatives discussed. Questions answered and patient/representative(s) expressed  understanding.     - Discussed: Risks, Benefits and Alternatives for the PROCEDURE were discussed     - Discussed with:  Patient      - Extended Intubation/Ventilatory Support Discussed: No.      - Patient is DNR/DNI Status: No    Use of blood products discussed: No .     Postoperative Care            Comments:                Nette Renee MD

## 2023-02-17 NOTE — ANESTHESIA CARE TRANSFER NOTE
Patient: Brett Hpokins    Procedure: * No procedures listed *  Cardioversion External    Diagnosis: * No pre-op diagnosis entered *  Diagnosis Additional Information: No value filed.    Anesthesia Type:   No value filed.     Note:    Oropharynx: oropharynx clear of all foreign objects  Level of Consciousness: drowsy  Oxygen Supplementation: nasal cannula  Level of Supplemental Oxygen (L/min / FiO2): 4  Independent Airway: airway patency satisfactory and stable  Dentition: dentition unchanged  Vital Signs Stable: post-procedure vital signs reviewed and stable  Report to RN Given: handoff report given  Patient transferred to: Cardiac Special Care          Vitals:  Vitals Value Taken Time   /68 02/17/23 0958   Temp     Pulse 63 02/17/23 1000   Resp 23 02/17/23 1000   SpO2 99 % 02/17/23 1000   Vitals shown include unvalidated device data.    Electronically Signed By: CRISTIANO Flores CRNA  February 17, 2023  10:01 AM

## 2023-02-17 NOTE — PROCEDURES
[facility]    Procedure: Cardioversion    Date/Time: 2/17/2023 10:12 AM  Performed by: Glenna Cardenas APRN CNP  Authorized by: Glenna Cardenas APRN CNP       UNIVERSAL PROTOCOL   Site Marked: NA  Prior Images Obtained and Reviewed:  Yes  Required items: Required blood products, implants, devices and special equipment available    Patient identity confirmed:  Verbally with patient, arm band and provided demographic data  Patient was reevaluated immediately before administering moderate or deep sedation or anesthesia  Confirmation Checklist:  Patient's identity using two indicators, relevant allergies, procedure was appropriate and matched the consent or emergent situation and correct equipment/implants were available  Time out: Immediately prior to the procedure a time out was called    Universal Protocol: the Joint Commission Universal Protocol was followed       ANESTHESIA  Anesthesia was administered and monitored by anesthesiology.  See anesthesia documentation for details.    PROCEDURE DETAILS  Pre-procedure rhythm: atrial fibrillation  Patient position: patient was placed in a supine position  Chest area: chest area exposed  Electrodes: pads  Electrodes placed: anterior-posterior  Number of attempts: 1    Details of Attempts:  At 0959, after administration of IV Brevital by MDA and confirmation of adequate sedation, he received a single synchronous shock at 300 J with prompt restoration of sinus rhythm, initially sinus tachycardia which slowed to sinus rhythm in the 60s after a few seconds.  Post cardioversion EKG pending.  Post-procedure rhythm: normal sinus rhythm  Complications: no complications      PROCEDURE  Describe Procedure: History of persistent atrial fibrillation with last cardioversion on 9/12/2019.  He has been maintaining sinus rhythm on sotalol 80 mg twice daily.  He underwent left atrial appendage closure with a Watchman device on 1/12/2023.  He had recurrence of A-fib  on 1/17/2023 associated with fatigue and shortness of breath.  As this was his first prolonged A-fib in several years, no change in antiarrhythmic medication was recommended.  He was restarted on Eliquis 1 week prior to cardioversion with continuation of clopidogrel.  Successful cardioversion to sinus rhythm  Continue Eliquis 5 mg twice daily for 4 weeks after ablation, then resume aspirin 81 mg daily.  Continue clopidogrel 75 mg daily throughout.  Continue sotalol 80 mg twice daily.  Follow-up with Glenna Cardenas CNP on 3/15/2023  Discharge to home 1 hour after cardioversion if fully awake and stable  Patient Tolerance:  Patient tolerated the procedure well with no immediate complications  Length of time physician/provider present for 1:1 monitoring during sedation: 10

## 2023-02-17 NOTE — PROGRESS NOTES
Discharged to home following cardioversion. Pt states a good understanding of discharge instructions and will be following up with Glenna in March. No change in medication.

## 2023-02-20 DIAGNOSIS — K20.90 ESOPHAGITIS: ICD-10-CM

## 2023-02-20 NOTE — TELEPHONE ENCOUNTER
"Routing refill request to provider for review/approval because:  Patient is on clopidogrel    Last Written Prescription Date:  11/2/22  Last Fill Quantity: 90,  # refills: 0   Last office visit provider:  2/7/23     Requested Prescriptions   Pending Prescriptions Disp Refills     omeprazole (PRILOSEC) 20 MG DR capsule [Pharmacy Med Name: OMEPRAZOLE 20MG CAPSULES] 90 capsule 0     Sig: TAKE ONE CAPSULE BY MOUTH DAILY BEFORE BREAKFAST       PPI Protocol Failed - 2/20/2023 11:17 AM        Failed - Not on Clopidogrel (unless Pantoprazole ordered)        Passed - No diagnosis of osteoporosis on record        Passed - Recent (12 mo) or future (30 days) visit within the authorizing provider's specialty     Patient has had an office visit with the authorizing provider or a provider within the authorizing providers department within the previous 12 mos or has a future within next 30 days. See \"Patient Info\" tab in inbasket, or \"Choose Columns\" in Meds & Orders section of the refill encounter.              Passed - Medication is active on med list        Passed - Patient is age 18 or older             Zully Hackett RN 02/20/23 11:17 AM  "

## 2023-02-23 ENCOUNTER — HOSPITAL ENCOUNTER (OUTPATIENT)
Dept: ULTRASOUND IMAGING | Facility: CLINIC | Age: 68
Discharge: HOME | End: 2023-02-23
Attending: FAMILY MEDICINE | Admitting: FAMILY MEDICINE
Payer: COMMERCIAL

## 2023-02-23 DIAGNOSIS — E04.1 THYROID NODULE: ICD-10-CM

## 2023-02-23 PROCEDURE — 272N000710 US BIOPSY THYROID FINE NEEDLE ASPIRATION

## 2023-02-23 PROCEDURE — 88173 CYTOPATH EVAL FNA REPORT: CPT | Mod: 26 | Performed by: PATHOLOGY

## 2023-02-23 PROCEDURE — 88172 CYTP DX EVAL FNA 1ST EA SITE: CPT | Mod: 26 | Performed by: PATHOLOGY

## 2023-02-23 PROCEDURE — 88173 CYTOPATH EVAL FNA REPORT: CPT | Mod: TC | Performed by: FAMILY MEDICINE

## 2023-02-24 LAB
PATH REPORT.COMMENTS IMP SPEC: ABNORMAL
PATH REPORT.COMMENTS IMP SPEC: YES
PATH REPORT.FINAL DX SPEC: ABNORMAL
PATH REPORT.GROSS SPEC: ABNORMAL
PATH REPORT.RELEVANT HX SPEC: ABNORMAL

## 2023-02-27 DIAGNOSIS — I10 ESSENTIAL HYPERTENSION: ICD-10-CM

## 2023-02-27 DIAGNOSIS — E06.3 HYPOTHYROIDISM DUE TO HASHIMOTO'S THYROIDITIS: ICD-10-CM

## 2023-02-28 RX ORDER — HYDROCHLOROTHIAZIDE 25 MG/1
TABLET ORAL
Qty: 90 TABLET | Refills: 2 | Status: SHIPPED | OUTPATIENT
Start: 2023-02-28 | End: 2023-11-24

## 2023-02-28 RX ORDER — LEVOTHYROXINE SODIUM 100 UG/1
TABLET ORAL
Qty: 90 TABLET | Refills: 2 | Status: SHIPPED | OUTPATIENT
Start: 2023-02-28 | End: 2023-11-24

## 2023-02-28 NOTE — TELEPHONE ENCOUNTER
"Last Written Prescription Date:  11/2/22  Last Fill Quantity: 90,  # refills: 0   Last office visit provider:  1/4/23     Requested Prescriptions   Pending Prescriptions Disp Refills     levothyroxine (SYNTHROID/LEVOTHROID) 100 MCG tablet [Pharmacy Med Name: LEVOTHYROXINE 0.100MG (100MCG) TAB] 90 tablet 0     Sig: TAKE 1 TABLET(100 MCG) BY MOUTH DAILY       Thyroid Protocol Passed - 2/27/2023  7:19 AM        Passed - Patient is 12 years or older        Passed - Recent (12 mo) or future (30 days) visit within the authorizing provider's specialty     Patient has had an office visit with the authorizing provider or a provider within the authorizing providers department within the previous 12 mos or has a future within next 30 days. See \"Patient Info\" tab in inbasket, or \"Choose Columns\" in Meds & Orders section of the refill encounter.              Passed - Medication is active on med list        Passed - Normal TSH on file in past 12 months     Recent Labs   Lab Test 01/04/23  1047   TSH 3.14                 hydrochlorothiazide (HYDRODIURIL) 25 MG tablet [Pharmacy Med Name: HYDROCHLOROTHIAZIDE 25MGTABLETS] 90 tablet 0     Sig: TAKE 1 TABLET BY MOUTH EVERY DAY IN THE MORNING FOR HIGH BLOOD PRESSURE       Diuretics (Including Combos) Protocol Passed - 2/27/2023  7:19 AM        Passed - Blood pressure under 140/90 in past 12 months     BP Readings from Last 3 Encounters:   02/17/23 128/83   02/16/23 120/81   02/08/23 107/86                 Passed - Recent (12 mo) or future (30 days) visit within the authorizing provider's specialty     Patient has had an office visit with the authorizing provider or a provider within the authorizing providers department within the previous 12 mos or has a future within next 30 days. See \"Patient Info\" tab in inbasket, or \"Choose Columns\" in Meds & Orders section of the refill encounter.              Passed - Medication is active on med list        Passed - Patient is age 18 or older    "     Passed - Normal serum creatinine on file in past 12 months     Recent Labs   Lab Test 01/12/23  1401   CR 1.02              Passed - Normal serum potassium on file in past 12 months     Recent Labs   Lab Test 01/09/23  1446   POTASSIUM 4.9                    Passed - Normal serum sodium on file in past 12 months     Recent Labs   Lab Test 01/09/23  1446                      Gina Oliver RN 02/28/23 5:31 AM

## 2023-03-10 LAB — SCANNED LAB RESULT: NORMAL

## 2023-03-15 ENCOUNTER — OFFICE VISIT (OUTPATIENT)
Dept: CARDIOLOGY | Facility: CLINIC | Age: 68
End: 2023-03-15
Payer: COMMERCIAL

## 2023-03-15 VITALS
OXYGEN SATURATION: 97 % | SYSTOLIC BLOOD PRESSURE: 109 MMHG | HEART RATE: 61 BPM | DIASTOLIC BLOOD PRESSURE: 73 MMHG | WEIGHT: 258 LBS | RESPIRATION RATE: 16 BRPM | BODY MASS INDEX: 35.98 KG/M2

## 2023-03-15 DIAGNOSIS — I10 PRIMARY HYPERTENSION: ICD-10-CM

## 2023-03-15 DIAGNOSIS — Z95.818 PRESENCE OF WATCHMAN LEFT ATRIAL APPENDAGE CLOSURE DEVICE: ICD-10-CM

## 2023-03-15 DIAGNOSIS — I71.21 ANEURYSM OF ASCENDING AORTA WITHOUT RUPTURE (H): ICD-10-CM

## 2023-03-15 DIAGNOSIS — I48.19 PERSISTENT ATRIAL FIBRILLATION (H): Primary | ICD-10-CM

## 2023-03-15 PROCEDURE — 99215 OFFICE O/P EST HI 40 MIN: CPT | Performed by: NURSE PRACTITIONER

## 2023-03-15 RX ORDER — CLOPIDOGREL BISULFATE 75 MG/1
75 TABLET ORAL DAILY
Qty: 90 TABLET | Refills: 0 | Status: SHIPPED | OUTPATIENT
Start: 2023-03-15 | End: 2023-07-12

## 2023-03-15 NOTE — PATIENT INSTRUCTIONS
Brett Hopkins,    It was a pleasure to see you today at the [facility].     My recommendations after this visit include:    Continue current medications  Plavix to continue for 6 months after Watchman implant, continue aspirin 81 mg daily indefinitely.    Call if you have recurrent A fib    Follow up with Dr. Cotter in July 2023  Follow up with me in Dec/Jan    Glenna Cardenas, CNP  [facility], Electrophysiology  [phone]  EP nurses [phone]

## 2023-03-15 NOTE — LETTER
3/15/2023    Magdaleno Bennett MD  [address] [place] [address]    RE: Brett ESPINOZA Sandeep       Dear Colleague,     I had the pleasure of seeing Brett Hopkins in the [facility].    HEART CARE ELECTROPHYSIOLOGY NOTE      [facility]  [phone]      Assessment/Recommendations   Assessment/Plan:  1. Persistent Atrial Fibrillation: Symptomatic improvement following cardioversion, no symptomatology or evidence of AF recurrence.  We reviewed the natural progression of atrial fibrillation and treatment options with medications or repeat ablation.  This was his first episode of AF, though requiring cardioversion, since 2019.  Continue sotalol 80 mg twice daily.    He was reassured that atrial fibrillation is not life-threatening, but carries an increased risk for stroke.  He has a [id number] score of 3 for age 65-74, hypertension, and vascular disease.  HAS-BLED score of 2 for age and bleeding predisposition.  S/p left atrial appendage closure with the Watchman FLX device 1/12/2023.  He is 4 weeks post cardioversion, so discontinue Eliquis and resume aspirin 81 mg daily with clopidogrel 75 mg daily.  He will remain on DAPT for 6 months post Watchman implant, then clopidogrel will be discontinued.  He is scheduled for post implant DIONTE on 5/3/2023.    2.  Hypertension: Blood pressure at target.  Continue lisinopril, hydrochlorothiazide, and sotalol as above.    3.  Vascular disease: Minimal/mild coronary calcification seen on CT.  Denies anginal symptoms.  He also has an ascending aortic aneurysm being followed by yearly MRI or CT which has at this point been stable.    Follow up with Dr. Cotter in July (vascular disease and 6 month post-Watchman)  Follow up with me in December 2023/January 2024 for A-fib and 1 year post-Watchman     History of Present Illness/Subjective    HPI: Brett Hopkins is a 67 year old male who comes in today for EP follow-up of atrial  fibrillation.  He has a history of atrial fibrillation, hypertension, hypothyroidism, mild coronary artery calcification seen on CT, ascending aortic aneurysm, myasthenia gravis, gait instability with falls, GERD, osteoarthritis, and chronic right lower extremity lymphedema since his right total knee revision in November 2019.  He was seen in the ED in December 2020 for double vision and has been diagnosed with cataract as well as some sort of cranial nerve palsy for which he has new glasses with prisms.     He was diagnosed with paroxysmal atrial fibrillation in 2007.  He failed antiarrhythmic therapy with propafenone and flecainide.  He underwent pulmonary vein isolation ablation with Dr. Barfield on 8/19/2013 with cryo-to all 4 pulmonary veins.   He was noted to be back in atrial fibrillation at a preoperative exam in April 2019, but FitBit data suggests that recurrence was in March.  In hindsight, he noted worsening fatigue and occasional palpitations since that time that he had attributed to increased stress.   He underwent cardioversion on 7/10/2019, but with early recurrence of atrial fibrillation.  He failed antiarrhythmic therapy with flecainide.  He was started on sotalol and underwent cardioversion on 9/12/2019 after which he reported some symptomatic improvement.       He was initially on Eliquis for stroke prophylaxis, but with gait instability and recurrent falls.  He underwent left atrial appendage closure with a 27 mm Watchman FLX device on 1/12/2023.  Eliquis was discontinued and he was started on aspirin and clopidogrel.  However, he had recurrence of A-fib on 1/17/2023 associated with fatigue and dyspnea on exertion.  He was restarted on Eliquis and underwent cardioversion on 2/17/2023.     Champ states that he has been feeling better since cardioversion.  He has not had any recurrent A-fib.  Chronic lower extremity lymphedema is at baseline.  He denies exertional chest discomfort,  palpitations, abdominal fullness/bloating or worsened peripheral edema, shortness of breath, paroxysmal nocturnal dyspnea, orthopnea, lightheadedness, dizziness, pre-syncope, or syncope.       Cardiographics (EKGs personally reviewed):  EKG done 2/17/2023 shows sinus rhythm at 72 bpm,  ms, QT/QTc 442/483 ms  EKG done 12/21/2020 shows sinus rhythm at 60 bpm, QT/QTc interval measures 426/426 ms  EKG done 7/15/2019 shows atrial fibrillation with controlled ventricular response at 92 bpm  EKG, Done 4/23/2019 shows atrial fibrillation with rapid ventricular response at 190 bpm  EKG done 6/2/2017 shows sinus bradycardia 54 bpm, QT/QTc interval measures 410/388 ms     24-hour Holter monitor worn 7/24/2019 shows persistent atrial fibrillation with mildly elevated ventricular response with an average ventricular rate of 99 bpm and a range of 63 to 148 bpm.  No significant bradycardia or pauses.    Transesophageal echo done 1/12/2023:  Left ventricular size, wall motion and function are normal. The ejection  fraction is 60-65%.  Normal right ventricle size and systolic function.  No thrombus is detected in the left atrial appendage.  Watchman device noted in left atrial appendage. The device is well seated with  no leakage by color flow Doppler imaging.  There is no pericardial effusion.     CTA pulmonary vein done 10/21/2022:  1. The following measurements were made of the left atrial appendage at 35% cardiac phase at the level of the bifurcation of the left circumflex artery:  -Orifice diameter: 28 x 25 mm (average 26 mm)  -Orifice circumference: 83 mm  -Orifice area: 5.42 cm   -Useful depth: 17 mm  -Maximum depth: 35 mm  2. No thrombus in the left atrium or left atrial appendage.  3. Mild coronary atherosclerosis with no obvious obstructive lesions.  4. Borderline enlargement of the ascending aorta measuring 4.0 cm.    I have reviewed and updated the patient's Past Medical History, Social History, Family History and  Medication List.  Outside records personally reviewed.     Physical Examination  Review of Systems   Vitals: /73 (BP Location: Right arm, Patient Position: Sitting, Cuff Size: Adult Large)   Pulse 61   Resp 16   Wt 117 kg (258 lb)   SpO2 97%   BMI 35.98 kg/m    BMI= Body mass index is 35.98 kg/m .  Wt Readings from Last 3 Encounters:   03/15/23 117 kg (258 lb)   02/17/23 117 kg (258 lb)   02/16/23 117 kg (258 lb)       General Appearance:   Alert, well-appearing and in no acute distress.   HEENT: Atraumatic, normocephalic.  No scleral icterus, normal conjunctivae, EOMs intact, PERRL.  Wearing a mask.   Chest/Lungs:   Chest symmetric, spine straight.  Respirations unlabored.  Lungs are clear to auscultation.   Cardiovascular:   Regular rate and rhythm.  Normal first and second heart sounds with no murmurs, rubs, or gallops; radial and posterior tibial pulses are intact, right lower extremity lymphedema.   Abdomen:  Soft, nondistended, bowel sounds present.   Extremities: No cyanosis or clubbing.   Musculoskeletal: Moves all extremities.     Skin: Warm, dry, intact.    Neurologic: Mood and affect are appropriate.  Alert and oriented to person, place, time, and situation.     ROS: 10 point ROS neg other than the symptoms noted above in the HPI.         Medical History  Surgical History Family History Social History   Past Medical History:   Diagnosis Date     Aneurysm Of The Ascending Aorta     Mild dilatation (4.1 cm) by MRI in Aug 2013 CT December 2021, not clearly visualized [facility]      GERD (gastroesophageal reflux disease) 9/10/2018    History of a hiatal hernia Omeprazole daily Breakthrough symptoms by 2 PM daily      Hypertension 4/26/2019    Dx Apr 2019 Metoprolol, also for atrial fibrillation. Lisinopril, April 2019 hydrochlorothiazide Oct 2020      Hypothyroidism     Thyroid replacement     Lymphedema 6/1/2020    Right leg, after surgery Dr. Orlando, et al Compression stockings, pneumatic  pump-somewhat helpful Lasix, Nov 2020, but as long December 2020, neither were helpful. Massage     Myasthenia gravis (H) 1/4/2023    Dx 2022 Double vision     Persistent atrial fibrillation     Dx 2007 Symptomatic, SANDERS -NO longer symptomatic CV with first episode [id number] - VASc risk score = 1 (HTN) Failed Rhythmol > Flecainide   PVI August 19, 2013 April 2019 recurrent AF-Eliquis Cardioversion, September 2019 Eliquis      Status post ablation of atrial fibrillation 07/28/2015    PVI August 19, 2013 (cryo-PVI only)     Thyroid nodule 1/5/2022    Incidental diagnosis December 2021 2 nodules, 2.1 and 2.4 cm maximum dimension FNA results 2023, benign Recommend follow-up 1 3 and 5 years     Past Surgical History:   Procedure Laterality Date     ARTHROPLASTY REVISION HIP Right 05/01/2019     ARTHROPLASTY REVISION KNEE Right 2019     CARDIOVERSION  07/01/2019    7/10/2019     CARDIOVERSION  09/12/2019     CATARACT EXTRACTION Bilateral 06/2021     CV LEFT ATRIAL APPENDAGE CLOSURE N/A 1/12/2023    Procedure: Left Atrial Appendage Closure;  Surgeon: Mitesh Graff MD;  [facility]     DENTAL SURGERY      Oral Surgery Tooth Extraction;  Implant     MI ABLATE HEART DYSRHYTHM FOCUS  08/19/2013    Description: Catheter Ablation Atrial Fibrillation;  Recorded: 11/16/2013;  Comments: PVI Aug 19, 2013 (Cryo to all 4 PV's)     TOOTH EXTRACTION      implant     [facility] TOTAL HIP ARTHROPLASTY Right 05/08/2019    Procedure: RIGHT TOTAL HIP ARTHROPLASTY DIRECT ANTERIOR APPROACH;  Surgeon: Mario Woodruff MD;  Location: [facility] OR;  Service: Orthopedics     [facility] TOTAL KNEE ARTHROPLASTY Right 06/21/2017    Procedure: 2)  RIGHT TOTAL KNEE ARTHROPLASTY;  Surgeon: Mario VICKERS MD;  Location: [facility] OR;  Service: Orthopedics     Family History   Problem Relation Age of Onset     Atrial fibrillation Mother      Dementia Mother         dx 80s     Diabetes Type 2  Mother         dx 60s/70s     Atrial  fibrillation Father      Rheumatoid Arthritis Father      Atrial fibrillation Sister      Cerebrovascular Disease Sister      Parkinsonism Sister      Parkinsonism Sister      Atrial fibrillation Brother      Rheumatoid Arthritis Brother      Cerebrovascular Disease Brother      CABG Brother      Atrial fibrillation Brother      Diabetes Type 2  Brother         Dx 40's        Social History     Socioeconomic History     Marital status:      Spouse name: Not on file     Number of children: 2     Years of education: Not on file     Highest education level: Not on file   Occupational History     Occupation: CPA   Tobacco Use     Smoking status: Never     Smokeless tobacco: Never   Substance and Sexual Activity     Alcohol use: Yes     Alcohol/week: 5.0 standard drinks     Types: 5 Cans of beer per week     Comment: minimal     Drug use: No     Sexual activity: Yes     Partners: Female   Other Topics Concern     Not on file   Social History Narrative    Diet-regular            Exercise-walk, limited by R leg, R knee     Social Determinants of Health     Financial Resource Strain: Not on file   Food Insecurity: Not on file   Transportation Needs: Not on file   Physical Activity: Not on file   Stress: Not on file   Social Connections: Not on file   Intimate Partner Violence: Not on file   Housing Stability: Not on file           Medications  Allergies   Current Outpatient Medications   Medication Sig Dispense Refill     amoxicillin (AMOXIL) 500 MG capsule TAKE 4 CAPSULES BY MOUTH 1 HOUR BEFORE DENTAL APPOINTMENT       aspirin (ASA) 81 MG EC tablet Take 1 tablet (81 mg) by mouth daily       clopidogrel (PLAVIX) 75 MG tablet Take 1 tablet (75 mg) by mouth daily 90 tablet 0     hydrochlorothiazide (HYDRODIURIL) 25 MG tablet TAKE 1 TABLET BY MOUTH EVERY DAY IN THE MORNING FOR HIGH BLOOD PRESSURE 90 tablet 2     levothyroxine (SYNTHROID/LEVOTHROID) 100 MCG tablet TAKE 1 TABLET(100 MCG) BY MOUTH DAILY 90 tablet 2      lisinopril (ZESTRIL) 40 MG tablet TAKE 1 TABLET BY MOUTH DAILY FOR HIGH BLOOD PRESSURE 90 tablet 2     omeprazole (PRILOSEC) 20 MG DR capsule TAKE ONE CAPSULE BY MOUTH DAILY BEFORE BREAKFAST 90 capsule 0     pyridostigmine (MESTINON) 60 MG tablet 0.5 tab po BID 90 tablet 3     sotalol (BETAPACE) 80 MG tablet TAKE 1 TABLET BY MOUTH TWICE DAILY 180 tablet 3       Allergies   Allergen Reactions     Sulfa (Sulfonamide Antibiotics) [Sulfa Drugs] Rash     Rash - turned purple  , Around age 32          Lab Results    Chemistry/lipid CBC Cardiac Enzymes/BNP/TSH/INR   Recent Labs   Lab Test 01/04/23  1047   CHOL 167   HDL 46   LDL 99   TRIG 112     Recent Labs   Lab Test 01/04/23  1047 11/22/21  0747 11/20/20  0915   LDL 99 93 82     Recent Labs   Lab Test 01/12/23  1401 01/09/23  1446   NA  --  140   POTASSIUM  --  4.9   CHLORIDE  --  102   CO2  --  29   GLC  --  103*   BUN  --  19.8   CR 1.02 1.09   GFRESTIMATED 82 75   YFN  --  9.4     Recent Labs   Lab Test 01/12/23  1401 01/09/23  1446 01/04/23  1047   CR 1.02 1.09 1.03      Recent Labs   Lab Test 01/12/23  1401 01/09/23  1446   WBC  --  6.6   HGB 13.9 15.3   HCT  --  47.1   MCV  --  95   PLT  --  242     Recent Labs   Lab Test 01/12/23  1401 01/09/23  1446 01/04/23  1047   HGB 13.9 15.3 15.3    Recent Labs   Lab Test 02/04/22  1214   TROPONINI <0.01     Recent Labs   Lab Test 02/04/22  1214   BNP 56     Recent Labs   Lab Test 01/04/23  1047   TSH 3.14     Recent Labs   Lab Test 12/21/20  2232   INR 1.12*      45 minutes were spent on the date of encounter performing chart review, history and exam, documentation, and further activities as noted above.              Thank you for allowing me to participate in the care of your patient.      Sincerely,     CRISTIANO Rasmussen [facility]  cc:   No referring provider defined for this encounter.

## 2023-03-15 NOTE — PROGRESS NOTES
HEART CARE ELECTROPHYSIOLOGY NOTE      [facility]  [phone]      Assessment/Recommendations   Assessment/Plan:  1. Persistent Atrial Fibrillation: Symptomatic improvement following cardioversion, no symptomatology or evidence of AF recurrence.  We reviewed the natural progression of atrial fibrillation and treatment options with medications or repeat ablation.  This was his first episode of AF, though requiring cardioversion, since 2019.  Continue sotalol 80 mg twice daily.    He was reassured that atrial fibrillation is not life-threatening, but carries an increased risk for stroke.  He has a AUC8QD2-[id number] score of 3 for age 65-74, hypertension, and vascular disease.  HAS-BLED score of 2 for age and bleeding predisposition.  S/p left atrial appendage closure with the Watchman FLX device 1/12/2023.  He is 4 weeks post cardioversion, so discontinue Eliquis and resume aspirin 81 mg daily with clopidogrel 75 mg daily.  He will remain on DAPT for 6 months post Watchman implant, then clopidogrel will be discontinued.  He is scheduled for post implant DIONTE on 5/3/2023.    2.  Hypertension: Blood pressure at target.  Continue lisinopril, hydrochlorothiazide, and sotalol as above.    3.  Vascular disease: Minimal/mild coronary calcification seen on CT.  Denies anginal symptoms.  He also has an ascending aortic aneurysm being followed by yearly MRI or CT which has at this point been stable.    Follow up with Dr. Cotter in July (vascular disease and 6 month post-Watchman)  Follow up with me in December 2023/January 2024 for A-fib and 1 year post-Watchman     History of Present Illness/Subjective    HPI: Brett Hopkins is a 67 year old male who comes in today for EP follow-up of atrial fibrillation.  He has a history of atrial fibrillation, hypertension, hypothyroidism, mild coronary artery calcification seen on CT, ascending aortic aneurysm, myasthenia gravis, gait instability with falls, GERD,  osteoarthritis, and chronic right lower extremity lymphedema since his right total knee revision in November 2019.  He was seen in the ED in December 2020 for double vision and has been diagnosed with cataract as well as some sort of cranial nerve palsy for which he has new glasses with prisms.     He was diagnosed with paroxysmal atrial fibrillation in 2007.  He failed antiarrhythmic therapy with propafenone and flecainide.  He underwent pulmonary vein isolation ablation with Dr. Barfeild on 8/19/2013 with cryo-to all 4 pulmonary veins.   He was noted to be back in atrial fibrillation at a preoperative exam in April 2019, but FitBit data suggests that recurrence was in March.  In hindsight, he noted worsening fatigue and occasional palpitations since that time that he had attributed to increased stress.   He underwent cardioversion on 7/10/2019, but with early recurrence of atrial fibrillation.  He failed antiarrhythmic therapy with flecainide.  He was started on sotalol and underwent cardioversion on 9/12/2019 after which he reported some symptomatic improvement.       He was initially on Eliquis for stroke prophylaxis, but with gait instability and recurrent falls.  He underwent left atrial appendage closure with a 27 mm Watchman FLX device on 1/12/2023.  Eliquis was discontinued and he was started on aspirin and clopidogrel.  However, he had recurrence of A-fib on 1/17/2023 associated with fatigue and dyspnea on exertion.  He was restarted on Eliquis and underwent cardioversion on 2/17/2023.     Champ states that he has been feeling better since cardioversion.  He has not had any recurrent A-fib.  Chronic lower extremity lymphedema is at baseline.  He denies exertional chest discomfort, palpitations, abdominal fullness/bloating or worsened peripheral edema, shortness of breath, paroxysmal nocturnal dyspnea, orthopnea, lightheadedness, dizziness, pre-syncope, or syncope.       Cardiographics (EKGs personally  reviewed):  EKG done 2/17/2023 shows sinus rhythm at 72 bpm,  ms, QT/QTc 442/483 ms  EKG done 12/21/2020 shows sinus rhythm at 60 bpm, QT/QTc interval measures 426/426 ms  EKG done 7/15/2019 shows atrial fibrillation with controlled ventricular response at 92 bpm  EKG, Done 4/23/2019 shows atrial fibrillation with rapid ventricular response at 190 bpm  EKG done 6/2/2017 shows sinus bradycardia 54 bpm, QT/QTc interval measures 410/388 ms     24-hour Holter monitor worn 7/24/2019 shows persistent atrial fibrillation with mildly elevated ventricular response with an average ventricular rate of 99 bpm and a range of 63 to 148 bpm.  No significant bradycardia or pauses.    Transesophageal echo done 1/12/2023:  Left ventricular size, wall motion and function are normal. The ejection  fraction is 60-65%.  Normal right ventricle size and systolic function.  No thrombus is detected in the left atrial appendage.  Watchman device noted in left atrial appendage. The device is well seated with  no leakage by color flow Doppler imaging.  There is no pericardial effusion.     CTA pulmonary vein done 10/21/2022:  1. The following measurements were made of the left atrial appendage at 35% cardiac phase at the level of the bifurcation of the left circumflex artery:  -Orifice diameter: 28 x 25 mm (average 26 mm)  -Orifice circumference: 83 mm  -Orifice area: 5.42 cm   -Useful depth: 17 mm  -Maximum depth: 35 mm  2. No thrombus in the left atrium or left atrial appendage.  3. Mild coronary atherosclerosis with no obvious obstructive lesions.  4. Borderline enlargement of the ascending aorta measuring 4.0 cm.    I have reviewed and updated the patient's Past Medical History, Social History, Family History and Medication List.  Outside records personally reviewed.     Physical Examination  Review of Systems   Vitals: /73 (BP Location: Right arm, Patient Position: Sitting, Cuff Size: Adult Large)   Pulse 61   Resp 16   Wt  117 kg (258 lb)   SpO2 97%   BMI 35.98 kg/m    BMI= Body mass index is 35.98 kg/m .  Wt Readings from Last 3 Encounters:   03/15/23 117 kg (258 lb)   02/17/23 117 kg (258 lb)   02/16/23 117 kg (258 lb)       General Appearance:   Alert, well-appearing and in no acute distress.   HEENT: Atraumatic, normocephalic.  No scleral icterus, normal conjunctivae, EOMs intact, PERRL.  Wearing a mask.   Chest/Lungs:   Chest symmetric, spine straight.  Respirations unlabored.  Lungs are clear to auscultation.   Cardiovascular:   Regular rate and rhythm.  Normal first and second heart sounds with no murmurs, rubs, or gallops; radial and posterior tibial pulses are intact, right lower extremity lymphedema.   Abdomen:  Soft, nondistended, bowel sounds present.   Extremities: No cyanosis or clubbing.   Musculoskeletal: Moves all extremities.     Skin: Warm, dry, intact.    Neurologic: Mood and affect are appropriate.  Alert and oriented to person, place, time, and situation.     ROS: 10 point ROS neg other than the symptoms noted above in the HPI.         Medical History  Surgical History Family History Social History   Past Medical History:   Diagnosis Date     Aneurysm Of The Ascending Aorta     Mild dilatation (4.1 cm) by MRI in Aug 2013 CT December 2021, not clearly visualized [facility]      GERD (gastroesophageal reflux disease) 9/10/2018    History of a hiatal hernia Omeprazole daily Breakthrough symptoms by 2 PM daily      Hypertension 4/26/2019    Dx Apr 2019 Metoprolol, also for atrial fibrillation. Lisinopril, April 2019 hydrochlorothiazide Oct 2020      Hypothyroidism     Thyroid replacement     Lymphedema 6/1/2020    Right leg, after surgery Dr. Orlando, et al Compression stockings, pneumatic pump-somewhat helpful Lasix, Nov 2020, but as long December 2020, neither were helpful. Massage     Myasthenia gravis (H) 1/4/2023    Dx 2022 Double vision     Persistent atrial fibrillation     Dx 2007 Symptomatic, SANDERS -NO  longer symptomatic CV with first episode [id number] - VASc risk score = 1 (HTN) Failed Rhythmol > Flecainide   PVI August 19, 2013 April 2019 recurrent AF-Eliquis Cardioversion, September 2019 Eliquis      Status post ablation of atrial fibrillation 07/28/2015    PVI August 19, 2013 (cryo-PVI only)     Thyroid nodule 1/5/2022    Incidental diagnosis December 2021 2 nodules, 2.1 and 2.4 cm maximum dimension FNA results 2023, benign Recommend follow-up 1 3 and 5 years     Past Surgical History:   Procedure Laterality Date     ARTHROPLASTY REVISION HIP Right 05/01/2019     ARTHROPLASTY REVISION KNEE Right 2019     CARDIOVERSION  07/01/2019    7/10/2019     CARDIOVERSION  09/12/2019     CATARACT EXTRACTION Bilateral 06/2021     CV LEFT ATRIAL APPENDAGE CLOSURE N/A 1/12/2023    Procedure: Left Atrial Appendage Closure;  Surgeon: Mitesh Graff MD;  Location: [facility] CV     DENTAL SURGERY      Oral Surgery Tooth Extraction;  Implant     [facility] ABLATE HEART DYSRHYTHM FOCUS  08/19/2013    Description: Catheter Ablation Atrial Fibrillation;  Recorded: 11/16/2013;  Comments: PVI Aug 19, 2013 (Cryo to all 4 PV's)     TOOTH EXTRACTION      implant     [facility] TOTAL HIP ARTHROPLASTY Right 05/08/2019    Procedure: RIGHT TOTAL HIP ARTHROPLASTY DIRECT ANTERIOR APPROACH;  Surgeon: Mario Woodruff MD;  Location: [facility] Main OR;  Service: Orthopedics     [facility] TOTAL KNEE ARTHROPLASTY Right 06/21/2017    Procedure: 2)  RIGHT TOTAL KNEE ARTHROPLASTY;  Surgeon: Mario VICKERS MD;  Location: [facility] Main OR;  Service: Orthopedics     Family History   Problem Relation Age of Onset     Atrial fibrillation Mother      Dementia Mother         dx 80s     Diabetes Type 2  Mother         dx 60s/70s     Atrial fibrillation Father      Rheumatoid Arthritis Father      Atrial fibrillation Sister      Cerebrovascular Disease Sister      Parkinsonism Sister      Parkinsonism Sister      Atrial fibrillation Brother      Rheumatoid  Arthritis Brother      Cerebrovascular Disease Brother      CABG Brother      Atrial fibrillation Brother      Diabetes Type 2  Brother         Dx 40's        Social History     Socioeconomic History     Marital status:      Spouse name: Not on file     Number of children: 2     Years of education: Not on file     Highest education level: Not on file   Occupational History     Occupation: CPA   Tobacco Use     Smoking status: Never     Smokeless tobacco: Never   Substance and Sexual Activity     Alcohol use: Yes     Alcohol/week: 5.0 standard drinks     Types: 5 Cans of beer per week     Comment: minimal     Drug use: No     Sexual activity: Yes     Partners: Female   Other Topics Concern     Not on file   Social History Narrative    Diet-regular            Exercise-walk, limited by R leg, R knee     Social Determinants of Health     Financial Resource Strain: Not on file   Food Insecurity: Not on file   Transportation Needs: Not on file   Physical Activity: Not on file   Stress: Not on file   Social Connections: Not on file   Intimate Partner Violence: Not on file   Housing Stability: Not on file           Medications  Allergies   Current Outpatient Medications   Medication Sig Dispense Refill     amoxicillin (AMOXIL) 500 MG capsule TAKE 4 CAPSULES BY MOUTH 1 HOUR BEFORE DENTAL APPOINTMENT       aspirin (ASA) 81 MG EC tablet Take 1 tablet (81 mg) by mouth daily       clopidogrel (PLAVIX) 75 MG tablet Take 1 tablet (75 mg) by mouth daily 90 tablet 0     hydrochlorothiazide (HYDRODIURIL) 25 MG tablet TAKE 1 TABLET BY MOUTH EVERY DAY IN THE MORNING FOR HIGH BLOOD PRESSURE 90 tablet 2     levothyroxine (SYNTHROID/LEVOTHROID) 100 MCG tablet TAKE 1 TABLET(100 MCG) BY MOUTH DAILY 90 tablet 2     lisinopril (ZESTRIL) 40 MG tablet TAKE 1 TABLET BY MOUTH DAILY FOR HIGH BLOOD PRESSURE 90 tablet 2     omeprazole (PRILOSEC) 20 MG DR capsule TAKE ONE CAPSULE BY MOUTH DAILY BEFORE BREAKFAST 90 capsule 0     pyridostigmine  (MESTINON) 60 MG tablet 0.5 tab po BID 90 tablet 3     sotalol (BETAPACE) 80 MG tablet TAKE 1 TABLET BY MOUTH TWICE DAILY 180 tablet 3       Allergies   Allergen Reactions     Sulfa (Sulfonamide Antibiotics) [Sulfa Drugs] Rash     Rash - turned purple  , Around age 32          Lab Results    Chemistry/lipid CBC Cardiac Enzymes/BNP/TSH/INR   Recent Labs   Lab Test 01/04/23  1047   CHOL 167   HDL 46   LDL 99   TRIG 112     Recent Labs   Lab Test 01/04/23  1047 11/22/21  0747 11/20/20  0915   LDL 99 93 82     Recent Labs   Lab Test 01/12/23  1401 01/09/23  1446   NA  --  140   POTASSIUM  --  4.9   CHLORIDE  --  102   CO2  --  29   GLC  --  103*   BUN  --  19.8   CR 1.02 1.09   GFRESTIMATED 82 75   YFN  --  9.4     Recent Labs   Lab Test 01/12/23  1401 01/09/23  1446 01/04/23  1047   CR 1.02 1.09 1.03      Recent Labs   Lab Test 01/12/23  1401 01/09/23  1446   WBC  --  6.6   HGB 13.9 15.3   HCT  --  47.1   MCV  --  95   PLT  --  242     Recent Labs   Lab Test 01/12/23  1401 01/09/23  1446 01/04/23  1047   HGB 13.9 15.3 15.3    Recent Labs   Lab Test 02/04/22  1214   TROPONINI <0.01     Recent Labs   Lab Test 02/04/22  1214   BNP 56     Recent Labs   Lab Test 01/04/23  1047   TSH 3.14     Recent Labs   Lab Test 12/21/20  2232   INR 1.12*      45 minutes were spent on the date of encounter performing chart review, history and exam, documentation, and further activities as noted above.

## 2023-03-19 DIAGNOSIS — I10 ESSENTIAL HYPERTENSION: ICD-10-CM

## 2023-03-20 RX ORDER — LISINOPRIL 40 MG/1
TABLET ORAL
Qty: 90 TABLET | Refills: 2 | Status: SHIPPED | OUTPATIENT
Start: 2023-03-20 | End: 2023-12-27

## 2023-03-20 NOTE — TELEPHONE ENCOUNTER
"Last Written Prescription Date:  6/21/2022  Last Fill Quantity: 90,  # refills: 2   Last office visit provider:  1/4/2023     Requested Prescriptions   Pending Prescriptions Disp Refills     lisinopril (ZESTRIL) 40 MG tablet [Pharmacy Med Name: LISINOPRIL 40MG TABLETS] 90 tablet 2     Sig: TAKE 1 TABLET BY MOUTH DAILY FOR HIGH BLOOD PRESSURE       ACE Inhibitors (Including Combos) Protocol Passed - 3/20/2023 10:38 AM        Passed - Blood pressure under 140/90 in past 12 months     BP Readings from Last 3 Encounters:   03/15/23 109/73   02/17/23 128/83   02/16/23 120/81                 Passed - Recent (12 mo) or future (30 days) visit within the authorizing provider's specialty     Patient has had an office visit with the authorizing provider or a provider within the authorizing providers department within the previous 12 mos or has a future within next 30 days. See \"Patient Info\" tab in inbasket, or \"Choose Columns\" in Meds & Orders section of the refill encounter.              Passed - Medication is active on med list        Passed - Patient is age 18 or older        Passed - Normal serum creatinine on file in past 12 months     Recent Labs   Lab Test 01/12/23  1401   CR 1.02       Ok to refill medication if creatinine is low          Passed - Normal serum potassium on file in past 12 months     Recent Labs   Lab Test 01/09/23  1446   POTASSIUM 4.9                  Margarita Rubio RN 03/20/23 10:39 AM  "

## 2023-04-04 ENCOUNTER — DOCUMENTATION ONLY (OUTPATIENT)
Dept: CARDIOLOGY | Facility: CLINIC | Age: 68
End: 2023-04-04
Payer: COMMERCIAL

## 2023-04-04 NOTE — PROGRESS NOTES
Appointment scheduled with Primary Dr. Bennett on 4/24 at 9am for Pre-Procedure H&P for DIONTE scheduled for 5/3/23. -SC

## 2023-04-24 ENCOUNTER — TELEPHONE (OUTPATIENT)
Dept: FAMILY MEDICINE | Facility: CLINIC | Age: 68
End: 2023-04-24

## 2023-04-24 ENCOUNTER — OFFICE VISIT (OUTPATIENT)
Dept: FAMILY MEDICINE | Facility: CLINIC | Age: 68
End: 2023-04-24
Payer: COMMERCIAL

## 2023-04-24 VITALS
SYSTOLIC BLOOD PRESSURE: 120 MMHG | HEIGHT: 71 IN | BODY MASS INDEX: 36.96 KG/M2 | DIASTOLIC BLOOD PRESSURE: 72 MMHG | TEMPERATURE: 97 F | OXYGEN SATURATION: 98 % | HEART RATE: 66 BPM | WEIGHT: 264 LBS

## 2023-04-24 DIAGNOSIS — I48.19 PERSISTENT ATRIAL FIBRILLATION (H): ICD-10-CM

## 2023-04-24 DIAGNOSIS — Z95.818 PRESENCE OF WATCHMAN LEFT ATRIAL APPENDAGE CLOSURE DEVICE: ICD-10-CM

## 2023-04-24 DIAGNOSIS — Z01.818 PREOP EXAMINATION: Primary | ICD-10-CM

## 2023-04-24 DIAGNOSIS — G70.00 MYASTHENIA GRAVIS (H): ICD-10-CM

## 2023-04-24 LAB
ANION GAP SERPL CALCULATED.3IONS-SCNC: 12 MMOL/L (ref 7–15)
BUN SERPL-MCNC: 21.9 MG/DL (ref 8–23)
CALCIUM SERPL-MCNC: 9.5 MG/DL (ref 8.8–10.2)
CHLORIDE SERPL-SCNC: 105 MMOL/L (ref 98–107)
CREAT SERPL-MCNC: 1.1 MG/DL (ref 0.67–1.17)
DEPRECATED HCO3 PLAS-SCNC: 25 MMOL/L (ref 22–29)
ERYTHROCYTE [DISTWIDTH] IN BLOOD BY AUTOMATED COUNT: 13.9 % (ref 10–15)
GFR SERPL CREATININE-BSD FRML MDRD: 74 ML/MIN/1.73M2
GLUCOSE SERPL-MCNC: 104 MG/DL (ref 70–99)
HCT VFR BLD AUTO: 47.4 % (ref 40–53)
HGB BLD-MCNC: 15.6 G/DL (ref 13.3–17.7)
MCH RBC QN AUTO: 30.4 PG (ref 26.5–33)
MCHC RBC AUTO-ENTMCNC: 32.9 G/DL (ref 31.5–36.5)
MCV RBC AUTO: 92 FL (ref 78–100)
PLATELET # BLD AUTO: 214 10E3/UL (ref 150–450)
POTASSIUM SERPL-SCNC: 4.7 MMOL/L (ref 3.4–5.3)
RBC # BLD AUTO: 5.13 10E6/UL (ref 4.4–5.9)
SODIUM SERPL-SCNC: 142 MMOL/L (ref 136–145)
WBC # BLD AUTO: 7.1 10E3/UL (ref 4–11)

## 2023-04-24 PROCEDURE — 85027 COMPLETE CBC AUTOMATED: CPT | Performed by: FAMILY MEDICINE

## 2023-04-24 PROCEDURE — 80048 BASIC METABOLIC PNL TOTAL CA: CPT | Performed by: FAMILY MEDICINE

## 2023-04-24 PROCEDURE — 36415 COLL VENOUS BLD VENIPUNCTURE: CPT | Performed by: FAMILY MEDICINE

## 2023-04-24 PROCEDURE — 99214 OFFICE O/P EST MOD 30 MIN: CPT | Performed by: FAMILY MEDICINE

## 2023-04-24 NOTE — LETTER
April 25, 2023      Champ Hopkins  [address] [place] [address]        Dear ,    We are writing to inform you of your test results.  Kidney tests are normal, blood counts are normal, you are not anemic.      Resulted Orders   Basic metabolic panel   Result Value Ref Range    Sodium 142 136 - 145 mmol/L    Potassium 4.7 3.4 - 5.3 mmol/L    Chloride 105 98 - 107 mmol/L    Carbon Dioxide (CO2) 25 22 - 29 mmol/L    Anion Gap 12 7 - 15 mmol/L    Urea Nitrogen 21.9 8.0 - 23.0 mg/dL    Creatinine 1.10 0.67 - 1.17 mg/dL    Calcium 9.5 8.8 - 10.2 mg/dL    Glucose 104 (H) 70 - 99 mg/dL    GFR Estimate 74 >60 mL/min/1.73m2      Comment:      eGFR calculated using 2021 CKD-EPI equation.   CBC with platelets   Result Value Ref Range    WBC Count 7.1 4.0 - 11.0 10e3/uL    RBC Count 5.13 4.40 - 5.90 10e6/uL    Hemoglobin 15.6 13.3 - 17.7 g/dL    Hematocrit 47.4 40.0 - 53.0 %    MCV 92 78 - 100 fL    MCH 30.4 26.5 - 33.0 pg    MCHC 32.9 31.5 - 36.5 g/dL    RDW 13.9 10.0 - 15.0 %    Platelet Count 214 150 - 450 10e3/uL       If you have any questions or concerns, please call the clinic at the number listed above.       Sincerely,      Magdaleno Bennett MD

## 2023-04-24 NOTE — H&P (VIEW-ONLY)
[facility]  [address] [place] [id number]  Phone: [phone]  Fax: [phone]  Primary Provider: Magdaleno Russo  Pre-op Performing Provider: MAGDALENO RUSSO       PREOPERATIVE EVALUATION:  Today's date: 4/24/2023    Brett Hopkins is a 67 year old male who presents for a preoperative evaluation.      2/23/2022     5:35 PM   Additional Questions   Roomed by Mehrdad TAWANDA     Surgical Information:  Surgery/Procedure: Echo DIONTE  Surgery Location: [facility]  Surgeon: Tiffanie Staff  Surgery Date: 05/03/2023  Time of Surgery: 7:30 AM  Where patient plans to recover: At home with family  Fax number for surgical facility: Note does not need to be faxed, will be available electronically in Epic.    Assessment & Plan     The proposed surgical procedure is considered LOW risk.    (Z01.818) Preop examination  (primary encounter diagnosis)  Comment: For DIONTE echocardiogram as above  Plan: Basic metabolic panel, CBC with platelets             (Z95.818) Presence of Watchman left atrial appendage closure device  Comment: Patient had a Watchman device placed January 2023  Plan:      (I48.19) Persistent atrial fibrillation  Comment: Patient is currently in sinus rhythm  Plan:      (G70.00) Myasthenia gravis (H)  Comment: Patient's ocular myasthenia has now resolved for now.  Plan:      PLAN:  1.  CBC and basic metabolic profile reviewed, results normal see below  2.  Twelve-lead EKG from February 17 reviewed sinus rhythm with premature atrial complexes and other nonspecific findings  3.  Patient is otherwise cleared for the procedure as above  4.  Patient is due for Medicare wellness exam January 2024       Implanted Device:   - Type of device: Watchman device January 2023 place Patient advised to bring device information on day of surgery.       - No identified additional risk factors other than previously addressed    Medication Instructions:  Patient is to take all scheduled  medications on the day of surgery    RECOMMENDATION:  APPROVAL GIVEN to proceed with proposed procedure, without further diagnostic evaluation.            Subjective     HPI related to upcoming procedure: Patient is here for preoperative clearance prior to a DIONTE echocardiogram.  The patient has a history of underlying atrial fibrillation he had the Watchman device placed in January of this year he actually had a brief episode of atrial fibrillation after that had a cardioversion in February but he currently is in sinus rhythm.    The echocardiogram is to document that there is no anatomic or other issue because of the Watchman device.  Patient is on low-dose aspirin which will be lifelong but Plavix will most likely be discontinued in July.    Patient has a history of myasthenia gravis, ocular.  This was diagnosed because of persistent double vision he was on Mestinon but he had diarrhea as a side effect and has since discontinued that medication but of note his double vision has also resolved for now he will follow-up with neurology    Otherwise no other change in his health status          4/19/2023    10:38 PM   Preop Questions   1. Have you ever had a heart attack or stroke? No   2. Have you ever had surgery on your heart or blood vessels, such as a stent placement, a coronary artery bypass, or surgery on an artery in your head, neck, heart, or legs? No   3. Do you have chest pain with activity? No   4. Do you have a history of  heart failure? No   5. Do you currently have a cold, bronchitis or symptoms of other infection? No   6. Do you have a cough, shortness of breath, or wheezing? No   7. Do you or anyone in your family have previous history of blood clots? No   8. Do you or does anyone in your family have a serious bleeding problem such as prolonged bleeding following surgeries or cuts? No   9. Have you ever had problems with anemia or been told to take iron pills? No   10. Have you had any abnormal blood  loss such as black, tarry or bloody stools? No   11. Have you ever had a blood transfusion? No   12. Are you willing to have a blood transfusion if it is medically needed before, during, or after your surgery? Yes   13. Have you or any of your relatives ever had problems with anesthesia? No   14. Do you have sleep apnea, excessive snoring or daytime drowsiness? No   15. Do you have any artifical heart valves or other implanted medical devices like a pacemaker, defibrillator, or continuous glucose monitor? YES -see past surgical history and active medical problems   15a. What type of device do you have? Watchman   15b. Name of the clinic that manages your device:  [facility]   16. Do you have artificial joints? YES -see past surgical history   17. Are you allergic to latex? No       Health Care Directive:  Patient does not have a Health Care Directive or Living Will: Advance Directive received and scanned. Click on Code in the patient header to view.    Preoperative Review of :   reviewed - no record of controlled substances prescribed.       Status of Chronic Conditions:  See problem list for active medical problems.  Problems all longstanding and stable, except as noted/documented.  See ROS for pertinent symptoms related to these conditions.      Review of Systems  CONSTITUTIONAL: NEGATIVE for fever, chills, change in weight  INTEGUMENTARY/SKIN: NEGATIVE for worrisome rashes, moles or lesions  EYES: NEGATIVE for vision changes or irritation  ENT/MOUTH: NEGATIVE for ear, mouth and throat problems  RESP: NEGATIVE for significant cough or SOB  CV: NEGATIVE for chest pain, palpitations or peripheral edema  GI: NEGATIVE for nausea, abdominal pain, heartburn, or change in bowel habits  : NEGATIVE for frequency, dysuria, or hematuria  MUSCULOSKELETAL: NEGATIVE for significant arthralgias or myalgia  NEURO: NEGATIVE for weakness, dizziness or paresthesias  ENDOCRINE: NEGATIVE for temperature intolerance, skin/hair  changes  HEME: NEGATIVE for bleeding problems  PSYCHIATRIC: NEGATIVE for changes in mood or affect    Patient Active Problem List    Diagnosis Date Noted     Presence of Watchman left atrial appendage closure device 01/12/2023     Priority: Medium     1/12/23 - 27 mm Watchman FLX device       Myasthenia gravis (H) 01/04/2023     Priority: Medium     Dx 2022  Double vision       Thyroid nodule 01/05/2022     Priority: Medium     Incidental diagnosis December 2021  2 nodules, 2.1 and 2.4 cm maximum dimension  FNA results 2023, benign  Recommend follow-up 1 3 and 5 years       Obesity (BMI 35.0-39.9) with comorbidity (H) 11/20/2020     Priority: Medium     Lymphedema 06/01/2020     Priority: Medium     Right leg, after surgery  Dr. Orlando, et al  Compression stockings, pneumatic pump-somewhat helpful  Lasix, Nov 2020, but as long December 2020, neither were helpful.  Massage       Persistent atrial fibrillation      Priority: Medium     Dx 2007  Symptomatic, SANDERS -NO longer symptomatic  CV with first episode  [id number] - VASc risk score = 1 (HTN)  Failed Rhythmol > Flecainide    PVI August 19, 2013 April 2019 recurrent AF-Eliquis  Cardioversion, September 2019  Eliquis         Hypertension 04/26/2019     Priority: Medium     Dx Apr 2019  Metoprolol, also for atrial fibrillation.  Lisinopril, April 2019  hydrochlorothiazide Oct 2020         GERD (gastroesophageal reflux disease) 09/10/2018     Priority: Medium     History of a hiatal hernia  Omeprazole daily  Breakthrough symptoms by 2 PM daily         AA (alopecia areata) 09/10/2018     Priority: Medium     Area of hair loss back of scalp  Dermatology         Varicose Veins      Priority: Medium     no symptoms         Aneurysm Of The Ascending Aorta      Priority: Medium     Mild dilatation (4.1 cm) by MRI in Aug 2013  CT December 2021, not clearly visualized  [facility]         Hypothyroidism      Priority: Medium     Thyroid replacement       Status post  ablation of atrial fibrillation 07/28/2015     Priority: Medium     PVI August 19, 2013 (cryo-PVI only)          Past Medical History:   Diagnosis Date     Aneurysm Of The Ascending Aorta     Mild dilatation (4.1 cm) by MRI in Aug 2013 CT December 2021, not clearly visualized [facility]      GERD (gastroesophageal reflux disease) 9/10/2018    History of a hiatal hernia Omeprazole daily Breakthrough symptoms by 2 PM daily      Hypertension 4/26/2019    Dx Apr 2019 Metoprolol, also for atrial fibrillation. Lisinopril, April 2019 hydrochlorothiazide Oct 2020      Hypothyroidism     Thyroid replacement     Lymphedema 6/1/2020    Right leg, after surgery Dr. Orlando, et al Compression stockings, pneumatic pump-somewhat helpful Lasix, Nov 2020, but as long December 2020, neither were helpful. Massage     Myasthenia gravis (H) 1/4/2023    Dx 2022 Double vision     Persistent atrial fibrillation     Dx 2007 Symptomatic, SANDERS -NO longer symptomatic CV with first episode [id number] - VASc risk score = 1 (HTN) Failed Rhythmol > Flecainide   PVI August 19, 2013 April 2019 recurrent AF-Eliquis Cardioversion, September 2019 Eliquis      Status post ablation of atrial fibrillation 07/28/2015    PVI August 19, 2013 (cryo-PVI only)     Thyroid nodule 1/5/2022    Incidental diagnosis December 2021 2 nodules, 2.1 and 2.4 cm maximum dimension FNA results 2023, benign Recommend follow-up 1 3 and 5 years     Past Surgical History:   Procedure Laterality Date     ARTHROPLASTY REVISION HIP Right 05/01/2019     ARTHROPLASTY REVISION KNEE Right 2019     CARDIOVERSION  07/01/2019    7/10/2019     CARDIOVERSION  09/12/2019     CATARACT EXTRACTION Bilateral 06/2021     CV LEFT ATRIAL APPENDAGE CLOSURE N/A 1/12/2023    Procedure: Left Atrial Appendage Closure;  Surgeon: Mitesh Graff MD;  Location: [facility] CV     DENTAL SURGERY      Oral Surgery Tooth Extraction;  Implant     IA ABLATE HEART DYSRHYTHM FOCUS  08/19/2013     Description: Catheter Ablation Atrial Fibrillation;  Recorded: 11/16/2013;  Comments: PVI Aug 19, 2013 (Cryo to all 4 PV's)     TOOTH EXTRACTION      implant     [facility] TOTAL HIP ARTHROPLASTY Right 05/08/2019    Procedure: RIGHT TOTAL HIP ARTHROPLASTY DIRECT ANTERIOR APPROACH;  Surgeon: Mario Woodruff MD;  Location: [facility];  Service: Orthopedics     [facility] TOTAL KNEE ARTHROPLASTY Right 06/21/2017    Procedure: 2)  RIGHT TOTAL KNEE ARTHROPLASTY;  Surgeon: Mario VICKERS MD;  Location: [facility];  Service: Orthopedics     Current Outpatient Medications   Medication Sig Dispense Refill     amoxicillin (AMOXIL) 500 MG capsule TAKE 4 CAPSULES BY MOUTH 1 HOUR BEFORE DENTAL APPOINTMENT       aspirin (ASA) 81 MG EC tablet Take 1 tablet (81 mg) by mouth daily       clopidogrel (PLAVIX) 75 MG tablet Take 1 tablet (75 mg) by mouth daily 90 tablet 0     hydrochlorothiazide (HYDRODIURIL) 25 MG tablet TAKE 1 TABLET BY MOUTH EVERY DAY IN THE MORNING FOR HIGH BLOOD PRESSURE 90 tablet 2     levothyroxine (SYNTHROID/LEVOTHROID) 100 MCG tablet TAKE 1 TABLET(100 MCG) BY MOUTH DAILY 90 tablet 2     lisinopril (ZESTRIL) 40 MG tablet TAKE 1 TABLET BY MOUTH DAILY FOR HIGH BLOOD PRESSURE 90 tablet 2     omeprazole (PRILOSEC) 20 MG DR capsule TAKE ONE CAPSULE BY MOUTH DAILY BEFORE BREAKFAST 90 capsule 0     pyridostigmine (MESTINON) 60 MG tablet 0.5 tab po BID 90 tablet 3     sotalol (BETAPACE) 80 MG tablet TAKE 1 TABLET BY MOUTH TWICE DAILY 180 tablet 3       Allergies   Allergen Reactions     Sulfa (Sulfonamide Antibiotics) [Sulfa Drugs] Rash     Rash - turned purple  , Around age 32        Social History     Tobacco Use     Smoking status: Never     Smokeless tobacco: Never   Vaping Use     Vaping status: Not on file   Substance Use Topics     Alcohol use: Yes     Alcohol/week: 5.0 standard drinks of alcohol     Types: 5 Cans of beer per week     Comment: minimal     Family History   Problem Relation Age of Onset      "Atrial fibrillation Mother      Dementia Mother         dx 80s     Diabetes Type 2  Mother         dx 60s/70s     Atrial fibrillation Father      Rheumatoid Arthritis Father      Atrial fibrillation Sister      Cerebrovascular Disease Sister      Parkinsonism Sister      Parkinsonism Sister      Atrial fibrillation Brother      Rheumatoid Arthritis Brother      Cerebrovascular Disease Brother      CABG Brother      Atrial fibrillation Brother      Diabetes Type 2  Brother         Dx 40's     History   Drug Use No         Objective     /72 (BP Location: Left arm, Patient Position: Sitting, Cuff Size: Adult Regular)   Pulse 66   Temp 97  F (36.1  C) (Temporal)   Ht 1.803 m (5' 11\")   Wt 119.7 kg (264 lb)   SpO2 98%   BMI 36.82 kg/m      Physical Exam    GENERAL APPEARANCE: healthy, alert and no distress     EYES: EOMI,  PERRL     HENT: ear canals and TM's normal and nose and mouth without ulcers or lesions     NECK: no adenopathy, no asymmetry, masses, or scars and thyroid normal to palpation     RESP: lungs clear to auscultation - no rales, rhonchi or wheezes     CV: regular rates and rhythm, normal S1 S2, no S3 or S4 and no murmur, click or rub     ABDOMEN:  soft, nontender, no HSM or masses and bowel sounds normal     MS: extremities normal- no gross deformities noted, no evidence of inflammation in joints, FROM in all extremities.     SKIN: no suspicious lesions or rashes     NEURO: Normal strength and tone, sensory exam grossly normal, mentation intact and speech normal     PSYCH: mentation appears normal. and affect normal/bright     LYMPHATICS: No cervical adenopathy    Recent Labs   Lab Test 01/12/23  1401 01/09/23  1446 01/04/23  1047   HGB 13.9 15.3 15.3   PLT  --  242 237   NA  --  140 142   POTASSIUM  --  4.9 4.4   CR 1.02 1.09 1.03        Diagnostics:  Recent Results (from the past 48 hour(s))   Basic metabolic panel    Collection Time: 04/24/23 10:04 AM   Result Value Ref Range    Sodium 142 " 136 - 145 mmol/L    Potassium 4.7 3.4 - 5.3 mmol/L    Chloride 105 98 - 107 mmol/L    Carbon Dioxide (CO2) 25 22 - 29 mmol/L    Anion Gap 12 7 - 15 mmol/L    Urea Nitrogen 21.9 8.0 - 23.0 mg/dL    Creatinine 1.10 0.67 - 1.17 mg/dL    Calcium 9.5 8.8 - 10.2 mg/dL    Glucose 104 (H) 70 - 99 mg/dL    GFR Estimate 74 >60 mL/min/1.73m2   CBC with platelets    Collection Time: 04/24/23 10:04 AM   Result Value Ref Range    WBC Count 7.1 4.0 - 11.0 10e3/uL    RBC Count 5.13 4.40 - 5.90 10e6/uL    Hemoglobin 15.6 13.3 - 17.7 g/dL    Hematocrit 47.4 40.0 - 53.0 %    MCV 92 78 - 100 fL    MCH 30.4 26.5 - 33.0 pg    MCHC 32.9 31.5 - 36.5 g/dL    RDW 13.9 10.0 - 15.0 %    Platelet Count 214 150 - 450 10e3/uL      EKG: appears normal, NSR, Premature atrial complexes with aberrant conduction, low voltage QRS, inferior posterior infarct noted.    Revised Cardiac Risk Index (RCRI):  The patient has the following serious cardiovascular risks for perioperative complications:   - No serious cardiac risks = 0 points     RCRI Interpretation: 0 points: Class I (very low risk - 0.4% complication rate)           Signed Electronically by: Magdaleno Bennett MD  Copy of this evaluation report is provided to requesting physician.

## 2023-04-24 NOTE — TELEPHONE ENCOUNTER
General Call      Reason for Call:  Patient got a message in [employer] to schedule a f/u visit with Dr. Bennett and wants to know why. Is very upset and said he already saw him today 4/24 and is having a     What are your questions or concerns: reason for message in my chart and why he needs to come back in.    Date of last appointment with provider: 4/24    Could we send this information to you in [employer] or would you prefer to receive a phone call?:   Patient would prefer a phone call   Okay to leave a detailed message?: Yes at Cell number on file:    Telephone Information:   Mobile [phone]

## 2023-04-24 NOTE — PROGRESS NOTES
[facility]  [address] [place] [id number]  Phone: [phone]  Fax: [phone]  Primary Provider: Magdaleno Russo  Pre-op Performing Provider: MAGDALENO RUSSO       PREOPERATIVE EVALUATION:  Today's date: 4/24/2023    Brett Hopkins is a 67 year old male who presents for a preoperative evaluation.      2/23/2022     5:35 PM   Additional Questions   Roomed by Mehrdad TAWANDA     Surgical Information:  Surgery/Procedure: Echo DIONTE  Surgery Location: [facility]  Surgeon: Tiffanie Staff  Surgery Date: 05/03/2023  Time of Surgery: 7:30 AM  Where patient plans to recover: At home with family  Fax number for surgical facility: Note does not need to be faxed, will be available electronically in Epic.    Assessment & Plan     The proposed surgical procedure is considered LOW risk.    (Z01.818) Preop examination  (primary encounter diagnosis)  Comment: For DIONTE echocardiogram as above  Plan: Basic metabolic panel, CBC with platelets             (Z95.818) Presence of Watchman left atrial appendage closure device  Comment: Patient had a Watchman device placed January 2023  Plan:      (I48.19) Persistent atrial fibrillation  Comment: Patient is currently in sinus rhythm  Plan:      (G70.00) Myasthenia gravis (H)  Comment: Patient's ocular myasthenia has now resolved for now.  Plan:      PLAN:  1.  CBC and basic metabolic profile reviewed, results normal see below  2.  Twelve-lead EKG from February 17 reviewed sinus rhythm with premature atrial complexes and other nonspecific findings  3.  Patient is otherwise cleared for the procedure as above  4.  Patient is due for Medicare wellness exam January 2024       Implanted Device:   - Type of device: Watchman device January 2023 place Patient advised to bring device information on day of surgery.       - No identified additional risk factors other than previously addressed    Medication Instructions:  Patient is to take all scheduled  medications on the day of surgery    RECOMMENDATION:  APPROVAL GIVEN to proceed with proposed procedure, without further diagnostic evaluation.            Subjective     HPI related to upcoming procedure: Patient is here for preoperative clearance prior to a DIONTE echocardiogram.  The patient has a history of underlying atrial fibrillation he had the Watchman device placed in January of this year he actually had a brief episode of atrial fibrillation after that had a cardioversion in February but he currently is in sinus rhythm.    The echocardiogram is to document that there is no anatomic or other issue because of the Watchman device.  Patient is on low-dose aspirin which will be lifelong but Plavix will most likely be discontinued in July.    Patient has a history of myasthenia gravis, ocular.  This was diagnosed because of persistent double vision he was on Mestinon but he had diarrhea as a side effect and has since discontinued that medication but of note his double vision has also resolved for now he will follow-up with neurology    Otherwise no other change in his health status          4/19/2023    10:38 PM   Preop Questions   1. Have you ever had a heart attack or stroke? No   2. Have you ever had surgery on your heart or blood vessels, such as a stent placement, a coronary artery bypass, or surgery on an artery in your head, neck, heart, or legs? No   3. Do you have chest pain with activity? No   4. Do you have a history of  heart failure? No   5. Do you currently have a cold, bronchitis or symptoms of other infection? No   6. Do you have a cough, shortness of breath, or wheezing? No   7. Do you or anyone in your family have previous history of blood clots? No   8. Do you or does anyone in your family have a serious bleeding problem such as prolonged bleeding following surgeries or cuts? No   9. Have you ever had problems with anemia or been told to take iron pills? No   10. Have you had any abnormal blood  loss such as black, tarry or bloody stools? No   11. Have you ever had a blood transfusion? No   12. Are you willing to have a blood transfusion if it is medically needed before, during, or after your surgery? Yes   13. Have you or any of your relatives ever had problems with anesthesia? No   14. Do you have sleep apnea, excessive snoring or daytime drowsiness? No   15. Do you have any artifical heart valves or other implanted medical devices like a pacemaker, defibrillator, or continuous glucose monitor? YES -see past surgical history and active medical problems   15a. What type of device do you have? Watchman   15b. Name of the clinic that manages your device:  [facility]   16. Do you have artificial joints? YES -see past surgical history   17. Are you allergic to latex? No       Health Care Directive:  Patient does not have a Health Care Directive or Living Will: Advance Directive received and scanned. Click on Code in the patient header to view.    Preoperative Review of :   reviewed - no record of controlled substances prescribed.       Status of Chronic Conditions:  See problem list for active medical problems.  Problems all longstanding and stable, except as noted/documented.  See ROS for pertinent symptoms related to these conditions.      Review of Systems  CONSTITUTIONAL: NEGATIVE for fever, chills, change in weight  INTEGUMENTARY/SKIN: NEGATIVE for worrisome rashes, moles or lesions  EYES: NEGATIVE for vision changes or irritation  ENT/MOUTH: NEGATIVE for ear, mouth and throat problems  RESP: NEGATIVE for significant cough or SOB  CV: NEGATIVE for chest pain, palpitations or peripheral edema  GI: NEGATIVE for nausea, abdominal pain, heartburn, or change in bowel habits  : NEGATIVE for frequency, dysuria, or hematuria  MUSCULOSKELETAL: NEGATIVE for significant arthralgias or myalgia  NEURO: NEGATIVE for weakness, dizziness or paresthesias  ENDOCRINE: NEGATIVE for temperature intolerance, skin/hair  changes  HEME: NEGATIVE for bleeding problems  PSYCHIATRIC: NEGATIVE for changes in mood or affect    Patient Active Problem List    Diagnosis Date Noted     Presence of Watchman left atrial appendage closure device 01/12/2023     Priority: Medium     1/12/23 - 27 mm Watchman FLX device       Myasthenia gravis (H) 01/04/2023     Priority: Medium     Dx 2022  Double vision       Thyroid nodule 01/05/2022     Priority: Medium     Incidental diagnosis December 2021  2 nodules, 2.1 and 2.4 cm maximum dimension  FNA results 2023, benign  Recommend follow-up 1 3 and 5 years       Obesity (BMI 35.0-39.9) with comorbidity (H) 11/20/2020     Priority: Medium     Lymphedema 06/01/2020     Priority: Medium     Right leg, after surgery  Dr. Orlando, et al  Compression stockings, pneumatic pump-somewhat helpful  Lasix, Nov 2020, but as long December 2020, neither were helpful.  Massage       Persistent atrial fibrillation      Priority: Medium     Dx 2007  Symptomatic, SANDERS -NO longer symptomatic  CV with first episode  [id number] - VASc risk score = 1 (HTN)  Failed Rhythmol > Flecainide    PVI August 19, 2013 April 2019 recurrent AF-Eliquis  Cardioversion, September 2019  Eliquis         Hypertension 04/26/2019     Priority: Medium     Dx Apr 2019  Metoprolol, also for atrial fibrillation.  Lisinopril, April 2019  hydrochlorothiazide Oct 2020         GERD (gastroesophageal reflux disease) 09/10/2018     Priority: Medium     History of a hiatal hernia  Omeprazole daily  Breakthrough symptoms by 2 PM daily         AA (alopecia areata) 09/10/2018     Priority: Medium     Area of hair loss back of scalp  Dermatology         Varicose Veins      Priority: Medium     no symptoms         Aneurysm Of The Ascending Aorta      Priority: Medium     Mild dilatation (4.1 cm) by MRI in Aug 2013  CT December 2021, not clearly visualized  [facility]         Hypothyroidism      Priority: Medium     Thyroid replacement       Status post  ablation of atrial fibrillation 07/28/2015     Priority: Medium     PVI August 19, 2013 (cryo-PVI only)          Past Medical History:   Diagnosis Date     Aneurysm Of The Ascending Aorta     Mild dilatation (4.1 cm) by MRI in Aug 2013 CT December 2021, not clearly visualized [facility]      GERD (gastroesophageal reflux disease) 9/10/2018    History of a hiatal hernia Omeprazole daily Breakthrough symptoms by 2 PM daily      Hypertension 4/26/2019    Dx Apr 2019 Metoprolol, also for atrial fibrillation. Lisinopril, April 2019 hydrochlorothiazide Oct 2020      Hypothyroidism     Thyroid replacement     Lymphedema 6/1/2020    Right leg, after surgery Dr. Orlando, et al Compression stockings, pneumatic pump-somewhat helpful Lasix, Nov 2020, but as long December 2020, neither were helpful. Massage     Myasthenia gravis (H) 1/4/2023    Dx 2022 Double vision     Persistent atrial fibrillation     Dx 2007 Symptomatic, SANDERS -NO longer symptomatic CV with first episode [id number] - VASc risk score = 1 (HTN) Failed Rhythmol > Flecainide   PVI August 19, 2013 April 2019 recurrent AF-Eliquis Cardioversion, September 2019 Eliquis      Status post ablation of atrial fibrillation 07/28/2015    PVI August 19, 2013 (cryo-PVI only)     Thyroid nodule 1/5/2022    Incidental diagnosis December 2021 2 nodules, 2.1 and 2.4 cm maximum dimension FNA results 2023, benign Recommend follow-up 1 3 and 5 years     Past Surgical History:   Procedure Laterality Date     ARTHROPLASTY REVISION HIP Right 05/01/2019     ARTHROPLASTY REVISION KNEE Right 2019     CARDIOVERSION  07/01/2019    7/10/2019     CARDIOVERSION  09/12/2019     CATARACT EXTRACTION Bilateral 06/2021     CV LEFT ATRIAL APPENDAGE CLOSURE N/A 1/12/2023    Procedure: Left Atrial Appendage Closure;  Surgeon: Mitesh Graff MD;  Location: [facility]     DENTAL SURGERY      Oral Surgery Tooth Extraction;  Implant     NM ABLATE HEART DYSRHYTHM FOCUS  08/19/2013     Description: Catheter Ablation Atrial Fibrillation;  Recorded: 11/16/2013;  Comments: PVI Aug 19, 2013 (Cryo to all 4 PV's)     TOOTH EXTRACTION      implant     [facility] TOTAL HIP ARTHROPLASTY Right 05/08/2019    Procedure: RIGHT TOTAL HIP ARTHROPLASTY DIRECT ANTERIOR APPROACH;  Surgeon: Mario Woodruff MD;  Location: [facility];  Service: Orthopedics     [facility] TOTAL KNEE ARTHROPLASTY Right 06/21/2017    Procedure: 2)  RIGHT TOTAL KNEE ARTHROPLASTY;  Surgeon: Mario VICKERS MD;  Location: [facility];  Service: Orthopedics     Current Outpatient Medications   Medication Sig Dispense Refill     amoxicillin (AMOXIL) 500 MG capsule TAKE 4 CAPSULES BY MOUTH 1 HOUR BEFORE DENTAL APPOINTMENT       aspirin (ASA) 81 MG EC tablet Take 1 tablet (81 mg) by mouth daily       clopidogrel (PLAVIX) 75 MG tablet Take 1 tablet (75 mg) by mouth daily 90 tablet 0     hydrochlorothiazide (HYDRODIURIL) 25 MG tablet TAKE 1 TABLET BY MOUTH EVERY DAY IN THE MORNING FOR HIGH BLOOD PRESSURE 90 tablet 2     levothyroxine (SYNTHROID/LEVOTHROID) 100 MCG tablet TAKE 1 TABLET(100 MCG) BY MOUTH DAILY 90 tablet 2     lisinopril (ZESTRIL) 40 MG tablet TAKE 1 TABLET BY MOUTH DAILY FOR HIGH BLOOD PRESSURE 90 tablet 2     omeprazole (PRILOSEC) 20 MG DR capsule TAKE ONE CAPSULE BY MOUTH DAILY BEFORE BREAKFAST 90 capsule 0     pyridostigmine (MESTINON) 60 MG tablet 0.5 tab po BID 90 tablet 3     sotalol (BETAPACE) 80 MG tablet TAKE 1 TABLET BY MOUTH TWICE DAILY 180 tablet 3       Allergies   Allergen Reactions     Sulfa (Sulfonamide Antibiotics) [Sulfa Drugs] Rash     Rash - turned purple  , Around age 32        Social History     Tobacco Use     Smoking status: Never     Smokeless tobacco: Never   Vaping Use     Vaping status: Not on file   Substance Use Topics     Alcohol use: Yes     Alcohol/week: 5.0 standard drinks of alcohol     Types: 5 Cans of beer per week     Comment: minimal     Family History   Problem Relation Age of Onset      "Atrial fibrillation Mother      Dementia Mother         dx 80s     Diabetes Type 2  Mother         dx 60s/70s     Atrial fibrillation Father      Rheumatoid Arthritis Father      Atrial fibrillation Sister      Cerebrovascular Disease Sister      Parkinsonism Sister      Parkinsonism Sister      Atrial fibrillation Brother      Rheumatoid Arthritis Brother      Cerebrovascular Disease Brother      CABG Brother      Atrial fibrillation Brother      Diabetes Type 2  Brother         Dx 40's     History   Drug Use No         Objective     /72 (BP Location: Left arm, Patient Position: Sitting, Cuff Size: Adult Regular)   Pulse 66   Temp 97  F (36.1  C) (Temporal)   Ht 1.803 m (5' 11\")   Wt 119.7 kg (264 lb)   SpO2 98%   BMI 36.82 kg/m      Physical Exam    GENERAL APPEARANCE: healthy, alert and no distress     EYES: EOMI,  PERRL     HENT: ear canals and TM's normal and nose and mouth without ulcers or lesions     NECK: no adenopathy, no asymmetry, masses, or scars and thyroid normal to palpation     RESP: lungs clear to auscultation - no rales, rhonchi or wheezes     CV: regular rates and rhythm, normal S1 S2, no S3 or S4 and no murmur, click or rub     ABDOMEN:  soft, nontender, no HSM or masses and bowel sounds normal     MS: extremities normal- no gross deformities noted, no evidence of inflammation in joints, FROM in all extremities.     SKIN: no suspicious lesions or rashes     NEURO: Normal strength and tone, sensory exam grossly normal, mentation intact and speech normal     PSYCH: mentation appears normal. and affect normal/bright     LYMPHATICS: No cervical adenopathy    Recent Labs   Lab Test 01/12/23  1401 01/09/23  1446 01/04/23  1047   HGB 13.9 15.3 15.3   PLT  --  242 237   NA  --  140 142   POTASSIUM  --  4.9 4.4   CR 1.02 1.09 1.03        Diagnostics:  Recent Results (from the past 48 hour(s))   Basic metabolic panel    Collection Time: 04/24/23 10:04 AM   Result Value Ref Range    Sodium 142 " 136 - 145 mmol/L    Potassium 4.7 3.4 - 5.3 mmol/L    Chloride 105 98 - 107 mmol/L    Carbon Dioxide (CO2) 25 22 - 29 mmol/L    Anion Gap 12 7 - 15 mmol/L    Urea Nitrogen 21.9 8.0 - 23.0 mg/dL    Creatinine 1.10 0.67 - 1.17 mg/dL    Calcium 9.5 8.8 - 10.2 mg/dL    Glucose 104 (H) 70 - 99 mg/dL    GFR Estimate 74 >60 mL/min/1.73m2   CBC with platelets    Collection Time: 04/24/23 10:04 AM   Result Value Ref Range    WBC Count 7.1 4.0 - 11.0 10e3/uL    RBC Count 5.13 4.40 - 5.90 10e6/uL    Hemoglobin 15.6 13.3 - 17.7 g/dL    Hematocrit 47.4 40.0 - 53.0 %    MCV 92 78 - 100 fL    MCH 30.4 26.5 - 33.0 pg    MCHC 32.9 31.5 - 36.5 g/dL    RDW 13.9 10.0 - 15.0 %    Platelet Count 214 150 - 450 10e3/uL      EKG: appears normal, NSR, Premature atrial complexes with aberrant conduction, low voltage QRS, inferior posterior infarct noted.    Revised Cardiac Risk Index (RCRI):  The patient has the following serious cardiovascular risks for perioperative complications:   - No serious cardiac risks = 0 points     RCRI Interpretation: 0 points: Class I (very low risk - 0.4% complication rate)           Signed Electronically by: Magdaleno Bennett MD  Copy of this evaluation report is provided to requesting physician.

## 2023-05-03 ENCOUNTER — HOSPITAL ENCOUNTER (OUTPATIENT)
Dept: CARDIOLOGY | Facility: HOSPITAL | Age: 68
Discharge: HOME | End: 2023-05-03
Attending: INTERNAL MEDICINE | Admitting: INTERNAL MEDICINE
Payer: COMMERCIAL

## 2023-05-03 VITALS
TEMPERATURE: 98.5 F | SYSTOLIC BLOOD PRESSURE: 116 MMHG | RESPIRATION RATE: 21 BRPM | DIASTOLIC BLOOD PRESSURE: 72 MMHG | HEART RATE: 51 BPM | OXYGEN SATURATION: 98 %

## 2023-05-03 DIAGNOSIS — Z95.818 PRESENCE OF WATCHMAN LEFT ATRIAL APPENDAGE CLOSURE DEVICE: ICD-10-CM

## 2023-05-03 DIAGNOSIS — I48.19 PERSISTENT ATRIAL FIBRILLATION (H): ICD-10-CM

## 2023-05-03 PROCEDURE — 93312 ECHO TRANSESOPHAGEAL: CPT

## 2023-05-03 PROCEDURE — 99152 MOD SED SAME PHYS/QHP 5/>YRS: CPT | Performed by: INTERNAL MEDICINE

## 2023-05-03 PROCEDURE — 93325 DOPPLER ECHO COLOR FLOW MAPG: CPT | Mod: 26 | Performed by: INTERNAL MEDICINE

## 2023-05-03 PROCEDURE — 250N000011 HC RX IP 250 OP 636: Performed by: INTERNAL MEDICINE

## 2023-05-03 PROCEDURE — 93320 DOPPLER ECHO COMPLETE: CPT | Mod: 26 | Performed by: INTERNAL MEDICINE

## 2023-05-03 PROCEDURE — 258N000003 HC RX IP 258 OP 636: Performed by: INTERNAL MEDICINE

## 2023-05-03 PROCEDURE — 250N000009 HC RX 250: Performed by: INTERNAL MEDICINE

## 2023-05-03 PROCEDURE — 93312 ECHO TRANSESOPHAGEAL: CPT | Mod: 26 | Performed by: INTERNAL MEDICINE

## 2023-05-03 PROCEDURE — 93325 DOPPLER ECHO COLOR FLOW MAPG: CPT

## 2023-05-03 RX ORDER — FENTANYL CITRATE 50 UG/ML
INJECTION, SOLUTION INTRAMUSCULAR; INTRAVENOUS
Status: COMPLETED | OUTPATIENT
Start: 2023-05-03 | End: 2023-05-03

## 2023-05-03 RX ORDER — LIDOCAINE 40 MG/G
CREAM TOPICAL
Status: CANCELLED | OUTPATIENT
Start: 2023-05-03

## 2023-05-03 RX ORDER — SODIUM CHLORIDE 9 MG/ML
INJECTION, SOLUTION INTRAVENOUS CONTINUOUS
Status: CANCELLED | OUTPATIENT
Start: 2023-05-03

## 2023-05-03 RX ORDER — LIDOCAINE HYDROCHLORIDE 20 MG/ML
SOLUTION OROPHARYNGEAL
Status: COMPLETED | OUTPATIENT
Start: 2023-05-03 | End: 2023-05-03

## 2023-05-03 RX ORDER — LIDOCAINE 40 MG/G
CREAM TOPICAL
Status: DISCONTINUED | OUTPATIENT
Start: 2023-05-03 | End: 2023-05-03 | Stop reason: HOSPADM

## 2023-05-03 RX ORDER — SODIUM CHLORIDE 9 MG/ML
INJECTION, SOLUTION INTRAVENOUS CONTINUOUS
Status: DISCONTINUED | OUTPATIENT
Start: 2023-05-03 | End: 2023-05-03 | Stop reason: HOSPADM

## 2023-05-03 RX ADMIN — FENTANYL CITRATE 100 MCG: 50 INJECTION, SOLUTION INTRAMUSCULAR; INTRAVENOUS at 08:27

## 2023-05-03 RX ADMIN — MIDAZOLAM 1 MG: 1 INJECTION INTRAMUSCULAR; INTRAVENOUS at 08:25

## 2023-05-03 RX ADMIN — LIDOCAINE HYDROCHLORIDE 15 ML: 20 SOLUTION ORAL; TOPICAL at 08:23

## 2023-05-03 RX ADMIN — TOPICAL ANESTHETIC 13 SPRAY: 200 SPRAY DENTAL; PERIODONTAL at 08:23

## 2023-05-03 RX ADMIN — SODIUM CHLORIDE: 9 INJECTION, SOLUTION INTRAVENOUS at 07:59

## 2023-05-03 ASSESSMENT — ACTIVITIES OF DAILY LIVING (ADL): ADLS_ACUITY_SCORE: 35

## 2023-05-03 NOTE — SEDATION DOCUMENTATION
Hx. Of myasthenia gravis, Sx. Were only ocular, denies any difficulty swallowing, Dr. Cotter aware.

## 2023-05-03 NOTE — PRE-PROCEDURE
GENERAL PRE-PROCEDURE:   Procedure:  Transesophageal echocardiogram  Date/Time:  5/3/2023 8:15 AM    Written consent obtained?: Yes    Risks and benefits: Risks, benefits and alternatives were discussed    Consent given by:  Patient  Patient states understanding of procedure being performed: Yes    Patient's understanding of procedure matches consent: Yes    Procedure consent matches procedure scheduled: Yes    Expected level of sedation:  Moderate  Appropriately NPO:  Yes  Mallampati  :  Grade 1- soft palate, uvula, tonsillar pillars, and posterior pharyngeal wall visible  Lungs:  Lungs clear with good breath sounds bilaterally  Heart:  Normal heart sounds and rate  History & Physical reviewed:  History and physical reviewed and no updates needed  Statement of review:  I have reviewed the lab findings, diagnostic data, medications, and the plan for sedation

## 2023-05-03 NOTE — SEDATION DOCUMENTATION
Outpatient DIONTE complete, no patient complaints.  Denies any throat irritation or difficulty swallowing or difficulty breathing.  Swallowing water without difficulty. Discharge instructions given, questions answered.

## 2023-05-03 NOTE — DISCHARGE INSTRUCTIONS
1. You are required to have someone accompany you home.    2. Rest today. Do not drive or operate machinery today. Over-activity may produce nausea and dizziness.    3. You should follow your normal diet. Drink plenty of fluids. Do not drink any alcoholic beverages for 24 hours. *(Alcohol may interact with the medications you received today).  Cold food and fluids at 0945 this morning.    4. NO HOT FOODS or LIQUIDS FOR 6 HOURS after the procedure.  Not until 2:45 PM this afternoon.    5. You may have a sore throat or cough. This is normal. These symptoms should resolve in 24 hours.     6. If you have further questions call your doctor:

## 2023-05-10 ENCOUNTER — TELEPHONE (OUTPATIENT)
Dept: CARDIOLOGY | Facility: CLINIC | Age: 68
End: 2023-05-10
Payer: COMMERCIAL

## 2023-05-10 NOTE — TELEPHONE ENCOUNTER
Phone call to patient discuss results below. Confirmed he is continuing to take DAPT. Informed him writer will call in July to discontinue plavix. He is appreciative. No further questions at this time. ERMA

## 2023-05-10 NOTE — TELEPHONE ENCOUNTER
Patient s/p LAAC 1/12/2023. 3 Month Post-LAAC DIONTE results show well placed and seated LAAC device with no thrombus or flow around the device. Patient EF 60%, unchanged from intra-op DIONTE EF measurements. Per post-LAAC protocol and 3/15/23 office visit with JAZMYNE Cardenas, patient to remain on once daily 81 mg ASA for life and once daily 75 mg plavix (clopidogrel) until 6 months post-LAAC (approximately 7/12/2023). Spoke to patient, he requests call back in one hour. [facility]

## 2023-06-15 ENCOUNTER — ANCILLARY PROCEDURE (OUTPATIENT)
Dept: GENERAL RADIOLOGY | Facility: CLINIC | Age: 68
End: 2023-06-15
Attending: PHYSICIAN ASSISTANT
Payer: COMMERCIAL

## 2023-06-15 ENCOUNTER — OFFICE VISIT (OUTPATIENT)
Dept: FAMILY MEDICINE | Facility: CLINIC | Age: 68
End: 2023-06-15
Payer: COMMERCIAL

## 2023-06-15 VITALS
DIASTOLIC BLOOD PRESSURE: 80 MMHG | TEMPERATURE: 98.5 F | BODY MASS INDEX: 36.54 KG/M2 | SYSTOLIC BLOOD PRESSURE: 132 MMHG | HEART RATE: 77 BPM | WEIGHT: 262 LBS | OXYGEN SATURATION: 97 %

## 2023-06-15 DIAGNOSIS — R05.1 ACUTE COUGH: ICD-10-CM

## 2023-06-15 DIAGNOSIS — R05.1 ACUTE COUGH: Primary | ICD-10-CM

## 2023-06-15 PROCEDURE — 71046 X-RAY EXAM CHEST 2 VIEWS: CPT | Mod: TC | Performed by: RADIOLOGY

## 2023-06-15 PROCEDURE — 99213 OFFICE O/P EST LOW 20 MIN: CPT | Performed by: PHYSICIAN ASSISTANT

## 2023-06-15 ASSESSMENT — ENCOUNTER SYMPTOMS
COUGH: 1
SORE THROAT: 1

## 2023-06-15 NOTE — PROGRESS NOTES
"  Assessment & Plan     Acute cough  New onset after travel.  Denies sob but cough is keeping him up at night  [place] and will not have access to health care, will send in augmentin to start if worsening symptoms.  Increase rest and fluids.  Follow up if symptoms persist or worsen    - XR Chest 2 Views  - amoxicillin-clavulanate (AUGMENTIN) 875-125 MG tablet  Dispense: 14 tablet; Refill: 0  - dextromethorphan (TUSSIN COUGH) 15 MG/5ML syrup  Dispense: 240 mL; Refill: 0       HARRY Brewer [facility]    Andre Oakes is a 67 year old, presenting for the following health issues:  Cough (X 1 week, productive, unable to sleep last night, coughing up \"a huge something out\". ), Pharyngitis (1 week), and come back from [place] about 1 week ago        6/15/2023    10:41 AM   Additional Questions   Roomed by Darrin   Accompanied by self     Cough  Associated symptoms include sore throat.   Pharyngitis   Associated symptoms include cough.   History of Present Illness       Reason for visit:  Persistent cough  Symptom onset:  1-2 weeks ago  Symptom intensity:  Severe  Had these symptoms before:  No  What makes it worse:  Trying sleep    He eats 2-3 servings of fruits and vegetables daily.He consumes 1 sweetened beverage(s) daily.He exercises with enough effort to increase his heart rate 10 to 19 minutes per day.  He exercises with enough effort to increase his heart rate 3 or less days per week.   He is taking medications regularly.       Ill x 1 week.  Has had sore throat in the beginning that has improved, has had congestion and now worsening cough for the past 5 days.  Cough kept him up last night.  Denies fevers, chills or body aches, has had some fatigue.      Review of Systems   HENT: Positive for sore throat.    Respiratory: Positive for cough.             Objective    /80   Pulse 77   Temp 98.5  F (36.9  C) (Oral)   Wt 118.8 kg (262 " lb)   SpO2 97%   BMI 36.54 kg/m    Body mass index is 36.54 kg/m .  Physical Exam   GENERAL: healthy, alert and no distress  NECK: no adenopathy, no asymmetry, masses, or scars and thyroid normal to palpation  RESP: lungs clear to auscultation - no rales, rhonchi or wheezes  CV: regular rate and rhythm, normal S1 S2, no S3 or S4, no murmur, click or rub, no peripheral edema and peripheral pulses strong  MS: no gross musculoskeletal defects noted, no edema    Results for orders placed or performed in visit on 06/15/23   XR Chest 2 Views     Status: None    Narrative    EXAM: XR CHEST 2 VIEWS  LOCATION: [facility]  DATE: 6/15/2023    INDICATION:  Acute cough  COMPARISON: 02/04/2022      Impression    IMPRESSION: Moderate size hiatal hernia similar to previous. Benign calcified left hilar lymph node of no significance. Chest otherwise negative. No acute new findings.

## 2023-06-23 NOTE — PROGRESS NOTES
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    Patient tried using small doses of the Mestinon 60 mg tablet  Had diarrhea that was fairly significant  He started using the Lomotil but used it as the package insert and was taking it after he got the diarrhea    At this point I think that if he needs to use the Mestinon he should increase medication Lomotil  Could try half a Lomotil every day in the morning  If that does not work he can go to half a Lomotil twice daily  If that did not work he did have constipation he can go to half a tablet 3 times a day or even 4 times per day depending on his symptoms    Right now he feels that the diplopia is better  We will hold the Mestinon in reserve  He did get prisms for his glasses      Had a significant improvement in his diplopia  Saw his eye doctor and they got rid of his prisms  Now no longer has to use the prisms has no fluctuating diplopia with low-dose Mestinon    Previously for his aortic aneurysm he gets periodic CT scans of the chest  No evidence of any thymoma on any of those    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  Diplopia (ocular myasthenia gravis)       Onset after starting steroids in September 2020 for alopecia (symptoms came on 2 months later       Variable diplopia followed by ophthalmology       Does have glasses with prism         Patient been following with ophthalmology over the last 2 years       Vision is split more in a skew deviation sometimes can be horizontal sometimes vertical but sometimes mixed       Has had 8-10  "different corrections for his glasses seems to work for a while but then it does not       Saw another eye physician and it looks like his prescription was \"backwards for the adjustment\"       They redid the prisms and when he came back they thought that they were done wrong again which would signify that he has changing diplopia not fixed.       This is most consistent with ocular myasthenia gravis         Initial acetylcholine receptor antibodies were negative       We will send MuSK antibodies       Some people have a \"antibody negative ocular myasthenia gravis\"         No dysphagia/no dysarthria/no difficulty shaving/no difficulty with stairs except for his arthritic knees          Discussed the use of low-dose Mestinon for symptomatic treatment.        Reviewed side effects of the medicine such as increased saliva, GI rumbling, increased diarrhea or loose stool        Usually people get used to these after they are on the med for a while          We will start a low-dose Mestinon half of a 60 mg tablet in the evening around 5 PM his diplopia is worse in the evening if it helps with no side effects then when he follows up we can make further adjustments      B.  Autoimmune disorder       Alopecia September 2020       Treated with steroids blood pressure went up.       Diplopia seem to happen 2 months after      Past medical history  Atrial fibrillation (pulmonary vein isolation 8/19/2013  CAD  Hypertension  Alopecia autoimmune  Posterior capsular cataract  Lymphedema right leg      Habits  Non-smoker  5 cans of beer per week  Works as a CPA    Family history  Mother atrial fibrillation/diabetes/dementia (80s)  Father rheumatoid arthritis/atrial fibrillation  Sister CVA  Sister parkinsonism  Brother atrial fibrillation/rheumatoid arthritis  Brother diabetes  Brother CVA/CABG/atrial fibrillation        Work-up  HEAD MRI: 12/22/2020  1.  No acute intracranial process.  2.  Generalized brain atrophy and presumed " "microvascular ischemic.  HEAD MRA: 12/22/2020  1.  No aneurysm, high flow AVM or significant stenosis identified.  2.  Variant [place] anatomy see official report  CTA chest 12/2/2021  1.  No acute arterial abnormality. Specifically no arterial dissection or aneurysm.  2.  Very mild asymmetric groundglass attenuation in the right upper lobe may reflect an early pulmonary infectious infiltrate.  3.  Enlarged right gland of the thyroid with a 28 mm nodule. A dedicated thyroid ultrasound is recommended to better characterize.  4.  Large esophageal hiatal hernia.  5.  Hepatic cysts.  CT angiogram pulmonary negative for any thymoma did show thyroid nodules is having a biopsy of his thyroid nodule (10/21/2022)  B12 919, (9/10/2018)  ESR 3, CRP 0.5, (9/10/2019)  TSH 0.26 (2/4/2022)  Acetylcholine blocking antibody, 6 negative (11/10/2022)  Acetylcholine modulating antibody, 0 negative (11/10/2022)  Acetylcholine binding antibodies, 0 negative (11/10/2022)  Striatal antibodies, <1:40 negative (11/10/2022)  Musk antibody negative (11/30/2022)        Exam    Review of system    Diplopia fluctuating  No dysarthria  No dysphagia  No trouble shaving  Dressing okay  Can walk up and down stairs but difficult due to his arthritic knees    No chest pain no shortness of breath  No nausea vomiting or diarrhea    Does have some mild neck pain  He get headaches more at the end of the day question whether this is \"eyestrain from his diplopia    Does snore    No focal weakness    Otherwise review of systems negative    General exam  Blood pressure 129/71, pulse 60  Lungs clear  Heart rate seemed regular has history of A. Fib  Abdomen soft  Arthritic knees bilaterally    Neurologic exam  Alert orient x3  Normal prosody speech  Normal naming  Normal comprehension  Normal repetition  No aphasia  No neglect  Memory recall normal    Cranials 2 through 12  With glasses on no hemiplegia seen  No ptosis  Eye closure strong  Visual fields " intact  No nystagmus  Tongue twisters good  Lip closure strong  Face symmetrical    MG testing  Talks in long paragraphs  Can whistle  Eye closure strong  No ptosis  Mild closure strong  Neck flexors and extensors good    Upper extremities  No drift no tremor finger-nose okay   reported right over left  Deltoid 5/5  Biceps 5/5  Triceps 5/5  Wrist and finger extensors 5/5  Wrist and finger flexors 5/5    Lower extremities reported right over left  Iliopsoas 5/5  Quadriceps 5/5  Hamstring 5/5  Anterotibial 5/5    Reflexes symmetrical    Gait  Antalgic, stiff right knee limps on left leg      Assessment/plan    1.  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  Ocular myasthenia gravis       Negative musk antibody       Trial of Mestinon 60 mg tablet, half a tablet at about 5 PM (diplopia worse in the evening)       Doing much better    3.  Atrial fibrillation        Watchman placed 1/12/2023       Recurrent atrial fibrillation       Follows with cardiology       Pulse 53 cautious dosing of medication    4.  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 twice daily (takes actually half a tablet at dinner states the other half as a  rescue)        Patient tried using small doses of the Mestinon 60 mg tablet  Half/quarter tablet twice daily to start    If diarrhea recurs  He will use Lomotil     At this point I think that if he needs to use the Mestinon he should increase medication Lomotil  Could try half a Lomotil every day in the morning  If that does not work he can go to half a Lomotil twice daily  If that did not work he did have constipation he can go to half a tablet 3 times a day or even 4 times per day depending on his symptoms    Patient could have a follow-up in a year he can cancel if he is not needing the medicine not getting by  I hold off on any immune modulator medication which we did discuss    Total care time today 25 minutes        As part of visit today  Reviewed  2/17/2023 and 5/3/2023 persistent atrial fibrillation

## 2023-06-26 ENCOUNTER — OFFICE VISIT (OUTPATIENT)
Dept: NEUROLOGY | Facility: CLINIC | Age: 68
End: 2023-06-26
Payer: COMMERCIAL

## 2023-06-26 VITALS
BODY MASS INDEX: 35 KG/M2 | DIASTOLIC BLOOD PRESSURE: 71 MMHG | HEART RATE: 60 BPM | HEIGHT: 71 IN | WEIGHT: 250 LBS | SYSTOLIC BLOOD PRESSURE: 121 MMHG

## 2023-06-26 DIAGNOSIS — G70.00 MYASTHENIA GRAVIS WITHOUT EXACERBATION (H): Primary | ICD-10-CM

## 2023-06-26 PROCEDURE — 99213 OFFICE O/P EST LOW 20 MIN: CPT | Performed by: PSYCHIATRY & NEUROLOGY

## 2023-06-26 RX ORDER — DIPHENOXYLATE HCL/ATROPINE 2.5-.025MG
1 TABLET ORAL 4 TIMES DAILY PRN
Status: CANCELLED | OUTPATIENT
Start: 2023-06-26

## 2023-06-26 NOTE — NURSING NOTE
Chief Complaint   Patient presents with     Myasthenia Gravis     4 month follow up. Decreased to a quarter of a tablet of mestinon due to loose stools for 3-4 weeks, still did not stop the loose stools. Stopped taking the mestinon completely.     Analisa Tang LPN on 6/26/2023 at 11:16 AM

## 2023-06-26 NOTE — LETTER
6/26/2023         RE: Brett ESPINOZA Sandeep  [address] [place] [address]        Dear Colleague,    Thank you for referring your patient, Brett Hopkins, to the [facility] [place]. Please see a copy of my visit note below.    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    Patient tried using small doses of the Mestinon 60 mg tablet  Had diarrhea that was fairly significant  He started using the Lomotil but used it as the package insert and was taking it after he got the diarrhea    At this point I think that if he needs to use the Mestinon he should increase medication Lomotil  Could try half a Lomotil every day in the morning  If that does not work he can go to half a Lomotil twice daily  If that did not work he did have constipation he can go to half a tablet 3 times a day or even 4 times per day depending on his symptoms    Right now he feels that the diplopia is better  We will hold the Mestinon in reserve  He did get prisms for his glasses      Had a significant improvement in his diplopia  Saw his eye doctor and they got rid of his prisms  Now no longer has to use the prisms has no fluctuating diplopia with low-dose Mestinon    Previously for his aortic aneurysm he gets periodic CT scans of the chest  No evidence of any thymoma on any of those    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  Diplopia (ocular myasthenia gravis)       Onset after starting steroids in September 2020 for alopecia (symptoms came on 2 months later       Variable diplopia followed by  "ophthalmology       Does have glasses with prism         Patient been following with ophthalmology over the last 2 years       Vision is split more in a skew deviation sometimes can be horizontal sometimes vertical but sometimes mixed       Has had 8-10 different corrections for his glasses seems to work for a while but then it does not       Saw another eye physician and it looks like his prescription was \"backwards for the adjustment\"       They redid the prisms and when he came back they thought that they were done wrong again which would signify that he has changing diplopia not fixed.       This is most consistent with ocular myasthenia gravis         Initial acetylcholine receptor antibodies were negative       We will send MuSK antibodies       Some people have a \"antibody negative ocular myasthenia gravis\"         No dysphagia/no dysarthria/no difficulty shaving/no difficulty with stairs except for his arthritic knees          Discussed the use of low-dose Mestinon for symptomatic treatment.        Reviewed side effects of the medicine such as increased saliva, GI rumbling, increased diarrhea or loose stool        Usually people get used to these after they are on the med for a while          We will start a low-dose Mestinon half of a 60 mg tablet in the evening around 5 PM his diplopia is worse in the evening if it helps with no side effects then when he follows up we can make further adjustments      B.  Autoimmune disorder       Alopecia September 2020       Treated with steroids blood pressure went up.       Diplopia seem to happen 2 months after      Past medical history  Atrial fibrillation (pulmonary vein isolation 8/19/2013  CAD  Hypertension  Alopecia autoimmune  Posterior capsular cataract  Lymphedema right leg      Habits  Non-smoker  5 cans of beer per week  Works as a CPA    Family history  Mother atrial fibrillation/diabetes/dementia (80s)  Father rheumatoid arthritis/atrial " "fibrillation  Sister CVA  Sister parkinsonism  Brother atrial fibrillation/rheumatoid arthritis  Brother diabetes  Brother CVA/CABG/atrial fibrillation        Work-up  HEAD MRI: 12/22/2020  1.  No acute intracranial process.  2.  Generalized brain atrophy and presumed microvascular ischemic.  HEAD MRA: 12/22/2020  1.  No aneurysm, high flow AVM or significant stenosis identified.  2.  Variant [place] anatomy see official report  CTA chest 12/2/2021  1.  No acute arterial abnormality. Specifically no arterial dissection or aneurysm.  2.  Very mild asymmetric groundglass attenuation in the right upper lobe may reflect an early pulmonary infectious infiltrate.  3.  Enlarged right gland of the thyroid with a 28 mm nodule. A dedicated thyroid ultrasound is recommended to better characterize.  4.  Large esophageal hiatal hernia.  5.  Hepatic cysts.  CT angiogram pulmonary negative for any thymoma did show thyroid nodules is having a biopsy of his thyroid nodule (10/21/2022)  B12 919, (9/10/2018)  ESR 3, CRP 0.5, (9/10/2019)  TSH 0.26 (2/4/2022)  Acetylcholine blocking antibody, 6 negative (11/10/2022)  Acetylcholine modulating antibody, 0 negative (11/10/2022)  Acetylcholine binding antibodies, 0 negative (11/10/2022)  Striatal antibodies, <1:40 negative (11/10/2022)  Musk antibody negative (11/30/2022)        Exam    Review of system    Diplopia fluctuating  No dysarthria  No dysphagia  No trouble shaving  Dressing okay  Can walk up and down stairs but difficult due to his arthritic knees    No chest pain no shortness of breath  No nausea vomiting or diarrhea    Does have some mild neck pain  He get headaches more at the end of the day question whether this is \"eyestrain from his diplopia    Does snore    No focal weakness    Otherwise review of systems negative    General exam  Blood pressure 129/71, pulse 60  Lungs clear  Heart rate seemed regular has history of A. Fib  Abdomen soft  Arthritic knees " bilaterally    Neurologic exam  Alert orient x3  Normal prosody speech  Normal naming  Normal comprehension  Normal repetition  No aphasia  No neglect  Memory recall normal    Cranials 2 through 12  With glasses on no hemiplegia seen  No ptosis  Eye closure strong  Visual fields intact  No nystagmus  Tongue twisters good  Lip closure strong  Face symmetrical    MG testing  Talks in long paragraphs  Can whistle  Eye closure strong  No ptosis  Mild closure strong  Neck flexors and extensors good    Upper extremities  No drift no tremor finger-nose okay   reported right over left  Deltoid 5/5  Biceps 5/5  Triceps 5/5  Wrist and finger extensors 5/5  Wrist and finger flexors 5/5    Lower extremities reported right over left  Iliopsoas 5/5  Quadriceps 5/5  Hamstring 5/5  Anterotibial 5/5    Reflexes symmetrical    Gait  Antalgic, stiff right knee limps on left leg      Assessment/plan    1.  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  Ocular myasthenia gravis       Negative musk antibody       Trial of Mestinon 60 mg tablet, half a tablet at about 5 PM (diplopia worse in the evening)       Doing much better    3.  Atrial fibrillation        Watchman placed 1/12/2023       Recurrent atrial fibrillation       Follows with cardiology       Pulse 53 cautious dosing of medication    4.  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 twice daily (takes actually half a tablet at dinner states the other half as a rescue)        Patient tried using small doses of the Mestinon 60 mg tablet  Half/quarter tablet twice daily to start    If diarrhea recurs  He will use Lomotil     At this point I think that if he needs to use the Mestinon he should increase medication Lomotil  Could try half a Lomotil every day in the morning  If that does not work he can go to half a Lomotil twice daily  If that did not work he did have constipation he can go to half a tablet 3 times a day or even 4 times per day depending on his symptoms    Patient could have a follow-up in a year he can cancel if he is not needing the medicine not getting by  I hold off on any immune modulator medication which we did discuss    Total care time today 25 minutes        As part of visit today  Reviewed  2/17/2023 and 5/3/2023 persistent atrial fibrillation        Again, thank you for allowing me to participate in the care of your patient.        Sincerely,        vani Traore MD

## 2023-06-27 ENCOUNTER — OFFICE VISIT (OUTPATIENT)
Dept: CARDIOLOGY | Facility: CLINIC | Age: 68
End: 2023-06-27
Attending: NURSE PRACTITIONER
Payer: COMMERCIAL

## 2023-06-27 VITALS
BODY MASS INDEX: 35.7 KG/M2 | HEART RATE: 50 BPM | WEIGHT: 256 LBS | RESPIRATION RATE: 16 BRPM | SYSTOLIC BLOOD PRESSURE: 108 MMHG | DIASTOLIC BLOOD PRESSURE: 70 MMHG | OXYGEN SATURATION: 98 %

## 2023-06-27 DIAGNOSIS — I10 HYPERTENSION, UNSPECIFIED TYPE: ICD-10-CM

## 2023-06-27 DIAGNOSIS — E78.5 DYSLIPIDEMIA: Primary | ICD-10-CM

## 2023-06-27 DIAGNOSIS — I25.10 CORONARY ARTERY DISEASE INVOLVING NATIVE CORONARY ARTERY WITHOUT ANGINA PECTORIS, UNSPECIFIED WHETHER NATIVE OR TRANSPLANTED HEART: ICD-10-CM

## 2023-06-27 DIAGNOSIS — Z95.818 PRESENCE OF WATCHMAN LEFT ATRIAL APPENDAGE CLOSURE DEVICE: ICD-10-CM

## 2023-06-27 DIAGNOSIS — I71.21 ANEURYSM OF ASCENDING AORTA WITHOUT RUPTURE (H): ICD-10-CM

## 2023-06-27 PROCEDURE — 99214 OFFICE O/P EST MOD 30 MIN: CPT | Performed by: INTERNAL MEDICINE

## 2023-06-27 RX ORDER — ROSUVASTATIN CALCIUM 10 MG/1
10 TABLET, COATED ORAL DAILY
Qty: 90 TABLET | Refills: 3 | Status: SHIPPED | OUTPATIENT
Start: 2023-06-27 | End: 2024-06-18

## 2023-06-27 NOTE — LETTER
"6/27/2023    Magdaleno Bennett MD  [address] [facility] [address]    RE: Brett Hopkins       Dear Colleague,     I had the pleasure of seeing Brett Hopkins in the [facility].         [facility] [address], [place] [address]   [email or web address]   OFFICE: [phone]            Impression and Plan     1.  Atrial fibrillation (persistent).  Champ has a history of persistent atrial fibrillation.  He has been followed in the Atrial Fibrillation Clinic.  He underwent direct-current cardioversion 17 February 2023.  He has been maintained on sotalol for rhythm maintenance.  Champ underwent left atrial appendage occlusion device placement 12 January 2023.  Champ is to continue dual antiplatelet therapy with discontinuation of clopidogrel 12 July 2023.    3.  Coronary artery disease.  Champ has coronary artery disease by virtue of CT scan 21 October 2022 revealing mild coronary atherosclerosis without obstructive disease.    4.  Enlargement of proximal ascending aorta.  Cardiac MRI 17 April 2019 revealed mild enlargement of the proximal ascending aorta measuring 41 mm.  CT imaging 2 December 2021 revealed the following: Aorta measures approximately 20 x 27 mm at the annulus, 32 mm at the sinuses of Valsalva, 28 mm at the sinuses of Valsalva, 38 mm at the mid ascending aorta, 29 mm in the arch and 27 the descending thoracic aorta.  CT scan 21 October 2022 revealed only \"borderline\" enlargement of the ascending aorta measuring 4.0 cm.     5.  Hypertension. Blood pressure is quite reasonable in the office today at 108/70 mmHg.    6.  Dyslipidemia.  Lipid profile 4 January 2023 revealed LDL 99 mg/dL and HDL 46 mg/dL.  Given evidence of coronary atherosclerosis, albeit mild based on CT scan 21 October 2022, would advocate trying to suppress LDL less than 70 mg/dL if possible.  Initiate rosuvastatin 10 mg daily.  Fasting lipid profile in 3 months.   "   Plan on follow-up in approximately 6 months.      35 minutes spent reviewing prior records (including documentation, laboratory studies, cardiac testing/imaging), interview with patient along with physical exam, planning, and subsequent documentation/crafting of note).           History of Present Illness    Once again I would like to thank you again for asking me to participate in the care of your patient, Brett Hopkins.  As you know, but to reiterate for my own records, Brett Hopkins is a 67 year old male with history of persistent atrial fibrillation.  Review of medical records indicate that he has history of atrial fibrillation dating back to 2007.  Champ had failed antiarrhythmic therapy with propafenone and flecainide.  He underwent pulmonary vein isolation ablation with Dr. Barfield on 19 August 2013.  He had recurrent atrial fibrillation identified in April 2019.  He underwent direct-current cardioversion 10 July 2019, but had fairly early recurrence.  He was started on sotalol therapy and underwent repeat cardioversion 12 September 2019. He underwent direct-current cardioversion 17 February 2023.       On interview, Champ has been doing well from a cardiac standpoint.  He denies any subjective palpitations to suggest recurrent atrial fibrillation.  No lightheadedness.  No chest pain or shortness of breath of concern.    Further review of systems is otherwise negative/noncontributory (medical record and 13 point review of systems reviewed as well and pertinent positives noted).         Cardiac Diagnostics      Transesophageal echocardiogram 3 May 2023 (personally performed and reviewed):  Normal left ventricular size and systolic performance with a visually estimated ejection fraction of 60%.  No significant valvular heart disease is identified on this study.  Normal right ventricular size and systolic performance.  There is mild left atrial enlargement.  There is a left atrial appendage occlusion device.  The device appears well-placed and seated.  No thrombus is detected upon the surface of the device.  No flow extending into the left atrial appendage was detected around the device.  No residual postprocedural atrial communication is identified.  No intracardiac mass or thrombus is detected  Echo contrast examination was performed using agitated NS as contrast agent. The right heart opacity was good and the left heart was well visualized. There was no evidence of right to left shunting during spontaneous respiration or following release of Valsalva.    Cardiac MRI 17 April 2019:  Normal left ventricular size, wall thickness and slightly reduced global systolic function. The quantified left ventricular ejection fraction is 50%. No myocardial scar is identified.  Normal right ventricular size function.  Moderately enlarged left atrium.  Mild mitral valve regurgitation.  Tricuspid aortic valve with no stenosis or regurgitation per velocity encoded images.  Mildly dilated mid ascending aorta with maximal measurement 41 mm.  When compared to previous study in 2016, there is no interval change in mid ascending    CT scan 21 October 2022:  The following measurements were made of the left atrial appendage at 35% cardiac phase at the level of the bifurcation of the left circumflex artery:  Orifice diameter: 28 x 25 mm (average 26 mm)  Orifice circumference: 83 mm  Orifice area: 5.42 cm   Useful depth: 17 mm  Maximum depth: 35 mm  No thrombus in the left atrium or left atrial appendage.  Mild coronary atherosclerosis with no obvious obstructive lesions.  Borderline enlargement of the ascending aorta measuring 4.0 cm.      Regadenoson nuclear perfusion study 11 February 2022:  No evidence of infarct or ischemia.  Normal left ventricular systolic performance with ejection fraction of 70%.    Holter monitor 24 July 2019:  Persistent atrial fibrillation with somewhat elevated ventricular response.  No symptoms recorded on  diary.    Twelve-lead ECG (personally reviewed) 4 February 2022: Sinus rhythm.  Heart rate 45 bpm.  QTc 399 ms.              Physical Examination       /70 (BP Location: Right arm, Patient Position: Sitting, Cuff Size: Adult Large)   Pulse 50   Resp 16   Wt 116.1 kg (256 lb)   SpO2 98%   BMI 35.70 kg/m          Wt Readings from Last 3 Encounters:   06/27/23 116.1 kg (256 lb)   06/26/23 113.4 kg (250 lb)   06/15/23 118.8 kg (262 lb)     The patient is alert and oriented times three. Sclerae are anicteric. Mucosal membranes are moist. Jugular venous pressure is normal. No significant adenopathy/thyromegally appreciated. Lungs are clear with good expansion. On cardiovascular exam, the patient has a regular S1 and S2. Abdomen is soft and non-tender. Extremities reveal no clubbing, cyanosis, or edema.           Family History/Social History/Risk Factors   Patient does not smoke.  Family history reviewed, and family history includes Atrial fibrillation in his brother, brother, father, mother, and sister; CABG in his brother; Cerebrovascular Disease in his brother and sister; Dementia in his mother; Diabetes Type 2  in his brother and mother; Parkinsonism in his sister and sister; Rheumatoid Arthritis in his brother and father.          Medical History  Surgical History Family History Social History   No past medical history on file.  Past Surgical History:   Procedure Laterality Date    ARTHROPLASTY REVISION HIP Right 05/01/2019    ARTHROPLASTY REVISION KNEE Right 2019    CARDIOVERSION  07/01/2019    7/10/2019    CARDIOVERSION  09/12/2019    CATARACT EXTRACTION Bilateral 06/2021    CV LEFT ATRIAL APPENDAGE CLOSURE N/A 1/12/2023    Procedure: Left Atrial Appendage Closure;  Surgeon: Mitesh Graff MD;  Location: [facility]    DENTAL SURGERY      Oral Surgery Tooth Extraction;  Implant    IL ABLATE HEART DYSRHYTHM FOCUS  08/19/2013    Description: Catheter Ablation Atrial Fibrillation;  Recorded:  11/16/2013;  Comments: PVI Aug 19, 2013 (Cryo to all 4 PV's)    TOOTH EXTRACTION      implant    [facility] TOTAL HIP ARTHROPLASTY Right 05/08/2019    Procedure: RIGHT TOTAL HIP ARTHROPLASTY DIRECT ANTERIOR APPROACH;  Surgeon: Mario Woodruff MD;  Location: [facility] Main OR;  Service: Orthopedics    [facility] TOTAL KNEE ARTHROPLASTY Right 06/21/2017    Procedure: 2)  RIGHT TOTAL KNEE ARTHROPLASTY;  Surgeon: Mario VICKERS MD;  Location: [facility] Main OR;  Service: Orthopedics     Family History   Problem Relation Age of Onset    Atrial fibrillation Mother     Dementia Mother         dx 80s    Diabetes Type 2  Mother         dx 60s/70s    Atrial fibrillation Father     Rheumatoid Arthritis Father     Atrial fibrillation Sister     Cerebrovascular Disease Sister     Parkinsonism Sister     Parkinsonism Sister     Atrial fibrillation Brother     Rheumatoid Arthritis Brother     Cerebrovascular Disease Brother     CABG Brother     Atrial fibrillation Brother     Diabetes Type 2  Brother         Dx 40's        Social History     Socioeconomic History    Marital status:      Spouse name: Not on file    Number of children: 2    Years of education: Not on file    Highest education level: Not on file   Occupational History    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   Tobacco Use    Smoking status: Never    Smokeless tobacco: Never   Substance and Sexual Activity    Alcohol use: Yes     Alcohol/week: 5.0 standard drinks of alcohol     Types: 5 Cans of beer per week     Comment: minimal    Drug use: No    Sexual activity: Yes     Partners: Female   Other Topics Concern    Not on file   Social History Narrative    Diet-regular            Exercise-walk, limited by R leg, R knee     Social Determinants of Health     Financial Resource Strain: Not on file   Food Insecurity: Not on file   Transportation Needs: Not on file   Physical Activity: Not on file   Stress: Not on file   Social Connections: Not on file   Intimate  Partner Violence: Not on file   Housing Stability: Not on file           Medications  Allergies   Current Outpatient Medications   Medication Sig Dispense Refill    aspirin (ASA) 81 MG EC tablet Take 1 tablet (81 mg) by mouth daily      clopidogrel (PLAVIX) 75 MG tablet Take 1 tablet (75 mg) by mouth daily 90 tablet 0    hydrochlorothiazide (HYDRODIURIL) 25 MG tablet TAKE 1 TABLET BY MOUTH EVERY DAY IN THE MORNING FOR HIGH BLOOD PRESSURE 90 tablet 2    levothyroxine (SYNTHROID/LEVOTHROID) 100 MCG tablet TAKE 1 TABLET(100 MCG) BY MOUTH DAILY 90 tablet 2    lisinopril (ZESTRIL) 40 MG tablet TAKE 1 TABLET BY MOUTH DAILY FOR HIGH BLOOD PRESSURE 90 tablet 2    omeprazole (PRILOSEC) 20 MG DR capsule TAKE ONE CAPSULE BY MOUTH DAILY BEFORE BREAKFAST 90 capsule 2    rosuvastatin (CRESTOR) 10 MG tablet Take 1 tablet (10 mg) by mouth daily 90 tablet 3    sotalol (BETAPACE) 80 MG tablet TAKE 1 TABLET BY MOUTH TWICE DAILY 180 tablet 3    amoxicillin (AMOXIL) 500 MG capsule TAKE 4 CAPSULES BY MOUTH 1 HOUR BEFORE DENTAL APPOINTMENT (Patient not taking: Reported on 6/27/2023)      dextromethorphan (TUSSIN COUGH) 15 MG/5ML syrup Take 10 mLs (30 mg) by mouth nightly as needed for cough 240 mL 0       Allergies   Allergen Reactions    Sulfa (Sulfonamide Antibiotics) [Sulfa Antibiotics] Rash     Rash - turned purple  , Around age 32          Lab Results    Chemistry/lipid CBC Cardiac Enzymes/BNP/TSH/INR   Recent Labs   Lab Test 01/04/23  1047   CHOL 167   HDL 46   LDL 99   TRIG 112     Recent Labs   Lab Test 01/04/23  1047 11/22/21  0747 11/20/20  0915   LDL 99 93 82     Recent Labs   Lab Test 04/24/23  1004      POTASSIUM 4.7   CHLORIDE 105   CO2 25   *   BUN 21.9   CR 1.10   GFRESTIMATED 74   YFN 9.5     Recent Labs   Lab Test 04/24/23  1004 01/12/23  1401 01/09/23  1446   CR 1.10 1.02 1.09     No results for input(s): A1C in the last 88951 hours.       Recent Labs   Lab Test 04/24/23  1004   WBC 7.1   HGB 15.6   HCT  47.4   MCV 92        Recent Labs   Lab Test 04/24/23  1004 01/12/23  1401 01/09/23  1446   HGB 15.6 13.9 15.3    Recent Labs   Lab Test 02/04/22  1214   TROPONINI <0.01     Recent Labs   Lab Test 02/04/22  1214   BNP 56     Recent Labs   Lab Test 01/04/23  1047   TSH 3.14     Recent Labs   Lab Test 12/21/20  2232   INR 1.12*          Medications  Allergies   Current Outpatient Medications   Medication Sig Dispense Refill    aspirin (ASA) 81 MG EC tablet Take 1 tablet (81 mg) by mouth daily      clopidogrel (PLAVIX) 75 MG tablet Take 1 tablet (75 mg) by mouth daily 90 tablet 0    hydrochlorothiazide (HYDRODIURIL) 25 MG tablet TAKE 1 TABLET BY MOUTH EVERY DAY IN THE MORNING FOR HIGH BLOOD PRESSURE 90 tablet 2    levothyroxine (SYNTHROID/LEVOTHROID) 100 MCG tablet TAKE 1 TABLET(100 MCG) BY MOUTH DAILY 90 tablet 2    lisinopril (ZESTRIL) 40 MG tablet TAKE 1 TABLET BY MOUTH DAILY FOR HIGH BLOOD PRESSURE 90 tablet 2    omeprazole (PRILOSEC) 20 MG DR capsule TAKE ONE CAPSULE BY MOUTH DAILY BEFORE BREAKFAST 90 capsule 2    rosuvastatin (CRESTOR) 10 MG tablet Take 1 tablet (10 mg) by mouth daily 90 tablet 3    sotalol (BETAPACE) 80 MG tablet TAKE 1 TABLET BY MOUTH TWICE DAILY 180 tablet 3    amoxicillin (AMOXIL) 500 MG capsule TAKE 4 CAPSULES BY MOUTH 1 HOUR BEFORE DENTAL APPOINTMENT (Patient not taking: Reported on 6/27/2023)      dextromethorphan (TUSSIN COUGH) 15 MG/5ML syrup Take 10 mLs (30 mg) by mouth nightly as needed for cough 240 mL 0      Allergies   Allergen Reactions    Sulfa (Sulfonamide Antibiotics) [Sulfa Antibiotics] Rash     Rash - turned purple  , Around age 32          Lab Results   Lab Results   Component Value Date     04/24/2023    CO2 25 04/24/2023    CO2 29 02/04/2022    BUN 21.9 04/24/2023    BUN 20 02/04/2022     Lab Results   Component Value Date    WBC 7.1 04/24/2023    HGB 15.6 04/24/2023    HCT 47.4 04/24/2023    MCV 92 04/24/2023     04/24/2023     Lab Results   Component  Value Date    CHOL 167 01/04/2023    TRIG 112 01/04/2023    HDL 46 01/04/2023     Lab Results   Component Value Date    INR 1.12 12/21/2020     Lab Results   Component Value Date    BNP 56 02/04/2022     Lab Results   Component Value Date    TROPONINI <0.01 02/04/2022     Lab Results   Component Value Date    TSH 3.14 01/04/2023    TSH 0.26 02/04/2022                    Thank you for allowing me to participate in the care of your patient.      Sincerely,     Tae Cotter MD     [facility]  cc:   CRISTIANO Rasmussen CNP  [address] [facility], [address]  [place] [address]

## 2023-06-27 NOTE — PROGRESS NOTES
" [facility] [address], [place] [address]   [email or web address]   OFFICE: [phone]            Impression and Plan     1.  Atrial fibrillation (persistent).  Champ has a history of persistent atrial fibrillation.  He has been followed in the Atrial Fibrillation Clinic.  He underwent direct-current cardioversion 17 February 2023.  He has been maintained on sotalol for rhythm maintenance.  Champ underwent left atrial appendage occlusion device placement 12 January 2023.  Champ is to continue dual antiplatelet therapy with discontinuation of clopidogrel 12 July 2023.    3.  Coronary artery disease.  Champ has coronary artery disease by virtue of CT scan 21 October 2022 revealing mild coronary atherosclerosis without obstructive disease.    4.  Enlargement of proximal ascending aorta.  Cardiac MRI 17 April 2019 revealed mild enlargement of the proximal ascending aorta measuring 41 mm.  CT imaging 2 December 2021 revealed the following: Aorta measures approximately 20 x 27 mm at the annulus, 32 mm at the sinuses of Valsalva, 28 mm at the sinuses of Valsalva, 38 mm at the mid ascending aorta, 29 mm in the arch and 27 the descending thoracic aorta.  CT scan 21 October 2022 revealed only \"borderline\" enlargement of the ascending aorta measuring 4.0 cm.     5.  Hypertension. Blood pressure is quite reasonable in the office today at 108/70 mmHg.    6.  Dyslipidemia.  Lipid profile 4 January 2023 revealed LDL 99 mg/dL and HDL 46 mg/dL.  Given evidence of coronary atherosclerosis, albeit mild based on CT scan 21 October 2022, would advocate trying to suppress LDL less than 70 mg/dL if possible.    Initiate rosuvastatin 10 mg daily.    Fasting lipid profile in 3 months.     Plan on follow-up in approximately 6 months.      35 minutes spent reviewing prior records (including documentation, laboratory studies, cardiac testing/imaging), interview with patient along with " physical exam, planning, and subsequent documentation/crafting of note).           History of Present Illness    Once again I would like to thank you again for asking me to participate in the care of your patient, Brett Hopkins.  As you know, but to reiterate for my own records, Brett Hopkins is a 67 year old male with history of persistent atrial fibrillation.  Review of medical records indicate that he has history of atrial fibrillation dating back to 2007.  Champ had failed antiarrhythmic therapy with propafenone and flecainide.  He underwent pulmonary vein isolation ablation with Dr. Barfield on 19 August 2013.  He had recurrent atrial fibrillation identified in April 2019.  He underwent direct-current cardioversion 10 July 2019, but had fairly early recurrence.  He was started on sotalol therapy and underwent repeat cardioversion 12 September 2019. He underwent direct-current cardioversion 17 February 2023.       On interview, Champ has been doing well from a cardiac standpoint.  He denies any subjective palpitations to suggest recurrent atrial fibrillation.  No lightheadedness.  No chest pain or shortness of breath of concern.    Further review of systems is otherwise negative/noncontributory (medical record and 13 point review of systems reviewed as well and pertinent positives noted).         Cardiac Diagnostics      Transesophageal echocardiogram 3 May 2023 (personally performed and reviewed):  1. Normal left ventricular size and systolic performance with a visually estimated ejection fraction of 60%.  2. No significant valvular heart disease is identified on this study.  3. Normal right ventricular size and systolic performance.  4. There is mild left atrial enlargement.  5. There is a left atrial appendage occlusion device. The device appears well-placed and seated.    No thrombus is detected upon the surface of the device.    No flow extending into the left atrial appendage was detected around the  device.  6. No residual postprocedural atrial communication is identified.  7. No intracardiac mass or thrombus is detected  8. Echo contrast examination was performed using agitated NS as contrast agent. The right heart opacity was good and the left heart was well visualized. There was no evidence of right to left shunting during spontaneous respiration or following release of Valsalva.    Cardiac MRI 17 April 2019:  1. Normal left ventricular size, wall thickness and slightly reduced global systolic function. The quantified left ventricular ejection fraction is 50%. No myocardial scar is identified.  2. Normal right ventricular size function.  3. Moderately enlarged left atrium.  4. Mild mitral valve regurgitation.  5. Tricuspid aortic valve with no stenosis or regurgitation per velocity encoded images.  6. Mildly dilated mid ascending aorta with maximal measurement 41 mm.    When compared to previous study in 2016, there is no interval change in mid ascending    CT scan 21 October 2022:  1. The following measurements were made of the left atrial appendage at 35% cardiac phase at the level of the bifurcation of the left circumflex artery:  a. Orifice diameter: 28 x 25 mm (average 26 mm)  b. Orifice circumference: 83 mm  c. Orifice area: 5.42 cm   d. Useful depth: 17 mm  e. Maximum depth: 35 mm  2. No thrombus in the left atrium or left atrial appendage.  3. Mild coronary atherosclerosis with no obvious obstructive lesions.  4. Borderline enlargement of the ascending aorta measuring 4.0 cm.      Regadenoson nuclear perfusion study 11 February 2022:  1. No evidence of infarct or ischemia.  2. Normal left ventricular systolic performance with ejection fraction of 70%.    Holter monitor 24 July 2019:  1. Persistent atrial fibrillation with somewhat elevated ventricular response.  2. No symptoms recorded on diary.    Twelve-lead ECG (personally reviewed) 4 February 2022: Sinus rhythm.  Heart rate 45 bpm.  QTc 399  ms.              Physical Examination       /70 (BP Location: Right arm, Patient Position: Sitting, Cuff Size: Adult Large)   Pulse 50   Resp 16   Wt 116.1 kg (256 lb)   SpO2 98%   BMI 35.70 kg/m          Wt Readings from Last 3 Encounters:   06/27/23 116.1 kg (256 lb)   06/26/23 113.4 kg (250 lb)   06/15/23 118.8 kg (262 lb)     The patient is alert and oriented times three. Sclerae are anicteric. Mucosal membranes are moist. Jugular venous pressure is normal. No significant adenopathy/thyromegally appreciated. Lungs are clear with good expansion. On cardiovascular exam, the patient has a regular S1 and S2. Abdomen is soft and non-tender. Extremities reveal no clubbing, cyanosis, or edema.           Family History/Social History/Risk Factors   Patient does not smoke.  Family history reviewed, and family history includes Atrial fibrillation in his brother, brother, father, mother, and sister; CABG in his brother; Cerebrovascular Disease in his brother and sister; Dementia in his mother; Diabetes Type 2  in his brother and mother; Parkinsonism in his sister and sister; Rheumatoid Arthritis in his brother and father.          Medical History  Surgical History Family History Social History   No past medical history on file.  Past Surgical History:   Procedure Laterality Date     ARTHROPLASTY REVISION HIP Right 05/01/2019     ARTHROPLASTY REVISION KNEE Right 2019     CARDIOVERSION  07/01/2019    7/10/2019     CARDIOVERSION  09/12/2019     CATARACT EXTRACTION Bilateral 06/2021     CV LEFT ATRIAL APPENDAGE CLOSURE N/A 1/12/2023    Procedure: Left Atrial Appendage Closure;  Surgeon: Mitesh Graff MD;  Location: [facility]     DENTAL SURGERY      Oral Surgery Tooth Extraction;  Implant     [place] ABLATE HEART DYSRHYTHM FOCUS  08/19/2013    Description: Catheter Ablation Atrial Fibrillation;  Recorded: 11/16/2013;  Comments: PVI Aug 19, 2013 (Cryo to all 4 PV's)     TOOTH EXTRACTION      implant     ZZC  TOTAL HIP ARTHROPLASTY Right 05/08/2019    Procedure: RIGHT TOTAL HIP ARTHROPLASTY DIRECT ANTERIOR APPROACH;  Surgeon: Mario Woodruff MD;  Location: [facility] Main OR;  Service: Orthopedics     [facility] TOTAL KNEE ARTHROPLASTY Right 06/21/2017    Procedure: 2)  RIGHT TOTAL KNEE ARTHROPLASTY;  Surgeon: Mario VICKERS MD;  Location: [facility] Main OR;  Service: Orthopedics     Family History   Problem Relation Age of Onset     Atrial fibrillation Mother      Dementia Mother         dx 80s     Diabetes Type 2  Mother         dx 60s/70s     Atrial fibrillation Father      Rheumatoid Arthritis Father      Atrial fibrillation Sister      Cerebrovascular Disease Sister      Parkinsonism Sister      Parkinsonism Sister      Atrial fibrillation Brother      Rheumatoid Arthritis Brother      Cerebrovascular Disease Brother      CABG Brother      Atrial fibrillation Brother      Diabetes Type 2  Brother         Dx 40's        Social History     Socioeconomic History     Marital status:      Spouse name: Not on file     Number of children: 2     Years of education: Not on file     Highest education level: Not on file   Occupational History     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   Tobacco Use     Smoking status: Never     Smokeless tobacco: Never   Substance and Sexual Activity     Alcohol use: Yes     Alcohol/week: 5.0 standard drinks of alcohol     Types: 5 Cans of beer per week     Comment: minimal     Drug use: No     Sexual activity: Yes     Partners: Female   Other Topics Concern     Not on file   Social History Narrative    Diet-regular            Exercise-walk, limited by R leg, R knee     Social Determinants of Health     Financial Resource Strain: Not on file   Food Insecurity: Not on file   Transportation Needs: Not on file   Physical Activity: Not on file   Stress: Not on file   Social Connections: Not on file   Intimate Partner Violence: Not on file   Housing Stability: Not on file            Medications  Allergies   Current Outpatient Medications   Medication Sig Dispense Refill     aspirin (ASA) 81 MG EC tablet Take 1 tablet (81 mg) by mouth daily       clopidogrel (PLAVIX) 75 MG tablet Take 1 tablet (75 mg) by mouth daily 90 tablet 0     hydrochlorothiazide (HYDRODIURIL) 25 MG tablet TAKE 1 TABLET BY MOUTH EVERY DAY IN THE MORNING FOR HIGH BLOOD PRESSURE 90 tablet 2     levothyroxine (SYNTHROID/LEVOTHROID) 100 MCG tablet TAKE 1 TABLET(100 MCG) BY MOUTH DAILY 90 tablet 2     lisinopril (ZESTRIL) 40 MG tablet TAKE 1 TABLET BY MOUTH DAILY FOR HIGH BLOOD PRESSURE 90 tablet 2     omeprazole (PRILOSEC) 20 MG DR capsule TAKE ONE CAPSULE BY MOUTH DAILY BEFORE BREAKFAST 90 capsule 2     rosuvastatin (CRESTOR) 10 MG tablet Take 1 tablet (10 mg) by mouth daily 90 tablet 3     sotalol (BETAPACE) 80 MG tablet TAKE 1 TABLET BY MOUTH TWICE DAILY 180 tablet 3     amoxicillin (AMOXIL) 500 MG capsule TAKE 4 CAPSULES BY MOUTH 1 HOUR BEFORE DENTAL APPOINTMENT (Patient not taking: Reported on 6/27/2023)       dextromethorphan (TUSSIN COUGH) 15 MG/5ML syrup Take 10 mLs (30 mg) by mouth nightly as needed for cough 240 mL 0       Allergies   Allergen Reactions     Sulfa (Sulfonamide Antibiotics) [Sulfa Antibiotics] Rash     Rash - turned purple  , Around age 32          Lab Results    Chemistry/lipid CBC Cardiac Enzymes/BNP/TSH/INR   Recent Labs   Lab Test 01/04/23  1047   CHOL 167   HDL 46   LDL 99   TRIG 112     Recent Labs   Lab Test 01/04/23  1047 11/22/21  0747 11/20/20  0915   LDL 99 93 82     Recent Labs   Lab Test 04/24/23  1004      POTASSIUM 4.7   CHLORIDE 105   CO2 25   *   BUN 21.9   CR 1.10   GFRESTIMATED 74   YFN 9.5     Recent Labs   Lab Test 04/24/23  1004 01/12/23  1401 01/09/23  1446   CR 1.10 1.02 1.09     No results for input(s): A1C in the last 13523 hours.       Recent Labs   Lab Test 04/24/23  1004   WBC 7.1   HGB 15.6   HCT 47.4   MCV 92        Recent Labs   Lab Test  04/24/23  1004 01/12/23  1401 01/09/23  1446   HGB 15.6 13.9 15.3    Recent Labs   Lab Test 02/04/22  1214   TROPONINI <0.01     Recent Labs   Lab Test 02/04/22  1214   BNP 56     Recent Labs   Lab Test 01/04/23  1047   TSH 3.14     Recent Labs   Lab Test 12/21/20  2232   INR 1.12*          Medications  Allergies   Current Outpatient Medications   Medication Sig Dispense Refill     aspirin (ASA) 81 MG EC tablet Take 1 tablet (81 mg) by mouth daily       clopidogrel (PLAVIX) 75 MG tablet Take 1 tablet (75 mg) by mouth daily 90 tablet 0     hydrochlorothiazide (HYDRODIURIL) 25 MG tablet TAKE 1 TABLET BY MOUTH EVERY DAY IN THE MORNING FOR HIGH BLOOD PRESSURE 90 tablet 2     levothyroxine (SYNTHROID/LEVOTHROID) 100 MCG tablet TAKE 1 TABLET(100 MCG) BY MOUTH DAILY 90 tablet 2     lisinopril (ZESTRIL) 40 MG tablet TAKE 1 TABLET BY MOUTH DAILY FOR HIGH BLOOD PRESSURE 90 tablet 2     omeprazole (PRILOSEC) 20 MG DR capsule TAKE ONE CAPSULE BY MOUTH DAILY BEFORE BREAKFAST 90 capsule 2     rosuvastatin (CRESTOR) 10 MG tablet Take 1 tablet (10 mg) by mouth daily 90 tablet 3     sotalol (BETAPACE) 80 MG tablet TAKE 1 TABLET BY MOUTH TWICE DAILY 180 tablet 3     amoxicillin (AMOXIL) 500 MG capsule TAKE 4 CAPSULES BY MOUTH 1 HOUR BEFORE DENTAL APPOINTMENT (Patient not taking: Reported on 6/27/2023)       dextromethorphan (TUSSIN COUGH) 15 MG/5ML syrup Take 10 mLs (30 mg) by mouth nightly as needed for cough 240 mL 0      Allergies   Allergen Reactions     Sulfa (Sulfonamide Antibiotics) [Sulfa Antibiotics] Rash     Rash - turned purple  , Around age 32          Lab Results   Lab Results   Component Value Date     04/24/2023    CO2 25 04/24/2023    CO2 29 02/04/2022    BUN 21.9 04/24/2023    BUN 20 02/04/2022     Lab Results   Component Value Date    WBC 7.1 04/24/2023    HGB 15.6 04/24/2023    HCT 47.4 04/24/2023    MCV 92 04/24/2023     04/24/2023     Lab Results   Component Value Date    CHOL 167 01/04/2023    TRIG  112 01/04/2023    HDL 46 01/04/2023     Lab Results   Component Value Date    INR 1.12 12/21/2020     Lab Results   Component Value Date    BNP 56 02/04/2022     Lab Results   Component Value Date    TROPONINI <0.01 02/04/2022     Lab Results   Component Value Date    TSH 3.14 01/04/2023    TSH 0.26 02/04/2022

## 2023-07-12 ENCOUNTER — TELEPHONE (OUTPATIENT)
Dept: CARDIOLOGY | Facility: CLINIC | Age: 68
End: 2023-07-12
Payer: COMMERCIAL

## 2023-07-12 NOTE — TELEPHONE ENCOUNTER
Call placed to patient. m-Traverse completed below. Instructed patient that last day of plavix should be today 7/12/23. Confirmed that patient is going to continue taking Aspirin 81mg daily indefinitely. He will follow up with Dr. Cotter in 6 months for 1 year Post LAAC DIONTE. Patient has no further questions. -SC    MODIFIED SCOTT SCALE   Timepoint: 6mo Post-LAAC    Previous score: 0    Score Description   0 No symptoms at all   1 No significant disability despite symptoms; able to carry out all usual duties and activities   2 Slight disability; unable to carry out all previous activities, but able to look after own affairs without assistance   3 Moderate disability; requiring some help, but able to walk without assistance   4 Moderately severe disability; unable to walk without assistance and unable to attend to own bodily needs without assistance   5 Severe disability; bedridden, incontinent and requiring constant nursing care and attention   6 Dead    Total score (0 - 6):  0    Change in score if s/p LAAC? No  If yes, notify implanting cardiologist.    Laura Terry RN BSN  Structural Heart Coordinator   [facility]  [phone]

## 2023-09-27 ENCOUNTER — TRANSFERRED RECORDS (OUTPATIENT)
Dept: HEALTH INFORMATION MANAGEMENT | Facility: CLINIC | Age: 68
End: 2023-09-27
Payer: COMMERCIAL

## 2023-10-11 ENCOUNTER — NURSE TRIAGE (OUTPATIENT)
Dept: CARDIOLOGY | Facility: CLINIC | Age: 68
End: 2023-10-11
Payer: COMMERCIAL

## 2023-10-11 ENCOUNTER — TELEPHONE (OUTPATIENT)
Dept: CARDIOLOGY | Facility: CLINIC | Age: 68
End: 2023-10-11
Payer: COMMERCIAL

## 2023-10-11 DIAGNOSIS — I48.19 PERSISTENT ATRIAL FIBRILLATION (H): Primary | ICD-10-CM

## 2023-10-11 NOTE — TELEPHONE ENCOUNTER
[facility] Call Center    Phone Message    May a detailed message be left on voicemail: yes     Reason for Call: Other: Patient called wanting to speak with someone on the care team in regards to their Kardia Device that is saying some weird things on there. Patient is trying to figure out if they are in afib and have been experiencing lightheadedness for the passed 3 weeks. Transferred patient to triage nurse in regards to lightheadedness. Please call patient to further discuss about the device. Patient will like a call back before the end of today, if possible as they are leaving out of town tomorrow. Thank you!     Action Taken: Other: Cardiology    Travel Screening: Not Applicable    Thank you!  [facility]

## 2023-10-11 NOTE — TELEPHONE ENCOUNTER
Called back Champ to address his concerns. He notes that this is not urgent and did not require nurse triage.     Champ reports that he has felt some lightheadedness and dizziness at times with both activity and rest for the last 3 weeks. He has had recent issues with myasthenia gravis and had undergone some visual changes/prescription glasses changes. He thought at first it could be related to his eyes. He also reads a lot so he took a break from reading. This did not help.     He decided to use his [employer] mobile jorge and he used this and the first reading he got was afib with a rate of 60. He did it 5 more times and got either undetermined rhythm or sinus rhythm. This prompted his call. He denies palpitations, shortness of breath or a racing heart rate. His heart rate stays in the 60's and he is on sotalol 80 mg bid. He had some brief left upper chest wall pain that he did have last winter when he was in afib but he felt this over the weekend briefly and felt it to be more musculoskeletal. He mainly just wanted to know if he needs any sort of testing to see if he is going in and out of Afib again.     Dr. Cotter is out of the office this week. Will route to Afib clinic to see about any cardiac testing or if this should await his return. -[facility]

## 2023-10-11 NOTE — TELEPHONE ENCOUNTER
Glenna,  In the absence of Dr. Cotter, could you review and advise on any cardiac testing for Champ? If you think this should wait for his return to clinic next week, please let me know. This was sent to nurse triage, but it is not an urgent call- see my note.   Thank you so much,  Chante

## 2023-10-12 NOTE — TELEPHONE ENCOUNTER
Pt was called, corresponding information/recommendations reviewed, verbalized understanding, has no further questions at this time, contact information was given for further concerns/questions, scheduling notified to contact pt, and order(s) were placed   10/12/2023 2:35 PM  SOPHIE Carpio Chloe, APRN CNP  P Hcc Ep Support Pool - [facility]  Caller: Unspecified (Yesterday,  4:26 PM)  MCOT x 1 week to confirm A fib and determine burden.  Thanks,  Glenna

## 2023-10-16 ENCOUNTER — HOSPITAL ENCOUNTER (OUTPATIENT)
Dept: CARDIOLOGY | Facility: CLINIC | Age: 68
Discharge: HOME | End: 2023-10-16
Attending: NURSE PRACTITIONER | Admitting: NURSE PRACTITIONER
Payer: COMMERCIAL

## 2023-10-16 DIAGNOSIS — I48.19 PERSISTENT ATRIAL FIBRILLATION (H): ICD-10-CM

## 2023-10-16 PROCEDURE — 93270 REMOTE 30 DAY ECG REV/REPORT: CPT

## 2023-10-24 PROCEDURE — 93272 ECG/REVIEW INTERPRET ONLY: CPT | Performed by: INTERNAL MEDICINE

## 2023-10-27 ENCOUNTER — DOCUMENTATION ONLY (OUTPATIENT)
Dept: CARDIOLOGY | Facility: CLINIC | Age: 68
End: 2023-10-27
Payer: COMMERCIAL

## 2023-10-27 DIAGNOSIS — I48.19 PERSISTENT ATRIAL FIBRILLATION (H): Primary | ICD-10-CM

## 2023-10-27 RX ORDER — SOTALOL HYDROCHLORIDE 120 MG/1
60 TABLET ORAL 2 TIMES DAILY
Qty: 90 TABLET | Refills: 3 | Status: SHIPPED | OUTPATIENT
Start: 2023-10-27 | End: 2023-11-13 | Stop reason: DRUGHIGH

## 2023-10-27 NOTE — PROGRESS NOTES
Glenna Cardenas, APRN CNP  P Hcc Ep Support Pool - [facility]  Hrs not significantly changed, but lightheadedness seems to correlate with when his heart rates are lower.  No A-fib.  Lets try decreasing sotalol to 60 mg twice daily.  Thanks,  Glenna

## 2023-10-27 NOTE — RESULT ENCOUNTER NOTE
Hrs not significantly changed, but lightheadedness seems to correlate with when his heart rates are lower.  No A-fib.  Lets try decreasing sotalol to 60 mg twice daily.  Thanks,  Glenna

## 2023-11-07 ENCOUNTER — ALLIED HEALTH/NURSE VISIT (OUTPATIENT)
Dept: CARDIOLOGY | Facility: CLINIC | Age: 68
End: 2023-11-07
Payer: COMMERCIAL

## 2023-11-07 ENCOUNTER — NURSE TRIAGE (OUTPATIENT)
Dept: CARDIOLOGY | Facility: CLINIC | Age: 68
End: 2023-11-07
Payer: COMMERCIAL

## 2023-11-07 VITALS
HEART RATE: 80 BPM | OXYGEN SATURATION: 98 % | RESPIRATION RATE: 16 BRPM | SYSTOLIC BLOOD PRESSURE: 134 MMHG | DIASTOLIC BLOOD PRESSURE: 95 MMHG

## 2023-11-07 DIAGNOSIS — I48.19 PERSISTENT ATRIAL FIBRILLATION (H): Primary | ICD-10-CM

## 2023-11-07 DIAGNOSIS — I48.19 PERSISTENT ATRIAL FIBRILLATION (H): ICD-10-CM

## 2023-11-07 PROCEDURE — 93000 ELECTROCARDIOGRAM COMPLETE: CPT | Mod: 77 | Performed by: INTERNAL MEDICINE

## 2023-11-07 NOTE — TELEPHONE ENCOUNTER
"Received a call from the call center to discuss A fib.  Spoke to Champ, who says Glenna Ronald CNP recently decreased the sotalol dose down to 60 mg twice daily due to having a low heart rate and dizzy spells.   He says he got a new smart watch that gave him a warning this morning of A fib, with HR's ranging  bpm. He says he checked his [employer] mobile device also which is indicating A fib as well.  His smart watch says current HR 91 bpm on the call. He says he had some dizziness last night, but today is mildly dizzy, not disruptive to his activities. He denies any shortness of breath, fatigue, weakness.  He would like to discuss any possible medication adjustments.  Advised a message will be sent to Diane Cardenas's team for follow up.  Please call cell phone on file.    1. DESCRIPTION: \"Please describe your heart rate or heartbeat that you are having\" (e.g., fast/slow, regular/irregular, skipped or extra beats, \"palpitations\")irregular, somewhat fast  2. ONSET: \"When did it start?\" (Minutes, hours or days) this morning  3. DURATION: \"How long does it last\" (e.g., seconds, minutes, hours)  4. PATTERN \"Does it come and go, or has it been constant since it started?\" \"Does it get worse with exertion?\" \"Are you feeling it now?\"  5. TAP: \"Using your hand, can you tap out what you are feeling on a chair or table in front of you, so that I can hear?\" (Note: not all patients can do this)  6. HEART RATE: \"Can you tell me your heart rate?\" \"How many beats in 15 seconds?\" (Note: not all patients can do this) ranging  bpm today  7. RECURRENT SYMPTOM: \"Have you ever had this before?\" If Yes, ask: \"When was the last time?\" and \"What happened that time?\"  8. CAUSE: \"What do you think is causing the palpitations?\"  9. CARDIAC HISTORY: \"Do you have any history of heart disease?\" (e.g., heart attack, angina, bypass surgery, angioplasty, arrhythmia) persistent A fib  10. OTHER SYMPTOMS: \"Do you have any other symptoms?\" (e.g., " "dizziness, chest pain, sweating, difficulty breathing) mild dizziness  11. PREGNANCY: \"Is there any chance you are pregnant?\" \"When was your last menstrual period?\"  Additional Information    Negative: Heart beating very rapidly (e.g., > 140 / minute) and present now  (Exception: During exercise.)    Protocols used: Heart Rate and Heartbeat [id number]    "

## 2023-11-07 NOTE — TELEPHONE ENCOUNTER
Justice Manzanares/Taryn,     ECG done at  today  Confirms Afib  Recommendations?    Thanks!  Francisca

## 2023-11-07 NOTE — TELEPHONE ENCOUNTER
Justice Manzanares, see triage below.  Pt had recent monitor:  Cardiac event monitoring from 10/16/2023 to 10/22/2023 (monitored duration 6d 5h 34m).  Baseline rhythm was sinus rhythm 51bpm with IVCD.    Reported heart rate range 45 to 113bpm, average 62bpm.  28 symptom triggers (lightheaded, dizzy) correlated to sinus rhythm 47-81bpm with IVCD, rare PACs and PVCs.  No tachyarrhythmias.  No atrial fibrillation.  There were no pauses noted.  Supraventricular and ventricular ectopic beat frequency are not reported on this monitoring modality.      Sotalol was decreased to 60mg BID   Sounds like now pt having more Afib    Clinic visit recommended?  Thank you!  Francisca

## 2023-11-07 NOTE — TELEPHONE ENCOUNTER
Taryn Condon, Glo Beckford RN  Caller: Unspecified (Today, 11:12 AM)  Increase Sotalol to 80 mg BID and then offer DCCV. He had a Watchman so will need Eliquis BID prior to DCCV per that protocol.  Will need another EKG after 5 th dose of Sotalol due to dose adjustment.  Will need to watch HR on Sotalol since his holter showed some tendency towards bradycardia.  He failed Flecainide in the past so if he does not tolerate higher dose of Sotalol will need to load him on Amiodarone.    Thanks,  Taryn

## 2023-11-07 NOTE — TELEPHONE ENCOUNTER
"Spoke with patient.  He verbalizes concerns with going back on sotalol 80mg BID as he just previously lightheaded and dizzy  He reviews multiple concerns with \"starting back to square one\"  And \"no one can seem to help fix his problem\"  He reviews that someone told him \"Dr. Barfield isn't my doctor anymore and he is too busy for you\"    He is asking to speak with someone by phone regarding issues and concerns.  Message sent to Glenna and RODDY to review   Will await further communication.   JW  "

## 2023-11-07 NOTE — TELEPHONE ENCOUNTER
Pc to patient, reviewed recommendations below.  He is agreeable to ECG.  Order placed and scheduling notiifed.  JW

## 2023-11-07 NOTE — TELEPHONE ENCOUNTER
Taryn Condon, Glo Beckford RN  Caller: Unspecified (Today, 11:12 AM)  Please have patient come in for an EKG today to confirm rhythm since he is feeling symptomatic, his monitor did not show any AF.    Thanks,  Taryn

## 2023-11-08 ENCOUNTER — ALLIED HEALTH/NURSE VISIT (OUTPATIENT)
Dept: FAMILY MEDICINE | Facility: CLINIC | Age: 68
End: 2023-11-08
Payer: COMMERCIAL

## 2023-11-08 DIAGNOSIS — I48.19 PERSISTENT ATRIAL FIBRILLATION (H): Primary | ICD-10-CM

## 2023-11-08 DIAGNOSIS — Z23 ENCOUNTER FOR IMMUNIZATION: Primary | ICD-10-CM

## 2023-11-08 LAB
ATRIAL RATE - MUSE: 75 BPM
DIASTOLIC BLOOD PRESSURE - MUSE: NORMAL MMHG
INTERPRETATION ECG - MUSE: NORMAL
P AXIS - MUSE: NORMAL DEGREES
PR INTERVAL - MUSE: NORMAL MS
QRS DURATION - MUSE: 120 MS
QT - MUSE: 384 MS
QTC - MUSE: 456 MS
R AXIS - MUSE: -13 DEGREES
SYSTOLIC BLOOD PRESSURE - MUSE: NORMAL MMHG
T AXIS - MUSE: 30 DEGREES
VENTRICULAR RATE- MUSE: 85 BPM

## 2023-11-08 PROCEDURE — G0008 ADMIN INFLUENZA VIRUS VAC: HCPCS

## 2023-11-08 PROCEDURE — 90662 IIV NO PRSV INCREASED AG IM: CPT

## 2023-11-08 RX ORDER — RESPIRATORY SYNCYTIAL VIRUS VACCINE 120MCG/0.5
0.5 KIT INTRAMUSCULAR ONCE
Qty: 1 EACH | Refills: 0 | Status: CANCELLED | OUTPATIENT
Start: 2023-11-08 | End: 2023-11-08

## 2023-11-08 NOTE — TELEPHONE ENCOUNTER
Patient spoke with RODDY via phone.  Patient instructed to get 60+ ounces of non caffeinated fluid daily  Patient will increase his sotalol as previously instructed to 80mg BID and check in on Thursday if he remains in AF.  Will prep for DCCV if patient confirms continued AF.  Per RODDY patient to have ECG to confirm NSR and check qtc is patient feels he converted.  Otherwise QTC check to be done at DCCV.  Will postpone message to Thursday to check in with patient if he hasn't called already.    JW

## 2023-11-09 ENCOUNTER — PREP FOR PROCEDURE (OUTPATIENT)
Dept: CARDIOLOGY | Facility: CLINIC | Age: 68
End: 2023-11-09
Payer: COMMERCIAL

## 2023-11-09 ENCOUNTER — DOCUMENTATION ONLY (OUTPATIENT)
Dept: CARDIOLOGY | Facility: CLINIC | Age: 68
End: 2023-11-09
Payer: COMMERCIAL

## 2023-11-09 DIAGNOSIS — I48.19 PERSISTENT ATRIAL FIBRILLATION (H): Primary | ICD-10-CM

## 2023-11-09 RX ORDER — LIDOCAINE 40 MG/G
CREAM TOPICAL
Status: CANCELLED | OUTPATIENT
Start: 2023-11-09

## 2023-11-09 NOTE — TELEPHONE ENCOUNTER
PC back to pt  Pt reports being in AF continuously, despite starting Sotalol  Pt has hx of LAAO  Advised pt that we would set up DCCV  Per Dr Barfield and guidelines for DCCV, pt will start Eliquis 5mg BID by tomorrow for 7 days and for 4 wks s/p  RX sent in and DCCV order placed  Pt is aware to call if he is unable to start or  the medication by tomorrow 11/10/23  Pt verbalized understanding, has no further questions or concerns at this time, and has our contact information if needed.  11/9/2023 12:33 PM  SOPHIE Carpio Alyson21 minutes ago (11:54 AM)     AM  [facility]     Phone Message     May a detailed message be left on voicemail: yes      Reason for Call: Other: Patient would like Dr. Barfield know that he is still in Afib. Patient has taken medication per Dr. Genao orders. Please reach out.       Action Taken: Other: Cardiology      Travel Screening: Not Applicable     Thank you!  [facility]

## 2023-11-09 NOTE — PROGRESS NOTES
H&P  PMD: [x]  Received [] Card OV: []  Date:  Sent LM  []  Teach  []   Orders  I [x] P  [x]  Letter []   AC: Eliquis- hx of LAAO started 11/10 NP Med Review: NO changes- Pt only on AAD and/or has Device    DM Meds: None  GLP-1:None       1955  Home:[phone] (home) Cell:[phone] (mobile)  Emergency Contact: Ankita Hopkins [phone]  PCP: Magdaleno Bennett, [phone]    +++Important patient information for CSC/Cath Lab staff : None+++    [facility] EP Cath Lab Procedure Order   Procedure: Cardioversion for AF  Ordering Provider: Dr Barfield  Date Ordered and Prepped: 11/9/2023 Glo Carcamo RN  Anticipated Case Duration:  Standard  Scheduling Needs/Timeframe:  Schedule on or after 11/17/23  Scheduling Contact: Please contact pt to schedule  Cardiology Follow Up Apt s/p: Standard- EP CHIQUITA @ 4-6 wks or previously scheduled General Card apt  Current Device/Device Co Needed for Procedure: None NoneNone  Pre-Procedural Testing needed: None  Anesthesia:  General    [facility] EP Cath Lab Prep   H&P:  Pt to schedule with PMD to complete  Pre-op Labs: K if pt taking diuretic medication or hx of low Potassium, Beta HcG if appropriate, and INR if on Warfarin will be ordered AM of procedure and Review of most recent labs, WEL for procedure  T&S Pre-Procedure Review: T&S is not required for DCCV/DFT Testing  Medical Records Pertinent for Procedure:  None  Contrast Dye Allergies: NA  GLP-1 Protocol: If on Dulaglutide (Trulicity) (weekly)- Injection hold 7 days prior to procedure  , Exenatide extended release (Bydureon bcise) (weekly)- Injection hold 7 days prior to procedure, Exenatide (Byetta) (twice daily)- Oral Tablet hold day prior and morning of procedure and for Injection hold 7 days prior to procedure, Semaglutide (Ozempic) (weekly)- Injection and Oral hold 7 days prior to procedure, Liraglutide (Victoza, Saxenda) (daily)- Injection hold day prior and morning of procedure    Allergies   Allergen Reactions     Sulfa (Sulfonamide Antibiotics) [Sulfa Antibiotics] Rash     Rash - turned purple  , Around age 32       Current Outpatient Medications:     apixaban ANTICOAGULANT (ELIQUIS ANTICOAGULANT) 5 MG tablet, Take 1 tablet (5 mg) by mouth 2 times daily, Disp: 56 tablet, Rfl: 0    aspirin (ASA) 81 MG EC tablet, Take 1 tablet (81 mg) by mouth daily, Disp: , Rfl:     hydrochlorothiazide (HYDRODIURIL) 25 MG tablet, TAKE 1 TABLET BY MOUTH EVERY DAY IN THE MORNING FOR HIGH BLOOD PRESSURE, Disp: 90 tablet, Rfl: 2    levothyroxine (SYNTHROID/LEVOTHROID) 100 MCG tablet, TAKE 1 TABLET(100 MCG) BY MOUTH DAILY, Disp: 90 tablet, Rfl: 2    lisinopril (ZESTRIL) 40 MG tablet, TAKE 1 TABLET BY MOUTH DAILY FOR HIGH BLOOD PRESSURE, Disp: 90 tablet, Rfl: 2    omeprazole (PRILOSEC) 20 MG DR capsule, TAKE ONE CAPSULE BY MOUTH DAILY BEFORE BREAKFAST, Disp: 90 capsule, Rfl: 2    rosuvastatin (CRESTOR) 10 MG tablet, Take 1 tablet (10 mg) by mouth daily, Disp: 90 tablet, Rfl: 3    sotalol (BETAPACE) 120 MG tablet, Take 0.5 tablets (60 mg) by mouth 2 times daily, Disp: 90 tablet, Rfl: 3    Documentation Date:11/9/2023 12:53 PM  Glo Carcamo RN

## 2023-11-13 ENCOUNTER — OFFICE VISIT (OUTPATIENT)
Dept: FAMILY MEDICINE | Facility: CLINIC | Age: 68
End: 2023-11-13
Payer: COMMERCIAL

## 2023-11-13 VITALS
HEART RATE: 69 BPM | HEIGHT: 69 IN | BODY MASS INDEX: 39.1 KG/M2 | SYSTOLIC BLOOD PRESSURE: 110 MMHG | OXYGEN SATURATION: 97 % | DIASTOLIC BLOOD PRESSURE: 70 MMHG | WEIGHT: 264 LBS

## 2023-11-13 DIAGNOSIS — I48.19 PERSISTENT ATRIAL FIBRILLATION (H): ICD-10-CM

## 2023-11-13 DIAGNOSIS — Z01.818 PREOP EXAMINATION: Primary | ICD-10-CM

## 2023-11-13 PROCEDURE — 99214 OFFICE O/P EST MOD 30 MIN: CPT | Performed by: FAMILY MEDICINE

## 2023-11-13 RX ORDER — SOTALOL HYDROCHLORIDE 80 MG/1
80 TABLET ORAL 2 TIMES DAILY
COMMUNITY
End: 2023-12-27

## 2023-11-13 NOTE — H&P (VIEW-ONLY)
[facility]  [address] [address]  Phone: [phone]  Fax: [phone]  Primary Provider: Magdaleno Russo  Pre-op Performing Provider: MAGDALENO RUSSO        PREOPERATIVE EVALUATION:  Today's date: 11/13/2023    Champ is a 68 year old male who presents for a preoperative evaluation.      6/15/2023    10:41 AM   Additional Questions   Roomed by Darrin   Accompanied by self       Surgical Information:  Surgery/Procedure:  Cardioversion for AF   Surgery Location:[facility]  Surgeon: Dr Barfield  Surgery Date: 11/17/23  Time of Surgery: 8:30 am  Where patient plans to recover: At home with family  Fax number for surgical facility: Note does not need to be faxed, will be available electronically in Epic.    Assessment & Plan     The proposed surgical procedure is considered LOW risk.    (Z01.818) Preop examination  (primary encounter diagnosis)  Comment: For cardioversion as above   Plan:      (I48.19) Persistent atrial fibrillation  Comment: Patient is currently in atrial fibrillation, asymptomatic, status post numerous medications and procedures  Plan:      PLAN:  The patient will stay on Eliquis until postprocedure  Patient will have potassium drawn the morning of the procedure  Patient is cleared for the procedure  Patient has a follow-up appointment with me in the near future            - No identified additional risk factors other than previously addressed    Antiplatelet or Anticoagulation Medication Instructions:   - Bleeding risk is low for this procedure (e.g. dental, skin, cataract).    Additional Medication Instructions:  Patient is to take all scheduled medications on the day of surgery    RECOMMENDATION:  APPROVAL GIVEN to proceed with proposed procedure, without further diagnostic evaluation.            Subjective       HPI related to upcoming procedure: Patient comes in for preoperative clearance prior to a cardioversion as above    The patient has a  longstanding history of atrial fibrillation going back as far as 2007, he generally has no symptoms when he is in atrial fibrillation though he now has a smart watch which can tell him so.    Patient has had prior cardioversion, numerous medication he did have a Watchman device placed in January of this year but when he had a DIONTE that put him back in atrial fibrillation and he is scheduled for cardioversion later this week    His cardiologist has discussed the possibility of a repeat ablation procedure but we will see how this upcoming procedure goes.    The patient is on sotalol but dose has been adjusted he is on Eliquis now prior to the procedure.    The patient has had numerous surgical procedures without any issues with anesthesia and has been no other change in his health status.    Patient's blood pressure is well controlled he reports occasionally he will get more elevated values but usually is well controlled here in the office.          11/12/2023    12:37 PM   Preop Questions   1. Have you ever had a heart attack or stroke? No   2. Have you ever had surgery on your heart or blood vessels, such as a stent placement, a coronary artery bypass, or surgery on an artery in your head, neck, heart, or legs? No   3. Do you have chest pain with activity? No   4. Do you have a history of  heart failure? No   5. Do you currently have a cold, bronchitis or symptoms of other infection? No   6. Do you have a cough, shortness of breath, or wheezing? No   7. Do you or anyone in your family have previous history of blood clots? No   8. Do you or does anyone in your family have a serious bleeding problem such as prolonged bleeding following surgeries or cuts? No   9. Have you ever had problems with anemia or been told to take iron pills? No   10. Have you had any abnormal blood loss such as black, tarry or bloody stools? No   11. Have you ever had a blood transfusion? No   12. Are you willing to have a blood transfusion if  it is medically needed before, during, or after your surgery? Yes   13. Have you or any of your relatives ever had problems with anesthesia? No   14. Do you have sleep apnea, excessive snoring or daytime drowsiness? No   15. Do you have any artifical heart valves or other implanted medical devices like a pacemaker, defibrillator, or continuous glucose monitor? No   16. Do you have artificial joints? YES -see past surgical history   17. Are you allergic to latex? No       Health Care Directive:  Patient does not have a Health Care Directive or Living Will: Advance Directive received and scanned. Click on Code in the patient header to view.    Preoperative Review of :   reviewed - no record of controlled substances prescribed.        Status of Chronic Conditions:  See problem list for active medical problems.  Problems all longstanding and stable, except as noted/documented.  See ROS for pertinent symptoms related to these conditions.    Review of Systems  CONSTITUTIONAL: NEGATIVE for fever, chills, change in weight  INTEGUMENTARY/SKIN: NEGATIVE for worrisome rashes, moles or lesions  EYES: NEGATIVE for vision changes or irritation  ENT/MOUTH: NEGATIVE for ear, mouth and throat problems  RESP: NEGATIVE for significant cough or SOB  CV: NEGATIVE for chest pain, palpitations or peripheral edema  GI: NEGATIVE for nausea, abdominal pain, heartburn, or change in bowel habits  : NEGATIVE for frequency, dysuria, or hematuria  MUSCULOSKELETAL: NEGATIVE for significant arthralgias or myalgia  NEURO: NEGATIVE for weakness, dizziness or paresthesias  ENDOCRINE: NEGATIVE for temperature intolerance, skin/hair changes  HEME: NEGATIVE for bleeding problems  PSYCHIATRIC: NEGATIVE for changes in mood or affect    Patient Active Problem List    Diagnosis Date Noted    Presence of Watchman left atrial appendage closure device 01/12/2023     Priority: Medium     1/12/23 - 27 mm Watchman FLX device      Myasthenia gravis (H)  01/04/2023     Priority: Medium     Dx 2022  Double vision      Thyroid nodule 01/05/2022     Priority: Medium     Incidental diagnosis December 2021  2 nodules, 2.1 and 2.4 cm maximum dimension  FNA results 2023, benign  Recommend follow-up 1 3 and 5 years      Obesity (BMI 35.0-39.9) with comorbidity (H) 11/20/2020     Priority: Medium    Lymphedema 06/01/2020     Priority: Medium     Right leg, after surgery  Dr. Orlando, et al  Compression stockings, pneumatic pump-somewhat helpful  Lasix, Nov 2020, but as long December 2020, neither were helpful.  Massage      Persistent atrial fibrillation      Priority: Medium     Dx 2007  Symptomatic, SANDERS -NO longer symptomatic  CV with first episode  [id number] - VASc risk score = 1 (HTN)  Failed Rhythmol > Flecainide    PVI August 19, 2013 April 2019 recurrent AF-Eliquis  Cardioversion, September 2019  Eliquis        Hypertension 04/26/2019     Priority: Medium     Dx Apr 2019  Metoprolol, also for atrial fibrillation.  Lisinopril, April 2019  hydrochlorothiazide Oct 2020        GERD (gastroesophageal reflux disease) 09/10/2018     Priority: Medium     History of a hiatal hernia  Omeprazole daily  Breakthrough symptoms by 2 PM daily        AA (alopecia areata) 09/10/2018     Priority: Medium     Area of hair loss back of scalp  Dermatology        Varicose Veins      Priority: Medium     no symptoms        Aneurysm Of The Ascending Aorta      Priority: Medium     Mild dilatation (4.1 cm) by MRI in Aug 2013  CT December 2021, not clearly visualized  [facility]        Hypothyroidism      Priority: Medium     Thyroid replacement      Status post ablation of atrial fibrillation 07/28/2015     Priority: Medium     PVI August 19, 2013 (cryo-PVI only)          No past medical history on file.  Past Surgical History:   Procedure Laterality Date    ARTHROPLASTY REVISION HIP Right 05/01/2019    ARTHROPLASTY REVISION KNEE Right 2019    CARDIOVERSION  07/01/2019    7/10/2019     CARDIOVERSION  09/12/2019    CATARACT EXTRACTION Bilateral 06/2021    CV LEFT ATRIAL APPENDAGE CLOSURE N/A 1/12/2023    Procedure: Left Atrial Appendage Closure;  Surgeon: Mitesh Graff MD;  Location: [facility] CV    DENTAL SURGERY      Oral Surgery Tooth Extraction;  Implant    NE ABLATE HEART DYSRHYTHM FOCUS  08/19/2013    Description: Catheter Ablation Atrial Fibrillation;  Recorded: 11/16/2013;  Comments: PVI Aug 19, 2013 (Cryo to all 4 PV's)    TOOTH EXTRACTION      implant    [facility] TOTAL HIP ARTHROPLASTY Right 05/08/2019    Procedure: RIGHT TOTAL HIP ARTHROPLASTY DIRECT ANTERIOR APPROACH;  Surgeon: Mario Woodruff MD;  Location: [facility];  Service: Orthopedics    [facility] TOTAL KNEE ARTHROPLASTY Right 06/21/2017    Procedure: 2)  RIGHT TOTAL KNEE ARTHROPLASTY;  Surgeon: Mario VICKERS MD;  Location: [facility];  Service: Orthopedics     Current Outpatient Medications   Medication Sig Dispense Refill    apixaban ANTICOAGULANT (ELIQUIS ANTICOAGULANT) 5 MG tablet Take 1 tablet (5 mg) by mouth 2 times daily 56 tablet 0    aspirin (ASA) 81 MG EC tablet Take 1 tablet (81 mg) by mouth daily      hydrochlorothiazide (HYDRODIURIL) 25 MG tablet TAKE 1 TABLET BY MOUTH EVERY DAY IN THE MORNING FOR HIGH BLOOD PRESSURE 90 tablet 2    levothyroxine (SYNTHROID/LEVOTHROID) 100 MCG tablet TAKE 1 TABLET(100 MCG) BY MOUTH DAILY 90 tablet 2    lisinopril (ZESTRIL) 40 MG tablet TAKE 1 TABLET BY MOUTH DAILY FOR HIGH BLOOD PRESSURE 90 tablet 2    omeprazole (PRILOSEC) 20 MG DR capsule TAKE ONE CAPSULE BY MOUTH DAILY BEFORE BREAKFAST 90 capsule 2    rosuvastatin (CRESTOR) 10 MG tablet Take 1 tablet (10 mg) by mouth daily 90 tablet 3    sotalol (BETAPACE) 120 MG tablet Take 0.5 tablets (60 mg) by mouth 2 times daily 90 tablet 3       Allergies   Allergen Reactions    Sulfa (Sulfonamide Antibiotics) [Sulfa Antibiotics] Rash     Rash - turned purple  , Around age 32        Social History     Tobacco Use     Smoking status: Never    Smokeless tobacco: Never   Substance Use Topics    Alcohol use: Yes     Alcohol/week: 5.0 standard drinks of alcohol     Types: 5 Cans of beer per week     Comment: minimal     Family History   Problem Relation Age of Onset    Atrial fibrillation Mother     Dementia Mother         dx 80s    Diabetes Type 2  Mother         dx 60s/70s    Atrial fibrillation Father     Rheumatoid Arthritis Father     Atrial fibrillation Sister     Cerebrovascular Disease Sister     Parkinsonism Sister     Parkinsonism Sister     Atrial fibrillation Brother     Rheumatoid Arthritis Brother     Cerebrovascular Disease Brother     CABG Brother     Atrial fibrillation Brother     Diabetes Type 2  Brother         Dx 40's     History   Drug Use No         Objective     There were no vitals taken for this visit.    Physical Exam    GENERAL APPEARANCE: healthy, alert and no distress     EYES: EOMI,  PERRL     HENT: ear canals and TM's normal and nose and mouth without ulcers or lesions     NECK: no adenopathy, no asymmetry, masses, or scars and thyroid normal to palpation     RESP: lungs clear to auscultation - no rales, rhonchi or wheezes     CV: regular rates and rhythm, normal S1 S2, no S3 or S4 and no murmur, click or rub     CV: irregularly irregular rhythm     ABDOMEN:  soft, nontender, no HSM or masses and bowel sounds normal     MS: extremities normal- no gross deformities noted, no evidence of inflammation in joints, FROM in all extremities.     SKIN: no suspicious lesions or rashes     NEURO: Normal strength and tone, sensory exam grossly normal, mentation intact and speech normal     PSYCH: mentation appears normal. and affect normal/bright     LYMPHATICS: No cervical adenopathy    Recent Labs   Lab Test 04/24/23  1004 01/12/23  1401 01/09/23  1446   HGB 15.6 13.9 15.3     --  242     --  140   POTASSIUM 4.7  --  4.9   CR 1.10 1.02 1.09        Diagnostics:  No labs were ordered during this  visit.   EKG: atrial fibrillation, rate 85    Revised Cardiac Risk Index (RCRI):  The patient has the following serious cardiovascular risks for perioperative complications:   - No serious cardiac risks = 0 points     RCRI Interpretation: 0 points: Class I (very low risk - 0.4% complication rate)         Signed Electronically by: Magdaleno Bennett MD  Copy of this evaluation report is provided to requesting physician.

## 2023-11-13 NOTE — PROGRESS NOTES
[facility]  [address], [place] [address]  Phone: [phone]  Fax: [phone]  Primary Provider: Magdaleno Russo  Pre-op Performing Provider: MAGDALENO RUSSO        PREOPERATIVE EVALUATION:  Today's date: 11/13/2023    Champ is a 68 year old male who presents for a preoperative evaluation.      6/15/2023    10:41 AM   Additional Questions   Roomed by Darrin   Accompanied by self       Surgical Information:  Surgery/Procedure:  Cardioversion for AF   Surgery Location:[facility]  Surgeon: Dr Barfield  Surgery Date: 11/17/23  Time of Surgery: 8:30 am  Where patient plans to recover: At home with family  Fax number for surgical facility: Note does not need to be faxed, will be available electronically in Epic.    Assessment & Plan     The proposed surgical procedure is considered LOW risk.    (Z01.818) Preop examination  (primary encounter diagnosis)  Comment: For cardioversion as above   Plan:      (I48.19) Persistent atrial fibrillation  Comment: Patient is currently in atrial fibrillation, asymptomatic, status post numerous medications and procedures  Plan:      PLAN:  The patient will stay on Eliquis until postprocedure  Patient will have potassium drawn the morning of the procedure  Patient is cleared for the procedure  Patient has a follow-up appointment with me in the near future            - No identified additional risk factors other than previously addressed    Antiplatelet or Anticoagulation Medication Instructions:   - Bleeding risk is low for this procedure (e.g. dental, skin, cataract).    Additional Medication Instructions:  Patient is to take all scheduled medications on the day of surgery    RECOMMENDATION:  APPROVAL GIVEN to proceed with proposed procedure, without further diagnostic evaluation.            Subjective       HPI related to upcoming procedure: Patient comes in for preoperative clearance prior to a cardioversion as above    The patient has a  longstanding history of atrial fibrillation going back as far as 2007, he generally has no symptoms when he is in atrial fibrillation though he now has a smart watch which can tell him so.    Patient has had prior cardioversion, numerous medication he did have a Watchman device placed in January of this year but when he had a DIONTE that put him back in atrial fibrillation and he is scheduled for cardioversion later this week    His cardiologist has discussed the possibility of a repeat ablation procedure but we will see how this upcoming procedure goes.    The patient is on sotalol but dose has been adjusted he is on Eliquis now prior to the procedure.    The patient has had numerous surgical procedures without any issues with anesthesia and has been no other change in his health status.    Patient's blood pressure is well controlled he reports occasionally he will get more elevated values but usually is well controlled here in the office.          11/12/2023    12:37 PM   Preop Questions   1. Have you ever had a heart attack or stroke? No   2. Have you ever had surgery on your heart or blood vessels, such as a stent placement, a coronary artery bypass, or surgery on an artery in your head, neck, heart, or legs? No   3. Do you have chest pain with activity? No   4. Do you have a history of  heart failure? No   5. Do you currently have a cold, bronchitis or symptoms of other infection? No   6. Do you have a cough, shortness of breath, or wheezing? No   7. Do you or anyone in your family have previous history of blood clots? No   8. Do you or does anyone in your family have a serious bleeding problem such as prolonged bleeding following surgeries or cuts? No   9. Have you ever had problems with anemia or been told to take iron pills? No   10. Have you had any abnormal blood loss such as black, tarry or bloody stools? No   11. Have you ever had a blood transfusion? No   12. Are you willing to have a blood transfusion if  it is medically needed before, during, or after your surgery? Yes   13. Have you or any of your relatives ever had problems with anesthesia? No   14. Do you have sleep apnea, excessive snoring or daytime drowsiness? No   15. Do you have any artifical heart valves or other implanted medical devices like a pacemaker, defibrillator, or continuous glucose monitor? No   16. Do you have artificial joints? YES -see past surgical history   17. Are you allergic to latex? No       Health Care Directive:  Patient does not have a Health Care Directive or Living Will: Advance Directive received and scanned. Click on Code in the patient header to view.    Preoperative Review of :   reviewed - no record of controlled substances prescribed.        Status of Chronic Conditions:  See problem list for active medical problems.  Problems all longstanding and stable, except as noted/documented.  See ROS for pertinent symptoms related to these conditions.    Review of Systems  CONSTITUTIONAL: NEGATIVE for fever, chills, change in weight  INTEGUMENTARY/SKIN: NEGATIVE for worrisome rashes, moles or lesions  EYES: NEGATIVE for vision changes or irritation  ENT/MOUTH: NEGATIVE for ear, mouth and throat problems  RESP: NEGATIVE for significant cough or SOB  CV: NEGATIVE for chest pain, palpitations or peripheral edema  GI: NEGATIVE for nausea, abdominal pain, heartburn, or change in bowel habits  : NEGATIVE for frequency, dysuria, or hematuria  MUSCULOSKELETAL: NEGATIVE for significant arthralgias or myalgia  NEURO: NEGATIVE for weakness, dizziness or paresthesias  ENDOCRINE: NEGATIVE for temperature intolerance, skin/hair changes  HEME: NEGATIVE for bleeding problems  PSYCHIATRIC: NEGATIVE for changes in mood or affect    Patient Active Problem List    Diagnosis Date Noted    Presence of Watchman left atrial appendage closure device 01/12/2023     Priority: Medium     1/12/23 - 27 mm Watchman FLX device      Myasthenia gravis (H)  01/04/2023     Priority: Medium     Dx 2022  Double vision      Thyroid nodule 01/05/2022     Priority: Medium     Incidental diagnosis December 2021  2 nodules, 2.1 and 2.4 cm maximum dimension  FNA results 2023, benign  Recommend follow-up 1 3 and 5 years      Obesity (BMI 35.0-39.9) with comorbidity (H) 11/20/2020     Priority: Medium    Lymphedema 06/01/2020     Priority: Medium     Right leg, after surgery  Dr. Orlando, et al  Compression stockings, pneumatic pump-somewhat helpful  Lasix, Nov 2020, but as long December 2020, neither were helpful.  Massage      Persistent atrial fibrillation      Priority: Medium     Dx 2007  Symptomatic, SANDERS -NO longer symptomatic  CV with first episode  [id number] - VASc risk score = 1 (HTN)  Failed Rhythmol > Flecainide    PVI August 19, 2013 April 2019 recurrent AF-Eliquis  Cardioversion, September 2019  Eliquis        Hypertension 04/26/2019     Priority: Medium     Dx Apr 2019  Metoprolol, also for atrial fibrillation.  Lisinopril, April 2019  hydrochlorothiazide Oct 2020        GERD (gastroesophageal reflux disease) 09/10/2018     Priority: Medium     History of a hiatal hernia  Omeprazole daily  Breakthrough symptoms by 2 PM daily        AA (alopecia areata) 09/10/2018     Priority: Medium     Area of hair loss back of scalp  Dermatology        Varicose Veins      Priority: Medium     no symptoms        Aneurysm Of The Ascending Aorta      Priority: Medium     Mild dilatation (4.1 cm) by MRI in Aug 2013  CT December 2021, not clearly visualized  [facility]        Hypothyroidism      Priority: Medium     Thyroid replacement      Status post ablation of atrial fibrillation 07/28/2015     Priority: Medium     PVI August 19, 2013 (cryo-PVI only)          No past medical history on file.  Past Surgical History:   Procedure Laterality Date    ARTHROPLASTY REVISION HIP Right 05/01/2019    ARTHROPLASTY REVISION KNEE Right 2019    CARDIOVERSION  07/01/2019    7/10/2019     CARDIOVERSION  09/12/2019    CATARACT EXTRACTION Bilateral 06/2021    CV LEFT ATRIAL APPENDAGE CLOSURE N/A 1/12/2023    Procedure: Left Atrial Appendage Closure;  Surgeon: Mitesh Graff MD;  Location: [facility] CV    DENTAL SURGERY      Oral Surgery Tooth Extraction;  Implant    [facility] ABLATE HEART DYSRHYTHM FOCUS  08/19/2013    Description: Catheter Ablation Atrial Fibrillation;  Recorded: 11/16/2013;  Comments: PVI Aug 19, 2013 (Cryo to all 4 PV's)    TOOTH EXTRACTION      implant    [facility] TOTAL HIP ARTHROPLASTY Right 05/08/2019    Procedure: RIGHT TOTAL HIP ARTHROPLASTY DIRECT ANTERIOR APPROACH;  Surgeon: Mario Woodruff MD;  Location: [facility];  Service: Orthopedics    [facility] TOTAL KNEE ARTHROPLASTY Right 06/21/2017    Procedure: 2)  RIGHT TOTAL KNEE ARTHROPLASTY;  Surgeon: Mario VICKERS MD;  Location: [facility];  Service: Orthopedics     Current Outpatient Medications   Medication Sig Dispense Refill    apixaban ANTICOAGULANT (ELIQUIS ANTICOAGULANT) 5 MG tablet Take 1 tablet (5 mg) by mouth 2 times daily 56 tablet 0    aspirin (ASA) 81 MG EC tablet Take 1 tablet (81 mg) by mouth daily      hydrochlorothiazide (HYDRODIURIL) 25 MG tablet TAKE 1 TABLET BY MOUTH EVERY DAY IN THE MORNING FOR HIGH BLOOD PRESSURE 90 tablet 2    levothyroxine (SYNTHROID/LEVOTHROID) 100 MCG tablet TAKE 1 TABLET(100 MCG) BY MOUTH DAILY 90 tablet 2    lisinopril (ZESTRIL) 40 MG tablet TAKE 1 TABLET BY MOUTH DAILY FOR HIGH BLOOD PRESSURE 90 tablet 2    omeprazole (PRILOSEC) 20 MG DR capsule TAKE ONE CAPSULE BY MOUTH DAILY BEFORE BREAKFAST 90 capsule 2    rosuvastatin (CRESTOR) 10 MG tablet Take 1 tablet (10 mg) by mouth daily 90 tablet 3    sotalol (BETAPACE) 120 MG tablet Take 0.5 tablets (60 mg) by mouth 2 times daily 90 tablet 3       Allergies   Allergen Reactions    Sulfa (Sulfonamide Antibiotics) [Sulfa Antibiotics] Rash     Rash - turned purple  , Around age 32        Social History     Tobacco Use     Smoking status: Never    Smokeless tobacco: Never   Substance Use Topics    Alcohol use: Yes     Alcohol/week: 5.0 standard drinks of alcohol     Types: 5 Cans of beer per week     Comment: minimal     Family History   Problem Relation Age of Onset    Atrial fibrillation Mother     Dementia Mother         dx 80s    Diabetes Type 2  Mother         dx 60s/70s    Atrial fibrillation Father     Rheumatoid Arthritis Father     Atrial fibrillation Sister     Cerebrovascular Disease Sister     Parkinsonism Sister     Parkinsonism Sister     Atrial fibrillation Brother     Rheumatoid Arthritis Brother     Cerebrovascular Disease Brother     CABG Brother     Atrial fibrillation Brother     Diabetes Type 2  Brother         Dx 40's     History   Drug Use No         Objective     There were no vitals taken for this visit.    Physical Exam    GENERAL APPEARANCE: healthy, alert and no distress     EYES: EOMI,  PERRL     HENT: ear canals and TM's normal and nose and mouth without ulcers or lesions     NECK: no adenopathy, no asymmetry, masses, or scars and thyroid normal to palpation     RESP: lungs clear to auscultation - no rales, rhonchi or wheezes     CV: regular rates and rhythm, normal S1 S2, no S3 or S4 and no murmur, click or rub     CV: irregularly irregular rhythm     ABDOMEN:  soft, nontender, no HSM or masses and bowel sounds normal     MS: extremities normal- no gross deformities noted, no evidence of inflammation in joints, FROM in all extremities.     SKIN: no suspicious lesions or rashes     NEURO: Normal strength and tone, sensory exam grossly normal, mentation intact and speech normal     PSYCH: mentation appears normal. and affect normal/bright     LYMPHATICS: No cervical adenopathy    Recent Labs   Lab Test 04/24/23  1004 01/12/23  1401 01/09/23  1446   HGB 15.6 13.9 15.3     --  242     --  140   POTASSIUM 4.7  --  4.9   CR 1.10 1.02 1.09        Diagnostics:  No labs were ordered during this  visit.   EKG: atrial fibrillation, rate 85    Revised Cardiac Risk Index (RCRI):  The patient has the following serious cardiovascular risks for perioperative complications:   - No serious cardiac risks = 0 points     RCRI Interpretation: 0 points: Class I (very low risk - 0.4% complication rate)         Signed Electronically by: Magdaleno Bennett MD  Copy of this evaluation report is provided to requesting physician.

## 2023-11-15 ENCOUNTER — TELEPHONE (OUTPATIENT)
Dept: CARDIOLOGY | Facility: CLINIC | Age: 68
End: 2023-11-15
Payer: COMMERCIAL

## 2023-11-15 NOTE — TELEPHONE ENCOUNTER
Pre-Procedure Education    Procedure: DCCV with Taryn Condon NP on 11/17/2023 with arrival time 8:30 am      PT IS A POST WATCHMAN ELIQUIS START 11/10/2023 NO MISSED DOSES    COVID: COVID policy- if pt develops COVID like symptoms prior to procedure, he/she would need to complete an at home with a rapid antigen COVID test 1-2 days prior to your procedure date. If COVID + pt is aware the procedure will need to be rescheduled, and to contact CV scheduling as soon as possible    Pre-Op H&P: Completed- Available in Epic    Education:     PT HAS A  FOR PROCEDURE TH AT WILL STAY WITH HIM    PT INSTRUCTED TO HOLD ANY VITAMINS, MINERALS CALCIUM IRON OR SUPPLEMENTS THE MORNING OF CV  PT INSTRUCTED NO GUM CHEWING MINTS OR CANDY THE MORNING OF CV  PT INSTRUCTED TO LEAVE JEWELRY AT HOME  PT INSTRUCTED TO BATHE OR SHOWER BEFORE COMING IN  PT IS ON ELIQUIS POST WATCHMAN PT  PT IS ON SOTALOL  PT IS A HARD IV AND BLOOD DRAW CSC NOTIFIED FOR THIS   NO MEDICATION CHANGES MADE PER TARYN CONDON CNP  PT HAS A POST CV FOLLOW UP 12/22 WITH RAMA    Contact: Reviewed via phone with pt    Pre-Procedure Instruction: NPO after midnight pre procedure, Defined NPO with pt, Remove all jewelry and leave all valuables at home, Shower prior to arrival, Sedation plan/orders, Transportation requirements and arrangements post procedure, Post-procedure follow up process, Post-procedure restrictions/expectations, and Pre-procedure letter sent- letter tab  Risks:      Medication:   Instructions regarding anticoagulants: Eliquis- To continue anticoagulation uninterrupted through their procedure    Instructions regarding antiarrhythmic medication: Sotalol; continue medication prior to procedure as prescribed    Instructions given to pt regarding diuretics medication: NA for DCCV  Instructions given to pt regarding DM/GLP-1 medication:   DM- None  GLP-1- None    Instructions for medication, other than anticoagulants and antiarrhythmics listed  above, given to pt: Take all medication AM of procedure with small sips of water         Important patient information for staff:  PT IS A POST WATCHMAN ON ELIQUIS AND IS A HARD IV AND BLOOD DRAW ANS [facility] NOTIFIED    11/15/2023 8:47 AM  Sharmila Suresh LPN

## 2023-11-17 ENCOUNTER — ANESTHESIA (OUTPATIENT)
Dept: CARDIOLOGY | Facility: HOSPITAL | Age: 68
End: 2023-11-17
Payer: COMMERCIAL

## 2023-11-17 ENCOUNTER — HOSPITAL ENCOUNTER (OUTPATIENT)
Dept: CARDIOLOGY | Facility: HOSPITAL | Age: 68
Discharge: HOME | End: 2023-11-17
Attending: INTERNAL MEDICINE | Admitting: NURSE PRACTITIONER
Payer: COMMERCIAL

## 2023-11-17 ENCOUNTER — ANESTHESIA EVENT (OUTPATIENT)
Dept: CARDIOLOGY | Facility: HOSPITAL | Age: 68
End: 2023-11-17
Payer: COMMERCIAL

## 2023-11-17 VITALS
HEART RATE: 67 BPM | OXYGEN SATURATION: 96 % | BODY MASS INDEX: 39.1 KG/M2 | DIASTOLIC BLOOD PRESSURE: 85 MMHG | HEIGHT: 69 IN | WEIGHT: 264 LBS | RESPIRATION RATE: 13 BRPM | TEMPERATURE: 98.2 F | SYSTOLIC BLOOD PRESSURE: 131 MMHG

## 2023-11-17 DIAGNOSIS — I48.19 PERSISTENT ATRIAL FIBRILLATION (H): ICD-10-CM

## 2023-11-17 LAB
ATRIAL RATE - MUSE: 63 BPM
DIASTOLIC BLOOD PRESSURE - MUSE: NORMAL MMHG
INTERPRETATION ECG - MUSE: NORMAL
P AXIS - MUSE: 51 DEGREES
PR INTERVAL - MUSE: 174 MS
QRS DURATION - MUSE: 104 MS
QT - MUSE: 442 MS
QTC - MUSE: 452 MS
R AXIS - MUSE: -28 DEGREES
SYSTOLIC BLOOD PRESSURE - MUSE: NORMAL MMHG
T AXIS - MUSE: 19 DEGREES
VENTRICULAR RATE- MUSE: 63 BPM

## 2023-11-17 PROCEDURE — 92960 CARDIOVERSION ELECTRIC EXT: CPT

## 2023-11-17 PROCEDURE — 999N000054 HC STATISTIC EKG NON-CHARGEABLE

## 2023-11-17 PROCEDURE — 258N000003 HC RX IP 258 OP 636: Performed by: NURSE ANESTHETIST, CERTIFIED REGISTERED

## 2023-11-17 PROCEDURE — 93010 ELECTROCARDIOGRAM REPORT: CPT | Performed by: INTERNAL MEDICINE

## 2023-11-17 PROCEDURE — 250N000009 HC RX 250: Performed by: NURSE ANESTHETIST, CERTIFIED REGISTERED

## 2023-11-17 PROCEDURE — 93005 ELECTROCARDIOGRAM TRACING: CPT

## 2023-11-17 PROCEDURE — 370N000017 HC ANESTHESIA TECHNICAL FEE, PER MIN

## 2023-11-17 PROCEDURE — 92960 CARDIOVERSION ELECTRIC EXT: CPT | Performed by: NURSE PRACTITIONER

## 2023-11-17 RX ORDER — LIDOCAINE 40 MG/G
CREAM TOPICAL
Status: DISCONTINUED | OUTPATIENT
Start: 2023-11-17 | End: 2023-11-17 | Stop reason: HOSPADM

## 2023-11-17 RX ORDER — SODIUM CHLORIDE 9 MG/ML
INJECTION, SOLUTION INTRAVENOUS CONTINUOUS PRN
Status: DISCONTINUED | OUTPATIENT
Start: 2023-11-17 | End: 2023-11-17

## 2023-11-17 RX ADMIN — SODIUM CHLORIDE: 9 INJECTION, SOLUTION INTRAVENOUS at 09:30

## 2023-11-17 RX ADMIN — METHOHEXITAL SODIUM 80 MG: 500 INJECTION, POWDER, LYOPHILIZED, FOR SOLUTION INTRAMUSCULAR; INTRAVENOUS; RECTAL at 09:41

## 2023-11-17 ASSESSMENT — ENCOUNTER SYMPTOMS: DYSRHYTHMIAS: 1

## 2023-11-17 ASSESSMENT — ACTIVITIES OF DAILY LIVING (ADL)
ADLS_ACUITY_SCORE: 35

## 2023-11-17 NOTE — DISCHARGE INSTRUCTIONS
Cardioversion  Cardioversion is a procedure to restore your heart's normal rhythm from a fast or irregular rhythm (arrhythmia) in the top or bottom chambers of your heart. You may have the procedure in a hospital or surgery center. It's often done on an outpatient (same day) basis. During the procedure, your doctor will give you medication to keep you free from pain. Then the doctor gives you a brief electric shock. This helps your heartbeat become normal again. In most cases, you can go home within hours of the procedure.    Before Your Procedure  Tell your doctor what over-the-counter and prescription medications, herbs, and supplements you are taking.  Take medication as directed. Your doctor may prescribe anticoagulants (blood thinners), depending on your situation. They help prevent blood clots from forming.  Ask your doctor about the risks and benefits of cardioversion.  Sign your consent form.  Don t eat or drink anything for 8  hours before your procedure.  Follow any other instructions your doctor gives you.   Arrange for an adult to drive you home after the procedure.     During Your Procedure  Your health care provider will place small pads (electrodes) on your chest to record your heartbeat at all times.  Your health care provider will place an intravenous (IV) line in your arm. This gives you medication (sedation) that keeps you free of pain. You ll feel sleepy.      After Your Procedure  Your health care provider will monitor you until you are fully awake. Then you ll be able to sit up, walk, and eat.  In most cases, you ll be able to go home after the sedation wears off. This usually takes a few hours.  For a few days, the skin on your chest may feel a little sore, like a mild sunburn.  DO NOT  drive or operate heavy machinery for 24 hours after the procedure.  The day after your procedure, try to take it easy. Take medication as directed.  Call your doctor if you notice skipped beats, a rapid  heartbeat, or chest tightness. These may be signs that an irregular heartbeat has returned

## 2023-11-17 NOTE — ANESTHESIA PREPROCEDURE EVALUATION
Anesthesia Pre-Procedure Evaluation    Patient: Brett Hopkins   MRN: [id number] : 1955        Procedure :   Cardioversion External       History reviewed. No pertinent past medical history.   Past Surgical History:   Procedure Laterality Date    ARTHROPLASTY REVISION HIP Right 2019    ARTHROPLASTY REVISION KNEE Right 2019    CARDIOVERSION  2019    7/10/2019    CARDIOVERSION  2019    CATARACT EXTRACTION Bilateral 2021    CV LEFT ATRIAL APPENDAGE CLOSURE N/A 2023    Procedure: Left Atrial Appendage Closure;  Surgeon: Mitesh Graff MD;  Location: [facility] CV    DENTAL SURGERY      Oral Surgery Tooth Extraction;  Implant    [facility] ABLATE HEART DYSRHYTHM FOCUS  2013    Description: Catheter Ablation Atrial Fibrillation;  Recorded: 2013;  Comments: PVI Aug 19, 2013 (Cryo to all 4 PV's)    TOOTH EXTRACTION      implant    [facility] TOTAL HIP ARTHROPLASTY Right 2019    Procedure: RIGHT TOTAL HIP ARTHROPLASTY DIRECT ANTERIOR APPROACH;  Surgeon: Mario Woodruff MD;  Location: [facility];  Service: Orthopedics    [facility] TOTAL KNEE ARTHROPLASTY Right 2017    Procedure: 2)  RIGHT TOTAL KNEE ARTHROPLASTY;  Surgeon: Mario VICKERS MD;  Location: [facility] OR;  Service: Orthopedics      Allergies   Allergen Reactions    Sulfa (Sulfonamide Antibiotics) [Sulfa Antibiotics] Rash     Rash - turned purple  , Around age 32      Social History     Tobacco Use    Smoking status: Never    Smokeless tobacco: Never   Substance Use Topics    Alcohol use: Yes     Alcohol/week: 5.0 standard drinks of alcohol     Types: 5 Cans of beer per week     Comment: minimal      Wt Readings from Last 1 Encounters:   23 119.7 kg (264 lb)        Anesthesia Evaluation            ROS/MED HX  ENT/Pulmonary:  - neg pulmonary ROS     Neurologic:  - neg neurologic ROS     Cardiovascular:     (+)  hypertension- -   -  - -                        dysrhythmias, a-fib,            "  METS/Exercise Tolerance:     Hematologic:       Musculoskeletal:       GI/Hepatic:     (+) GERD,                   Renal/Genitourinary:       Endo:     (+)          thyroid problem,     Obesity,       Psychiatric/Substance Use:       Infectious Disease:       Malignancy:       Other:            Physical Exam    Airway        Mallampati: II   TM distance: > 3 FB   Neck ROM: full   Mouth opening: > 3 cm    Respiratory Devices and Support         Dental       (+) Modest Abnormalities - crowns, retainers, 1 or 2 missing teeth      Cardiovascular          Rhythm and rate: irregular and normal     Pulmonary           breath sounds clear to auscultation           OUTSIDE LABS:  CBC:   Lab Results   Component Value Date    WBC 7.1 04/24/2023    WBC 6.6 01/09/2023    HGB 15.6 04/24/2023    HGB 13.9 01/12/2023    HCT 47.4 04/24/2023    HCT 47.1 01/09/2023     04/24/2023     01/09/2023     BMP:   Lab Results   Component Value Date     04/24/2023     01/09/2023    POTASSIUM 4.7 04/24/2023    POTASSIUM 4.9 01/09/2023    CHLORIDE 105 04/24/2023    CHLORIDE 102 01/09/2023    CO2 25 04/24/2023    CO2 29 01/09/2023    BUN 21.9 04/24/2023    BUN 19.8 01/09/2023    CR 1.10 04/24/2023    CR 1.02 01/12/2023     (H) 04/24/2023     (H) 01/09/2023     COAGS:   Lab Results   Component Value Date    PTT 31 12/21/2020    INR 1.12 (H) 12/21/2020     POC: No results found for: \"BGM\", \"HCG\", \"HCGS\"  HEPATIC: No results found for: \"ALBUMIN\", \"PROTTOTAL\", \"ALT\", \"AST\", \"GGT\", \"ALKPHOS\", \"BILITOTAL\", \"BILIDIRECT\", \"AUDRA\"  OTHER:   Lab Results   Component Value Date    YFN 9.5 04/24/2023    MAG 2.1 09/10/2018    TSH 3.14 01/04/2023    CRP 0.5 09/10/2019    SED 3 09/10/2019       Anesthesia Plan    ASA Status:  3    NPO Status:  NPO Appropriate    Anesthesia Type: General.     - Airway: Mask Only   Induction: Intravenous.   Maintenance: TIVA.        Consents    Anesthesia Plan(s) and associated risks, " benefits, and realistic alternatives discussed. Questions answered and patient/representative(s) expressed understanding.     - Discussed:     - Discussed with:  Patient      - Extended Intubation/Ventilatory Support Discussed: No.      - Patient is DNR/DNI Status: No     Use of blood products discussed: No .     Postoperative Care            Comments:    Other Comments: Brief GA mask with brevital. Bite block prior to cardioversion. ETT and LMA available. CODY available.               Gamaliel Edward MD

## 2023-11-17 NOTE — ANESTHESIA CARE TRANSFER NOTE
Patient: Brett Hopkins    Procedure: * No procedures listed *  Cardioversion External    Diagnosis: * No pre-op diagnosis entered *  Diagnosis Additional Information: No value filed.    Anesthesia Type:   General     Note:    Oropharynx: oropharynx clear of all foreign objects and spontaneously breathing  Level of Consciousness: drowsy  Oxygen Supplementation: nasal cannula  Level of Supplemental Oxygen (L/min / FiO2): 4  Independent Airway: airway patency satisfactory and stable  Dentition: dentition unchanged  Vital Signs Stable: post-procedure vital signs reviewed and stable  Report to RN Given: handoff report given  Patient transferred to: Cardiac Special [facility]          Vitals:  Vitals Value Taken Time   /70 11/17/23 0948   Temp     Pulse 63 11/17/23 0949   Resp 33 11/17/23 0949   SpO2 97 % 11/17/23 0949   Vitals shown include unfiled device data.    Electronically Signed By: CRISTIANO Bermudez CRNA  November 17, 2023  9:50 AM

## 2023-11-17 NOTE — PROCEDURES
[facility]    Procedure: Cardioversion    Date/Time: 11/17/2023 9:49 AM    Performed by: Taryn Condon NP  Authorized by: Benedicto Barfield MD      UNIVERSAL PROTOCOL   Site Marked: Yes  Prior Images Obtained and Reviewed:  NA  Required items: Required blood products, implants, devices and special equipment available    Patient identity confirmed:  Verbally with patient, arm band, provided demographic data and hospital-assigned identification number  Patient was reevaluated immediately before administering moderate or deep sedation or anesthesia  Confirmation Checklist:  Patient's identity using two indicators, relevant allergies, procedure was appropriate and matched the consent or emergent situation and correct equipment/implants were available  Time out: Immediately prior to the procedure a time out was called    Universal Protocol: the Joint Commission Universal Protocol was followed    Preparation: Patient was prepped and draped in usual sterile fashion       ANESTHESIA    Anesthesia was administered and monitored by anesthesiology.  See anesthesia documentation for details.    PROCEDURE DETAILS  Cardioversion basis: elective  Indications: failure of anti-arrhythmic medications  Pre-procedure rhythm: atrial fibrillation  Patient position: patient was placed in a supine position  Chest area: chest area exposed  Electrodes: pads  Electrodes placed: anterior-posterior  Number of attempts: 1    Details of Attempts:  After administration of IV Brevital by MDA and confirmation of adequate sedation, he received a synchronized shock of 300 J with prompt restoration of sinus rhythm.  12 lead EKG post DCCV pending review    Post-procedure rhythm: normal sinus rhythm  Complications: no complications      PROCEDURE  Describe Procedure: 68 year old male, persistent AF, CAD, HTN, HLD.   Previously on Sotalol 60 mg BID, breakthrough AF noted on 11/7/23, AF confirmed by EKG, patient reporting  symptoms of fatigue, decreased activity tolerance, SOBOE.   Sotalol dose increased to 80 mg BID and he was encouraged to increased his fluid intake to 60 ounces daily, repeat EKG confirmed that he remained in AF. Presents for DCCV today. NSR restored after receiving one synchronized shock of 300 J, no complications during or post DCCV. Eliquis BID initiated one week prior to DCCV due to Watchman device in place, inserted January 2023, he will  continue with Eliquis BID for an additional 4 weeks then continue with ASA 81 mg daily. Continue with Sotalol 80 mg BID. He is scheduled to follow up in EP clinic with Nicolle Ryan CNP on 12/22/23.  Patient Tolerance:  Patient tolerated the procedure well with no immediate complications  Length of time physician/provider present for 1:1 monitoring during sedation: 10

## 2023-11-17 NOTE — ANESTHESIA POSTPROCEDURE EVALUATION
Patient: Brett Hopkins    Procedure: * No procedures listed *  Cardioversion External    Anesthesia Type:  General    Note:  Disposition: Outpatient   Postop Pain Control: Uneventful            Sign Out: Well controlled pain   PONV: No   Neuro/Psych: Uneventful            Sign Out: Acceptable/Baseline neuro status   Airway/Respiratory: Uneventful            Sign Out: Acceptable/Baseline resp. status   CV/Hemodynamics: Uneventful            Sign Out: Acceptable CV status   Other NRE:    DID A NON-ROUTINE EVENT OCCUR?            Last vitals:  Vitals:    11/17/23 0946 11/17/23 1000 11/17/23 1005   BP: 105/75 111/75    Pulse:  61    Resp:  17 15   Temp:      SpO2:  96%        Electronically Signed By: Gamaliel Edward MD  November 17, 2023  10:31 AM

## 2023-11-18 NOTE — PROGRESS NOTES
Pt with uneventful recovery post cardioversion. SR, ambulation staedy, at baseline neuro status. Instructions per AVS. Wife and pt understanding. Discharge at 1115 to care of wife.

## 2023-11-23 DIAGNOSIS — I10 ESSENTIAL HYPERTENSION: ICD-10-CM

## 2023-11-23 DIAGNOSIS — E06.3 HYPOTHYROIDISM DUE TO HASHIMOTO'S THYROIDITIS: ICD-10-CM

## 2023-11-24 RX ORDER — LEVOTHYROXINE SODIUM 100 UG/1
TABLET ORAL
Qty: 90 TABLET | Refills: 2 | Status: SHIPPED | OUTPATIENT
Start: 2023-11-24 | End: 2024-08-27

## 2023-11-24 RX ORDER — HYDROCHLOROTHIAZIDE 25 MG/1
TABLET ORAL
Qty: 90 TABLET | Refills: 2 | Status: SHIPPED | OUTPATIENT
Start: 2023-11-24 | End: 2024-08-27

## 2023-12-04 ENCOUNTER — TELEPHONE (OUTPATIENT)
Dept: CARDIOLOGY | Facility: CLINIC | Age: 68
End: 2023-12-04
Payer: COMMERCIAL

## 2023-12-04 DIAGNOSIS — I48.19 PERSISTENT ATRIAL FIBRILLATION (H): ICD-10-CM

## 2023-12-04 NOTE — TELEPHONE ENCOUNTER
Return call to patient, he is upset that he does not have enough Eliquis to take for the full 4 weeks post Cardioversion on 11-17-23.  He is leaving town tomorrow and wants [facility].  Pt is angry on the phone, offered to send to [facility] pharmacy across in [place], he states that will take to long to get to and would like it sent to a pharmacy in [place], though he does not know which one.  Decided to send to [facility] pharmacy on [address].  He states that if he cannot get it today he will stop taking it all together and miss the last week of Eliquis.  Advised that he should take for the full 4 weeks post procedure in order to prevent stroke.  Rx sent.

## 2023-12-19 ENCOUNTER — OFFICE VISIT (OUTPATIENT)
Dept: FAMILY MEDICINE | Facility: CLINIC | Age: 68
End: 2023-12-19
Payer: COMMERCIAL

## 2023-12-19 VITALS
HEART RATE: 60 BPM | SYSTOLIC BLOOD PRESSURE: 102 MMHG | RESPIRATION RATE: 15 BRPM | HEIGHT: 69 IN | DIASTOLIC BLOOD PRESSURE: 68 MMHG | TEMPERATURE: 96.8 F | BODY MASS INDEX: 38.95 KG/M2 | OXYGEN SATURATION: 100 % | WEIGHT: 263 LBS

## 2023-12-19 DIAGNOSIS — K21.9 GASTROESOPHAGEAL REFLUX DISEASE WITHOUT ESOPHAGITIS: ICD-10-CM

## 2023-12-19 DIAGNOSIS — Z00.00 PHYSICAL EXAM: ICD-10-CM

## 2023-12-19 DIAGNOSIS — E04.1 THYROID NODULE: Primary | ICD-10-CM

## 2023-12-19 DIAGNOSIS — E78.5 DYSLIPIDEMIA: ICD-10-CM

## 2023-12-19 DIAGNOSIS — I48.19 PERSISTENT ATRIAL FIBRILLATION (H): ICD-10-CM

## 2023-12-19 DIAGNOSIS — I10 PRIMARY HYPERTENSION: ICD-10-CM

## 2023-12-19 DIAGNOSIS — Z29.11 NEED FOR VACCINATION AGAINST RESPIRATORY SYNCYTIAL VIRUS: ICD-10-CM

## 2023-12-19 DIAGNOSIS — I89.0 LYMPHEDEMA: ICD-10-CM

## 2023-12-19 DIAGNOSIS — G70.00 MYASTHENIA GRAVIS (H): ICD-10-CM

## 2023-12-19 DIAGNOSIS — I71.21 ANEURYSM OF ASCENDING AORTA WITHOUT RUPTURE (H): ICD-10-CM

## 2023-12-19 DIAGNOSIS — E78.2 MIXED HYPERLIPIDEMIA: ICD-10-CM

## 2023-12-19 DIAGNOSIS — E06.3 HYPOTHYROIDISM DUE TO HASHIMOTO'S THYROIDITIS: ICD-10-CM

## 2023-12-19 LAB
ALBUMIN SERPL BCG-MCNC: 4.1 G/DL (ref 3.5–5.2)
ALP SERPL-CCNC: 75 U/L (ref 40–150)
ALT SERPL W P-5'-P-CCNC: 23 U/L (ref 0–70)
ANION GAP SERPL CALCULATED.3IONS-SCNC: 11 MMOL/L (ref 7–15)
AST SERPL W P-5'-P-CCNC: 26 U/L (ref 0–45)
BILIRUB SERPL-MCNC: 0.5 MG/DL
BUN SERPL-MCNC: 25.9 MG/DL (ref 8–23)
CALCIUM SERPL-MCNC: 9.5 MG/DL (ref 8.8–10.2)
CHLORIDE SERPL-SCNC: 104 MMOL/L (ref 98–107)
CHOLEST SERPL-MCNC: 120 MG/DL
CREAT SERPL-MCNC: 1.09 MG/DL (ref 0.67–1.17)
DEPRECATED HCO3 PLAS-SCNC: 29 MMOL/L (ref 22–29)
EGFRCR SERPLBLD CKD-EPI 2021: 75 ML/MIN/1.73M2
FASTING STATUS PATIENT QL REPORTED: NORMAL
GLUCOSE SERPL-MCNC: 107 MG/DL (ref 70–99)
HDLC SERPL-MCNC: 61 MG/DL
LDLC SERPL CALC-MCNC: 43 MG/DL
NONHDLC SERPL-MCNC: 59 MG/DL
POTASSIUM SERPL-SCNC: 4.6 MMOL/L (ref 3.4–5.3)
PROT SERPL-MCNC: 7.2 G/DL (ref 6.4–8.3)
SODIUM SERPL-SCNC: 144 MMOL/L (ref 135–145)
TRIGL SERPL-MCNC: 78 MG/DL
TSH SERPL DL<=0.005 MIU/L-ACNC: 7.19 UIU/ML (ref 0.3–4.2)

## 2023-12-19 PROCEDURE — 80061 LIPID PANEL: CPT | Performed by: FAMILY MEDICINE

## 2023-12-19 PROCEDURE — 80053 COMPREHEN METABOLIC PANEL: CPT | Performed by: FAMILY MEDICINE

## 2023-12-19 PROCEDURE — 36415 COLL VENOUS BLD VENIPUNCTURE: CPT | Performed by: FAMILY MEDICINE

## 2023-12-19 PROCEDURE — 99214 OFFICE O/P EST MOD 30 MIN: CPT | Mod: 25 | Performed by: FAMILY MEDICINE

## 2023-12-19 PROCEDURE — 84443 ASSAY THYROID STIM HORMONE: CPT | Performed by: FAMILY MEDICINE

## 2023-12-19 PROCEDURE — 99397 PER PM REEVAL EST PAT 65+ YR: CPT | Performed by: FAMILY MEDICINE

## 2023-12-19 RX ORDER — RESPIRATORY SYNCYTIAL VIRUS VACCINE 120MCG/0.5
0.5 KIT INTRAMUSCULAR ONCE
Qty: 1 EACH | Refills: 0 | Status: CANCELLED | OUTPATIENT
Start: 2023-12-19 | End: 2023-12-19

## 2023-12-19 ASSESSMENT — ENCOUNTER SYMPTOMS
MYALGIAS: 0
CONSTIPATION: 0
SORE THROAT: 0
NERVOUS/ANXIOUS: 0
FEVER: 0
DIARRHEA: 0
JOINT SWELLING: 0
SHORTNESS OF BREATH: 0
DYSURIA: 0
PARESTHESIAS: 0
WEAKNESS: 0
CHILLS: 0
DIZZINESS: 0
ARTHRALGIAS: 1
HEMATURIA: 0
PALPITATIONS: 0
ABDOMINAL PAIN: 0
HEARTBURN: 0
HEADACHES: 0
HEMATOCHEZIA: 0
EYE PAIN: 0
FREQUENCY: 0
COUGH: 0
NAUSEA: 0

## 2023-12-19 ASSESSMENT — ACTIVITIES OF DAILY LIVING (ADL): CURRENT_FUNCTION: NO ASSISTANCE NEEDED

## 2023-12-19 NOTE — LETTER
December 20, 2023      Champ Hopkins  [address] [place] [address]        Dear ,    We are writing to inform you of your test results.  The TSH for thyroid is slightly off, however I do not want to adjust your medication yet at this time rather I want you to come back anytime after March 1 for this to be rechecked.  You do not need to be fasting    Cholesterol is very well-controlled  Sugar is just slightly high at 107 ideally under 100, however this is not diabetes mellitus.   Good diet and exercise will help keep blood sugar low.  Liver and kidney tests are essentially normal    See us again in 1 year            Resulted Orders   Comprehensive metabolic panel   Result Value Ref Range    Sodium 144 135 - 145 mmol/L      Comment:      Reference intervals for this test were updated on 09/26/2023 to more accurately reflect our healthy population. There may be differences in the flagging of prior results with similar values performed with this method. Interpretation of those prior results can be made in the context of the updated reference intervals.     Potassium 4.6 3.4 - 5.3 mmol/L    Carbon Dioxide (CO2) 29 22 - 29 mmol/L    Anion Gap 11 7 - 15 mmol/L    Urea Nitrogen 25.9 (H) 8.0 - 23.0 mg/dL    Creatinine 1.09 0.67 - 1.17 mg/dL    GFR Estimate 75 >60 mL/min/1.73m2    Calcium 9.5 8.8 - 10.2 mg/dL    Chloride 104 98 - 107 mmol/L    Glucose 107 (H) 70 - 99 mg/dL    Alkaline Phosphatase 75 40 - 150 U/L      Comment:      Reference intervals for this test were updated on 11/14/2023 to more accurately reflect our healthy population. There may be differences in the flagging of prior results with similar values performed with this method. Interpretation of those prior results can be made in the context of the updated reference intervals.    AST 26 0 - 45 U/L      Comment:      Reference intervals for this test were updated on 6/12/2023 to more accurately reflect our healthy population. There may be  differences in the flagging of prior results with similar values performed with this method. Interpretation of those prior results can be made in the context of the updated reference intervals.    ALT 23 0 - 70 U/L      Comment:      Reference intervals for this test were updated on 6/12/2023 to more accurately reflect our healthy population. There may be differences in the flagging of prior results with similar values performed with this method. Interpretation of those prior results can be made in the context of the updated reference intervals.      Protein Total 7.2 6.4 - 8.3 g/dL    Albumin 4.1 3.5 - 5.2 g/dL    Bilirubin Total 0.5 <=1.2 mg/dL   TSH   Result Value Ref Range    TSH 7.19 (H) 0.30 - 4.20 uIU/mL   Lipid panel reflex to direct LDL Fasting   Result Value Ref Range    Cholesterol 120 <200 mg/dL    Triglycerides 78 <150 mg/dL    Direct Measure HDL 61 >=40 mg/dL    LDL Cholesterol Calculated 43 <=100 mg/dL    Non HDL Cholesterol 59 <130 mg/dL    Patient Fasting > 8hrs? Unknown     Narrative    Cholesterol  Desirable:  <200 mg/dL    Triglycerides  Normal:  Less than 150 mg/dL  Borderline High:  150-199 mg/dL  High:  200-499 mg/dL  Very High:  Greater than or equal to 500 mg/dL    Direct Measure HDL  Female:  Greater than or equal to 50 mg/dL   Male:  Greater than or equal to 40 mg/dL    LDL Cholesterol  Desirable:  <100mg/dL  Above Desirable:  100-129 mg/dL   Borderline High:  130-159 mg/dL   High:  160-189 mg/dL   Very High:  >= 190 mg/dL    Non HDL Cholesterol  Desirable:  130 mg/dL  Above Desirable:  130-159 mg/dL  Borderline High:  160-189 mg/dL  High:  190-219 mg/dL  Very High:  Greater than or equal to 220 mg/dL       If you have any questions or concerns, please call the clinic at the number listed above.       Sincerely,      Magdaleno Bennett MD

## 2023-12-19 NOTE — PROGRESS NOTES
SUBJECTIVE:   Champ is a 68 year old, presenting for the following:  Wellness Visit        12/19/2023     7:34 AM   Additional Questions   Roomed by Sharon MATTHEW       Are you in the first 12 months of your Medicare coverage?  No    HPI:  Patient comes in for his annual Medicare wellness examination.  Patient has a history of an abdominal aortic aneurysm diagnosed in 2013 it has been stable over time his last examination was a year ago which documented that it is at 4.0 cm I would consider repeating in 2 to 3 years    Patient has a history of atrial fibrillation he just had a cardioversion in November, he has had numerous interventions and medications, and cardiology has discussed with him the possibility of a second ablation.  He is only on aspirin and sotalol for this at the time and is in sinus rhythm    Patient has a history of both hypothyroidism and a thyroid nodule he did have fine-needle biopsy in February of this year which was benign, we will going to repeat the ultrasound this year.    Patient has a history of hypertension well-controlled on lisinopril and hydrochlorothiazide.    Has a history of myasthenia gravis, only involvement was ocular, he was having double vision this is resolved he is not on any medication, he will be followed up with ophthalmology as well as neurology    Patient also sees orthopedic surgery for underlying osteoarthritis he has had joint replacements he is having some arthritis particularly of the left knee and getting injections for this.    Patient has a history of reflux he takes Prilosec daily if he misses a dose he does have breakthrough symptoms.    Overall the patient does have good health habits she tries to maintain good activity and is good diet he has never smoked drinks alcohol in moderation.  He would be due for COVID booster but he would like to get one of the nonmRNA vaccines, also encouraged him to get the NIA vaccine at his local pharmacy                Healthy  "Habits:     In general, how would you rate your overall health?  Good    Frequency of exercise:  2-3 days/week    Duration of exercise:  30-45 minutes    Do you usually eat at least 4 servings of fruit and vegetables a day, include whole grains    & fiber and avoid regularly eating high fat or \"junk\" foods?  Yes    Taking medications regularly:  Yes    Medication side effects:  None    Ability to successfully perform activities of daily living:  No assistance needed    Home Safety:  No safety concerns identified    Hearing Impairment:  Difficulty following a conversation in a noisy restaurant or crowded room    In the past 6 months, have you been bothered by leaking of urine?  No    In general, how would you rate your overall mental or emotional health?  Excellent    Additional concerns today:  Yes      Today's PHQ-2 Score:       12/19/2023     7:35 AM   PHQ-2 ( 1999 [employer])   Q1: Little interest or pleasure in doing things 0   Q2: Feeling down, depressed or hopeless 0   PHQ-2 Score 0   Q1: Little interest or pleasure in doing things Not at all   Q2: Feeling down, depressed or hopeless Not at all   PHQ-2 Score 0           Have you ever done Advance Care Planning? (For example, a Health Directive, POLST, or a discussion with a medical provider or your loved ones about your wishes): Yes, advance care planning is on file.       Fall risk  Fallen 2 or more times in the past year?: No  Any fall with injury in the past year?: No    Cognitive Screening   1) Repeat 3 items   2) Clock draw: NORMAL  3) 3 item recall: Recalls 3 objects  Results: 3 items recalled: COGNITIVE IMPAIRMENT LESS LIKELY    Mini-CogTM Copyright ADELINE Abdi. Licensed by the author for use in [facility]; reprinted with permission ([email or web address]@.[email or web address]). All rights reserved.      Do you have sleep apnea, excessive snoring or daytime drowsiness? : no    Reviewed and updated as needed this visit by clinical staff   Tobacco  Allergies  Meds          "     Reviewed and updated as needed this visit by Provider                 Social History     Tobacco Use    Smoking status: Never    Smokeless tobacco: Never   Substance Use Topics    Alcohol use: Yes     Alcohol/week: 5.0 standard drinks of alcohol     Types: 5 Cans of beer per week     Comment: minimal             12/19/2023     7:34 AM   Alcohol Use   Prescreen: >3 drinks/day or >7 drinks/week? No     Do you have a current opioid prescription? No  Do you use any other controlled substances or medications that are not prescribed by a provider? None                Current providers sharing in care for this patient include:     Patient Care Team:  Magdaleno Bennett MD as PCP - General  Cayetano Traore MD as Assigned Neuroscience Provider  Scarlett Troncoso PA-C as Assigned Heart and Vascular Provider  Magdaleno Bennett MD as Assigned PCP    The following health maintenance items are reviewed in Epic and correct as of today:  Health Maintenance   Topic Date Due    ANNUAL REVIEW OF HM ORDERS  Never done    RSV VACCINE (Pregnancy & 60+) (1 - 1-dose 60+ series) Never done    COVID-19 Vaccine (5 - 2023-24 season) 09/01/2023    ADVANCE CARE PLANNING  09/10/2023    MEDICARE ANNUAL WELLNESS VISIT  01/04/2024    TSH W/FREE T4 REFLEX  01/04/2024    FALL RISK ASSESSMENT  12/19/2024    LIPID  01/04/2028    DTAP/TDAP/TD IMMUNIZATION (4 - Td or Tdap) 09/10/2028    COLORECTAL CANCER SCREENING  10/30/2028    PHQ-2 (once per calendar year)  Completed    INFLUENZA VACCINE  Completed    Pneumococcal Vaccine: 65+ Years  Completed    AORTIC ANEURYSM SCREENING (SYSTEM ASSIGNED)  Completed    IPV IMMUNIZATION  Aged Out    HPV IMMUNIZATION  Aged Out    MENINGITIS IMMUNIZATION  Aged Out    RSV MONOCLONAL ANTIBODY  Aged Out    HEPATITIS C SCREENING  Discontinued    ZOSTER IMMUNIZATION  Discontinued     Lab work is in process          Review of Systems   Constitutional:  Negative for chills and fever.   HENT:  Negative for  "congestion, ear pain, hearing loss and sore throat.    Eyes:  Negative for pain and visual disturbance.   Respiratory:  Negative for cough and shortness of breath.    Cardiovascular:  Positive for chest pain and peripheral edema. Negative for palpitations.   Gastrointestinal:  Negative for abdominal pain, constipation, diarrhea, heartburn, hematochezia and nausea.   Genitourinary:  Negative for dysuria, frequency, genital sores, hematuria and urgency.   Musculoskeletal:  Positive for arthralgias. Negative for joint swelling and myalgias.   Skin:  Negative for rash.   Neurological:  Negative for dizziness, weakness, headaches and paresthesias.   Psychiatric/Behavioral:  Negative for mood changes. The patient is not nervous/anxious.      Constitutional, HEENT, cardiovascular, pulmonary, GI, , musculoskeletal, neuro, skin, endocrine and psych systems are negative, except as otherwise noted.    OBJECTIVE:   There were no vitals taken for this visit. Estimated body mass index is 38.99 kg/m  as calculated from the following:    Height as of 11/17/23: 1.753 m (5' 9\").    Weight as of 11/17/23: 119.7 kg (264 lb).  Physical Exam  GENERAL: healthy, alert and no distress  EYES: Eyes grossly normal to inspection, PERRL and conjunctivae and sclerae normal  HENT: ear canals and TM's normal, nose and mouth without ulcers or lesions  NECK: no adenopathy, no asymmetry, masses, or scars and thyroid normal to palpation  RESP: lungs clear to auscultation - no rales, rhonchi or wheezes  CV: regular rate and rhythm, normal S1 S2, no S3 or S4, no murmur, click or rub, no peripheral edema and peripheral pulses strong  ABDOMEN: soft, nontender, no hepatosplenomegaly, no masses and bowel sounds normal  MS: no gross musculoskeletal defects noted, no edema  SKIN: no suspicious lesions or rashes  NEURO: Normal strength and tone, mentation intact and speech normal  PSYCH: mentation appears normal, affect normal/bright    Diagnostic Test " Results:  Labs reviewed in Epic    ASSESSMENT / PLAN:   (E04.1) Thyroid nodule  (primary encounter diagnosis)  Comment: Patient with thyroid nodules noted incidentally prior FNA negative  Plan: US Thyroid             (Z29.11) Need for vaccination against respiratory syncytial virus  Comment: Recommend at his local pharmacy  Plan:      (Z00.00) Physical exam  Comment: Overall the patient is in fairly good health despite his numerous comorbidities  Plan:      (G70.00) Myasthenia gravis (H)  Comment: Ocular involvement now resolved followed by both neurology and ophthalmology  Plan:      (K21.9) Gastroesophageal reflux disease without esophagitis  Comment: Prilosec daily  Plan:      (E03.8,  E06.3) Hypothyroidism   Comment: Thyroid replacement  Plan: TSH             (I10) Hypertension  Comment: Well-controlled on lisinopril and hydrochlorothiazide  Plan:      (I48.19) Persistent atrial fibrillation  Comment: Patient is in sinus rhythm status post numerous surgical and medication intervention  Plan:      (E78.2) Hyperlipidemia  Comment: Crestor  Plan: Comprehensive metabolic panel, CANCELED: Lipid         panel reflex to direct LDL Fasting             (I71.21) Aneurysm of ascending aorta without rupture (H24)  Comment: Began in 2013, stable  Plan:      (I89.0) Lymphedema  Comment: Chronic right leg lymphedema  Plan:      PLAN:  1.  Laboratory studies as above  2.  Thyroid ultrasound  3.  Patient is followed regularly by cardiology may have an ablation procedure in 2024  4.  Patient is also followed regularly by ophthalmology neurology and orthopedics  5.  Consider follow-up thoracic artery aneurysm sometime in the next 2 to 3 years  6.  Patient to consider both a COVID booster and RSV vaccines  7.  Patient otherwise should be seen yearly sooner if needed            COUNSELING:  Reviewed preventive health counseling, as reflected in patient instructions       Regular exercise       Healthy diet/nutrition      BMI:  "  Estimated body mass index is 38.99 kg/m  as calculated from the following:    Height as of 11/17/23: 1.753 m (5' 9\").    Weight as of 11/17/23: 119.7 kg (264 lb).   Weight management plan: Discussed healthy diet and exercise guidelines      He reports that he has never smoked. He has never used smokeless tobacco.      Appropriate preventive services were discussed with this patient, including applicable screening as appropriate for fall prevention, nutrition, physical activity, Tobacco-use cessation, weight loss and cognition.  Checklist reviewing preventive services available has been given to the patient.    Reviewed patients plan of care and provided an AVS. The Intermediate Care Plan ( asthma action plan, low back pain action plan, and migraine action plan) for Brett meets the Care Plan requirement. This Care Plan has been established and reviewed with the Patient.          Magdaleno Bennett MD  [facility]    Identified Health Risks:  I have reviewed Opioid Use Disorder and Substance Use Disorder risk factors and made any needed referrals.   "

## 2023-12-20 DIAGNOSIS — E78.5 DYSLIPIDEMIA: Primary | ICD-10-CM

## 2023-12-20 DIAGNOSIS — E06.3 HYPOTHYROIDISM DUE TO HASHIMOTO'S THYROIDITIS: Primary | ICD-10-CM

## 2023-12-22 ENCOUNTER — OFFICE VISIT (OUTPATIENT)
Dept: CARDIOLOGY | Facility: CLINIC | Age: 68
End: 2023-12-22
Payer: COMMERCIAL

## 2023-12-22 VITALS
SYSTOLIC BLOOD PRESSURE: 112 MMHG | RESPIRATION RATE: 16 BRPM | DIASTOLIC BLOOD PRESSURE: 60 MMHG | OXYGEN SATURATION: 98 % | WEIGHT: 263 LBS | BODY MASS INDEX: 38.84 KG/M2 | HEART RATE: 59 BPM

## 2023-12-22 DIAGNOSIS — I10 ESSENTIAL HYPERTENSION: ICD-10-CM

## 2023-12-22 DIAGNOSIS — I48.19 PERSISTENT ATRIAL FIBRILLATION (H): Primary | ICD-10-CM

## 2023-12-22 DIAGNOSIS — Z95.818 PRESENCE OF WATCHMAN LEFT ATRIAL APPENDAGE CLOSURE DEVICE: ICD-10-CM

## 2023-12-22 DIAGNOSIS — E66.9 OBESITY (BMI 30-39.9): ICD-10-CM

## 2023-12-22 DIAGNOSIS — I10 PRIMARY HYPERTENSION: ICD-10-CM

## 2023-12-22 DIAGNOSIS — Z98.890 STATUS POST ABLATION OF ATRIAL FIBRILLATION: ICD-10-CM

## 2023-12-22 DIAGNOSIS — I25.10 NONOBSTRUCTIVE ATHEROSCLEROSIS OF CORONARY ARTERY: ICD-10-CM

## 2023-12-22 DIAGNOSIS — Z86.79 STATUS POST ABLATION OF ATRIAL FIBRILLATION: ICD-10-CM

## 2023-12-22 PROCEDURE — 99215 OFFICE O/P EST HI 40 MIN: CPT | Performed by: NURSE PRACTITIONER

## 2023-12-22 NOTE — LETTER
12/22/2023    Magdaleno Bennett MD  [address] [address]    RE: Brett Hopkins       Dear Colleague,     I had the pleasure of seeing Brett Hopkins in the [facility].     [facility]  Cardiac Electrophysiology  [address]  [place] [address]   Office: [phone]  Fax: [phone]     HEART CARE ELECTROPHYSIOLOGY FOLLOW UP    Primary Care: Magdaleno Bennett MD      Assessment/Recommendations     Persistent atrial fibrillation: Longstanding history diagnosed 2007, index PVI 8/19/2023.  He has since recurrent AF requiring multiple cardioversions since 2019, most recently 11/17/2023.  Failed flecainide, sotalol uptitration limited secondary to side effects of lightheadedness/dizziness.  Long discussion regarding pathophysiology and natural progression of atrial fibrillation, antiarrhythmic drug therapy and catheter ablation.  He would like to consider repeat catheter ablation given multiple recurrences since his PVI in 2013. We also discussed the ongoing importance of lifestyle modification including maintaining a healthy weight, sleep apnea diagnosis and management, and alcohol avoidance, as part of a long term strategy for atrial fibrillation management.  He does not believe he has sleep apnea but would be willing to revisit this in the future and consider alternative therapies to CPAP.     [id number] score of 3 for age 65-74, hypertension, CAD.  Sp LAAC 1/12/2023, follow-up DIONTE shows no peridevice flow or thrombus. Now on ASA monotherapy    Hypertension: Controlled on current regimen    Nonobstructive CAD: By CT with negative MPI 2022. No anginal sx    Obesity: BMI 38.8    Plan:   -Continue aspirin 81 mg daily indefinitely for stroke prophylaxis  -Continue sotalol 80mg twice a day  -Follow-up with Dr. Barfield as planned to discuss repeat ablation     History of Present Illness/Subjective    Brett Hopkins is a 68 year old male with  past medical history significant for persistent AF, hypertension, nonobstructive CAD, ascending aorta dilation, hyperlipidemia, GERD, hypothyroidism, myasthenia gravis, chronic RLE lymphedema, obesity.  He has a history of paroxysmal AF diagnosed in 2007, failing propafenone and flecainide prior to PVI 8/19/2013.  He developed recurrent AF around 3/2019, with worsening fatigue and occasional palpitations, requiring DCCV 7/10/2019 with ERAF.  He was briefly on flecainide prior to initiation of sotalol and DCCV 9/12/2019, with symptomatic improvement.  He developed recurrent AF 1/17/2023 with worsening fatigue and shortness of breath with exertion, requiring cardioversion 2/17/2023.  He underwent percutaneous LAAC 1/12/2023 due to history of gait instability and falls.  He developed worsening lightheadedness/dizziness pending MCT 10/2023, showing no sustained tachyarrhythmias or AF, sinus rhythm/sinus bradycardia with IVCD and his sotalol was decreased to 60 mg twice a day.  He developed recurrent AF around 11/6/2023 requiring DCCV 11/17/2023.    He reports he was asymptomatic with most recent recurrence last month and was only made aware by his smart watch.  His lightheadedness has significantly improved. He denies chest discomfort, palpitations, shortness of breath, pedal edema, or syncope.  He was evaluated for sleep apnea prior to his first ablation and briefly used CPAP; he sought a second opinion and was then told his sleep apnea was very mild occurring only when supine. He is a retired CPA. He does water aerobics at the [facility] multiple times a week.      Data Review     Arrhythmia hx:   Dx/date: Paroxysmal AF 2007.  Persistent 7/2019, 11/2023  Sx: Dyspnea  [id number]/OAC: 3 for age 65-74, hypertension and vascular disease. Sp LAAC 1/12/2023  Prior / current AV christine blocking agents, AAD: Propafenone, flecainide/sotalol  DCCV: 7/10/2019, 2/17/2023, 11/17/2023  Ablation: 8/19/2013, Dr. Barfield-cryoballoon  PVI    EK2023: SR 63 bpm, IVCD,  ms, QT/QTc 442/452 ms  2023: AF 85 bpm, IVCD  Personally reviewed.     TTE:   5/3/2023  1. Normal left ventricular size and systolic performance with a visually  estimated ejection fraction of 60%.  2. No significant valvular heart disease is identified on this study.  3. Normal right ventricular size and systolic performance.  4. There is mild left atrial enlargement.  5. There is a left atrial appendage occlusion device. The device appears well-  placed and seated.  Â  No thrombus is detected upon the surface of the device.  Â  No flow extending into the left atrial appendage was detected around the  device.  6. No residual postprocedural atrial communication is identified.  7. No intracardiac mass or thrombus is detected  8. Echo contrast examination was performed using agitated NS as contrast  agent. The right heart opacity was good and the left heart was well  visualized. There was no evidence of right to left shunting during spontaneous  respiration or following release of Valsalva.    JEREMIAH, BEATRIZ:   BEATRIZ x 6 days beginning 10/16/2023.  Baseline rhythm sinus with IVCD, average 62 bpm range  bpm.  No tachyarrhythmias or AF.  Symptom triggers of lightheadedness/dizziness correlating to sinus rhythm with IVCD  bpm, rare atrial/ventricular ectopy    MPI: 2022    The nuclear stress test is negative for inducible myocardial ischemia.    Left ventricular function is normal.    The left ventricular ejection fraction at rest is 70%.    Nuclear Study Quality: The  images demonstrate diaphragmatic attenuation.    I have reviewed and updated the patient's past medical history, allergy list and medication list.          Physical Examination   Vitals: /60 (BP Location: Left arm, Patient Position: Sitting, Cuff Size: Adult Large)   Pulse 59   Resp 16   Wt 119.3 kg (263 lb)   SpO2 98%   BMI 38.84 kg/m      BMI= Body mass index is  38.84 kg/m .    Wt Readings from Last 3 Encounters:   12/22/23 119.3 kg (263 lb)   12/19/23 119.3 kg (263 lb)   11/17/23 119.7 kg (264 lb)       General   Appearance:   Alert and oriented, in no acute distress.    HEENT:  Normocephalic and atraumatic. Conjunctiva and sclera are clear. Moist oral mucosa.    Neck: No JVP, carotid bruit or obvious thyromegaly.   Lungs:   Respirations unlabored. Clear bilaterally with no rales, rhonchi, or wheezes.     Cardiovascular:   Rhythm is regular. S1 and S2 are normal. No significant murmur is present. Lower extremities demonstrate chronic edema, R>L to proximal RLE. Posterior tibial pulses are intact bilaterally.   Extremities: No cyanosis or clubbing   Skin: Skin is warm, dry, and otherwise intact.   Neurologic: Gait not assessed. Mood and affect appropriate.           Medical History  Surgical History Family History Social History   No past medical history on file. Past Surgical History:   Procedure Laterality Date    ARTHROPLASTY REVISION HIP Right 05/01/2019    ARTHROPLASTY REVISION KNEE Right 2019    CARDIOVERSION  07/01/2019    7/10/2019    CARDIOVERSION  09/12/2019    CATARACT EXTRACTION Bilateral 06/2021    CV LEFT ATRIAL APPENDAGE CLOSURE N/A 1/12/2023    Procedure: Left Atrial Appendage Closure;  Surgeon: Mitesh Graff MD;  Location: [facility]    DENTAL SURGERY      Oral Surgery Tooth Extraction;  Implant    [place] ABLATE HEART DYSRHYTHM FOCUS  08/19/2013    Description: Catheter Ablation Atrial Fibrillation;  Recorded: 11/16/2013;  Comments: PVI Aug 19, 2013 (Cryo to all 4 PV's)    TOOTH EXTRACTION      implant    [facility] TOTAL HIP ARTHROPLASTY Right 05/08/2019    Procedure: RIGHT TOTAL HIP ARTHROPLASTY DIRECT ANTERIOR APPROACH;  Surgeon: Mario Woodruff MD;  Location: [facility];  Service: Orthopedics    [facility] TOTAL KNEE ARTHROPLASTY Right 06/21/2017    Procedure: 2)  RIGHT TOTAL KNEE ARTHROPLASTY;  Surgeon: Mario VICKERS MD;  Location: [facility]  Main OR;  Service: Orthopedics    Family History   Problem Relation Age of Onset    Atrial fibrillation Mother     Dementia Mother         dx 80s    Diabetes Type 2  Mother         dx 60s/70s    Atrial fibrillation Father     Rheumatoid Arthritis Father     Atrial fibrillation Sister     Cerebrovascular Disease Sister     Parkinsonism Sister     Parkinsonism Sister     Atrial fibrillation Brother     Rheumatoid Arthritis Brother     Cerebrovascular Disease Brother     CABG Brother     Atrial fibrillation Brother     Diabetes Type 2  Brother         Dx 40's    Social History     Socioeconomic History    Marital status:      Spouse name: Not on file    Number of children: 2    Years of education: Not on file    Highest education level: Not on file   Occupational History    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   Tobacco Use    Smoking status: Never    Smokeless tobacco: Never   Vaping Use    Vaping Use: Never used   Substance and Sexual Activity    Alcohol use: Yes     Alcohol/week: 5.0 standard drinks of alcohol     Types: 5 Cans of beer per week     Comment: minimal    Drug use: No    Sexual activity: Yes     Partners: Female   Other Topics Concern    Not on file   Social History Narrative    Diet-regular            Exercise-walk, limited by R leg, R knee        Volunteer,      Social Determinants of Health     Financial Resource Strain: Low Risk  (12/19/2023)    Financial Resource Strain     Within the past 12 months, have you or your family members you live with been unable to get utilities (heat, electricity) when it was really needed?: No   Food Insecurity: Low Risk  (12/19/2023)    Food Insecurity     Within the past 12 months, did you worry that your food would run out before you got money to buy more?: No     Within the past 12 months, did the food you bought just not last and you didn t have money to get more?: No   Transportation Needs: Low Risk  (12/19/2023)    Transportation Needs      Within the past 12 months, has lack of transportation kept you from medical appointments, getting your medicines, non-medical meetings or appointments, work, or from getting things that you need?: No   Physical Activity: Not on file   Stress: Not on file   Social Connections: Not on file   Interpersonal Safety: Low Risk  (12/19/2023)    Interpersonal Safety     Do you feel physically and emotionally safe where you currently live?: Yes     Within the past 12 months, have you been hit, slapped, kicked or otherwise physically hurt by someone?: No     Within the past 12 months, have you been humiliated or emotionally abused in other ways by your partner or ex-partner?: No   Housing Stability: Low Risk  (12/19/2023)    Housing Stability     Do you have housing? : Yes     Are you worried about losing your housing?: No          Medications  Allergies   Scheduled Meds:  Current Outpatient Medications   Medication Sig Dispense Refill    aspirin (ASA) 81 MG EC tablet Take 1 tablet (81 mg) by mouth daily      hydrochlorothiazide (HYDRODIURIL) 25 MG tablet TAKE 1 TABLET BY MOUTH EVERY DAY IN THE MORNING FOR HIGH BLOOD PRESSURE 90 tablet 2    levothyroxine (SYNTHROID/LEVOTHROID) 100 MCG tablet TAKE 1 TABLET(100 MCG) BY MOUTH DAILY 90 tablet 2    lisinopril (ZESTRIL) 40 MG tablet TAKE 1 TABLET BY MOUTH DAILY FOR HIGH BLOOD PRESSURE 90 tablet 2    omeprazole (PRILOSEC) 20 MG DR capsule TAKE ONE CAPSULE BY MOUTH DAILY BEFORE BREAKFAST 90 capsule 2    rosuvastatin (CRESTOR) 10 MG tablet Take 1 tablet (10 mg) by mouth daily 90 tablet 3    sotalol (BETAPACE) 80 MG tablet Take 80 mg by mouth 2 times daily      Allergies   Allergen Reactions    Sulfa (Sulfonamide Antibiotics) [Sulfa Antibiotics] Rash     Rash - turned purple  , Around age 32         Lab Results    Chemistry/lipid CBC Cardiac Enzymes/BNP/TSH/INR   Lab Results   Component Value Date    CHOL 120 12/19/2023    HDL 61 12/19/2023    TRIG 78 12/19/2023    BUN 25.9 (H)  2023     2023    CO2 29 2023    Lab Results   Component Value Date    WBC 7.1 2023    HGB 15.6 2023    HCT 47.4 2023    MCV 92 2023     2023    @RESUFAST(BMP,CBC,BNP,TSH,  INR)@      48 minutes spent reviewing prior records (including documentation, laboratory studies, cardiac testing/imaging), history and physical exam, planning, and subsequent documentation.     This note has been dictated using voice recognition software. Any grammatical, typographical, or context distortions are unintentional and inherent to the software.    Nicolle Ryan CNP  Clinical Cardiac Electrophysiology  [facility]  Clinic and [phone]  Fax: [phone]  Electrophysiology Nurses: [phone]          Thank you for allowing me to participate in the care of your patient.      Sincerely,     CRISTIANO CASTRO CNP     [facility]  cc:   No referring provider defined for this encounter.

## 2023-12-22 NOTE — PROGRESS NOTES
[facility]  Cardiac Electrophysiology  [address]  [place] [address]   Office: [phone]  Fax: [phone]     HEART CARE ELECTROPHYSIOLOGY FOLLOW UP    Primary Care: Magdaleno Bennett MD      Assessment/Recommendations     Persistent atrial fibrillation: Longstanding history diagnosed 2007, index PVI 8/19/2023.  He has since recurrent AF requiring multiple cardioversions since 2019, most recently 11/17/2023.  Failed flecainide, sotalol uptitration limited secondary to side effects of lightheadedness/dizziness.  Long discussion regarding pathophysiology and natural progression of atrial fibrillation, antiarrhythmic drug therapy and catheter ablation.  He would like to consider repeat catheter ablation given multiple recurrences since his PVI in 2013. We also discussed the ongoing importance of lifestyle modification including maintaining a healthy weight, sleep apnea diagnosis and management, and alcohol avoidance, as part of a long term strategy for atrial fibrillation management.  He does not believe he has sleep apnea but would be willing to revisit this in the future and consider alternative therapies to CPAP.     [id number] score of 3 for age 65-74, hypertension, CAD.  Sp LAAC 1/12/2023, follow-up DIONTE shows no peridevice flow or thrombus. Now on ASA monotherapy    Hypertension: Controlled on current regimen    Nonobstructive CAD: By CT with negative MPI 2022. No anginal sx    Obesity: BMI 38.8    Plan:   -Continue aspirin 81 mg daily indefinitely for stroke prophylaxis  -Continue sotalol 80mg twice a day  -Follow-up with Dr. Barfield as planned to discuss repeat ablation     History of Present Illness/Subjective    Brett Hopkins is a 68 year old male with past medical history significant for persistent AF, hypertension, nonobstructive CAD, ascending aorta dilation, hyperlipidemia, GERD, hypothyroidism, myasthenia gravis, chronic RLE lymphedema, obesity.  He  has a history of paroxysmal AF diagnosed in , failing propafenone and flecainide prior to PVI 2013.  He developed recurrent AF around 3/2019, with worsening fatigue and occasional palpitations, requiring DCCV 7/10/2019 with ERAF.  He was briefly on flecainide prior to initiation of sotalol and DCCV 2019, with symptomatic improvement.  He developed recurrent AF 2023 with worsening fatigue and shortness of breath with exertion, requiring cardioversion 2023.  He underwent percutaneous LAAC 2023 due to history of gait instability and falls.  He developed worsening lightheadedness/dizziness pending MCT 10/2023, showing no sustained tachyarrhythmias or AF, sinus rhythm/sinus bradycardia with IVCD and his sotalol was decreased to 60 mg twice a day.  He developed recurrent AF around 2023 requiring DCCV 2023.    He reports he was asymptomatic with most recent recurrence last month and was only made aware by his smart watch.  His lightheadedness has significantly improved. He denies chest discomfort, palpitations, shortness of breath, pedal edema, or syncope.  He was evaluated for sleep apnea prior to his first ablation and briefly used CPAP; he sought a second opinion and was then told his sleep apnea was very mild occurring only when supine. He is a retired CPA. He does water aerobics at the [facility] multiple times a week.      Data Review     Arrhythmia hx:   Dx/date: Paroxysmal AF .  Persistent 2019, 2023  Sx: Dyspnea  [id number]/OAC: 3 for age 65-74, hypertension and vascular disease. Sp LAAC 2023  Prior / current AV christine blocking agents, AAD: Propafenone, flecainide/sotalol  DCCV: 7/10/2019, 2023, 2023  Ablation: 2013, Dr. Barfield-cryoballoon PVI    EK2023: SR 63 bpm, IVCD,  ms, QT/QTc 442/452 ms  2023: AF 85 bpm, IVCD  Personally reviewed.     TTE:   5/3/2023  1. Normal left ventricular size and systolic performance with a  visually  estimated ejection fraction of 60%.  2. No significant valvular heart disease is identified on this study.  3. Normal right ventricular size and systolic performance.  4. There is mild left atrial enlargement.  5. There is a left atrial appendage occlusion device. The device appears well-  placed and seated.  Â  No thrombus is detected upon the surface of the device.  Â  No flow extending into the left atrial appendage was detected around the  device.  6. No residual postprocedural atrial communication is identified.  7. No intracardiac mass or thrombus is detected  8. Echo contrast examination was performed using agitated NS as contrast  agent. The right heart opacity was good and the left heart was well  visualized. There was no evidence of right to left shunting during spontaneous  respiration or following release of Valsalva.    JEREMIAH, BEATRIZ:   BEATRIZ x 6 days beginning 10/16/2023.  Baseline rhythm sinus with IVCD, average 62 bpm range  bpm.  No tachyarrhythmias or AF.  Symptom triggers of lightheadedness/dizziness correlating to sinus rhythm with IVCD  bpm, rare atrial/ventricular ectopy    MPI: 2/11/2022    The nuclear stress test is negative for inducible myocardial ischemia.    Left ventricular function is normal.    The left ventricular ejection fraction at rest is 70%.    Nuclear Study Quality: The  images demonstrate diaphragmatic attenuation.    I have reviewed and updated the patient's past medical history, allergy list and medication list.          Physical Examination   Vitals: /60 (BP Location: Left arm, Patient Position: Sitting, Cuff Size: Adult Large)   Pulse 59   Resp 16   Wt 119.3 kg (263 lb)   SpO2 98%   BMI 38.84 kg/m      BMI= Body mass index is 38.84 kg/m .    Wt Readings from Last 3 Encounters:   12/22/23 119.3 kg (263 lb)   12/19/23 119.3 kg (263 lb)   11/17/23 119.7 kg (264 lb)       General   Appearance:   Alert and oriented, in no acute  distress.    HEENT:  Normocephalic and atraumatic. Conjunctiva and sclera are clear. Moist oral mucosa.    Neck: No JVP, carotid bruit or obvious thyromegaly.   Lungs:   Respirations unlabored. Clear bilaterally with no rales, rhonchi, or wheezes.     Cardiovascular:   Rhythm is regular. S1 and S2 are normal. No significant murmur is present. Lower extremities demonstrate chronic edema, R>L to proximal RLE. Posterior tibial pulses are intact bilaterally.   Extremities: No cyanosis or clubbing   Skin: Skin is warm, dry, and otherwise intact.   Neurologic: Gait not assessed. Mood and affect appropriate.           Medical History  Surgical History Family History Social History   No past medical history on file. Past Surgical History:   Procedure Laterality Date    ARTHROPLASTY REVISION HIP Right 05/01/2019    ARTHROPLASTY REVISION KNEE Right 2019    CARDIOVERSION  07/01/2019    7/10/2019    CARDIOVERSION  09/12/2019    CATARACT EXTRACTION Bilateral 06/2021    CV LEFT ATRIAL APPENDAGE CLOSURE N/A 1/12/2023    Procedure: Left Atrial Appendage Closure;  Surgeon: Mitesh Graff MD;  [facility]: [facility] CV    DENTAL SURGERY      Oral Surgery Tooth Extraction;  Implant    TN ABLATE HEART DYSRHYTHM FOCUS  08/19/2013    Description: Catheter Ablation Atrial Fibrillation;  Recorded: 11/16/2013;  Comments: PVI Aug 19, 2013 (Cryo to all 4 PV's)    TOOTH EXTRACTION      implant    RUST TOTAL HIP ARTHROPLASTY Right 05/08/2019    Procedure: RIGHT TOTAL HIP ARTHROPLASTY DIRECT ANTERIOR APPROACH;  Surgeon: Mario Woodruff MD;  Location: [facility];  Service: Orthopedics    RUST TOTAL KNEE ARTHROPLASTY Right 06/21/2017    Procedure: 2)  RIGHT TOTAL KNEE ARTHROPLASTY;  Surgeon: Mario VICKERS MD;  Location: [facility];  Service: Orthopedics    Family History   Problem Relation Age of Onset    Atrial fibrillation Mother     Dementia Mother         dx 80s    Diabetes Type 2  Mother         dx 60s/70s     Atrial fibrillation Father     Rheumatoid Arthritis Father     Atrial fibrillation Sister     Cerebrovascular Disease Sister     Parkinsonism Sister     Parkinsonism Sister     Atrial fibrillation Brother     Rheumatoid Arthritis Brother     Cerebrovascular Disease Brother     CABG Brother     Atrial fibrillation Brother     Diabetes Type 2  Brother         Dx 40's    Social History     Socioeconomic History    Marital status:      Spouse name: Not on file    Number of children: 2    Years of education: Not on file    Highest education level: Not on file   Occupational History    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   Tobacco Use    Smoking status: Never    Smokeless tobacco: Never   Vaping Use    Vaping Use: Never used   Substance and Sexual Activity    Alcohol use: Yes     Alcohol/week: 5.0 standard drinks of alcohol     Types: 5 Cans of beer per week     Comment: minimal    Drug use: No    Sexual activity: Yes     Partners: Female   Other Topics Concern    Not on file   Social History Narrative    Diet-regular            Exercise-walk, limited by R leg, R knee        Volunteer,      Social Determinants of Health     Financial Resource Strain: Low Risk  (12/19/2023)    Financial Resource Strain     Within the past 12 months, have you or your family members you live with been unable to get utilities (heat, electricity) when it was really needed?: No   Food Insecurity: Low Risk  (12/19/2023)    Food Insecurity     Within the past 12 months, did you worry that your food would run out before you got money to buy more?: No     Within the past 12 months, did the food you bought just not last and you didn t have money to get more?: No   Transportation Needs: Low Risk  (12/19/2023)    Transportation Needs     Within the past 12 months, has lack of transportation kept you from medical appointments, getting your medicines, non-medical meetings or appointments, work, or from getting things that you need?:  No   Physical Activity: Not on file   Stress: Not on file   Social Connections: Not on file   Interpersonal Safety: Low Risk  (12/19/2023)    Interpersonal Safety     Do you feel physically and emotionally safe where you currently live?: Yes     Within the past 12 months, have you been hit, slapped, kicked or otherwise physically hurt by someone?: No     Within the past 12 months, have you been humiliated or emotionally abused in other ways by your partner or ex-partner?: No   Housing Stability: Low Risk  (12/19/2023)    Housing Stability     Do you have housing? : Yes     Are you worried about losing your housing?: No          Medications  Allergies   Scheduled Meds:  Current Outpatient Medications   Medication Sig Dispense Refill    aspirin (ASA) 81 MG EC tablet Take 1 tablet (81 mg) by mouth daily      hydrochlorothiazide (HYDRODIURIL) 25 MG tablet TAKE 1 TABLET BY MOUTH EVERY DAY IN THE MORNING FOR HIGH BLOOD PRESSURE 90 tablet 2    levothyroxine (SYNTHROID/LEVOTHROID) 100 MCG tablet TAKE 1 TABLET(100 MCG) BY MOUTH DAILY 90 tablet 2    lisinopril (ZESTRIL) 40 MG tablet TAKE 1 TABLET BY MOUTH DAILY FOR HIGH BLOOD PRESSURE 90 tablet 2    omeprazole (PRILOSEC) 20 MG DR capsule TAKE ONE CAPSULE BY MOUTH DAILY BEFORE BREAKFAST 90 capsule 2    rosuvastatin (CRESTOR) 10 MG tablet Take 1 tablet (10 mg) by mouth daily 90 tablet 3    sotalol (BETAPACE) 80 MG tablet Take 80 mg by mouth 2 times daily      Allergies   Allergen Reactions    Sulfa (Sulfonamide Antibiotics) [Sulfa Antibiotics] Rash     Rash - turned purple  , Around age 32         Lab Results    Chemistry/lipid CBC Cardiac Enzymes/BNP/TSH/INR   Lab Results   Component Value Date    CHOL 120 12/19/2023    HDL 61 12/19/2023    TRIG 78 12/19/2023    BUN 25.9 (H) 12/19/2023     12/19/2023    CO2 29 12/19/2023    Lab Results   Component Value Date    WBC 7.1 04/24/2023    HGB 15.6 04/24/2023    HCT 47.4 04/24/2023    MCV 92 04/24/2023     04/24/2023     @RESUFAST(BMP,CBC,BNP,TSH,  INR)@      48 minutes spent reviewing prior records (including documentation, laboratory studies, cardiac testing/imaging), history and physical exam, planning, and subsequent documentation.     This note has been dictated using voice recognition software. Any grammatical, typographical, or context distortions are unintentional and inherent to the software.    Nicolle Ryan, CNP  Clinical Cardiac Electrophysiology  [facility]  Clinic and [phone]  Fax: [phone]  Electrophysiology Nurses: [phone]

## 2023-12-22 NOTE — PATIENT INSTRUCTIONS
Brett Hopkins,    It was a pleasure to see you today at the [facility].     My recommendations after this visit include:  -Continue aspirin 81 mg daily indefinitely for stroke prophylaxis  -Continue sotalol 80mg twice a day  -Follow-up with Dr. Barfield as planned to discuss repeat ablation    Please do not hesitate to call with additional questions or concerns.     Nicolle Ryan, CNP  Clinical Cardiac Electrophysiology  [facility]  Clinic and [phone]  Fax: [phone]  Electrophysiology Nurses: [phone]

## 2023-12-27 ENCOUNTER — TELEPHONE (OUTPATIENT)
Dept: CARDIOLOGY | Facility: CLINIC | Age: 68
End: 2023-12-27
Payer: COMMERCIAL

## 2023-12-27 ENCOUNTER — HOSPITAL ENCOUNTER (OUTPATIENT)
Dept: ULTRASOUND IMAGING | Facility: CLINIC | Age: 68
Discharge: HOME | End: 2023-12-27
Attending: FAMILY MEDICINE | Admitting: FAMILY MEDICINE
Payer: COMMERCIAL

## 2023-12-27 DIAGNOSIS — E04.1 THYROID NODULE: ICD-10-CM

## 2023-12-27 DIAGNOSIS — I48.19 PERSISTENT ATRIAL FIBRILLATION (H): ICD-10-CM

## 2023-12-27 DIAGNOSIS — I48.19 PERSISTENT ATRIAL FIBRILLATION (H): Primary | ICD-10-CM

## 2023-12-27 PROCEDURE — 76536 US EXAM OF HEAD AND NECK: CPT

## 2023-12-27 RX ORDER — LISINOPRIL 40 MG/1
TABLET ORAL
Qty: 90 TABLET | Refills: 2 | Status: SHIPPED | OUTPATIENT
Start: 2023-12-27 | End: 2024-09-18

## 2023-12-27 RX ORDER — SOTALOL HYDROCHLORIDE 80 MG/1
80 TABLET ORAL 2 TIMES DAILY
Qty: 180 TABLET | Refills: 3 | Status: SHIPPED | OUTPATIENT
Start: 2023-12-27 | End: 2023-12-27

## 2023-12-27 RX ORDER — SOTALOL HYDROCHLORIDE 80 MG/1
80 TABLET ORAL 2 TIMES DAILY
Qty: 180 TABLET | Refills: 3 | Status: ON HOLD | OUTPATIENT
Start: 2023-12-27 | End: 2024-03-19

## 2023-12-27 NOTE — TELEPHONE ENCOUNTER
[facility] Call Center    Phone Message    May a detailed message be left on voicemail: yes     Reason for Call: Medication Refill Request    Has the patient contacted the pharmacy for the refill? Yes   Name of medication being requested: Sotalol 80mg  Provider who prescribed the medication: Nicolle Ryan  [facility]  Date medication is needed: 12/27/2023   Pt is out of medication    Action Taken: Other: Cardio    Travel Screening: Not Applicable

## 2024-01-09 ENCOUNTER — TELEPHONE (OUTPATIENT)
Dept: CARDIOLOGY | Facility: CLINIC | Age: 69
End: 2024-01-09
Payer: COMMERCIAL

## 2024-01-09 NOTE — TELEPHONE ENCOUNTER
Patient s/p LAAC 1/12/2023. Per post-LAAC medication protocol, patient to remain on once daily 81 mg ASA for life. Seeing Dr. Barfield 1/25/2024.     MODIFIED SCOTT SCALE   Timepoint: 1yr Post-LAAC    Previous score: 0    Score Description   0 No symptoms at all   1 No significant disability despite symptoms; able to carry out all usual duties and activities   2 Slight disability; unable to carry out all previous activities, but able to look after own affairs without assistance   3 Moderate disability; requiring some help, but able to walk without assistance   4 Moderately severe disability; unable to walk without assistance and unable to attend to own bodily needs without assistance   5 Severe disability; bedridden, incontinent and requiring constant nursing care and attention   6 Dead    Total score (0 - 6):  0    Change in score if s/p LAAC? No  If yes, notify implanting cardiologist.    Anila Maradiaga RN BSN  Structural Heart Coordinator   [facility]  [phone]    ----- Message from Laura Terry RN sent at 1/13/2023  2:00 PM CST -----  Regarding: Check for 1 year follow up and [place]

## 2024-01-25 ENCOUNTER — OFFICE VISIT (OUTPATIENT)
Dept: CARDIOLOGY | Facility: CLINIC | Age: 69
End: 2024-01-25
Payer: COMMERCIAL

## 2024-01-25 VITALS
HEART RATE: 51 BPM | HEIGHT: 69 IN | OXYGEN SATURATION: 99 % | DIASTOLIC BLOOD PRESSURE: 78 MMHG | WEIGHT: 267.9 LBS | RESPIRATION RATE: 16 BRPM | BODY MASS INDEX: 39.68 KG/M2 | SYSTOLIC BLOOD PRESSURE: 118 MMHG

## 2024-01-25 DIAGNOSIS — I48.19 PERSISTENT ATRIAL FIBRILLATION (H): Primary | ICD-10-CM

## 2024-01-25 PROCEDURE — 99215 OFFICE O/P EST HI 40 MIN: CPT | Performed by: INTERNAL MEDICINE

## 2024-01-25 NOTE — PROGRESS NOTES
[facility]  Cardiac Electrophysiology     Progress Note: Benedicto Barfield MD    Primary Care: Magdaleno Bennett MD    Primary Cardiologist: Nicola Cotter MD    Primary Electrophysiologist: Benedicto Barfield MD    Assessment:       Persistent atrial fibrillation: Chronic problem for the patient diagnosed in 2007.  Patient underwent PVI August 2013 (cryo-PVI only).  The patient has had multiple recurrences of atrial fibrillation subsequently and has failed medical therapy with flecainide and then sotalol.  His symptoms with atrial fibrillation tend to be feeling tired, fatigue, and some dyspnea on exertion. Sotalol caused him significant lightheadedness and dizziness.  The patient reports no recurrence since his most recent cardioversion and November 2023.  We discussed the treatment options for his arrhythmia particularly since he has not tolerated medications well.  Specifically we discussed the advantages and the mapping and ablation equipment since his index ablation as well as postprocedural care.  The patient is likely interested in moving forward with repeat mapping/ablation of his atrial fibrillation.  The patient has an elevated [id number] score and underwent successful LAAC 1/12/2023 the patient is now off OAC and is compliant with his low-dose baby aspirin.  Essential hypertension: The patient's blood pressure today is at target on his current medical therapy.  Coronary atherosclerosis, native vessel: Discovered by CT imaging with negative stress testing in 2022.  The patient currently reports no symptoms suggesting coronary ischemia.  He is currently taking a statin (rosuvastatin).      Recommendations:  No change in current medication.  The patient will be contacted by the EP nurse clinician next week to discuss whether he wishes to move forward with repeat mapping/ablation.  Tentatively scheduled for follow-up in the arrhythmia clinic with the EP CHIQUITA in 2 months.    Time spent: 45 minutes spent  on the date of the encounter doing chart review, history and exam, documentation and further activities as noted above.    Subjective:  Brett Hopkins (68 year old male) returns to the arrhythmia clinic for interval reevaluation of his atrial fibrillation post ablation.  The patient reports that he has had no symptomatic recurrence of atrial fibrillation following his most recent cardioversion in November 2023.  Although not frequent the patient is quite symptomatic with fatigue, feeling tired, and having some dyspnea on exertion when he is in atrial fibrillation.  He has not tolerated flecainide therapy and the same is occurred on sotalol with symptoms of lightheadedness and dizziness.  The patient reports that the symptoms resolved with the discontinuation of the medication and we discussed the implications as a pertains to potential increased likelihood of recurrent episodes of atrial fibrillation.  We discussed at length the rationale for consideration of repeat mapping and ablation of his arrhythmia.    Current Outpatient Medications   Medication Sig Dispense Refill    aspirin (ASA) 81 MG EC tablet Take 1 tablet (81 mg) by mouth daily      hydrochlorothiazide (HYDRODIURIL) 25 MG tablet TAKE 1 TABLET BY MOUTH EVERY DAY IN THE MORNING FOR HIGH BLOOD PRESSURE 90 tablet 2    levothyroxine (SYNTHROID/LEVOTHROID) 100 MCG tablet TAKE 1 TABLET(100 MCG) BY MOUTH DAILY 90 tablet 2    lisinopril (ZESTRIL) 40 MG tablet TAKE 1 TABLET BY MOUTH DAILY FOR HIGH BLOOD PRESSURE 90 tablet 2    omeprazole (PRILOSEC) 20 MG DR capsule TAKE ONE CAPSULE BY MOUTH DAILY BEFORE BREAKFAST 90 capsule 2    rosuvastatin (CRESTOR) 10 MG tablet Take 1 tablet (10 mg) by mouth daily 90 tablet 3    sotalol (BETAPACE) 80 MG tablet Take 1 tablet (80 mg) by mouth 2 times daily 180 tablet 3       Review of Systems:     Family History  Family History   Problem Relation Age of Onset    Atrial fibrillation Mother     Dementia Mother         dx 80s     "Diabetes Type 2  Mother         dx 60s/70s    Atrial fibrillation Father     Rheumatoid Arthritis Father     Atrial fibrillation Sister     Cerebrovascular Disease Sister     Parkinsonism Sister     Parkinsonism Sister     Atrial fibrillation Brother     Rheumatoid Arthritis Brother     Cerebrovascular Disease Brother     CABG Brother     Atrial fibrillation Brother     Diabetes Type 2  Brother         Dx 40's       Social History   reports that he has never smoked. He has never used smokeless tobacco. He reports current alcohol use of about 5.0 standard drinks of alcohol per week. He reports that he does not use drugs.    Objective:   Vital signs in last 24 hours:  /78 (BP Location: Left arm, Patient Position: Sitting, Cuff Size: Adult Large)   Pulse 51   Resp 16   Ht 1.753 m (5' 9\")   Wt 121.5 kg (267 lb 14.4 oz)   SpO2 99%   BMI 39.56 kg/m      Physical Exam:  General: The patient is alert oriented to person place and situation.  The patient is in no acute distress at the time of my evaluation.  Eyes: Pupils are equal, round, and reactive to light.  Conjunctiva and sclera are clear.  ENT: Oral mucosa is moist and without redness. No evident nasal discharge.  Pulmonary: Lungs are clear bilaterally with no rales, rhonchi, or wheezes.    Cardiovascular exam: Rhythm is regular. S1 and S2 are normal. No significant murmur is present. JVP is normal. Lower extremities demonstrate no significant edema. Distal pulses are intact bilaterally.  Abdomen is soft, nontender, no organomegaly, and bowel sounds present.  Musculoskeletal: Spine is straight. Extremities without deformity.  Neuro: Gait is normal.   Skin is warm, dry, and otherwise intact.      Cardiographics:       Lab Results:         Outside record review:        "

## 2024-01-25 NOTE — Clinical Note
Hi team, Please see my note from today. Can 1 of you please reach out to him next week and confirm that he wants to move forward with repeat/ablation of his atrial fibrillation. Mapping system: [employer] No additional diagnostic testing needed.  Okay for double up ablation. Thanks Benedicto

## 2024-01-25 NOTE — LETTER
1/25/2024    Magdaleno Bennett MD  [address] [address]    RE: Brett Hopkins       Dear Colleague,     I had the pleasure of seeing Brett Hopkins in the [facility].    [facility]  Cardiac Electrophysiology     Progress Note: Benedicto Barfield MD    Primary Care: Magdaleno Bennett MD    Primary Cardiologist: Nicola Cotter MD    Primary Electrophysiologist: Benedicto Barfield MD    Assessment:       Persistent atrial fibrillation: Chronic problem for the patient diagnosed in 2007.  Patient underwent PVI August 2013 (cryo-PVI only).  The patient has had multiple recurrences of atrial fibrillation subsequently and has failed medical therapy with flecainide and then sotalol.  His symptoms with atrial fibrillation tend to be feeling tired, fatigue, and some dyspnea on exertion. Sotalol caused him significant lightheadedness and dizziness.  The patient reports no recurrence since his most recent cardioversion and November 2023.  We discussed the treatment options for his arrhythmia particularly since he has not tolerated medications well.  Specifically we discussed the advantages and the mapping and ablation equipment since his index ablation as well as postprocedural care.  The patient is likely interested in moving forward with repeat mapping/ablation of his atrial fibrillation.  The patient has an elevated [id number] score and underwent successful LAAC 1/12/2023 the patient is now off OAC and is compliant with his low-dose baby aspirin.  Essential hypertension: The patient's blood pressure today is at target on his current medical therapy.  Coronary atherosclerosis, native vessel: Discovered by CT imaging with negative stress testing in 2022.  The patient currently reports no symptoms suggesting coronary ischemia.  He is currently taking a statin (rosuvastatin).      Recommendations:  No change in current medication.  The patient will be contacted by the EP nurse clinician  next week to discuss whether he wishes to move forward with repeat mapping/ablation.  Tentatively scheduled for follow-up in the arrhythmia clinic with the EP CHIQUITA in 2 months.    Time spent: 45 minutes spent on the date of the encounter doing chart review, history and exam, documentation and further activities as noted above.    Subjective:  Brett Hopkins (68 year old male) returns to the arrhythmia clinic for interval reevaluation of his atrial fibrillation post ablation.  The patient reports that he has had no symptomatic recurrence of atrial fibrillation following his most recent cardioversion in November 2023.  Although not frequent the patient is quite symptomatic with fatigue, feeling tired, and having some dyspnea on exertion when he is in atrial fibrillation.  He has not tolerated flecainide therapy and the same is occurred on sotalol with symptoms of lightheadedness and dizziness.  The patient reports that the symptoms resolved with the discontinuation of the medication and we discussed the implications as a pertains to potential increased likelihood of recurrent episodes of atrial fibrillation.  We discussed at length the rationale for consideration of repeat mapping and ablation of his arrhythmia.    Current Outpatient Medications   Medication Sig Dispense Refill    aspirin (ASA) 81 MG EC tablet Take 1 tablet (81 mg) by mouth daily      hydrochlorothiazide (HYDRODIURIL) 25 MG tablet TAKE 1 TABLET BY MOUTH EVERY DAY IN THE MORNING FOR HIGH BLOOD PRESSURE 90 tablet 2    levothyroxine (SYNTHROID/LEVOTHROID) 100 MCG tablet TAKE 1 TABLET(100 MCG) BY MOUTH DAILY 90 tablet 2    lisinopril (ZESTRIL) 40 MG tablet TAKE 1 TABLET BY MOUTH DAILY FOR HIGH BLOOD PRESSURE 90 tablet 2    omeprazole (PRILOSEC) 20 MG DR capsule TAKE ONE CAPSULE BY MOUTH DAILY BEFORE BREAKFAST 90 capsule 2    rosuvastatin (CRESTOR) 10 MG tablet Take 1 tablet (10 mg) by mouth daily 90 tablet 3    sotalol (BETAPACE) 80 MG tablet Take 1  "tablet (80 mg) by mouth 2 times daily 180 tablet 3       Review of Systems:     Family History  Family History   Problem Relation Age of Onset    Atrial fibrillation Mother     Dementia Mother         dx 80s    Diabetes Type 2  Mother         dx 60s/70s    Atrial fibrillation Father     Rheumatoid Arthritis Father     Atrial fibrillation Sister     Cerebrovascular Disease Sister     Parkinsonism Sister     Parkinsonism Sister     Atrial fibrillation Brother     Rheumatoid Arthritis Brother     Cerebrovascular Disease Brother     CABG Brother     Atrial fibrillation Brother     Diabetes Type 2  Brother         Dx 40's       Social History   reports that he has never smoked. He has never used smokeless tobacco. He reports current alcohol use of about 5.0 standard drinks of alcohol per week. He reports that he does not use drugs.    Objective:   Vital signs in last 24 hours:  /78 (BP Location: Left arm, Patient Position: Sitting, Cuff Size: Adult Large)   Pulse 51   Resp 16   Ht 1.753 m (5' 9\")   Wt 121.5 kg (267 lb 14.4 oz)   SpO2 99%   BMI 39.56 kg/m      Physical Exam:  General: The patient is alert oriented to person place and situation.  The patient is in no acute distress at the time of my evaluation.  Eyes: Pupils are equal, round, and reactive to light.  Conjunctiva and sclera are clear.  ENT: Oral mucosa is moist and without redness. No evident nasal discharge.  Pulmonary: Lungs are clear bilaterally with no rales, rhonchi, or wheezes.    Cardiovascular exam: Rhythm is regular. S1 and S2 are normal. No significant murmur is present. JVP is normal. Lower extremities demonstrate no significant edema. Distal pulses are intact bilaterally.  Abdomen is soft, nontender, no organomegaly, and bowel sounds present.  Musculoskeletal: Spine is straight. Extremities without deformity.  Neuro: Gait is normal.   Skin is warm, dry, and otherwise intact.      Cardiographics:       Lab Results:         Outside " record review:          Thank you for allowing me to participate in the care of your patient.      Sincerely,     Benedicto Barfield MD     [facility]  cc:   Benedicto Barfield MD  [address] [address]

## 2024-01-26 ENCOUNTER — TELEPHONE (OUTPATIENT)
Dept: CARDIOLOGY | Facility: CLINIC | Age: 69
End: 2024-01-26
Payer: COMMERCIAL

## 2024-01-26 NOTE — TELEPHONE ENCOUNTER
Benedicto Barfield MD  P [facility]  Please see my note from today.  Can 1 of you please reach out to him next week and confirm that he wants to move forward with repeat/ablation of his atrial fibrillation.  Mapping system: [employer]/23[employer]  No additional diagnostic testing needed.  Okay for double up ablation.  Thanks  Benedicto

## 2024-01-29 ENCOUNTER — DOCUMENTATION ONLY (OUTPATIENT)
Dept: CARDIOLOGY | Facility: CLINIC | Age: 69
End: 2024-01-29
Payer: COMMERCIAL

## 2024-01-29 ENCOUNTER — PREP FOR PROCEDURE (OUTPATIENT)
Dept: CARDIOLOGY | Facility: CLINIC | Age: 69
End: 2024-01-29
Payer: COMMERCIAL

## 2024-01-29 DIAGNOSIS — I48.19 PERSISTENT ATRIAL FIBRILLATION (H): Primary | ICD-10-CM

## 2024-01-29 RX ORDER — SODIUM CHLORIDE 9 MG/ML
100 INJECTION, SOLUTION INTRAVENOUS CONTINUOUS
Status: CANCELLED | OUTPATIENT
Start: 2024-01-29

## 2024-01-29 RX ORDER — LIDOCAINE 40 MG/G
CREAM TOPICAL
Status: CANCELLED | OUTPATIENT
Start: 2024-01-29

## 2024-01-29 NOTE — PROGRESS NOTES
H&P Date: Teach Date: Imaging None   PVI  Order Case Req Y  Order Set Y Lab/EKG  Orders [] Letter [] F/U RN Date:  NP follow-up Date:     AC Eliquis-Hx of LAAO- Start 7 days prior, and continue for 6 wks post   AAD Sotalol-Hold 3 days prior   PPI/H2 Blocker Increase Omeprazole to 40 mg  3 days prior, 6wk post   Diuretics None   DM/GLP-1 DM Meds- None  GLP-1- None     1955  Home:[phone] (home) Cell:[phone] (mobile)  Emergency Contact: Ankita Hopkins [phone]  PCP: Magdaleno Bennett, [phone]    Important patient information for CSC/Cath Lab staff : None    [facility] EP Cath Lab Procedure Order   Ablation Type:  PVI- Atrial Fibrillation  Ordering Provider: Dr Barfield  Date Ordered and Prepped: 1/29/2024 Jayna Lopez RN    Scheduling Information:  Anticipated Case Duration:  Standard ( Case per day SA 2:1, DW 4:1, KA 3:1)   Scheduling Timeframe:   Pt is an , would like to schedule for after 4-15-24  Scheduling Restrictions: None  Scheduling Contact: Please contact pt to schedule, if you are unable to schedule date within the next 24 hours please contact pt to update on scheduling process  EP RN Follow Up Apt: Schedule EP RN PC visit 3-4 days s/p PVI  MD Preference: Scheduling with ordering provider  Current Device/Device Co Needed for Procedure: None NoneNone  Pre-Procedural Testing needed: None  Mapping System Required:  JM (Dr Barfield)  ICE Needed:  Yes  Anesthesia:General Anesthesia    [facility] EP Cath Lab Prep   H&P:  Schedule H&P with EP CHIQUITA, RN Teach, and Labs within 30 days of PVI  Pre-op Labs: CBC, BMP, Beta HcG if appropriate, and INR if on Warfarin will be ordered AM of procedure, if not completed at pre-op H&P within 7 days of procedure.  T&S Pre-Procedure Review: Does not need for PVI procedures  Medical Records Pertinent for Procedure:   LAAO 1-12-23;  Cardioversion 2-17-23, 11-17-23, Holter/ACT event monitor 10-16-23, Echo DIONTE 5-3-23 EF 60 %, EKG 11-7-23 Afib @85, and Ablation  Record PV 2013  Iodinated Contrast Dye Allergies (Does not include Shellfish, Egg, and/or Iodine Allergy): NA  GLP-1 Protocol: If on Dulaglutide (Trulicity) (weekly)- Injection hold 7 days prior to procedure  , Exenatide extended release (Bydureon bcise) (weekly)- Injection hold 7 days prior to procedure, Exenatide (Byetta) (twice daily)- Oral Tablet hold day prior and morning of procedure and for Injection hold 7 days prior to procedure, Semaglutide (Ozempic) (weekly)- Injection and Oral hold 7 days prior to procedure, Liraglutide (Victoza, Saxenda) (daily)- Injection hold day prior and morning of procedure    Allergies   Allergen Reactions    Sulfa (Sulfonamide Antibiotics) [Sulfa Antibiotics] Rash     Rash - turned purple  , Around age 32       Current Outpatient Medications:     aspirin (ASA) 81 MG EC tablet, Take 1 tablet (81 mg) by mouth daily, Disp: , Rfl:     hydrochlorothiazide (HYDRODIURIL) 25 MG tablet, TAKE 1 TABLET BY MOUTH EVERY DAY IN THE MORNING FOR HIGH BLOOD PRESSURE, Disp: 90 tablet, Rfl: 2    levothyroxine (SYNTHROID/LEVOTHROID) 100 MCG tablet, TAKE 1 TABLET(100 MCG) BY MOUTH DAILY, Disp: 90 tablet, Rfl: 2    lisinopril (ZESTRIL) 40 MG tablet, TAKE 1 TABLET BY MOUTH DAILY FOR HIGH BLOOD PRESSURE, Disp: 90 tablet, Rfl: 2    omeprazole (PRILOSEC) 20 MG DR capsule, TAKE ONE CAPSULE BY MOUTH DAILY BEFORE BREAKFAST, Disp: 90 capsule, Rfl: 2    rosuvastatin (CRESTOR) 10 MG tablet, Take 1 tablet (10 mg) by mouth daily, Disp: 90 tablet, Rfl: 3    sotalol (BETAPACE) 80 MG tablet, Take 1 tablet (80 mg) by mouth 2 times daily, Disp: 180 tablet, Rfl: 3    Documentation Date:1/29/2024 10:18 AM  Jayna Lopez RN

## 2024-01-29 NOTE — TELEPHONE ENCOUNTER
Phone call to patient, he is very interested in repeat PVI with SWA, he is an  and would like to wait until after 4-15-24  to have the procedure completed.  Chart prepped, orders placed and forwarded to our scheduling team.

## 2024-02-15 ENCOUNTER — TELEPHONE (OUTPATIENT)
Dept: CARDIOLOGY | Facility: CLINIC | Age: 69
End: 2024-02-15
Payer: COMMERCIAL

## 2024-02-15 DIAGNOSIS — K20.90 ESOPHAGITIS: ICD-10-CM

## 2024-02-15 DIAGNOSIS — I48.19 PERSISTENT ATRIAL FIBRILLATION (H): Primary | ICD-10-CM

## 2024-02-15 NOTE — TELEPHONE ENCOUNTER
[facility] Call Center    Phone Message    May a detailed message be left on voicemail: yes     Reason for Call: Other: Pt called in says he went into AFIB last night, and that he is currently in afib. Please justyna pt back for further discussion.     Action Taken: Other: cardiology    Travel Screening: Not Applicable    Thank you!  [facility]

## 2024-02-15 NOTE — TELEPHONE ENCOUNTER
Nicolle-    Pt call reporting onset of AF  Pt reports approximate onset of episode 5:30am 2/15/24  Pt denies any symptoms during episode(s)  Pt reports symptoms as asymptomatic  Pt denies any recent changes or triggers leading to episode(s)  Pts HRs during the episode(s)  105-130bpm  Pts current BP unable to report exact readings of BP, but notes BP has been within normal range and at pts baseline  Pt  reports AF onset by smart watch, and confirms via "[employer] Mobile    Problem History Diagnosis: persistent AF  [id number] VASC Score: 3  Pertinent past surgical/medical history: Pt has hx of LAAO, PVI in 2013, and has needed DCCV. Pt has not converted back to SR on his own.  Next Clinic Apt or Planned follow up apt: Next clinic apt was to be set up for PVI in April, advised pt given need for multiple DCCV within the past year PVI should be prioritized. Pt was wanting to wait, but does agree that he needs to have this done sooner now.   Medication: Pts medication list and allergy list is current and listed below for reference   Anticoagulation:  Not on AC with hx of LAAO, and does not have Eliquis at home. Will need new RX.  Antiarrhythmic Medications: Sotalol as prescribed below  Instructions/Information reviewed with patient: AF/AFL and stroke education provided to pt, reassurance was given to pt of non-life threatening arrhythmia, reviewed when to seek care through the ER/911, instructed to continue current medication as prescribed, continue to monitor s/s of AF/longterm, call if symptoms persist or worsen, to contact the EP nursing office if conversion back to SR, information would be sent to provider for review and would be contacted with further recommendations, contact information provided to pt, and pt verbalized understanding    Pt is going to [place] for spring training for [employer] in 2 wks, prefers to have DCCV prior to this.  Also if agreeable and discussed this with pt, to arrange both DCCV and PVI. To coordinate both, to  avoid interruption in Eliquis 4wks post to restart again 7 days prior to PVI.  Pt also on ASA 81mg, and will need to stop when starting Eliquis.    Please advise  Thank you,  Glo Carcamo RN  2/15/2024 9:28 AM    Allergies   Allergen Reactions    Sulfa (Sulfonamide Antibiotics) [Sulfa Antibiotics] Rash     Rash - turned purple  , Around age 32       Current Outpatient Medications:     aspirin (ASA) 81 MG EC tablet, Take 1 tablet (81 mg) by mouth daily, Disp: , Rfl:     hydrochlorothiazide (HYDRODIURIL) 25 MG tablet, TAKE 1 TABLET BY MOUTH EVERY DAY IN THE MORNING FOR HIGH BLOOD PRESSURE, Disp: 90 tablet, Rfl: 2    levothyroxine (SYNTHROID/LEVOTHROID) 100 MCG tablet, TAKE 1 TABLET(100 MCG) BY MOUTH DAILY, Disp: 90 tablet, Rfl: 2    lisinopril (ZESTRIL) 40 MG tablet, TAKE 1 TABLET BY MOUTH DAILY FOR HIGH BLOOD PRESSURE, Disp: 90 tablet, Rfl: 2    omeprazole (PRILOSEC) 20 MG DR capsule, TAKE ONE CAPSULE BY MOUTH DAILY BEFORE BREAKFAST, Disp: 90 capsule, Rfl: 2    rosuvastatin (CRESTOR) 10 MG tablet, Take 1 tablet (10 mg) by mouth daily, Disp: 90 tablet, Rfl: 3    sotalol (BETAPACE) 80 MG tablet, Take 1 tablet (80 mg) by mouth 2 times daily, Disp: 180 tablet, Rfl: 3

## 2024-02-16 ENCOUNTER — TELEPHONE (OUTPATIENT)
Dept: CARDIOLOGY | Facility: CLINIC | Age: 69
End: 2024-02-16
Payer: COMMERCIAL

## 2024-02-16 ENCOUNTER — PREP FOR PROCEDURE (OUTPATIENT)
Dept: CARDIOLOGY | Facility: CLINIC | Age: 69
End: 2024-02-16
Payer: COMMERCIAL

## 2024-02-16 ENCOUNTER — DOCUMENTATION ONLY (OUTPATIENT)
Dept: CARDIOLOGY | Facility: CLINIC | Age: 69
End: 2024-02-16
Payer: COMMERCIAL

## 2024-02-16 DIAGNOSIS — I48.19 PERSISTENT ATRIAL FIBRILLATION (H): Primary | ICD-10-CM

## 2024-02-16 RX ORDER — LIDOCAINE 40 MG/G
CREAM TOPICAL
Status: CANCELLED | OUTPATIENT
Start: 2024-02-16

## 2024-02-16 NOTE — TELEPHONE ENCOUNTER
[facility] Call Center    Phone Message    May a detailed message be left on voicemail: yes     Reason for Call: Other: Pt called in wanting to know when should he goes back on Eliquis if the cardioversion is scheduled for the 3/27/24,please call pt back for furter discussion.     Action Taken: Other: cardiology    Travel Screening: Not Applicable    Thank you!  [facility]

## 2024-02-16 NOTE — TELEPHONE ENCOUNTER
Noted PC, pt was advised yesterday during PC we would be reaching out upon review by Nicolle  2/16/2024 9:36 AM  Glo Carcamo RN

## 2024-02-16 NOTE — TELEPHONE ENCOUNTER
"PC back to pt  Reviewed below recommendations  Medication sent in and updated  Request sent to  to arrange apts  \"I have been waiting over 24 hours for a response\"  \"No one every calls me back from scheduling, I want a call back within 1 hour\"  \"I have other things going on in my life other than this that I need to arrange\"  Set limits for pt and reasonable expectations in regards to turn around time for care and coordination of his care  Pt is leaving in 1 hour, and wants this to be take care of before then  Pt verbalized understanding, has no further questions or concerns at this time, and has our contact information if needed.  2/16/2024 11:05 AM  SOPHIE Carpio Emily A, APRN CNP  P [facility] Support [facility]  Caller: Unspecified (Yesterday,  9:09 AM)  Ok to coordinate outpatient DCCV after 7 days on OAC. Discontinue ASA and start Eliquis 5mg BID for stroke prophylaxis. No med changes prior to DCCV. Ok to proceed with PVI scheduling also. Thank you  "

## 2024-02-16 NOTE — PROGRESS NOTES
H&P  PMD: []  Received [] Card OV: [x]  Date: 1/25 Sent LM  []  Teach  []   Orders  I [x] P  [x]  Letter []   AC: Eliquis started 2/17 full day. Hx of LAAO. Should stay on to avoid interruption between DCCV and PVI. NP Med Review: NO Changes-per EP Np    DM Meds: None  GLP-1:None       Brett Hopkins, 1955, [id number]  Home:[phone] (home) Cell:[phone] (mobile)  Emergency Contact: HopkinsAnkita osuth [phone]  PCP: Magdaleno Bennett, [phone]    +++Important patient information for CSC/Cath Lab staff : None+++    [facility] EP Cath Lab Procedure Order   Procedure: Cardioversion for AF  Ordering Provider: Nicolle Sanchez NP  Date Ordered and Prepped: 2/16/2024 Glo Carcamo, SOPHIE  Anticipated Case Duration:  Standard  Scheduling Needs/Timeframe:  Schedule on or after 2/24/24  Scheduling Contact: Please contact pt to schedule  Cardiology Follow Up Apt s/p:  Pt needs to try and schedule PVI 1mo from DCCV, he initially said April but is agreeable to move up now  Current Device/Device Co Needed for Procedure: None NoneNone  Pre-Procedural Testing needed: None  Anesthesia:  General    [facility] EP Cath Lab Prep   H&P:  Compled by cardiology on 1/25/24 if scheduled within 30 days, pt to schedule with PMD if procedure outside of this timeframe  Pre-op Labs: K if pt taking diuretic medication or hx of low Potassium, Beta HcG if appropriate, and INR if on Warfarin will be ordered AM of procedure and Review of most recent labs, WEL for procedure  T&S Pre-Procedure Review: T&S is not required for DCCV/DFT Testing  Medical Records Pertinent for Procedure:  None  Iodinated Contrast Dye Allergies (Does not include Shellfish, Egg, and/or Iodine Allergy): NA  GLP-1 Protocol: If on Dulaglutide (Trulicity) (weekly)- Injection hold 7 days prior to procedure  , Exenatide extended release (Bydureon bcise) (weekly)- Injection hold 7 days prior to procedure, Exenatide (Byetta) (twice daily)- Oral Tablet hold day prior and  morning of procedure and for Injection hold 7 days prior to procedure, Semaglutide (Ozempic) (weekly)- Injection and Oral hold 7 days prior to procedure, Liraglutide (Victoza, Saxenda) (daily)- Injection hold day prior and morning of procedure    Allergies   Allergen Reactions    Sulfa (Sulfonamide Antibiotics) [Sulfa Antibiotics] Rash     Rash - turned purple  , Around age 32       Current Outpatient Medications:     apixaban ANTICOAGULANT (ELIQUIS) 5 MG tablet, Take 1 tablet (5 mg) by mouth 2 times daily . Stop Aspirin when starting., Disp: 60 tablet, Rfl: 4    hydrochlorothiazide (HYDRODIURIL) 25 MG tablet, TAKE 1 TABLET BY MOUTH EVERY DAY IN THE MORNING FOR HIGH BLOOD PRESSURE, Disp: 90 tablet, Rfl: 2    levothyroxine (SYNTHROID/LEVOTHROID) 100 MCG tablet, TAKE 1 TABLET(100 MCG) BY MOUTH DAILY, Disp: 90 tablet, Rfl: 2    lisinopril (ZESTRIL) 40 MG tablet, TAKE 1 TABLET BY MOUTH DAILY FOR HIGH BLOOD PRESSURE, Disp: 90 tablet, Rfl: 2    omeprazole (PRILOSEC) 20 MG DR capsule, TAKE 1 CAPSULE BY MOUTH DAILY BEFORE BREAKFAST, Disp: 90 capsule, Rfl: 1    rosuvastatin (CRESTOR) 10 MG tablet, Take 1 tablet (10 mg) by mouth daily, Disp: 90 tablet, Rfl: 3    sotalol (BETAPACE) 80 MG tablet, Take 1 tablet (80 mg) by mouth 2 times daily, Disp: 180 tablet, Rfl: 3    Documentation Date:2/16/2024 11:07 AM  Glo Carcamo RN

## 2024-02-16 NOTE — TELEPHONE ENCOUNTER
[facility] Call Center    Phone Message    May a detailed message be left on voicemail: yes     Reason for Call: Other: Pt waiting a call back to discuss afib and next steps as far as medication and upcoming vacation to [place] on 02/29/24. Please return call as soon as possible as he waited all day yesterday for a call back.      Action Taken: Other: Cardiology    Travel Screening: Not Applicable    Thank you!  [facility]

## 2024-02-16 NOTE — TELEPHONE ENCOUNTER
1st Attempt to contact pt, detailed message discussing the below with contact information was left per pt request.  Pt instructed to start Eliqis no later than 2/20/24 (7 days prior to DCCV)  Pt also informed that he cannot miss or skip any doses prior to procedure  Request that pt contact the clinic back to verify that he received the msg  2/16/2024 1:34 PM  Glo Carcamo RN

## 2024-02-20 ENCOUNTER — TELEPHONE (OUTPATIENT)
Dept: CARDIOLOGY | Facility: CLINIC | Age: 69
End: 2024-02-20
Payer: COMMERCIAL

## 2024-02-20 NOTE — TELEPHONE ENCOUNTER
Pre-Procedure Education  2/27/2024  Procedure: DCCV with Taryn Condon NP on 2/27/2024 with arrival time 8:30 am      CONFIRMED WITH PT HE STARTED HIS ELIQUIS ON 2/18/2024 IS POST WATCHMAN    Orders: Orderset for procedure verified signed/held    COVID: COVID policy- if pt develops COVID like symptoms prior to procedure, he/she would need to complete an at home with a rapid antigen COVID test 1-2 days prior to your procedure date. If COVID + pt is aware the procedure will need to be rescheduled, and to contact CV scheduling as soon as possible    Pre-Op H&P: Scheduled on 2/21/2024 DR RUSSO IN OUR SYSTEM at PMD- Will request upon completion    Education:      PT HAS A  FOR PROCEDURE THAT WILL STAY WITH HIM    PT HAS MY CHART REVIEWED PROCEDURE NOTE INSTRUCTIONS    PT INSTRUCTED TO HOLD ANY VITAMINS, MINERALS CALCIUM IRON OR SUPPLEMENTS THE MORNING OF CV  PT INSTRUCTED NO GUM CHEWING MINTS OR CANDY THE MORNING OF CV  PT INSTRUCTED TO LEAVE JEWELRY AT HOME  PT INSTRUCTED TO BATHE OR SHOWER BEFORE COMING IN  PT IS ON ELIQUIS  PT IS ON SOTALOL  NO MEDICATION CHANGES WERE MADE  PT IS A HARD BLOOD DRAW AND IV START  PT HAS A POST CV FOLLOW UP WITH RAMA 4/11         Contact: Reviewed via phone with pt    Pre-Procedure Instruction: NPO after midnight pre procedure, Defined NPO with pt, Remove all jewelry and leave all valuables at home, Shower prior to arrival, Sedation plan/orders, Transportation requirements and arrangements post procedure, Post-procedure follow up process, Post-procedure restrictions/expectations, and Pre-procedure letter sent- letter tab  Risks:      Medication:   Instructions regarding anticoagulants: Eliquis- To continue anticoagulation uninterrupted through their procedure    Instructions regarding antiarrhythmic medication: Sotalol; continue medication prior to procedure as prescribed    Instructions given to pt regarding diuretics medication: NA for DCCV    Instructions given to pt  regarding DM/GLP-1 medication:   DM- None  GLP-1- None    Instructions for medication, other than anticoagulants and antiarrhythmics listed above, given to pt: Take all medication AM of procedure with small sips of water     Important patient information for staff:  PT IS  ON SOTALOL PT IS A HARD BLOOD DRAW AND IV START [facility]SC NOTIFIED    2/20/2024 10:52 AM  Sharmila Suresh LPN

## 2024-02-21 ENCOUNTER — OFFICE VISIT (OUTPATIENT)
Dept: FAMILY MEDICINE | Facility: CLINIC | Age: 69
End: 2024-02-21
Payer: COMMERCIAL

## 2024-02-21 VITALS
TEMPERATURE: 97.8 F | WEIGHT: 266 LBS | HEIGHT: 69 IN | SYSTOLIC BLOOD PRESSURE: 90 MMHG | OXYGEN SATURATION: 100 % | BODY MASS INDEX: 39.4 KG/M2 | DIASTOLIC BLOOD PRESSURE: 72 MMHG | HEART RATE: 85 BPM

## 2024-02-21 DIAGNOSIS — I48.19 PERSISTENT ATRIAL FIBRILLATION (H): ICD-10-CM

## 2024-02-21 DIAGNOSIS — Z29.11 NEED FOR VACCINATION AGAINST RESPIRATORY SYNCYTIAL VIRUS: ICD-10-CM

## 2024-02-21 DIAGNOSIS — Z01.818 PREOP EXAMINATION: Primary | ICD-10-CM

## 2024-02-21 PROCEDURE — 99214 OFFICE O/P EST MOD 30 MIN: CPT | Performed by: FAMILY MEDICINE

## 2024-02-21 RX ORDER — RESPIRATORY SYNCYTIAL VIRUS VACCINE 120MCG/0.5
0.5 KIT INTRAMUSCULAR ONCE
Qty: 1 EACH | Refills: 0 | Status: SHIPPED | OUTPATIENT
Start: 2024-02-21 | End: 2024-02-21

## 2024-02-21 NOTE — PROGRESS NOTES
Preoperative Evaluation  [facility]  [address] [address]  Phone: [phone]  Fax: [phone]  Primary Provider: Magdaleno Russo  Pre-op Performing Provider: MAGDALENO RUSSO  Feb 21, 2024         Champ is a 68 year old, presenting for the following:  Pre-Op Exam        2/21/2024     3:31 PM   Additional Questions   Roomed by KALIE MATTHEW     Surgical Information  Surgery/Procedure: CARDIO VERSION , then Ablation  Surgery Location: [facility]  Surgeon: PT DOES NOT REMEMBER   Surgery Date: 2/27/2024  Time of Surgery: TBD  Where patient plans to recover: At home with family  Fax number for surgical facility: Note does not need to be faxed, will be available electronically in Epic.    Assessment & Plan     The proposed surgical procedure is considered LOW risk.    (Z01.818) Preop examination  (primary encounter diagnosis)  Comment: For cardioversion and then ablation as above  Plan: PRIMARY CARE FOLLOW-UP SCHEDULING             (Z29.11) Need for vaccination against respiratory syncytial virus  Comment: Recommend at his local pharmacy  Plan: respiratory syncytial virus vaccine, bivalent         (ABRYSVO) injection             (I48.19) Persistent atrial fibrillation  Comment: Patient is back in atrial fibrillation  Plan:      PLAN:  1.  Patient is to maintain his current medications including Eliquis  2.  Laboratory studies have been ordered for the morning of the procedure  3.  Patient will consider COVID and RSV vaccines after the procedures  4.  Patient is cleared for surgery  5.  Patient is due for Medicare wellness exam in 6 months            - No identified additional risk factors other than previously addressed    Antiplatelet or Anticoagulation Medication Instructions   - Bleeding risk is low for this procedure (e.g. dental, skin, cataract).    Additional Medication Instructions  Patient is to take all scheduled medications on the day of  surgery    Recommendation  APPROVAL GIVEN to proceed with proposed procedure, without further diagnostic evaluation.          Subjective       HPI related to upcoming procedure: Patient comes in for preoperative clearance prior to a cardioversion and then an ablation for underlying atrial fibrillation.    Of note the patient has had previous cardioversion as well as a Watchman device, he went back into atrial fibrillation not that long ago and is scheduled for the above procedures.  He is back on Eliquis he is still on sotalol.  Patient can tell he is in atrial fibrillation because of his watch otherwise he does not have any symptoms of being in atrial fibrillation.    No other change in his health status of note his blood pressure remains well-controlled.  He will consider getting a COVID booster sometime after he has done with these procedures since he gets sick on it and will consider the RSV vaccine as well.    Patient has had numerous surgical procedures no issues with anesthesia or pain control no other recent change in his health status        2/19/2024     5:50 PM   Preop Questions   1. Have you ever had a heart attack or stroke? No   2. Have you ever had surgery on your heart or blood vessels, such as a stent placement, a coronary artery bypass, or surgery on an artery in your head, neck, heart, or legs? No   3. Do you have chest pain with activity? No   4. Do you have a history of  heart failure? No   5. Do you currently have a cold, bronchitis or symptoms of other infection? No   6. Do you have a cough, shortness of breath, or wheezing? No   7. Do you or anyone in your family have previous history of blood clots? No   8. Do you or does anyone in your family have a serious bleeding problem such as prolonged bleeding following surgeries or cuts? No   9. Have you ever had problems with anemia or been told to take iron pills? No   10. Have you had any abnormal blood loss such as black, tarry or bloody stools?  No   11. Have you ever had a blood transfusion? No   12. Are you willing to have a blood transfusion if it is medically needed before, during, or after your surgery? Yes   13. Have you or any of your relatives ever had problems with anesthesia? No   14. Do you have sleep apnea, excessive snoring or daytime drowsiness? No   15. Do you have any artifical heart valves or other implanted medical devices like a pacemaker, defibrillator, or continuous glucose monitor? No   16. Do you have artificial joints? YES -see PSHx   17. Are you allergic to latex? No       Health Care Directive  Patient does not have a Health Care Directive or Living Will: Advance Directive received and scanned. Click on Code in the patient header to view.    Preoperative Review of    reviewed - no record of controlled substances prescribed.        Status of Chronic Conditions:  See problem list for active medical problems.  Problems all longstanding and stable, except as noted/documented.  See ROS for pertinent symptoms related to these conditions.    Patient Active Problem List    Diagnosis Date Noted    Hyperlipidemia 12/19/2023     Priority: Medium     Crestor      Presence of Watchman left atrial appendage closure device 01/12/2023     Priority: Medium     1/12/23 - 27 mm Watchman FLX device      Myasthenia gravis (H) 01/04/2023     Priority: Medium     Dx 2022  Double vision  Neurology, Opthalmology          Thyroid nodule 01/05/2022     Priority: Medium     Incidental diagnosis December 2021  2 nodules, 2.1 and 2.4 cm maximum dimension  FNA results 2023, benign  Ultrasound, Dec 2023 stable  Recommend follow-up 1 3 and 5 years      Obesity (BMI 35.0-39.9) with comorbidity (H) 11/20/2020     Priority: Medium    Lymphedema 06/01/2020     Priority: Medium     Right leg, after surgery  Dr. Orlando, et al  Compression stockings, pneumatic pump-somewhat helpful  Lasix, Nov 2020, but as long December 2020, neither were helpful.  Massage       Persistent atrial fibrillation      Priority: Medium     Dx 2007  Symptomatic, SANDERS -NO longer symptomatic  CV with first episode  [id number] - VASc risk score = 1 (HTN)  Failed Rhythmol > Flecainide    PVI August 19, 2013 April 2019 recurrent AF-Eliquis  Cardioversion, September 2019, Nov 2023  Eliquis  Watchman, Jan 2023        Hypertension 04/26/2019     Priority: Medium     Dx Apr 2019  Metoprolol, also for atrial fibrillation.  Lisinopril, April 2019  hydrochlorothiazide Oct 2020        GERD (gastroesophageal reflux disease) 09/10/2018     Priority: Medium     History of a hiatal hernia  Omeprazole daily  Breakthrough symptoms by 2 PM daily        AA (alopecia areata) 09/10/2018     Priority: Medium     Area of hair loss back of scalp  Dermatology        Varicose Veins      Priority: Medium     no symptoms        Aneurysm Of The Ascending Aorta      Priority: Medium     Mild dilatation (4.1 cm) by MRI in Aug 2013  2022-4.0 cm  Cardiology  Consider repeat 2024, 2025        Hypothyroidism      Priority: Medium     Thyroid replacement      Status post ablation of atrial fibrillation 07/28/2015     Priority: Medium     PVI August 19, 2013 (cryo-PVI only)          No past medical history on file.  Past Surgical History:   Procedure Laterality Date    ARTHROPLASTY REVISION HIP Right 05/01/2019    ARTHROPLASTY REVISION KNEE Right 2019    CARDIOVERSION  07/01/2019    7/10/2019    CARDIOVERSION  09/12/2019    CATARACT EXTRACTION Bilateral 06/2021    CV LEFT ATRIAL APPENDAGE CLOSURE N/A 1/12/2023    Procedure: Left Atrial Appendage Closure;  Surgeon: Mitesh Graff MD;  Location: [facility] CV    DENTAL SURGERY      Oral Surgery Tooth Extraction;  Implant    FL ABLATE HEART DYSRHYTHM FOCUS  08/19/2013    Description: Catheter Ablation Atrial Fibrillation;  Recorded: 11/16/2013;  Comments: PVI Aug 19, 2013 (Cryo to all 4 PV's)    TOOTH EXTRACTION      implant    ZZC TOTAL HIP ARTHROPLASTY Right 05/08/2019     Procedure: RIGHT TOTAL HIP ARTHROPLASTY DIRECT ANTERIOR APPROACH;  Surgeon: Mario Woodruff MD;  Location: [facility];  Service: Orthopedics    [facility] TOTAL KNEE ARTHROPLASTY Right 06/21/2017    Procedure: 2)  RIGHT TOTAL KNEE ARTHROPLASTY;  Surgeon: Mario VICKERS MD;  Location: [facility];  Service: Orthopedics     Current Outpatient Medications   Medication Sig Dispense Refill    apixaban ANTICOAGULANT (ELIQUIS) 5 MG tablet Take 1 tablet (5 mg) by mouth 2 times daily . Stop Aspirin when starting. 60 tablet 4    hydrochlorothiazide (HYDRODIURIL) 25 MG tablet TAKE 1 TABLET BY MOUTH EVERY DAY IN THE MORNING FOR HIGH BLOOD PRESSURE 90 tablet 2    levothyroxine (SYNTHROID/LEVOTHROID) 100 MCG tablet TAKE 1 TABLET(100 MCG) BY MOUTH DAILY 90 tablet 2    lisinopril (ZESTRIL) 40 MG tablet TAKE 1 TABLET BY MOUTH DAILY FOR HIGH BLOOD PRESSURE 90 tablet 2    omeprazole (PRILOSEC) 20 MG DR capsule TAKE 1 CAPSULE BY MOUTH DAILY BEFORE BREAKFAST 90 capsule 1    rosuvastatin (CRESTOR) 10 MG tablet Take 1 tablet (10 mg) by mouth daily 90 tablet 3    sotalol (BETAPACE) 80 MG tablet Take 1 tablet (80 mg) by mouth 2 times daily 180 tablet 3       Allergies   Allergen Reactions    Sulfa (Sulfonamide Antibiotics) [Sulfa Antibiotics] Rash     Rash - turned purple  , Around age 32        Social History     Tobacco Use    Smoking status: Never    Smokeless tobacco: Never   Substance Use Topics    Alcohol use: Yes     Alcohol/week: 5.0 standard drinks of alcohol     Types: 5 Cans of beer per week     Comment: minimal     Family History   Problem Relation Age of Onset    Atrial fibrillation Mother     Dementia Mother         dx 80s    Diabetes Type 2  Mother         dx 60s/70s    Atrial fibrillation Father     Rheumatoid Arthritis Father     Atrial fibrillation Sister     Cerebrovascular Disease Sister     Parkinsonism Sister     Parkinsonism Sister     Atrial fibrillation Brother     Rheumatoid Arthritis Brother      "Cerebrovascular Disease Brother     CABG Brother     Atrial fibrillation Brother     Diabetes Type 2  Brother         Dx 40's     History   Drug Use No         Review of Systems    Review of Systems  Constitutional, HEENT, cardiovascular, pulmonary, GI, , musculoskeletal, neuro, skin, endocrine and psych systems are negative, except as otherwise noted.  Objective    BP 90/72   Pulse 85   Temp 97.8  F (36.6  C)   Ht 1.753 m (5' 9\")   Wt 120.7 kg (266 lb)   SpO2 100%   BMI 39.28 kg/m     Estimated body mass index is 39.28 kg/m  as calculated from the following:    Height as of this encounter: 1.753 m (5' 9\").    Weight as of this encounter: 120.7 kg (266 lb).  Physical Exam  GENERAL: alert and no distress  EYES: Eyes grossly normal to inspection, PERRL and conjunctivae and sclerae normal  HENT: ear canals and TM's normal, nose and mouth without ulcers or lesions  NECK: no adenopathy, no asymmetry, masses, or scars  RESP: lungs clear to auscultation - no rales, rhonchi or wheezes  CV: regular rates and rhythm, normal S1 S2, no S3 or S4, no murmur, click or rub, peripheral pulses strong, no peripheral edema, and rhythm is irregular  ABDOMEN: soft, nontender, no hepatosplenomegaly, no masses and bowel sounds normal  MS: no gross musculoskeletal defects noted, no edema  SKIN: no suspicious lesions or rashes  NEURO: Normal strength and tone, mentation intact and speech normal  PSYCH: mentation appears normal, affect normal/bright    Recent Labs   Lab Test 12/19/23  0829 04/24/23  1004 01/12/23  1401 01/09/23  1446   HGB  --  15.6 13.9 15.3   PLT  --  214  --  242    142  --  140   POTASSIUM 4.6 4.7  --  4.9   CR 1.09 1.10 1.02 1.09        Diagnostics  No labs were ordered during this visit.   No EKG this visit, completed in the last 90 days.    Revised Cardiac Risk Index (RCRI)  The patient has the following serious cardiovascular risks for perioperative complications:   - No serious cardiac risks = 0 points "     RCRI Interpretation: 0 points: Class I (very low risk - 0.4% complication rate)         Signed Electronically by: Magdaleno Bennett MD  Copy of this evaluation report is provided to requesting physician.

## 2024-02-21 NOTE — H&P (VIEW-ONLY)
Preoperative Evaluation  [facility]  [address] [address]  Phone: [phone]  Fax: [phone]  Primary Provider: Magdaleno Russo  Pre-op Performing Provider: MAGDALENO RUSSO  Feb 21, 2024         Champ is a 68 year old, presenting for the following:  Pre-Op Exam        2/21/2024     3:31 PM   Additional Questions   Roomed by KALIE MATTHEW     Surgical Information  Surgery/Procedure: CARDIO VERSION , then Ablation  Surgery Location: [facility]  Surgeon: PT DOES NOT REMEMBER   Surgery Date: 2/27/2024  Time of Surgery: TBD  Where patient plans to recover: At home with family  Fax number for surgical facility: Note does not need to be faxed, will be available electronically in Epic.    Assessment & Plan     The proposed surgical procedure is considered LOW risk.    (Z01.818) Preop examination  (primary encounter diagnosis)  Comment: For cardioversion and then ablation as above  Plan: PRIMARY CARE FOLLOW-UP SCHEDULING             (Z29.11) Need for vaccination against respiratory syncytial virus  Comment: Recommend at his local pharmacy  Plan: respiratory syncytial virus vaccine, bivalent         (ABRYSVO) injection             (I48.19) Persistent atrial fibrillation  Comment: Patient is back in atrial fibrillation  Plan:      PLAN:  1.  Patient is to maintain his current medications including Eliquis  2.  Laboratory studies have been ordered for the morning of the procedure  3.  Patient will consider COVID and RSV vaccines after the procedures  4.  Patient is cleared for surgery  5.  Patient is due for Medicare wellness exam in 6 months            - No identified additional risk factors other than previously addressed    Antiplatelet or Anticoagulation Medication Instructions   - Bleeding risk is low for this procedure (e.g. dental, skin, cataract).    Additional Medication Instructions  Patient is to take all scheduled medications on the day of  surgery    Recommendation  APPROVAL GIVEN to proceed with proposed procedure, without further diagnostic evaluation.          Subjective       HPI related to upcoming procedure: Patient comes in for preoperative clearance prior to a cardioversion and then an ablation for underlying atrial fibrillation.    Of note the patient has had previous cardioversion as well as a Watchman device, he went back into atrial fibrillation not that long ago and is scheduled for the above procedures.  He is back on Eliquis he is still on sotalol.  Patient can tell he is in atrial fibrillation because of his watch otherwise he does not have any symptoms of being in atrial fibrillation.    No other change in his health status of note his blood pressure remains well-controlled.  He will consider getting a COVID booster sometime after he has done with these procedures since he gets sick on it and will consider the RSV vaccine as well.    Patient has had numerous surgical procedures no issues with anesthesia or pain control no other recent change in his health status        2/19/2024     5:50 PM   Preop Questions   1. Have you ever had a heart attack or stroke? No   2. Have you ever had surgery on your heart or blood vessels, such as a stent placement, a coronary artery bypass, or surgery on an artery in your head, neck, heart, or legs? No   3. Do you have chest pain with activity? No   4. Do you have a history of  heart failure? No   5. Do you currently have a cold, bronchitis or symptoms of other infection? No   6. Do you have a cough, shortness of breath, or wheezing? No   7. Do you or anyone in your family have previous history of blood clots? No   8. Do you or does anyone in your family have a serious bleeding problem such as prolonged bleeding following surgeries or cuts? No   9. Have you ever had problems with anemia or been told to take iron pills? No   10. Have you had any abnormal blood loss such as black, tarry or bloody stools?  No   11. Have you ever had a blood transfusion? No   12. Are you willing to have a blood transfusion if it is medically needed before, during, or after your surgery? Yes   13. Have you or any of your relatives ever had problems with anesthesia? No   14. Do you have sleep apnea, excessive snoring or daytime drowsiness? No   15. Do you have any artifical heart valves or other implanted medical devices like a pacemaker, defibrillator, or continuous glucose monitor? No   16. Do you have artificial joints? YES -see PSHx   17. Are you allergic to latex? No       Health Care Directive  Patient does not have a Health Care Directive or Living Will: Advance Directive received and scanned. Click on Code in the patient header to view.    Preoperative Review of    reviewed - no record of controlled substances prescribed.        Status of Chronic Conditions:  See problem list for active medical problems.  Problems all longstanding and stable, except as noted/documented.  See ROS for pertinent symptoms related to these conditions.    Patient Active Problem List    Diagnosis Date Noted    Hyperlipidemia 12/19/2023     Priority: Medium     Crestor      Presence of Watchman left atrial appendage closure device 01/12/2023     Priority: Medium     1/12/23 - 27 mm Watchman FLX device      Myasthenia gravis (H) 01/04/2023     Priority: Medium     Dx 2022  Double vision  Neurology, Opthalmology          Thyroid nodule 01/05/2022     Priority: Medium     Incidental diagnosis December 2021  2 nodules, 2.1 and 2.4 cm maximum dimension  FNA results 2023, benign  Ultrasound, Dec 2023 stable  Recommend follow-up 1 3 and 5 years      Obesity (BMI 35.0-39.9) with comorbidity (H) 11/20/2020     Priority: Medium    Lymphedema 06/01/2020     Priority: Medium     Right leg, after surgery  Dr. Orlando, et al  Compression stockings, pneumatic pump-somewhat helpful  Lasix, Nov 2020, but as long December 2020, neither were helpful.  Massage       Persistent atrial fibrillation      Priority: Medium     Dx 2007  Symptomatic, SANDERS -NO longer symptomatic  CV with first episode  [id number] - VASc risk score = 1 (HTN)  Failed Rhythmol > Flecainide    PVI August 19, 2013 April 2019 recurrent AF-Eliquis  Cardioversion, September 2019, Nov 2023  Eliquis  Watchman, Jan 2023        Hypertension 04/26/2019     Priority: Medium     Dx Apr 2019  Metoprolol, also for atrial fibrillation.  Lisinopril, April 2019  hydrochlorothiazide Oct 2020        GERD (gastroesophageal reflux disease) 09/10/2018     Priority: Medium     History of a hiatal hernia  Omeprazole daily  Breakthrough symptoms by 2 PM daily        AA (alopecia areata) 09/10/2018     Priority: Medium     Area of hair loss back of scalp  Dermatology        Varicose Veins      Priority: Medium     no symptoms        Aneurysm Of The Ascending Aorta      Priority: Medium     Mild dilatation (4.1 cm) by MRI in Aug 2013  2022-4.0 cm  Cardiology  Consider repeat 2024, 2025        Hypothyroidism      Priority: Medium     Thyroid replacement      Status post ablation of atrial fibrillation 07/28/2015     Priority: Medium     PVI August 19, 2013 (cryo-PVI only)          No past medical history on file.  Past Surgical History:   Procedure Laterality Date    ARTHROPLASTY REVISION HIP Right 05/01/2019    ARTHROPLASTY REVISION KNEE Right 2019    CARDIOVERSION  07/01/2019    7/10/2019    CARDIOVERSION  09/12/2019    CATARACT EXTRACTION Bilateral 06/2021    CV LEFT ATRIAL APPENDAGE CLOSURE N/A 1/12/2023    Procedure: Left Atrial Appendage Closure;  Surgeon: Mitesh Graff MD;  Location: [facility] CV    DENTAL SURGERY      Oral Surgery Tooth Extraction;  Implant    RI ABLATE HEART DYSRHYTHM FOCUS  08/19/2013    Description: Catheter Ablation Atrial Fibrillation;  Recorded: 11/16/2013;  Comments: PVI Aug 19, 2013 (Cryo to all 4 PV's)    TOOTH EXTRACTION      implant    ZZC TOTAL HIP ARTHROPLASTY Right 05/08/2019     Procedure: RIGHT TOTAL HIP ARTHROPLASTY DIRECT ANTERIOR APPROACH;  Surgeon: Mario Woodruff MD;  Location: [facility];  Service: Orthopedics    [facility] TOTAL KNEE ARTHROPLASTY Right 06/21/2017    Procedure: 2)  RIGHT TOTAL KNEE ARTHROPLASTY;  Surgeon: Mario VICKERS MD;  Location: [facility];  Service: Orthopedics     Current Outpatient Medications   Medication Sig Dispense Refill    apixaban ANTICOAGULANT (ELIQUIS) 5 MG tablet Take 1 tablet (5 mg) by mouth 2 times daily . Stop Aspirin when starting. 60 tablet 4    hydrochlorothiazide (HYDRODIURIL) 25 MG tablet TAKE 1 TABLET BY MOUTH EVERY DAY IN THE MORNING FOR HIGH BLOOD PRESSURE 90 tablet 2    levothyroxine (SYNTHROID/LEVOTHROID) 100 MCG tablet TAKE 1 TABLET(100 MCG) BY MOUTH DAILY 90 tablet 2    lisinopril (ZESTRIL) 40 MG tablet TAKE 1 TABLET BY MOUTH DAILY FOR HIGH BLOOD PRESSURE 90 tablet 2    omeprazole (PRILOSEC) 20 MG DR capsule TAKE 1 CAPSULE BY MOUTH DAILY BEFORE BREAKFAST 90 capsule 1    rosuvastatin (CRESTOR) 10 MG tablet Take 1 tablet (10 mg) by mouth daily 90 tablet 3    sotalol (BETAPACE) 80 MG tablet Take 1 tablet (80 mg) by mouth 2 times daily 180 tablet 3       Allergies   Allergen Reactions    Sulfa (Sulfonamide Antibiotics) [Sulfa Antibiotics] Rash     Rash - turned purple  , Around age 32        Social History     Tobacco Use    Smoking status: Never    Smokeless tobacco: Never   Substance Use Topics    Alcohol use: Yes     Alcohol/week: 5.0 standard drinks of alcohol     Types: 5 Cans of beer per week     Comment: minimal     Family History   Problem Relation Age of Onset    Atrial fibrillation Mother     Dementia Mother         dx 80s    Diabetes Type 2  Mother         dx 60s/70s    Atrial fibrillation Father     Rheumatoid Arthritis Father     Atrial fibrillation Sister     Cerebrovascular Disease Sister     Parkinsonism Sister     Parkinsonism Sister     Atrial fibrillation Brother     Rheumatoid Arthritis Brother      "Cerebrovascular Disease Brother     CABG Brother     Atrial fibrillation Brother     Diabetes Type 2  Brother         Dx 40's     History   Drug Use No         Review of Systems    Review of Systems  Constitutional, HEENT, cardiovascular, pulmonary, GI, , musculoskeletal, neuro, skin, endocrine and psych systems are negative, except as otherwise noted.  Objective    BP 90/72   Pulse 85   Temp 97.8  F (36.6  C)   Ht 1.753 m (5' 9\")   Wt 120.7 kg (266 lb)   SpO2 100%   BMI 39.28 kg/m     Estimated body mass index is 39.28 kg/m  as calculated from the following:    Height as of this encounter: 1.753 m (5' 9\").    Weight as of this encounter: 120.7 kg (266 lb).  Physical Exam  GENERAL: alert and no distress  EYES: Eyes grossly normal to inspection, PERRL and conjunctivae and sclerae normal  HENT: ear canals and TM's normal, nose and mouth without ulcers or lesions  NECK: no adenopathy, no asymmetry, masses, or scars  RESP: lungs clear to auscultation - no rales, rhonchi or wheezes  CV: regular rates and rhythm, normal S1 S2, no S3 or S4, no murmur, click or rub, peripheral pulses strong, no peripheral edema, and rhythm is irregular  ABDOMEN: soft, nontender, no hepatosplenomegaly, no masses and bowel sounds normal  MS: no gross musculoskeletal defects noted, no edema  SKIN: no suspicious lesions or rashes  NEURO: Normal strength and tone, mentation intact and speech normal  PSYCH: mentation appears normal, affect normal/bright    Recent Labs   Lab Test 12/19/23  0829 04/24/23  1004 01/12/23  1401 01/09/23  1446   HGB  --  15.6 13.9 15.3   PLT  --  214  --  242    142  --  140   POTASSIUM 4.6 4.7  --  4.9   CR 1.09 1.10 1.02 1.09        Diagnostics  No labs were ordered during this visit.   No EKG this visit, completed in the last 90 days.    Revised Cardiac Risk Index (RCRI)  The patient has the following serious cardiovascular risks for perioperative complications:   - No serious cardiac risks = 0 points "     RCRI Interpretation: 0 points: Class I (very low risk - 0.4% complication rate)         Signed Electronically by: Magdaleno Bennett MD  Copy of this evaluation report is provided to requesting physician.

## 2024-02-27 ENCOUNTER — ANESTHESIA (OUTPATIENT)
Dept: CARDIOLOGY | Facility: HOSPITAL | Age: 69
End: 2024-02-27
Payer: COMMERCIAL

## 2024-02-27 ENCOUNTER — ANESTHESIA EVENT (OUTPATIENT)
Dept: CARDIOLOGY | Facility: HOSPITAL | Age: 69
End: 2024-02-27
Payer: COMMERCIAL

## 2024-02-27 ENCOUNTER — HOSPITAL ENCOUNTER (OUTPATIENT)
Dept: CARDIOLOGY | Facility: HOSPITAL | Age: 69
Discharge: HOME | End: 2024-02-27
Attending: NURSE PRACTITIONER | Admitting: NURSE PRACTITIONER
Payer: COMMERCIAL

## 2024-02-27 VITALS
HEART RATE: 66 BPM | HEIGHT: 69 IN | OXYGEN SATURATION: 97 % | RESPIRATION RATE: 19 BRPM | SYSTOLIC BLOOD PRESSURE: 117 MMHG | WEIGHT: 266 LBS | TEMPERATURE: 98.6 F | DIASTOLIC BLOOD PRESSURE: 78 MMHG | BODY MASS INDEX: 39.4 KG/M2

## 2024-02-27 DIAGNOSIS — I48.19 PERSISTENT ATRIAL FIBRILLATION (H): ICD-10-CM

## 2024-02-27 LAB
ATRIAL RATE - MUSE: 62 BPM
DIASTOLIC BLOOD PRESSURE - MUSE: NORMAL MMHG
INTERPRETATION ECG - MUSE: NORMAL
P AXIS - MUSE: 68 DEGREES
POTASSIUM SERPL-SCNC: 4.1 MMOL/L (ref 3.4–5.3)
PR INTERVAL - MUSE: 168 MS
QRS DURATION - MUSE: 128 MS
QT - MUSE: 462 MS
QTC - MUSE: 468 MS
R AXIS - MUSE: 5 DEGREES
SYSTOLIC BLOOD PRESSURE - MUSE: NORMAL MMHG
T AXIS - MUSE: 39 DEGREES
VENTRICULAR RATE- MUSE: 62 BPM

## 2024-02-27 PROCEDURE — 370N000017 HC ANESTHESIA TECHNICAL FEE, PER MIN

## 2024-02-27 PROCEDURE — 84132 ASSAY OF SERUM POTASSIUM: CPT | Performed by: NURSE PRACTITIONER

## 2024-02-27 PROCEDURE — 92960 CARDIOVERSION ELECTRIC EXT: CPT

## 2024-02-27 PROCEDURE — 999N000054 HC STATISTIC EKG NON-CHARGEABLE

## 2024-02-27 PROCEDURE — 92960 CARDIOVERSION ELECTRIC EXT: CPT | Performed by: NURSE PRACTITIONER

## 2024-02-27 PROCEDURE — 93010 ELECTROCARDIOGRAM REPORT: CPT | Performed by: INTERNAL MEDICINE

## 2024-02-27 PROCEDURE — 250N000009 HC RX 250: Performed by: ANESTHESIOLOGY

## 2024-02-27 PROCEDURE — 93005 ELECTROCARDIOGRAM TRACING: CPT | Performed by: NURSE PRACTITIONER

## 2024-02-27 PROCEDURE — 36415 COLL VENOUS BLD VENIPUNCTURE: CPT | Performed by: NURSE PRACTITIONER

## 2024-02-27 PROCEDURE — 93005 ELECTROCARDIOGRAM TRACING: CPT

## 2024-02-27 RX ORDER — LIDOCAINE 40 MG/G
CREAM TOPICAL
Status: DISCONTINUED | OUTPATIENT
Start: 2024-02-27 | End: 2024-02-27 | Stop reason: HOSPADM

## 2024-02-27 RX ADMIN — METHOHEXITAL SODIUM 70 MG: 500 INJECTION, POWDER, LYOPHILIZED, FOR SOLUTION INTRAMUSCULAR; INTRAVENOUS; RECTAL at 09:30

## 2024-02-27 ASSESSMENT — ACTIVITIES OF DAILY LIVING (ADL)
ADLS_ACUITY_SCORE: 36
ADLS_ACUITY_SCORE: 38
ADLS_ACUITY_SCORE: 38

## 2024-02-27 ASSESSMENT — ENCOUNTER SYMPTOMS: DYSRHYTHMIAS: 1

## 2024-02-27 NOTE — ANESTHESIA PREPROCEDURE EVALUATION
Anesthesia Pre-Procedure Evaluation    Patient: Brett Hopkins   MRN: [id number] : 1955        Procedure :   Cardioversion External       History reviewed. No pertinent past medical history.   Past Surgical History:   Procedure Laterality Date    ARTHROPLASTY REVISION HIP Right 2019    ARTHROPLASTY REVISION KNEE Right 2019    CARDIOVERSION  2019    7/10/2019    CARDIOVERSION  2019    CATARACT EXTRACTION Bilateral 2021    CV LEFT ATRIAL APPENDAGE CLOSURE N/A 2023    Procedure: Left Atrial Appendage Closure;  Surgeon: Mitesh Graff MD;  Location: [facility] CV    DENTAL SURGERY      Oral Surgery Tooth Extraction;  Implant    ME ABLATE HEART DYSRHYTHM FOCUS  2013    Description: Catheter Ablation Atrial Fibrillation;  Recorded: 2013;  Comments: PVI Aug 19, 2013 (Cryo to all 4 PV's)    TOOTH EXTRACTION      implant    [facility] TOTAL HIP ARTHROPLASTY Right 2019    Procedure: RIGHT TOTAL HIP ARTHROPLASTY DIRECT ANTERIOR APPROACH;  Surgeon: Mario Woodruff MD;  Location: [facility];  Service: Orthopedics    [facility] TOTAL KNEE ARTHROPLASTY Right 2017    Procedure: 2)  RIGHT TOTAL KNEE ARTHROPLASTY;  Surgeon: Mario VICKERS MD;  Location: [facility] OR;  Service: Orthopedics      Allergies   Allergen Reactions    Sulfa (Sulfonamide Antibiotics) [Sulfa Antibiotics] Rash     Rash - turned purple  , Around age 32      Social History     Tobacco Use    Smoking status: Never    Smokeless tobacco: Never   Substance Use Topics    Alcohol use: Yes     Alcohol/week: 5.0 standard drinks of alcohol     Types: 5 Cans of beer per week     Comment: minimal      Wt Readings from Last 1 Encounters:   24 120.7 kg (266 lb)        Anesthesia Evaluation            ROS/MED HX  ENT/Pulmonary:  - neg pulmonary ROS     Neurologic:  - neg neurologic ROS     Cardiovascular: Comment: TRANSESOPHAGEAL ECHOCARDIOGRAM     1. Normal left ventricular size and systolic  performance with a visually  estimated ejection fraction of 60%.  2. No significant valvular heart disease is identified on this study.  3. Normal right ventricular size and systolic performance.  4. There is mild left atrial enlargement.  5. There is a left atrial appendage occlusion device. The device appears well-  placed and seated.  Â  No thrombus is detected upon the surface of the device.  Â  No flow extending into the left atrial appendage was detected around the  device.  6. No residual postprocedural atrial communication is identified.  7. No intracardiac mass or thrombus is detected  8. Echo contrast examination was performed using agitated NS as contrast  agent. The right heart opacity was good and the left heart was well  visualized. There was no evidence of right to left shunting during spontaneous  respiration or following release of Valsalva.      (+)  hypertension- -   -  - -                        dysrhythmias, a-fib,             METS/Exercise Tolerance:     Hematologic:       Musculoskeletal:       GI/Hepatic:     (+) GERD,                   Renal/Genitourinary:       Endo:     (+)          thyroid problem,     Obesity,       Psychiatric/Substance Use:       Infectious Disease:       Malignancy:       Other:            Physical Exam    Airway        Mallampati: II   TM distance: > 3 FB   Neck ROM: full   Mouth opening: > 3 cm    Respiratory Devices and Support         Dental       (+) Modest Abnormalities - crowns, retainers, 1 or 2 missing teeth      Cardiovascular          Rhythm and rate: irregular and normal     Pulmonary           breath sounds clear to auscultation           OUTSIDE LABS:  CBC:   Lab Results   Component Value Date    WBC 7.1 04/24/2023    WBC 6.6 01/09/2023    HGB 15.6 04/24/2023    HGB 13.9 01/12/2023    HCT 47.4 04/24/2023    HCT 47.1 01/09/2023     04/24/2023     01/09/2023     BMP:   Lab Results   Component Value Date     12/19/2023      "04/24/2023    POTASSIUM 4.6 12/19/2023    POTASSIUM 4.7 04/24/2023    CHLORIDE 104 12/19/2023    CHLORIDE 105 04/24/2023    CO2 29 12/19/2023    CO2 25 04/24/2023    BUN 25.9 (H) 12/19/2023    BUN 21.9 04/24/2023    CR 1.09 12/19/2023    CR 1.10 04/24/2023     (H) 12/19/2023     (H) 04/24/2023     COAGS:   Lab Results   Component Value Date    PTT 31 12/21/2020    INR 1.12 (H) 12/21/2020     POC: No results found for: \"BGM\", \"HCG\", \"HCGS\"  HEPATIC:   Lab Results   Component Value Date    ALBUMIN 4.1 12/19/2023    PROTTOTAL 7.2 12/19/2023    ALT 23 12/19/2023    AST 26 12/19/2023    ALKPHOS 75 12/19/2023    BILITOTAL 0.5 12/19/2023     OTHER:   Lab Results   Component Value Date    YFN 9.5 12/19/2023    MAG 2.1 09/10/2018    TSH 7.19 (H) 12/19/2023    CRP 0.5 09/10/2019    SED 3 09/10/2019       Anesthesia Plan    ASA Status:  3    NPO Status:  NPO Appropriate    Anesthesia Type: General.     - Airway: Mask Only   Induction: Intravenous.   Maintenance: TIVA.        Consents    Anesthesia Plan(s) and associated risks, benefits, and realistic alternatives discussed. Questions answered and patient/representative(s) expressed understanding.     - Discussed:     - Discussed with:  Patient      - Extended Intubation/Ventilatory Support Discussed: No.      - Patient is DNR/DNI Status: No     Use of blood products discussed: No .     Postoperative Care            Comments:    Other Comments: Brief GA mask with brevital. Bite block prior to cardioversion. ETT and LMA available. CODY available.               Cassandra Martinez MD  "

## 2024-02-27 NOTE — ANESTHESIA CARE TRANSFER NOTE
Patient: Brett Hopkins    Procedure: * No procedures listed *  Cardioversion External    Diagnosis: * No pre-op diagnosis entered *  Diagnosis Additional Information: No value filed.    Anesthesia Type:   General     Note:    Oropharynx: oropharynx clear of all foreign objects  Level of Consciousness: drowsy  Oxygen Supplementation: nasal cannula  Level of Supplemental Oxygen (L/min / FiO2): 4  Independent Airway: airway patency satisfactory and stable  Dentition: dentition unchanged  Vital Signs Stable: post-procedure vital signs reviewed and stable    Patient transferred to: [facility]      post-procedure handoff checklist not completed for medical reasons    Vitals:  Vitals Value Taken Time   /70 02/27/24 0934   Temp     Pulse 66 02/27/24 0933   Resp 30 02/27/24 0933   SpO2 92 % 02/27/24 0933   Vitals shown include unfiled device data.    Electronically Signed By: CRISTIANO Morales CRNA  February 27, 2024  9:35 AM

## 2024-02-27 NOTE — ANESTHESIA CARE TRANSFER NOTE
Patient: Brett Hopkins    Procedure:   Cardioversion External    Diagnosis: AFIB  Diagnosis Additional Information: No value filed.    Anesthesia Type:   General     Note:    Oropharynx: oropharynx clear of all foreign objects  Level of Consciousness: drowsy  Oxygen Supplementation: nasal cannula  Level of Supplemental Oxygen (L/min / FiO2): 4  Independent Airway: airway patency satisfactory and stable  Dentition: dentition unchanged  Vital Signs Stable: post-procedure vital signs reviewed and stable  Report to RN Given: handoff report given  Patient transferred to: [facility]          Vitals:  Vitals Value Taken Time   /75 02/27/24 0945   Temp     Pulse 59 02/27/24 0946   Resp 15 02/27/24 0946   SpO2 95 % 02/27/24 0946   Vitals shown include unfiled device data.      Electronically Signed By: CRISTIANO Morales CRNA  February 27, 2024  9:48 AM

## 2024-02-27 NOTE — ANESTHESIA POSTPROCEDURE EVALUATION
Patient: Brett Hopkins    Procedure: * No procedures listed *  Cardioversion External    Anesthesia Type:  General    Note:     Postop Pain Control: Uneventful            Sign Out: Well controlled pain   PONV: No   Neuro/Psych: Uneventful            Sign Out: Acceptable/Baseline neuro status   Airway/Respiratory: Uneventful            Sign Out: Acceptable/Baseline resp. status   CV/Hemodynamics: Uneventful            Sign Out: Acceptable CV status; No obvious hypovolemia; No obvious fluid overload   Other NRE: NONE   DID A NON-ROUTINE EVENT OCCUR? No           Last vitals:  Vitals:    02/27/24 1025 02/27/24 1030 02/27/24 1035   BP:   117/78   Pulse: 66 65 66   Resp: 17 17 19   Temp:      SpO2:          Electronically Signed By: Cassandra Martinez MD  February 27, 2024  12:08 PM

## 2024-02-27 NOTE — INTERVAL H&P NOTE
"I have reviewed the surgical (or preoperative) H&P that is linked to this encounter, and examined the patient. There are no significant changes  No missed doses of Eliquis in the last 3 weeks    Clinical Conditions Present on Arrival:  Clinically Significant Risk Factors Present on Admission                # Drug Induced Coagulation Defect: home medication list includes an anticoagulant medication   # Obesity: Estimated body mass index is 39.28 kg/m  as calculated from the following:    Height as of 2/21/24: 1.753 m (5' 9\").    Weight as of 2/21/24: 120.7 kg (266 lb).       "

## 2024-02-27 NOTE — PROGRESS NOTES
Pt had successful cardioversion, SR, BBB, VSS, at baseline neuro status. Tyshawn light food, fluids. Voiding. Instructions per AVS, no med changes. Skin intact but pink at patch sites. Pt. Planning ablation in March. Wife is  today. Pt discharged with all belongings at 1121.

## 2024-02-27 NOTE — DISCHARGE INSTRUCTIONS
Cardioversion    Cardioversion is a procedure to restore your heart's normal rhythm from a fast or irregular rhythm (arrhythmia) in the top or bottom chambers of your heart. You may have the procedure in a hospital or surgery center. It's often done on an outpatient (same day) basis. During the procedure, your doctor will give you medication to keep you free from pain. Then the doctor gives you a brief electric shock. This helps your heartbeat become normal again. In most cases, you can go home within hours of the procedure.    Before Your Procedure    Tell your doctor what over-the-counter and prescription medications, herbs, and supplements you are taking.  Take medication as directed. Your doctor may prescribe anticoagulants (blood thinners), depending on your situation. They help prevent blood clots from forming.  Ask your doctor about the risks and benefits of cardioversion.  Sign your consent form.  Don't eat or drink anything for 8  hours before your procedure.  Follow any other instructions your doctor gives you.   Arrange for an adult to drive you home after the procedure.    During Your Procedure    Your health care provider will place small pads (electrodes) on your chest to record your heartbeat at all times.  Your health care provider will place an intravenous (IV) line in your arm. This gives you medication (sedation) that keeps you free of pain. You'll feel sleepy.  Your health care provider will give you oxygen through a soft, plastic tube in your nose.  Pads will be placed on your chest and back. Your health care provider will give you a very brief electric shock through the pads. Remember, because of sedation, you won't feel the shock.  Your doctor will watch your heartbeat to make sure your normal rhythm has been restored.           During cardioversion, you are fully sedated and won't feel a thing    After Your Procedure    Your health care provider will monitor you until you are fully awake.  Then you'll be able to sit up, walk, and eat.  In most cases, you'll be able to go home after the sedation wears off. This usually takes a few hours.  For a few days, the skin on your chest may feel a little sore, like a mild sunburn.  DO NOT  drive or operate heavy machinery for 24 hours after the procedure.  The day after your procedure, try to take it easy. Take medication as directed.  Call your doctor if you notice skipped beats, a rapid heartbeat, or chest tightness. These may be signs that an irregular heartbeat has returned.        Be sure to have someone else drive you home.

## 2024-02-27 NOTE — PROCEDURES
[facility]    Procedure: Cardioversion    Date/Time: 2/27/2024 10:42 AM    Performed by: Taryn Condon NP  Authorized by: Nicolle Ryan APRN CNP      UNIVERSAL PROTOCOL   Site Marked: NA  Prior Images Obtained and Reviewed:  Yes  Required items: Required blood products, implants, devices and special equipment available    Patient identity confirmed:  Verbally with patient, arm band, provided demographic data and hospital-assigned identification number  Patient was reevaluated immediately before administering moderate or deep sedation or anesthesia  Confirmation Checklist:  Patient's identity using two indicators, relevant allergies, procedure was appropriate and matched the consent or emergent situation and correct equipment/implants were available  Time out: Immediately prior to the procedure a time out was called    Universal Protocol: the Joint Commission Universal Protocol was followed    Preparation: Patient was prepped and draped in usual sterile fashion       ANESTHESIA    Anesthesia was administered and monitored by anesthesiology.  See anesthesia documentation for details.    PROCEDURE DETAILS  Cardioversion basis: elective  Indications: failure of anti-arrhythmic medications  Pre-procedure rhythm: atrial fibrillation  Patient position: patient was placed in a supine position  Chest area: chest area exposed  Electrodes: pads  Electrodes placed: anterior-posterior  Number of attempts: 1    Details of Attempts:  After administration of IV Brevital by MDA and confirmation of adequate sedation, he received a synchronized shock of 200 J with prompt restoration of sinus rhythm.   12 lead EKG post DCCV pending review    Post-procedure rhythm: normal sinus rhythm  Complications: no complications      PROCEDURE  Describe Procedure:   Dx/date: Paroxysmal AF 2007.  Persistent 7/2019, 11/2023    Sx: Dyspnea    [id number]/OAC: 3 for age 65-74, hypertension and vascular disease. Sp  LAAC 1/12/2023    Prior / current AV christine blocking agents, AAD: Propafenone, flecainide/sotalol    DCCV: 7/10/2019, 2/17/2023, 11/17/2023    Ablation: 8/19/2013, Dr. Barfield-cryoballoon PVI    Patient presents for elective cardioversion today   3 weeks prior to his scheduled ablation.  He received 1 synchronized shock of 200 J today with, normal sinus rhythm restored, ventricular rates remain in the 60s to upper 50s.  He will continue on sotalol 80 mg twice daily prior to his ablation.  He will continue on Eliquis twice daily for stroke prevention due to his CHADS2 Vascor of 3 due to age, hypertension and coronary artery disease.  He does have a Watchman device in place, inserted 1/12/2023.  Eliquis will be continued for at least 6 weeks post ablation.  He is scheduled to follow-up in clinic with Nicolle Ryan on 4/11/2024.      Patient Tolerance:  Patient tolerated the procedure well with no immediate complications  Length of time physician/provider present for 1:1 monitoring during sedation: 10

## 2024-03-06 ENCOUNTER — NURSE TRIAGE (OUTPATIENT)
Dept: CARDIOLOGY | Facility: CLINIC | Age: 69
End: 2024-03-06
Payer: COMMERCIAL

## 2024-03-06 NOTE — TELEPHONE ENCOUNTER
"Patient spoke to Triage stating he has gone into A-Fib last night. He is unsure when it started. He states he had a Cardioversion a week ago. He is scheduled on the 19th of March for an ablation. Patient states the EKG currently on his watch has been inconclusive. His heart rate right now is 91bpm. Patient states he never gets symptoms with A-Fib and currently does not feel any. He states he will get very tired and some SOB with exertion. Patient verifies he is on Eliquis 5mg BID and sotalol 80mg BID. Patient was informed this will be routed to the nurses with Dr. Barfield to further review/follow-up. He verbalized awareness and understanding.     1. DESCRIPTION: \"Please describe your heart rate or heartbeat that you are having\" (e.g., fast/slow, regular/irregular, skipped or extra beats, \"palpitations\") A-Fib.  2. ONSET: \"When did it start?\" (Minutes, hours or days) He is unsure. Started last night.  3. DURATION: \"How long does it last\" (e.g., seconds, minutes, hours)  4. PATTERN \"Does it come and go, or has it been constant since it started?\" \"Does it get worse with exertion?\" \"Are you feeling it now?\" Constant. Current. SOB with exertion.  5. TAP: \"Using your hand, can you tap out what you are feeling on a chair or table in front of you, so that I can hear?\" (Note: not all patients can do this)  6. HEART RATE: \"Can you tell me your heart rate?\" \"How many beats in 15 seconds?\" (Note: not all patients can do this)  91.  7. RECURRENT SYMPTOM: \"Have you ever had this before?\" If Yes, ask: \"When was the last time?\" and \"What happened that time?\" Has H/O persistent A-Fib.  8. CAUSE: \"What do you think is causing?\" A-Fib.  9. CARDIAC HISTORY: \"Do you have any history of heart disease?\" (e.g., heart attack, angina, bypass surgery, angioplasty, arrhythmia) Persistent A-Fib, Watchman.  10. OTHER SYMPTOMS: \"Do you have any other symptoms?\" (e.g., dizziness, chest pain, sweating, difficulty breathing) Does not have current " symptoms. Will get very tired and SOB with exertion.     Additional Information   Negative: Dizziness, lightheadedness, or weakness and heart beating very rapidly (e.g., > 140 / minute)   Negative: Chest pain   Negative: Difficulty breathing    Protocols used: Heart Rate and Heartbeat [id number]

## 2024-03-06 NOTE — TELEPHONE ENCOUNTER
"Return call to patient.  He notes that at about 0230 this am his watch notified him that he was having faster heart rates and an \"inconclusive\" hear rhythm.  Pt checked his 3Leaf jorge this morning and it did report afib, pt notes his heart rates have been in the 90's.  He states he has some fatigue and some shortness of breath with activity, cannot feel palpitations or chest discomfort.    Pt with a hx of persistent afib, LAAO, s/p PVI 2013 and s/p Cardioversion on 2-27-24.      Pt continues Eliquis 5 mg bid and Sotalol 80 mg bid.    Pt is scheduled for repeat PVI with Dr. Barfield on 3-19-24, is scheduled for H&P on 3-11-24.    Discussed monitoring his symptoms as this time, given they are minimal with controlled heart rates, recent failed cardioversion and ablation scheduled for 2 weeks from now.  He states understanding, is a little nervous about being in afib for a prolonged period of time.  Explained afib is not a dangerous heart rhythm if on appropriate anticoagulation and controlled heart rates.  Reviewed contact information for change in symptoms or increase in heart rates.  He states understanding.  "

## 2024-03-11 ENCOUNTER — LAB (OUTPATIENT)
Dept: CARDIOLOGY | Facility: CLINIC | Age: 69
End: 2024-03-11
Payer: COMMERCIAL

## 2024-03-11 ENCOUNTER — ALLIED HEALTH/NURSE VISIT (OUTPATIENT)
Dept: CARDIOLOGY | Facility: CLINIC | Age: 69
End: 2024-03-11
Payer: COMMERCIAL

## 2024-03-11 ENCOUNTER — OFFICE VISIT (OUTPATIENT)
Dept: CARDIOLOGY | Facility: CLINIC | Age: 69
End: 2024-03-11
Payer: COMMERCIAL

## 2024-03-11 VITALS
SYSTOLIC BLOOD PRESSURE: 120 MMHG | WEIGHT: 273 LBS | BODY MASS INDEX: 40.43 KG/M2 | RESPIRATION RATE: 16 BRPM | DIASTOLIC BLOOD PRESSURE: 72 MMHG | HEART RATE: 97 BPM | HEIGHT: 69 IN

## 2024-03-11 DIAGNOSIS — I48.19 PERSISTENT ATRIAL FIBRILLATION (H): ICD-10-CM

## 2024-03-11 DIAGNOSIS — Z95.818 PRESENCE OF WATCHMAN LEFT ATRIAL APPENDAGE CLOSURE DEVICE: ICD-10-CM

## 2024-03-11 DIAGNOSIS — Z86.79 STATUS POST ABLATION OF ATRIAL FIBRILLATION: ICD-10-CM

## 2024-03-11 DIAGNOSIS — Z98.890 STATUS POST ABLATION OF ATRIAL FIBRILLATION: ICD-10-CM

## 2024-03-11 DIAGNOSIS — I25.10 NONOBSTRUCTIVE ATHEROSCLEROSIS OF CORONARY ARTERY: ICD-10-CM

## 2024-03-11 DIAGNOSIS — I48.19 PERSISTENT ATRIAL FIBRILLATION (H): Primary | ICD-10-CM

## 2024-03-11 DIAGNOSIS — I10 PRIMARY HYPERTENSION: ICD-10-CM

## 2024-03-11 LAB
ANION GAP SERPL CALCULATED.3IONS-SCNC: 7 MMOL/L (ref 7–15)
BUN SERPL-MCNC: 21.9 MG/DL (ref 8–23)
CALCIUM SERPL-MCNC: 9 MG/DL (ref 8.8–10.2)
CHLORIDE SERPL-SCNC: 105 MMOL/L (ref 98–107)
CREAT SERPL-MCNC: 1.13 MG/DL (ref 0.67–1.17)
DEPRECATED HCO3 PLAS-SCNC: 31 MMOL/L (ref 22–29)
EGFRCR SERPLBLD CKD-EPI 2021: 72 ML/MIN/1.73M2
ERYTHROCYTE [DISTWIDTH] IN BLOOD BY AUTOMATED COUNT: 13.7 % (ref 10–15)
GLUCOSE SERPL-MCNC: 153 MG/DL (ref 70–99)
HCT VFR BLD AUTO: 46.2 % (ref 40–53)
HGB BLD-MCNC: 14.5 G/DL (ref 13.3–17.7)
MCH RBC QN AUTO: 30.7 PG (ref 26.5–33)
MCHC RBC AUTO-ENTMCNC: 31.4 G/DL (ref 31.5–36.5)
MCV RBC AUTO: 98 FL (ref 78–100)
PLATELET # BLD AUTO: 175 10E3/UL (ref 150–450)
POTASSIUM SERPL-SCNC: 4.1 MMOL/L (ref 3.4–5.3)
RBC # BLD AUTO: 4.73 10E6/UL (ref 4.4–5.9)
SODIUM SERPL-SCNC: 143 MMOL/L (ref 135–145)
WBC # BLD AUTO: 5.7 10E3/UL (ref 4–11)

## 2024-03-11 PROCEDURE — 85027 COMPLETE CBC AUTOMATED: CPT

## 2024-03-11 PROCEDURE — 36415 COLL VENOUS BLD VENIPUNCTURE: CPT

## 2024-03-11 PROCEDURE — 99214 OFFICE O/P EST MOD 30 MIN: CPT | Performed by: NURSE PRACTITIONER

## 2024-03-11 PROCEDURE — 99207 PR NO CHARGE NURSE ONLY: CPT

## 2024-03-11 PROCEDURE — G2211 COMPLEX E/M VISIT ADD ON: HCPCS | Performed by: NURSE PRACTITIONER

## 2024-03-11 PROCEDURE — 93000 ELECTROCARDIOGRAM COMPLETE: CPT | Performed by: INTERNAL MEDICINE

## 2024-03-11 PROCEDURE — 80048 BASIC METABOLIC PNL TOTAL CA: CPT

## 2024-03-11 RX ORDER — OMEPRAZOLE 40 MG/1
40 CAPSULE, DELAYED RELEASE ORAL DAILY
Qty: 45 CAPSULE | Refills: 0 | Status: SHIPPED | OUTPATIENT
Start: 2024-03-11 | End: 2024-04-30

## 2024-03-11 NOTE — PROGRESS NOTES
[facility]  Cardiac Electrophysiology  [address]  [place] [address]   Office: [phone]  Fax: [phone]     HEART CARE ELECTROPHYSIOLOGY NOTE     Primary Care: Magdaleno Bennett MD       Assessment/Recommendations     Persistent atrial fibrillation: Longstanding history diagnosed 2007, index PVI 8/19/2023.  He has since recurrent AF requiring multiple cardioversions since 2019, most recently 11/17/2023.  Failed flecainide, sotalol uptitration limited secondary to side effects of lightheadedness/dizziness. We discussed pulmonary vein isolation ablation procedure including <1-2% risk for major complication, anticipated success rates, recovery and follow-up.  Medical and surgical history reviewed and updated. Current medications and allergies reviewed and updated as appropriate. No personal or family history of adverse reactions to anesthesia or abnormal bleeding with surgery.    Presence of Watchman left atrial appendage closure device: [id number] score of 3 for age 65-74, hypertension, CAD.  Sp LAAC 1/12/2023, follow-up DIONTE shows no peridevice flow or thrombus.  In anticipation of ablation, discontinue aspirin and start Eliquis 5 mg twice a day for stroke prophylaxis    Hypertension: Controlled on current regimen    Nonobstructive CAD: By CT, negative MPI 2022. No anginal sx     Obesity: BMI 38.8    Plan:   -Discontinue aspirin  -Start Eliquis 5 mg twice a day for stroke prophylaxis and continue for 6 weeks after ablation  -Hold sotalol beginning 3 days prior to ablation  -Increase omeprazole to 40mg every day beginning 3 days prior to ablation and continuing for 6 weeks thereafter  -Follow up with EP RN 3/22/2024, 6-week clinic follow-up 4/30/2024     History of Present Illness/Subjective    Brett Hopkins is a 68 year old male with past medical history significant for persistent AF, hypertension, nonobstructive CAD, ascending aorta dilation, hyperlipidemia,  "GERD, hypothyroidism, myasthenia gravis, chronic RLE lymphedema, obesity, seen today for history and physical. He has a history of paroxysmal AF diagnosed in 2007, failing propafenone and flecainide prior to PVI 8/19/2013.  He developed recurrent AF around 3/2019, with worsening fatigue and occasional palpitations, requiring DCCV 7/10/2019 with ERAF.  He was briefly on flecainide prior to initiation of sotalol and DCCV 9/12/2019, with symptomatic improvement.  He developed recurrent AF 1/17/2023 with worsening fatigue and shortness of breath with exertion, requiring cardioversion 2/17/2023.  He underwent percutaneous LAAC 1/12/2023 due to history of gait instability and falls.  His sotalol was decreased due to symptomatic bradycardia and he developed recurrent AF around 11/6/2023 requiring DCCV 11/17/2023 and 2/27/2024.     He was back in atrial fibrillation within 1 week of cardioversion, with some worsening fatigue and shortness of breath with exertion. He denies chest discomfort, palpitations, lightheadedness/dizziness, pedal edema, or syncope.     Data Review     Arrhythmia hx:   Dx/date: Paroxysmal AF 2007.  Persistent 7/2019, 11/2023  Sx: SANDERS, fatigue, palpitations  [id number]: 3 for age 65-74, hypertension and vascular disease. Sp LAAC 1/12/2023  AAD: Propafenone, flecainide, sotalol  DCCV: 7/10/2019, 2/17/2023, 11/17/2023, 2/27/2024  Ablation: 8/19/2013, Dr. Barfield-cryoballoon PVI    EKG 3/11/2024 AF 97 bpm, borderline RBBB    I have reviewed and updated the patient's past medical history, allergies and medication list.               Physical Examination Review of Systems   /72 (BP Location: Right arm, Patient Position: Sitting, Cuff Size: Adult Large)   Pulse 97   Resp 16   Ht 1.753 m (5' 9\")   Wt 123.8 kg (273 lb)   BMI 40.32 kg/m      Body mass index is 40.32 kg/m .    Wt Readings from Last 3 Encounters:   03/11/24 123.8 kg (273 lb)   02/27/24 120.7 kg (266 lb)   02/21/24 120.7 kg (266 lb) "     General   Appearance:   Alert and oriented, in no acute distress.    HEENT:  Normocephalic and atraumatic. Conjunctiva and sclera are clear. Moist oral mucosa.    Neck: No JVP, carotid bruit or obvious thyromegaly.   Lungs:   Respirations unlabored. Clear bilaterally with no rales, rhonchi, or wheezes.     Cardiovascular:   Rhythm is irregular. S1 and S2 are normal. No significant murmur is present. Lower extremities demonstrate chronic lymphedema bilaterally, R>L. Posterior tibial pulses are intact bilaterally.   Extremities: No cyanosis or clubbing   Skin: Skin is warm, dry, and otherwise intact.   Neurologic: Gait not asssessed. Mood and affect appropriate.                                                Medical History  Surgical History Family History Social History   No past medical history on file. Past Surgical History:   Procedure Laterality Date    ARTHROPLASTY REVISION HIP Right 05/01/2019    ARTHROPLASTY REVISION KNEE Right 2019    CARDIOVERSION  07/01/2019    7/10/2019    CARDIOVERSION  09/12/2019    CATARACT EXTRACTION Bilateral 06/2021    CV LEFT ATRIAL APPENDAGE CLOSURE N/A 1/12/2023    Procedure: Left Atrial Appendage Closure;  Surgeon: Mitesh Graff MD;  Location: [facility]    DENTAL SURGERY      Oral Surgery Tooth Extraction;  Implant    [facility] ABLATE HEART DYSRHYTHM FOCUS  08/19/2013    Description: Catheter Ablation Atrial Fibrillation;  Recorded: 11/16/2013;  Comments: PVI Aug 19, 2013 (Cryo to all 4 PV's)    TOOTH EXTRACTION      implant    [facility] TOTAL HIP ARTHROPLASTY Right 05/08/2019    Procedure: RIGHT TOTAL HIP ARTHROPLASTY DIRECT ANTERIOR APPROACH;  Surgeon: Mario Woodruff MD;  Location: [facility];  Service: Orthopedics    [place] TOTAL KNEE ARTHROPLASTY Right 06/21/2017    Procedure: 2)  RIGHT TOTAL KNEE ARTHROPLASTY;  Surgeon: Mario VICKERS MD;  Location: [facility];  Service: Orthopedics    Family History   Problem Relation Age of Onset    Atrial  fibrillation Mother     Dementia Mother         dx 80s    Diabetes Type 2  Mother         dx 60s/70s    Atrial fibrillation Father     Rheumatoid Arthritis Father     Atrial fibrillation Sister     Cerebrovascular Disease Sister     Parkinsonism Sister     Parkinsonism Sister     Atrial fibrillation Brother     Rheumatoid Arthritis Brother     Cerebrovascular Disease Brother     CABG Brother     Atrial fibrillation Brother     Diabetes Type 2  Brother         Dx 40's    Social History     Tobacco Use    Smoking status: Never    Smokeless tobacco: Never   Vaping Use    Vaping Use: Never used   Substance Use Topics    Alcohol use: Yes     Alcohol/week: 5.0 standard drinks of alcohol     Types: 5 Cans of beer per week     Comment: minimal    Drug use: No          Medications  Allergies   Current Outpatient Medications   Medication Sig Dispense Refill    apixaban ANTICOAGULANT (ELIQUIS) 5 MG tablet Take 1 tablet (5 mg) by mouth 2 times daily . Stop Aspirin when starting. 60 tablet 4    hydrochlorothiazide (HYDRODIURIL) 25 MG tablet TAKE 1 TABLET BY MOUTH EVERY DAY IN THE MORNING FOR HIGH BLOOD PRESSURE 90 tablet 2    levothyroxine (SYNTHROID/LEVOTHROID) 100 MCG tablet TAKE 1 TABLET(100 MCG) BY MOUTH DAILY 90 tablet 2    lisinopril (ZESTRIL) 40 MG tablet TAKE 1 TABLET BY MOUTH DAILY FOR HIGH BLOOD PRESSURE 90 tablet 2    omeprazole (PRILOSEC) 20 MG DR capsule TAKE 1 CAPSULE BY MOUTH DAILY BEFORE BREAKFAST 90 capsule 1    rosuvastatin (CRESTOR) 10 MG tablet Take 1 tablet (10 mg) by mouth daily 90 tablet 3    sotalol (BETAPACE) 80 MG tablet Take 1 tablet (80 mg) by mouth 2 times daily 180 tablet 3    Allergies   Allergen Reactions    Sulfa (Sulfonamide Antibiotics) [Sulfa Antibiotics] Rash     Rash - turned purple  , Around age 32         Lab Results    Chemistry/lipid CBC Cardiac Enzymes/BNP/TSH/INR   Lab Results   Component Value Date    BUN 25.9 (H) 12/19/2023     12/19/2023    CO2 29 12/19/2023     No results  "found for: \"CREATININE\"    Lab Results   Component Value Date    CHOL 120 2023    HDL 61 2023    LDL 43 2023      Lab Results   Component Value Date    WBC 7.1 2023    HGB 15.6 2023    HCT 47.4 2023    MCV 92 2023     2023    Lab Results   Component Value Date    TROPONINI <0.01 2022    BNP 56 2022    TSH 7.19 (H) 2023    INR 1.12 (H) 2020        30 minutes spent reviewing prior records (including documentation, laboratory studies, cardiac testing/imaging), history and physical exam, planning, and subsequent documentation.     The longitudinal plan of care for the diagnosis(es)/condition(s) as documented were addressed during this visit. Due to the added complexity in care, I will continue to support Champ in the subsequent management and with ongoing continuity of care.     This note has been dictated using voice recognition software. Any grammatical, typographical, or context distortions are unintentional and inherent to the software.    Nicolle Ryan, CNP  Clinical Cardiac Electrophysiology  [facility] [place]  Clinic and [phone]  Fax: [phone]  Electrophysiology Nurses: [phone]                                 "

## 2024-03-11 NOTE — H&P (VIEW-ONLY)
[facility]  Cardiac Electrophysiology  [address]  [place] [address]   Office: [phone]  Fax: [phone]     HEART CARE ELECTROPHYSIOLOGY NOTE     Primary Care: Magdaleno Bennett MD       Assessment/Recommendations     Persistent atrial fibrillation: Longstanding history diagnosed 2007, index PVI 8/19/2023.  He has since recurrent AF requiring multiple cardioversions since 2019, most recently 11/17/2023.  Failed flecainide, sotalol uptitration limited secondary to side effects of lightheadedness/dizziness. We discussed pulmonary vein isolation ablation procedure including <1-2% risk for major complication, anticipated success rates, recovery and follow-up.  Medical and surgical history reviewed and updated. Current medications and allergies reviewed and updated as appropriate. No personal or family history of adverse reactions to anesthesia or abnormal bleeding with surgery.    Presence of Watchman left atrial appendage closure device: [id number] score of 3 for age 65-74, hypertension, CAD.  Sp LAAC 1/12/2023, follow-up DIONTE shows no peridevice flow or thrombus.  In anticipation of ablation, discontinue aspirin and start Eliquis 5 mg twice a day for stroke prophylaxis    Hypertension: Controlled on current regimen    Nonobstructive CAD: By CT, negative MPI 2022. No anginal sx     Obesity: BMI 38.8    Plan:   -Discontinue aspirin  -Start Eliquis 5 mg twice a day for stroke prophylaxis and continue for 6 weeks after ablation  -Hold sotalol beginning 3 days prior to ablation  -Increase omeprazole to 40mg every day beginning 3 days prior to ablation and continuing for 6 weeks thereafter  -Follow up with EP RN 3/22/2024, 6-week clinic follow-up 4/30/2024     History of Present Illness/Subjective    Brett Hopkins is a 68 year old male with past medical history significant for persistent AF, hypertension, nonobstructive CAD, ascending aorta dilation, hyperlipidemia,  "GERD, hypothyroidism, myasthenia gravis, chronic RLE lymphedema, obesity, seen today for history and physical. He has a history of paroxysmal AF diagnosed in 2007, failing propafenone and flecainide prior to PVI 8/19/2013.  He developed recurrent AF around 3/2019, with worsening fatigue and occasional palpitations, requiring DCCV 7/10/2019 with ERAF.  He was briefly on flecainide prior to initiation of sotalol and DCCV 9/12/2019, with symptomatic improvement.  He developed recurrent AF 1/17/2023 with worsening fatigue and shortness of breath with exertion, requiring cardioversion 2/17/2023.  He underwent percutaneous LAAC 1/12/2023 due to history of gait instability and falls.  His sotalol was decreased due to symptomatic bradycardia and he developed recurrent AF around 11/6/2023 requiring DCCV 11/17/2023 and 2/27/2024.     He was back in atrial fibrillation within 1 week of cardioversion, with some worsening fatigue and shortness of breath with exertion. He denies chest discomfort, palpitations, lightheadedness/dizziness, pedal edema, or syncope.     Data Review     Arrhythmia hx:   Dx/date: Paroxysmal AF 2007.  Persistent 7/2019, 11/2023  Sx: SANDERS, fatigue, palpitations  [id number]: 3 for age 65-74, hypertension and vascular disease. Sp LAAC 1/12/2023  AAD: Propafenone, flecainide, sotalol  DCCV: 7/10/2019, 2/17/2023, 11/17/2023, 2/27/2024  Ablation: 8/19/2013, Dr. Barfield-cryoballoon PVI    EKG 3/11/2024 AF 97 bpm, borderline RBBB    I have reviewed and updated the patient's past medical history, allergies and medication list.               Physical Examination Review of Systems   /72 (BP Location: Right arm, Patient Position: Sitting, Cuff Size: Adult Large)   Pulse 97   Resp 16   Ht 1.753 m (5' 9\")   Wt 123.8 kg (273 lb)   BMI 40.32 kg/m      Body mass index is 40.32 kg/m .    Wt Readings from Last 3 Encounters:   03/11/24 123.8 kg (273 lb)   02/27/24 120.7 kg (266 lb)   02/21/24 120.7 kg (266 lb) "     General   Appearance:   Alert and oriented, in no acute distress.    HEENT:  Normocephalic and atraumatic. Conjunctiva and sclera are clear. Moist oral mucosa.    Neck: No JVP, carotid bruit or obvious thyromegaly.   Lungs:   Respirations unlabored. Clear bilaterally with no rales, rhonchi, or wheezes.     Cardiovascular:   Rhythm is irregular. S1 and S2 are normal. No significant murmur is present. Lower extremities demonstrate chronic lymphedema bilaterally, R>L. Posterior tibial pulses are intact bilaterally.   Extremities: No cyanosis or clubbing   Skin: Skin is warm, dry, and otherwise intact.   Neurologic: Gait not asssessed. Mood and affect appropriate.                                                Medical History  Surgical History Family History Social History   No past medical history on file. Past Surgical History:   Procedure Laterality Date    ARTHROPLASTY REVISION HIP Right 05/01/2019    ARTHROPLASTY REVISION KNEE Right 2019    CARDIOVERSION  07/01/2019    7/10/2019    CARDIOVERSION  09/12/2019    CATARACT EXTRACTION Bilateral 06/2021    CV LEFT ATRIAL APPENDAGE CLOSURE N/A 1/12/2023    Procedure: Left Atrial Appendage Closure;  Surgeon: Mitesh Graff MD;  Location: [facility]    DENTAL SURGERY      Oral Surgery Tooth Extraction;  Implant    [place] ABLATE HEART DYSRHYTHM FOCUS  08/19/2013    Description: Catheter Ablation Atrial Fibrillation;  Recorded: 11/16/2013;  Comments: PVI Aug 19, 2013 (Cryo to all 4 PV's)    TOOTH EXTRACTION      implant    [facility] TOTAL HIP ARTHROPLASTY Right 05/08/2019    Procedure: RIGHT TOTAL HIP ARTHROPLASTY DIRECT ANTERIOR APPROACH;  Surgeon: Mario Woodruff MD;  Location: [facility];  Service: Orthopedics    [facility] TOTAL KNEE ARTHROPLASTY Right 06/21/2017    Procedure: 2)  RIGHT TOTAL KNEE ARTHROPLASTY;  Surgeon: Mario VICKERS MD;  Location: [facility];  Service: Orthopedics    Family History   Problem Relation Age of Onset    Atrial  fibrillation Mother     Dementia Mother         dx 80s    Diabetes Type 2  Mother         dx 60s/70s    Atrial fibrillation Father     Rheumatoid Arthritis Father     Atrial fibrillation Sister     Cerebrovascular Disease Sister     Parkinsonism Sister     Parkinsonism Sister     Atrial fibrillation Brother     Rheumatoid Arthritis Brother     Cerebrovascular Disease Brother     CABG Brother     Atrial fibrillation Brother     Diabetes Type 2  Brother         Dx 40's    Social History     Tobacco Use    Smoking status: Never    Smokeless tobacco: Never   Vaping Use    Vaping Use: Never used   Substance Use Topics    Alcohol use: Yes     Alcohol/week: 5.0 standard drinks of alcohol     Types: 5 Cans of beer per week     Comment: minimal    Drug use: No          Medications  Allergies   Current Outpatient Medications   Medication Sig Dispense Refill    apixaban ANTICOAGULANT (ELIQUIS) 5 MG tablet Take 1 tablet (5 mg) by mouth 2 times daily . Stop Aspirin when starting. 60 tablet 4    hydrochlorothiazide (HYDRODIURIL) 25 MG tablet TAKE 1 TABLET BY MOUTH EVERY DAY IN THE MORNING FOR HIGH BLOOD PRESSURE 90 tablet 2    levothyroxine (SYNTHROID/LEVOTHROID) 100 MCG tablet TAKE 1 TABLET(100 MCG) BY MOUTH DAILY 90 tablet 2    lisinopril (ZESTRIL) 40 MG tablet TAKE 1 TABLET BY MOUTH DAILY FOR HIGH BLOOD PRESSURE 90 tablet 2    omeprazole (PRILOSEC) 20 MG DR capsule TAKE 1 CAPSULE BY MOUTH DAILY BEFORE BREAKFAST 90 capsule 1    rosuvastatin (CRESTOR) 10 MG tablet Take 1 tablet (10 mg) by mouth daily 90 tablet 3    sotalol (BETAPACE) 80 MG tablet Take 1 tablet (80 mg) by mouth 2 times daily 180 tablet 3    Allergies   Allergen Reactions    Sulfa (Sulfonamide Antibiotics) [Sulfa Antibiotics] Rash     Rash - turned purple  , Around age 32         Lab Results    Chemistry/lipid CBC Cardiac Enzymes/BNP/TSH/INR   Lab Results   Component Value Date    BUN 25.9 (H) 12/19/2023     12/19/2023    CO2 29 12/19/2023     No results  "found for: \"CREATININE\"    Lab Results   Component Value Date    CHOL 120 2023    HDL 61 2023    LDL 43 2023      Lab Results   Component Value Date    WBC 7.1 2023    HGB 15.6 2023    HCT 47.4 2023    MCV 92 2023     2023    Lab Results   Component Value Date    TROPONINI <0.01 2022    BNP 56 2022    TSH 7.19 (H) 2023    INR 1.12 (H) 2020        30 minutes spent reviewing prior records (including documentation, laboratory studies, cardiac testing/imaging), history and physical exam, planning, and subsequent documentation.     The longitudinal plan of care for the diagnosis(es)/condition(s) as documented were addressed during this visit. Due to the added complexity in care, I will continue to support Champ in the subsequent management and with ongoing continuity of care.     This note has been dictated using voice recognition software. Any grammatical, typographical, or context distortions are unintentional and inherent to the software.    Nicolle Ryan, CNP  Clinical Cardiac Electrophysiology  [facility]  Clinic and [phone]  Fax: [phone]  Electrophysiology Nurses: [phone]                                 "

## 2024-03-11 NOTE — PROGRESS NOTES
Pre-Procedure Pulmonary Vein Ablation (AF) Education    Procedure: PVI with Dr Barfield on 3/19 with arrival time 5:30am    COVID: Pt denies COVID like symptoms, and is aware if he/she develops COVID like symptoms they would need to complete an at home with a rapid antigen COVID test 1-2 days prior to your procedure date. If COVID + pt is aware the procedure will need to be rescheduled, and to contact CV scheduling as soon as possible    Type & Screen: Is not required for PVI Ablation    Pre-Op H&P: Completed today with EP CHIQUITA- See record in Epic    Education:   Reviewed with pt in Clinic today  Pre-Procedure Instruction: NPO after midnight pre procedure, Defined NPO, Remove all jewelry and leave all valuables at home, Shower prior to arrival, Anesthesia and intubation plan/orders, Intra-procedure PVI process, Post- PVI procedure expectations/recovery, Transportation requirements and arrangements post procedure, Post-procedure follow up process, Letter sent to pt via [employer] and mail with written instructions (Refer to letters tab), Lab results would be called to pt if abnormal  Risks:   Atrial Fibrillation Ablation/Left Atrial Ablation  Cardiac Ablation  <1% Hypotension, Hemorrhage, Thrombophlebitis, Systemic or pulmonic emboli, Cardiac perforation (tamponade), Infection, Pneumothorax, Arrhythmias, Proarrhythmic effects of drugs, Radiation exposure, Catheter entrapment  <1 % Vascular injury including perforation of vein, artery or heart  1-2% Tamponade and Aortic puncture with left sided transeptal approach  1% CVA   <1% MI  <0.1% death  If external defibrillation or CV is needed, 25% risk for superficial burn  Risks associated with general anesthesia will be addressed by the Anesthesiology Department  Radiofrequency Risks:  In addition to standard risks for Radiofrequency Ablation, there is:  <2% Significant pulmonary vein stenosis  <2% Embolic events  <1% Esophageal fistula  <1% Phrenic nerve paralysis    Cryoablation Risks:  In addition to standard risks for Cryoablation Ablation, there is:  <1% Phrenic nerve paralysis  <1% Pulmonary vein stenosis  <1% Esophageal fistula    Medication:   Instructions regarding anticoagulants: Eliquis- Continue anticoagulation uninterrupted through their procedure, do not miss any doses of AC prior to procedure, importance of taking AC for stroke prevention, taking AC as prescribed, to call prior to PVI if missed a dose of AC, and if upon arrival pt reports missing a dose of AC PVI will potentially be cnx/postponed  Instructions given to pt regarding antiarrhythmic medication: Sotalol- Hold 3 days prior to procedure  Instructions given to pt regarding PPI medication: Increase omeprazole to 40mg daily 3 days prior, 6wk post  Instructions given to pt regarding diuretics medication: None  Instructions given to pt regarding DM/GLP-1 medication:   DM- None  GLP-1- None  Instructions for medication, other than anticoagulants and antiarrhythmics listed above, given to pt: Take all medication AM of procedure with small sips of water     Important patient information for staff: None    3/11/2024 10:30 AM  Glo Rose RN

## 2024-03-11 NOTE — LETTER
3/11/2024    Magdaleno Bennett MD  [address] [address]    RE: Brett Hopkins       Dear Colleague,     I had the pleasure of seeing Brett Hopkins in the [facility].       [facility]  Cardiac Electrophysiology  [address]  [place] [address]   Office: [phone]  Fax: [phone]     HEART CARE ELECTROPHYSIOLOGY NOTE     Primary Care: Magdaleno Bennett MD       Assessment/Recommendations     Persistent atrial fibrillation: Longstanding history diagnosed 2007, index PVI 8/19/2023.  He has since recurrent AF requiring multiple cardioversions since 2019, most recently 11/17/2023.  Failed flecainide, sotalol uptitration limited secondary to side effects of lightheadedness/dizziness. We discussed pulmonary vein isolation ablation procedure including <1-2% risk for major complication, anticipated success rates, recovery and follow-up.  Medical and surgical history reviewed and updated. Current medications and allergies reviewed and updated as appropriate. No personal or family history of adverse reactions to anesthesia or abnormal bleeding with surgery.    Presence of Watchman left atrial appendage closure device: [id number] score of 3 for age 65-74, hypertension, CAD.  Sp LAAC 1/12/2023, follow-up DIONTE shows no peridevice flow or thrombus.  In anticipation of ablation, discontinue aspirin and start Eliquis 5 mg twice a day for stroke prophylaxis    Hypertension: Controlled on current regimen    Nonobstructive CAD: By CT, negative MPI 2022. No anginal sx     Obesity: BMI 38.8    Plan:   -Discontinue aspirin  -Start Eliquis 5 mg twice a day for stroke prophylaxis and continue for 6 weeks after ablation  -Hold sotalol beginning 3 days prior to ablation  -Increase omeprazole to 40mg every day beginning 3 days prior to ablation and continuing for 6 weeks thereafter  -Follow up with EP RN 3/22/2024, 6-week clinic follow-up 4/30/2024      History of Present Illness/Subjective    Brett Hopkins is a 68 year old male with past medical history significant for persistent AF, hypertension, nonobstructive CAD, ascending aorta dilation, hyperlipidemia, GERD, hypothyroidism, myasthenia gravis, chronic RLE lymphedema, obesity, seen today for history and physical. He has a history of paroxysmal AF diagnosed in 2007, failing propafenone and flecainide prior to PVI 8/19/2013.  He developed recurrent AF around 3/2019, with worsening fatigue and occasional palpitations, requiring DCCV 7/10/2019 with ERAF.  He was briefly on flecainide prior to initiation of sotalol and DCCV 9/12/2019, with symptomatic improvement.  He developed recurrent AF 1/17/2023 with worsening fatigue and shortness of breath with exertion, requiring cardioversion 2/17/2023.  He underwent percutaneous LAAC 1/12/2023 due to history of gait instability and falls.  His sotalol was decreased due to symptomatic bradycardia and he developed recurrent AF around 11/6/2023 requiring DCCV 11/17/2023 and 2/27/2024.     He was back in atrial fibrillation within 1 week of cardioversion, with some worsening fatigue and shortness of breath with exertion. He denies chest discomfort, palpitations, lightheadedness/dizziness, pedal edema, or syncope.     Data Review     Arrhythmia hx:   Dx/date: Paroxysmal AF 2007.  Persistent 7/2019, 11/2023  Sx: SANDERS, fatigue, palpitations  [id number]/OAC: 3 for age 65-74, hypertension and vascular disease. Sp LAAC 1/12/2023  AAD: Propafenone, flecainide, sotalol  DCCV: 7/10/2019, 2/17/2023, 11/17/2023, 2/27/2024  Ablation: 8/19/2013, Dr. Barfield-cryoballoon PVI    EKG 3/11/2024 AF 97 bpm, borderline RBBB    I have reviewed and updated the patient's past medical history, allergies and medication list.               Physical Examination Review of Systems   /72 (BP Location: Right arm, Patient Position: Sitting, Cuff Size: Adult Large)   Pulse 97   Resp 16   Ht  "1.753 m (5' 9\")   Wt 123.8 kg (273 lb)   BMI 40.32 kg/m      Body mass index is 40.32 kg/m .    Wt Readings from Last 3 Encounters:   03/11/24 123.8 kg (273 lb)   02/27/24 120.7 kg (266 lb)   02/21/24 120.7 kg (266 lb)     General   Appearance:   Alert and oriented, in no acute distress.    HEENT:  Normocephalic and atraumatic. Conjunctiva and sclera are clear. Moist oral mucosa.    Neck: No JVP, carotid bruit or obvious thyromegaly.   Lungs:   Respirations unlabored. Clear bilaterally with no rales, rhonchi, or wheezes.     Cardiovascular:   Rhythm is irregular. S1 and S2 are normal. No significant murmur is present. Lower extremities demonstrate chronic lymphedema bilaterally, R>L. Posterior tibial pulses are intact bilaterally.   Extremities: No cyanosis or clubbing   Skin: Skin is warm, dry, and otherwise intact.   Neurologic: Gait not asssessed. Mood and affect appropriate.                                                Medical History  Surgical History Family History Social History   No past medical history on file. Past Surgical History:   Procedure Laterality Date     ARTHROPLASTY REVISION HIP Right 05/01/2019     ARTHROPLASTY REVISION KNEE Right 2019     CARDIOVERSION  07/01/2019    7/10/2019     CARDIOVERSION  09/12/2019     CATARACT EXTRACTION Bilateral 06/2021     CV LEFT ATRIAL APPENDAGE CLOSURE N/A 1/12/2023    Procedure: Left Atrial Appendage Closure;  Surgeon: Mitesh Graff MD;  Location: [facility]     DENTAL SURGERY      Oral Surgery Tooth Extraction;  Implant     GA ABLATE HEART DYSRHYTHM FOCUS  08/19/2013    Description: Catheter Ablation Atrial Fibrillation;  Recorded: 11/16/2013;  Comments: PVI Aug 19, 2013 (Cryo to all 4 PV's)     TOOTH EXTRACTION      implant     [facility] TOTAL HIP ARTHROPLASTY Right 05/08/2019    Procedure: RIGHT TOTAL HIP ARTHROPLASTY DIRECT ANTERIOR APPROACH;  Surgeon: Mario Woodruff MD;  Location: [facility];  Service: Orthopedics     [facility] TOTAL KNEE " ARTHROPLASTY Right 06/21/2017    Procedure: 2)  RIGHT TOTAL KNEE ARTHROPLASTY;  Surgeon: Mario VICKERS MD;  Location: [facility] Main OR;  Service: Orthopedics    Family History   Problem Relation Age of Onset     Atrial fibrillation Mother      Dementia Mother         dx 80s     Diabetes Type 2  Mother         dx 60s/70s     Atrial fibrillation Father      Rheumatoid Arthritis Father      Atrial fibrillation Sister      Cerebrovascular Disease Sister      Parkinsonism Sister      Parkinsonism Sister      Atrial fibrillation Brother      Rheumatoid Arthritis Brother      Cerebrovascular Disease Brother      CABG Brother      Atrial fibrillation Brother      Diabetes Type 2  Brother         Dx 40's    Social History     Tobacco Use     Smoking status: Never     Smokeless tobacco: Never   Vaping Use     Vaping Use: Never used   Substance Use Topics     Alcohol use: Yes     Alcohol/week: 5.0 standard drinks of alcohol     Types: 5 Cans of beer per week     Comment: minimal     Drug use: No          Medications  Allergies   Current Outpatient Medications   Medication Sig Dispense Refill     apixaban ANTICOAGULANT (ELIQUIS) 5 MG tablet Take 1 tablet (5 mg) by mouth 2 times daily . Stop Aspirin when starting. 60 tablet 4     hydrochlorothiazide (HYDRODIURIL) 25 MG tablet TAKE 1 TABLET BY MOUTH EVERY DAY IN THE MORNING FOR HIGH BLOOD PRESSURE 90 tablet 2     levothyroxine (SYNTHROID/LEVOTHROID) 100 MCG tablet TAKE 1 TABLET(100 MCG) BY MOUTH DAILY 90 tablet 2     lisinopril (ZESTRIL) 40 MG tablet TAKE 1 TABLET BY MOUTH DAILY FOR HIGH BLOOD PRESSURE 90 tablet 2     omeprazole (PRILOSEC) 20 MG DR capsule TAKE 1 CAPSULE BY MOUTH DAILY BEFORE BREAKFAST 90 capsule 1     rosuvastatin (CRESTOR) 10 MG tablet Take 1 tablet (10 mg) by mouth daily 90 tablet 3     sotalol (BETAPACE) 80 MG tablet Take 1 tablet (80 mg) by mouth 2 times daily 180 tablet 3    Allergies   Allergen Reactions     Sulfa (Sulfonamide Antibiotics) [Sulfa  "Antibiotics] Rash     Rash - turned purple  , Around age 32         Lab Results    Chemistry/lipid CBC Cardiac Enzymes/BNP/TSH/INR   Lab Results   Component Value Date    BUN 25.9 (H) 2023     2023    CO2 29 2023     No results found for: \"CREATININE\"    Lab Results   Component Value Date    CHOL 120 2023    HDL 61 2023    LDL 43 2023      Lab Results   Component Value Date    WBC 7.1 2023    HGB 15.6 2023    HCT 47.4 2023    MCV 92 2023     2023    Lab Results   Component Value Date    TROPONINI <0.01 2022    BNP 56 2022    TSH 7.19 (H) 2023    INR 1.12 (H) 2020        30 minutes spent reviewing prior records (including documentation, laboratory studies, cardiac testing/imaging), history and physical exam, planning, and subsequent documentation.     The longitudinal plan of care for the diagnosis(es)/condition(s) as documented were addressed during this visit. Due to the added complexity in care, I will continue to support Champ in the subsequent management and with ongoing continuity of care.     This note has been dictated using voice recognition software. Any grammatical, typographical, or context distortions are unintentional and inherent to the software.    Nicolle Ryan CNP  Clinical Cardiac Electrophysiology  [facility]  Clinic and [phone]  Fax: [phone]  Electrophysiology Nurses: [phone]                                     Thank you for allowing me to participate in the care of your patient.      Sincerely,     CRISTIANO CASTRO CNP     [facility]  cc:   No referring provider defined for this encounter.      "

## 2024-03-12 LAB
ATRIAL RATE - MUSE: NORMAL BPM
DIASTOLIC BLOOD PRESSURE - MUSE: NORMAL MMHG
INTERPRETATION ECG - MUSE: NORMAL
P AXIS - MUSE: NORMAL DEGREES
PR INTERVAL - MUSE: NORMAL MS
QRS DURATION - MUSE: 94 MS
QT - MUSE: 370 MS
QTC - MUSE: 469 MS
R AXIS - MUSE: -3 DEGREES
SYSTOLIC BLOOD PRESSURE - MUSE: NORMAL MMHG
T AXIS - MUSE: 38 DEGREES
VENTRICULAR RATE- MUSE: 97 BPM

## 2024-03-18 ENCOUNTER — ANESTHESIA EVENT (OUTPATIENT)
Dept: CARDIOLOGY | Facility: HOSPITAL | Age: 69
End: 2024-03-18
Payer: COMMERCIAL

## 2024-03-19 ENCOUNTER — HOSPITAL ENCOUNTER (OUTPATIENT)
Facility: HOSPITAL | Age: 69
Discharge: HOME | End: 2024-03-19
Attending: INTERNAL MEDICINE | Admitting: INTERNAL MEDICINE
Payer: COMMERCIAL

## 2024-03-19 ENCOUNTER — ANESTHESIA (OUTPATIENT)
Dept: CARDIOLOGY | Facility: HOSPITAL | Age: 69
End: 2024-03-19
Payer: COMMERCIAL

## 2024-03-19 VITALS
SYSTOLIC BLOOD PRESSURE: 125 MMHG | WEIGHT: 260 LBS | DIASTOLIC BLOOD PRESSURE: 80 MMHG | BODY MASS INDEX: 37.22 KG/M2 | HEIGHT: 70 IN | OXYGEN SATURATION: 95 % | HEART RATE: 97 BPM | RESPIRATION RATE: 18 BRPM | TEMPERATURE: 97.9 F

## 2024-03-19 DIAGNOSIS — I48.19 PERSISTENT ATRIAL FIBRILLATION (H): Primary | ICD-10-CM

## 2024-03-19 LAB
ACT BLD: 333 SECONDS (ref 74–150)
ACT BLD: 344 SECONDS (ref 74–150)
ACT BLD: 399 SECONDS (ref 74–150)
ACT BLD: 414 SECONDS (ref 74–150)
ACT BLD: 426 SECONDS (ref 74–150)
ACT BLD: 453 SECONDS (ref 74–150)
ACT BLD: 460 SECONDS (ref 74–150)
ACT BLD: 468 SECONDS (ref 74–150)
ANION GAP SERPL CALCULATED.3IONS-SCNC: 12 MMOL/L (ref 7–15)
BUN SERPL-MCNC: 24.6 MG/DL (ref 8–23)
CALCIUM SERPL-MCNC: 9.1 MG/DL (ref 8.8–10.2)
CHLORIDE SERPL-SCNC: 101 MMOL/L (ref 98–107)
CREAT SERPL-MCNC: 1.12 MG/DL (ref 0.67–1.17)
DEPRECATED HCO3 PLAS-SCNC: 28 MMOL/L (ref 22–29)
EGFRCR SERPLBLD CKD-EPI 2021: 72 ML/MIN/1.73M2
ERYTHROCYTE [DISTWIDTH] IN BLOOD BY AUTOMATED COUNT: 13.9 % (ref 10–15)
GLUCOSE SERPL-MCNC: 102 MG/DL (ref 70–99)
HCT VFR BLD AUTO: 47.7 % (ref 40–53)
HGB BLD-MCNC: 15.3 G/DL (ref 13.3–17.7)
MCH RBC QN AUTO: 30.8 PG (ref 26.5–33)
MCHC RBC AUTO-ENTMCNC: 32.1 G/DL (ref 31.5–36.5)
MCV RBC AUTO: 96 FL (ref 78–100)
PLATELET # BLD AUTO: 201 10E3/UL (ref 150–450)
POTASSIUM SERPL-SCNC: 3.8 MMOL/L (ref 3.4–5.3)
RBC # BLD AUTO: 4.96 10E6/UL (ref 4.4–5.9)
SODIUM SERPL-SCNC: 141 MMOL/L (ref 135–145)
WBC # BLD AUTO: 6.6 10E3/UL (ref 4–11)

## 2024-03-19 PROCEDURE — C1759 CATH, INTRA ECHOCARDIOGRAPHY: HCPCS | Performed by: INTERNAL MEDICINE

## 2024-03-19 PROCEDURE — 93655 ICAR CATH ABLTJ DSCRT ARRHYT: CPT | Performed by: INTERNAL MEDICINE

## 2024-03-19 PROCEDURE — 85027 COMPLETE CBC AUTOMATED: CPT | Performed by: INTERNAL MEDICINE

## 2024-03-19 PROCEDURE — 93005 ELECTROCARDIOGRAM TRACING: CPT

## 2024-03-19 PROCEDURE — 93010 ELECTROCARDIOGRAM REPORT: CPT | Performed by: INTERNAL MEDICINE

## 2024-03-19 PROCEDURE — 370N000017 HC ANESTHESIA TECHNICAL FEE, PER MIN: Performed by: INTERNAL MEDICINE

## 2024-03-19 PROCEDURE — C1894 INTRO/SHEATH, NON-LASER: HCPCS | Performed by: INTERNAL MEDICINE

## 2024-03-19 PROCEDURE — 710N000010 HC RECOVERY PHASE 1, LEVEL 2, PER MIN

## 2024-03-19 PROCEDURE — 93656 COMPRE EP EVAL ABLTJ ATR FIB: CPT | Performed by: INTERNAL MEDICINE

## 2024-03-19 PROCEDURE — 250N000011 HC RX IP 250 OP 636: Performed by: NURSE ANESTHETIST, CERTIFIED REGISTERED

## 2024-03-19 PROCEDURE — C1732 CATH, EP, DIAG/ABL, 3D/VECT: HCPCS | Performed by: INTERNAL MEDICINE

## 2024-03-19 PROCEDURE — 93657 TX L/R ATRIAL FIB ADDL: CPT | Performed by: INTERNAL MEDICINE

## 2024-03-19 PROCEDURE — 85347 COAGULATION TIME ACTIVATED: CPT

## 2024-03-19 PROCEDURE — 258N000003 HC RX IP 258 OP 636: Performed by: NURSE ANESTHETIST, CERTIFIED REGISTERED

## 2024-03-19 PROCEDURE — 250N000009 HC RX 250: Performed by: NURSE ANESTHETIST, CERTIFIED REGISTERED

## 2024-03-19 PROCEDURE — 250N000013 HC RX MED GY IP 250 OP 250 PS 637: Performed by: INTERNAL MEDICINE

## 2024-03-19 PROCEDURE — C1733 CATH, EP, OTHR THAN COOL-TIP: HCPCS | Performed by: INTERNAL MEDICINE

## 2024-03-19 PROCEDURE — 250N000011 HC RX IP 250 OP 636: Performed by: INTERNAL MEDICINE

## 2024-03-19 PROCEDURE — C1730 CATH, EP, 19 OR FEW ELECT: HCPCS | Performed by: INTERNAL MEDICINE

## 2024-03-19 PROCEDURE — 258N000003 HC RX IP 258 OP 636: Performed by: INTERNAL MEDICINE

## 2024-03-19 PROCEDURE — 36415 COLL VENOUS BLD VENIPUNCTURE: CPT | Performed by: INTERNAL MEDICINE

## 2024-03-19 PROCEDURE — 250N000011 HC RX IP 250 OP 636

## 2024-03-19 PROCEDURE — 80048 BASIC METABOLIC PNL TOTAL CA: CPT | Performed by: INTERNAL MEDICINE

## 2024-03-19 PROCEDURE — 999N000054 HC STATISTIC EKG NON-CHARGEABLE

## 2024-03-19 PROCEDURE — C1731 CATH, EP, 20 OR MORE ELEC: HCPCS | Performed by: INTERNAL MEDICINE

## 2024-03-19 PROCEDURE — C1769 GUIDE WIRE: HCPCS | Performed by: INTERNAL MEDICINE

## 2024-03-19 PROCEDURE — 272N000001 HC OR GENERAL SUPPLY STERILE: Performed by: INTERNAL MEDICINE

## 2024-03-19 RX ORDER — PROPOFOL 10 MG/ML
INJECTION, EMULSION INTRAVENOUS PRN
Status: DISCONTINUED | OUTPATIENT
Start: 2024-03-19 | End: 2024-03-19

## 2024-03-19 RX ORDER — FUROSEMIDE 10 MG/ML
INJECTION INTRAMUSCULAR; INTRAVENOUS PRN
Status: DISCONTINUED | OUTPATIENT
Start: 2024-03-19 | End: 2024-03-19

## 2024-03-19 RX ORDER — PROTAMINE SULFATE 10 MG/ML
INJECTION, SOLUTION INTRAVENOUS PRN
Status: DISCONTINUED | OUTPATIENT
Start: 2024-03-19 | End: 2024-03-19

## 2024-03-19 RX ORDER — DEXAMETHASONE SODIUM PHOSPHATE 4 MG/ML
INJECTION, SOLUTION INTRA-ARTICULAR; INTRALESIONAL; INTRAMUSCULAR; INTRAVENOUS; SOFT TISSUE PRN
Status: DISCONTINUED | OUTPATIENT
Start: 2024-03-19 | End: 2024-03-19

## 2024-03-19 RX ORDER — SODIUM CHLORIDE 9 MG/ML
100 INJECTION, SOLUTION INTRAVENOUS CONTINUOUS
Status: DISCONTINUED | OUTPATIENT
Start: 2024-03-19 | End: 2024-03-19 | Stop reason: HOSPADM

## 2024-03-19 RX ORDER — ONDANSETRON 2 MG/ML
INJECTION INTRAMUSCULAR; INTRAVENOUS PRN
Status: DISCONTINUED | OUTPATIENT
Start: 2024-03-19 | End: 2024-03-19

## 2024-03-19 RX ORDER — ONDANSETRON 2 MG/ML
4 INJECTION INTRAMUSCULAR; INTRAVENOUS EVERY 6 HOURS PRN
Status: DISCONTINUED | OUTPATIENT
Start: 2024-03-19 | End: 2024-03-19 | Stop reason: HOSPADM

## 2024-03-19 RX ORDER — HALOPERIDOL 5 MG/ML
1 INJECTION INTRAMUSCULAR
Status: DISCONTINUED | OUTPATIENT
Start: 2024-03-19 | End: 2024-03-19 | Stop reason: HOSPADM

## 2024-03-19 RX ORDER — SODIUM CHLORIDE 9 MG/ML
100 INJECTION, SOLUTION INTRAVENOUS CONTINUOUS
Status: DISCONTINUED | OUTPATIENT
Start: 2024-03-19 | End: 2024-03-19

## 2024-03-19 RX ORDER — FENTANYL CITRATE 50 UG/ML
25 INJECTION, SOLUTION INTRAMUSCULAR; INTRAVENOUS EVERY 5 MIN PRN
Status: DISCONTINUED | OUTPATIENT
Start: 2024-03-19 | End: 2024-03-19 | Stop reason: HOSPADM

## 2024-03-19 RX ORDER — ONDANSETRON 4 MG/1
4 TABLET, ORALLY DISINTEGRATING ORAL EVERY 30 MIN PRN
Status: DISCONTINUED | OUTPATIENT
Start: 2024-03-19 | End: 2024-03-19 | Stop reason: HOSPADM

## 2024-03-19 RX ORDER — SODIUM CHLORIDE, SODIUM LACTATE, POTASSIUM CHLORIDE, CALCIUM CHLORIDE 600; 310; 30; 20 MG/100ML; MG/100ML; MG/100ML; MG/100ML
INJECTION, SOLUTION INTRAVENOUS CONTINUOUS
Status: DISCONTINUED | OUTPATIENT
Start: 2024-03-19 | End: 2024-03-19 | Stop reason: HOSPADM

## 2024-03-19 RX ORDER — ONDANSETRON 2 MG/ML
4 INJECTION INTRAMUSCULAR; INTRAVENOUS EVERY 30 MIN PRN
Status: DISCONTINUED | OUTPATIENT
Start: 2024-03-19 | End: 2024-03-19 | Stop reason: HOSPADM

## 2024-03-19 RX ORDER — SODIUM CHLORIDE, SODIUM LACTATE, POTASSIUM CHLORIDE, CALCIUM CHLORIDE 600; 310; 30; 20 MG/100ML; MG/100ML; MG/100ML; MG/100ML
INJECTION, SOLUTION INTRAVENOUS CONTINUOUS
Status: DISCONTINUED | OUTPATIENT
Start: 2024-03-19 | End: 2024-03-19

## 2024-03-19 RX ORDER — NALOXONE HYDROCHLORIDE 0.4 MG/ML
0.1 INJECTION, SOLUTION INTRAMUSCULAR; INTRAVENOUS; SUBCUTANEOUS
Status: DISCONTINUED | OUTPATIENT
Start: 2024-03-19 | End: 2024-03-19 | Stop reason: HOSPADM

## 2024-03-19 RX ORDER — OXYCODONE HYDROCHLORIDE 5 MG/1
5 TABLET ORAL EVERY 4 HOURS PRN
Status: DISCONTINUED | OUTPATIENT
Start: 2024-03-19 | End: 2024-03-19 | Stop reason: HOSPADM

## 2024-03-19 RX ORDER — ASPIRIN 81 MG/1
81 TABLET ORAL DAILY
Status: SHIPPED
Start: 2024-03-19 | End: 2024-04-30

## 2024-03-19 RX ORDER — OXYCODONE HYDROCHLORIDE 5 MG/1
5 TABLET ORAL
Status: DISCONTINUED | OUTPATIENT
Start: 2024-03-19 | End: 2024-03-19 | Stop reason: HOSPADM

## 2024-03-19 RX ORDER — LIDOCAINE 40 MG/G
CREAM TOPICAL
Status: DISCONTINUED | OUTPATIENT
Start: 2024-03-19 | End: 2024-03-19 | Stop reason: HOSPADM

## 2024-03-19 RX ORDER — OXYCODONE HYDROCHLORIDE 5 MG/1
10 TABLET ORAL EVERY 4 HOURS PRN
Status: DISCONTINUED | OUTPATIENT
Start: 2024-03-19 | End: 2024-03-19 | Stop reason: HOSPADM

## 2024-03-19 RX ORDER — OXYCODONE HYDROCHLORIDE 5 MG/1
10 TABLET ORAL
Status: DISCONTINUED | OUTPATIENT
Start: 2024-03-19 | End: 2024-03-19 | Stop reason: HOSPADM

## 2024-03-19 RX ORDER — KETOROLAC TROMETHAMINE 30 MG/ML
INJECTION, SOLUTION INTRAMUSCULAR; INTRAVENOUS PRN
Status: DISCONTINUED | OUTPATIENT
Start: 2024-03-19 | End: 2024-03-19

## 2024-03-19 RX ORDER — LIDOCAINE HYDROCHLORIDE 10 MG/ML
INJECTION, SOLUTION INFILTRATION; PERINEURAL PRN
Status: DISCONTINUED | OUTPATIENT
Start: 2024-03-19 | End: 2024-03-19

## 2024-03-19 RX ORDER — BENZOCAINE/MENTHOL 6 MG-10 MG
LOZENGE MUCOUS MEMBRANE 2 TIMES DAILY PRN
Status: DISCONTINUED | OUTPATIENT
Start: 2024-03-19 | End: 2024-03-19 | Stop reason: HOSPADM

## 2024-03-19 RX ORDER — HYDROMORPHONE HYDROCHLORIDE 1 MG/ML
0.4 INJECTION, SOLUTION INTRAMUSCULAR; INTRAVENOUS; SUBCUTANEOUS EVERY 5 MIN PRN
Status: DISCONTINUED | OUTPATIENT
Start: 2024-03-19 | End: 2024-03-19 | Stop reason: HOSPADM

## 2024-03-19 RX ORDER — ONDANSETRON 4 MG/1
4 TABLET, ORALLY DISINTEGRATING ORAL EVERY 6 HOURS PRN
Status: DISCONTINUED | OUTPATIENT
Start: 2024-03-19 | End: 2024-03-19 | Stop reason: HOSPADM

## 2024-03-19 RX ORDER — SODIUM CHLORIDE 9 MG/ML
INJECTION, SOLUTION INTRAVENOUS CONTINUOUS PRN
Status: DISCONTINUED | OUTPATIENT
Start: 2024-03-19 | End: 2024-03-19

## 2024-03-19 RX ORDER — KETAMINE HYDROCHLORIDE 10 MG/ML
INJECTION INTRAMUSCULAR; INTRAVENOUS PRN
Status: DISCONTINUED | OUTPATIENT
Start: 2024-03-19 | End: 2024-03-19

## 2024-03-19 RX ORDER — FENTANYL CITRATE 50 UG/ML
25 INJECTION, SOLUTION INTRAMUSCULAR; INTRAVENOUS
Status: DISCONTINUED | OUTPATIENT
Start: 2024-03-19 | End: 2024-03-19 | Stop reason: HOSPADM

## 2024-03-19 RX ORDER — ACETAMINOPHEN 325 MG/1
650 TABLET ORAL EVERY 4 HOURS PRN
Status: DISCONTINUED | OUTPATIENT
Start: 2024-03-19 | End: 2024-03-19 | Stop reason: HOSPADM

## 2024-03-19 RX ORDER — FENTANYL CITRATE 50 UG/ML
50 INJECTION, SOLUTION INTRAMUSCULAR; INTRAVENOUS EVERY 5 MIN PRN
Status: DISCONTINUED | OUTPATIENT
Start: 2024-03-19 | End: 2024-03-19 | Stop reason: HOSPADM

## 2024-03-19 RX ORDER — HEPARIN SODIUM 200 [USP'U]/100ML
INJECTION, SOLUTION INTRAVENOUS
Status: DISCONTINUED | OUTPATIENT
Start: 2024-03-19 | End: 2024-03-19 | Stop reason: HOSPADM

## 2024-03-19 RX ORDER — HEPARIN SODIUM 1000 [USP'U]/ML
INJECTION, SOLUTION INTRAVENOUS; SUBCUTANEOUS
Status: DISCONTINUED | OUTPATIENT
Start: 2024-03-19 | End: 2024-03-19 | Stop reason: HOSPADM

## 2024-03-19 RX ORDER — HEPARIN SODIUM 10000 [USP'U]/100ML
INJECTION, SOLUTION INTRAVENOUS CONTINUOUS PRN
Status: DISCONTINUED | OUTPATIENT
Start: 2024-03-19 | End: 2024-03-19 | Stop reason: HOSPADM

## 2024-03-19 RX ORDER — HYDROMORPHONE HYDROCHLORIDE 1 MG/ML
0.2 INJECTION, SOLUTION INTRAMUSCULAR; INTRAVENOUS; SUBCUTANEOUS EVERY 5 MIN PRN
Status: DISCONTINUED | OUTPATIENT
Start: 2024-03-19 | End: 2024-03-19 | Stop reason: HOSPADM

## 2024-03-19 RX ORDER — IBUPROFEN 600 MG/1
600 TABLET, FILM COATED ORAL EVERY 6 HOURS PRN
Status: DISCONTINUED | OUTPATIENT
Start: 2024-03-19 | End: 2024-03-19 | Stop reason: HOSPADM

## 2024-03-19 RX ORDER — FENTANYL CITRATE 50 UG/ML
INJECTION, SOLUTION INTRAMUSCULAR; INTRAVENOUS PRN
Status: DISCONTINUED | OUTPATIENT
Start: 2024-03-19 | End: 2024-03-19

## 2024-03-19 RX ADMIN — ROCURONIUM BROMIDE 25 MG: 50 INJECTION, SOLUTION INTRAVENOUS at 13:10

## 2024-03-19 RX ADMIN — PHENYLEPHRINE HYDROCHLORIDE 100 MCG: 10 INJECTION INTRAVENOUS at 09:46

## 2024-03-19 RX ADMIN — PROPOFOL 180 MG: 10 INJECTION, EMULSION INTRAVENOUS at 09:46

## 2024-03-19 RX ADMIN — PHENYLEPHRINE HYDROCHLORIDE 0.4 MCG/KG/MIN: 10 INJECTION INTRAVENOUS at 10:07

## 2024-03-19 RX ADMIN — IBUPROFEN 600 MG: 600 TABLET, FILM COATED ORAL at 15:53

## 2024-03-19 RX ADMIN — ONDANSETRON 4 MG: 2 INJECTION INTRAMUSCULAR; INTRAVENOUS at 14:35

## 2024-03-19 RX ADMIN — ROCURONIUM BROMIDE 25 MG: 50 INJECTION, SOLUTION INTRAVENOUS at 12:10

## 2024-03-19 RX ADMIN — DEXAMETHASONE SODIUM PHOSPHATE 4 MG: 4 INJECTION, SOLUTION INTRA-ARTICULAR; INTRALESIONAL; INTRAMUSCULAR; INTRAVENOUS; SOFT TISSUE at 10:01

## 2024-03-19 RX ADMIN — MIDAZOLAM 2 MG: 1 INJECTION INTRAMUSCULAR; INTRAVENOUS at 09:36

## 2024-03-19 RX ADMIN — PHENYLEPHRINE HYDROCHLORIDE 150 MCG: 10 INJECTION INTRAVENOUS at 10:07

## 2024-03-19 RX ADMIN — PROTAMINE SULFATE 100 MG: 10 INJECTION, SOLUTION INTRAVENOUS at 14:30

## 2024-03-19 RX ADMIN — PHENYLEPHRINE HYDROCHLORIDE 100 MCG: 10 INJECTION INTRAVENOUS at 10:10

## 2024-03-19 RX ADMIN — LIDOCAINE HYDROCHLORIDE 5 ML: 10 INJECTION, SOLUTION INFILTRATION; PERINEURAL at 09:46

## 2024-03-19 RX ADMIN — PHENYLEPHRINE HYDROCHLORIDE 100 MCG: 10 INJECTION INTRAVENOUS at 11:04

## 2024-03-19 RX ADMIN — SUGAMMADEX 200 MG: 100 INJECTION, SOLUTION INTRAVENOUS at 14:38

## 2024-03-19 RX ADMIN — ROCURONIUM BROMIDE 20 MG: 50 INJECTION, SOLUTION INTRAVENOUS at 11:28

## 2024-03-19 RX ADMIN — ACETAMINOPHEN 650 MG: 325 TABLET ORAL at 15:55

## 2024-03-19 RX ADMIN — FUROSEMIDE 10 MG: 10 INJECTION, SOLUTION INTRAMUSCULAR; INTRAVENOUS at 14:38

## 2024-03-19 RX ADMIN — SODIUM CHLORIDE 100 ML/HR: 9 INJECTION, SOLUTION INTRAVENOUS at 07:13

## 2024-03-19 RX ADMIN — PHENYLEPHRINE HYDROCHLORIDE 150 MCG: 10 INJECTION INTRAVENOUS at 10:04

## 2024-03-19 RX ADMIN — KETOROLAC TROMETHAMINE 15 MG: 30 INJECTION, SOLUTION INTRAMUSCULAR at 14:45

## 2024-03-19 RX ADMIN — ROCURONIUM BROMIDE 30 MG: 50 INJECTION, SOLUTION INTRAVENOUS at 11:08

## 2024-03-19 RX ADMIN — PHENYLEPHRINE HYDROCHLORIDE 100 MCG: 10 INJECTION INTRAVENOUS at 10:01

## 2024-03-19 RX ADMIN — KETAMINE HYDROCHLORIDE 50 MG: 10 INJECTION INTRAMUSCULAR; INTRAVENOUS at 09:46

## 2024-03-19 RX ADMIN — FENTANYL CITRATE 100 MCG: 50 INJECTION INTRAMUSCULAR; INTRAVENOUS at 09:46

## 2024-03-19 RX ADMIN — SODIUM CHLORIDE: 9 INJECTION, SOLUTION INTRAVENOUS at 09:36

## 2024-03-19 RX ADMIN — PHENYLEPHRINE HYDROCHLORIDE 150 MCG: 10 INJECTION INTRAVENOUS at 09:58

## 2024-03-19 RX ADMIN — PHENYLEPHRINE HYDROCHLORIDE 150 MCG: 10 INJECTION INTRAVENOUS at 10:30

## 2024-03-19 RX ADMIN — Medication 100 MG: at 09:46

## 2024-03-19 ASSESSMENT — ACTIVITIES OF DAILY LIVING (ADL)
ADLS_ACUITY_SCORE: 36

## 2024-03-19 ASSESSMENT — ENCOUNTER SYMPTOMS: DYSRHYTHMIAS: 1

## 2024-03-19 NOTE — Clinical Note
Arrhythmia Type: atrial fibrillation.   Method of Cardioversion: synchronous.   The arrhythmia was terminated.   Energy shock delivered: 300 joules.   Post cardioversion rhythm: sinus rhythm.

## 2024-03-19 NOTE — ANESTHESIA CARE TRANSFER NOTE
Patient: Brett Hopkins    Procedure: Procedure(s):  Ablation Atrial Fibrillation       Diagnosis: Atrial Fibrillation  Diagnosis Additional Information: No value filed.    Anesthesia Type:   General     Note:    Oropharynx: oropharynx clear of all foreign objects  Level of Consciousness: awake  Oxygen Supplementation: room air    Independent Airway: airway patency satisfactory and stable  Dentition: dentition unchanged  Vital Signs Stable: post-procedure vital signs reviewed and stable  Report to RN Given: handoff report given  Patient transferred to: Cardiac Special Care          Vitals:  Vitals Value Taken Time   /67 03/19/24 1450   Temp 97.9    Pulse 87 03/19/24 1458   Resp 16    SpO2 95 % 03/19/24 1458   Vitals shown include unfiled device data.    Electronically Signed By: CRISTIANO Steiner CRNA  March 19, 2024  3:00 PM

## 2024-03-19 NOTE — DISCHARGE INSTRUCTIONS
Medications      Take your medications as prescribed.    It is important for you to continue your blood thinner without interruption, unless your electrophysiologist instructs you otherwise.      General instructions      Have an adult stay with you until tomorrow.    You may resume your normal diet.      You may shower tomorrow.  Do not take a bath or use a hot tub or pool for at least 1 week.     Activity recommendations      Do not drive for 3 days.    Avoid stooping or squatting more than 90 degrees at the hips for 7 days.    Avoid repetitive motions such as loading , vacuuming, raking or shoveling for 7 days.    Avoid heavy lifting (greater than 25lbs) for 7 days.            Groin care instructions      For the first 24 hours after your ablation, check the groin access sites every 1-2 hours while awake.    You may keep a Band-Aid over the puncture sites for 1 or 2 days post-procedure and thereafter may keep these sites uncovered.  Change the Band-Aid daily.  If there is minor oozing, apply another Band-Aid and remove it after 12 hours.    For 2 days, when you cough, sneeze, laugh or move your bowels, hold your hand over the puncture sites and press firmly.    Do not scrub the groin access sites.    Do not use lotion or powder near the groin access sites.           Arrhythmias following ablation     Recurrent atrial fibrillation and palpitations are common within the first 3 months post ablation while your heart recovers from the procedure.  These are usually more frequent in the first few weeks following ablation and should occur less frequently over time.  Please contact us if you are having frequent or long-lasting atrial fibrillation episodes following ablation.     Common findings after ablation     Bruising and a dime or pea size lump at the access sites is common.  If you notice increased swelling, external bleeding, or have other concerns regarding your groin access sites please call your  electrophysiology team s office, and if after hours consider emergency department evaluation.      Soreness and mild pain at your groin sites are normal, to help relieve this pain you can apply ice/cold packs to the sites for 20min 3-4 times per day.      Pleuritic chest discomfort (chest pain worse with taking deep breaths, worse with lying flat on your back) can occur after ablation, usually coming about within the first 24-72hrs post ablation.  If this occurs and is severe enough to be troublesome to you, please call us and consider starting a course of ibuprofen 400mg three times daily for 5 to 7 days.          Things to watch for      As with any type of procedure, please be more attentive to unusual symptoms post ablation (eg. fever, neurologic changes, pain with swallowing, loss of consciousness, etc) - we recommend ER evaluation for any such symptoms in the first few weeks post procedure.      Contact the EP Nurse line with any of the following.  Contact the cardiology on-call number after business hours.       Consider ER evaluation for severe symptoms.      Groin pain, swelling, or growing hard lump around the puncture sites.    Groin redness, tenderness, swelling, or drainage (blood or pus).    Neurological changes (for example: leg, arm or face weakness or numbness, difficulties with speech or word finding, problems walking or with your balance, vision changes).    Any numbness, coolness or changes in color in your extremities.   Sudden onset severe abdominal or back pain.   Moderate or severe chest pain not relieved by Tylenol or Ibuprofen, particularly after the first 48 hours.    Shortness of breath.    Chills or fever greater than 100 F.    Difficulty swallowing food or liquids.   Coughing up blood.    Blood in your stool.   Nausea and vomiting.   Difficulty urinating.     Recurrent atrial fibrillation associated with prolonged rapid heart rates (for example, heart rates over 140bpm for greater than  4 hours) or associated with additional concerning symptoms (for example, chest pain, lightheadedness, loss of consciousness, sweating).      If you are being evaluated at an emergency department, please tell your ER doctor that you have recently underwent an atrial fibrillation ablation and ask the ER to contact our office.  Many special considerations apply following ablation - these may be overlooked during an ER evaluation.     Our office will have a follow-up visit scheduled for you in approximately 6 weeks.  Please do not hesitate to call us before that time should issues arise.                [facility]      To reach the EP nurses working with Dr. Barfield: [phone].    To reach the general Heart Care Clinic line or after hours service: [phone].    If you are calling after hours, please listen to the entire voicemail,  a live  will answer at the end of the message.

## 2024-03-19 NOTE — ANESTHESIA POSTPROCEDURE EVALUATION
Patient: Brett Hopkins    Procedure: Procedure(s):  Ablation Atrial Fibrillation       Anesthesia Type:  General    Note:  Disposition: Outpatient   Postop Pain Control: Uneventful            Sign Out: Well controlled pain   PONV: No   Neuro/Psych: Uneventful            Sign Out: Acceptable/Baseline neuro status   Airway/Respiratory: Uneventful            Sign Out: Acceptable/Baseline resp. status   CV/Hemodynamics: Uneventful            Sign Out: Acceptable CV status; No obvious hypovolemia; No obvious fluid overload   Other NRE: NONE   DID A NON-ROUTINE EVENT OCCUR?            Last vitals:  Vitals Value Taken Time   /77 03/19/24 1545   Temp     Pulse 92 03/19/24 1558   Resp     SpO2 94 % 03/19/24 1558   Vitals shown include unfiled device data.    Electronically Signed By: Lawson Ho MD  March 19, 2024  3:59 PM

## 2024-03-19 NOTE — Clinical Note
[facility] system 12 lead EKG, hemodynamics 5 lead, pulse oximetery, NIBP, [employer] hands off defibrillator/external pacer, with 3 monitoring leads to patient. Baseline assessment done.

## 2024-03-19 NOTE — INTERVAL H&P NOTE
"I have reviewed the surgical (or preoperative) H&P that is linked to this encounter, and examined the patient. There are no significant changes    Clinical Conditions Present on Arrival:  Clinically Significant Risk Factors Present on Admission                # Drug Induced Coagulation Defect: home medication list includes an anticoagulant medication   # Severe Obesity: Estimated body mass index is 40.32 kg/m  as calculated from the following:    Height as of 3/11/24: 1.753 m (5' 9\").    Weight as of 3/11/24: 123.8 kg (273 lb).       "

## 2024-03-19 NOTE — PROGRESS NOTES
Patient was kept comfortable during post-procedure stay.VSS. Complained of back discomfort due to laying flat for a long time. Treated with pain medicines and repositioning. Right and left femoral access sites remain dry & free from signs of bleeding. Tolerated food and fluids. Ambulated without issues. Appointments already made & included in AVS. Dr. Barfield was able to speak with patient and spouse post procedure. Post-op instructions reviewed and packet given to patient & spouse. Able to ask questions. Verbalized no concerns. Belongings returned. Discharged in stable condition.

## 2024-03-19 NOTE — Clinical Note
General anesthesia is planned for this procedure.    Provider immediate assessment completed.  Staged Advancement Flap Text: The defect edges were debeveled with a #15 scalpel blade. Given the location of the defect, shape of the defect and the proximity to free margins a staged advancement flap was deemed most appropriate. Using a sterile surgical marker, an appropriate advancement flap was drawn incorporating the defect and placing the expected incisions within the relaxed skin tension lines where possible. The area thus outlined was incised deep to adipose tissue with a #15 scalpel blade. The skin margins were undermined to an appropriate distance in all directions utilizing iris scissors. Following this, the designed flap was carried over into the primary defect and sutured into place.

## 2024-03-19 NOTE — ANESTHESIA PREPROCEDURE EVALUATION
Anesthesia Pre-Procedure Evaluation    Patient: Brett Hopkins   MRN: [id number] : 1955        Procedure : Procedure(s):  Ablation Atrial Fibrillation          No past medical history on file.   Past Surgical History:   Procedure Laterality Date    ARTHROPLASTY REVISION HIP Right 2019    ARTHROPLASTY REVISION KNEE Right 2019    CARDIOVERSION  2019    7/10/2019    CARDIOVERSION  2019    CATARACT EXTRACTION Bilateral 2021    CV LEFT ATRIAL APPENDAGE CLOSURE N/A 2023    Procedure: Left Atrial Appendage Closure;  Surgeon: Mitesh Graff MD;  Location: [facility] CATH [facility] CV    DENTAL SURGERY      Oral Surgery Tooth Extraction;  Implant    PA ABLATE HEART DYSRHYTHM FOCUS  2013    Description: Catheter Ablation Atrial Fibrillation;  Recorded: 2013;  Comments: PVI Aug 19, 2013 (Cryo to all 4 PV's)    TOOTH EXTRACTION      implant    [facility] TOTAL HIP ARTHROPLASTY Right 2019    Procedure: RIGHT TOTAL HIP ARTHROPLASTY DIRECT ANTERIOR APPROACH;  Surgeon: Mario Woodruff MD;  Location: [facility];  Service: Orthopedics    [facility] TOTAL KNEE ARTHROPLASTY Right 2017    Procedure: 2)  RIGHT TOTAL KNEE ARTHROPLASTY;  Surgeon: Mario VICKERS MD;  Location: [facility];  Service: Orthopedics      Allergies   Allergen Reactions    Sulfa (Sulfonamide Antibiotics) [Sulfa Antibiotics] Rash     Rash - turned purple  , Around age 32      Social History     Tobacco Use    Smoking status: Never    Smokeless tobacco: Never   Substance Use Topics    Alcohol use: Yes     Alcohol/week: 5.0 standard drinks of alcohol     Types: 5 Cans of beer per week     Comment: minimal      Wt Readings from Last 1 Encounters:   24 123.8 kg (273 lb)        Anesthesia Evaluation   Pt has had prior anesthetic.     No history of anesthetic complications       ROS/MED HX  ENT/Pulmonary:  - neg pulmonary ROS     Neurologic:  - neg neurologic ROS     Cardiovascular:     (+) Dyslipidemia  "hypertension- Peripheral Vascular Disease-   -  - -                        dysrhythmias, a-fib,             METS/Exercise Tolerance: >4 METS    Hematologic:  - neg hematologic  ROS     Musculoskeletal: Comment: Myasthenia gravis -- only affecting eyes, has since resolved.      GI/Hepatic:     (+) GERD, Asymptomatic on medication,    hiatal hernia,              Renal/Genitourinary:  - neg Renal ROS     Endo:     (+)          thyroid problem, hypothyroidism,    Obesity (morbid),       Psychiatric/Substance Use:  - neg psychiatric ROS     Infectious Disease:  - neg infectious disease ROS     Malignancy:  - neg malignancy ROS     Other:  - neg other ROS          Physical Exam    Airway  airway exam normal      Mallampati: II   TM distance: > 3 FB   Neck ROM: limited   Mouth opening: > 3 cm    Respiratory Devices and Support         Dental  no notable dental history         Cardiovascular          Rhythm and rate: irregular     Pulmonary   pulmonary exam normal                OUTSIDE LABS:  CBC:   Lab Results   Component Value Date    WBC 5.7 03/11/2024    WBC 7.1 04/24/2023    HGB 14.5 03/11/2024    HGB 15.6 04/24/2023    HCT 46.2 03/11/2024    HCT 47.4 04/24/2023     03/11/2024     04/24/2023     BMP:   Lab Results   Component Value Date     03/11/2024     12/19/2023    POTASSIUM 4.1 03/11/2024    POTASSIUM 4.1 02/27/2024    CHLORIDE 105 03/11/2024    CHLORIDE 104 12/19/2023    CO2 31 (H) 03/11/2024    CO2 29 12/19/2023    BUN 21.9 03/11/2024    BUN 25.9 (H) 12/19/2023    CR 1.13 03/11/2024    CR 1.09 12/19/2023     (H) 03/11/2024     (H) 12/19/2023     COAGS:   Lab Results   Component Value Date    PTT 31 12/21/2020    INR 1.12 (H) 12/21/2020     POC: No results found for: \"BGM\", \"HCG\", \"HCGS\"  HEPATIC:   Lab Results   Component Value Date    ALBUMIN 4.1 12/19/2023    PROTTOTAL 7.2 12/19/2023    ALT 23 12/19/2023    AST 26 12/19/2023    ALKPHOS 75 12/19/2023    BILITOTAL 0.5 " "12/19/2023     OTHER:   Lab Results   Component Value Date    YFN 9.0 03/11/2024    MAG 2.1 09/10/2018    TSH 7.19 (H) 12/19/2023    CRP 0.5 09/10/2019    SED 3 09/10/2019       Anesthesia Plan    ASA Status:  3    NPO Status:  NPO Appropriate    Anesthesia Type: General.     - Airway: ETT   Induction: Intravenous, Propofol.   Maintenance: Balanced.   Techniques and Equipment:     - Airway: Video-Laryngoscope       Consents    Anesthesia Plan(s) and associated risks, benefits, and realistic alternatives discussed. Questions answered and patient/representative(s) expressed understanding.     - Discussed:     - Discussed with:  Patient      - Extended Intubation/Ventilatory Support Discussed: No.      - Patient is DNR/DNI Status: No     Use of blood products discussed: No .     Postoperative Care    Pain management: IV analgesics, Multi-modal analgesia.     - Plan for long acting post-op opioid use   PONV prophylaxis: Ondansetron (or other 5HT-3), Dexamethasone or Solumedrol, Droperidol or Haldol     Comments:    Other Comments: 50 mg ketamine IV on induction.  Suggamadex for reversal.           Lawson Ho MD    I have reviewed the pertinent notes and labs in the chart from the past 30 days and (re)examined the patient.  Any updates or changes from those notes are reflected in this note.            # Drug Induced Coagulation Defect: home medication list includes an anticoagulant medication   # Severe Obesity: Estimated body mass index is 40.32 kg/m  as calculated from the following:    Height as of 3/11/24: 1.753 m (5' 9\").    Weight as of 3/11/24: 123.8 kg (273 lb).      "

## 2024-03-20 ENCOUNTER — TELEPHONE (OUTPATIENT)
Dept: CARDIOLOGY | Facility: CLINIC | Age: 69
End: 2024-03-20
Payer: COMMERCIAL

## 2024-03-20 ENCOUNTER — HOSPITAL ENCOUNTER (EMERGENCY)
Facility: CLINIC | Age: 69
Discharge: HOME | End: 2024-03-20
Attending: FAMILY MEDICINE | Admitting: FAMILY MEDICINE
Payer: COMMERCIAL

## 2024-03-20 ENCOUNTER — APPOINTMENT (OUTPATIENT)
Dept: CT IMAGING | Facility: CLINIC | Age: 69
End: 2024-03-20
Attending: FAMILY MEDICINE
Payer: COMMERCIAL

## 2024-03-20 VITALS
WEIGHT: 260 LBS | HEART RATE: 101 BPM | DIASTOLIC BLOOD PRESSURE: 71 MMHG | RESPIRATION RATE: 25 BRPM | TEMPERATURE: 99.3 F | SYSTOLIC BLOOD PRESSURE: 121 MMHG | BODY MASS INDEX: 37.22 KG/M2 | OXYGEN SATURATION: 92 % | HEIGHT: 70 IN

## 2024-03-20 DIAGNOSIS — I30.9 ACUTE PERICARDITIS, UNSPECIFIED TYPE: ICD-10-CM

## 2024-03-20 LAB
ALBUMIN SERPL BCG-MCNC: 3.8 G/DL (ref 3.5–5.2)
ALBUMIN UR-MCNC: NEGATIVE MG/DL
ALP SERPL-CCNC: 61 U/L (ref 40–150)
ALT SERPL W P-5'-P-CCNC: 7 U/L (ref 0–70)
ANION GAP SERPL CALCULATED.3IONS-SCNC: 8 MMOL/L (ref 7–15)
APPEARANCE UR: CLEAR
AST SERPL W P-5'-P-CCNC: 36 U/L (ref 0–45)
ATRIAL RATE - MUSE: 90 BPM
ATRIAL RATE - MUSE: 98 BPM
BASOPHILS # BLD AUTO: 0 10E3/UL (ref 0–0.2)
BASOPHILS NFR BLD AUTO: 0 %
BILIRUB DIRECT SERPL-MCNC: <0.2 MG/DL (ref 0–0.3)
BILIRUB SERPL-MCNC: 0.5 MG/DL
BILIRUB UR QL STRIP: NEGATIVE
BUN SERPL-MCNC: 20.7 MG/DL (ref 8–23)
CALCIUM SERPL-MCNC: 8.6 MG/DL (ref 8.8–10.2)
CHLORIDE SERPL-SCNC: 104 MMOL/L (ref 98–107)
COLOR UR AUTO: COLORLESS
CREAT SERPL-MCNC: 0.96 MG/DL (ref 0.67–1.17)
DEPRECATED HCO3 PLAS-SCNC: 30 MMOL/L (ref 22–29)
DIASTOLIC BLOOD PRESSURE - MUSE: NORMAL MMHG
DIASTOLIC BLOOD PRESSURE - MUSE: NORMAL MMHG
EGFRCR SERPLBLD CKD-EPI 2021: 87 ML/MIN/1.73M2
EOSINOPHIL # BLD AUTO: 0 10E3/UL (ref 0–0.7)
EOSINOPHIL NFR BLD AUTO: 0 %
ERYTHROCYTE [DISTWIDTH] IN BLOOD BY AUTOMATED COUNT: 14 % (ref 10–15)
GLUCOSE SERPL-MCNC: 163 MG/DL (ref 70–99)
GLUCOSE UR STRIP-MCNC: NEGATIVE MG/DL
HCT VFR BLD AUTO: 42.3 % (ref 40–53)
HGB BLD-MCNC: 13.6 G/DL (ref 13.3–17.7)
HGB UR QL STRIP: NEGATIVE
HOLD SPECIMEN: NORMAL
IMM GRANULOCYTES # BLD: 0 10E3/UL
IMM GRANULOCYTES NFR BLD: 0 %
INTERPRETATION ECG - MUSE: NORMAL
INTERPRETATION ECG - MUSE: NORMAL
KETONES UR STRIP-MCNC: NEGATIVE MG/DL
LACTATE SERPL-SCNC: 0.9 MMOL/L (ref 0.7–2)
LACTATE SERPL-SCNC: 2.1 MMOL/L (ref 0.7–2)
LEUKOCYTE ESTERASE UR QL STRIP: NEGATIVE
LIPASE SERPL-CCNC: 17 U/L (ref 13–60)
LYMPHOCYTES # BLD AUTO: 1.2 10E3/UL (ref 0.8–5.3)
LYMPHOCYTES NFR BLD AUTO: 11 %
MAGNESIUM SERPL-MCNC: 1.6 MG/DL (ref 1.7–2.3)
MCH RBC QN AUTO: 31 PG (ref 26.5–33)
MCHC RBC AUTO-ENTMCNC: 32.2 G/DL (ref 31.5–36.5)
MCV RBC AUTO: 96 FL (ref 78–100)
MONOCYTES # BLD AUTO: 0.9 10E3/UL (ref 0–1.3)
MONOCYTES NFR BLD AUTO: 9 %
MUCOUS THREADS #/AREA URNS LPF: PRESENT /LPF
NEUTROPHILS # BLD AUTO: 8.6 10E3/UL (ref 1.6–8.3)
NEUTROPHILS NFR BLD AUTO: 79 %
NITRATE UR QL: NEGATIVE
NRBC # BLD AUTO: 0 10E3/UL
NRBC BLD AUTO-RTO: 0 /100
NT-PROBNP SERPL-MCNC: 534 PG/ML (ref 0–900)
P AXIS - MUSE: 83 DEGREES
P AXIS - MUSE: 84 DEGREES
PH UR STRIP: 5.5 [PH] (ref 5–7)
PLATELET # BLD AUTO: 172 10E3/UL (ref 150–450)
POTASSIUM SERPL-SCNC: 3.9 MMOL/L (ref 3.4–5.3)
PR INTERVAL - MUSE: 158 MS
PR INTERVAL - MUSE: 166 MS
PROT SERPL-MCNC: 6.7 G/DL (ref 6.4–8.3)
QRS DURATION - MUSE: 102 MS
QRS DURATION - MUSE: 94 MS
QT - MUSE: 350 MS
QT - MUSE: 410 MS
QTC - MUSE: 446 MS
QTC - MUSE: 501 MS
R AXIS - MUSE: -34 DEGREES
R AXIS - MUSE: 8 DEGREES
RBC # BLD AUTO: 4.39 10E6/UL (ref 4.4–5.9)
RBC URINE: 1 /HPF
SODIUM SERPL-SCNC: 142 MMOL/L (ref 135–145)
SP GR UR STRIP: 1.01 (ref 1–1.03)
SYSTOLIC BLOOD PRESSURE - MUSE: NORMAL MMHG
SYSTOLIC BLOOD PRESSURE - MUSE: NORMAL MMHG
T AXIS - MUSE: 40 DEGREES
T AXIS - MUSE: 45 DEGREES
TROPONIN T SERPL HS-MCNC: 1930 NG/L
TROPONIN T SERPL HS-MCNC: 2168 NG/L
UROBILINOGEN UR STRIP-MCNC: <2 MG/DL
VENTRICULAR RATE- MUSE: 90 BPM
VENTRICULAR RATE- MUSE: 98 BPM
WBC # BLD AUTO: 10.9 10E3/UL (ref 4–11)
WBC URINE: <1 /HPF

## 2024-03-20 PROCEDURE — 250N000013 HC RX MED GY IP 250 OP 250 PS 637: Performed by: INTERNAL MEDICINE

## 2024-03-20 PROCEDURE — 80053 COMPREHEN METABOLIC PANEL: CPT | Performed by: FAMILY MEDICINE

## 2024-03-20 PROCEDURE — 81001 URINALYSIS AUTO W/SCOPE: CPT | Performed by: FAMILY MEDICINE

## 2024-03-20 PROCEDURE — 96375 TX/PRO/DX INJ NEW DRUG ADDON: CPT | Mod: 59

## 2024-03-20 PROCEDURE — 84075 ASSAY ALKALINE PHOSPHATASE: CPT | Performed by: FAMILY MEDICINE

## 2024-03-20 PROCEDURE — 85025 COMPLETE CBC W/AUTO DIFF WBC: CPT | Performed by: FAMILY MEDICINE

## 2024-03-20 PROCEDURE — 93005 ELECTROCARDIOGRAM TRACING: CPT | Performed by: FAMILY MEDICINE

## 2024-03-20 PROCEDURE — 83735 ASSAY OF MAGNESIUM: CPT | Performed by: FAMILY MEDICINE

## 2024-03-20 PROCEDURE — 250N000011 HC RX IP 250 OP 636: Performed by: FAMILY MEDICINE

## 2024-03-20 PROCEDURE — 36415 COLL VENOUS BLD VENIPUNCTURE: CPT | Performed by: FAMILY MEDICINE

## 2024-03-20 PROCEDURE — 84484 ASSAY OF TROPONIN QUANT: CPT | Performed by: FAMILY MEDICINE

## 2024-03-20 PROCEDURE — 83880 ASSAY OF NATRIURETIC PEPTIDE: CPT | Performed by: FAMILY MEDICINE

## 2024-03-20 PROCEDURE — 71275 CT ANGIOGRAPHY CHEST: CPT

## 2024-03-20 PROCEDURE — 83690 ASSAY OF LIPASE: CPT | Performed by: FAMILY MEDICINE

## 2024-03-20 PROCEDURE — 99285 EMERGENCY DEPT VISIT HI MDM: CPT | Mod: 25

## 2024-03-20 PROCEDURE — 83605 ASSAY OF LACTIC ACID: CPT | Performed by: FAMILY MEDICINE

## 2024-03-20 PROCEDURE — 96374 THER/PROPH/DIAG INJ IV PUSH: CPT | Mod: 59

## 2024-03-20 RX ORDER — IOPAMIDOL 755 MG/ML
90 INJECTION, SOLUTION INTRAVASCULAR ONCE
Status: COMPLETED | OUTPATIENT
Start: 2024-03-20 | End: 2024-03-20

## 2024-03-20 RX ORDER — HYDROMORPHONE HYDROCHLORIDE 1 MG/ML
0.5 INJECTION, SOLUTION INTRAMUSCULAR; INTRAVENOUS; SUBCUTANEOUS ONCE
Status: COMPLETED | OUTPATIENT
Start: 2024-03-20 | End: 2024-03-20

## 2024-03-20 RX ORDER — KETOROLAC TROMETHAMINE 30 MG/ML
30 INJECTION, SOLUTION INTRAMUSCULAR; INTRAVENOUS ONCE
Status: COMPLETED | OUTPATIENT
Start: 2024-03-20 | End: 2024-03-20

## 2024-03-20 RX ORDER — COLCHICINE 0.6 MG/1
0.6 TABLET ORAL 2 TIMES DAILY
Status: DISCONTINUED | OUTPATIENT
Start: 2024-03-20 | End: 2024-03-20 | Stop reason: HOSPADM

## 2024-03-20 RX ORDER — KETOROLAC TROMETHAMINE 15 MG/ML
15 INJECTION, SOLUTION INTRAMUSCULAR; INTRAVENOUS ONCE
Status: DISCONTINUED | OUTPATIENT
Start: 2024-03-20 | End: 2024-03-20

## 2024-03-20 RX ORDER — COLCHICINE 0.6 MG/1
0.6 TABLET ORAL 2 TIMES DAILY
Qty: 90 TABLET | Refills: 0 | Status: SHIPPED | OUTPATIENT
Start: 2024-03-20 | End: 2024-04-30

## 2024-03-20 RX ADMIN — KETOROLAC TROMETHAMINE 30 MG: 30 INJECTION, SOLUTION INTRAMUSCULAR at 21:14

## 2024-03-20 RX ADMIN — COLCHICINE 0.6 MG: 0.6 TABLET ORAL at 21:20

## 2024-03-20 RX ADMIN — HYDROMORPHONE HYDROCHLORIDE 0.5 MG: 1 INJECTION, SOLUTION INTRAMUSCULAR; INTRAVENOUS; SUBCUTANEOUS at 18:46

## 2024-03-20 RX ADMIN — IOPAMIDOL 90 ML: 755 INJECTION, SOLUTION INTRAVENOUS at 17:15

## 2024-03-20 ASSESSMENT — ACTIVITIES OF DAILY LIVING (ADL)
ADLS_ACUITY_SCORE: 35

## 2024-03-20 ASSESSMENT — ENCOUNTER SYMPTOMS
NAUSEA: 0
ABDOMINAL PAIN: 1
CONSTIPATION: 1
VOMITING: 0
SHORTNESS OF BREATH: 1
COUGH: 1

## 2024-03-20 ASSESSMENT — COLUMBIA-SUICIDE SEVERITY RATING SCALE - C-SSRS
1. IN THE PAST MONTH, HAVE YOU WISHED YOU WERE DEAD OR WISHED YOU COULD GO TO SLEEP AND NOT WAKE UP?: NO
2. HAVE YOU ACTUALLY HAD ANY THOUGHTS OF KILLING YOURSELF IN THE PAST MONTH?: NO
6. HAVE YOU EVER DONE ANYTHING, STARTED TO DO ANYTHING, OR PREPARED TO DO ANYTHING TO END YOUR LIFE?: NO

## 2024-03-20 NOTE — ED PROVIDER NOTES
EMERGENCY DEPARTMENT ENCOUNTER      NAME: Brett Hopkins  AGE: 68 year old male  YOB: 1955  MRN: [id number]  EVALUATION DATE & TIME: No admission date for patient encounter.    PCP: Magdaleno Bennett    ED PROVIDER: Carlos Horvath M.D.    Chief Complaint   Patient presents with    Abdominal Pain    Shortness of Breath       FINAL IMPRESSION:  1. Acute pericarditis, unspecified type        ED COURSE & MEDICAL DECISION MAKING:    Pertinent Labs & Imaging studies independently interpreted by me. (See chart for details)  4:58 PM Patient seen and examined, external records reviewed.    ED Course as of 03/20/24 2110   Wed Mar 20, 2024   1718 Patient seen in triage immediately on arrival.  Underwent ablation for atrial fibrillation yesterday, comes in today with shortness of breath and upper abdominal pain.  On exam, appears uncomfortable, tachypneic but no hypoxia.  Heart is regular, lungs are clear, minimal upper abdominal tenderness.  Concern for possible aortic catastrophe including dissection, pericardial effusion, retroperitoneal hemorrhage, bowel obstruction.  Labs and CT scan are ordered along with Dilaudid for pain.  Initial EKG is reassuring.   1740 CT chest abdomen pelvis independently turbid by me negative for acute findings   1757 Labs ordered and independently interpreted by me with normal CBC, normal basic metabolic panel, normal hepatic panel, normal lipase, magnesium slightly low, normal BNP.  Lactate slightly elevated.  Troponin is significantly elevated consistent with patient's post ablation status.   1918 Troponin improved, this elevation in troponin likely represents postprocedure and not acute coronary syndrome   2007 Discussed with Dr. Barfield, cardiology who feels patient has postprocedural pericarditis.  Will come and see the patient in the department and recommends Toradol 30 mg IV.         At the conclusion of the encounter I discussed the results of all of the tests and the  disposition. The questions were answered. The patient or family acknowledged understanding and was agreeable with the care plan.     Medical Decision Making  Obtained supplemental history:Supplemental history obtained?: Family Member/Significant Other  Reviewed external records: External records reviewed?: Documented in chart  Care impacted by chronic illness:Heart Disease and Hypertension  Care significantly affected by social determinants of health:Access to Affordable Health Care  Did you consider but not order tests?: Work up considered but not performed and documented in chart, if applicable  Did you interpret images independently?: Independent interpretation of ECG and images noted in documentation, when applicable.  Consultation discussion with other provider:Did you involve another provider (consultant, , pharmacy, etc.)?: I discussed the care with another health care provider, see documentation for details.  Discharge. I prescribed additional prescription strength medication(s) as charted. See documentation for any additional details.    EKG:    Performed at: 4:55 PM  Impression: Normal EKG  Rate: 98  Rhythm: Sinus  Axis: Normal  NV Interval: 158  QRS Interval: 94  QTc Interval: 446  ST Changes: No acute ischemic changes  Comparison: Prior of March 19, no acute changes are seen    I have independently reviewed and interpreted the EKG(s) documented above.    PROCEDURES:       MEDICATIONS GIVEN IN THE EMERGENCY:  Medications   ketorolac (TORADOL) injection 30 mg (has no administration in time range)   colchicine (COLCRYS) tablet 0.6 mg (has no administration in time range)   HYDROmorphone (PF) (DILAUDID) injection 0.5 mg (0.5 mg Intravenous $Given 3/20/24 4126)   iopamidol (ISOVUE-370) solution 90 mL (90 mLs Intravenous $Given 3/20/24 1715)       NEW PRESCRIPTIONS STARTED AT TODAY'S ER VISIT  New Prescriptions    No medications on file  "      =================================================================    HPI    Patient information was obtained from: Patient      Brett Hopkins is a 68 year old male with a pertinent history of hypertension, hyperlipidemia, hypothyroidism, AAA, GERD, lymphedema, and myasthenia gravis, who presents to this ED by private car for evaluation of abdominal pain and shortness of breath.    Patient reports an onset of \"sharp\" abdominal pain which occured this morning. He called the nurse line and was informed to go into the ED. He started to feel minor shortness of breath and his abdominal pain radiate to his upper area, during his ride to the ED. He endorses minor constipation and coughing. Otherwise, is unsure if he is feeling back pain. Patient denies nausea, vomiting, chest pain, or new edema.     Patient reports he had an ablation for atrial fibrillation yesterday. He was discharged with an anticoagulation course of Eliquis for this. He has a history of hypertension and takes Lisinopril for this. Patient denies a history of smoking. No other medical concerns are expressed at this time.     REVIEW OF SYSTEMS   Review of Systems   Respiratory:  Positive for cough (minor) and shortness of breath (minor).    Cardiovascular:  Negative for chest pain and leg swelling (new).   Gastrointestinal:  Positive for abdominal pain (\"sharp\") and constipation (minor). Negative for nausea and vomiting.   Musculoskeletal:  Back pain: unsure.    All other systems reviewed and negative    PAST MEDICAL HISTORY:  History reviewed. No pertinent past medical history.    PAST SURGICAL HISTORY:  Past Surgical History:   Procedure Laterality Date    ARTHROPLASTY REVISION HIP Right 05/01/2019    ARTHROPLASTY REVISION KNEE Right 2019    CARDIOVERSION  07/01/2019    7/10/2019    CARDIOVERSION  09/12/2019    CATARACT EXTRACTION Bilateral 06/2021    CV LEFT ATRIAL APPENDAGE CLOSURE N/A 1/12/2023    Procedure: Left Atrial Appendage Closure;  " Surgeon: Mitesh Graff MD;  Location: [facility] CV    DENTAL SURGERY      Oral Surgery Tooth Extraction;  Implant    EP ABLATION PULMONARY VEIN ISOLATION N/A 3/19/2024    Procedure: Ablation Atrial Fibrillation;  Surgeon: Benedicto Barfield MD;  Location: [facility] CV    [place] ABLATE HEART DYSRHYTHM FOCUS  08/19/2013    Description: Catheter Ablation Atrial Fibrillation;  Recorded: 11/16/2013;  Comments: PVI Aug 19, 2013 (Cryo to all 4 PV's)    TOOTH EXTRACTION      implant    [facility] TOTAL HIP ARTHROPLASTY Right 05/08/2019    Procedure: RIGHT TOTAL HIP ARTHROPLASTY DIRECT ANTERIOR APPROACH;  Surgeon: Mario Woodruff MD;  Location: [facility];  Service: Orthopedics    [facility] TOTAL KNEE ARTHROPLASTY Right 06/21/2017    Procedure: 2)  RIGHT TOTAL KNEE ARTHROPLASTY;  Surgeon: Mario VICKERS MD;  Location: [facility];  Service: Orthopedics       CURRENT MEDICATIONS:    Current Facility-Administered Medications   Medication    colchicine (COLCRYS) tablet 0.6 mg    ketorolac (TORADOL) injection 30 mg     Current Outpatient Medications   Medication    apixaban ANTICOAGULANT (ELIQUIS) 5 MG tablet    aspirin 81 MG EC tablet    hydrochlorothiazide (HYDRODIURIL) 25 MG tablet    levothyroxine (SYNTHROID/LEVOTHROID) 100 MCG tablet    lisinopril (ZESTRIL) 40 MG tablet    omeprazole (PRILOSEC) 20 MG DR capsule    omeprazole (PRILOSEC) 40 MG DR capsule    rosuvastatin (CRESTOR) 10 MG tablet       ALLERGIES:  Allergies   Allergen Reactions    Sulfa (Sulfonamide Antibiotics) [Sulfa Antibiotics] Rash     Rash - turned purple  , Around age 32       FAMILY HISTORY:  Family History   Problem Relation Age of Onset    Atrial fibrillation Mother     Dementia Mother         dx 80s    Diabetes Type 2  Mother         dx 60s/70s    Atrial fibrillation Father     Rheumatoid Arthritis Father     Atrial fibrillation Sister     Cerebrovascular Disease Sister     Parkinsonism Sister     Parkinsonism Sister     Atrial  fibrillation Brother     Rheumatoid Arthritis Brother     Cerebrovascular Disease Brother     CABG Brother     Atrial fibrillation Brother     Diabetes Type 2  Brother         Dx 40's       SOCIAL HISTORY:   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   Tobacco Use    Smoking status: Never    Smokeless tobacco: Never   Vaping Use    Vaping Use: Never used   Substance and Sexual Activity    Alcohol use: Yes     Alcohol/week: 5.0 standard drinks of alcohol     Types: 5 Cans of beer per week     Comment: minimal    Drug use: No    Sexual activity: Yes     Partners: Female   Social History Narrative    Diet-regular            Exercise-walk, limited by R leg, R knee        Volunteer,      Social Determinants of Health     Financial Resource Strain: Low Risk  (12/19/2023)    Financial Resource Strain     Within the past 12 months, have you or your family members you live with been unable to get utilities (heat, electricity) when it was really needed?: No   Food Insecurity: Low Risk  (12/19/2023)    Food Insecurity     Within the past 12 months, did you worry that your food would run out before you got money to buy more?: No     Within the past 12 months, did the food you bought just not last and you didn t have money to get more?: No   Transportation Needs: Low Risk  (12/19/2023)    Transportation Needs     Within the past 12 months, has lack of transportation kept you from medical appointments, getting your medicines, non-medical meetings or appointments, work, or from getting things that you need?: No   Interpersonal Safety: Low Risk  (12/19/2023)    Interpersonal Safety     Do you feel physically and emotionally safe where you currently live?: Yes     Within the past 12 months, have you been hit, slapped, kicked or otherwise physically hurt by someone?: No     Within the past 12 months, have you  "been humiliated or emotionally abused in other ways by your partner or ex-partner?: No   Housing Stability: Low Risk  (12/19/2023)    Housing Stability     Do you have housing? : Yes     Are you worried about losing your housing?: No       VITALS:  /64   Pulse 99   Temp 99.3  F (37.4  C) (Temporal)   Resp 28   Ht 1.778 m (5' 10\")   Wt 117.9 kg (260 lb)   SpO2 95%   BMI 37.31 kg/m      PHYSICAL EXAM:  Physical Exam  Vitals and nursing note reviewed.   Constitutional:       Appearance: Normal appearance.   HENT:      Head: Normocephalic and atraumatic.      Right Ear: External ear normal.      Left Ear: External ear normal.      Nose: Nose normal.      Mouth/Throat:      Mouth: Mucous membranes are moist.   Eyes:      Extraocular Movements: Extraocular movements intact.      Conjunctiva/sclera: Conjunctivae normal.      Pupils: Pupils are equal, round, and reactive to light.   Cardiovascular:      Rate and Rhythm: Normal rate and regular rhythm.   Pulmonary:      Effort: Pulmonary effort is normal.      Breath sounds: Normal breath sounds. No wheezing or rales.   Abdominal:      General: Abdomen is flat. There is no distension.      Palpations: Abdomen is soft.      Tenderness: There is abdominal tenderness (mild periumbilical tenderness). There is no guarding.   Musculoskeletal:         General: Normal range of motion.      Cervical back: Normal range of motion and neck supple.      Right lower leg: Edema present.      Left lower leg: Edema present.      Comments: Marked bilateral LE edema   Lymphadenopathy:      Cervical: No cervical adenopathy.   Skin:     General: Skin is warm and dry.   Neurological:      General: No focal deficit present.      Mental Status: He is alert and oriented to person, place, and time. Mental status is at baseline.      Comments: No gross focal neurologic deficits   Psychiatric:         Mood and Affect: Mood normal.         Behavior: Behavior normal.         Thought Content: " Thought content normal.          LAB:  All pertinent labs reviewed and interpreted.  Results for orders placed or performed during the hospital encounter of 03/20/24   CTA Chest Abdomen Pelvis w Contrast    Impression    IMPRESSION:  1.  No acute findings in the chest, abdomen, or pelvis.    2.  Persistent opacification of the left atrial appendage despite the presence of a left atrial appendage occlusion device.     Extra Blue Top Tube   Result Value Ref Range    Hold Specimen JIC    Extra Red Top Tube   Result Value Ref Range    Hold Specimen JIC    Extra Green Top (Lithium Heparin) Tube   Result Value Ref Range    Hold Specimen .    Extra Purple Top Tube   Result Value Ref Range    Hold Specimen .    Basic metabolic panel   Result Value Ref Range    Sodium 142 135 - 145 mmol/L    Potassium 3.9 3.4 - 5.3 mmol/L    Chloride 104 98 - 107 mmol/L    Carbon Dioxide (CO2) 30 (H) 22 - 29 mmol/L    Anion Gap 8 7 - 15 mmol/L    Urea Nitrogen 20.7 8.0 - 23.0 mg/dL    Creatinine 0.96 0.67 - 1.17 mg/dL    GFR Estimate 87 >60 mL/min/1.73m2    Calcium 8.6 (L) 8.8 - 10.2 mg/dL    Glucose 163 (H) 70 - 99 mg/dL   Hepatic function panel   Result Value Ref Range    Protein Total 6.7 6.4 - 8.3 g/dL    Albumin 3.8 3.5 - 5.2 g/dL    Bilirubin Total 0.5 <=1.2 mg/dL    Alkaline Phosphatase 61 40 - 150 U/L    AST 36 0 - 45 U/L    ALT 7 0 - 70 U/L    Bilirubin Direct <0.20 0.00 - 0.30 mg/dL   Result Value Ref Range    Lipase 17 13 - 60 U/L   Lactic acid whole blood   Result Value Ref Range    Lactic Acid 2.1 (H) 0.7 - 2.0 mmol/L   Result Value Ref Range    Magnesium 1.6 (L) 1.7 - 2.3 mg/dL   Result Value Ref Range    Troponin T, High Sensitivity 2,168 (HH) <=22 ng/L   Nt probnp inpatient (BNP)   Result Value Ref Range    N terminal Pro BNP Inpatient 534 0 - 900 pg/mL   UA with Microscopic reflex to Culture    Specimen: Urine, Clean Catch   Result Value Ref Range    Color Urine Colorless Colorless, Straw, Light Yellow, Yellow    Appearance  Urine Clear Clear    Glucose Urine Negative Negative mg/dL    Bilirubin Urine Negative Negative    Ketones Urine Negative Negative mg/dL    Specific Gravity Urine 1.015 1.005 - 1.030    Blood Urine Negative Negative    pH Urine 5.5 5.0 - 7.0    Protein Albumin Urine Negative Negative mg/dL    Urobilinogen Urine <2.0 <2.0 mg/dL    Nitrite Urine Negative Negative    Leukocyte Esterase Urine Negative Negative    Mucus Urine Present (A) None Seen /LPF    RBC Urine 1 <=2 /HPF    WBC Urine <1 <=5 /HPF   CBC with platelets and differential   Result Value Ref Range    WBC Count 10.9 4.0 - 11.0 10e3/uL    RBC Count 4.39 (L) 4.40 - 5.90 10e6/uL    Hemoglobin 13.6 13.3 - 17.7 g/dL    Hematocrit 42.3 40.0 - 53.0 %    MCV 96 78 - 100 fL    MCH 31.0 26.5 - 33.0 pg    MCHC 32.2 31.5 - 36.5 g/dL    RDW 14.0 10.0 - 15.0 %    Platelet Count 172 150 - 450 10e3/uL    % Neutrophils 79 %    % Lymphocytes 11 %    % Monocytes 9 %    % Eosinophils 0 %    % Basophils 0 %    % Immature Granulocytes 0 %    NRBCs per 100 WBC 0 <1 /100    Absolute Neutrophils 8.6 (H) 1.6 - 8.3 10e3/uL    Absolute Lymphocytes 1.2 0.8 - 5.3 10e3/uL    Absolute Monocytes 0.9 0.0 - 1.3 10e3/uL    Absolute Eosinophils 0.0 0.0 - 0.7 10e3/uL    Absolute Basophils 0.0 0.0 - 0.2 10e3/uL    Absolute Immature Granulocytes 0.0 <=0.4 10e3/uL    Absolute NRBCs 0.0 10e3/uL   Lactic acid whole blood   Result Value Ref Range    Lactic Acid 0.9 0.7 - 2.0 mmol/L   Result Value Ref Range    Troponin T, High Sensitivity 1,930 (HH) <=22 ng/L   ECG 12-LEAD WITH MUSE (LHE)   Result Value Ref Range    Systolic Blood Pressure  mmHg    Diastolic Blood Pressure  mmHg    Ventricular Rate 98 BPM    Atrial Rate 98 BPM    TN Interval 158 ms    QRS Duration 94 ms     ms    QTc 446 ms    P Axis 84 degrees    R AXIS 8 degrees    T Axis 40 degrees    Interpretation ECG       Sinus rhythm with occasional Premature ventricular complexes  Otherwise normal ECG  When compared with ECG of  19-MAR-2024 15:43,  Premature ventricular complexes are now Present  Premature supraventricular complexes are no longer Present  Criteria for Inferior-posterior infarct are no longer Present  Inverted T waves have replaced nonspecific T wave abnormality in Anterior leads  QT has shortened  Confirmed by SEE ED PROVIDER NOTE FOR, ECG INTERPRETATION ([id number]),  NAZARIOARGENTINA GAINES ([id number]) on 3/20/2024 7:20:53 PM         RADIOLOGY:  Reviewed all pertinent imaging. Please see official radiology report.  CTA Chest Abdomen Pelvis w Contrast   Final Result   IMPRESSION:   1.  No acute findings in the chest, abdomen, or pelvis.      2.  Persistent opacification of the left atrial appendage despite the presence of a left atrial appendage occlusion device.             I, Marely Chua, am serving as a scribe to document services personally performed by Dr. Horvath based on my observation and the provider's statements to me. I, Carlos Horvath MD attest that Marely Chua is acting in a scribe capacity, has observed my performance of the services and has documented them in accordance with my direction.    Carlos Horvath M.D.  Emergency Medicine  [facility] EMERGENCY ROOM  [address] [id number] [phone]  Dept: [phone]       Carlos Horvath MD  03/20/24 3557

## 2024-03-20 NOTE — ED TRIAGE NOTES
"Pt had ablation yesterday at [facility] for a fib, pt c/o pain now all across abdominal area and bloating. Started at 11am. Pt c/o chills. Denies nausea, vomiting, BM today \"but very difficult\" feeling constipated. Pt having some shortness of breath.         "

## 2024-03-20 NOTE — TELEPHONE ENCOUNTER
Pt returned phone call and discussed with him the need for him to present to an ED for assessment as this is not typical for post ablation patients.   Pt verbalized understanding and in agreement.     Leia Santamaria RN

## 2024-03-20 NOTE — TELEPHONE ENCOUNTER
Pt LM on EP RN line reporting severe abdominal pain and SOB. SOB noted on VM.   Pt had ablation yesterday.    Returned phone call to pt but no answer. LM requesting pt resent to ED for evaluation.    Leia Santamaria RN

## 2024-03-21 NOTE — PROGRESS NOTES
Pharmacist Admission Medication History    Admission medication history is complete. The information provided in this note is only as accurate as the sources available at the time of the update.    Information Source(s): Patient and CareEverywhere/SureScripts via in-person    Pertinent Information: Patient had ablation yesterday 3/19/24, was instructed to start low dose aspirin 81 mg once daily and stop sotalol 80 mg BID therapy.     Changes made to PTA medication list:  Added: None  Deleted: None  Changed: omeprazole 40 mg daily x 6 weeks (mid-March thru end of April) followed by 20 mg daily thereafter    Allergies reviewed with patient and updates made in EHR: yes    Medication History Completed By: Tania Moses [facility] 3/20/2024 7:39 PM    PTA Med List   Medication Sig Note Last Dose    apixaban ANTICOAGULANT (ELIQUIS) 5 MG tablet Take 1 tablet (5 mg) by mouth 2 times daily . Stop Aspirin when starting.  3/20/2024 at am    aspirin 81 MG EC tablet Take 1 tablet (81 mg) by mouth daily  new - to start 3/20/24    hydrochlorothiazide (HYDRODIURIL) 25 MG tablet TAKE 1 TABLET BY MOUTH EVERY DAY IN THE MORNING FOR HIGH BLOOD PRESSURE  3/20/2024 at am    levothyroxine (SYNTHROID/LEVOTHROID) 100 MCG tablet TAKE 1 TABLET(100 MCG) BY MOUTH DAILY  3/20/2024 at am    lisinopril (ZESTRIL) 40 MG tablet TAKE 1 TABLET BY MOUTH DAILY FOR HIGH BLOOD PRESSURE  3/20/2024 at am    omeprazole (PRILOSEC) 20 MG DR capsule TAKE 1 CAPSULE BY MOUTH DAILY BEFORE BREAKFAST 3/20/2024: Start 20 mg daily dose in 6 weeks (end of April 2024) when finished with 40 mg dose.  start end of April 2024    omeprazole (PRILOSEC) 40 MG DR capsule Take 1 capsule (40 mg) by mouth daily Starting 3 days prior to ablation and continuing for 6 weeks after, then resume 20mg daily 3/20/2024: 40 mg daily dose x 6 weeks (mid-March through end of April 2024) 3/20/2024 at am x 6 weeks then decrease to 20 mg daily dose end of April 2024    rosuvastatin (CRESTOR) 10 MG  tablet Take 1 tablet (10 mg) by mouth daily  3/20/2024 at am

## 2024-03-22 ENCOUNTER — VIRTUAL VISIT (OUTPATIENT)
Dept: CARDIOLOGY | Facility: CLINIC | Age: 69
End: 2024-03-22
Payer: COMMERCIAL

## 2024-03-22 ENCOUNTER — PATIENT OUTREACH (OUTPATIENT)
Dept: CARE COORDINATION | Facility: CLINIC | Age: 69
End: 2024-03-22

## 2024-03-22 DIAGNOSIS — I48.19 PERSISTENT ATRIAL FIBRILLATION (H): Primary | ICD-10-CM

## 2024-03-22 PROCEDURE — 99207 PR NO CHARGE NURSE ONLY: CPT | Mod: 93

## 2024-03-22 NOTE — LETTER
Brett Hopkins  [address] [place] [address]    Dear Brett Hopkins,      I am a team member within the [facility] with [facility]. I recently contacted you to ensure you are doing well following a visit within our health system. I also wanted to take this chance to introduce Clinic Care Coordination should you have any interest in this program in the future.    Below is a description of Clinic Care Coordination and how this team can further assist you:       The Clinic Care Coordination team is made up of a Registered Nurse, , Financial Resource Worker, and a Community Health Worker who understand and can help navigate the health care system. The goal of clinic care coordination is to help you manage your health, improve access to care, and achieve optimal health outcomes. They work alongside your provider to assist you in determining your health and social needs, obtain health care and community resources, and provide you with necessary information and education. Clinic Care Coordination can work with you through any barriers and develop a care plan that helps coordinate and strengthen the relationship between you and your care team.    If you wish to connect with the Clinic Care Coordination Team, please let your [facility] Primary Care Provider or Clinic Care Team know and they can place a referral. The Clinic Care Coordination team will then reach out by phone to further support you.    We are focused on providing you with the highest-quality healthcare experience possible.    Sincerely,   Your care team with [facility]

## 2024-03-22 NOTE — PROGRESS NOTES
Clinic Care Coordination Contact  Community Health Worker Initial Outreach    CHW Initial Information Gathering:  Referral Source: ED Follow-Up  CHW Additional Questions  If ED/Hospital discharge, follow-up appointment scheduled as recommended?: Other (Pt following up with cardiology)  Henrit active?: Yes    Patient accepts CC: No, patient declined at this time. Patient will be sent Care Coordination introduction letter for future reference.       Nicolle Vargas  Community Health Worker  Connected Care Resource [place], [facility]    *Connected Care Resource Team does NOT follow patient ongoing. Referrals are identified based on internal discharge reports and the outreach is to ensure patient has an understanding of their discharge instructions.

## 2024-03-22 NOTE — PATIENT INSTRUCTIONS
Instructions Following your Ablation Procedure    Your anticoagulation medication Eliquis:  It is important to remain on your anticoagulation medication uninterrupted after your ablation to reduce your risk of a stroke or heart attack, do not stop this medication  Please remain on your aspirin for 1 month, then you can stop    Groin care instructions  Keep the site clean and dry, do not place a bandage over the site. If there is minor oozing, apply another bandaid and remove it after 12 hours.  Mild bruising at the access sites is normal. If you notice increased swelling, external bleeding, or have other concerns regarding your access sites please consider emergency department evaluation for significant changes and call your electrophysiology team's office.  You may experience mild discomfort at your groin sites, applying ice packs 20min 3-4 times a day can help alleviate this discomfort.    Activity recommendations  You can resume driving.  Avoid stooping or squatting more than 90 degrees at the hips, repetitive motions such as loading , vacuuming, raking or shoveling, and heavy lifting (greater than 25lbs) for 1 week  Increase your activity gradually over the next 5-10 days, working back to your normal daily activity/routine.    Post ablation instructions  Stay well hydrated, and increase your fluid intake during this recovery period  High protein foods aide in your bodies healing process  You may have some irregular heartbeats and/or atrial fibrillation following your ablation which is normal to recovery, these episodes should occur less frequently over time.   Recurrent atrial fibrillation can occur within the first 3 months post ablation while your heart recovers from the procedure. Please call the electrophysiology team's office if you have an episode lasting greater than 4 hours, or if you notice the episodes are increasing in frequency or duration  Pleuritic chest discomfort (chest pain worse with  taking deep breaths, worse with laying flat on your back) can occur after ablation, usually coming about within the first 24-48hrs post ablation. If this occurs and is severe enough to be troublesome to you, please call us and consider starting a course of ibuprofen 400mg three times daily for 5 to 7 days    Things to watch for  As with any type of procedure, please be more attentive to unusual symptoms post ablation (eg. fever, neurologic changes, pain with swallowing, loss of consciousness, etc) - we recommend ER evaluation for any such symptoms in the first few weeks post procedure.    Consider ER evaluation for the following:  Severe chest pain not relieved by Tylenol or Ibuprofen  You have chills or a fever greater than 101 F (38 C)  Neurologic changes (eg. leg, arm or face weakness or numbness, difficulties with speech or word finding, problems  walking or with your balance, vision changes)  Severe difficulty swallowing and/or you are coughing up blood  Shortness of breath  Increased groin pain or a large or growing hard lump around the site  Groin is red, swollen, hot or tender  Blood or fluid is draining from the groin site  Any numbness, coolness or changes in color in your extremities  Groin pain not relieved by Tylenol or Advil  Recurrent atrial fibrillation associated with sustained rapid heart rates or associated with additional concerning  symptoms.    Your follow up appointments are as follows:  You will be seen by the electrophysiologist nurse practitioner at 6 weeks after your ablation  At your 6 week appointment, a 3 month follow-up appointment will be arranged with either the Nurse Practitioner or the Electrophysiology provider     Sincerely,  Jayna Lopez RN [phone]    After hours please contact the on call service at # [phone]

## 2024-03-22 NOTE — PROGRESS NOTES
Pt is s/p PVI PO day 4, today 3/22/2024 , PVI was completed on 3-19-24 with Dr. Barfield and pt was discharged the same day.  PC was placed to pt, spoke to pt    General Assessment:     Weight: Pt reports weight is slightly up from baseline compared to pre procedural weight, but confirms weight is coming down since discharge and denies s/s of abnormal fluid retention s/p procedure    Vital signs:  Pt reports normal heart rates and blood pressure and Pt has been afebrile.    Pain: Pt reports mild to moderate pain at groin sites, reviewed with pt on how to mitigate pain at home s/p during recovery (ice, rest, OTC tylenol).  Pt reports that his chest/abdominal pain is better today, only very mild.  He continues colchicine.    /GI: Pt denies difficulty swallowing, denies heart burn, denies constipation, denies urinary retention/difficulty, reports normal appetite, and reports staying hydrated.    Respiratory: Pt  feels a bit short of breath with activity and denies any further symptoms abnormal to normal healing process s/p PVI.    Activity: Pt is gradually working into baseline activity.     Neurological:  Pt denies vision changes, changes in speech, changes in balance, and changes in strength or motor function.    Rhythm Assessment:   Pt denies palpitations and denies symptoms or sustained AF episodes.    Procedure Site Assessment:   Pt reports some bruising around sites without significant change from hospital discharge and normal to PVI recovery    Anticoagulation/Medication:  Pt remain on Eliquis without interruption  Pt confirms taking ASA 81mg Daily, and will continue taking this for 1 mo s/p    Education completed with pt at this visit:  Reviewed normal post-op PVI healing process, when to contact EP-RN/EP-MD, contact information was given to the pt for further concerns or questions, after hours contact was reviewed with patient, and pt verbalized understanding    Follow up:  AVS mailed to patient and is  available through my chart.  Pt Does have a 6 week follow up scheduled with 4-30-24 on Nicolle Ryan .    3/22/2024 9:11 AM  Jayna Lopez RN

## 2024-04-29 NOTE — PROGRESS NOTES
[facility]  Cardiac Electrophysiology  [address]  [place] [address]   Office: [phone]  Fax: [phone]     HEART CARE ELECTROPHYSIOLOGY NOTE     Primary Care: Magdaleno Bennett MD       Assessment/Recommendations     Persistent atrial fibrillation, a/typical atrial flutter/stage 3D: Status post RF PWI/roofline, CTI flutter and TANIA ablation 3/19/2024, complicated by presumed post ablation pericarditis, symptoms which have resolved.  No symptomatology or documentation of recurrent AF    Presence of Watchman left atrial appendage closure device: [id number] score of 3 for age 65-74, hypertension, CAD.  Sp LAAC 1/12/2023.     Plan:   -Resume aspirin 81 mg daily for stroke prophylaxis and discontinue Eliquis  -2-week MCT to evaluate for occult arrhythmia  -EP follow-up 6 weeks, due for general cardiology follow-up 1-2 months     History of Present Illness/Subjective    Brett Hopkins is a 68 year old male with past medical history significant for persistent AF, hypertension, nonobstructive CAD, ascending aorta dilation, hyperlipidemia, GERD, hypothyroidism, myasthenia gravis, chronic RLE lymphedema, obesity, seen today for follow-up post ablation.  He has a history of paroxysmal AF diagnosed in 2007, failing propafenone and flecainide prior to PVI 8/19/2013.  He developed recurrent AF around 3/2019, symptomatic with worsening fatigue and occasional palpitations, requiring DCCV 7/10/2019 with ERAF.  He was briefly on flecainide prior to initiation of sotalol and DCCV 9/12/2019 and 2/17/2023. His sotalol was decreased due to symptomatic bradycardia and he developed recurrent AF around 11/6/2023 requiring DCCV 11/17/2023 and 2/27/2024.  He underwent repeat ablation consisting of RF PWI/roofline, CTI flutter and TNAIA ablation 3/19/2024.  The following day he was evaluated in the ED for abdominal pain and dyspnea, with reassuring CT and labs, treated for post ablation  pericarditis with IV Toradol and colchicine.     He reports abdominal pain resolved within 1-2 weeks.  He denies recurrent atrial fibrillation to his awareness.  He denies groin site issues, sore throat, trouble swallowing, heartburn or neurologic changes.     Data Review     Arrhythmia hx:   Dx/date: Paroxysmal AF 2007.  Persistent 7/2019, 11/2023  Sx: SANDERS, fatigue, palpitations  [id number]: 3 for age 65-74, hypertension and vascular disease. Sp LAAC 1/12/2023  AAD: Propafenone, flecainide, sotalol  DCCV: 7/10/2019 (ERAF), 2/17/2023, 11/17/2023, 2/27/2024  Ablation: 8/19/2013, Dr. Barfield-cryoballoon PVI. 3/19/2024, Dr. Barfield-PWI/roofline, TANIA line, empiric CTI flutter ablation    EKG 3/20/2024: SR 98 bpm, atrial ectopy  3/19/2024: SR 90 bpm, atrial ectopy    ==    EKG 3/11/2024 AF 97 bpm, borderline RBBB    I have reviewed and updated the patient's past medical history, allergies and medication list.               Physical Examination Review of Systems   There were no vitals taken for this visit.    There is no height or weight on file to calculate BMI.    Wt Readings from Last 3 Encounters:   03/20/24 117.9 kg (260 lb)   03/19/24 117.9 kg (260 lb)   03/11/24 123.8 kg (273 lb)     General   Appearance:   Alert and oriented, in no acute distress.    HEENT:  Normocephalic and atraumatic. Conjunctiva and sclera are clear. Moist oral mucosa.    Neck: No JVP, carotid bruit or obvious thyromegaly.   Lungs:   Respirations unlabored. Clear bilaterally with no rales, rhonchi, or wheezes.     Cardiovascular:   Rhythm is regular. S1 and S2 are normal. No significant murmur is present. Lower extremities demonstrate no significant edema. Posterior tibial pulses are intact bilaterally.   Extremities: No cyanosis or clubbing   Skin: Skin is warm, dry, and otherwise intact.   Neurologic: Gait not assessed. Mood and affect appropriate.                                                Medical History  Surgical History Family  History Social History   No past medical history on file. Past Surgical History:   Procedure Laterality Date    ARTHROPLASTY REVISION HIP Right 05/01/2019    ARTHROPLASTY REVISION KNEE Right 2019    CARDIOVERSION  07/01/2019    7/10/2019    CARDIOVERSION  09/12/2019    CATARACT EXTRACTION Bilateral 06/2021    CV LEFT ATRIAL APPENDAGE CLOSURE N/A 1/12/2023    Procedure: Left Atrial Appendage Closure;  Surgeon: Mitesh Graff MD;  Location: [facility]    DENTAL SURGERY      Oral Surgery Tooth Extraction;  Implant    EP ABLATION PULMONARY VEIN ISOLATION N/A 3/19/2024    Procedure: Ablation Atrial Fibrillation;  Surgeon: Benedicto Barfield MD;  Location: [facility]    AR ABLATE HEART DYSRHYTHM FOCUS  08/19/2013    Description: Catheter Ablation Atrial Fibrillation;  Recorded: 11/16/2013;  Comments: PVI Aug 19, 2013 (Cryo to all 4 PV's)    TOOTH EXTRACTION      implant    [facility] TOTAL HIP ARTHROPLASTY Right 05/08/2019    Procedure: RIGHT TOTAL HIP ARTHROPLASTY DIRECT ANTERIOR APPROACH;  Surgeon: Mario Woodruff MD;  Location: [facility] Main OR;  Service: Orthopedics    [facility] TOTAL KNEE ARTHROPLASTY Right 06/21/2017    Procedure: 2)  RIGHT TOTAL KNEE ARTHROPLASTY;  Surgeon: Mario VICKERS MD;  Location: [facility] Main OR;  Service: Orthopedics    Family History   Problem Relation Age of Onset    Atrial fibrillation Mother     Dementia Mother         dx 80s    Diabetes Type 2  Mother         dx 60s/70s    Atrial fibrillation Father     Rheumatoid Arthritis Father     Atrial fibrillation Sister     Cerebrovascular Disease Sister     Parkinsonism Sister     Parkinsonism Sister     Atrial fibrillation Brother     Rheumatoid Arthritis Brother     Cerebrovascular Disease Brother     CABG Brother     Atrial fibrillation Brother     Diabetes Type 2  Brother         Dx 40's    Social History     Tobacco Use    Smoking status: Never    Smokeless tobacco: Never   Vaping Use    Vaping status: Never Used  "  Substance Use Topics    Alcohol use: Yes     Alcohol/week: 5.0 standard drinks of alcohol     Types: 5 Cans of beer per week     Comment: minimal    Drug use: No          Medications  Allergies   Current Outpatient Medications   Medication Sig Dispense Refill    apixaban ANTICOAGULANT (ELIQUIS) 5 MG tablet Take 1 tablet (5 mg) by mouth 2 times daily . Stop Aspirin when starting. 60 tablet 4    aspirin 81 MG EC tablet Take 1 tablet (81 mg) by mouth daily      colchicine (COLCRYS) 0.6 MG tablet Take 1 tablet (0.6 mg) by mouth 2 times daily for 45 days 90 tablet 0    hydrochlorothiazide (HYDRODIURIL) 25 MG tablet TAKE 1 TABLET BY MOUTH EVERY DAY IN THE MORNING FOR HIGH BLOOD PRESSURE 90 tablet 2    levothyroxine (SYNTHROID/LEVOTHROID) 100 MCG tablet TAKE 1 TABLET(100 MCG) BY MOUTH DAILY 90 tablet 2    lisinopril (ZESTRIL) 40 MG tablet TAKE 1 TABLET BY MOUTH DAILY FOR HIGH BLOOD PRESSURE 90 tablet 2    omeprazole (PRILOSEC) 20 MG DR capsule TAKE 1 CAPSULE BY MOUTH DAILY BEFORE BREAKFAST 90 capsule 1    omeprazole (PRILOSEC) 40 MG DR capsule Take 1 capsule (40 mg) by mouth daily Starting 3 days prior to ablation and continuing for 6 weeks after, then resume 20mg daily 45 capsule 0    rosuvastatin (CRESTOR) 10 MG tablet Take 1 tablet (10 mg) by mouth daily 90 tablet 3    Allergies   Allergen Reactions    Sulfa (Sulfonamide Antibiotics) [Sulfa Antibiotics] Rash     Rash - turned purple  , Around age 32         Lab Results    Chemistry/lipid CBC Cardiac Enzymes/BNP/TSH/INR   Lab Results   Component Value Date    BUN 20.7 03/20/2024     03/20/2024    CO2 30 (H) 03/20/2024     No results found for: \"CREATININE\"    Lab Results   Component Value Date    CHOL 120 12/19/2023    HDL 61 12/19/2023    LDL 43 12/19/2023      Lab Results   Component Value Date    WBC 10.9 03/20/2024    HGB 13.6 03/20/2024    HCT 42.3 03/20/2024    MCV 96 03/20/2024     03/20/2024    Lab Results   Component Value Date    TROPONINI <0.01 " 2022    BNP 56 2022    TSH 7.19 (H) 2023    INR 1.12 (H) 2020        25 minutes spent reviewing prior records (including documentation, laboratory studies, cardiac testing/imaging), history and physical exam, planning, and subsequent documentation.     The longitudinal plan of care for the diagnosis(es)/condition(s) as documented were addressed during this visit. Due to the added complexity in care, I will continue to support Champ in the subsequent management and with ongoing continuity of care.     This note has been dictated using voice recognition software. Any grammatical, typographical, or context distortions are unintentional and inherent to the software.    Nicolle Ryan, CNP  Clinical Cardiac Electrophysiology  [facility]  Clinic and [phone]  Fax: [phone]  Electrophysiology Nurses: [phone]

## 2024-04-30 ENCOUNTER — ORDERS ONLY (AUTO-RELEASED) (OUTPATIENT)
Dept: CARDIOLOGY | Facility: CLINIC | Age: 69
End: 2024-04-30

## 2024-04-30 ENCOUNTER — OFFICE VISIT (OUTPATIENT)
Dept: CARDIOLOGY | Facility: CLINIC | Age: 69
End: 2024-04-30
Payer: COMMERCIAL

## 2024-04-30 VITALS
RESPIRATION RATE: 16 BRPM | WEIGHT: 264 LBS | SYSTOLIC BLOOD PRESSURE: 124 MMHG | DIASTOLIC BLOOD PRESSURE: 80 MMHG | HEART RATE: 84 BPM | BODY MASS INDEX: 37.88 KG/M2 | OXYGEN SATURATION: 97 %

## 2024-04-30 DIAGNOSIS — Z86.79 STATUS POST ABLATION OF ATRIAL FIBRILLATION: ICD-10-CM

## 2024-04-30 DIAGNOSIS — Z98.890 STATUS POST ABLATION OF ATRIAL FIBRILLATION: ICD-10-CM

## 2024-04-30 DIAGNOSIS — I48.19 PERSISTENT ATRIAL FIBRILLATION (H): Primary | ICD-10-CM

## 2024-04-30 DIAGNOSIS — E78.2 MIXED HYPERLIPIDEMIA: ICD-10-CM

## 2024-04-30 DIAGNOSIS — Z95.818 PRESENCE OF WATCHMAN LEFT ATRIAL APPENDAGE CLOSURE DEVICE: ICD-10-CM

## 2024-04-30 DIAGNOSIS — I48.19 PERSISTENT ATRIAL FIBRILLATION (H): ICD-10-CM

## 2024-04-30 PROCEDURE — 99214 OFFICE O/P EST MOD 30 MIN: CPT | Performed by: NURSE PRACTITIONER

## 2024-04-30 RX ORDER — ASPIRIN 81 MG/1
81 TABLET ORAL DAILY
Status: SHIPPED
Start: 2024-04-30

## 2024-04-30 NOTE — LETTER
4/30/2024    Magdaleno Bennett MD  [address] [facility] [address]    RE: Brett Hopkins       Dear Colleague,     I had the pleasure of seeing Brett Hopkins in the [facility].       [facility]  Cardiac Electrophysiology  [address]  [place] [address]   Office: [phone]  Fax: [phone]     HEART CARE ELECTROPHYSIOLOGY NOTE     Primary Care: Magdaleno Bennett MD       Assessment/Recommendations     Persistent atrial fibrillation, a/typical atrial flutter/stage 3D: Status post RF PWI/roofline, CTI flutter and TANIA ablation 3/19/2024, complicated by presumed post ablation pericarditis, symptoms which have resolved.  No symptomatology or documentation of recurrent AF    Presence of Watchman left atrial appendage closure device: [id number] score of 3 for age 65-74, hypertension, CAD.  Sp LAAC 1/12/2023.     Plan:   -Resume aspirin 81 mg daily for stroke prophylaxis and discontinue Eliquis  -2-week MCT to evaluate for occult arrhythmia  -EP follow-up 6 weeks, due for general cardiology follow-up 1-2 months     History of Present Illness/Subjective    Brett Hopkins is a 68 year old male with past medical history significant for persistent AF, hypertension, nonobstructive CAD, ascending aorta dilation, hyperlipidemia, GERD, hypothyroidism, myasthenia gravis, chronic RLE lymphedema, obesity, seen today for follow-up post ablation.  He has a history of paroxysmal AF diagnosed in 2007, failing propafenone and flecainide prior to PVI 8/19/2013.  He developed recurrent AF around 3/2019, symptomatic with worsening fatigue and occasional palpitations, requiring DCCV 7/10/2019 with ERAF.  He was briefly on flecainide prior to initiation of sotalol and DCCV 9/12/2019 and 2/17/2023. His sotalol was decreased due to symptomatic bradycardia and he developed recurrent AF around 11/6/2023 requiring DCCV 11/17/2023 and 2/27/2024.  He underwent repeat  ablation consisting of RF PWI/roofline, CTI flutter and TANIA ablation 3/19/2024.  The following day he was evaluated in the ED for abdominal pain and dyspnea, with reassuring CT and labs, treated for post ablation pericarditis with IV Toradol and colchicine.     He reports abdominal pain resolved within 1-2 weeks.  He denies recurrent atrial fibrillation to his awareness.  He denies groin site issues, sore throat, trouble swallowing, heartburn or neurologic changes.     Data Review     Arrhythmia hx:   Dx/date: Paroxysmal AF 2007.  Persistent 7/2019, 11/2023  Sx: SANDERS, fatigue, palpitations  [id number]: 3 for age 65-74, hypertension and vascular disease. Sp LAAC 1/12/2023  AAD: Propafenone, flecainide, sotalol  DCCV: 7/10/2019 (ERAF), 2/17/2023, 11/17/2023, 2/27/2024  Ablation: 8/19/2013, Dr. Barfield-cryoballoon PVI. 3/19/2024, Dr. Barfield-PWI/roofline, TANIA line, empiric CTI flutter ablation    EKG 3/20/2024: SR 98 bpm, atrial ectopy  3/19/2024: SR 90 bpm, atrial ectopy    ==    EKG 3/11/2024 AF 97 bpm, borderline RBBB    I have reviewed and updated the patient's past medical history, allergies and medication list.               Physical Examination Review of Systems   There were no vitals taken for this visit.    There is no height or weight on file to calculate BMI.    Wt Readings from Last 3 Encounters:   03/20/24 117.9 kg (260 lb)   03/19/24 117.9 kg (260 lb)   03/11/24 123.8 kg (273 lb)     General   Appearance:   Alert and oriented, in no acute distress.    HEENT:  Normocephalic and atraumatic. Conjunctiva and sclera are clear. Moist oral mucosa.    Neck: No JVP, carotid bruit or obvious thyromegaly.   Lungs:   Respirations unlabored. Clear bilaterally with no rales, rhonchi, or wheezes.     Cardiovascular:   Rhythm is regular. S1 and S2 are normal. No significant murmur is present. Lower extremities demonstrate no significant edema. Posterior tibial pulses are intact bilaterally.   Extremities: No cyanosis or  clubbing   Skin: Skin is warm, dry, and otherwise intact.   Neurologic: Gait not assessed. Mood and affect appropriate.                                                Medical History  Surgical History Family History Social History   No past medical history on file. Past Surgical History:   Procedure Laterality Date    ARTHROPLASTY REVISION HIP Right 05/01/2019    ARTHROPLASTY REVISION KNEE Right 2019    CARDIOVERSION  07/01/2019    7/10/2019    CARDIOVERSION  09/12/2019    CATARACT EXTRACTION Bilateral 06/2021    CV LEFT ATRIAL APPENDAGE CLOSURE N/A 1/12/2023    Procedure: Left Atrial Appendage Closure;  Surgeon: Mitesh Graff MD;  Location: [facility]    DENTAL SURGERY      Oral Surgery Tooth Extraction;  Implant    EP ABLATION PULMONARY VEIN ISOLATION N/A 3/19/2024    Procedure: Ablation Atrial Fibrillation;  Surgeon: Benedicto Barfield MD;  Location: [facility]    AL ABLATE HEART DYSRHYTHM FOCUS  08/19/2013    Description: Catheter Ablation Atrial Fibrillation;  Recorded: 11/16/2013;  Comments: PVI Aug 19, 2013 (Cryo to all 4 PV's)    TOOTH EXTRACTION      implant    [facility] TOTAL HIP ARTHROPLASTY Right 05/08/2019    Procedure: RIGHT TOTAL HIP ARTHROPLASTY DIRECT ANTERIOR APPROACH;  Surgeon: Mario Woodruff MD;  Location: [facility];  Service: Orthopedics    [facility] TOTAL KNEE ARTHROPLASTY Right 06/21/2017    Procedure: 2)  RIGHT TOTAL KNEE ARTHROPLASTY;  Surgeon: Mario VICKERS MD;  Location: [facility] OR;  Service: Orthopedics    Family History   Problem Relation Age of Onset    Atrial fibrillation Mother     Dementia Mother         dx 80s    Diabetes Type 2  Mother         dx 60s/70s    Atrial fibrillation Father     Rheumatoid Arthritis Father     Atrial fibrillation Sister     Cerebrovascular Disease Sister     Parkinsonism Sister     Parkinsonism Sister     Atrial fibrillation Brother     Rheumatoid Arthritis Brother     Cerebrovascular Disease Brother     CABG Brother      "Atrial fibrillation Brother     Diabetes Type 2  Brother         Dx 40's    Social History     Tobacco Use    Smoking status: Never    Smokeless tobacco: Never   Vaping Use    Vaping status: Never Used   Substance Use Topics    Alcohol use: Yes     Alcohol/week: 5.0 standard drinks of alcohol     Types: 5 Cans of beer per week     Comment: minimal    Drug use: No          Medications  Allergies   Current Outpatient Medications   Medication Sig Dispense Refill    apixaban ANTICOAGULANT (ELIQUIS) 5 MG tablet Take 1 tablet (5 mg) by mouth 2 times daily . Stop Aspirin when starting. 60 tablet 4    aspirin 81 MG EC tablet Take 1 tablet (81 mg) by mouth daily      colchicine (COLCRYS) 0.6 MG tablet Take 1 tablet (0.6 mg) by mouth 2 times daily for 45 days 90 tablet 0    hydrochlorothiazide (HYDRODIURIL) 25 MG tablet TAKE 1 TABLET BY MOUTH EVERY DAY IN THE MORNING FOR HIGH BLOOD PRESSURE 90 tablet 2    levothyroxine (SYNTHROID/LEVOTHROID) 100 MCG tablet TAKE 1 TABLET(100 MCG) BY MOUTH DAILY 90 tablet 2    lisinopril (ZESTRIL) 40 MG tablet TAKE 1 TABLET BY MOUTH DAILY FOR HIGH BLOOD PRESSURE 90 tablet 2    omeprazole (PRILOSEC) 20 MG DR capsule TAKE 1 CAPSULE BY MOUTH DAILY BEFORE BREAKFAST 90 capsule 1    omeprazole (PRILOSEC) 40 MG DR capsule Take 1 capsule (40 mg) by mouth daily Starting 3 days prior to ablation and continuing for 6 weeks after, then resume 20mg daily 45 capsule 0    rosuvastatin (CRESTOR) 10 MG tablet Take 1 tablet (10 mg) by mouth daily 90 tablet 3    Allergies   Allergen Reactions    Sulfa (Sulfonamide Antibiotics) [Sulfa Antibiotics] Rash     Rash - turned purple  , Around age 32         Lab Results    Chemistry/lipid CBC Cardiac Enzymes/BNP/TSH/INR   Lab Results   Component Value Date    BUN 20.7 03/20/2024     03/20/2024    CO2 30 (H) 03/20/2024     No results found for: \"CREATININE\"    Lab Results   Component Value Date    CHOL 120 12/19/2023    HDL 61 12/19/2023    LDL 43 12/19/2023      " Lab Results   Component Value Date    WBC 10.9 2024    HGB 13.6 2024    HCT 42.3 2024    MCV 96 2024     2024    Lab Results   Component Value Date    TROPONINI <0.01 2022    BNP 56 2022    TSH 7.19 (H) 2023    INR 1.12 (H) 2020        25 minutes spent reviewing prior records (including documentation, laboratory studies, cardiac testing/imaging), history and physical exam, planning, and subsequent documentation.     The longitudinal plan of care for the diagnosis(es)/condition(s) as documented were addressed during this visit. Due to the added complexity in care, I will continue to support Champ in the subsequent management and with ongoing continuity of care.     This note has been dictated using voice recognition software. Any grammatical, typographical, or context distortions are unintentional and inherent to the software.    Nicolle Ryan CNP  Clinical Cardiac Electrophysiology  [facility]  Clinic and [phone]  Fax: [phone]  Electrophysiology Nurses: [phone]     Thank you for allowing me to participate in the care of your patient.      Sincerely,     CRISTIANO CASTRO CNP   [facility]  cc:   CRISTIANO Brooke CNP  [address] [facility] [address]  [address] [place] [address]

## 2024-05-07 ENCOUNTER — TELEPHONE (OUTPATIENT)
Dept: CARDIOLOGY | Facility: CLINIC | Age: 69
End: 2024-05-07
Payer: COMMERCIAL

## 2024-05-07 DIAGNOSIS — I48.19 PERSISTENT ATRIAL FIBRILLATION (H): Primary | ICD-10-CM

## 2024-05-07 NOTE — TELEPHONE ENCOUNTER
Me   to Vicky Doty    5/7/24  3:25 PM  Justice Cornelius!  Can you please see the below notes?  Can we remove the charge for the Zio even though he has received it and proceed with an MCOT?  Thank you!  Nicolle Dumont APRN CNP   to [facility] Ep Support Pool - [facility]    5/7/24  1:57 PM  Can you please give him a call and apologize on my behalf. Can he switch to 2 week Biotel MCT? Thank you  May 5, 2024  Champ Hopkins   to CRISTIANO Brooke CNP         5/5/24  9:01 PM  When I was in this past week,  you indicated that you were prescribing the same monitor that I wore 6 months ago and they would be sending plenty of replacement pads that could be changed on the days that I do my water exercise class.  I believe that they sent the wrong device. I received a device called Zio which is a single device that is worn for the entire time.  When I  called the 1-800 number,  I was told that the device could not go in water and it could not be removed.  I really don't want to skip my classes because it's the only exercise that helps with my severe knee pain.      Please contact me as soon as possible to discuss me getting the Holter monitor that I  have used in the past. I have not used the device (although the package was opened before I realized it was the wrong device) and I want to make sure I'm not charged for this.      Champ Hopkins   [phone]

## 2024-05-07 NOTE — TELEPHONE ENCOUNTER
Pt spoke with our nursing supervisor regarding charges for zio patch below.  Phone call to patient to discuss recommendations from Nicolle and offered an apology as noted by Nicolle.    Pt is upset and frustrated as noted below.  Discussed our clinic number which has been offered to him on multiple occasions including all paperwork for his ablation that took place in March with Dr. Barfield.  Pt has been calling the scheduling number and not having luck getting to the nursing team.  Reviewed the nursing number for further concerns or questions.  Order placed for MCOT, pt will come to the cardiopulmonary clinic for placement as he may have questions about use.  Pt will be contacted with updates regarding the Zio patch.

## 2024-05-08 NOTE — TELEPHONE ENCOUNTER
Order for Zio patch monitor cancelled. Spoke with representative from [facility] to inform that monitor was ordered in error. Patient already spoke with someone yesterday and noted that he was not going to wear monitor. They also noted on patient's file that it was ordered in error. Patient should simply return monitor and should not be charged for monitor ordered in error.   ==    Patient was contacted and notified of above. -bairon

## 2024-05-09 ENCOUNTER — HOSPITAL ENCOUNTER (OUTPATIENT)
Dept: CARDIOLOGY | Facility: CLINIC | Age: 69
Discharge: HOME | End: 2024-05-09
Attending: NURSE PRACTITIONER
Payer: COMMERCIAL

## 2024-05-09 DIAGNOSIS — I48.19 PERSISTENT ATRIAL FIBRILLATION (H): ICD-10-CM

## 2024-05-09 PROCEDURE — 999N000096 CARDIAC MOBILE TELEMETRY MONITOR

## 2024-05-28 PROCEDURE — 93228 REMOTE 30 DAY ECG REV/REPORT: CPT | Performed by: INTERNAL MEDICINE

## 2024-06-05 ENCOUNTER — LAB (OUTPATIENT)
Dept: CARDIOLOGY | Facility: CLINIC | Age: 69
End: 2024-06-05
Payer: COMMERCIAL

## 2024-06-05 DIAGNOSIS — E78.2 MIXED HYPERLIPIDEMIA: ICD-10-CM

## 2024-06-05 PROCEDURE — 80061 LIPID PANEL: CPT

## 2024-06-05 PROCEDURE — 36415 COLL VENOUS BLD VENIPUNCTURE: CPT

## 2024-06-06 LAB
CHOLEST SERPL-MCNC: 117 MG/DL
FASTING STATUS PATIENT QL REPORTED: YES
HDLC SERPL-MCNC: 60 MG/DL
LDLC SERPL CALC-MCNC: 47 MG/DL
NONHDLC SERPL-MCNC: 57 MG/DL
TRIGL SERPL-MCNC: 50 MG/DL

## 2024-06-12 ENCOUNTER — OFFICE VISIT (OUTPATIENT)
Dept: CARDIOLOGY | Facility: CLINIC | Age: 69
End: 2024-06-12
Payer: COMMERCIAL

## 2024-06-12 VITALS
HEART RATE: 58 BPM | RESPIRATION RATE: 14 BRPM | WEIGHT: 262 LBS | BODY MASS INDEX: 37.59 KG/M2 | SYSTOLIC BLOOD PRESSURE: 130 MMHG | DIASTOLIC BLOOD PRESSURE: 77 MMHG

## 2024-06-12 DIAGNOSIS — Z86.79 STATUS POST ABLATION OF ATRIAL FIBRILLATION: ICD-10-CM

## 2024-06-12 DIAGNOSIS — I48.19 PERSISTENT ATRIAL FIBRILLATION (H): Primary | ICD-10-CM

## 2024-06-12 DIAGNOSIS — Z98.890 STATUS POST ABLATION OF ATRIAL FIBRILLATION: ICD-10-CM

## 2024-06-12 PROCEDURE — 99215 OFFICE O/P EST HI 40 MIN: CPT | Performed by: INTERNAL MEDICINE

## 2024-06-12 PROCEDURE — G2211 COMPLEX E/M VISIT ADD ON: HCPCS | Performed by: INTERNAL MEDICINE

## 2024-06-12 NOTE — LETTER
6/12/2024    Magdaleno Bennett MD  [address] [address]    RE: Brett Hopkins       Dear Colleague,     I had the pleasure of seeing Brett Hopkins in the [facility].    [facility]  Cardiac Electrophysiology     Progress Note: Benedicto Barfield MD    Primary Care: Magdaleno Bennett MD    Primary Cardiologist: Nicola Cotter MD    Primary Electrophysiologist: Benedicto Barfield MD    Assessment:       Atrial fibrillation stage 3D (persistent s/p ablation): Chronic problem for the patient diagnosed in 2007.  Patient has undergone ablation x 2 (PVI only 2013) and subsequent complex ablation Mar 2024 (PVI + PWI + TANIA line + CTI line).  Patient did develop post ablation pericarditis which subsequently has resolved.  He currently reports no recurrent symptoms of tachypalpitations or other symptoms suggesting atrial fibrillation or flutter..  The patient wore a MCOT monitor in May which demonstrated no evidence of atrial fibrillation or flutter.  The patient has an elevated [id number] score and underwent successful LAAC 1/12/2023.   Patient was restarted on DOAC therapy for his periprocedural care and this is now been discontinued with resumption of ASA daily.  The patient currently reports no neurologic symptoms.  Essential hypertension: The patient's blood pressure today is at target on his current medical therapy.  Coronary atherosclerosis, native vessel: Discovered by CT imaging with negative stress testing in 2022.  The patient currently reports no symptoms suggesting coronary ischemia.  He is currently taking a statin (rosuvastatin) and his LDL is at target      Recommendations:  No change in current medical therapy  Patient will be seen back in the arrhythmia clinic in 6 months by the EP CHIQUITA    Time spent: 40 minutes spent on the date of the encounter doing chart review, history and exam, documentation and further activities as noted above.    Subjective:  Brett ESPINOZA  Sandeep (68 year old male) returns to the arrhythmia clinic for interval reevaluation of his rhythm status.  The patient is approximately 4 months out from his ablation procedure to treat his atrial fibrillation.  He reports that following his symptoms of pericarditis he recovered uneventfully and is returned to full activities.  He notes no tachypalpitations or other symptoms suggesting return of atrial fibrillation or flutter.  The patient reports no additional cardiac symptoms.  He is keeping himself busy with water exercise and gardening.    Current Outpatient Medications   Medication Sig Dispense Refill    aspirin 81 MG EC tablet Take 1 tablet (81 mg) by mouth daily      hydrochlorothiazide (HYDRODIURIL) 25 MG tablet TAKE 1 TABLET BY MOUTH EVERY DAY IN THE MORNING FOR HIGH BLOOD PRESSURE 90 tablet 2    levothyroxine (SYNTHROID/LEVOTHROID) 100 MCG tablet TAKE 1 TABLET(100 MCG) BY MOUTH DAILY 90 tablet 2    lisinopril (ZESTRIL) 40 MG tablet TAKE 1 TABLET BY MOUTH DAILY FOR HIGH BLOOD PRESSURE 90 tablet 2    omeprazole (PRILOSEC) 20 MG DR capsule TAKE 1 CAPSULE BY MOUTH DAILY BEFORE BREAKFAST 90 capsule 1    rosuvastatin (CRESTOR) 10 MG tablet Take 1 tablet (10 mg) by mouth daily 90 tablet 3       Review of Systems:     Family History  Family History   Problem Relation Age of Onset    Atrial fibrillation Mother     Dementia Mother         dx 80s    Diabetes Type 2  Mother         dx 60s/70s    Atrial fibrillation Father     Rheumatoid Arthritis Father     Atrial fibrillation Sister     Cerebrovascular Disease Sister     Parkinsonism Sister     Parkinsonism Sister     Atrial fibrillation Brother     Rheumatoid Arthritis Brother     Cerebrovascular Disease Brother     CABG Brother     Atrial fibrillation Brother     Diabetes Type 2  Brother         Dx 40's       Social History   reports that he has never smoked. He has never used smokeless tobacco. He reports current alcohol use of about 5.0 standard drinks of  alcohol per week. He reports that he does not use drugs.    Objective:   Vital signs in last 24 hours:  /77 (BP Location: Right arm, Patient Position: Sitting, Cuff Size: Adult Regular)   Pulse 58   Resp 14   Wt 118.8 kg (262 lb)   BMI 37.59 kg/m      Physical Exam:  General: The patient is alert oriented to person place and situation.  The patient is in no acute distress at the time of my evaluation.  Eyes: Pupils are equal, round, and reactive to light.  Conjunctiva and sclera are clear.  ENT: Oral mucosa is moist and without redness. No evident nasal discharge.  Pulmonary: Lungs are clear bilaterally with no rales, rhonchi, or wheezes.    Cardiovascular exam: Rhythm is regular. S1 and S2 are normal. No significant murmur is present. JVP is normal. Lower extremities demonstrate no significant edema. Distal pulses are intact bilaterally.  Abdomen is soft, nontender, no organomegaly, and bowel sounds present.  Musculoskeletal: Spine is straight. Extremities without deformity.  Neuro: Gait is mildly asymmetric.     Skin is warm, dry, and otherwise intact.      Cardiographics:   Redo atrial fibrillation ablation 3/19/2024  Atrial fibrillation ablation (pulmonary vein isolation): A 4 mm open irrigated Tacticath RF catheter was used.  Entrance and exit block was confirmed from all 4 pulmonary veins.    Additional mapping/ablation to treat atrial fibrillation: Detailed substrate mapping demonstrated significant voltage attenuation and signal fractionation along the left atrial posterior wall, roofline, and anterior/superior left atrium.  The left atrial posterior wall was targeted for substrate modification by complete isolation.  An ablation line was created between the mitral annulus along the anterior/superior left atrium and connected to the left atrial roofline.  Additional arrhythmia mechanism identified, mapped, and ablated: The patient is at risk for additional arrhythmia including flutter including the  most common, right atrial CTI dependent atrial flutter.  A CTI isthmus ablation line was created and bidirectional block was confirmed.  Previous ablation: Patient had ostial isolation of the 4 main pulmonary veins.  Additional ablation as outlined above was performed to create antral isolation.  Antegrade conduction characteristics: Normal  Retrograde conduction characteristics: Normal  Left atrium: Detailed electroanatomic mapping of the left atrium using St Gio HD-Grid catheter was performed.  Left atrium demonstrated abnormal left atrial signals with both attenuation and fractionation observed on the left atrial posterior wall and anterior/superior roof.    MCOT monitor May 2024  Mobile cardiac telemetry monitoring from 5/9/2024 to 5/22/2024 (monitored duration 13d 5h).  Predominant underlying rhythm was sinus rhythm, 47 to 172bpm, average 77 bpm.  No sustained tachyarrhythmias.  No atrial fibrillation.  There were no pauses noted.  Rare supraventricular ectopic beats (2%). One 13 beat run of non sustained SVT was noted.  Rare premature ventricular contractions (1%).  Symptom triggers (2 events) correlated to sinus rhythm with rare PAC.    Lab Results:         Outside record review:          Thank you for allowing me to participate in the care of your patient.      Sincerely,     Benedicto Barfield MD      Heart Care  cc:   CRISTIANO Brooke [facility]  [facility] [address]  [address] [place] [address]

## 2024-06-12 NOTE — PROGRESS NOTES
[facility]  Cardiac Electrophysiology     Progress Note: Benedicto Barfield MD    Primary Care: Magdaleno Bennett MD    Primary Cardiologist: Nicola Cotter MD    Primary Electrophysiologist: Benedicto Barfield MD    Assessment:       Atrial fibrillation stage 3D (persistent s/p ablation): Chronic problem for the patient diagnosed in 2007.  Patient has undergone ablation x 2 (PVI only 2013) and subsequent complex ablation Mar 2024 (PVI + PWI + TANIA line + CTI line).  Patient did develop post ablation pericarditis which subsequently has resolved.  He currently reports no recurrent symptoms of tachypalpitations or other symptoms suggesting atrial fibrillation or flutter..  The patient wore a MCOT monitor in May which demonstrated no evidence of atrial fibrillation or flutter.  The patient has an elevated [id number] score and underwent successful LAAC 1/12/2023.   Patient was restarted on DOAC therapy for his periprocedural care and this is now been discontinued with resumption of ASA daily.  The patient currently reports no neurologic symptoms.  Essential hypertension: The patient's blood pressure today is at target on his current medical therapy.  Coronary atherosclerosis, native vessel: Discovered by CT imaging with negative stress testing in 2022.  The patient currently reports no symptoms suggesting coronary ischemia.  He is currently taking a statin (rosuvastatin) and his LDL is at target      Recommendations:  No change in current medical therapy  Patient will be seen back in the arrhythmia clinic in 6 months by the EP CHIQUITA    Time spent: 40 minutes spent on the date of the encounter doing chart review, history and exam, documentation and further activities as noted above.    Subjective:  Brett Hopkins (68 year old male) returns to the arrhythmia clinic for interval reevaluation of his rhythm status.  The patient is approximately 4 months out from his ablation procedure to treat his atrial fibrillation.   He reports that following his symptoms of pericarditis he recovered uneventfully and is returned to full activities.  He notes no tachypalpitations or other symptoms suggesting return of atrial fibrillation or flutter.  The patient reports no additional cardiac symptoms.  He is keeping himself busy with water exercise and gardening.    Current Outpatient Medications   Medication Sig Dispense Refill    aspirin 81 MG EC tablet Take 1 tablet (81 mg) by mouth daily      hydrochlorothiazide (HYDRODIURIL) 25 MG tablet TAKE 1 TABLET BY MOUTH EVERY DAY IN THE MORNING FOR HIGH BLOOD PRESSURE 90 tablet 2    levothyroxine (SYNTHROID/LEVOTHROID) 100 MCG tablet TAKE 1 TABLET(100 MCG) BY MOUTH DAILY 90 tablet 2    lisinopril (ZESTRIL) 40 MG tablet TAKE 1 TABLET BY MOUTH DAILY FOR HIGH BLOOD PRESSURE 90 tablet 2    omeprazole (PRILOSEC) 20 MG DR capsule TAKE 1 CAPSULE BY MOUTH DAILY BEFORE BREAKFAST 90 capsule 1    rosuvastatin (CRESTOR) 10 MG tablet Take 1 tablet (10 mg) by mouth daily 90 tablet 3       Review of Systems:     Family History  Family History   Problem Relation Age of Onset    Atrial fibrillation Mother     Dementia Mother         dx 80s    Diabetes Type 2  Mother         dx 60s/70s    Atrial fibrillation Father     Rheumatoid Arthritis Father     Atrial fibrillation Sister     Cerebrovascular Disease Sister     Parkinsonism Sister     Parkinsonism Sister     Atrial fibrillation Brother     Rheumatoid Arthritis Brother     Cerebrovascular Disease Brother     CABG Brother     Atrial fibrillation Brother     Diabetes Type 2  Brother         Dx 40's       Social History   reports that he has never smoked. He has never used smokeless tobacco. He reports current alcohol use of about 5.0 standard drinks of alcohol per week. He reports that he does not use drugs.    Objective:   Vital signs in last 24 hours:  /77 (BP Location: Right arm, Patient Position: Sitting, Cuff Size: Adult Regular)   Pulse 58   Resp 14    Wt 118.8 kg (262 lb)   BMI 37.59 kg/m      Physical Exam:  General: The patient is alert oriented to person place and situation.  The patient is in no acute distress at the time of my evaluation.  Eyes: Pupils are equal, round, and reactive to light.  Conjunctiva and sclera are clear.  ENT: Oral mucosa is moist and without redness. No evident nasal discharge.  Pulmonary: Lungs are clear bilaterally with no rales, rhonchi, or wheezes.    Cardiovascular exam: Rhythm is regular. S1 and S2 are normal. No significant murmur is present. JVP is normal. Lower extremities demonstrate no significant edema. Distal pulses are intact bilaterally.  Abdomen is soft, nontender, no organomegaly, and bowel sounds present.  Musculoskeletal: Spine is straight. Extremities without deformity.  Neuro: Gait is mildly asymmetric.     Skin is warm, dry, and otherwise intact.      Cardiographics:   Redo atrial fibrillation ablation 3/19/2024  Atrial fibrillation ablation (pulmonary vein isolation): A 4 mm open irrigated Tacticath RF catheter was used.  Entrance and exit block was confirmed from all 4 pulmonary veins.    Additional mapping/ablation to treat atrial fibrillation: Detailed substrate mapping demonstrated significant voltage attenuation and signal fractionation along the left atrial posterior wall, roofline, and anterior/superior left atrium.  The left atrial posterior wall was targeted for substrate modification by complete isolation.  An ablation line was created between the mitral annulus along the anterior/superior left atrium and connected to the left atrial roofline.  Additional arrhythmia mechanism identified, mapped, and ablated: The patient is at risk for additional arrhythmia including flutter including the most common, right atrial CTI dependent atrial flutter.  A CTI isthmus ablation line was created and bidirectional block was confirmed.  Previous ablation: Patient had ostial isolation of the 4 main pulmonary veins.   Additional ablation as outlined above was performed to create antral isolation.  Antegrade conduction characteristics: Normal  Retrograde conduction characteristics: Normal  Left atrium: Detailed electroanatomic mapping of the left atrium using [employer] G[employer] HD-Grid catheter was performed.  Left atrium demonstrated abnormal left atrial signals with both attenuation and fractionation observed on the left atrial posterior wall and anterior/superior roof.    MCOT monitor May 2024  Mobile cardiac telemetry monitoring from 5/9/2024 to 5/22/2024 (monitored duration 13d 5h).  Predominant underlying rhythm was sinus rhythm, 47 to 172bpm, average 77 bpm.  No sustained tachyarrhythmias.  No atrial fibrillation.  There were no pauses noted.  Rare supraventricular ectopic beats (2%). One 13 beat run of non sustained SVT was noted.  Rare premature ventricular contractions (1%).  Symptom triggers (2 events) correlated to sinus rhythm with rare PAC.    Lab Results:         Outside record review:

## 2024-06-17 NOTE — PROGRESS NOTES
HEART CARE ENCOUNTER CONSULTATON NOTE      [facility]  [phone]      Assessment/Recommendations   Assessment:   Persistent atrial fibrillation, atrial flutter: s/p PVI 3/19/2024 without evidence of recurrence on recent MCOT, s/p prior PVI 2013 and Watchman 1/2023 - aspirin 81 mg daily  - No known recurrence, monitors heart rate/A-fib on Apple Watch and Kardia without any recent alerts  Mild nonobstructive coronary artery disease: Per CT pulmonary vein 10/2022 - aspirin 81 mg, cholesterol well-controlled on rosuvastatin 10 mg  Hypertension: Controlled on hydrochlorothiazide, lisinopril  Enlargement of proximal ascending aorta: Reported no evidence of aortic aneurysm on CTA chest/abdomen 3/2024, previously reported mild aortic dilation 4.0 cm on CT pulmonary vein 10/2022      Plan:   Patient wishes to take least amount of medication possible.  Specifically asked about rosuvastatin today given that he had no history of elevated cholesterol.  We discussed finding of mild CAD on pulmonary vein and best treatment of prevention of progression, reduction in inflammation with statin medication along with baby aspirin.  In addition to continue medication for this purpose, patient is agreeable.  Continue aspirin and rosuvastatin  Continue hydrochlorothiazide and lisinopril. Can discuss reducing/discontinuing antihypertensive medications with Dr. Bennett as prescriber.        Follow up in EP clinic with CHIQUITA in 6 months  Follow-up in general cardiology in 1 year     History of Present Illness/Subjective    HPI: Brett Hopkins is a 68 year old male with PMHx of atrial fibrillation, mild CAD, HTN, mild proximal ascending aortic enlargement presents for follow-up. S/p PVI fibrillation/flutter ablation 3/19/2024.  No evidence of recurrent atrial fibrillation on recent MCOT monitor.    She reports doing well following ablation.  Previously received alerts of atrial fibrillation and RVR on his smart watch,  has not noticed this prior.  Was otherwise asymptomatic.  Occasionally has sharp chest pains in his chest wall near her sternum and rib when bending over or a quick movement.  Not specifically with exertion, also noted with exertion.  Reports history of lymphedema, and lower extremity swelling is stable.    Patient has questions regarding necessity and purpose of current medications.  Would like to take least amount of medications as possible.  Wonders if he can discontinue rosuvastatin as he is never had high cholesterol.  Also questions necessity of HCTZ or lisinopril given blood pressure fluctuations were apparently noted around the use of prednisone.      Mobile cardiac telemetry monitoring from 5/9/2024 to 5/22/2024 (monitored duration 13d 5h).  Predominant underlying rhythm was sinus rhythm, 47 to 172bpm, average 77 bpm.  No sustained tachyarrhythmias.  No atrial fibrillation.  There were no pauses noted.  Rare supraventricular ectopic beats (2%). One 13 beat run of non sustained SVT was noted.  Rare premature ventricular contractions (1%).  Symptom triggers (2 events) correlated to sinus rhythm with rare PAC.       Physical Examination  Review of Systems   Vitals: There were no vitals taken for this visit.  BMI= There is no height or weight on file to calculate BMI.  Wt Readings from Last 3 Encounters:   06/12/24 118.8 kg (262 lb)   04/30/24 119.7 kg (264 lb)   03/20/24 117.9 kg (260 lb)           ENT/Mouth: membranes moist, no oral lesions or bleeding gums.      EYES:  no scleral icterus, normal conjunctivae       Chest/Lungs:   lungs are clear to auscultation, no rales or wheezing, equal chest wall expansion    Cardiovascular:   Regular. Normal first and second heart sounds with no murmurs, rubs, or gallops; the carotid, radial and posterior tibial pulses are intact, mild edema bilaterally        Extremities: no cyanosis or clubbing   Skin: no xanthelasma, warm.    Neurologic: no tremors     Psychiatric:  alert and oriented x3, calm        Please refer above for cardiac ROS details.        Medical History  Surgical History Family History Social History   No past medical history on file.  Past Surgical History:   Procedure Laterality Date    ARTHROPLASTY REVISION HIP Right 05/01/2019    ARTHROPLASTY REVISION KNEE Right 2019    CARDIOVERSION  07/01/2019    7/10/2019    CARDIOVERSION  09/12/2019    CATARACT EXTRACTION Bilateral 06/2021    CV LEFT ATRIAL APPENDAGE CLOSURE N/A 1/12/2023    Procedure: Left Atrial Appendage Closure;  Surgeon: Mitesh Graff MD;  Location: [facility]    DENTAL SURGERY      Oral Surgery Tooth Extraction;  Implant    EP ABLATION PULMONARY VEIN ISOLATION N/A 3/19/2024    Procedure: Ablation Atrial Fibrillation;  Surgeon: Benedicto Barfield MD;  Location: [facility]    MA ABLATE HEART DYSRHYTHM FOCUS  08/19/2013    Description: Catheter Ablation Atrial Fibrillation;  Recorded: 11/16/2013;  Comments: PVI Aug 19, 2013 (Cryo to all 4 PV's)    TOOTH EXTRACTION      implant    [facility] TOTAL HIP ARTHROPLASTY Right 05/08/2019    Procedure: RIGHT TOTAL HIP ARTHROPLASTY DIRECT ANTERIOR APPROACH;  Surgeon: Mario Woodruff MD;  Location: [facility];  Service: Orthopedics    [facility] TOTAL KNEE ARTHROPLASTY Right 06/21/2017    Procedure: 2)  RIGHT TOTAL KNEE ARTHROPLASTY;  Surgeon: Mario VICKERS MD;  Location: [facility] Main OR;  Service: Orthopedics     Family History   Problem Relation Age of Onset    Atrial fibrillation Mother     Dementia Mother         dx 80s    Diabetes Type 2  Mother         dx 60s/70s    Atrial fibrillation Father     Rheumatoid Arthritis Father     Atrial fibrillation Sister     Cerebrovascular Disease Sister     Parkinsonism Sister     Parkinsonism Sister     Atrial fibrillation Brother     Rheumatoid Arthritis Brother     Cerebrovascular Disease Brother     CABG Brother     Atrial fibrillation Brother     Diabetes Type 2  Brother         Dx 40's         Social History     Socioeconomic History    Marital status:      Spouse name: Not on file    Number of children: 2    Years of education: Not on file    Highest education level: Not on file   Occupational History    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   Tobacco Use    Smoking status: Never    Smokeless tobacco: Never   Vaping Use    Vaping status: Never Used   Substance and Sexual Activity    Alcohol use: Yes     Alcohol/week: 5.0 standard drinks of alcohol     Types: 5 Cans of beer per week     Comment: minimal    Drug use: No    Sexual activity: Yes     Partners: Female   Other Topics Concern    Not on file   Social History Narrative    Diet-regular            Exercise-walk, limited by R leg, R knee        Volunteer,      Social Determinants of Health     Financial Resource Strain: Low Risk  (12/19/2023)    Financial Resource Strain     Within the past 12 months, have you or your family members you live with been unable to get utilities (heat, electricity) when it was really needed?: No   Food Insecurity: Low Risk  (12/19/2023)    Food Insecurity     Within the past 12 months, did you worry that your food would run out before you got money to buy more?: No     Within the past 12 months, did the food you bought just not last and you didn t have money to get more?: No   Transportation Needs: Low Risk  (12/19/2023)    Transportation Needs     Within the past 12 months, has lack of transportation kept you from medical appointments, getting your medicines, non-medical meetings or appointments, work, or from getting things that you need?: No   Physical Activity: Not on file   Stress: Not on file   Social Connections: Not on file   Interpersonal Safety: Low Risk  (12/19/2023)    Interpersonal Safety     Do you feel physically and emotionally safe where you currently live?: Yes     Within the past 12 months, have you been hit, slapped, kicked or otherwise physically hurt by someone?: No     Within the  "past 12 months, have you been humiliated or emotionally abused in other ways by your partner or ex-partner?: No   Housing Stability: Low Risk  (12/19/2023)    Housing Stability     Do you have housing? : Yes     Are you worried about losing your housing?: No           Medications  Allergies   Current Outpatient Medications   Medication Sig Dispense Refill    aspirin 81 MG EC tablet Take 1 tablet (81 mg) by mouth daily      hydrochlorothiazide (HYDRODIURIL) 25 MG tablet TAKE 1 TABLET BY MOUTH EVERY DAY IN THE MORNING FOR HIGH BLOOD PRESSURE 90 tablet 2    levothyroxine (SYNTHROID/LEVOTHROID) 100 MCG tablet TAKE 1 TABLET(100 MCG) BY MOUTH DAILY 90 tablet 2    lisinopril (ZESTRIL) 40 MG tablet TAKE 1 TABLET BY MOUTH DAILY FOR HIGH BLOOD PRESSURE 90 tablet 2    omeprazole (PRILOSEC) 20 MG DR capsule TAKE 1 CAPSULE BY MOUTH DAILY BEFORE BREAKFAST 90 capsule 1    rosuvastatin (CRESTOR) 10 MG tablet Take 1 tablet (10 mg) by mouth daily 90 tablet 3       Allergies   Allergen Reactions    Adhesive Tape Blisters     3M clear adhesive post ablation 3/19/2024    Sulfa (Sulfonamide Antibiotics) [Sulfa Antibiotics] Rash     Rash - turned purple  , Around age 32          Lab Results    Chemistry/lipid CBC Cardiac Enzymes/BNP/TSH/INR   Recent Labs   Lab Test 06/05/24  0847   CHOL 117   HDL 60   LDL 47   TRIG 50     Recent Labs   Lab Test 06/05/24  0847 12/19/23  0829 01/04/23  1047   LDL 47 43 99     Recent Labs   Lab Test 03/20/24  1703      POTASSIUM 3.9   CHLORIDE 104   CO2 30*   *   BUN 20.7   CR 0.96   GFRESTIMATED 87   YFN 8.6*     Recent Labs   Lab Test 03/20/24  1703 03/19/24  0644 03/11/24  0950   CR 0.96 1.12 1.13     No results for input(s): \"A1C\" in the last 63153 hours.       Recent Labs   Lab Test 03/20/24  1703   WBC 10.9   HGB 13.6   HCT 42.3   MCV 96        Recent Labs   Lab Test 03/20/24  1703 03/19/24  0644 03/11/24  0950   HGB 13.6 15.3 14.5    Recent Labs   Lab Test 02/04/22  1214 "   TROPONINI <0.01     Recent Labs   Lab Test 03/20/24  1703 02/04/22  1214   BNP  --  56   NTBNPI 534  --      Recent Labs   Lab Test 12/19/23  0829   TSH 7.19*     Recent Labs   Lab Test 12/21/20  2232   INR 1.12*          This note has been dictated using voice recognition software. Any grammatical, typographical, or context distortions are unintentional and inherent to the software    Lety Monae PA-C

## 2024-06-18 ENCOUNTER — OFFICE VISIT (OUTPATIENT)
Dept: CARDIOLOGY | Facility: CLINIC | Age: 69
End: 2024-06-18
Payer: COMMERCIAL

## 2024-06-18 VITALS
BODY MASS INDEX: 37.16 KG/M2 | WEIGHT: 259 LBS | SYSTOLIC BLOOD PRESSURE: 117 MMHG | OXYGEN SATURATION: 97 % | RESPIRATION RATE: 16 BRPM | HEART RATE: 57 BPM | DIASTOLIC BLOOD PRESSURE: 76 MMHG

## 2024-06-18 DIAGNOSIS — Z95.818 PRESENCE OF WATCHMAN LEFT ATRIAL APPENDAGE CLOSURE DEVICE: ICD-10-CM

## 2024-06-18 DIAGNOSIS — I48.19 PERSISTENT ATRIAL FIBRILLATION (H): Primary | ICD-10-CM

## 2024-06-18 DIAGNOSIS — I25.10 CORONARY ARTERY DISEASE INVOLVING NATIVE CORONARY ARTERY OF NATIVE HEART WITHOUT ANGINA PECTORIS: ICD-10-CM

## 2024-06-18 DIAGNOSIS — Z86.79 STATUS POST ABLATION OF ATRIAL FIBRILLATION: ICD-10-CM

## 2024-06-18 DIAGNOSIS — Z98.890 STATUS POST ABLATION OF ATRIAL FIBRILLATION: ICD-10-CM

## 2024-06-18 PROCEDURE — G2211 COMPLEX E/M VISIT ADD ON: HCPCS

## 2024-06-18 PROCEDURE — 99214 OFFICE O/P EST MOD 30 MIN: CPT

## 2024-06-18 RX ORDER — ROSUVASTATIN CALCIUM 10 MG/1
10 TABLET, COATED ORAL DAILY
Qty: 90 TABLET | Refills: 3 | Status: SHIPPED | OUTPATIENT
Start: 2024-06-18

## 2024-06-18 NOTE — LETTER
6/18/2024    Magdaleno Bennett MD  [address]  [facility] [address]    RE: Brett Hopkins       Dear Colleague,     I had the pleasure of seeing Brett Hopkins in the [facility].    HEART CARE ENCOUNTER CONSULTATON NOTE      [facility]  [phone]      Assessment/Recommendations   Assessment:   Persistent atrial fibrillation, atrial flutter: s/p PVI 3/19/2024 without evidence of recurrence on recent MCOT, s/p prior PVI 2013 and Watchman 1/2023 - aspirin 81 mg daily  - No known recurrence, monitors heart rate/A-fib on Apple Watch and Kardia without any recent alerts  Mild nonobstructive coronary artery disease: Per CT pulmonary vein 10/2022 - aspirin 81 mg, cholesterol well-controlled on rosuvastatin 10 mg  Hypertension: Controlled on hydrochlorothiazide, lisinopril  Enlargement of proximal ascending aorta: Reported no evidence of aortic aneurysm on CTA chest/abdomen 3/2024, previously reported mild aortic dilation 4.0 cm on CT pulmonary vein 10/2022      Plan:   Patient wishes to take least amount of medication possible.  Specifically asked about rosuvastatin today given that he had no history of elevated cholesterol.  We discussed finding of mild CAD on pulmonary vein and best treatment of prevention of progression, reduction in inflammation with statin medication along with baby aspirin.  In addition to continue medication for this purpose, patient is agreeable.  Continue aspirin and rosuvastatin  Continue hydrochlorothiazide and lisinopril. Can discuss reducing/discontinuing antihypertensive medications with Dr. Bennett as prescriber.        Follow up in EP clinic with CHIQUITA in 6 months  Follow-up in general cardiology in 1 year     History of Present Illness/Subjective    HPI: Brett Hopkins is a 68 year old male with PMHx of atrial fibrillation, mild CAD, HTN, mild proximal ascending aortic enlargement presents for follow-up. S/p PVI fibrillation/flutter  ablation 3/19/2024.  No evidence of recurrent atrial fibrillation on recent MCOT monitor.    She reports doing well following ablation.  Previously received alerts of atrial fibrillation and RVR on his smart watch, has not noticed this prior.  Was otherwise asymptomatic.  Occasionally has sharp chest pains in his chest wall near her sternum and rib when bending over or a quick movement.  Not specifically with exertion, also noted with exertion.  Reports history of lymphedema, and lower extremity swelling is stable.    Patient has questions regarding necessity and purpose of current medications.  Would like to take least amount of medications as possible.  Wonders if he can discontinue rosuvastatin as he is never had high cholesterol.  Also questions necessity of HCTZ or lisinopril given blood pressure fluctuations were apparently noted around the use of prednisone.      Mobile cardiac telemetry monitoring from 5/9/2024 to 5/22/2024 (monitored duration 13d 5h).  Predominant underlying rhythm was sinus rhythm, 47 to 172bpm, average 77 bpm.  No sustained tachyarrhythmias.  No atrial fibrillation.  There were no pauses noted.  Rare supraventricular ectopic beats (2%). One 13 beat run of non sustained SVT was noted.  Rare premature ventricular contractions (1%).  Symptom triggers (2 events) correlated to sinus rhythm with rare PAC.       Physical Examination  Review of Systems   Vitals: There were no vitals taken for this visit.  BMI= There is no height or weight on file to calculate BMI.  Wt Readings from Last 3 Encounters:   06/12/24 118.8 kg (262 lb)   04/30/24 119.7 kg (264 lb)   03/20/24 117.9 kg (260 lb)           ENT/Mouth: membranes moist, no oral lesions or bleeding gums.      EYES:  no scleral icterus, normal conjunctivae       Chest/Lungs:   lungs are clear to auscultation, no rales or wheezing, equal chest wall expansion    Cardiovascular:   Regular. Normal first and second heart sounds with no murmurs, rubs,  or gallops; the carotid, radial and posterior tibial pulses are intact, mild edema bilaterally        Extremities: no cyanosis or clubbing   Skin: no xanthelasma, warm.    Neurologic: no tremors     Psychiatric: alert and oriented x3, calm        Please refer above for cardiac ROS details.        Medical History  Surgical History Family History Social History   No past medical history on file.  Past Surgical History:   Procedure Laterality Date    ARTHROPLASTY REVISION HIP Right 05/01/2019    ARTHROPLASTY REVISION KNEE Right 2019    CARDIOVERSION  07/01/2019    7/10/2019    CARDIOVERSION  09/12/2019    CATARACT EXTRACTION Bilateral 06/2021    CV LEFT ATRIAL APPENDAGE CLOSURE N/A 1/12/2023    Procedure: Left Atrial Appendage Closure;  Surgeon: Mitesh Graff MD;  [facility]    DENTAL SURGERY      Oral Surgery Tooth Extraction;  Implant    EP ABLATION PULMONARY VEIN ISOLATION N/A 3/19/2024    Procedure: Ablation Atrial Fibrillation;  Surgeon: Benedicto Barfiled MD;  Location: [facility] CV    AK ABLATE HEART DYSRHYTHM FOCUS  08/19/2013    Description: Catheter Ablation Atrial Fibrillation;  Recorded: 11/16/2013;  Comments: PVI Aug 19, 2013 (Cryo to all 4 PV's)    TOOTH EXTRACTION      implant    [facility] TOTAL HIP ARTHROPLASTY Right 05/08/2019    Procedure: RIGHT TOTAL HIP ARTHROPLASTY DIRECT ANTERIOR APPROACH;  Surgeon: Mario Woodruff MD;  Location: [facility] OR;  Service: Orthopedics    [facility] TOTAL KNEE ARTHROPLASTY Right 06/21/2017    Procedure: 2)  RIGHT TOTAL KNEE ARTHROPLASTY;  Surgeon: Mario VICKERS MD;  Location: [facility];  Service: Orthopedics     Family History   Problem Relation Age of Onset    Atrial fibrillation Mother     Dementia Mother         dx 80s    Diabetes Type 2  Mother         dx 60s/70s    Atrial fibrillation Father     Rheumatoid Arthritis Father     Atrial fibrillation Sister     Cerebrovascular Disease Sister     Parkinsonism Sister     Parkinsonism  Sister     Atrial fibrillation Brother     Rheumatoid Arthritis Brother     Cerebrovascular Disease Brother     CABG Brother     Atrial fibrillation Brother     Diabetes Type 2  Brother         Dx 40's        Social History     Socioeconomic History    Marital status:      Spouse name: Not on file    Number of children: 2    Years of education: Not on file    Highest education level: Not on file   Occupational History    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   Tobacco Use    Smoking status: Never    Smokeless tobacco: Never   Vaping Use    Vaping status: Never Used   Substance and Sexual Activity    Alcohol use: Yes     Alcohol/week: 5.0 standard drinks of alcohol     Types: 5 Cans of beer per week     Comment: minimal    Drug use: No    Sexual activity: Yes     Partners: Female   Other Topics Concern    Not on file   Social History Narrative    Diet-regular            Exercise-walk, limited by R leg, R knee        Volunteer,      Social Determinants of Health     Financial Resource Strain: Low Risk  (12/19/2023)    Financial Resource Strain     Within the past 12 months, have you or your family members you live with been unable to get utilities (heat, electricity) when it was really needed?: No   Food Insecurity: Low Risk  (12/19/2023)    Food Insecurity     Within the past 12 months, did you worry that your food would run out before you got money to buy more?: No     Within the past 12 months, did the food you bought just not last and you didn t have money to get more?: No   Transportation Needs: Low Risk  (12/19/2023)    Transportation Needs     Within the past 12 months, has lack of transportation kept you from medical appointments, getting your medicines, non-medical meetings or appointments, work, or from getting things that you need?: No   Physical Activity: Not on file   Stress: Not on file   Social Connections: Not on file   Interpersonal Safety: Low Risk  (12/19/2023)    Interpersonal  "Safety     Do you feel physically and emotionally safe where you currently live?: Yes     Within the past 12 months, have you been hit, slapped, kicked or otherwise physically hurt by someone?: No     Within the past 12 months, have you been humiliated or emotionally abused in other ways by your partner or ex-partner?: No   Housing Stability: Low Risk  (12/19/2023)    Housing Stability     Do you have housing? : Yes     Are you worried about losing your housing?: No           Medications  Allergies   Current Outpatient Medications   Medication Sig Dispense Refill    aspirin 81 MG EC tablet Take 1 tablet (81 mg) by mouth daily      hydrochlorothiazide (HYDRODIURIL) 25 MG tablet TAKE 1 TABLET BY MOUTH EVERY DAY IN THE MORNING FOR HIGH BLOOD PRESSURE 90 tablet 2    levothyroxine (SYNTHROID/LEVOTHROID) 100 MCG tablet TAKE 1 TABLET(100 MCG) BY MOUTH DAILY 90 tablet 2    lisinopril (ZESTRIL) 40 MG tablet TAKE 1 TABLET BY MOUTH DAILY FOR HIGH BLOOD PRESSURE 90 tablet 2    omeprazole (PRILOSEC) 20 MG DR capsule TAKE 1 CAPSULE BY MOUTH DAILY BEFORE BREAKFAST 90 capsule 1    rosuvastatin (CRESTOR) 10 MG tablet Take 1 tablet (10 mg) by mouth daily 90 tablet 3       Allergies   Allergen Reactions    Adhesive Tape Blisters     3M clear adhesive post ablation 3/19/2024    Sulfa (Sulfonamide Antibiotics) [Sulfa Antibiotics] Rash     Rash - turned purple  , Around age 32          Lab Results    Chemistry/lipid CBC Cardiac Enzymes/BNP/TSH/INR   Recent Labs   Lab Test 06/05/24  0847   CHOL 117   HDL 60   LDL 47   TRIG 50     Recent Labs   Lab Test 06/05/24  0847 12/19/23  0829 01/04/23  1047   LDL 47 43 99     Recent Labs   Lab Test 03/20/24  1703      POTASSIUM 3.9   CHLORIDE 104   CO2 30*   *   BUN 20.7   CR 0.96   GFRESTIMATED 87   YFN 8.6*     Recent Labs   Lab Test 03/20/24  1703 03/19/24  0644 03/11/24  0950   CR 0.96 1.12 1.13     No results for input(s): \"A1C\" in the last 25742 hours.       Recent Labs   Lab Test " 03/20/24  1703   WBC 10.9   HGB 13.6   HCT 42.3   MCV 96        Recent Labs   Lab Test 03/20/24  1703 03/19/24  0644 03/11/24  0950   HGB 13.6 15.3 14.5    Recent Labs   Lab Test 02/04/22  1214   TROPONINI <0.01     Recent Labs   Lab Test 03/20/24  1703 02/04/22  1214   BNP  --  56   NTBNPI 534  --      Recent Labs   Lab Test 12/19/23  0829   TSH 7.19*     Recent Labs   Lab Test 12/21/20  2232   INR 1.12*          This note has been dictated using voice recognition software. Any grammatical, typographical, or context distortions are unintentional and inherent to the software    Lety Monae PA-C      Thank you for allowing me to participate in the care of your patient.      Sincerely,     Lety Dunn PA-C     [facility]  cc:   CRISTIANO Brooke CNP  [facility] [address]  [address] [place] [address]

## 2024-06-18 NOTE — PATIENT INSTRUCTIONS
It was a pleasure taking part in your care today:    - Continue rosuvastatin and aspirin  - Discuss blood pressure medications with Dr. Bennett  - Follow up in A fib clinic in 6 months  - Follow up with Dr. Cotter in 1 year, can call March 2025 to schedule follow up    Please call the [facility] clinic with any questions or concerns at [phone].     Lety Monae PA-C

## 2024-07-15 ENCOUNTER — OFFICE VISIT (OUTPATIENT)
Dept: FAMILY MEDICINE | Facility: CLINIC | Age: 69
End: 2024-07-15
Payer: COMMERCIAL

## 2024-07-15 VITALS
BODY MASS INDEX: 37.02 KG/M2 | WEIGHT: 258 LBS | DIASTOLIC BLOOD PRESSURE: 84 MMHG | OXYGEN SATURATION: 100 % | SYSTOLIC BLOOD PRESSURE: 128 MMHG | TEMPERATURE: 97.4 F | RESPIRATION RATE: 18 BRPM | HEART RATE: 62 BPM

## 2024-07-15 DIAGNOSIS — M79.89 SWELLING OF LEFT HAND: Primary | ICD-10-CM

## 2024-07-15 LAB
CRP SERPL-MCNC: 5.18 MG/L
ERYTHROCYTE [DISTWIDTH] IN BLOOD BY AUTOMATED COUNT: 13.6 % (ref 10–15)
ERYTHROCYTE [SEDIMENTATION RATE] IN BLOOD BY WESTERGREN METHOD: 7 MM/HR (ref 0–20)
HCT VFR BLD AUTO: 45.5 % (ref 40–53)
HGB BLD-MCNC: 14.9 G/DL (ref 13.3–17.7)
MCH RBC QN AUTO: 31.2 PG (ref 26.5–33)
MCHC RBC AUTO-ENTMCNC: 32.7 G/DL (ref 31.5–36.5)
MCV RBC AUTO: 95 FL (ref 78–100)
PLATELET # BLD AUTO: 184 10E3/UL (ref 150–450)
RBC # BLD AUTO: 4.78 10E6/UL (ref 4.4–5.9)
URATE SERPL-MCNC: 7.7 MG/DL (ref 3.4–7)
WBC # BLD AUTO: 6.5 10E3/UL (ref 4–11)

## 2024-07-15 PROCEDURE — 85652 RBC SED RATE AUTOMATED: CPT | Performed by: PHYSICIAN ASSISTANT

## 2024-07-15 PROCEDURE — 84550 ASSAY OF BLOOD/URIC ACID: CPT | Performed by: PHYSICIAN ASSISTANT

## 2024-07-15 PROCEDURE — 86140 C-REACTIVE PROTEIN: CPT | Performed by: PHYSICIAN ASSISTANT

## 2024-07-15 PROCEDURE — 99214 OFFICE O/P EST MOD 30 MIN: CPT | Performed by: PHYSICIAN ASSISTANT

## 2024-07-15 PROCEDURE — 85027 COMPLETE CBC AUTOMATED: CPT | Performed by: PHYSICIAN ASSISTANT

## 2024-07-15 PROCEDURE — 36415 COLL VENOUS BLD VENIPUNCTURE: CPT | Performed by: PHYSICIAN ASSISTANT

## 2024-07-15 RX ORDER — PREDNISONE 20 MG/1
40 TABLET ORAL DAILY
Qty: 10 TABLET | Refills: 0 | Status: SHIPPED | OUTPATIENT
Start: 2024-07-15 | End: 2024-07-15 | Stop reason: ALTCHOICE

## 2024-07-15 RX ORDER — CETIRIZINE HYDROCHLORIDE 10 MG/1
10 TABLET ORAL 2 TIMES DAILY PRN
Qty: 30 TABLET | Refills: 0 | Status: SHIPPED | OUTPATIENT
Start: 2024-07-15

## 2024-07-15 NOTE — PATIENT INSTRUCTIONS
Your blood work is not strongly indicative of infection.  I have a low suspicion for gout since it is usually much more painful and this expands over multiple joints which would not be super common for gout in such a short amount of time.  I am most suspicious for histamine reaction.  This could be from something environmental like the sun or an insect bite or a new product.  For treatment I like to have you start taking an oral steroid (prednisone) and an oral antihistamine (Zyrtec).  Take prednisone 2 pills daily and with food.  This medicine is best taken in the morning because taking it close to bedtime can keep you awake.  Take Zyrtec twice per day, it does not matter if it is with food or not.  Once the swelling and discomfort has gone down you can discontinue the Zyrtec.  Follow-up if no improvement over the course the next 7 days.  Seek emergency medical attention if you develop streaking redness up the arm, fevers, or severe arm pain and swelling.  You will be notified of the remaining lab results via [employer] once they are back.  If anything requires a change to treatment plan you will be called.

## 2024-07-15 NOTE — PROGRESS NOTES
Patient presents with:  swollen hand: Left hand swollen        Clinical Decision Making:  Left hand swelling.  Differential diagnosis includes but is not limited to dermatitis reaction to sun, cellulitis, gout, DVT, injury.  Patient denies any known injury.  Not strongly indicative of infection given normal CBC and inflammatory marker.  Unlikely gout given number of joints it is spanning over and lack of severe pain.  Low suspicion for DVT as there is no pain to palpation of upper arm or forearm or any edema in these areas.  Main suspicion is histamine like reaction.  Patient started on antihistamine for treatment we discussed trial of steroid, but patient would rather not due to reactions to steroids in the past when he had treatment for alopecia he was directed to follow-up if symptoms fail to improve.  We discussed the importance of avoidance of sun exposure and use of moisturizer to bring back protective skin barrier.  Moisturizer suggested or Aquaphor or Vaseline, but he historically uses utter cream.      ICD-10-CM    1. Swelling of left hand  M79.89 CBC with platelets     Erythrocyte sedimentation rate auto     Uric acid     CRP inflammation     CBC with platelets     Erythrocyte sedimentation rate auto     Uric acid     CRP inflammation     cetirizine (ZYRTEC) 10 MG tablet     DISCONTINUED: predniSONE (DELTASONE) 20 MG tablet          Patient Instructions   Your blood work is not strongly indicative of infection.  I have a low suspicion for gout since it is usually much more painful and this expands over multiple joints which would not be super common for gout in such a short amount of time.  I am most suspicious for histamine reaction.  This could be from something environmental like the sun or an insect bite or a new product.  For treatment I like to have you start taking an oral steroid (prednisone) and an oral antihistamine (Zyrtec).  Take prednisone 2 pills daily and with food.  This medicine is best  taken in the morning because taking it close to bedtime can keep you awake.  Take Zyrtec twice per day, it does not matter if it is with food or not.  Once the swelling and discomfort has gone down you can discontinue the Zyrtec.  Follow-up if no improvement over the course the next 7 days.  Seek emergency medical attention if you develop streaking redness up the arm, fevers, or severe arm pain and swelling.  You will be notified of the remaining lab results via [employer] once they are back.  If anything requires a change to treatment plan you will be called.    HPI:  Brett Hopkins is a 68 year old male who presents today with concerns of left hand redness and swelling that he woke up with today. No known injury. No pain other than a tight sensation when he tries to make a fist. He reports that both hands feel itchy, but he has equated that with sun exposure and dryness. No prior hx of gout. He is otherwise feeling well and healthy. No upper arm or forearm pain or discomfort.     History obtained from the patient.    Problem List:  2023-12: Hyperlipidemia  2023-01: Presence of Watchman left atrial appendage closure device  2023-01: Myasthenia gravis (H)  2022-01: Thyroid nodule  2020-11: Obesity (BMI 35.0-39.9) with comorbidity (H)  2020-06: Lymphedema  2019-04: Hypertension  2018-09: GERD (gastroesophageal reflux disease)  2018-09: AA (alopecia areata)  2015-07: Status post ablation of atrial fibrillation  Varicose Veins  Persistent atrial fibrillation  Aneurysm Of The Ascending Aorta  Hypothyroidism      No past medical history on file.    Social History     Tobacco Use    Smoking status: Never    Smokeless tobacco: Never   Substance Use Topics    Alcohol use: Yes     Alcohol/week: 5.0 standard drinks of alcohol     Types: 5 Cans of beer per week     Comment: minimal         Review of Systems    Vitals:    07/15/24 1401   BP: 128/84   BP Location: Right arm   Patient Position: Sitting   Cuff Size: Adult Large    Pulse: 62   Resp: 18   Temp: 97.4  F (36.3  C)   TempSrc: Oral   SpO2: 100%   Weight: 117 kg (258 lb)       Physical Exam  Vitals and nursing note reviewed.   Constitutional:       General: He is not in acute distress.     Appearance: He is not toxic-appearing or diaphoretic.   HENT:      Head: Normocephalic and atraumatic.      Right Ear: External ear normal.      Left Ear: External ear normal.   Eyes:      Conjunctiva/sclera: Conjunctivae normal.   Pulmonary:      Effort: Pulmonary effort is normal. No respiratory distress.   Musculoskeletal:      Comments: Left hand edema and erythema worse on the dorsal aspect of the hand and sparing to the palmar aspect. The skin is taught and rough/bumpy to palpation. No signs of compartment syndrome he is neurovascularly intact. The skin is warm to the touch. No pustules. Normal ROM. Loss in definition of the knuckles due to the swelling.    Neurological:      Mental Status: He is alert.   Psychiatric:         Mood and Affect: Mood normal.         Behavior: Behavior normal.         Thought Content: Thought content normal.         Judgment: Judgment normal.         Results:  Results for orders placed or performed in visit on 07/15/24   CBC with platelets     Status: Normal   Result Value Ref Range    WBC Count 6.5 4.0 - 11.0 10e3/uL    RBC Count 4.78 4.40 - 5.90 10e6/uL    Hemoglobin 14.9 13.3 - 17.7 g/dL    Hematocrit 45.5 40.0 - 53.0 %    MCV 95 78 - 100 fL    MCH 31.2 26.5 - 33.0 pg    MCHC 32.7 31.5 - 36.5 g/dL    RDW 13.6 10.0 - 15.0 %    Platelet Count 184 150 - 450 10e3/uL   Erythrocyte sedimentation rate auto     Status: Normal   Result Value Ref Range    Erythrocyte Sedimentation Rate 7 0 - 20 mm/hr         At the end of the encounter, I discussed results, diagnosis, medications. Discussed red flags for immediate return to clinic/ER, as well as indications for follow up if no improvement. Patient understood and agreed to plan. Patient was stable for discharge.

## 2024-08-16 ENCOUNTER — TELEPHONE (OUTPATIENT)
Dept: CARDIOLOGY | Facility: CLINIC | Age: 69
End: 2024-08-16
Payer: COMMERCIAL

## 2024-08-16 ENCOUNTER — TELEPHONE (OUTPATIENT)
Dept: CARDIOLOGY | Facility: CLINIC | Age: 69
End: 2024-08-16

## 2024-08-16 ENCOUNTER — MYC MEDICAL ADVICE (OUTPATIENT)
Dept: CARDIOLOGY | Facility: CLINIC | Age: 69
End: 2024-08-16

## 2024-08-16 ENCOUNTER — ALLIED HEALTH/NURSE VISIT (OUTPATIENT)
Dept: CARDIOLOGY | Facility: CLINIC | Age: 69
End: 2024-08-16
Payer: COMMERCIAL

## 2024-08-16 ENCOUNTER — PREP FOR PROCEDURE (OUTPATIENT)
Dept: CARDIOLOGY | Facility: CLINIC | Age: 69
End: 2024-08-16

## 2024-08-16 ENCOUNTER — DOCUMENTATION ONLY (OUTPATIENT)
Dept: CARDIOLOGY | Facility: CLINIC | Age: 69
End: 2024-08-16

## 2024-08-16 DIAGNOSIS — I48.19 PERSISTENT ATRIAL FIBRILLATION (H): Primary | ICD-10-CM

## 2024-08-16 DIAGNOSIS — I48.19 PERSISTENT ATRIAL FIBRILLATION (H): ICD-10-CM

## 2024-08-16 LAB
ATRIAL RATE - MUSE: 159 BPM
DIASTOLIC BLOOD PRESSURE - MUSE: NORMAL MMHG
INTERPRETATION ECG - MUSE: NORMAL
P AXIS - MUSE: NORMAL DEGREES
PR INTERVAL - MUSE: NORMAL MS
QRS DURATION - MUSE: 114 MS
QT - MUSE: 328 MS
QTC - MUSE: 492 MS
R AXIS - MUSE: -7 DEGREES
SYSTOLIC BLOOD PRESSURE - MUSE: NORMAL MMHG
T AXIS - MUSE: 21 DEGREES
VENTRICULAR RATE- MUSE: 135 BPM

## 2024-08-16 PROCEDURE — 93000 ELECTROCARDIOGRAM COMPLETE: CPT | Performed by: STUDENT IN AN ORGANIZED HEALTH CARE EDUCATION/TRAINING PROGRAM

## 2024-08-16 PROCEDURE — 99207 PR NO CHARGE NURSE ONLY: CPT

## 2024-08-16 RX ORDER — LIDOCAINE 40 MG/G
CREAM TOPICAL
OUTPATIENT
Start: 2024-08-16

## 2024-08-16 RX ORDER — METOPROLOL TARTRATE 25 MG/1
25 TABLET, FILM COATED ORAL 2 TIMES DAILY
Qty: 60 TABLET | Refills: 11 | Status: SHIPPED | OUTPATIENT
Start: 2024-08-16 | End: 2024-08-20

## 2024-08-16 RX ORDER — SODIUM CHLORIDE 9 MG/ML
INJECTION, SOLUTION INTRAVENOUS CONTINUOUS
Status: DISCONTINUED | OUTPATIENT
Start: 2024-08-16 | End: 2024-08-16 | Stop reason: HOSPADM

## 2024-08-16 NOTE — TELEPHONE ENCOUNTER
Return call placed to pt.     Pt believes he is back in AF. He has a Pixel 2 watch that has given warnings during periods of inactivity or other concerns. He had warnings of high HR. He then did EKG on watch which reports AF. He then tried his [email or web address] jorge which also confirmed AF. He had PVI on 3/19/24 and has not had a single episode since then. He reports when he does go into AF he is stuck until ablation or DCCV.     He is unable to tell himself when he is in AF or not.     He is mostly concerned at this time that he is not currently taking any medication for arrhythmias.     Watchman placed 1/12/23  and successful.      Leia Santamaria RN

## 2024-08-16 NOTE — PROGRESS NOTES
H&P  PMD: []  Date: Card OV: []  Date:  Sent for Teach []   Orders: I [x] P  [x]      AC: Eliquis Start 1 week prior and continue for 4 weeks after. NP Med Review: NEEDS review    DM Meds: None  GLP-1:Nicolle Martin APRN CNP  [facility] Ep Support Pool - e  Caller: Unspecified (Today, 11:37 AM)  Most recent cardioversion was pre ablation in March. He has no great options for AAD other than amiodarone. Please have him start metoprolol 25mg daily. DCCV after 1 week on Eliquis 5mg BID, x4 weeks after, stop ASA on Eliquis. Follow-up after cardioversion. Thank you       Brett Hopkins, 1955, [id number]  Home:[phone] (home) Cell:[phone] (mobile)  Emergency Contact: Ankita Hopkins [phone]  PCP: Magdaleno Bennett, [phone]    +++Important patient information for CSC/Cath Lab staff : None+++    [facility] EP Cath Lab Procedure Order   Procedure: Cardioversion for AF  Ordering Provider:  Nicolle Ryan NP  Date Ordered and Prepped: 8/16/2024 Leia Santamaria RN  Anticipated Case Duration:  Standard  Scheduling Needs/Timeframe:  Next Available  Scheduling Contact: Please contact pt to schedule  Cardiology Follow Up Apt s/p: Standard- EP CHIQUITA @ 4-6 wks or previously scheduled General Card apt  Current Device/Device Co Needed for Procedure: None NoneNone  Pre-Procedural Testing needed: None  Anesthesia:  General    [facility] EP Cath Lab Prep   H&P:  Pt to schedule with PMD to complete  Pre-op Labs: K if pt taking diuretic medication or hx of low Potassium, Beta HcG if appropriate, and INR if on Warfarin will be ordered AM of procedure and Review of most recent labs, WEL for procedure  T&S Pre-Procedure Review: T&S is not required for DCCV/DFT Testing  Medical Records Pertinent for Procedure:  None  Iodinated Contrast Dye Allergies (Does not include Shellfish, Egg, and/or Iodine Allergy): NA  GLP-1 Protocol: If on Dulaglutide (Trulicity) (weekly)- Injection hold 7 days prior to procedure  , Exenatide  extended release (Bydureon bcise) (weekly)- Injection hold 7 days prior to procedure, Exenatide (Byetta) (twice daily)- Oral Tablet hold day prior and morning of procedure and for Injection hold 7 days prior to procedure, Semaglutide (Ozempic) (weekly)- Injection and Oral hold 7 days prior to procedure, Liraglutide (Victoza, Saxenda) (daily)- Injection hold day prior and morning of procedure    Allergies   Allergen Reactions    Adhesive Tape Blisters     3M clear adhesive post ablation 3/19/2024    Sulfa (Sulfonamide Antibiotics) [Sulfa Antibiotics] Rash     Rash - turned purple  , Around age 32       Current Outpatient Medications:     aspirin 81 MG EC tablet, Take 1 tablet (81 mg) by mouth daily, Disp: , Rfl:     cetirizine (ZYRTEC) 10 MG tablet, Take 1 tablet (10 mg) by mouth 2 times daily as needed (left hand swelling), Disp: 30 tablet, Rfl: 0    hydrochlorothiazide (HYDRODIURIL) 25 MG tablet, TAKE 1 TABLET BY MOUTH EVERY DAY IN THE MORNING FOR HIGH BLOOD PRESSURE, Disp: 90 tablet, Rfl: 2    levothyroxine (SYNTHROID/LEVOTHROID) 100 MCG tablet, TAKE 1 TABLET(100 MCG) BY MOUTH DAILY, Disp: 90 tablet, Rfl: 2    lisinopril (ZESTRIL) 40 MG tablet, TAKE 1 TABLET BY MOUTH DAILY FOR HIGH BLOOD PRESSURE, Disp: 90 tablet, Rfl: 2    omeprazole (PRILOSEC) 20 MG DR capsule, TAKE 1 CAPSULE BY MOUTH DAILY BEFORE BREAKFAST, Disp: 90 capsule, Rfl: 1    rosuvastatin (CRESTOR) 10 MG tablet, Take 1 tablet (10 mg) by mouth daily, Disp: 90 tablet, Rfl: 3    Documentation Date:8/16/2024 12:41 PM  Leia Santamaria RN     Detail Level: Detailed Depth Of Biopsy: dermis Was A Bandage Applied: Yes Size Of Lesion In Cm: 0 Biopsy Type: H and E Biopsy Method: Personna blade Anesthesia Type: 2% lidocaine without epinephrine Anesthesia Volume In Cc: 0.5 Hemostasis: Drysol Wound Care: Petrolatum Dressing: bandage Destruction After The Procedure: No Type Of Destruction Used: Curettage Curettage Text: The wound bed was treated with curettage after the biopsy was performed. Cryotherapy Text: The wound bed was treated with cryotherapy after the biopsy was performed. Electrodesiccation Text: The wound bed was treated with electrodesiccation after the biopsy was performed. Electrodesiccation And Curettage Text: The wound bed was treated with electrodesiccation and curettage after the biopsy was performed. Silver Nitrate Text: The wound bed was treated with silver nitrate after the biopsy was performed. Lab: [id number] Lab Facility: [id number] Consent: Written consent was obtained and risks were reviewed including but not limited to scarring, infection, bleeding, scabbing, incomplete removal, nerve damage and allergy to anesthesia. Post-Care Instructions: I reviewed with the patient in detail post-care instructions. Patient is to keep the biopsy site dry overnight, and then apply bacitracin twice daily until healed. Patient may apply hydrogen peroxide soaks to remove any crusting. Notification Instructions: Patient will be notified of biopsy results. However, patient instructed to call the office if not contacted within 2 weeks. Billing Type: Third-Party Bill Information: Selecting Yes will display possible errors in your note based on the variables you have selected. This validation is only offered as a suggestion for you. PLEASE NOTE THAT THE VALIDATION TEXT WILL BE REMOVED WHEN YOU FINALIZE YOUR NOTE. IF YOU WANT TO FAX A PRELIMINARY NOTE YOU WILL NEED TO TOGGLE THIS TO 'NO' IF YOU DO NOT WANT IT IN YOUR FAXED NOTE.

## 2024-08-16 NOTE — TELEPHONE ENCOUNTER
Pt came into clinic to verify if pt is in Afib.  Pt had ablation on 3/19/24.  Vitals are as follows:    BP:  132/78 right arm  HR: 138  SAO2:  90%    EKG shown to Gela BARRIOS who advised note is sent to VALERIE RN who will need to set pt up for cardioversion.  Pt advised of RN message and okay to leave clinic.  Pt understands.  Thanks    CLAUDE Rankin

## 2024-08-16 NOTE — TELEPHONE ENCOUNTER
Returned call to pt. Discussed recommendations and pt is in agreement to get EKG to day. [facility] would [facility] to [facility] to [facility]. Message sent to scheduling to assist.      Leia Santamaria RN

## 2024-08-16 NOTE — TELEPHONE ENCOUNTER
[facility] Call Center    Phone Message    May a detailed message be left on voicemail: yes     Reason for Call: Other: Pt would like a call back as he stated he thinks he might be in afib, he had an ablation in March but would like to discuss asap, he was taken off his medication and he would like to discuss if he should be back on something to help, pt did not want to speak to a triage nurse, he would like to discuss with his team     Action Taken: Other: Cardio    Travel Screening: Not Applicable     Date of Service:

## 2024-08-19 NOTE — PROGRESS NOTES
Returned call to pt. He reports Friday evening he went back into SR and he has checked all weekend with his devices.     He is asking if he should still start metoprolol 25mg daily as recommended when hew as in AF. We discussed reason for taking but he did not feel comfortable and wanted Nicolle to decide if he should or should not start the Metoprolol. He is very hesitant with starting medications if he does not have to. He also sent a [employer] message 8/16 inquiring about this.       Leia Santamaria RN

## 2024-08-19 NOTE — PROGRESS NOTES
Nicolle Ryan APRN CNP  [facility] Ep Support Pool - [facility]  Caller: Unspecified (3 days ago, 12:41 PM)  I'll message him on My Chart. Thanks    DCCV canceled.    Carla

## 2024-08-20 RX ORDER — METOPROLOL TARTRATE 25 MG/1
25 TABLET, FILM COATED ORAL 2 TIMES DAILY
Qty: 180 TABLET | Refills: 3 | Status: SHIPPED | OUTPATIENT
Start: 2024-08-20 | End: 2024-08-20 | Stop reason: ALTCHOICE

## 2024-08-20 RX ORDER — METOPROLOL TARTRATE 25 MG/1
25 TABLET, FILM COATED ORAL DAILY
Qty: 90 TABLET | Refills: 3 | Status: SHIPPED | OUTPATIENT
Start: 2024-08-20

## 2024-08-20 NOTE — TELEPHONE ENCOUNTER
Phone call to pt, discussed that we can see this medication was entered on our end, apologized for any inconvenience regarding his pharmacy of choice not having it on hand and him having to wait an hour due to their short staff.    Discussed that I can send it to a different pharmacy if he prefers and he declined at this time.    Discussed that I will resend in 90 day per his request and he should call prior to going to  the medication to ensure that it is ready, to reduce frustration if he has to wait in line again.    Pt states understanding, will call if he has any further questions or concerns.    Nicolle De APRN CNP  [facility] Support Pool - e16 minutes ago (3:46 PM)       Can you please call him to see if he ended up getting this - looks like this was signed and ordered as he said last Friday so may be a pharmacy issue. Thank you

## 2024-08-20 NOTE — TELEPHONE ENCOUNTER
Nicolle Ryan APRN CNP   to [facility] Ep Support Pool - [facility]       8/16/24 12:23 PM  Most recent cardioversion was pre ablation in March. He has no great options for AAD other than amiodarone. Please have him start metoprolol 25mg daily. DCCV after 1 week on Eliquis 5mg BID, x4 weeks after, stop ASA on Eliquis. Follow-up after cardioversion. Thank you

## 2024-08-26 DIAGNOSIS — E06.3 HYPOTHYROIDISM DUE TO HASHIMOTO'S THYROIDITIS: ICD-10-CM

## 2024-08-26 DIAGNOSIS — I10 ESSENTIAL HYPERTENSION: ICD-10-CM

## 2024-08-27 RX ORDER — LEVOTHYROXINE SODIUM 100 UG/1
TABLET ORAL
Qty: 90 TABLET | Refills: 0 | Status: SHIPPED | OUTPATIENT
Start: 2024-08-27

## 2024-08-27 RX ORDER — HYDROCHLOROTHIAZIDE 25 MG/1
TABLET ORAL
Qty: 90 TABLET | Refills: 0 | Status: SHIPPED | OUTPATIENT
Start: 2024-08-27

## 2024-09-18 DIAGNOSIS — I10 ESSENTIAL HYPERTENSION: ICD-10-CM

## 2024-09-18 DIAGNOSIS — K20.90 ESOPHAGITIS: ICD-10-CM

## 2024-09-18 RX ORDER — LISINOPRIL 40 MG/1
TABLET ORAL
Qty: 90 TABLET | Refills: 1 | Status: SHIPPED | OUTPATIENT
Start: 2024-09-18

## 2024-09-25 ENCOUNTER — TELEPHONE (OUTPATIENT)
Dept: FAMILY MEDICINE | Facility: CLINIC | Age: 69
End: 2024-09-25
Payer: COMMERCIAL

## 2024-09-25 ENCOUNTER — TRANSFERRED RECORDS (OUTPATIENT)
Dept: HEALTH INFORMATION MANAGEMENT | Facility: CLINIC | Age: 69
End: 2024-09-25
Payer: COMMERCIAL

## 2024-09-25 DIAGNOSIS — G70.00 MYASTHENIA GRAVIS (H): Primary | ICD-10-CM

## 2024-09-25 DIAGNOSIS — I48.19 PERSISTENT ATRIAL FIBRILLATION (H): ICD-10-CM

## 2024-09-25 DIAGNOSIS — I89.0 LYMPHEDEMA: ICD-10-CM

## 2024-09-25 NOTE — TELEPHONE ENCOUNTER
General Call    Contacts       Contact Date/Time Type Contact Phone/Fax    09/25/2024 10:28 AM CDT Phone (Incoming) Champ Hopkins (Self) [phone] (M)          Reason for Call:  Patient is requesting care coordination to see if its for him. Dealing with multiple issues and would like to speak with Care Coordination consult to help him.      Could we send this information to you in [employer] or would you prefer to receive a phone call?:   Patient would prefer a phone call   Okay to leave a detailed message?: No at Cell number on file:    Telephone Information:   Mobile [phone]

## 2024-09-30 ENCOUNTER — PATIENT OUTREACH (OUTPATIENT)
Dept: CARE COORDINATION | Facility: CLINIC | Age: 69
End: 2024-09-30
Payer: COMMERCIAL

## 2024-09-30 NOTE — PROGRESS NOTES
Clinic Care Coordination Contact  Community Health Worker Initial Outreach    CHW Initial Information Gathering:  Referral Source: PCP  Preferred Hospital: [facility]  [phone]  Preferred Urgent Care: [facility] [place], [phone]  Current living arrangement:: I live in a private home, I live in a private home with spouse  Type of residence:: Private home - stairs  Community Resources: None  Supplies Currently Used at Home: None  Equipment Currently Used at Home: grab bar, toilet, grab bar, tub/shower  Informal Support system:: Spouse  No PCP office visit in Past Year: No  Transportation means:: Regular car       Patient accepts CC: Yes. Patient scheduled for assessment with the RN on 10/1/24 at 1:00 pm. Patient noted desire to discuss supports for coordinating care for his medical concerns.     FABRICIO Cardona  [phone]  Connected Care Resource [facility]

## 2024-10-01 ENCOUNTER — PATIENT OUTREACH (OUTPATIENT)
Dept: NURSING | Facility: CLINIC | Age: 69
End: 2024-10-01
Payer: COMMERCIAL

## 2024-10-01 ASSESSMENT — ACTIVITIES OF DAILY LIVING (ADL): DEPENDENT_IADLS:: INDEPENDENT

## 2024-10-01 NOTE — PROGRESS NOTES
Clinic Care Coordination Contact  Clinic Care Coordination Contact  OUTREACH    Referral Information:  Referral Source: PCP         Chief Complaint   Patient presents with    Clinic Care Coordination - Initial        Universal Utilization: see below     Utilization      No Show Count (past year)  0             ED Visits  1             Hospital Admissions  3                    Current as of: 10/1/2024  9:25 AM                Clinical Concerns:  Current Medical Concerns:    Patient Active Problem List   Diagnosis    Varicose Veins    Persistent atrial fibrillation    Aneurysm Of The Ascending Aorta    Hypothyroidism    Status post ablation of atrial fibrillation    GERD (gastroesophageal reflux disease)    AA (alopecia areata)    Hypertension    Lymphedema    Obesity (BMI 35.0-39.9) with comorbidity (H)    Thyroid nodule    Myasthenia gravis (H)    Presence of Watchman left atrial appendage closure device    Hyperlipidemia         Current Behavioral Concerns:     Education Provided to patient: yes      Health Maintenance Reviewed:    Clinical Pathway: None    Medication Management:  Medication review status: Medications reviewed and no changes reported per patient.        Patient manages all medications independently.  Denies any difficulty     Functional Status:  Dependent ADLs:: Independent  Dependent IADLs:: Independent  Bed or wheelchair confined:: No  Mobility Status: Independent    Living Situation:  Current living arrangement:: I live in a private home with spouse  Type of residence:: Private home - [facility]    Lifestyle & Psychosocial Needs:    Social Determinants of Health     Food Insecurity: Low Risk  (10/1/2024)    Food Insecurity     Within the past 12 months, did you worry that your food would run out before you got money to buy more?: No     Within the past 12 months, did the food you bought just not last and you didn t have money to get more?: No   Depression: Not at risk (2/21/2024)    PHQ-2     PHQ-2  Score: 0   Housing Stability: Low Risk  (10/1/2024)    Housing Stability     Do you have housing? : Yes     Are you worried about losing your housing?: No   Tobacco Use: Low Risk  (6/18/2024)    Patient History     Smoking Tobacco Use: Never     Smokeless Tobacco Use: Never     Passive Exposure: Not on file   Financial Resource Strain: Low Risk  (10/1/2024)    Financial Resource Strain     Within the past 12 months, have you or your family members you live with been unable to get utilities (heat, electricity) when it was really needed?: No   Alcohol Use: Not At Risk (10/1/2024)    AUDIT-C     Frequency of Alcohol Consumption: 4 or more times a week     Average Number of Drinks: 1 or 2     Frequency of Binge Drinking: Never   Transportation Needs: Low Risk  (10/1/2024)    Transportation Needs     Within the past 12 months, has lack of transportation kept you from medical appointments, getting your medicines, non-medical meetings or appointments, work, or from getting things that you need?: No   Physical Activity: Sufficiently Active (10/1/2024)    Exercise Vital Sign     Days of Exercise per Week: 7 days     Minutes of Exercise per Session: 120 min   Interpersonal Safety: Low Risk  (12/19/2023)    Interpersonal Safety     Do you feel physically and emotionally safe where you currently live?: Yes     Within the past 12 months, have you been hit, slapped, kicked or otherwise physically hurt by someone?: No     Within the past 12 months, have you been humiliated or emotionally abused in other ways by your partner or ex-partner?: No   Stress: No Stress Concern Present (10/1/2024)    [employer] [place] [employer] - Occupational Stress Questionnaire     Feeling of Stress : Only a little   Social Connections: Socially Integrated (10/1/2024)    Social Connection and Isolation Panel [NHANES]     Frequency of Communication with Friends and Family: Never     Frequency of Social Gatherings with Friends and Family:  Three times a week     Attends Jewish Services: More than 4 times per year     Active Member of Clubs or Organizations: Yes     Attends Club or Organization Meetings: More than 4 times per year     Marital Status:    Health Literacy: Not on file              Jewish or spiritual beliefs that impact treatment:: No  Mental health DX:: No  Informal Support system:: Spouse        68 yr M PMH: as listed above.  Stated he has been getting letters in the mail from [facility] about care coordination and is confused about what it is.  Writer attempted to explain this and how it is different from primary care CCC.  He went on to discuss his dissatisfaction with the vascular center in 2021 for lymphedema therapy and outpatient wraps.  Writer attempted to discuss another referral to vascular as the previous provider is no longer there.  He declined to have this referral placed.  Champ has a strong knowledge of his medications, appointments and medical history.  We reviewed together his cardiology appointments from June 2024.  Discussed that he needed to [facility] to schedule his 6 month Afib clinic appointment ASAP and his 12 month Dr. Cotter appointment.  He stated he would do this independently.  Patient not enrolled to CCC.  All items reviewed as listed above.  Champ to discuss lymphedema further with PCP in the future.  Writer additionally recommended he call [employer] to inquire if they had additional recommendations for lymphedema therapists in this area.       Resources and Interventions:  Current Resources:      Community Resources: None  Supplies Currently Used at Home: None  Equipment Currently Used at Home: grab bar, tub/shower  Employment Status: retired         Advance Care Plan/Directive  Advanced Care Plans/Directives on file:: No    Referrals Placed: None         Care Plan:      Patient/Caregiver understanding: patient stated an understanding       Future Appointments                In 1 month Sabrina  Magdaleno BURKETT MD [facility], [facility]            Plan: Patient not enrolled to Clinic Care Coordination

## 2024-11-03 SDOH — HEALTH STABILITY: PHYSICAL HEALTH: ON AVERAGE, HOW MANY MINUTES DO YOU ENGAGE IN EXERCISE AT THIS LEVEL?: 60 MIN

## 2024-11-03 SDOH — HEALTH STABILITY: PHYSICAL HEALTH: ON AVERAGE, HOW MANY DAYS PER WEEK DO YOU ENGAGE IN MODERATE TO STRENUOUS EXERCISE (LIKE A BRISK WALK)?: 3 DAYS

## 2024-11-03 ASSESSMENT — SOCIAL DETERMINANTS OF HEALTH (SDOH): HOW OFTEN DO YOU GET TOGETHER WITH FRIENDS OR RELATIVES?: MORE THAN THREE TIMES A WEEK

## 2024-11-06 ENCOUNTER — OFFICE VISIT (OUTPATIENT)
Dept: FAMILY MEDICINE | Facility: CLINIC | Age: 69
End: 2024-11-06
Payer: COMMERCIAL

## 2024-11-06 VITALS
RESPIRATION RATE: 12 BRPM | HEART RATE: 54 BPM | BODY MASS INDEX: 37.22 KG/M2 | OXYGEN SATURATION: 98 % | WEIGHT: 260 LBS | DIASTOLIC BLOOD PRESSURE: 74 MMHG | SYSTOLIC BLOOD PRESSURE: 108 MMHG | HEIGHT: 70 IN | TEMPERATURE: 98.4 F

## 2024-11-06 DIAGNOSIS — I89.0 LYMPHEDEMA: ICD-10-CM

## 2024-11-06 DIAGNOSIS — I10 PRIMARY HYPERTENSION: ICD-10-CM

## 2024-11-06 DIAGNOSIS — Z00.00 WELLNESS EXAMINATION: Primary | ICD-10-CM

## 2024-11-06 DIAGNOSIS — E66.01 MORBID OBESITY (H): ICD-10-CM

## 2024-11-06 DIAGNOSIS — G70.00 MYASTHENIA GRAVIS (H): ICD-10-CM

## 2024-11-06 DIAGNOSIS — Z23 NEED FOR VACCINATION: ICD-10-CM

## 2024-11-06 DIAGNOSIS — I48.19 PERSISTENT ATRIAL FIBRILLATION (H): ICD-10-CM

## 2024-11-06 DIAGNOSIS — I71.21 ANEURYSM OF ASCENDING AORTA WITHOUT RUPTURE (H): ICD-10-CM

## 2024-11-06 DIAGNOSIS — E06.3 HYPOTHYROIDISM DUE TO HASHIMOTO'S THYROIDITIS: ICD-10-CM

## 2024-11-06 DIAGNOSIS — M10.9 GOUT, UNSPECIFIED CAUSE, UNSPECIFIED CHRONICITY, UNSPECIFIED SITE: ICD-10-CM

## 2024-11-06 DIAGNOSIS — E06.3 HYPOTHYROIDISM DUE TO HASHIMOTO THYROIDITIS: ICD-10-CM

## 2024-11-06 DIAGNOSIS — E04.1 THYROID NODULE: ICD-10-CM

## 2024-11-06 DIAGNOSIS — E78.2 MIXED HYPERLIPIDEMIA: ICD-10-CM

## 2024-11-06 LAB
ALBUMIN SERPL BCG-MCNC: 4.1 G/DL (ref 3.5–5.2)
ALP SERPL-CCNC: 87 U/L (ref 40–150)
ALT SERPL W P-5'-P-CCNC: 18 U/L (ref 0–70)
ANION GAP SERPL CALCULATED.3IONS-SCNC: 9 MMOL/L (ref 7–15)
AST SERPL W P-5'-P-CCNC: 24 U/L (ref 0–45)
BILIRUB SERPL-MCNC: 0.4 MG/DL
BUN SERPL-MCNC: 21.1 MG/DL (ref 8–23)
CALCIUM SERPL-MCNC: 9.5 MG/DL (ref 8.8–10.4)
CHLORIDE SERPL-SCNC: 101 MMOL/L (ref 98–107)
CHOLEST SERPL-MCNC: 131 MG/DL
CREAT SERPL-MCNC: 1.16 MG/DL (ref 0.67–1.17)
EGFRCR SERPLBLD CKD-EPI 2021: 69 ML/MIN/1.73M2
FASTING STATUS PATIENT QL REPORTED: ABNORMAL
FASTING STATUS PATIENT QL REPORTED: NORMAL
GLUCOSE SERPL-MCNC: 108 MG/DL (ref 70–99)
HCO3 SERPL-SCNC: 29 MMOL/L (ref 22–29)
HDLC SERPL-MCNC: 64 MG/DL
LDLC SERPL CALC-MCNC: 45 MG/DL
NONHDLC SERPL-MCNC: 67 MG/DL
POTASSIUM SERPL-SCNC: 4.8 MMOL/L (ref 3.4–5.3)
PROT SERPL-MCNC: 7.3 G/DL (ref 6.4–8.3)
SODIUM SERPL-SCNC: 139 MMOL/L (ref 135–145)
TRIGL SERPL-MCNC: 110 MG/DL
TSH SERPL DL<=0.005 MIU/L-ACNC: 6.43 UIU/ML (ref 0.3–4.2)

## 2024-11-06 PROCEDURE — 36415 COLL VENOUS BLD VENIPUNCTURE: CPT | Performed by: FAMILY MEDICINE

## 2024-11-06 PROCEDURE — 80053 COMPREHEN METABOLIC PANEL: CPT | Performed by: FAMILY MEDICINE

## 2024-11-06 PROCEDURE — 99214 OFFICE O/P EST MOD 30 MIN: CPT | Mod: 25 | Performed by: FAMILY MEDICINE

## 2024-11-06 PROCEDURE — 90662 IIV NO PRSV INCREASED AG IM: CPT | Performed by: FAMILY MEDICINE

## 2024-11-06 PROCEDURE — G0008 ADMIN INFLUENZA VIRUS VAC: HCPCS | Performed by: FAMILY MEDICINE

## 2024-11-06 PROCEDURE — G0439 PPPS, SUBSEQ VISIT: HCPCS | Performed by: FAMILY MEDICINE

## 2024-11-06 PROCEDURE — 84443 ASSAY THYROID STIM HORMONE: CPT | Performed by: FAMILY MEDICINE

## 2024-11-06 PROCEDURE — 80061 LIPID PANEL: CPT | Performed by: FAMILY MEDICINE

## 2024-11-06 NOTE — PROGRESS NOTES
"Preventive Care Visit  [facility] RUKHSANA Bennett MD, Family Medicine  Nov 6, 2024      Assessment & Plan     (Z00.00) Wellness examination  (primary encounter diagnosis)  Comment: Patient is in overall fairly good health given his significant comorbidities.  Plan: PRIMARY CARE FOLLOW-UP SCHEDULING           (I48.19) Atrial fibrillation  Comment: Patient is in sinus rhythm, he has had cardioversions, ablation and even presence of a Watchman device  Plan:      (I71.21) Aneurysm of ascending aorta without rupture (H)  Comment: Noted in 2022 followed by cardiology  Plan:      (E06.3) Hypothyroidism   Comment: On thyroid replacement  Plan: CANCELED: TSH             (I10) Hypertension  Comment: Currently well-controlled on hydrochlorothiazide and lisinopril  Plan:      (E78.2) Hyperlipidemia  Comment: Controlled with Crestor  Plan: Comprehensive metabolic panel, Lipid panel         reflex to direct LDL Fasting             (E04.1) Thyroid nodule  Comment: Noted previously  Plan: US Thyroid             (I89.0) Lymphedema  Comment: Chronic longstanding has not responded well to treatment  Plan:      (G70.00) Myasthenia gravis (H)  Comment: Presented as double vision in remission for now  Plan:      (M10.9) Gout  Comment: Centimeters left hand swelling, only 1 obvious recurrent  Plan:      (Z23) Need for vaccination  Comment:    Plan: INFLUENZA HIGH DOSE, TRIVALENT, PF (FLUZONE)           (E66.01) Morbid obesity (H)  Comment: Elevated BMI with comorbidity    PLAN:  1.  Influenza vaccine  2.  Patient is followed both by general cardiology and electro cardiology  3.  Thyroid ultrasound  4.  Laboratory studies as above  5.  Continue the patient on his same medication  6.  Patient otherwise should be seen yearly                  BMI  Estimated body mass index is 37.31 kg/m  as calculated from the following:    Height as of this encounter: 1.778 m (5' 10\").    Weight as of this encounter: 117.9 kg (260 " lb).   Weight management plan: Discussed healthy diet and exercise guidelines    Counseling  Appropriate preventive services were addressed with this patient via screening, questionnaire, or discussion as appropriate for fall prevention, nutrition, physical activity, Tobacco-use cessation, social engagement, weight loss and cognition.  Checklist reviewing preventive services available has been given to the patient.  Reviewed patient's diet, addressing concerns and/or questions.   He is at risk for lack of exercise and has been provided with information to increase physical activity for the benefit of his well-being.   The patient was provided with written information regarding signs of hearing loss.           Andre Oakes is a 69 year old, presenting for the following:  Physical ([facility])        11/6/2024     9:19 AM   Additional Questions   Roomed by Dennis NORTON  Patient comes in for his annual Medicare wellness examination.  The patient has a longstanding history of atrial fibrillation he is undergone various procedures including cardioversion and ablation and presence of a Watchman device he recently underwent an ablation back in February of this year he had a brief episode of atrial fibrillation over the summer, cardiology put him back on low-dose metoprolol for this, he also has a smart watch which basically tells him that almost always he is in sinus rhythm.    Patient has a history of an aortic aneurysm, followed regularly by cardiology.    Patient has a history of lymphedema previously seen by a specialist, he has not had good results with diuretics or compression devices.    Patient has a known history of a thyroid nodule he does need follow-up ultrasound he also has hypothyroidism on thyroid replacement.    Patient was seen in July for acute onset of left hand swelling, uric acid was elevated consistent with gout however the patient does not have a history of any other similar episodes  consistent with gout.    Patient has a history of myasthenia, presented as double vision, of note patient was on a medication for this briefly but it caused side effects he is no longer on any medication and actually is not having any double vision or any other symptoms consistent with this.                      Health Care Directive  Patient does not have a Health Care Directive: Advance Directive received and scanned. Click on Code in the patient header to view.      11/3/2024   General Health   How would you rate your overall physical health? Good   Feel stress (tense, anxious, or unable to sleep) Not at all            11/3/2024   Nutrition   Diet: Regular (no restrictions)            11/3/2024   Exercise   Days per week of moderate/strenous exercise 3 days   Average minutes spent exercising at this level 60 min            11/3/2024   Social Factors   Frequency of gathering with friends or relatives More than three times a week   Worry food won't last until get money to buy more No   Food not last or not have enough money for food? No   Do you have housing? (Housing is defined as stable permanent housing and does not include staying ouside in a car, in a tent, in an abandoned building, in an overnight shelter, or couch-surfing.) No   Are you worried about losing your housing? No   Lack of transportation? No   Unable to get utilities (heat,electricity)? No   Want help with housing or utility concern? No      (!) HOUSING CONCERN PRESENT      11/3/2024   Fall Risk   Fallen 2 or more times in the past year? No    Trouble with walking or balance? No        Patient-reported          11/3/2024   Activities of Daily Living- Home Safety   Needs help with the following daily activites None of the above   Safety concerns in the home None of the above            11/3/2024   Dental   Dentist two times every year? Yes            11/3/2024   Hearing Screening   Hearing concerns? (!) IT'S HARD TO FOLLOW A CONVERSATION IN A NOISY  RESTAURANT OR CROWDED ROOM.            11/3/2024   Driving Risk Screening   Patient/family members have concerns about driving No            11/3/2024   General Alertness/Fatigue Screening   Have you been more tired than usual lately? No            11/3/2024   Urinary Incontinence Screening   Bothered by leaking urine in past 6 months No            11/3/2024   TB Screening   Were you born outside of the [place]? No            Today's PHQ-2 Score:       11/6/2024     8:54 AM   PHQ-2 ( 1999 [employer])   Q1: Little interest or pleasure in doing things 0    Q2: Feeling down, depressed or hopeless 0    PHQ-2 Score 0    Q1: Little interest or pleasure in doing things Not at all   Q2: Feeling down, depressed or hopeless Not at all   PHQ-2 Score 0       Patient-reported           11/3/2024   Substance Use   Alcohol more than 3/day or more than 7/wk No   Do you have a current opioid prescription? No   How severe/bad is pain from 1 to 10? 5/10   Do you use any other substances recreationally? No        Social History     Tobacco Use    Smoking status: Never    Smokeless tobacco: Never   Vaping Use    Vaping status: Never Used   Substance Use Topics    Alcohol use: Yes     Alcohol/week: 5.0 standard drinks of alcohol     Types: 5 Cans of beer per week     Comment: minimal    Drug use: No           11/3/2024   AAA Screening   Family history of Abdominal Aortic Aneurysm (AAA)? Unsure      Last PSA:   Prostate Specific Antigen Screen   Date Value Ref Range Status   06/01/2020 1.3 0.0 - 4.5 ng/mL Final     ASCVD Risk   The ASCVD Risk score (Jarred CHENG, et al., 2019) failed to calculate for the following reasons:    The valid total cholesterol range is 130 to 320 mg/dL            Reviewed and updated as needed this visit by Provider                    History reviewed. No pertinent past medical history.  Past Surgical History:   Procedure Laterality Date    ARTHROPLASTY REVISION HIP Right 05/01/2019    ARTHROPLASTY REVISION KNEE  Right 2019    CARDIOVERSION  07/01/2019    7/10/2019    CARDIOVERSION  09/12/2019    CATARACT EXTRACTION Bilateral 06/2021    CV LEFT ATRIAL APPENDAGE CLOSURE N/A 1/12/2023    Procedure: Left Atrial Appendage Closure;  Surgeon: Mitesh Graff MD;  Location: [facility]    DENTAL SURGERY      Oral Surgery Tooth Extraction;  Implant    EP ABLATION PULMONARY VEIN ISOLATION N/A 3/19/2024    Procedure: Ablation Atrial Fibrillation;  Surgeon: Benedicto Barfield MD;  Location: [facility]    [place] ABLATE HEART DYSRHYTHM FOCUS  08/19/2013    Description: Catheter Ablation Atrial Fibrillation;  Recorded: 11/16/2013;  Comments: PVI Aug 19, 2013 (Cryo to all 4 PV's)    TOOTH EXTRACTION      implant    [facility] TOTAL HIP ARTHROPLASTY Right 05/08/2019    Procedure: RIGHT TOTAL HIP ARTHROPLASTY DIRECT ANTERIOR APPROACH;  Surgeon: Mario Woodruff MD;  Location: [facility] OR;  Service: Orthopedics    [facility] TOTAL KNEE ARTHROPLASTY Right 06/21/2017    Procedure: 2)  RIGHT TOTAL KNEE ARTHROPLASTY;  Surgeon: Mario VICKERS MD;  Location: [facility] OR;  Service: Orthopedics     Lab work is in process  Labs reviewed in EPIC  Patient Active Problem List   Diagnosis    Varicose Veins    Persistent atrial fibrillation    Aneurysm Of The Ascending Aorta    Hypothyroidism    Status post ablation of atrial fibrillation    GERD (gastroesophageal reflux disease)    AA (alopecia areata)    Hypertension    Lymphedema    Obesity (BMI 35.0-39.9) with comorbidity (H)    Thyroid nodule    Myasthenia gravis (H)    Presence of Watchman left atrial appendage closure device    Hyperlipidemia    Gout     Past Surgical History:   Procedure Laterality Date    ARTHROPLASTY REVISION HIP Right 05/01/2019    ARTHROPLASTY REVISION KNEE Right 2019    CARDIOVERSION  07/01/2019    7/10/2019    CARDIOVERSION  09/12/2019    CATARACT EXTRACTION Bilateral 06/2021    CV LEFT ATRIAL APPENDAGE CLOSURE N/A 1/12/2023    Procedure: Left Atrial  Appendage Closure;  Surgeon: Mitesh Grfaf MD;  Location: [facility] CATH LAB CV    DENTAL SURGERY      Oral Surgery Tooth Extraction;  Implant    EP ABLATION PULMONARY VEIN ISOLATION N/A 3/19/2024    Procedure: Ablation Atrial Fibrillation;  Surgeon: Benedicto Barfield MD;  Location: [facility] LAB CV    [place] ABLATE HEART DYSRHYTHM FOCUS  08/19/2013    Description: Catheter Ablation Atrial Fibrillation;  Recorded: 11/16/2013;  Comments: PVI Aug 19, 2013 (Cryo to all 4 PV's)    TOOTH EXTRACTION      implant    [facility] TOTAL HIP ARTHROPLASTY Right 05/08/2019    Procedure: RIGHT TOTAL HIP ARTHROPLASTY DIRECT ANTERIOR APPROACH;  Surgeon: Mario Woodruff MD;  Location: [facility] OR;  Service: Orthopedics    [facility] TOTAL KNEE ARTHROPLASTY Right 06/21/2017    Procedure: 2)  RIGHT TOTAL KNEE ARTHROPLASTY;  Surgeon: Mario VICKERS MD;  Location: [facility] OR;  Service: Orthopedics       Social History     Tobacco Use    Smoking status: Never    Smokeless tobacco: Never   Substance Use Topics    Alcohol use: Yes     Alcohol/week: 5.0 standard drinks of alcohol     Types: 5 Cans of beer per week     Comment: minimal     Family History   Problem Relation Age of Onset    Atrial fibrillation Mother     Dementia Mother         dx 80s    Diabetes Type 2  Mother         dx 60s/70s    Atrial fibrillation Father     Rheumatoid Arthritis Father     Atrial fibrillation Sister     Cerebrovascular Disease Sister     Parkinsonism Sister     Parkinsonism Sister     Atrial fibrillation Brother     Rheumatoid Arthritis Brother     Cerebrovascular Disease Brother     CABG Brother     Kidney failure Brother         Dialysis    Atrial fibrillation Brother     Diabetes Type 2  Brother         Dx 40's         Current Outpatient Medications   Medication Sig Dispense Refill    aspirin 81 MG EC tablet Take 1 tablet (81 mg) by mouth daily      hydrochlorothiazide (HYDRODIURIL) 25 MG tablet TAKE 1 TABLET BY MOUTH EVERY DAY IN THE MORNING FOR  HIGH BLOOD PRESSURE 90 tablet 0    levothyroxine (SYNTHROID/LEVOTHROID) 100 MCG tablet TAKE 1 TABLET(100 MCG) BY MOUTH DAILY 90 tablet 0    lisinopril (ZESTRIL) 40 MG tablet TAKE 1 TABLET BY MOUTH DAILY FOR HIGH BLOOD PRESSURE 90 tablet 1    metoprolol tartrate (LOPRESSOR) 25 MG tablet Take 1 tablet (25 mg) by mouth daily 90 tablet 3    omeprazole (PRILOSEC) 20 MG DR capsule TAKE 1 CAPSULE BY MOUTH DAILY BEFORE BREAKFAST 90 capsule 0    rosuvastatin (CRESTOR) 10 MG tablet Take 1 tablet (10 mg) by mouth daily 90 tablet 3     Allergies   Allergen Reactions    Adhesive Tape Blisters     3M clear adhesive post ablation 3/19/2024    Sulfa (Sulfonamide Antibiotics) [Sulfa Antibiotics] Rash     Rash - turned purple  , Around age 32     Current providers sharing in care for this patient include:  Patient Care Team:  Magdaleno Bennett MD as PCP - General (Family Medicine)  Cayetano Traore MD as Assigned Neuroscience Provider  Magdaleno Bennett MD as Assigned PCP  Nicolle Ryan APRN CNP as Assigned Heart and Vascular Provider    The following health maintenance items are reviewed in Epic and correct as of today:  Health Maintenance   Topic Date Due    ANNUAL REVIEW OF HM ORDERS  Never done    RSV VACCINE (1 - Risk 60-74 years 1-dose series) Never done    INFLUENZA VACCINE (1) 09/01/2024    COVID-19 Vaccine (5 - 2024-25 season) 09/01/2024    MEDICARE ANNUAL WELLNESS VISIT  12/19/2024    TSH W/FREE T4 REFLEX  12/19/2024    BMP  03/20/2025    LIPID  06/05/2025    FALL RISK ASSESSMENT  11/06/2025    GLUCOSE  03/20/2027    DTAP/TDAP/TD IMMUNIZATION (4 - Td or Tdap) 09/10/2028    COLORECTAL CANCER SCREENING  10/30/2028    ADVANCE CARE PLANNING  02/21/2029    PHQ-2 (once per calendar year)  Completed    Pneumococcal Vaccine: 65+ Years  Completed    HPV IMMUNIZATION  Aged Out    MENINGITIS IMMUNIZATION  Aged Out    RSV MONOCLONAL ANTIBODY  Aged Out    HEPATITIS C SCREENING  Discontinued    ZOSTER IMMUNIZATION   "Discontinued         Review of Systems  Constitutional, HEENT, cardiovascular, pulmonary, GI, , musculoskeletal, neuro, skin, endocrine and psych systems are negative, except as otherwise noted.     Objective    Exam  /74   Pulse 54   Temp 98.4  F (36.9  C) (Oral)   Resp 12   Ht 1.778 m (5' 10\")   Wt 117.9 kg (260 lb)   SpO2 98%   BMI 37.31 kg/m     Estimated body mass index is 37.31 kg/m  as calculated from the following:    Height as of this encounter: 1.778 m (5' 10\").    Weight as of this encounter: 117.9 kg (260 lb).    Physical Exam  GENERAL: alert and no distress  EYES: Eyes grossly normal to inspection, PERRL and conjunctivae and sclerae normal  HENT: ear canals and TM's normal, nose and mouth without ulcers or lesions  NECK: no adenopathy, no asymmetry, masses, or scars  RESP: lungs clear to auscultation - no rales, rhonchi or wheezes  CV: regular rate and rhythm, normal S1 S2, no S3 or S4, no murmur, click or rub, no peripheral edema  ABDOMEN: soft, nontender, no hepatosplenomegaly, no masses and bowel sounds normal  MS: no gross musculoskeletal defects noted, no edema  SKIN: no suspicious lesions or rashes  NEURO: Normal strength and tone, mentation intact and speech normal  PSYCH: mentation appears normal, affect normal/bright        11/6/2024   Mini Cog   Clock Draw Score 2 Normal   3 Item Recall 3 objects recalled   Mini Cog Total Score 5                 Signed Electronically by: Magdaleno Bennett MD    "

## 2024-11-06 NOTE — LETTER
November 7, 2024      [facility]  [address] [place] [address]        Dear ,    We are writing to inform you of your test results.  The TSH or thyroid test is a bit off, I am increasing your dose from 100 mcg to 112 mcg, I would like you to come back in January for a lab only visit for the thyroid to be rechecked you do not need to be fasting    Sugar is just slightly high at 108, ideally under 100, this is not in the range of diabetes but good diet and exercise will help keep blood sugar low.  Liver and kidney tests are normal  Cholesterol is overall very well-controlled    See us again in 1 year            Resulted Orders   Comprehensive metabolic panel   Result Value Ref Range    Sodium 139 135 - 145 mmol/L    Potassium 4.8 3.4 - 5.3 mmol/L    Carbon Dioxide (CO2) 29 22 - 29 mmol/L    Anion Gap 9 7 - 15 mmol/L    Urea Nitrogen 21.1 8.0 - 23.0 mg/dL    Creatinine 1.16 0.67 - 1.17 mg/dL    GFR Estimate 69 >60 mL/min/1.73m2      Comment:      eGFR calculated using 2021 CKD-EPI equation.    Calcium 9.5 8.8 - 10.4 mg/dL      Comment:      Reference intervals for this test were updated on 7/16/2024 to reflect our healthy population more accurately. There may be differences in the flagging of prior results with similar values performed with this method. Those prior results can be interpreted in the context of the updated reference intervals.    Chloride 101 98 - 107 mmol/L    Glucose 108 (H) 70 - 99 mg/dL    Alkaline Phosphatase 87 40 - 150 U/L    AST 24 0 - 45 U/L    ALT 18 0 - 70 U/L    Protein Total 7.3 6.4 - 8.3 g/dL    Albumin 4.1 3.5 - 5.2 g/dL    Bilirubin Total 0.4 <=1.2 mg/dL    Patient Fasting > 8hrs? Unknown    Lipid panel reflex to direct LDL Fasting   Result Value Ref Range    Cholesterol 131 <200 mg/dL    Triglycerides 110 <150 mg/dL    Direct Measure HDL 64 >=40 mg/dL    LDL Cholesterol Calculated 45 <100 mg/dL    Non HDL Cholesterol 67 <130 mg/dL    Patient Fasting > 8hrs? Unknown      Narrative    Cholesterol  Desirable: < 200 mg/dL  Borderline High: 200 - 239 mg/dL  High: >= 240 mg/dL    Triglycerides  Normal: < 150 mg/dL  Borderline High: 150 - 199 mg/dL  High: 200-499 mg/dL  Very High: >= 500 mg/dL    Direct Measure HDL  Female: >= 50 mg/dL   Male: >= 40 mg/dL    LDL Cholesterol  Desirable: < 100 mg/dL  Above Desirable: 100 - 129 mg/dL   Borderline High: 130 - 159 mg/dL   High:  160 - 189 mg/dL   Very High: >= 190 mg/dL    Non HDL Cholesterol  Desirable: < 130 mg/dL  Above Desirable: 130 - 159 mg/dL  Borderline High: 160 - 189 mg/dL  High: 190 - 219 mg/dL  Very High: >= 220 mg/dL   TSH   Result Value Ref Range    TSH 6.43 (H) 0.30 - 4.20 uIU/mL       If you have any questions or concerns, please call the clinic at the number listed above.       Sincerely,      Magdaleno Bennett MD

## 2024-11-07 DIAGNOSIS — E06.3 HYPOTHYROIDISM DUE TO HASHIMOTO THYROIDITIS: Primary | ICD-10-CM

## 2024-11-07 RX ORDER — LEVOTHYROXINE SODIUM 112 UG/1
112 TABLET ORAL DAILY
Qty: 90 TABLET | Refills: 0 | Status: SHIPPED | OUTPATIENT
Start: 2024-11-07

## 2024-11-14 ENCOUNTER — HOSPITAL ENCOUNTER (OUTPATIENT)
Dept: ULTRASOUND IMAGING | Facility: CLINIC | Age: 69
Discharge: HOME | End: 2024-11-14
Attending: FAMILY MEDICINE
Payer: COMMERCIAL

## 2024-11-14 DIAGNOSIS — E04.1 THYROID NODULE: ICD-10-CM

## 2024-11-14 PROCEDURE — 76536 US EXAM OF HEAD AND NECK: CPT

## 2024-11-18 DIAGNOSIS — I10 ESSENTIAL HYPERTENSION: ICD-10-CM

## 2024-11-19 RX ORDER — HYDROCHLOROTHIAZIDE 25 MG/1
TABLET ORAL
Qty: 90 TABLET | Refills: 2 | Status: SHIPPED | OUTPATIENT
Start: 2024-11-19

## 2024-11-27 ENCOUNTER — OFFICE VISIT (OUTPATIENT)
Dept: CARDIOLOGY | Facility: CLINIC | Age: 69
End: 2024-11-27
Payer: COMMERCIAL

## 2024-11-27 VITALS
BODY MASS INDEX: 37.07 KG/M2 | WEIGHT: 264.8 LBS | SYSTOLIC BLOOD PRESSURE: 104 MMHG | HEART RATE: 65 BPM | HEIGHT: 71 IN | DIASTOLIC BLOOD PRESSURE: 58 MMHG | OXYGEN SATURATION: 98 %

## 2024-11-27 DIAGNOSIS — Z98.890 STATUS POST ABLATION OF ATRIAL FIBRILLATION: ICD-10-CM

## 2024-11-27 DIAGNOSIS — Z86.79 STATUS POST ABLATION OF ATRIAL FIBRILLATION: ICD-10-CM

## 2024-11-27 DIAGNOSIS — I71.21 ANEURYSM OF ASCENDING AORTA WITHOUT RUPTURE (H): ICD-10-CM

## 2024-11-27 DIAGNOSIS — I10 PRIMARY HYPERTENSION: ICD-10-CM

## 2024-11-27 DIAGNOSIS — I48.19 PERSISTENT ATRIAL FIBRILLATION (H): Primary | ICD-10-CM

## 2024-11-27 PROCEDURE — 99215 OFFICE O/P EST HI 40 MIN: CPT | Performed by: NURSE PRACTITIONER

## 2024-11-27 PROCEDURE — G2211 COMPLEX E/M VISIT ADD ON: HCPCS | Performed by: NURSE PRACTITIONER

## 2024-11-27 NOTE — LETTER
11/27/2024    Magdaleno Bennett MD  [address] [address]    RE: Brett Castroenson       Dear Colleague,     I had the pleasure of seeing Brett Hopkins in the [facility].       [facility]  Cardiac Electrophysiology  [address]  [place] [address]   Office: [phone]  Fax: [phone]     HEART CARE ELECTROPHYSIOLOGY NOTE     Primary Care: Magdaleno Bennett MD       Assessment/Recommendations     Persistent atrial fibrillation, a/typical atrial flutter/stage 3D: paroxysmal AF diagnosed 2007, failing propafenone and flecainide prior to PVI 8/19/2013.    Recurrent AF 6 months post ablation, symptomatic with worsening fatigue, dyspnea on exertion and occasional palpitations, requiring multiple cardioversions over the next several years on flecainide and sotalol  Status post RF PWI/roofline, CTI flutter and TANIA ablation 3/19/2024.  Sotalol discontinued immediately post ablation; MCT 5/2024 unremarkable for recurrent AF or tachyarrhythmias  Asymptomatic recurrence 8/2024 which converted spontaneously.  He prefers minimal medications and would like to trial off metoprolol if not needed.  Continue expectant management    Presence of Watchman left atrial appendage closure device: [id number] score of 3 for age 65-74, hypertension, CAD.  Sp LAAC 1/12/2023     CAD: Mild/nonobstructive disease by CT, negative MPI 2022.  No anginal symptoms    Plan:  Continue aspirin 81 mg daily for stroke prophylaxis  Taper/discontinue metoprolol  Follow-up with Dr. Cotter 6 months, EP 1 year     History of Present Illness/Subjective    Brett Hopkins is a 68 year old male with past medical history significant for persistent AF status post ablation 2013 and 3/19/2024, percutaneous left atrial appendage closure in situ 1/12/2023, HTN, nonobstructive CAD, ascending aorta dilation, hyperlipidemia, GERD, hypothyroidism, myasthenia gravis, chronic RLE lymphedema,  obesity, seen today for EP follow-up and 2 year post Watchman implant    He has a history of paroxysmal AF diagnosed in 2007, failing propafenone and flecainide prior to PVI 8/19/2013.  He developed recurrent AF about 6 months post ablation, symptomatic with worsening fatigue, dyspnea on exertion and occasional palpitations, requiring DCCV 7/10/2019 with ERAF.  He was briefly on flecainide prior to undergoing DCCV on sotalol 9/12/2019 and 2/17/2023. Sotalol was decreased due to symptomatic bradycardia with recurrent AF around 11/6/2023 requiring DCCV 11/17/2023 and 2/27/2024.  He underwent repeat ablation consisting of RF PWI/roofline, CTI flutter and TANIA ablation 3/19/2024 and treated for post ablation pericarditis.  His sotalol was discontinued immediately post ablation; MCT 5/2024 was unremarkable for recurrent AF or tachyarrhythmias.    After initially noticing elevated heart rates at home via [employer] he was documented in recurrent AF with RVR by EKG August 2024, which converted spontaneously after about 12 hours.  He had no specific symptoms associated with this episode.  He monitors his heart rates with his smart watch and [employer] mobile which have consistently been 60-70s at home over the past few months.  He does not recall previous side effects on flecainide and believes this was discontinued due to breakthrough AF, and that lightheadedness on sotalol may have been due to dehydration. He has no palpitations, chest discomfort, dyspnea on exertion at rest, lightheadedness/dizziness, pedal edema or syncope.     Data Review     Arrhythmia hx:   Dx/date: Paroxysmal AF 2007.  Persistent 7/2019, 11/2023  Sx: Historically SANDERS, fatigue, palpitations.  Asymptomatic recurrence 8/2023  [id number]: 3 for age 65-74, hypertension and vascular disease. Sp LAAC 1/12/2023  AAD: Propafenone pre ablation 2013, flecainide, sotalol (lightheadedness/dizziness)  DCCV: 7/10/2019 (ERAF), 2/17/2023, 11/17/2023,  2/27/2024  Ablation: cryoballoon PVI 8/19/2013 (Dr. Barfield). RF PWI/roofline, TANIA line, empiric CTI flutter ablation 3/19/2024 (Dr. Barfield)    EKG 8/16/2024:  bpm, IVCD  ms, QT/QTc 328/492 ms  3/20/2024: SR 98 bpm, atrial ectopy  3/19/2024: SR 90 bpm, atrial ectopy  3/11/2024 AF 97 bpm, borderline RBBB    DIONTE 5/3/2023  1. Normal left ventricular size and systolic performance with a visually  estimated ejection fraction of 60%.  2. No significant valvular heart disease is identified on this study.  3. Normal right ventricular size and systolic performance.  4. There is mild left atrial enlargement.  5. There is a left atrial appendage occlusion device. The device appears well-  placed and seated.  Â  No thrombus is detected upon the surface of the device.  Â  No flow extending into the left atrial appendage was detected around the  device.  6. No residual postprocedural atrial communication is identified.  7. No intracardiac mass or thrombus is detected  8. Echo contrast examination was performed using agitated NS as contrast  agent. The right heart opacity was good and the left heart was well  visualized. There was no evidence of right to left shunting during spontaneous  respiration or following release of Valsalva.    Mobile cardiac telemetry monitoring from 5/9/2024 to 5/22/2024 (monitored duration 13d 5h).  Predominant underlying rhythm was sinus rhythm, 47 to 172bpm, average 77 bpm.  No sustained tachyarrhythmias.  No atrial fibrillation.  There were no pauses noted.  Rare supraventricular ectopic beats (2%). One 13 beat run of non sustained SVT was noted.  Rare premature ventricular contractions (1%).  Symptom triggers (2 events) correlated to sinus rhythm with rare PAC.    I have reviewed and updated the patient's past medical history, allergies and medication list.               Physical Examination Review of Systems   /58 (BP Location: Left arm, Patient Position: Sitting, Cuff Size: Adult  "Large)   Pulse 65   Ht 1.803 m (5' 11\")   Wt 120.1 kg (264 lb 12.8 oz)   SpO2 98%   BMI 36.93 kg/m      Body mass index is 36.93 kg/m .    Wt Readings from Last 3 Encounters:   11/27/24 120.1 kg (264 lb 12.8 oz)   11/06/24 117.9 kg (260 lb)   07/15/24 117 kg (258 lb)     General   Appearance:   Alert and oriented, no acute distress   HEENT:  Normocephalic and atraumatic   Neck: No JVP, carotid bruit or obvious thyromegaly   Lungs:   Respirations unlabored   Cardiovascular:   Rhythm is regular. S1 and S2 are normal. No significant murmur is present. Lower extremities demonstrate no significant edema   Extremities: No cyanosis or clubbing   Skin: Skin is warm, dry, and otherwise intact   Neurologic: Gait not assessed. Mood and affect appropriate                                                   Medical History  Surgical History Family History Social History   No past medical history on file. Past Surgical History:   Procedure Laterality Date     ARTHROPLASTY REVISION HIP Right 05/01/2019     ARTHROPLASTY REVISION KNEE Right 2019     CARDIOVERSION  07/01/2019    7/10/2019     CARDIOVERSION  09/12/2019     CATARACT EXTRACTION Bilateral 06/2021     CV LEFT ATRIAL APPENDAGE CLOSURE N/A 1/12/2023    Procedure: Left Atrial Appendage Closure;  Surgeon: Mitesh Graff MD;  Location: [facility]     DENTAL SURGERY      Oral Surgery Tooth Extraction;  Implant     EP ABLATION PULMONARY VEIN ISOLATION N/A 3/19/2024    Procedure: Ablation Atrial Fibrillation;  Surgeon: Benedicto Barfield MD;  Location: [facility]     MO ABLATE HEART DYSRHYTHM FOCUS  08/19/2013    Description: Catheter Ablation Atrial Fibrillation;  Recorded: 11/16/2013;  Comments: PVI Aug 19, 2013 (Cryo to all 4 PV's)     TOOTH EXTRACTION      implant     ZZC TOTAL HIP ARTHROPLASTY Right 05/08/2019    Procedure: RIGHT TOTAL HIP ARTHROPLASTY DIRECT ANTERIOR APPROACH;  Surgeon: Mario Woodruff MD;  Location: [facility];  Service: " Orthopedics     [facility] TOTAL KNEE ARTHROPLASTY Right 06/21/2017    Procedure: 2)  RIGHT TOTAL KNEE ARTHROPLASTY;  Surgeon: Mario VICKERS MD;  Location: [facility] Main OR;  Service: Orthopedics    Family History   Problem Relation Age of Onset     Atrial fibrillation Mother      Dementia Mother         dx 80s     Diabetes Type 2  Mother         dx 60s/70s     Atrial fibrillation Father      Rheumatoid Arthritis Father      Atrial fibrillation Sister      Cerebrovascular Disease Sister      Parkinsonism Sister      Parkinsonism Sister      Atrial fibrillation Brother      Rheumatoid Arthritis Brother      Cerebrovascular Disease Brother      CABG Brother      Kidney failure Brother         Dialysis     Atrial fibrillation Brother      Diabetes Type 2  Brother         Dx 40's    Social History     Tobacco Use     Smoking status: Never     Passive exposure: Past     Smokeless tobacco: Never   Vaping Use     Vaping status: Never Used   Substance Use Topics     Alcohol use: Yes     Alcohol/week: 5.0 standard drinks of alcohol     Types: 5 Cans of beer per week     Comment: minimal     Drug use: Never          Medications  Allergies   Current Outpatient Medications   Medication Sig Dispense Refill     aspirin 81 MG EC tablet Take 1 tablet (81 mg) by mouth daily       hydrochlorothiazide (HYDRODIURIL) 25 MG tablet TAKE 1 TABLET BY MOUTH EVERY DAY IN THE MORNING FOR HIGH BLOOD PRESSURE 90 tablet 2     levothyroxine (SYNTHROID/LEVOTHROID) 112 MCG tablet Take 1 tablet (112 mcg) by mouth daily. 90 tablet 0     lisinopril (ZESTRIL) 40 MG tablet TAKE 1 TABLET BY MOUTH DAILY FOR HIGH BLOOD PRESSURE 90 tablet 1     metoprolol tartrate (LOPRESSOR) 25 MG tablet Take 1 tablet (25 mg) by mouth daily 90 tablet 3     omeprazole (PRILOSEC) 20 MG DR capsule TAKE 1 CAPSULE BY MOUTH DAILY BEFORE BREAKFAST 90 capsule 0     rosuvastatin (CRESTOR) 10 MG tablet Take 1 tablet (10 mg) by mouth daily 90 tablet 3    Allergies   Allergen Reactions  "    Adhesive Tape Blisters     3M clear adhesive post ablation 3/19/2024     Sulfa (Sulfonamide Antibiotics) [Sulfa Antibiotics] Rash     Rash - turned purple  , Around age 32         Lab Results    Chemistry/lipid CBC Cardiac Enzymes/BNP/TSH/INR   Lab Results   Component Value Date    BUN 21.1 2024     2024    CO2 29 2024     No results found for: \"CREATININE\"    Lab Results   Component Value Date    CHOL 131 2024    HDL 64 2024    LDL 45 2024      Lab Results   Component Value Date    WBC 6.5 07/15/2024    HGB 14.9 07/15/2024    HCT 45.5 07/15/2024    MCV 95 07/15/2024     07/15/2024    Lab Results   Component Value Date    TROPONINI <0.01 2022    BNP 56 2022    TSH 6.43 (H) 2024    INR 1.12 (H) 2020        40 minutes spent reviewing prior records (including documentation, laboratory studies, cardiac testing/imaging), history and physical exam, planning, and subsequent documentation.     The longitudinal plan of care for the diagnosis(es)/condition(s) as documented were addressed during this visit. Due to the added complexity in care, I will continue to support Champ in the subsequent management and with ongoing continuity of care.     This note has been dictated using voice recognition software. Any grammatical, typographical, or context distortions are unintentional and inherent to the software.    Nicolle Ryan CNP  Clinical Cardiac Electrophysiology  [facility]  Clinic and [phone]  Fax: [phone]  Electrophysiology Nurses: [phone]                                     Thank you for allowing me to participate in the care of your patient.      Sincerely,     CRISTIANO Brooke CNP     [facility]  cc:   CRISTIANO Brooke CNP  [facility] [address] [address]  [address] [place] [address]      "

## 2024-11-27 NOTE — PROGRESS NOTES
[facility]  Cardiac Electrophysiology  [address]  [place] [address]   Office: [phone]  Fax: [phone]     HEART CARE ELECTROPHYSIOLOGY NOTE     Primary Care: Magdaleno Bennett MD       Assessment/Recommendations     Persistent atrial fibrillation, a/typical atrial flutter/stage 3D: paroxysmal AF diagnosed 2007, failing propafenone and flecainide prior to PVI 8/19/2013.    Recurrent AF 6 months post ablation, symptomatic with worsening fatigue, dyspnea on exertion and occasional palpitations, requiring multiple cardioversions over the next several years on flecainide and sotalol  Status post RF PWI/roofline, CTI flutter and TANIA ablation 3/19/2024.  Sotalol discontinued immediately post ablation; MCT 5/2024 unremarkable for recurrent AF or tachyarrhythmias  Asymptomatic recurrence 8/2024 which converted spontaneously.  He prefers minimal medications and would like to trial off metoprolol if not needed.  Continue expectant management    Presence of Watchman left atrial appendage closure device: [id number] score of 3 for age 65-74, hypertension, CAD.  Sp LAAC 1/12/2023     CAD: Mild/nonobstructive disease by CT, negative MPI 2022.  No anginal symptoms    Plan:  Continue aspirin 81 mg daily for stroke prophylaxis  Taper/discontinue metoprolol  Follow-up with Dr. Cotter 6 months, EP 1 year     History of Present Illness/Subjective    Brett Hopkins is a 68 year old male with past medical history significant for persistent AF status post ablation 2013 and 3/19/2024, percutaneous left atrial appendage closure in situ 1/12/2023, HTN, nonobstructive CAD, ascending aorta dilation, hyperlipidemia, GERD, hypothyroidism, myasthenia gravis, chronic RLE lymphedema, obesity, seen today for EP follow-up and 2 year post Watchman implant    He has a history of paroxysmal AF diagnosed in 2007, failing propafenone and flecainide prior to PVI 8/19/2013.  He developed recurrent  AF about 6 months post ablation, symptomatic with worsening fatigue, dyspnea on exertion and occasional palpitations, requiring DCCV 7/10/2019 with ERAF.  He was briefly on flecainide prior to undergoing DCCV on sotalol 9/12/2019 and 2/17/2023. Sotalol was decreased due to symptomatic bradycardia with recurrent AF around 11/6/2023 requiring DCCV 11/17/2023 and 2/27/2024.  He underwent repeat ablation consisting of RF PWI/roofline, CTI flutter and TANIA ablation 3/19/2024 and treated for post ablation pericarditis.  His sotalol was discontinued immediately post ablation; MCT 5/2024 was unremarkable for recurrent AF or tachyarrhythmias.    After initially noticing elevated heart rates at home via [employer] he was documented in recurrent AF with RVR by EKG August 2024, which converted spontaneously after about 12 hours.  He had no specific symptoms associated with this episode.  He monitors his heart rates with his smart watch and [employer] mobile which have consistently been 60-70s at home over the past few months.  He does not recall previous side effects on flecainide and believes this was discontinued due to breakthrough AF, and that lightheadedness on sotalol may have been due to dehydration. He has no palpitations, chest discomfort, dyspnea on exertion at rest, lightheadedness/dizziness, pedal edema or syncope.     Data Review     Arrhythmia hx:   Dx/date: Paroxysmal AF 2007.  Persistent 7/2019, 11/2023  Sx: Historically SANDERS, fatigue, palpitations.  Asymptomatic recurrence 8/2023  [id number]: 3 for age 65-74, hypertension and vascular disease. Sp LAAC 1/12/2023  AAD: Propafenone pre ablation 2013, flecainide, sotalol (lightheadedness/dizziness)  DCCV: 7/10/2019 (ERAF), 2/17/2023, 11/17/2023, 2/27/2024  Ablation: cryoballoon PVI 8/19/2013 (Dr. Barfield). RF PWI/roofline, TANIA line, empiric CTI flutter ablation 3/19/2024 (Dr. Barfield)    EKG 8/16/2024:  bpm, IVCD  ms, QT/QTc 328/492 ms  3/20/2024: SR 98 bpm,  "atrial ectopy  3/19/2024: SR 90 bpm, atrial ectopy  3/11/2024 AF 97 bpm, borderline RBBB    DIONTE 5/3/2023  1. Normal left ventricular size and systolic performance with a visually  estimated ejection fraction of 60%.  2. No significant valvular heart disease is identified on this study.  3. Normal right ventricular size and systolic performance.  4. There is mild left atrial enlargement.  5. There is a left atrial appendage occlusion device. The device appears well-  placed and seated.  Â  No thrombus is detected upon the surface of the device.  Â  No flow extending into the left atrial appendage was detected around the  device.  6. No residual postprocedural atrial communication is identified.  7. No intracardiac mass or thrombus is detected  8. Echo contrast examination was performed using agitated NS as contrast  agent. The right heart opacity was good and the left heart was well  visualized. There was no evidence of right to left shunting during spontaneous  respiration or following release of Valsalva.    Mobile cardiac telemetry monitoring from 5/9/2024 to 5/22/2024 (monitored duration 13d 5h).  Predominant underlying rhythm was sinus rhythm, 47 to 172bpm, average 77 bpm.  No sustained tachyarrhythmias.  No atrial fibrillation.  There were no pauses noted.  Rare supraventricular ectopic beats (2%). One 13 beat run of non sustained SVT was noted.  Rare premature ventricular contractions (1%).  Symptom triggers (2 events) correlated to sinus rhythm with rare PAC.    I have reviewed and updated the patient's past medical history, allergies and medication list.               Physical Examination Review of Systems   /58 (BP Location: Left arm, Patient Position: Sitting, Cuff Size: Adult Large)   Pulse 65   Ht 1.803 m (5' 11\")   Wt 120.1 kg (264 lb 12.8 oz)   SpO2 98%   BMI 36.93 kg/m      Body mass index is 36.93 kg/m .    Wt Readings from Last 3 Encounters:   11/27/24 120.1 kg (264 lb 12.8 oz)   11/06/24 " 117.9 kg (260 lb)   07/15/24 117 kg (258 lb)     General   Appearance:   Alert and oriented, no acute distress   HEENT:  Normocephalic and atraumatic   Neck: No JVP, carotid bruit or obvious thyromegaly   Lungs:   Respirations unlabored   Cardiovascular:   Rhythm is regular. S1 and S2 are normal. No significant murmur is present. Lower extremities demonstrate no significant edema   Extremities: No cyanosis or clubbing   Skin: Skin is warm, dry, and otherwise intact   Neurologic: Gait not assessed. Mood and affect appropriate                                                   Medical History  Surgical History Family History Social History   No past medical history on file. Past Surgical History:   Procedure Laterality Date    ARTHROPLASTY REVISION HIP Right 05/01/2019    ARTHROPLASTY REVISION KNEE Right 2019    CARDIOVERSION  07/01/2019    7/10/2019    CARDIOVERSION  09/12/2019    CATARACT EXTRACTION Bilateral 06/2021    CV LEFT ATRIAL APPENDAGE CLOSURE N/A 1/12/2023    Procedure: Left Atrial Appendage Closure;  Surgeon: Mitesh Graff MD;  Location: [facility]    DENTAL SURGERY      Oral Surgery Tooth Extraction;  Implant    EP ABLATION PULMONARY VEIN ISOLATION N/A 3/19/2024    Procedure: Ablation Atrial Fibrillation;  Surgeon: Benedicto Barfield MD;  Location: [facility]    MS ABLATE HEART DYSRHYTHM FOCUS  08/19/2013    Description: Catheter Ablation Atrial Fibrillation;  Recorded: 11/16/2013;  Comments: PVI Aug 19, 2013 (Cryo to all 4 PV's)    TOOTH EXTRACTION      implant    [facility] TOTAL HIP ARTHROPLASTY Right 05/08/2019    Procedure: RIGHT TOTAL HIP ARTHROPLASTY DIRECT ANTERIOR APPROACH;  Surgeon: Mario Woodruff MD;  Location: [facility];  Service: Orthopedics    [facility] TOTAL KNEE ARTHROPLASTY Right 06/21/2017    Procedure: 2)  RIGHT TOTAL KNEE ARTHROPLASTY;  Surgeon: Mario VICKERS MD;  Location: [facility];  Service: Orthopedics    Family History   Problem Relation Age of  Onset    Atrial fibrillation Mother     Dementia Mother         dx 80s    Diabetes Type 2  Mother         dx 60s/70s    Atrial fibrillation Father     Rheumatoid Arthritis Father     Atrial fibrillation Sister     Cerebrovascular Disease Sister     Parkinsonism Sister     Parkinsonism Sister     Atrial fibrillation Brother     Rheumatoid Arthritis Brother     Cerebrovascular Disease Brother     CABG Brother     Kidney failure Brother         Dialysis    Atrial fibrillation Brother     Diabetes Type 2  Brother         Dx 40's    Social History     Tobacco Use    Smoking status: Never     Passive exposure: Past    Smokeless tobacco: Never   Vaping Use    Vaping status: Never Used   Substance Use Topics    Alcohol use: Yes     Alcohol/week: 5.0 standard drinks of alcohol     Types: 5 Cans of beer per week     Comment: minimal    Drug use: Never          Medications  Allergies   Current Outpatient Medications   Medication Sig Dispense Refill    aspirin 81 MG EC tablet Take 1 tablet (81 mg) by mouth daily      hydrochlorothiazide (HYDRODIURIL) 25 MG tablet TAKE 1 TABLET BY MOUTH EVERY DAY IN THE MORNING FOR HIGH BLOOD PRESSURE 90 tablet 2    levothyroxine (SYNTHROID/LEVOTHROID) 112 MCG tablet Take 1 tablet (112 mcg) by mouth daily. 90 tablet 0    lisinopril (ZESTRIL) 40 MG tablet TAKE 1 TABLET BY MOUTH DAILY FOR HIGH BLOOD PRESSURE 90 tablet 1    metoprolol tartrate (LOPRESSOR) 25 MG tablet Take 1 tablet (25 mg) by mouth daily 90 tablet 3    omeprazole (PRILOSEC) 20 MG DR capsule TAKE 1 CAPSULE BY MOUTH DAILY BEFORE BREAKFAST 90 capsule 0    rosuvastatin (CRESTOR) 10 MG tablet Take 1 tablet (10 mg) by mouth daily 90 tablet 3    Allergies   Allergen Reactions    Adhesive Tape Blisters     3M clear adhesive post ablation 3/19/2024    Sulfa (Sulfonamide Antibiotics) [Sulfa Antibiotics] Rash     Rash - turned purple  , Around age 32         Lab Results    Chemistry/lipid CBC Cardiac Enzymes/BNP/TSH/INR   Lab Results  "  Component Value Date    BUN 21.1 2024     2024    CO2 29 2024     No results found for: \"CREATININE\"    Lab Results   Component Value Date    CHOL 131 2024    HDL 64 2024    LDL 45 2024      Lab Results   Component Value Date    WBC 6.5 07/15/2024    HGB 14.9 07/15/2024    HCT 45.5 07/15/2024    MCV 95 07/15/2024     07/15/2024    Lab Results   Component Value Date    TROPONINI <0.01 2022    BNP 56 2022    TSH 6.43 (H) 2024    INR 1.12 (H) 2020        40 minutes spent reviewing prior records (including documentation, laboratory studies, cardiac testing/imaging), history and physical exam, planning, and subsequent documentation.     The longitudinal plan of care for the diagnosis(es)/condition(s) as documented were addressed during this visit. Due to the added complexity in care, I will continue to support Champ in the subsequent management and with ongoing continuity of care.     This note has been dictated using voice recognition software. Any grammatical, typographical, or context distortions are unintentional and inherent to the software.    Nicolle Ryan, CNP  Clinical Cardiac Electrophysiology  [facility]  Clinic and [phone]  Fax: [phone]  Electrophysiology Nurses: [phone]                                 "

## 2024-12-03 ENCOUNTER — TELEPHONE (OUTPATIENT)
Dept: CARDIOLOGY | Facility: CLINIC | Age: 69
End: 2024-12-03
Payer: COMMERCIAL

## 2024-12-16 NOTE — TELEPHONE ENCOUNTER
MODIFIED SCOTT SCALE   Timepoint: 2yr Post-LAAC    Previous score: 0    Score Description   0 No symptoms at all   1 No significant disability despite symptoms; able to carry out all usual duties and activities   2 Slight disability; unable to carry out all previous activities, but able to look after own affairs without assistance   3 Moderate disability; requiring some help, but able to walk without assistance   4 Moderately severe disability; unable to walk without assistance and unable to attend to own bodily needs without assistance   5 Severe disability; bedridden, incontinent and requiring constant nursing care and attention   6 Dead    Total score (0 - 6):  0    Change in score if s/p LAAC? No  If yes, notify implanting cardiologist.    Justina Morel RN   Structural Heart Coordinator   [facility]  [phone]

## 2024-12-24 DIAGNOSIS — K20.90 ESOPHAGITIS: ICD-10-CM

## 2025-01-15 ENCOUNTER — LAB (OUTPATIENT)
Dept: LAB | Facility: CLINIC | Age: 70
End: 2025-01-15
Payer: COMMERCIAL

## 2025-01-15 DIAGNOSIS — E06.3 HYPOTHYROIDISM DUE TO HASHIMOTO THYROIDITIS: ICD-10-CM

## 2025-01-15 LAB — TSH SERPL DL<=0.005 MIU/L-ACNC: 4.87 UIU/ML (ref 0.3–4.2)

## 2025-01-15 PROCEDURE — 84443 ASSAY THYROID STIM HORMONE: CPT

## 2025-01-15 PROCEDURE — 36415 COLL VENOUS BLD VENIPUNCTURE: CPT

## 2025-01-15 NOTE — TELEPHONE ENCOUNTER
Pt asking for refill on Levothyroxine 112 mg. Pt came in today for his lab work TSH, lab result still pending

## 2025-01-16 DIAGNOSIS — E06.3 HYPOTHYROIDISM DUE TO HASHIMOTO THYROIDITIS: Primary | ICD-10-CM

## 2025-01-16 RX ORDER — LEVOTHYROXINE SODIUM 112 UG/1
112 TABLET ORAL DAILY
Qty: 90 TABLET | Refills: 0 | OUTPATIENT
Start: 2025-01-16

## 2025-01-16 RX ORDER — LEVOTHYROXINE SODIUM 125 UG/1
125 TABLET ORAL
Qty: 90 TABLET | Refills: 1 | Status: SHIPPED | OUTPATIENT
Start: 2025-01-16

## 2025-03-25 DIAGNOSIS — I10 ESSENTIAL HYPERTENSION: ICD-10-CM

## 2025-03-25 RX ORDER — LISINOPRIL 40 MG/1
40 TABLET ORAL DAILY
Qty: 90 TABLET | Refills: 1 | Status: SHIPPED | OUTPATIENT
Start: 2025-03-25

## 2025-04-02 ENCOUNTER — HOSPITAL ENCOUNTER (EMERGENCY)
Facility: CLINIC | Age: 70
Discharge: HOME | DRG: 602 | End: 2025-04-02
Attending: EMERGENCY MEDICINE
Payer: COMMERCIAL

## 2025-04-02 ENCOUNTER — APPOINTMENT (OUTPATIENT)
Dept: CT IMAGING | Facility: CLINIC | Age: 70
DRG: 602 | End: 2025-04-02
Payer: COMMERCIAL

## 2025-04-02 ENCOUNTER — NURSE TRIAGE (OUTPATIENT)
Dept: FAMILY MEDICINE | Facility: CLINIC | Age: 70
End: 2025-04-02
Payer: COMMERCIAL

## 2025-04-02 ENCOUNTER — APPOINTMENT (OUTPATIENT)
Dept: ULTRASOUND IMAGING | Facility: CLINIC | Age: 70
DRG: 602 | End: 2025-04-02
Payer: COMMERCIAL

## 2025-04-02 VITALS
HEIGHT: 71 IN | WEIGHT: 255 LBS | HEART RATE: 72 BPM | RESPIRATION RATE: 16 BRPM | DIASTOLIC BLOOD PRESSURE: 76 MMHG | SYSTOLIC BLOOD PRESSURE: 118 MMHG | BODY MASS INDEX: 35.7 KG/M2 | TEMPERATURE: 98.8 F | OXYGEN SATURATION: 99 %

## 2025-04-02 DIAGNOSIS — L03.115 CELLULITIS OF RIGHT LEG: ICD-10-CM

## 2025-04-02 DIAGNOSIS — I26.99 BILATERAL PULMONARY EMBOLISM (H): ICD-10-CM

## 2025-04-02 LAB
ALBUMIN SERPL BCG-MCNC: 3.8 G/DL (ref 3.5–5.2)
ALP SERPL-CCNC: 69 U/L (ref 40–150)
ALT SERPL W P-5'-P-CCNC: 30 U/L (ref 0–70)
ANION GAP SERPL CALCULATED.3IONS-SCNC: 13 MMOL/L (ref 7–15)
AST SERPL W P-5'-P-CCNC: 47 U/L (ref 0–45)
ATRIAL RATE - MUSE: 86 BPM
BASOPHILS # BLD AUTO: 0 10E3/UL (ref 0–0.2)
BASOPHILS NFR BLD AUTO: 0 %
BILIRUB DIRECT SERPL-MCNC: 0.46 MG/DL (ref 0–0.3)
BILIRUB SERPL-MCNC: 0.9 MG/DL
BUN SERPL-MCNC: 28.1 MG/DL (ref 8–23)
CALCIUM SERPL-MCNC: 9 MG/DL (ref 8.8–10.4)
CHLORIDE SERPL-SCNC: 95 MMOL/L (ref 98–107)
CREAT SERPL-MCNC: 1.25 MG/DL (ref 0.67–1.17)
CRP SERPL-MCNC: 225 MG/L
DIASTOLIC BLOOD PRESSURE - MUSE: NORMAL MMHG
EGFRCR SERPLBLD CKD-EPI 2021: 63 ML/MIN/1.73M2
EOSINOPHIL # BLD AUTO: 0 10E3/UL (ref 0–0.7)
EOSINOPHIL NFR BLD AUTO: 0 %
ERYTHROCYTE [DISTWIDTH] IN BLOOD BY AUTOMATED COUNT: 13.4 % (ref 10–15)
FLUAV RNA SPEC QL NAA+PROBE: NEGATIVE
FLUBV RNA RESP QL NAA+PROBE: NEGATIVE
GLUCOSE SERPL-MCNC: 129 MG/DL (ref 70–99)
HCO3 SERPL-SCNC: 26 MMOL/L (ref 22–29)
HCT VFR BLD AUTO: 47.5 % (ref 40–53)
HGB BLD-MCNC: 16.3 G/DL (ref 13.3–17.7)
IMM GRANULOCYTES # BLD: 0 10E3/UL
IMM GRANULOCYTES NFR BLD: 0 %
INTERPRETATION ECG - MUSE: NORMAL
LYMPHOCYTES # BLD AUTO: 1.3 10E3/UL (ref 0.8–5.3)
LYMPHOCYTES NFR BLD AUTO: 11 %
MAGNESIUM SERPL-MCNC: 1.7 MG/DL (ref 1.7–2.3)
MCH RBC QN AUTO: 30.5 PG (ref 26.5–33)
MCHC RBC AUTO-ENTMCNC: 34.3 G/DL (ref 31.5–36.5)
MCV RBC AUTO: 89 FL (ref 78–100)
MONOCYTES # BLD AUTO: 1 10E3/UL (ref 0–1.3)
MONOCYTES NFR BLD AUTO: 8 %
NEUTROPHILS # BLD AUTO: 9.7 10E3/UL (ref 1.6–8.3)
NEUTROPHILS NFR BLD AUTO: 81 %
NRBC # BLD AUTO: 0 10E3/UL
NRBC BLD AUTO-RTO: 0 /100
NT-PROBNP SERPL-MCNC: 441 PG/ML (ref 0–900)
P AXIS - MUSE: 51 DEGREES
PLATELET # BLD AUTO: 150 10E3/UL (ref 150–450)
POTASSIUM SERPL-SCNC: 3.3 MMOL/L (ref 3.4–5.3)
PR INTERVAL - MUSE: 150 MS
PROCALCITONIN SERPL IA-MCNC: 0.32 NG/ML
PROT SERPL-MCNC: 7.3 G/DL (ref 6.4–8.3)
QRS DURATION - MUSE: 122 MS
QT - MUSE: 368 MS
QTC - MUSE: 440 MS
R AXIS - MUSE: -1 DEGREES
RBC # BLD AUTO: 5.35 10E6/UL (ref 4.4–5.9)
RSV RNA SPEC NAA+PROBE: NEGATIVE
SARS-COV-2 RNA RESP QL NAA+PROBE: NEGATIVE
SODIUM SERPL-SCNC: 134 MMOL/L (ref 135–145)
SYSTOLIC BLOOD PRESSURE - MUSE: NORMAL MMHG
T AXIS - MUSE: 21 DEGREES
T4 FREE SERPL-MCNC: 1.85 NG/DL (ref 0.9–1.7)
TROPONIN T SERPL HS-MCNC: 21 NG/L
TSH SERPL DL<=0.005 MIU/L-ACNC: 7.47 UIU/ML (ref 0.3–4.2)
VENTRICULAR RATE- MUSE: 86 BPM
WBC # BLD AUTO: 12 10E3/UL (ref 4–11)

## 2025-04-02 PROCEDURE — 250N000013 HC RX MED GY IP 250 OP 250 PS 637: Performed by: EMERGENCY MEDICINE

## 2025-04-02 PROCEDURE — 93005 ELECTROCARDIOGRAM TRACING: CPT | Performed by: EMERGENCY MEDICINE

## 2025-04-02 PROCEDURE — 85004 AUTOMATED DIFF WBC COUNT: CPT

## 2025-04-02 PROCEDURE — 86140 C-REACTIVE PROTEIN: CPT | Performed by: EMERGENCY MEDICINE

## 2025-04-02 PROCEDURE — 83880 ASSAY OF NATRIURETIC PEPTIDE: CPT | Performed by: EMERGENCY MEDICINE

## 2025-04-02 PROCEDURE — 82374 ASSAY BLOOD CARBON DIOXIDE: CPT

## 2025-04-02 PROCEDURE — 87040 BLOOD CULTURE FOR BACTERIA: CPT | Performed by: EMERGENCY MEDICINE

## 2025-04-02 PROCEDURE — 84460 ALANINE AMINO (ALT) (SGPT): CPT | Performed by: EMERGENCY MEDICINE

## 2025-04-02 PROCEDURE — 250N000013 HC RX MED GY IP 250 OP 250 PS 637

## 2025-04-02 PROCEDURE — 83735 ASSAY OF MAGNESIUM: CPT

## 2025-04-02 PROCEDURE — 84155 ASSAY OF PROTEIN SERUM: CPT | Performed by: EMERGENCY MEDICINE

## 2025-04-02 PROCEDURE — 87637 SARSCOV2&INF A&B&RSV AMP PRB: CPT

## 2025-04-02 PROCEDURE — 250N000011 HC RX IP 250 OP 636: Performed by: EMERGENCY MEDICINE

## 2025-04-02 PROCEDURE — 36415 COLL VENOUS BLD VENIPUNCTURE: CPT

## 2025-04-02 PROCEDURE — 84439 ASSAY OF FREE THYROXINE: CPT | Performed by: EMERGENCY MEDICINE

## 2025-04-02 PROCEDURE — 84484 ASSAY OF TROPONIN QUANT: CPT

## 2025-04-02 PROCEDURE — 93971 EXTREMITY STUDY: CPT | Mod: RT

## 2025-04-02 PROCEDURE — 80048 BASIC METABOLIC PNL TOTAL CA: CPT

## 2025-04-02 PROCEDURE — 85048 AUTOMATED LEUKOCYTE COUNT: CPT

## 2025-04-02 PROCEDURE — 71275 CT ANGIOGRAPHY CHEST: CPT

## 2025-04-02 PROCEDURE — 99285 EMERGENCY DEPT VISIT HI MDM: CPT | Mod: 25

## 2025-04-02 PROCEDURE — 84145 PROCALCITONIN (PCT): CPT | Performed by: EMERGENCY MEDICINE

## 2025-04-02 PROCEDURE — 250N000011 HC RX IP 250 OP 636

## 2025-04-02 PROCEDURE — 84443 ASSAY THYROID STIM HORMONE: CPT | Performed by: EMERGENCY MEDICINE

## 2025-04-02 PROCEDURE — 36415 COLL VENOUS BLD VENIPUNCTURE: CPT | Performed by: EMERGENCY MEDICINE

## 2025-04-02 RX ORDER — PIPERACILLIN SODIUM, TAZOBACTAM SODIUM 3; .375 G/15ML; G/15ML
3.38 INJECTION, POWDER, LYOPHILIZED, FOR SOLUTION INTRAVENOUS ONCE
Status: COMPLETED | OUTPATIENT
Start: 2025-04-02 | End: 2025-04-02

## 2025-04-02 RX ORDER — IOPAMIDOL 755 MG/ML
75 INJECTION, SOLUTION INTRAVASCULAR ONCE
Status: COMPLETED | OUTPATIENT
Start: 2025-04-02 | End: 2025-04-02

## 2025-04-02 RX ORDER — APIXABAN 5 MG (74)
KIT ORAL
Qty: 1 EACH | Refills: 0 | Status: ON HOLD | OUTPATIENT
Start: 2025-04-02 | End: 2025-05-02

## 2025-04-02 RX ORDER — CEFDINIR 300 MG/1
300 CAPSULE ORAL 2 TIMES DAILY
Qty: 20 CAPSULE | Refills: 0 | Status: ON HOLD | OUTPATIENT
Start: 2025-04-02 | End: 2025-04-12

## 2025-04-02 RX ORDER — POTASSIUM CHLORIDE 1.5 G/1.58G
40 POWDER, FOR SOLUTION ORAL ONCE
Status: COMPLETED | OUTPATIENT
Start: 2025-04-02 | End: 2025-04-02

## 2025-04-02 RX ORDER — CLINDAMYCIN HYDROCHLORIDE 150 MG/1
450 CAPSULE ORAL 3 TIMES DAILY
Qty: 90 CAPSULE | Refills: 0 | Status: ON HOLD | OUTPATIENT
Start: 2025-04-02 | End: 2025-04-12

## 2025-04-02 RX ORDER — OXYCODONE HYDROCHLORIDE 5 MG/1
5 TABLET ORAL ONCE
Status: COMPLETED | OUTPATIENT
Start: 2025-04-02 | End: 2025-04-02

## 2025-04-02 RX ORDER — CLINDAMYCIN PHOSPHATE 600 MG/50ML
600 INJECTION, SOLUTION INTRAVENOUS ONCE
Status: COMPLETED | OUTPATIENT
Start: 2025-04-02 | End: 2025-04-02

## 2025-04-02 RX ADMIN — OXYCODONE 5 MG: 5 TABLET ORAL at 12:51

## 2025-04-02 RX ADMIN — POTASSIUM CHLORIDE 40 MEQ: 1.5 POWDER, FOR SOLUTION ORAL at 12:50

## 2025-04-02 RX ADMIN — CLINDAMYCIN PHOSPHATE 600 MG: 600 INJECTION, SOLUTION INTRAVENOUS at 14:08

## 2025-04-02 RX ADMIN — IOPAMIDOL 75 ML: 755 INJECTION, SOLUTION INTRAVENOUS at 12:26

## 2025-04-02 RX ADMIN — PIPERACILLIN AND TAZOBACTAM 3.38 G: 3; .375 INJECTION, POWDER, FOR SOLUTION INTRAVENOUS at 12:49

## 2025-04-02 RX ADMIN — APIXABAN 10 MG: 5 TABLET, FILM COATED ORAL at 14:12

## 2025-04-02 ASSESSMENT — COLUMBIA-SUICIDE SEVERITY RATING SCALE - C-SSRS
1. IN THE PAST MONTH, HAVE YOU WISHED YOU WERE DEAD OR WISHED YOU COULD GO TO SLEEP AND NOT WAKE UP?: NO
6. HAVE YOU EVER DONE ANYTHING, STARTED TO DO ANYTHING, OR PREPARED TO DO ANYTHING TO END YOUR LIFE?: NO
2. HAVE YOU ACTUALLY HAD ANY THOUGHTS OF KILLING YOURSELF IN THE PAST MONTH?: NO

## 2025-04-02 ASSESSMENT — ACTIVITIES OF DAILY LIVING (ADL)
ADLS_ACUITY_SCORE: 45
ADLS_ACUITY_SCORE: 45

## 2025-04-02 NOTE — TELEPHONE ENCOUNTER
"Nurse Triage SBAR    Is this a 2nd Level Triage? NO    Situation: Patient reports having right foot swelling and redness today and shortness of breath.      Background: Patient reports that had the stomach bug over the weekend with fever and vomiting.  Medical history includes obesity, gout, atrial fib, hypertension, lymphedema.     Assessment: Patient states that redness is up to his knee and foot is so swollen he can hardly walk on it.    Protocol Recommended Disposition:   Go to ED Now    Recommendation: Patient will go to ER.    RAMIREZ Anderson RN             Does the patient meet one of the following criteria for ADS visit consideration? 16+ years old, with an [facility] PCP     TIP  Providers, please consider if this condition is appropriate for management at one of our Acute and Diagnostic Services sites.     If patient is a good candidate, please use dotphrase <dot>triageresponse and select Refer to ADS to document.    Reason for Disposition   Difficulty breathing    Additional Information   Negative: Chest Pain   Negative: Followed an ankle injury   Negative: Followed a bee sting and has localized swelling (e.g., small area of puffy or swollen skin)   Negative: Followed an insect bite and has localized swelling (e.g., small area of puffy or swollen skin)   Negative: Ankle pain is main symptom   Negative: Swelling of calf or leg is main symptom   Negative: Leg swelling (e.g., ankle swelling extends up to shins or knees)    Answer Assessment - Initial Assessment Questions  1. LOCATION: \"Which ankle is swollen?\" \"Where is the swelling?\"      Right - foot  2. ONSET: \"When did the swelling start?\"      today  3. SWELLING: \"How bad is the swelling?\" Or, \"How large is it?\" (e.g., mild, moderate, severe; size of localized swelling)       Bad, can hardly walk on it.  4. PAIN: \"Is there any pain?\" If Yes, ask: \"How bad is it?\" (Scale 0-10; or none, mild, moderate, severe)         5. CAUSE: \"What do you think caused the " "ankle swelling?\"      Unsure but is red and redness is extending up towards ankle  6. OTHER SYMPTOMS: \"Do you have any other symptoms?\" (e.g., fever, chest pain, difficulty breathing, calf pain)      No fever, but does have shortness of breath.  Did have fever and stomach bug over the weekend, this is better now, but stomach still is not right.  7. PREGNANCY: \"Is there any chance you are pregnant?\" \"When was your last menstrual period?\"      no    Protocols used: Ankle Swelling-A-[place]    "

## 2025-04-02 NOTE — ED TRIAGE NOTES
Patient presents to ED with chills and fever that started on Sunday, had V/D on Sunday and Monday, last night began to have pain, redness and swelling to R leg, not on thinners, having some SOB.  Mayra Pascual RN.......4/2/2025 11:08 AM     Triage Assessment (Adult)       Row Name 04/02/25 1105          Triage Assessment    Airway WDL WDL        Respiratory WDL    Respiratory WDL X;rhythm/pattern     Rhythm/Pattern, Respiratory shortness of breath        Skin Circulation/Temperature WDL    Skin Circulation/Temperature WDL WDL        Cardiac WDL    Cardiac WDL WDL        Peripheral/Neurovascular WDL    Peripheral Neurovascular WDL WDL        Cognitive/Neuro/Behavioral WDL    Cognitive/Neuro/Behavioral WDL WDL

## 2025-04-02 NOTE — ED PROVIDER NOTES
EMERGENCY DEPARTMENT ENCOUNTER      NAME: Brett Hopkins  AGE: 69 year old male  YOB: 1955  MRN: [id number]  EVALUATION DATE & TIME: No admission date for patient encounter.    PCP: Magdaleno Bennett        Chief Complaint   Patient presents with    Leg Swelling    Shortness of Breath         IMPRESSION  1. Cellulitis of right leg    2. Bilateral pulmonary embolism (H)        PLAN  - Eliquis 10mg BID x7 days, then 5mg BID thereafter  - cefdinir 300mg BID & clindamycin 450mg TID x10 days  - close PCP follow up  - discharge to home    ED COURSE & MEDICAL DECISION MAKING    I was consulted by Sis Key PA-C on this patient. I reviewed ED presentation history, assessment, management, plan to this point. Please see ED CHIQUITA note for details.    Briefly, 69yoM with history of paroxysmal a-fib (s/p ablation & watchman) presenting with several days of progressive RLE pain/redness/swelling & shortness of breath.    Found to have small bilateral PE on CT chest today; no other acute abnormality or explanatory pathology. Doubt cor pulmonale with no chest pain, BNP/trop within normal limits. EKG unremarkable as well.    RLE US with no DVT; may have already moved up to lungs. Does have notable erythema with moderate edema & tenderness on exam though----will treat for overlying cellulitis. No concern for necrotizing fasciitis, septic joint, bacteremia, sepsis. Given Zosyn & clindamycin here now and cefdinir/clindamycin prescriptions for home.    Patient able to tolerate PO and walk without difficulty or desat. Wants to go home. Ok for outpatient management. Return precautions and need for PCP follow up discussed and understood. No further questions at the time of discharge.        --------------------------------------------------------------------------------   12:29 PM - RLE US independently reviewed & interpreted by me: no DVT  12:43 PM - CT chest independently reviewed & interpreted by me: bilateral  subsegmental PE, no pneumothorax, no lobar infiltrate.    Patient seen in the waiting room due to boarding crisis.    This patient involved a high degree of complexity in medical decision making, as significant risks were present and assessed. Recent encounters & results in medical record reviewed by me.    All workup (i.e. any EKG/labs/imaging as per charting below) reviewed and independently interpreted by me. See respective sections for details.    Broad differential considered for this patient, including but not limited to:  PE, ACS, acute aortic syndrome, pneumonia, sepsis, pneumothorax, Boerhaave's, tamponade, DVT, cellulitis, necrotizing fasciitis, septic joint, sepsis, bacteremia    I had a face to face encounter with this patient seen by the Advanced Practice Provider (CHIQUITA). I personally made/approved the management plan and take responsibility for the patient management. I personally saw patient and performed a substantive portion of the visit including all aspects of the medical decision making.    MEDICATIONS GIVEN IN THE EMERGENCY DEPARTMENT  Medications   clindamycin (CLEOCIN) 600 mg in 50 mL D5W intermittent infusion (600 mg Intravenous $New Bag 4/2/25 1408)   apixaban ANTICOAGULANT (ELIQUIS) tablet 10 mg (has no administration in time range)   iopamidol (ISOVUE-370) solution 75 mL (75 mLs Intravenous $Given 4/2/25 1226)   piperacillin-tazobactam (ZOSYN) 3.375 g vial to attach to  mL bag (3.375 g Intravenous $New Bag 4/2/25 1249)   potassium chloride (KLOR-CON) Packet 40 mEq (40 mEq Oral $Given 4/2/25 1250)   oxyCODONE (ROXICODONE) tablet 5 mg (5 mg Oral $Given 4/2/25 1251)     NEW PRESCRIPTIONS STARTED AT TODAY'S ER VISIT  Current Discharge Medication List        START taking these medications    Details   Apixaban Starter Pack (ELIQUIS DVT/PE STARTER PACK) 5 MG TBPK Take 10 mg by mouth 2 times daily for 7 days, THEN 5 mg 2 times daily for 23 days. Continue as directed by provider.  Qty: 1  "each, Refills: 0      cefdinir (OMNICEF) 300 MG capsule Take 1 capsule (300 mg) by mouth 2 times daily for 10 days.  Qty: 20 capsule, Refills: 0      clindamycin (CLEOCIN) 150 MG capsule Take 3 capsules (450 mg) by mouth 3 times daily for 10 days.  Qty: 90 capsule, Refills: 0           CONTINUE these medications which have NOT CHANGED    Details   aspirin 81 MG EC tablet Take 1 tablet (81 mg) by mouth daily    Associated Diagnoses: Persistent atrial fibrillation (H)      hydrochlorothiazide (HYDRODIURIL) 25 MG tablet TAKE 1 TABLET BY MOUTH EVERY DAY IN THE MORNING FOR HIGH BLOOD PRESSURE  Qty: 90 tablet, Refills: 2    Associated Diagnoses: Essential hypertension      levothyroxine (SYNTHROID/LEVOTHROID) 125 MCG tablet Take 1 tablet (125 mcg) by mouth every morning (before breakfast).  Qty: 90 tablet, Refills: 1    Associated Diagnoses: Hypothyroidism due to Hashimoto thyroiditis      lisinopril (ZESTRIL) 40 MG tablet Take 1 tablet (40 mg) by mouth daily. for high blood pressure  Qty: 90 tablet, Refills: 1    Associated Diagnoses: Essential hypertension      omeprazole (PRILOSEC) 20 MG DR capsule TAKE 1 CAPSULE BY MOUTH DAILY BEFORE BREAKFAST  Qty: 90 capsule, Refills: 1    Associated Diagnoses: Esophagitis      rosuvastatin (CRESTOR) 10 MG tablet Take 1 tablet (10 mg) by mouth daily  Qty: 90 tablet, Refills: 3                 =================================================================      HPI  Brett Hopkins is a 69 year old male with pertinent history of persistent atrial fibrillation with Watchman, hypothyroidism, GERD, HTN, obesity, myasthenia gravis, gout who presents to this ED by walk in with spouse for evaluation of right leg swelling and redness since this morning.      Noticed an ache in his right calf yesterday but swelled up this morning. No hx DVT/PE but does report he has been \"down\" with the flu for the past 4 days and has not really moved from his recliner or bed. Usually fairly active. Is " not on blood thinners as he has a watchman. Has felt short of breath over the past few days but worsened this AM and felt presyncopal/lightheaded. No syncope or falls. No cough or sore throat but does note rhinorrhea/congestion. Denies chest pain, rib pain, back pain. No pleuritic pain. Reports nausea/upset stomach starting Sunday. Several episodes of emesis. Little food intake since Sunday. Still trying to drink water, however. No urinary symptoms, abdominal pain, diarrhea. No sick contacts.      Chart review:   Triage note from 4/2/2025.  Patient reports having right foot swelling and redness today and shortness of breath.  That his foot is still swollen he can barely walk on it.  Instructed by RN to go to the ER.           --------------- MEDICAL HISTORY ---------------  PAST MEDICAL HISTORY:  Reviewed by me.  No past medical history on file.  Patient Active Problem List   Diagnosis    Varicose Veins    Persistent atrial fibrillation    Aneurysm Of The Ascending Aorta    Hypothyroidism    Status post ablation of atrial fibrillation    GERD (gastroesophageal reflux disease)    AA (alopecia areata)    Hypertension    Lymphedema    Obesity (BMI 35.0-39.9) with comorbidity (H)    Thyroid nodule    Myasthenia gravis (H)    Presence of Watchman left atrial appendage closure device    Hyperlipidemia    Gout       PAST SURGICAL HISTORY:  Reviewed by me.  Past Surgical History:   Procedure Laterality Date    ARTHROPLASTY REVISION HIP Right 05/01/2019    ARTHROPLASTY REVISION KNEE Right 2019    CARDIOVERSION  07/01/2019    7/10/2019    CARDIOVERSION  09/12/2019    CATARACT EXTRACTION Bilateral 06/2021    CV LEFT ATRIAL APPENDAGE CLOSURE N/A 1/12/2023    Procedure: Left Atrial Appendage Closure;  Surgeon: Mitesh Graff MD;  Location: [facility] CV    DENTAL SURGERY      Oral Surgery Tooth Extraction;  Implant    EP ABLATION PULMONARY VEIN ISOLATION N/A 3/19/2024    Procedure: Ablation Atrial Fibrillation;   Surgeon: Benedicto Barfield MD;  Location: [facility] CATH LAB CV    DE ABLATE HEART DYSRHYTHM FOCUS  08/19/2013    Description: Catheter Ablation Atrial Fibrillation;  Recorded: 11/16/2013;  Comments: PVI Aug 19, 2013 (Cryo to all 4 PV's)    TOOTH EXTRACTION      implant    [facility] TOTAL HIP ARTHROPLASTY Right 05/08/2019    Procedure: RIGHT TOTAL HIP ARTHROPLASTY DIRECT ANTERIOR APPROACH;  Surgeon: Mario Woodruff MD;  Location: [facility];  Service: Orthopedics    [facility] TOTAL KNEE ARTHROPLASTY Right 06/21/2017    Procedure: 2)  RIGHT TOTAL KNEE ARTHROPLASTY;  Surgeon: Mario VICKERS MD;  Location: [facility];  Service: Orthopedics       CURRENT MEDICATIONS:    Reviewed by me.    Current Facility-Administered Medications:     apixaban ANTICOAGULANT (ELIQUIS) tablet 10 mg, 10 mg, Oral, Once, Maruizio Rockwell MD    clindamycin (CLEOCIN) 600 mg in 50 mL D5W intermittent infusion, 600 mg, Intravenous, Once, Maurizio Rockwell MD, Last Rate: 100 mL/hr at 04/02/25 1408, 600 mg at 04/02/25 1408    Current Outpatient Medications:     Apixaban Starter Pack (ELIQUIS DVT/PE STARTER PACK) 5 MG TBPK, Take 10 mg by mouth 2 times daily for 7 days, THEN 5 mg 2 times daily for 23 days. Continue as directed by provider., Disp: 1 each, Rfl: 0    cefdinir (OMNICEF) 300 MG capsule, Take 1 capsule (300 mg) by mouth 2 times daily for 10 days., Disp: 20 capsule, Rfl: 0    clindamycin (CLEOCIN) 150 MG capsule, Take 3 capsules (450 mg) by mouth 3 times daily for 10 days., Disp: 90 capsule, Rfl: 0    aspirin 81 MG EC tablet, Take 1 tablet (81 mg) by mouth daily, Disp: , Rfl:     hydrochlorothiazide (HYDRODIURIL) 25 MG tablet, TAKE 1 TABLET BY MOUTH EVERY DAY IN THE MORNING FOR HIGH BLOOD PRESSURE, Disp: 90 tablet, Rfl: 2    levothyroxine (SYNTHROID/LEVOTHROID) 125 MCG tablet, Take 1 tablet (125 mcg) by mouth every morning (before breakfast)., Disp: 90 tablet, Rfl: 1    lisinopril (ZESTRIL) 40 MG tablet, Take 1 tablet (40 mg) by  mouth daily. for high blood pressure, Disp: 90 tablet, Rfl: 1    omeprazole (PRILOSEC) 20 MG DR capsule, TAKE 1 CAPSULE BY MOUTH DAILY BEFORE BREAKFAST, Disp: 90 capsule, Rfl: 1    rosuvastatin (CRESTOR) 10 MG tablet, Take 1 tablet (10 mg) by mouth daily, Disp: 90 tablet, Rfl: 3    ALLERGIES:  Reviewed by me.  Allergies   Allergen Reactions    Sulfa (Sulfonamide Antibiotics) [Sulfa Antibiotics] Rash     Rash - turned purple  , Around age 32       FAMILY HISTORY:  Reviewed by me.  Family History   Problem Relation Age of Onset    Atrial fibrillation Mother     Dementia Mother         dx 80s    Diabetes Type 2  Mother         dx 60s/70s    Atrial fibrillation Father     Rheumatoid Arthritis Father     Atrial fibrillation Sister     Cerebrovascular Disease Sister     Parkinsonism Sister     Parkinsonism Sister     Atrial fibrillation Brother     Rheumatoid Arthritis Brother     Cerebrovascular Disease Brother     CABG Brother     Kidney failure Brother         Dialysis    Atrial fibrillation Brother     Diabetes Type 2  Brother         Dx 40's       SOCIAL HISTORY:   Reviewed by me.  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   Tobacco Use    Smoking status: Never     Passive exposure: Past    Smokeless tobacco: Never   Vaping Use    Vaping status: Never Used   Substance and Sexual Activity    Alcohol use: Yes     Alcohol/week: 5.0 standard drinks of alcohol     Types: 5 Cans of beer per week     Comment: minimal    Drug use: Never    Sexual activity: Yes     Partners: Female   Social History Narrative    Diet-regular            Exercise-walk, limited by R leg, R knee        Volunteer,      Social Drivers of Health     Financial Resource Strain: Low Risk  (11/3/2024)    Financial Resource Strain     Within the past 12 months, have you or your family members you live with been unable to get  utilities (heat, electricity) when it was really needed?: No   Food Insecurity: Low Risk  (11/3/2024)    Food Insecurity     Within the past 12 months, did you worry that your food would run out before you got money to buy more?: No     Within the past 12 months, did the food you bought just not last and you didn t have money to get more?: No   Transportation Needs: Low Risk  (11/3/2024)    Transportation Needs     Within the past 12 months, has lack of transportation kept you from medical appointments, getting your medicines, non-medical meetings or appointments, work, or from getting things that you need?: No   Physical Activity: Sufficiently Active (11/3/2024)    Exercise Vital Sign     Days of Exercise per Week: 3 days     Minutes of Exercise per Session: 60 min   Stress: No Stress Concern Present (11/3/2024)    [place] [employer] - Occupational Stress Questionnaire     Feeling of Stress : Not at all   Social Connections: Socially Integrated (11/3/2024)    Social Connection and Isolation Panel [NHANES]     Frequency of Communication with Friends and Family: Never     Frequency of Social Gatherings with Friends and Family: More than three times a week     Attends Hindu Services: More than 4 times per year     Active Member of Clubs or Organizations: Yes     Attends Club or Organization Meetings: More than 4 times per year     Marital Status:    Interpersonal Safety: Low Risk  (11/6/2024)    Interpersonal Safety     Do you feel physically and emotionally safe where you currently live?: Yes     Within the past 12 months, have you been hit, slapped, kicked or otherwise physically hurt by someone?: No     Within the past 12 months, have you been humiliated or emotionally abused in other ways by your partner or ex-partner?: No   Housing Stability: High Risk (11/3/2024)    Housing Stability     Do you have housing? : No     Are you worried about losing your housing?: No  "        --------------- PHYSICAL EXAM ---------------  Nursing notes and vitals independently reviewed by me.  VITALS:  Vitals:    04/02/25 1104   BP: 108/73   Pulse: 91   Resp: 16   Temp: 97.9  F (36.6  C)   TempSrc: Oral   SpO2: 96%   Weight: 115.7 kg (255 lb)   Height: 1.803 m (5' 11\")       PHYSICAL EXAM:    General:  alert, interactive, no distress  Eyes:  conjunctivae clear, conjugate gaze  HENT:  atraumatic, nose with no rhinorrhea, oropharynx clear  Neck:  no meningismus  Cardiovascular:  HR 80s during exam, regular rhythm, no murmurs, brisk cap refill  Chest:  no chest wall tenderness  Pulmonary:  no stridor, normal phonation, normal work of breathing, clear lungs bilaterally  Abdomen: soft, nondistended, nontender  :  no CVA tenderness  Back:  no midline spinal tenderness  Musculoskeletal:    RLE:  2+ edema with erythema & tenderness up to lower thigh, no blistering or crepitus with distal CMS intact, no pain with ROM of joints  LLE:  no pretibial edema, no calf tenderness, no overlying skin changes  Skin:  warm, dry, no rash  Neuro:  awake, alert, answers questions appropriately, follows commands, moves all limbs  Psych:  calm, normal affect      --------------- RESULTS ---------------  EKG:    Reviewed and independently interpreted by me.  - NSR at 86bpm, unchanged RBBB with  (prior 114), otherwise normal intervals  - NSR replaced a-fib with RVR at 135bpm compared to prior on 8/16/24  My read.    LAB:  Reviewed and independently interpreted by me.  Results for orders placed or performed during the hospital encounter of 04/02/25   US Lower Extremity Venous Duplex Right    Impression    IMPRESSION:  1.  No deep venous thrombosis in the right lower extremity.   CT Chest Pulmonary Embolism w Contrast    Impression    IMPRESSION:    1.  Mild bilateral pulmonary artery embolism.    2.  Right ventricular enlargement is present, though was noted on prior imaging and may reflect chronic elevated right " heart pressure. Additional chronic borderline pulmonary arterial enlargement.    3.  No pulmonary infarct.    4.  Moderate hiatus hernia.    Critical Result: Pulmonary embolism    Finding was identified on 4/2/2025 at 12:50 PM .    Emergency Room Physician was contacted by me on 4/2/2025 12:50 PM CDT and verbalized understanding of the critical result.      Basic metabolic panel   Result Value Ref Range    Sodium 134 (L) 135 - 145 mmol/L    Potassium 3.3 (L) 3.4 - 5.3 mmol/L    Chloride 95 (L) 98 - 107 mmol/L    Carbon Dioxide (CO2) 26 22 - 29 mmol/L    Anion Gap 13 7 - 15 mmol/L    Urea Nitrogen 28.1 (H) 8.0 - 23.0 mg/dL    Creatinine 1.25 (H) 0.67 - 1.17 mg/dL    GFR Estimate 63 >60 mL/min/1.73m2    Calcium 9.0 8.8 - 10.4 mg/dL    Glucose 129 (H) 70 - 99 mg/dL   Result Value Ref Range    Troponin T, High Sensitivity 21 <=22 ng/L   Influenza A/B, RSV and SARS-CoV2 PCR (COVID-19) Nasopharyngeal    Specimen: Nasopharyngeal; Swab   Result Value Ref Range    Influenza A PCR Negative Negative    Influenza B PCR Negative Negative    RSV PCR Negative Negative    SARS CoV2 PCR Negative Negative   Result Value Ref Range    Magnesium 1.7 1.7 - 2.3 mg/dL   Hepatic function panel   Result Value Ref Range    Protein Total 7.3 6.4 - 8.3 g/dL    Albumin 3.8 3.5 - 5.2 g/dL    Bilirubin Total 0.9 <=1.2 mg/dL    Alkaline Phosphatase 69 40 - 150 U/L    AST 47 (H) 0 - 45 U/L    ALT 30 0 - 70 U/L    Bilirubin Direct 0.46 (H) 0.00 - 0.30 mg/dL   Result Value Ref Range    Procalcitonin 0.32 <0.50 ng/mL   Result Value Ref Range    CRP Inflammation 225.00 (H) <5.00 mg/L   CBC with platelets and differential   Result Value Ref Range    WBC Count 12.0 (H) 4.0 - 11.0 10e3/uL    RBC Count 5.35 4.40 - 5.90 10e6/uL    Hemoglobin 16.3 13.3 - 17.7 g/dL    Hematocrit 47.5 40.0 - 53.0 %    MCV 89 78 - 100 fL    MCH 30.5 26.5 - 33.0 pg    MCHC 34.3 31.5 - 36.5 g/dL    RDW 13.4 10.0 - 15.0 %    Platelet Count 150 150 - 450 10e3/uL    % Neutrophils 81  %    % Lymphocytes 11 %    % Monocytes 8 %    % Eosinophils 0 %    % Basophils 0 %    % Immature Granulocytes 0 %    NRBCs per 100 WBC 0 <1 /100    Absolute Neutrophils 9.7 (H) 1.6 - 8.3 10e3/uL    Absolute Lymphocytes 1.3 0.8 - 5.3 10e3/uL    Absolute Monocytes 1.0 0.0 - 1.3 10e3/uL    Absolute Eosinophils 0.0 0.0 - 0.7 10e3/uL    Absolute Basophils 0.0 0.0 - 0.2 10e3/uL    Absolute Immature Granulocytes 0.0 <=0.4 10e3/uL    Absolute NRBCs 0.0 10e3/uL   Nt probnp inpatient (BNP)   Result Value Ref Range    N terminal Pro BNP Inpatient 441 0 - 900 pg/mL   TSH with free T4 reflex   Result Value Ref Range    TSH 7.47 (H) 0.30 - 4.20 uIU/mL   Result Value Ref Range    Free T4 1.85 (H) 0.90 - 1.70 ng/dL   ECG 12-LEAD WITH MUSE ([facility])   Result Value Ref Range    Systolic Blood Pressure  mmHg    Diastolic Blood Pressure  mmHg    Ventricular Rate 86 BPM    Atrial Rate 86 BPM    WA Interval 150 ms    QRS Duration 122 ms     ms    QTc 440 ms    P Axis 51 degrees    R AXIS -1 degrees    T Axis 21 degrees    Interpretation ECG       Sinus rhythm with Premature atrial complexes  Right bundle branch block  Abnormal ECG  When compared with ECG of 16-Aug-2024 11:29,  Sinus rhythm has replaced Atrial fibrillation  Vent. rate has decreased by  49 bpm  Confirmed by SEE ED PROVIDER NOTE FOR, ECG INTERPRETATION ([id number]),  Hoda Cedeno ([id number]) on 4/2/2025 11:46:33 AM         RADIOLOGY:  Reviewed and independently interpreted by me. Please see official radiology report.  Recent Results (from the past 24 hours)   US Lower Extremity Venous Duplex Right    Narrative    EXAM: US LOWER EXTREMITY VENOUS DUPLEX RIGHT  LOCATION: [facility]  DATE: 4/2/2025    INDICATION: Right leg swelling.  COMPARISON: None.  TECHNIQUE: Venous Duplex ultrasound of the right lower extremity with and without compression, augmentation and duplex. Color flow and spectral Doppler with waveform analysis performed.    FINDINGS: Exam  includes the common femoral, femoral, popliteal, and contralateral common femoral veins as well as segmentally visualized deep calf veins and greater saphenous vein.     RIGHT: No deep vein thrombosis. No superficial thrombophlebitis. No popliteal cyst.      Impression    IMPRESSION:  1.  No deep venous thrombosis in the right lower extremity.   CT Chest Pulmonary Embolism w Contrast    Narrative    EXAM: CT CHEST PULMONARY EMBOLISM W CONTRAST  LOCATION: [facility]  DATE: 4/2/2025    INDICATION: Shortness of breath, R leg swelling.  COMPARISON: 3/20/2024.  TECHNIQUE: CT chest pulmonary angiogram during arterial phase injection of IV contrast. Multiplanar reformats and MIP reconstructions were performed. Dose reduction techniques were used.   CONTRAST: 75ml Isovue 370    FINDINGS:  ANGIOGRAM CHEST: Mild bilateral pulmonary artery embolism. Most proximal embolism is at the branch point of the distal right main pulmonary artery extending into the right upper, middle and lower lobes. Mild bilateral segmental and subsegmental disease   involving all lobes. No significant change of pulmonary trunk size measuring 3 cm. RV to LV ratio measures 1.6 cm; right heart enlargement noted on prior imaging. Nonaneurysmal aorta without dissection.    LUNGS AND PLEURA: Mild motion artifact. Mild basilar atelectasis. No pleural effusion.    MEDIASTINUM/AXILLAE: No adenopathy. No pericardial effusion. Left atrial appendage occluder device. Redemonstrated inferior right thyroid nodule. Moderate hiatus hernia with minimal adjacent fluid.    CORONARY ARTERY CALCIFICATION: Mild.    UPPER ABDOMEN: Cholelithiasis.    MUSCULOSKELETAL: Nothing acute.      Impression    IMPRESSION:    1.  Mild bilateral pulmonary artery embolism.    2.  Right ventricular enlargement is present, though was noted on prior imaging and may reflect chronic elevated right heart pressure. Additional chronic borderline pulmonary arterial  enlargement.    3.  No pulmonary infarct.    4.  Moderate hiatus hernia.    Critical Result: Pulmonary embolism    Finding was identified on 4/2/2025 at 12:50 PM .    Emergency Room Physician was contacted by me on 4/2/2025 12:50 PM CDT and verbalized understanding of the critical result.            PROCEDURES:   I was present for the key portions of procedures documented in CHIQUITA/midlevel note, see midlevel note for further details. See additional below as well.  Procedures   --------------------------------------------------------------------------------   Cardiac telemetry monitoring ordered by me secondary to the patient's history of shortness of breath and to monitor the patient for dysrhythmia. Reviewed & independently interpreted by me. Revealed normal sinus rhythm.  --------------------------------------------------------------------------------         Critical Care     Performed by:   Maurizio Rockwell MD   Authorized by:   Maurizio Rockwell MD  Total critical care time: 45 minutes (Critical care time was exclusive of separately billable procedures and treating other patients.)    Critical care was necessary to treat or prevent imminent or life-threatening deterioration of the following conditions: PE requiring anticoagulation, ED workup, telemetry monitoring    Critical care was time spent personally by me on the following activities:  - obtaining history from patient or surrogate  - examination of patient  - development of treatment plan with patient or surrogate  - ordering and performing treatments and interventions  - ordering and review of laboratory studies  - ordering and review of radiographic studies  - re-evaluation of patient's condition  - monitoring for potential decompensation  - discussion with  consultants      ---------------------------------------------------------------------------------------------------------------------  ---------------------------------------------------------------------------------------------------------------------              --------------- ADDITIONAL MDM ---------------  Sepsis/STEMI/Stroke Measures:  None    MIPS (CTPE, dental pain, Wang, sinusitis, asthma/COPD, head trauma):  CT Pulmonary Angiogram:Patient is moderate to high risk for PE.    History:  - I considered systemic symptoms of the presenting illness.  - Supplemental history from:       -- patient, family (wife)  - External Record(s) reviewed:       -- Inpatient/outpatient record (clinic visit 11/27/24), prior labs (blood 1/16/25), prior imaging (CTA chest/abdomen/pelvis 3/20/24)       -- see above ED course & MDM for further details    Workup:  - Chart documentation above includes differential considered and my independent interpretation any EKGs, labs tests, and/or imaging  - emergent/severe conditions considered and evaluated for: see above differential & MDM  - medications given that require intensive monitoring for toxicity: Eliquis  - In additional to work up documented, I considered the following work up:       -- echocardiogram       -- see above ED course & MDM for further details    Independent Interpretation:  - Independent interpretation of ECG and images noted in documentation, when applicable.    External Consultation:  - Discussion of management with another provider:       -- ED pharmacist re: meds       -- see above ED course & MDM for additional    Complicating Factors:  - Care impacted by chronic illness:       -- see above MDM, past medical history, & problem list    Disposition Considerations:  - Discharge       -- I considered escalation of care with admission to the hospital, but ultimately discharged the patient given normal vitals, does not meet hospitalization criteria, wants to  discharge home       -- I recommended the patient continue their current prescription strength medication(s) as charted above in current medications list       -- I prescribed prescription strength medication(s) as charted above       -- I recommended over-the-counter medication(s) as charted above & in discharge instructions             Maurizio Rockwell MD  04/02/25  Emergency Medicine  [facility] EMERGENCY ROOM  Formerly [facility], later [facility] [facility] [address]  [phone]  Dept: [phone]     Maurizio Rockwell MD  04/02/25 1508

## 2025-04-02 NOTE — ED PROVIDER NOTES
EMERGENCY DEPARTMENT ENCOUNTER      NAME: Brett Hopkins  AGE: 69 year old male  YOB: 1955  MRN: [id number]  EVALUATION DATE & TIME: No admission date for patient encounter.    PCP: Magdaleno Bnenett    ED PROVIDER: Sis Key PA-C    CHIEF COMPLAINT  Leg Swelling and Shortness of Breath      FINAL IMPRESSION:      ICD-10-CM    1. Cellulitis of right leg  L03.115 Walker Order for DME - ONLY FOR DME      2. Bilateral pulmonary embolism (H)  I26.99           MEDICAL DECISION MAKING AND ED COURSE:  Pertinent Labs & Imaging studies reviewed (See chart for details)  ED Course as of 04/02/25 1940 Wed Apr 02, 2025 1108 Patient is 69-year-old male pertinent history of A-fib with a watchman, HTN, gout, myasthenia gravis, hypothyroidism who presents to the ER today for fevers and chills that started on Sunday and then last night started of pain and redness and swelling to the right leg.  Is not on thinners.  Also reports shortness of breath for the past several days. Vitals unremarkable, afebrile.  No tachycardia, hypoxia, tachypnea.  On exam, patient is well-appearing in no distress.  Tenderness to palpation of the right calf and pain with dorsiflexion.  Swelling noted diffusely throughout the right leg and ankle.  Mostly spares the foot.  DP pulse 2+.  Sensation intact throughout.  Lungs clear to auscultation throughout without wheezing, stridor, crackles.  Heart with regular rate and rhythm.    High on my differential is DVT. Wells score moderate risk so will go directly to E US and CT PE study. Also will obtain labs, viral testing, EKG. Also considering cellulitis, lymphedema, pneumonia, pulmonary edema, ACS.   1153 Leukocytosis at 12.0. no anemia. EKG with NSR with PACs. No atrial fibrillation, pauses, or evidence of ischemia. CRP up here at 225. And US without DVT so favoring cellulitis as the cause. Negative covid/flu/RSV. Slight hypokalemia at 3.3 and hyponatremia at 134. Will supplement  here.   1256 CT PE shows mild bilateral Pes.  Ambulation challenge without hypoxia or significant tachypnea.  I do think right lower extremity swelling is secondary to cellulitis.  Considered possible DVT that been manifested as a PE but overall well-appearing here and I do think he would be a candidate for at home treatment.  I did discuss admission versus discharge home and he felt comfortable being discharged home on Eliquis.  First dose given here.  Will send with antibiotic for treatment of cellulitis as well as pain medications.  Patient was agreeable to this plan and discharged in stable condition.  All questions answered.         MEDICATIONS GIVEN IN THE EMERGENCY:  Medications   iopamidol (ISOVUE-370) solution 75 mL (75 mLs Intravenous $Given 4/2/25 1226)   piperacillin-tazobactam (ZOSYN) 3.375 g vial to attach to  mL bag (0 g Intravenous Stopped 4/2/25 1504)   potassium chloride (KLOR-CON) Packet 40 mEq (40 mEq Oral $Given 4/2/25 1250)   clindamycin (CLEOCIN) 600 mg in 50 mL D5W intermittent infusion (0 mg Intravenous Stopped 4/2/25 1505)   oxyCODONE (ROXICODONE) tablet 5 mg (5 mg Oral $Given 4/2/25 1251)   apixaban ANTICOAGULANT (ELIQUIS) tablet 10 mg (10 mg Oral $Given 4/2/25 1412)       NEW PRESCRIPTIONS STARTED AT TODAY'S ER VISIT  Discharge Medication List as of 4/2/2025  3:07 PM        START taking these medications    Details   Apixaban Starter Pack (ELIQUIS DVT/PE STARTER PACK) 5 MG TBPK Take 10 mg by mouth 2 times daily for 7 days, THEN 5 mg 2 times daily for 23 days. Continue as directed by provider., Disp-1 each, R-0, E-Prescribe      cefdinir (OMNICEF) 300 MG capsule Take 1 capsule (300 mg) by mouth 2 times daily for 10 days., Disp-20 capsule, R-0, E-Prescribe      clindamycin (CLEOCIN) 150 MG capsule Take 3 capsules (450 mg) by mouth 3 times daily for 10 days., Disp-90 capsule, R-0, E-Prescribe           Discharge Medication List as of 4/2/2025  3:07 PM     "      =================================================================    HPI    Patient information was obtained from: patient   Use of : N/A     Brett Hopkins is a 69 year old male with a pertinent history of persistent atrial fibrillation with Watchman, hypothyroidism, GERD, HTN, obesity, myasthenia gravis, gout who presents to this ED by walk in with spouse for evaluation of right leg swelling and redness since this morning.     Noticed an ache in his right calf yesterday but swelled up this morning. No hx DVT/PE but does report he has been \"down\" with the flu for the past 4 days and has not really moved from his recliner or bed. Usually fairly active. Is not on blood thinners as he has a watchman. Has felt short of breath over the past few days but worsened this AM and felt presyncopal/lightheaded. No syncope or falls. No cough or sore throat but does note rhinorrhea/congestion. Denies chest pain, rib pain, back pain. No pleuritic pain. Reports nausea/upset stomach starting Sunday. Several episodes of emesis. Little food intake since Sunday. Still trying to drink water, however. No urinary symptoms, abdominal pain, diarrhea. No sick contacts.     Chart review:   Triage note from 4/2/2025.  Patient reports having right foot swelling and redness today and shortness of breath.  That his foot is still swollen he can barely walk on it.  Instructed by RN to go to the ER.     PHYSICAL EXAM    /76   Pulse 72   Temp 98.8  F (37.1  C) (Oral)   Resp 16   Ht 1.803 m (5' 11\")   Wt 115.7 kg (255 lb)   SpO2 99%   BMI 35.57 kg/m    General:  alert, interactive, no distress  HENT:  atraumatic, oropharynx clear, EOMs intact. No scleral icterus  Cardiovascular:  regular rhythm, no murmurs, brisk cap refill, 2+ radial pulses  Pulmonary:  no stridor, normal phonation, normal work of breathing, clear lungs bilaterally  Abdomen: soft, nondistended, nontender  :  no CVA tenderness  Back:  no midline " spinal tenderness  Musculoskeletal:  2+ edema with erythema & tenderness just above the knee on the right, no blistering or crepitus   Neuro:  awake, alert, answers questions appropriately, follows commands, moves all limbs  Psych:  calm, normal affect     LAB:  All pertinent labs reviewed and interpreted.  Results for orders placed or performed during the hospital encounter of 04/02/25   US Lower Extremity Venous Duplex Right    Impression    IMPRESSION:  1.  No deep venous thrombosis in the right lower extremity.   CT Chest Pulmonary Embolism w Contrast    Impression    IMPRESSION:    1.  Mild bilateral pulmonary artery embolism.    2.  Right ventricular enlargement is present, though was noted on prior imaging and may reflect chronic elevated right heart pressure. Additional chronic borderline pulmonary arterial enlargement.    3.  No pulmonary infarct.    4.  Moderate hiatus hernia.    Critical Result: Pulmonary embolism    Finding was identified on 4/2/2025 at 12:50 PM .    Emergency Room Physician was contacted by me on 4/2/2025 12:50 PM CDT and verbalized understanding of the critical result.      Basic metabolic panel   Result Value Ref Range    Sodium 134 (L) 135 - 145 mmol/L    Potassium 3.3 (L) 3.4 - 5.3 mmol/L    Chloride 95 (L) 98 - 107 mmol/L    Carbon Dioxide (CO2) 26 22 - 29 mmol/L    Anion Gap 13 7 - 15 mmol/L    Urea Nitrogen 28.1 (H) 8.0 - 23.0 mg/dL    Creatinine 1.25 (H) 0.67 - 1.17 mg/dL    GFR Estimate 63 >60 mL/min/1.73m2    Calcium 9.0 8.8 - 10.4 mg/dL    Glucose 129 (H) 70 - 99 mg/dL   Result Value Ref Range    Troponin T, High Sensitivity 21 <=22 ng/L   Influenza A/B, RSV and SARS-CoV2 PCR (COVID-19) Nasopharyngeal    Specimen: Nasopharyngeal; Swab   Result Value Ref Range    Influenza A PCR Negative Negative    Influenza B PCR Negative Negative    RSV PCR Negative Negative    SARS CoV2 PCR Negative Negative   Result Value Ref Range    Magnesium 1.7 1.7 - 2.3 mg/dL   Hepatic function panel    Result Value Ref Range    Protein Total 7.3 6.4 - 8.3 g/dL    Albumin 3.8 3.5 - 5.2 g/dL    Bilirubin Total 0.9 <=1.2 mg/dL    Alkaline Phosphatase 69 40 - 150 U/L    AST 47 (H) 0 - 45 U/L    ALT 30 0 - 70 U/L    Bilirubin Direct 0.46 (H) 0.00 - 0.30 mg/dL   Result Value Ref Range    Procalcitonin 0.32 <0.50 ng/mL   Result Value Ref Range    CRP Inflammation 225.00 (H) <5.00 mg/L   CBC with platelets and differential   Result Value Ref Range    WBC Count 12.0 (H) 4.0 - 11.0 10e3/uL    RBC Count 5.35 4.40 - 5.90 10e6/uL    Hemoglobin 16.3 13.3 - 17.7 g/dL    Hematocrit 47.5 40.0 - 53.0 %    MCV 89 78 - 100 fL    MCH 30.5 26.5 - 33.0 pg    MCHC 34.3 31.5 - 36.5 g/dL    RDW 13.4 10.0 - 15.0 %    Platelet Count 150 150 - 450 10e3/uL    % Neutrophils 81 %    % Lymphocytes 11 %    % Monocytes 8 %    % Eosinophils 0 %    % Basophils 0 %    % Immature Granulocytes 0 %    NRBCs per 100 WBC 0 <1 /100    Absolute Neutrophils 9.7 (H) 1.6 - 8.3 10e3/uL    Absolute Lymphocytes 1.3 0.8 - 5.3 10e3/uL    Absolute Monocytes 1.0 0.0 - 1.3 10e3/uL    Absolute Eosinophils 0.0 0.0 - 0.7 10e3/uL    Absolute Basophils 0.0 0.0 - 0.2 10e3/uL    Absolute Immature Granulocytes 0.0 <=0.4 10e3/uL    Absolute NRBCs 0.0 10e3/uL   Nt probnp inpatient (BNP)   Result Value Ref Range    N terminal Pro BNP Inpatient 441 0 - 900 pg/mL   TSH with free T4 reflex   Result Value Ref Range    TSH 7.47 (H) 0.30 - 4.20 uIU/mL   Result Value Ref Range    Free T4 1.85 (H) 0.90 - 1.70 ng/dL   ECG 12-LEAD WITH MUSE (LHE)   Result Value Ref Range    Systolic Blood Pressure  mmHg    Diastolic Blood Pressure  mmHg    Ventricular Rate 86 BPM    Atrial Rate 86 BPM    AK Interval 150 ms    QRS Duration 122 ms     ms    QTc 440 ms    P Axis 51 degrees    R AXIS -1 degrees    T Axis 21 degrees    Interpretation ECG       Sinus rhythm with Premature atrial complexes  Right bundle branch block  Abnormal ECG  When compared with ECG of 16-Aug-2024 11:29,  Sinus rhythm  has replaced Atrial fibrillation  Vent. rate has decreased by  49 bpm  Confirmed by SEE ED PROVIDER NOTE FOR, ECG INTERPRETATION ([id number]),  Hoda Cedeno ([id number]) on 2025 11:46:33 AM         RADIOLOGY:  Reviewed all pertinent imaging. Please see official radiology report.  CT Chest Pulmonary Embolism w Contrast   Final Result   IMPRESSION:      1.  Mild bilateral pulmonary artery embolism.      2.  Right ventricular enlargement is present, though was noted on prior imaging and may reflect chronic elevated right heart pressure. Additional chronic borderline pulmonary arterial enlargement.      3.  No pulmonary infarct.      4.  Moderate hiatus hernia.      Critical Result: Pulmonary embolism      Finding was identified on 2025 at 12:50 PM .      Emergency Room Physician was contacted by me on 2025 12:50 PM CDT and verbalized understanding of the critical result.          US Lower Extremity Venous Duplex Right   Final Result   IMPRESSION:   1.  No deep venous thrombosis in the right lower extremity.        [id number] 11:09 AM  Sinus rhythm with PACs and a right bundle branch block.  Rate of 86.  HI interval of 150 MS.  QRS duration of 122 MS.  QTc of 440 MS. Compared to EKG of 2024, is no longer in atrial fibrillation. See MUSE for full report and EKG rhythm. EKG was independently interpreted by Dr. Luli CAMPBELL MD as well as myself without evidence of ST elevation/depression or ischemic changes.    Medical Decision Making  I obtained history from Family Member/Significant Other  Discharge. I prescribed additional prescription strength medication(s) as charted. See documentation for any additional details.    MIPS (CTPE, Dental pain, Wang, Sinusitis, Asthma/COPD, Head Trauma): CT Pulmonary Angiogram:Patient is moderate to high risk for PE.    SEPSIS: None      Sis Key PA-C  [facility] EMERGENCY ROOM  [id number] [facility] [address] [phone]         Sis Key PA-C  04/03/25 1107

## 2025-04-02 NOTE — DISCHARGE INSTRUCTIONS
Take the blood thinner (Eliquis) to help treat your blood clot.    Take the antibiotics (cefdinir & clindamycin) to treat any skin infection.    Continue all of your previously-prescribed medications.    Follow up with your Primary Care provider in 2 days for a recheck.    Return to the Emergency Department for any difficulty breathing, persistent vomiting, new or worsening symptoms, or any other concerns.

## 2025-04-03 ENCOUNTER — HOSPITAL ENCOUNTER (INPATIENT)
Facility: CLINIC | Age: 70
LOS: 5 days | Discharge: NURSING, REHAB OR LONG-TERM CARE | DRG: 602 | End: 2025-04-08
Attending: EMERGENCY MEDICINE | Admitting: STUDENT IN AN ORGANIZED HEALTH CARE EDUCATION/TRAINING PROGRAM
Payer: COMMERCIAL

## 2025-04-03 ENCOUNTER — OFFICE VISIT (OUTPATIENT)
Dept: FAMILY MEDICINE | Facility: CLINIC | Age: 70
End: 2025-04-03
Payer: COMMERCIAL

## 2025-04-03 ENCOUNTER — APPOINTMENT (OUTPATIENT)
Dept: CT IMAGING | Facility: CLINIC | Age: 70
DRG: 602 | End: 2025-04-03
Attending: EMERGENCY MEDICINE
Payer: COMMERCIAL

## 2025-04-03 ENCOUNTER — APPOINTMENT (OUTPATIENT)
Dept: ULTRASOUND IMAGING | Facility: CLINIC | Age: 70
DRG: 602 | End: 2025-04-03
Attending: STUDENT IN AN ORGANIZED HEALTH CARE EDUCATION/TRAINING PROGRAM
Payer: COMMERCIAL

## 2025-04-03 VITALS
DIASTOLIC BLOOD PRESSURE: 72 MMHG | HEIGHT: 71 IN | RESPIRATION RATE: 16 BRPM | TEMPERATURE: 97.3 F | OXYGEN SATURATION: 95 % | WEIGHT: 255 LBS | SYSTOLIC BLOOD PRESSURE: 105 MMHG | BODY MASS INDEX: 35.7 KG/M2 | HEART RATE: 84 BPM

## 2025-04-03 DIAGNOSIS — A41.9 SEPSIS WITHOUT ACUTE ORGAN DYSFUNCTION, DUE TO UNSPECIFIED ORGANISM (H): ICD-10-CM

## 2025-04-03 DIAGNOSIS — Z79.01 ANTICOAGULATED BY ANTICOAGULATION TREATMENT: ICD-10-CM

## 2025-04-03 DIAGNOSIS — I26.09 ACUTE PULMONARY EMBOLISM WITH ACUTE COR PULMONALE, UNSPECIFIED PULMONARY EMBOLISM TYPE (H): ICD-10-CM

## 2025-04-03 DIAGNOSIS — L03.115 CELLULITIS OF RIGHT LOWER EXTREMITY: Primary | ICD-10-CM

## 2025-04-03 DIAGNOSIS — R33.9 URINARY RETENTION: ICD-10-CM

## 2025-04-03 DIAGNOSIS — L03.115 CELLULITIS OF RIGHT LOWER LEG: ICD-10-CM

## 2025-04-03 DIAGNOSIS — I10 ESSENTIAL HYPERTENSION: ICD-10-CM

## 2025-04-03 DIAGNOSIS — I26.99 PULMONARY EMBOLISM, OTHER, UNSPECIFIED CHRONICITY, UNSPECIFIED WHETHER ACUTE COR PULMONALE PRESENT (H): Primary | ICD-10-CM

## 2025-04-03 DIAGNOSIS — L03.90 CELLULITIS, UNSPECIFIED CELLULITIS SITE: ICD-10-CM

## 2025-04-03 LAB
ALBUMIN SERPL BCG-MCNC: 3.5 G/DL (ref 3.5–5.2)
ALP SERPL-CCNC: 76 U/L (ref 40–150)
ALT SERPL W P-5'-P-CCNC: 33 U/L (ref 0–70)
ANION GAP SERPL CALCULATED.3IONS-SCNC: 16 MMOL/L (ref 7–15)
AST SERPL W P-5'-P-CCNC: 44 U/L (ref 0–45)
ATRIAL RATE - MUSE: 82 BPM
BACTERIA BLD CULT: NORMAL
BACTERIA BLD CULT: NORMAL
BASE EXCESS BLDV CALC-SCNC: 3.5 MMOL/L (ref -3–3)
BASOPHILS # BLD AUTO: 0 10E3/UL (ref 0–0.2)
BASOPHILS NFR BLD AUTO: 0 %
BILIRUB DIRECT SERPL-MCNC: 0.62 MG/DL (ref 0–0.3)
BILIRUB SERPL-MCNC: 1.2 MG/DL
BUN SERPL-MCNC: 31.2 MG/DL (ref 8–23)
CALCIUM SERPL-MCNC: 8.9 MG/DL (ref 8.8–10.4)
CHLORIDE SERPL-SCNC: 95 MMOL/L (ref 98–107)
CK SERPL-CCNC: 63 U/L (ref 39–308)
CREAT SERPL-MCNC: 1.45 MG/DL (ref 0.67–1.17)
CRP SERPL-MCNC: 213 MG/L
DIASTOLIC BLOOD PRESSURE - MUSE: 87 MMHG
EGFRCR SERPLBLD CKD-EPI 2021: 53 ML/MIN/1.73M2
EOSINOPHIL # BLD AUTO: 0 10E3/UL (ref 0–0.7)
EOSINOPHIL NFR BLD AUTO: 0 %
ERYTHROCYTE [DISTWIDTH] IN BLOOD BY AUTOMATED COUNT: 13.5 % (ref 10–15)
GLUCOSE SERPL-MCNC: 142 MG/DL (ref 70–99)
HCO3 BLDV-SCNC: 27 MMOL/L (ref 21–28)
HCO3 SERPL-SCNC: 23 MMOL/L (ref 22–29)
HCT VFR BLD AUTO: 45.2 % (ref 40–53)
HGB BLD-MCNC: 15.3 G/DL (ref 13.3–17.7)
IMM GRANULOCYTES # BLD: 0.1 10E3/UL
IMM GRANULOCYTES NFR BLD: 1 %
INTERPRETATION ECG - MUSE: NORMAL
LACTATE SERPL-SCNC: 1.7 MMOL/L (ref 0.7–2)
LIPASE SERPL-CCNC: 16 U/L (ref 13–60)
LYMPHOCYTES # BLD AUTO: 1.5 10E3/UL (ref 0.8–5.3)
LYMPHOCYTES NFR BLD AUTO: 10 %
MAGNESIUM SERPL-MCNC: 1.7 MG/DL (ref 1.7–2.3)
MCH RBC QN AUTO: 30.4 PG (ref 26.5–33)
MCHC RBC AUTO-ENTMCNC: 33.8 G/DL (ref 31.5–36.5)
MCV RBC AUTO: 90 FL (ref 78–100)
MONOCYTES # BLD AUTO: 1.1 10E3/UL (ref 0–1.3)
MONOCYTES NFR BLD AUTO: 8 %
NEUTROPHILS # BLD AUTO: 11.2 10E3/UL (ref 1.6–8.3)
NEUTROPHILS NFR BLD AUTO: 80 %
NRBC # BLD AUTO: 0 10E3/UL
NRBC BLD AUTO-RTO: 0 /100
NT-PROBNP SERPL-MCNC: 379 PG/ML (ref 0–900)
O2/TOTAL GAS SETTING VFR VENT: 21 %
OXYHGB MFR BLDV: 73 % (ref 70–75)
P AXIS - MUSE: 52 DEGREES
PCO2 BLDV: 38 MM HG (ref 40–50)
PH BLDV: 7.47 [PH] (ref 7.32–7.43)
PLATELET # BLD AUTO: 160 10E3/UL (ref 150–450)
PO2 BLDV: 38 MM HG (ref 25–47)
POTASSIUM SERPL-SCNC: 3.3 MMOL/L (ref 3.4–5.3)
POTASSIUM SERPL-SCNC: 3.5 MMOL/L (ref 3.4–5.3)
PR INTERVAL - MUSE: 148 MS
PROCALCITONIN SERPL IA-MCNC: 0.2 NG/ML
PROT SERPL-MCNC: 6.9 G/DL (ref 6.4–8.3)
QRS DURATION - MUSE: 132 MS
QT - MUSE: 402 MS
QTC - MUSE: 469 MS
R AXIS - MUSE: 18 DEGREES
RBC # BLD AUTO: 5.04 10E6/UL (ref 4.4–5.9)
SAO2 % BLDV: 74.2 % (ref 70–75)
SODIUM SERPL-SCNC: 134 MMOL/L (ref 135–145)
SYSTOLIC BLOOD PRESSURE - MUSE: 130 MMHG
T AXIS - MUSE: 36 DEGREES
TROPONIN T SERPL HS-MCNC: 17 NG/L
TROPONIN T SERPL HS-MCNC: 20 NG/L
VENTRICULAR RATE- MUSE: 82 BPM
WBC # BLD AUTO: 13.9 10E3/UL (ref 4–11)

## 2025-04-03 PROCEDURE — 86140 C-REACTIVE PROTEIN: CPT | Performed by: EMERGENCY MEDICINE

## 2025-04-03 PROCEDURE — 250N000011 HC RX IP 250 OP 636: Performed by: STUDENT IN AN ORGANIZED HEALTH CARE EDUCATION/TRAINING PROGRAM

## 2025-04-03 PROCEDURE — 82805 BLOOD GASES W/O2 SATURATION: CPT | Performed by: EMERGENCY MEDICINE

## 2025-04-03 PROCEDURE — 84145 PROCALCITONIN (PCT): CPT | Performed by: EMERGENCY MEDICINE

## 2025-04-03 PROCEDURE — 99223 1ST HOSP IP/OBS HIGH 75: CPT | Performed by: STUDENT IN AN ORGANIZED HEALTH CARE EDUCATION/TRAINING PROGRAM

## 2025-04-03 PROCEDURE — 250N000013 HC RX MED GY IP 250 OP 250 PS 637: Performed by: STUDENT IN AN ORGANIZED HEALTH CARE EDUCATION/TRAINING PROGRAM

## 2025-04-03 PROCEDURE — 36415 COLL VENOUS BLD VENIPUNCTURE: CPT | Performed by: STUDENT IN AN ORGANIZED HEALTH CARE EDUCATION/TRAINING PROGRAM

## 2025-04-03 PROCEDURE — 250N000011 HC RX IP 250 OP 636: Mod: JZ | Performed by: EMERGENCY MEDICINE

## 2025-04-03 PROCEDURE — 83690 ASSAY OF LIPASE: CPT | Performed by: EMERGENCY MEDICINE

## 2025-04-03 PROCEDURE — 96368 THER/DIAG CONCURRENT INF: CPT

## 2025-04-03 PROCEDURE — 83605 ASSAY OF LACTIC ACID: CPT | Performed by: EMERGENCY MEDICINE

## 2025-04-03 PROCEDURE — 82550 ASSAY OF CK (CPK): CPT | Performed by: STUDENT IN AN ORGANIZED HEALTH CARE EDUCATION/TRAINING PROGRAM

## 2025-04-03 PROCEDURE — 99291 CRITICAL CARE FIRST HOUR: CPT | Mod: 25

## 2025-04-03 PROCEDURE — 84132 ASSAY OF SERUM POTASSIUM: CPT | Performed by: STUDENT IN AN ORGANIZED HEALTH CARE EDUCATION/TRAINING PROGRAM

## 2025-04-03 PROCEDURE — 73701 CT LOWER EXTREMITY W/DYE: CPT | Mod: RT

## 2025-04-03 PROCEDURE — 250N000011 HC RX IP 250 OP 636: Performed by: EMERGENCY MEDICINE

## 2025-04-03 PROCEDURE — 82374 ASSAY BLOOD CARBON DIOXIDE: CPT | Performed by: EMERGENCY MEDICINE

## 2025-04-03 PROCEDURE — 258N000003 HC RX IP 258 OP 636: Performed by: EMERGENCY MEDICINE

## 2025-04-03 PROCEDURE — 73701 CT LOWER EXTREMITY W/DYE: CPT | Mod: RT,XS

## 2025-04-03 PROCEDURE — 93926 LOWER EXTREMITY STUDY: CPT | Mod: RT

## 2025-04-03 PROCEDURE — 83880 ASSAY OF NATRIURETIC PEPTIDE: CPT | Performed by: EMERGENCY MEDICINE

## 2025-04-03 PROCEDURE — 36415 COLL VENOUS BLD VENIPUNCTURE: CPT | Performed by: EMERGENCY MEDICINE

## 2025-04-03 PROCEDURE — 82248 BILIRUBIN DIRECT: CPT | Performed by: EMERGENCY MEDICINE

## 2025-04-03 PROCEDURE — 120N000001 HC R&B MED SURG/OB

## 2025-04-03 PROCEDURE — 85025 COMPLETE CBC W/AUTO DIFF WBC: CPT | Performed by: EMERGENCY MEDICINE

## 2025-04-03 PROCEDURE — 258N000003 HC RX IP 258 OP 636: Performed by: STUDENT IN AN ORGANIZED HEALTH CARE EDUCATION/TRAINING PROGRAM

## 2025-04-03 PROCEDURE — 96375 TX/PRO/DX INJ NEW DRUG ADDON: CPT

## 2025-04-03 PROCEDURE — 99292 CRITICAL CARE ADDL 30 MIN: CPT

## 2025-04-03 PROCEDURE — 93005 ELECTROCARDIOGRAM TRACING: CPT | Performed by: EMERGENCY MEDICINE

## 2025-04-03 PROCEDURE — 82947 ASSAY GLUCOSE BLOOD QUANT: CPT | Performed by: EMERGENCY MEDICINE

## 2025-04-03 PROCEDURE — 84484 ASSAY OF TROPONIN QUANT: CPT | Performed by: EMERGENCY MEDICINE

## 2025-04-03 PROCEDURE — 83735 ASSAY OF MAGNESIUM: CPT | Performed by: EMERGENCY MEDICINE

## 2025-04-03 PROCEDURE — 96365 THER/PROPH/DIAG IV INF INIT: CPT

## 2025-04-03 RX ORDER — AMOXICILLIN 500 MG/1
TABLET, FILM COATED ORAL
Status: ON HOLD | COMMUNITY
Start: 2024-12-13

## 2025-04-03 RX ORDER — MORPHINE SULFATE 4 MG/ML
4 INJECTION, SOLUTION INTRAMUSCULAR; INTRAVENOUS ONCE
Status: COMPLETED | OUTPATIENT
Start: 2025-04-03 | End: 2025-04-03

## 2025-04-03 RX ORDER — PIPERACILLIN SODIUM, TAZOBACTAM SODIUM 3; .375 G/15ML; G/15ML
3.38 INJECTION, POWDER, LYOPHILIZED, FOR SOLUTION INTRAVENOUS ONCE
Status: COMPLETED | OUTPATIENT
Start: 2025-04-03 | End: 2025-04-03

## 2025-04-03 RX ORDER — HYDROMORPHONE HYDROCHLORIDE 2 MG/1
2 TABLET ORAL EVERY 4 HOURS PRN
Status: DISCONTINUED | OUTPATIENT
Start: 2025-04-03 | End: 2025-04-08 | Stop reason: HOSPADM

## 2025-04-03 RX ORDER — NALOXONE HYDROCHLORIDE 0.4 MG/ML
0.2 INJECTION, SOLUTION INTRAMUSCULAR; INTRAVENOUS; SUBCUTANEOUS
Status: DISCONTINUED | OUTPATIENT
Start: 2025-04-03 | End: 2025-04-08 | Stop reason: HOSPADM

## 2025-04-03 RX ORDER — PIPERACILLIN SODIUM, TAZOBACTAM SODIUM 3; .375 G/15ML; G/15ML
3.38 INJECTION, POWDER, LYOPHILIZED, FOR SOLUTION INTRAVENOUS ONCE
Status: DISCONTINUED | OUTPATIENT
Start: 2025-04-03 | End: 2025-04-03

## 2025-04-03 RX ORDER — AMOXICILLIN 250 MG
1 CAPSULE ORAL 2 TIMES DAILY PRN
Status: DISCONTINUED | OUTPATIENT
Start: 2025-04-03 | End: 2025-04-08 | Stop reason: HOSPADM

## 2025-04-03 RX ORDER — CALCIUM CARBONATE 500 MG/1
1000 TABLET, CHEWABLE ORAL 4 TIMES DAILY PRN
Status: DISCONTINUED | OUTPATIENT
Start: 2025-04-03 | End: 2025-04-08 | Stop reason: HOSPADM

## 2025-04-03 RX ORDER — IOPAMIDOL 755 MG/ML
90 INJECTION, SOLUTION INTRAVASCULAR ONCE
Status: COMPLETED | OUTPATIENT
Start: 2025-04-03 | End: 2025-04-03

## 2025-04-03 RX ORDER — AMOXICILLIN 250 MG
2 CAPSULE ORAL 2 TIMES DAILY PRN
Status: DISCONTINUED | OUTPATIENT
Start: 2025-04-03 | End: 2025-04-08 | Stop reason: HOSPADM

## 2025-04-03 RX ORDER — ROSUVASTATIN CALCIUM 10 MG/1
10 TABLET, COATED ORAL DAILY
Status: DISCONTINUED | OUTPATIENT
Start: 2025-04-04 | End: 2025-04-08 | Stop reason: HOSPADM

## 2025-04-03 RX ORDER — PANTOPRAZOLE SODIUM 40 MG/1
40 TABLET, DELAYED RELEASE ORAL
Status: DISCONTINUED | OUTPATIENT
Start: 2025-04-04 | End: 2025-04-05

## 2025-04-03 RX ORDER — POTASSIUM CHLORIDE 7.45 MG/ML
10 INJECTION INTRAVENOUS ONCE
Status: COMPLETED | OUTPATIENT
Start: 2025-04-03 | End: 2025-04-03

## 2025-04-03 RX ORDER — PIPERACILLIN SODIUM, TAZOBACTAM SODIUM 3; .375 G/15ML; G/15ML
3.38 INJECTION, POWDER, LYOPHILIZED, FOR SOLUTION INTRAVENOUS EVERY 8 HOURS
Status: DISCONTINUED | OUTPATIENT
Start: 2025-04-03 | End: 2025-04-08 | Stop reason: HOSPADM

## 2025-04-03 RX ORDER — NALOXONE HYDROCHLORIDE 0.4 MG/ML
0.4 INJECTION, SOLUTION INTRAMUSCULAR; INTRAVENOUS; SUBCUTANEOUS
Status: DISCONTINUED | OUTPATIENT
Start: 2025-04-03 | End: 2025-04-08 | Stop reason: HOSPADM

## 2025-04-03 RX ORDER — POTASSIUM CHLORIDE 1.5 G/1.58G
20 POWDER, FOR SOLUTION ORAL ONCE
Status: COMPLETED | OUTPATIENT
Start: 2025-04-03 | End: 2025-04-03

## 2025-04-03 RX ORDER — SODIUM CHLORIDE 9 MG/ML
INJECTION, SOLUTION INTRAVENOUS CONTINUOUS
Status: DISCONTINUED | OUTPATIENT
Start: 2025-04-03 | End: 2025-04-04

## 2025-04-03 RX ORDER — ONDANSETRON 2 MG/ML
4 INJECTION INTRAMUSCULAR; INTRAVENOUS EVERY 6 HOURS PRN
Status: DISCONTINUED | OUTPATIENT
Start: 2025-04-03 | End: 2025-04-08 | Stop reason: HOSPADM

## 2025-04-03 RX ORDER — LEVOTHYROXINE SODIUM 125 UG/1
125 TABLET ORAL
Status: DISCONTINUED | OUTPATIENT
Start: 2025-04-04 | End: 2025-04-08 | Stop reason: HOSPADM

## 2025-04-03 RX ORDER — ACETAMINOPHEN 650 MG/1
650 SUPPOSITORY RECTAL EVERY 4 HOURS PRN
Status: DISCONTINUED | OUTPATIENT
Start: 2025-04-03 | End: 2025-04-08 | Stop reason: HOSPADM

## 2025-04-03 RX ORDER — MAGNESIUM SULFATE HEPTAHYDRATE 40 MG/ML
2 INJECTION, SOLUTION INTRAVENOUS ONCE
Status: COMPLETED | OUTPATIENT
Start: 2025-04-03 | End: 2025-04-03

## 2025-04-03 RX ORDER — ACETAMINOPHEN 325 MG/1
650 TABLET ORAL EVERY 4 HOURS PRN
Status: DISCONTINUED | OUTPATIENT
Start: 2025-04-03 | End: 2025-04-08 | Stop reason: HOSPADM

## 2025-04-03 RX ORDER — ONDANSETRON 4 MG/1
4 TABLET, ORALLY DISINTEGRATING ORAL EVERY 6 HOURS PRN
Status: DISCONTINUED | OUTPATIENT
Start: 2025-04-03 | End: 2025-04-08 | Stop reason: HOSPADM

## 2025-04-03 RX ORDER — LIDOCAINE 40 MG/G
CREAM TOPICAL
Status: DISCONTINUED | OUTPATIENT
Start: 2025-04-03 | End: 2025-04-08 | Stop reason: HOSPADM

## 2025-04-03 RX ADMIN — POTASSIUM CHLORIDE 10 MEQ: 7.46 INJECTION, SOLUTION INTRAVENOUS at 12:27

## 2025-04-03 RX ADMIN — CALCIUM CARBONATE (ANTACID) CHEW TAB 500 MG 1000 MG: 500 CHEW TAB at 21:36

## 2025-04-03 RX ADMIN — HYDROMORPHONE HYDROCHLORIDE 2 MG: 2 TABLET ORAL at 16:18

## 2025-04-03 RX ADMIN — VANCOMYCIN HYDROCHLORIDE 2500 MG: 5 INJECTION, POWDER, LYOPHILIZED, FOR SOLUTION INTRAVENOUS at 13:24

## 2025-04-03 RX ADMIN — CALCIUM CARBONATE (ANTACID) CHEW TAB 500 MG 1000 MG: 500 CHEW TAB at 18:50

## 2025-04-03 RX ADMIN — PIPERACILLIN AND TAZOBACTAM 3.38 G: 3; .375 INJECTION, POWDER, FOR SOLUTION INTRAVENOUS at 12:40

## 2025-04-03 RX ADMIN — MORPHINE SULFATE 4 MG: 4 INJECTION, SOLUTION INTRAMUSCULAR; INTRAVENOUS at 12:16

## 2025-04-03 RX ADMIN — POTASSIUM CHLORIDE 20 MEQ: 1.5 POWDER, FOR SOLUTION ORAL at 18:05

## 2025-04-03 RX ADMIN — APIXABAN 10 MG: 5 TABLET, FILM COATED ORAL at 21:36

## 2025-04-03 RX ADMIN — SODIUM CHLORIDE: 9 INJECTION, SOLUTION INTRAVENOUS at 16:52

## 2025-04-03 RX ADMIN — SODIUM CHLORIDE 1000 ML: 9 INJECTION, SOLUTION INTRAVENOUS at 13:47

## 2025-04-03 RX ADMIN — PIPERACILLIN AND TAZOBACTAM 3.38 G: 3; .375 INJECTION, POWDER, FOR SOLUTION INTRAVENOUS at 18:56

## 2025-04-03 RX ADMIN — IOPAMIDOL 75 ML: 755 INJECTION, SOLUTION INTRAVENOUS at 12:09

## 2025-04-03 RX ADMIN — MAGNESIUM SULFATE HEPTAHYDRATE 2 G: 40 INJECTION, SOLUTION INTRAVENOUS at 12:27

## 2025-04-03 ASSESSMENT — ACTIVITIES OF DAILY LIVING (ADL)
ADLS_ACUITY_SCORE: 45
ADLS_ACUITY_SCORE: 45
ADLS_ACUITY_SCORE: 25
ADLS_ACUITY_SCORE: 45
ADLS_ACUITY_SCORE: 45
ADLS_ACUITY_SCORE: 25
ADLS_ACUITY_SCORE: 45
ADLS_ACUITY_SCORE: 45
ADLS_ACUITY_SCORE: 25
ADLS_ACUITY_SCORE: 45

## 2025-04-03 ASSESSMENT — COLUMBIA-SUICIDE SEVERITY RATING SCALE - C-SSRS
2. HAVE YOU ACTUALLY HAD ANY THOUGHTS OF KILLING YOURSELF IN THE PAST MONTH?: NO
6. HAVE YOU EVER DONE ANYTHING, STARTED TO DO ANYTHING, OR PREPARED TO DO ANYTHING TO END YOUR LIFE?: NO
1. IN THE PAST MONTH, HAVE YOU WISHED YOU WERE DEAD OR WISHED YOU COULD GO TO SLEEP AND NOT WAKE UP?: NO

## 2025-04-03 NOTE — ED PROVIDER NOTES
EMERGENCY DEPARTMENT ENCOUNTER      NAME: Brett Hopkins  AGE: 69 year old male  YOB: 1955  MRN: [id number]  EVALUATION DATE & TIME: 4/3/2025 10:41 AM    PCP: Magdaleno Bennett    ED PROVIDER: Maurizio Rockwlel M.D.      Chief Complaint   Patient presents with    Generalized Weakness    Leg Pain         IMPRESSION  1. Cellulitis of right lower leg    2. Anticoagulated by anticoagulation treatment    3. Sepsis without acute organ dysfunction, due to unspecified organism (H)        PLAN  - admit to hospitalist for further care; med/surg tele admit    ED COURSE & MEDICAL DECISION MAKING    ED Course as of 04/03/25 1512   Thu Apr 03, 2025   1430 69yoM with history of paroxysmal a-fib (s/p ablation & watchman), obesity, GERD, HLD, hypothyroidism who was seen here in the ED yesterday with RLE redness/swelling and shortness of breath; found to have small bilateral pulmonary emboli---no DVT on US. Treated for RLE cellulitis (Zosyn/clindamycin in ED and cefdinir/clinda prescriptions for home) and given Eliquis. Doing well and wanted to treat as outpatient. Reports worsening RLE pain/redness/swelling despite taking 2 rounds of the prescribed PO antibiotics; no chest pain or shortness of breath. Unable to bear any weight on this right leg. Saw PCP in clinic and sent to the ED for admission.    Mild tachypnea on exam with otherwise normal vitals. Calm on exam with clear lungs, normal work of breathing, benign abdomen, mild worsened 3+ tender pitting edema with overlying circumferential erythema to RLE from ankle to just above knee with no crepitus, no pain with ROM of joints, distal CMS intact.    No concern for arterial occlusion. US yesterday with no DVT; doubt phlegmasia. CT obtained and no abscess; appears to be just cellulitis. No concern for septic joint clinically. Technically meets SIRS/sepsis criteria with cellulitis, mild tachypnea, WBC 13; but not severe sepsis or septic shock. Given vanc/Zosyn here in  the ED. K 3.3, mag 1.7 (replaced) with baseline creatinine 1.4, no anemia, ongoing high CRP in 200s.    From PE standpoint, high-sensitivity troponin reassuring against type MI, normal BNP, no O2 need; doubt contributing to his presentation today. Would just continue his Eliquis.    Overall, warrants admission for IV antibiotics for cellulitis with inability to walk due to pain. Stable for floor. No beds in system for transfer (ongoing boarding crisis here at [facility]). Consulted hospitalist for admission; they agreed. Patient understood and agreed with the plan; no further questions at the time of admission.       --------------------------------------------------------------------------------   --------------------------------------------------------------------------------     11:25 AM I met with the patient for the initial interview and physical examination. Discussed plan for treatment and workup in the ED.  2:06 PM I spoke with Dr. Roque the hospitalist. Made plans for admission.         This patient involved a high degree of complexity in medical decision making, as significant risks were present and assessed. Recent encounters & results in medical record reviewed by me.    All workup (i.e. any EKG/labs/imaging as per charting below) reviewed and independently interpreted by me. See respective sections for details.        See additional MDM below if interested.      MEDICATIONS GIVEN IN THE EMERGENCY DEPARTMENT  Medications   vancomycin (VANCOCIN) 2,500 mg in 0.9% NaCl 525 mL intermittent infusion (2,500 mg Intravenous $New Bag 4/3/25 1326)   morphine (PF) injection 4 mg (4 mg Intravenous $Given 4/3/25 1216)   magnesium sulfate 2 g in 50 mL sterile water intermittent infusion (0 g Intravenous Stopped 4/3/25 1345)   potassium chloride 10 mEq in 100 mL sterile water infusion (0 mEq Intravenous Stopped 4/3/25 1345)   iopamidol (ISOVUE-370) solution 90 mL (75 mLs Intravenous $Given 4/3/25 1209)    piperacillin-tazobactam (ZOSYN) 3.375 g vial to attach to  mL bag (0 g Intravenous Stopped 4/3/25 1323)   sodium chloride 0.9% BOLUS 1,000 mL (1,000 mLs Intravenous $New Bag 4/3/25 5657)                 =================================================================      HPI  Use of : N/A         Brett Hopkins is a 69 year old male with a pertinent history of persistent atrial fibrillation with Watchman, hypothyroidism, GERD, HTN, obesity, myasthenia gravis, gout, who presents to this ED by walk in for evaluation of leg pain.     The patient was seen yesterday for right lower extremity pain, redness, swelling and shortness of breath. He was found to have small bilateral PE but no DVT. He presents today with worsening RLE pain. He said his breathing is still difficult as well. He took two doses of his clindamycin and eliquis since yesterday. Pt noted that he can't stand at all due to the pain. Denies vomiting.       --------------- MEDICAL HISTORY ---------------  PAST MEDICAL HISTORY:  Reviewed independently by me.  History reviewed. No pertinent past medical history.  Patient Active Problem List   Diagnosis    Varicose Veins    Persistent atrial fibrillation    Aneurysm Of The Ascending Aorta    Hypothyroidism    Status post ablation of atrial fibrillation    GERD (gastroesophageal reflux disease)    AA (alopecia areata)    Hypertension    Lymphedema    Obesity (BMI 35.0-39.9) with comorbidity (H)    Thyroid nodule    Myasthenia gravis (H)    Presence of Watchman left atrial appendage closure device    Hyperlipidemia    Gout    Pulmonary embolism (H)    Cellulitis of right lower leg    Anticoagulated by anticoagulation treatment    Sepsis without acute organ dysfunction, due to unspecified organism (H)       PAST SURGICAL HISTORY:  Reviewed independently by me.  Past Surgical History:   Procedure Laterality Date    ARTHROPLASTY REVISION HIP Right 05/01/2019    ARTHROPLASTY REVISION KNEE  Right 2019    CARDIOVERSION  07/01/2019    7/10/2019    CARDIOVERSION  09/12/2019    CATARACT EXTRACTION Bilateral 06/2021    CV LEFT ATRIAL APPENDAGE CLOSURE N/A 1/12/2023    Procedure: Left Atrial Appendage Closure;  Surgeon: Mitesh Graff MD;  Location: [facility]    DENTAL SURGERY      Oral Surgery Tooth Extraction;  Implant    EP ABLATION PULMONARY VEIN ISOLATION N/A 3/19/2024    Procedure: Ablation Atrial Fibrillation;  Surgeon: Benedicto Barfield MD;  Location: [facility]    OR ABLATE HEART DYSRHYTHM FOCUS  08/19/2013    Description: Catheter Ablation Atrial Fibrillation;  Recorded: 11/16/2013;  Comments: PVI Aug 19, 2013 (Cryo to all 4 PV's)    TOOTH EXTRACTION      implant    [facility] TOTAL HIP ARTHROPLASTY Right 05/08/2019    Procedure: RIGHT TOTAL HIP ARTHROPLASTY DIRECT ANTERIOR APPROACH;  Surgeon: Mario Woodruff MD;  Location: [facility];  Service: Orthopedics    [facility] TOTAL KNEE ARTHROPLASTY Right 06/21/2017    Procedure: 2)  RIGHT TOTAL KNEE ARTHROPLASTY;  Surgeon: Mario VICKERS MD;  Location: [facility];  Service: Orthopedics       CURRENT MEDICATIONS:    Reviewed independently by me.    Current Facility-Administered Medications:     vancomycin (VANCOCIN) 2,500 mg in 0.9% NaCl 525 mL intermittent infusion, 2,500 mg, Intravenous, Once, Maurizio Rockwell MD, 2,500 mg at 04/03/25 1324    Current Outpatient Medications:     Apixaban Starter Pack (ELIQUIS DVT/PE STARTER PACK) 5 MG TBPK, Take 10 mg by mouth 2 times daily for 7 days, THEN 5 mg 2 times daily for 23 days. Continue as directed by provider., Disp: 1 each, Rfl: 0    aspirin 81 MG EC tablet, Take 1 tablet (81 mg) by mouth daily, Disp: , Rfl:     cefdinir (OMNICEF) 300 MG capsule, Take 1 capsule (300 mg) by mouth 2 times daily for 10 days., Disp: 20 capsule, Rfl: 0    clindamycin (CLEOCIN) 150 MG capsule, Take 3 capsules (450 mg) by mouth 3 times daily for 10 days., Disp: 90 capsule, Rfl: 0     hydrochlorothiazide (HYDRODIURIL) 25 MG tablet, TAKE 1 TABLET BY MOUTH EVERY DAY IN THE MORNING FOR HIGH BLOOD PRESSURE, Disp: 90 tablet, Rfl: 2    levothyroxine (SYNTHROID/LEVOTHROID) 125 MCG tablet, Take 1 tablet (125 mcg) by mouth every morning (before breakfast)., Disp: 90 tablet, Rfl: 1    lisinopril (ZESTRIL) 40 MG tablet, Take 1 tablet (40 mg) by mouth daily. for high blood pressure, Disp: 90 tablet, Rfl: 1    omeprazole (PRILOSEC) 20 MG DR capsule, TAKE 1 CAPSULE BY MOUTH DAILY BEFORE BREAKFAST, Disp: 90 capsule, Rfl: 1    rosuvastatin (CRESTOR) 10 MG tablet, Take 1 tablet (10 mg) by mouth daily, Disp: 90 tablet, Rfl: 3    amoxicillin (AMOXIL) 500 MG tablet, TAKE FOUR TS PO 1 HOUR B DAPP, Disp: , Rfl:     ALLERGIES:  Reviewed independently by me.  Allergies   Allergen Reactions    Sulfa (Sulfonamide Antibiotics) [Sulfa Antibiotics] Rash     Rash - turned purple  , Around age 32       FAMILY HISTORY:  Reviewed independently by me.  Family History   Problem Relation Age of Onset    Atrial fibrillation Mother     Dementia Mother         dx 80s    Diabetes Type 2  Mother         dx 60s/70s    Atrial fibrillation Father     Rheumatoid Arthritis Father     Atrial fibrillation Sister     Cerebrovascular Disease Sister     Parkinsonism Sister     Parkinsonism Sister     Atrial fibrillation Brother     Rheumatoid Arthritis Brother     Cerebrovascular Disease Brother     CABG Brother     Kidney failure Brother         Dialysis    Atrial fibrillation Brother     Diabetes Type 2  Brother         Dx 40's         SOCIAL HISTORY:   Reviewed independently by me.  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   Tobacco Use    Smoking status: Never     Passive exposure: Past    Smokeless tobacco: Never   Vaping Use    Vaping status: Never Used   Substance and Sexual Activity    Alcohol use: Yes      Alcohol/week: 5.0 standard drinks of alcohol     Types: 5 Cans of beer per week     Comment: minimal    Drug use: Never    Sexual activity: Yes     Partners: Female   Social History Narrative    Diet-regular            Exercise-walk, limited by R leg, R knee        Volunteer,      Social Drivers of Health     Financial Resource Strain: Low Risk  (11/3/2024)    Financial Resource Strain     Within the past 12 months, have you or your family members you live with been unable to get utilities (heat, electricity) when it was really needed?: No   Food Insecurity: Low Risk  (11/3/2024)    Food Insecurity     Within the past 12 months, did you worry that your food would run out before you got money to buy more?: No     Within the past 12 months, did the food you bought just not last and you didn t have money to get more?: No   Transportation Needs: Low Risk  (11/3/2024)    Transportation Needs     Within the past 12 months, has lack of transportation kept you from medical appointments, getting your medicines, non-medical meetings or appointments, work, or from getting things that you need?: No   Physical Activity: Sufficiently Active (11/3/2024)    Exercise Vital Sign     Days of Exercise per Week: 3 days     Minutes of Exercise per Session: 60 min   Stress: No Stress Concern Present (11/3/2024)    [employer] [place] [employer] - Occupational Stress Questionnaire     Feeling of Stress : Not at all   Social Connections: Socially Integrated (11/3/2024)    Social Connection and Isolation Panel [NHANES]     Frequency of Communication with Friends and Family: Never     Frequency of Social Gatherings with Friends and Family: More than three times a week     Attends Faith Services: More than 4 times per year     Active Member of Clubs or Organizations: Yes     Attends Club or Organization Meetings: More than 4 times per year     Marital Status:    Interpersonal Safety: Low Risk  (11/6/2024)    Interpersonal  "Safety     Do you feel physically and emotionally safe where you currently live?: Yes     Within the past 12 months, have you been hit, slapped, kicked or otherwise physically hurt by someone?: No     Within the past 12 months, have you been humiliated or emotionally abused in other ways by your partner or ex-partner?: No   Housing Stability: High Risk (11/3/2024)    Housing Stability     Do you have housing? : No     Are you worried about losing your housing?: No       --------------- PHYSICAL EXAM ---------------  Nursing notes and vitals independently reviewed by me.  VITALS:  Vitals:    04/03/25 1037 04/03/25 1200 04/03/25 1306   BP: 123/72 114/70    Pulse: 88  80   Resp: 25     Temp: 97.9  F (36.6  C)     TempSrc: Oral     SpO2: 97% 98% 95%   Weight: 117.5 kg (259 lb)     Height: 1.803 m (5' 11\")         PHYSICAL EXAM:    General:  alert, interactive, no distress  Eyes:  conjunctivae clear, conjugate gaze  HENT:  atraumatic, nose with no rhinorrhea, oropharynx clear  Neck:  no meningismus  Cardiovascular:  HR 80s during exam, regular rhythm, no murmurs, brisk cap refill  Chest:  no chest wall tenderness  Pulmonary:  no stridor, normal phonation, normal work of breathing, clear lungs bilaterally  Abdomen:  soft, nondistended, nontender  :  no CVA tenderness  Back:  no midline spinal tenderness  Musculoskeletal:  RLE with 3+ tender edema and erythema from ankle to just above the knee. Tenderness worse over calf. No blistering, crepitus, streaking skin redness or pain with ROM of joints, Distal CMS intact.   Skin:  warm, dry, no rash  Neuro:  awake, alert, answers questions appropriately, follows commands, moves all limbs  Psych:  calm, normal affect      --------------- RESULTS ---------------  EKG:    Reviewed and independently interpreted by me.  - NSR at 82bpm with PACs, no ST or T wave changes, unchanged  RBBB, otherwise normal intervals  - unchanged from prior on 4/2/25  My read.    LAB:  Reviewed and " independently interpreted by me.  Results for orders placed or performed during the hospital encounter of 04/03/25   CT Femur Thigh Right w Contrast    Impression    IMPRESSION:  1.  Subcutaneous soft tissue stranding involving most of the lower leg could represent cellulitis.  2.  No abscess.  3.  No CT evidence of osteomyelitis.     CT Tibia Fibula Lower Leg Right w Contrast    Impression    IMPRESSION:  1.  Subcutaneous soft tissue stranding involving most of the lower leg could represent cellulitis.  2.  No abscess.  3.  No CT evidence of osteomyelitis.     Basic metabolic panel   Result Value Ref Range    Sodium 134 (L) 135 - 145 mmol/L    Potassium 3.3 (L) 3.4 - 5.3 mmol/L    Chloride 95 (L) 98 - 107 mmol/L    Carbon Dioxide (CO2) 23 22 - 29 mmol/L    Anion Gap 16 (H) 7 - 15 mmol/L    Urea Nitrogen 31.2 (H) 8.0 - 23.0 mg/dL    Creatinine 1.45 (H) 0.67 - 1.17 mg/dL    GFR Estimate 53 (L) >60 mL/min/1.73m2    Calcium 8.9 8.8 - 10.4 mg/dL    Glucose 142 (H) 70 - 99 mg/dL   Hepatic function panel   Result Value Ref Range    Protein Total 6.9 6.4 - 8.3 g/dL    Albumin 3.5 3.5 - 5.2 g/dL    Bilirubin Total 1.2 <=1.2 mg/dL    Alkaline Phosphatase 76 40 - 150 U/L    AST 44 0 - 45 U/L    ALT 33 0 - 70 U/L    Bilirubin Direct 0.62 (H) 0.00 - 0.30 mg/dL   Result Value Ref Range    Magnesium 1.7 1.7 - 2.3 mg/dL   Result Value Ref Range    Lipase 16 13 - 60 U/L   Result Value Ref Range    Troponin T, High Sensitivity 20 <=22 ng/L   Nt probnp inpatient (BNP)   Result Value Ref Range    N terminal Pro BNP Inpatient 379 0 - 900 pg/mL   Result Value Ref Range    CRP Inflammation 213.00 (H) <5.00 mg/L   Lactic acid whole blood with 1x repeat in 2 hr when >2   Result Value Ref Range    Lactic Acid, Initial 1.7 0.7 - 2.0 mmol/L   Blood gas venous   Result Value Ref Range    pH Venous 7.47 (H) 7.32 - 7.43    pCO2 Venous 38 (L) 40 - 50 mm Hg    pO2 Venous 38 25 - 47 mm Hg    Bicarbonate Venous 27 21 - 28 mmol/L    Base  Excess/Deficit Venous 3.5 (H) -3.0 - 3.0 mmol/L    FIO2 21     Oxyhemoglobin Venous 73 70 - 75 %    O2 Sat, Venous 74.2 70.0 - 75.0 %   CBC with platelets and differential   Result Value Ref Range    WBC Count 13.9 (H) 4.0 - 11.0 10e3/uL    RBC Count 5.04 4.40 - 5.90 10e6/uL    Hemoglobin 15.3 13.3 - 17.7 g/dL    Hematocrit 45.2 40.0 - 53.0 %    MCV 90 78 - 100 fL    MCH 30.4 26.5 - 33.0 pg    MCHC 33.8 31.5 - 36.5 g/dL    RDW 13.5 10.0 - 15.0 %    Platelet Count 160 150 - 450 10e3/uL    % Neutrophils 80 %    % Lymphocytes 10 %    % Monocytes 8 %    % Eosinophils 0 %    % Basophils 0 %    % Immature Granulocytes 1 %    NRBCs per 100 WBC 0 <1 /100    Absolute Neutrophils 11.2 (H) 1.6 - 8.3 10e3/uL    Absolute Lymphocytes 1.5 0.8 - 5.3 10e3/uL    Absolute Monocytes 1.1 0.0 - 1.3 10e3/uL    Absolute Eosinophils 0.0 0.0 - 0.7 10e3/uL    Absolute Basophils 0.0 0.0 - 0.2 10e3/uL    Absolute Immature Granulocytes 0.1 <=0.4 10e3/uL    Absolute NRBCs 0.0 10e3/uL   Result Value Ref Range    Troponin T, High Sensitivity 17 <=22 ng/L   Result Value Ref Range    Procalcitonin 0.20 <0.50 ng/mL   ECG 12-LEAD WITH MUSE (L[facility])   Result Value Ref Range    Systolic Blood Pressure 130 mmHg    Diastolic Blood Pressure 87 mmHg    Ventricular Rate 82 BPM    Atrial Rate 82 BPM    ID Interval 148 ms    QRS Duration 132 ms     ms    QTc 469 ms    P Axis 52 degrees    R AXIS 18 degrees    T Axis 36 degrees    Interpretation ECG       Sinus rhythm with Premature atrial complexes  Right bundle branch block  Abnormal ECG  When compared with ECG of 02-Apr-2025 11:09,  No significant change was found  Confirmed by SEE ED PROVIDER NOTE FOR, ECG INTERPRETATION ([id number]),  Yuki Martinez ([id number]) on 4/3/2025 11:01:31 AM           RADIOLOGY:  Reviewed and independently interpreted by me. Please see official radiology report.  Recent Results (from the past 24 hours)   CT Femur Thigh Right w Contrast    Narrative    EXAM: CT FEMUR THIGH RIGHT W  CONTRAST, CT TIBIA FIBULA LOWER LEG RIGHT W CONTRAST  LOCATION: [facility]  DATE: 4/3/2025    INDICATION: Leg pain, redness, swelling.  COMPARISON: Ultrasound 4/2/2025  TECHNIQUE: IV contrast. Axial, sagittal and coronal thin-section reconstruction. Dose reduction techniques were used.   CONTRAST: Isovue 370 75mL    FINDINGS:     THIGH-FEMUR:  -Hip and knee arthroplasties with associated metallic artifact. No fracture. No signs of loosening. No focal bony abnormality to suggest osteomyelitis.    No significant soft tissue abnormality. No fluid collection. Prominent lymph nodes proximal right thigh and right inguinal region, most with fatty yossi, or probably reactive or incidental.    Colonic diverticula. Contrast in the urinary bladder.    LOWER LEG-TIBIA-FIBULA:  -Poorly defined subcutaneous soft tissue stranding involving most of the lower leg, most marked distally and anteromedially, could represent cellulitis. No fluid collection.    No focal bony abnormality to suggest osteomyelitis. No fracture.    Venous varicosities.      Impression    IMPRESSION:  1.  Subcutaneous soft tissue stranding involving most of the lower leg could represent cellulitis.  2.  No abscess.  3.  No CT evidence of osteomyelitis.     CT Tibia Fibula Lower Leg Right w Contrast    Narrative    EXAM: CT FEMUR THIGH RIGHT W CONTRAST, CT TIBIA FIBULA LOWER LEG RIGHT W CONTRAST  LOCATION: [facility]  DATE: 4/3/2025    INDICATION: Leg pain, redness, swelling.  COMPARISON: Ultrasound 4/2/2025  TECHNIQUE: IV contrast. Axial, sagittal and coronal thin-section reconstruction. Dose reduction techniques were used.   CONTRAST: Isovue 370 75mL    FINDINGS:     THIGH-FEMUR:  -Hip and knee arthroplasties with associated metallic artifact. No fracture. No signs of loosening. No focal bony abnormality to suggest osteomyelitis.    No significant soft tissue abnormality. No fluid collection. Prominent lymph  nodes proximal right thigh and right inguinal region, most with fatty yossi, or probably reactive or incidental.    Colonic diverticula. Contrast in the urinary bladder.    LOWER LEG-TIBIA-FIBULA:  -Poorly defined subcutaneous soft tissue stranding involving most of the lower leg, most marked distally and anteromedially, could represent cellulitis. No fluid collection.    No focal bony abnormality to suggest osteomyelitis. No fracture.    Venous varicosities.      Impression    IMPRESSION:  1.  Subcutaneous soft tissue stranding involving most of the lower leg could represent cellulitis.  2.  No abscess.  3.  No CT evidence of osteomyelitis.           PROCEDURES:   Procedures   --------------------------------------------------------------------------------   Cardiac telemetry monitoring ordered by me secondary to the patient's history of weakness & pulmonary emboli and to monitor the patient for dysrhythmia. Reviewed & independently interpreted by me. Revealed normal sinus rhythm with PACs.  --------------------------------------------------------------------------------         Critical Care     Performed by:   Maurizio Rockwell MD   Authorized by:   Maurizio Rockwell MD  Total critical care time: 165 minutes (Critical care time was exclusive of separately billable procedures and treating other patients.)    Critical care was necessary to treat or prevent imminent or life-threatening deterioration of the following conditions: Sepsis requiring 2+ IV antibiotics, IVF, admission    Critical care was time spent personally by me on the following activities:  - obtaining history from patient or surrogate  - examination of patient  - development of treatment plan with patient or surrogate  - ordering and performing treatments and interventions  - ordering and review of laboratory studies  - ordering and review of radiographic studies  - re-evaluation of patient's condition  - monitoring for potential decompensation  -  discussion with consultants      ---------------------------------------------------------------------------------------------------------------------  ---------------------------------------------------------------------------------------------------------------------              --------------- ADDITIONAL MDM ---------------  Sepsis/STEMI/Stroke Measures:  None    MIPS (CTPE, dental pain, Wang, sinusitis, asthma/COPD, head trauma):  Not Applicable    History:  - I considered systemic symptoms of the presenting illness.  - Supplemental history from:       -- patient, family (wife)  - External Record(s) reviewed:       -- Inpatient/outpatient record, prior labs, prior imaging       -- see above ED course & MDM for further details    Workup:  - Chart documentation above includes differential considered and my independent interpretation any EKGs, labs tests, and/or imaging  - emergent/severe conditions considered and evaluated for: see above differential & MDM  - In additional to work up documented, I considered the following work up:       -- see above ED course & MDM for further details    Independent Interpretation:  - Independent interpretation of ECG and images noted in documentation, when applicable.    External Consultation:  - Discussion of management with another provider:       -- see above ED course & MDM for details    Complicating Factors:  - Care impacted by chronic illness:       -- see above MDM, past medical history, & problem list    Disposition Considerations:  - Admit                 I, Amena Blanco, am serving as a scribe to document services personally performed by Dr. Maurizio Rockwell based on my observation and the provider's statements to me. I, Maurizio Rockwell MD attest that Amena Blanco is acting in a scribe capacity, has observed my performance of the services and has documented them in accordance with my direction.      Maurizio Rockwell MD  04/03/25  Emergency Medicine  [facility]  [facility] EMERGENCY ROOM  [address] [id number]  [phone]  Dept: [phone]     Maurizio Rockwell MD  04/03/25 1522

## 2025-04-03 NOTE — ED TRIAGE NOTES
Patient presents to the ED with RLE pain/redness. Was here yesterday and discharged from the ER. Has known cellulitis and PE. Was started on PO antibiotics and eliquis for the PE. Patient is in a lot of pain (worse than yesterday) to his right lower extremity and is not safe at home. Patient can not stand/walk/put any pressure onto the right leg and feels unsafe at home. Patient went to his PCP this AM who advised him to come to the hospital for admission.

## 2025-04-03 NOTE — PROGRESS NOTES
Assessment & Plan     Cellulitis of right lower extremity  Unfortunately patient has what appears to be worsening cellulitis with increasing redness and increasing pain in the last 24 hours, despite clindamycin and cefdinir.  Advised patient to return back to the emergency department for possible admission, per his request as well, and he most likely requires IV antibiotics.    Acute pulmonary embolism with acute cor pulmonale, unspecified pulmonary embolism type (H)  Stable.  Advised patient to continue with Eliquis.      The longitudinal plan of care for the diagnosis(es)/condition(s) as documented were addressed during this visit. Due to the added complexity in care, I will continue to support Champ in the subsequent management and with ongoing continuity of care.        MED REC REQUIRED  Post Medication Reconciliation Status:           Subjective   Champ is a 69 year old, presenting for the following health issues:  Hospital F/U (Patient is here for a follow up from [facility] Room on 4.2.25 for leg swelling and shortness of breath. ) and Leg Pain (Right leg pain is 10 times worse then yesterday. )        4/3/2025     9:34 AM   Additional Questions   Roomed by jessie flowers cma   Accompanied by wife         Cellulitis of right lower leg: Patient was having fever, chills, body aches a few days ago.  He was seen in the emergency department yesterday and diagnosed with a right lower extremity cellulitis and acute mild bilateral pulmonary embolisms.  He was appropriately prescribed cefdinir, clindamycin and Eliquis, which she has been taking.  His wife marked the cellulitis on his right lower leg and there appears to be increasing redness at the superior and lateral aspects of her marks.  Patient complains that there is significant increase in right lower extremity pain in the last 24 hours.  Patient is having difficulty using the bathroom at home and was unable to go up the  "stairs, so had to sleep in a recliner.  He states that he is elevating his leg with the recliner, but pain still is severe.  He was offered admission to the hospital yesterday when he was in the emergency department versus discharge home, he decided to be discharged home, but now he feels like he made the wrong decision and should be admitted especially with the increasing pain that he has experienced in the last 24 hours.      Hospital Follow-up Visit:    Hospital/Nursing Home/IP Rehab Facility: [facility]    Was the patient in the ICU or did the patient experience delirium during hospitalization?  No  Do you have any other stressors you would like to discuss with your provider? No    Problems taking medications regularly:  None  Medication changes since discharge: cefdinir, clindamycin, and eliquis.   Problems adhering to non-medication therapy:  None    Summary of hospitalization:  [facility] discharge summary reviewed  Diagnostic Tests/Treatments reviewed.  Follow up needed: cellulitis evalution  Other Healthcare Providers Involved in Patient s Care:         None  Update since discharge: worsened.         Plan of care communicated with patient and wife                   Review of Systems  No fever      Objective    /72 (BP Location: Left arm, Patient Position: Sitting, Cuff Size: Adult Regular)   Pulse 84   Temp 97.3  F (36.3  C) (Temporal)   Resp 16   Ht 1.803 m (5' 11\")   Wt 115.7 kg (255 lb)   SpO2 95%   BMI 35.57 kg/m    Body mass index is 35.57 kg/m .  Physical Exam   GENERAL: alert and no distress.  Patient is in a wheelchair.  SKIN: Significant erythema of right lower extremity extending from ankle to above knee on anterior, lateral and medial aspects of right lower leg.  PSYCH: mentation appears normal, affect normal/bright            Signed Electronically by: Alex Arizmendi MD    "

## 2025-04-03 NOTE — H&P
[facility]    History and Physical - Hospitalist Service       Date of Admission:  4/3/2025    Assessment & Plan      Brett Hopkins is a 69 year old male admitted on 4/3/2025.  He has a past medical history significant for hypertension, hypothyroidism, ascending aortic aneurysm, atrial fibrillation status post Watchman device, myasthenia gravis, GERD, chronic right lower extremity lymphedema who presented to the hospital on 4/2/2025 with right lower extremity swelling, pain and mild shortness of breath.    Right lower extremity swelling/pain  Possible right lower extremity cellulitis  At this point the etiology of right lower extremity swelling, erythema and pain is most likely due to cellulitis.  No clear signs and symptoms to suggest necrotizing fasciitis.   Patient was on oral antibiotic--> with worsening symptoms over the last 24 hours.    Plan  Right lower extremity arterial ultrasound  CK  Continue broad-spectrum antibiotic with vancomycin and Zosyn  Continue to watch for any symptoms to suggest necrotizing fasciitis such as worsening pain, fever, edema, crepitus--> if the patient developed any--> consult surgery for further evaluation    Recently diagnosed mild pulmonary embolism  Diagnosed on 4/2/2025  Started on Eliquis 10 mg twice daily.    Plan  Continue Eliquis 10 mg twice daily    Hypothyroidism  Patient reported strict adherence to his medication  TSH mildly elevated with high T4 on 4/2.  Per patient, his provider is aware of the results and there has been some adjustment to his levothyroxine a few weeks ago    Plan  Continue home levothyroxine    Hypertension  At home on hydrochlorothiazide 25 mg daily and lisinopril 40 mg daily  Blood pressures currently around 110/70    Plan  Hold home antihypertensives for now given normal blood pressure.    Acute kidney injury  Baseline creatinine around 1.1.  Creatinine on 4/3 was 1.4  ALFREDO etiology remains unclear possibly related to  "active infection and mild hypotension along with medication side effect    Plan  Hold home hydrochlorothiazide and lisinopril as above  Start normal saline 75 cc/h  Recheck BMP on 4/4  Urinalysis    GERD.    Continue home medications    Mild hyponatremia  Etiology remains unclear possibly related to hypovolemia and medication side effect    Plan  Normal saline as above  Continue to monitor    Hypokalemia    Plan  Replete as per protocol                      Diet:  Regular diet  DVT Prophylaxis: DOAC  Wang Catheter: Not present  Lines: None     Cardiac Monitoring: None  Code Status:  Full code    Clinically Significant Risk Factors Present on Admission        # Hypokalemia: Lowest K = 3.3 mmol/L in last 2 days, will replace as needed  # Hyponatremia: Lowest Na = 134 mmol/L in last 2 days, will monitor as appropriate  # Hypochloremia: Lowest Cl = 95 mmol/L in last 2 days, will monitor as appropriate       # Drug Induced Coagulation Defect: home medication list includes an anticoagulant medication  # Drug Induced Platelet Defect: home medication list includes an antiplatelet medication   # Hypertension: Noted on problem list           # Obesity: Estimated body mass index is 36.12 kg/m  as calculated from the following:    Height as of this encounter: 1.803 m (5' 11\").    Weight as of this encounter: 117.5 kg (259 lb).              Disposition Plan     Medically Ready for Discharge: Anticipated in 2-4 Days           JO ROSARIO MD  Hospitalist Service  [facility]  Securely message with [employer] (more info)  Text page via [facility] Paging/Directory     ______________________________________________________________________    Chief Complaint   Right lower extremity pain and swelling    History is obtained from the patient    History of Present Illness   Brett Hopkins is a 69 year old male admitted on 4/3/2025.  He has a past medical history significant for hypertension, hypothyroidism, " ascending aortic aneurysm, atrial fibrillation status post Watchman device, myasthenia gravis, GERD, chronic right lower extremity lymphedema who presented to the hospital on 4/2/2025 with right lower extremity swelling, pain and mild shortness of breath.    Patient was in his usual state of health until around 5 days ago when he started to experience fever, chills, runny nose.  Patient also noticed some right lower extremity discomfort.  On 4/2/2025 patient noticed worsening right lower extremity edema, erythema, pain along with mild shortness of breath.  He presented to the hospital and was evaluated in the ED.    CT PE showed mild bilateral pulmonary artery embolism.  Patient was also diagnosed with right lower extremity cellulitis and he was discharged home on Eliquis and cefdinir and clindamycin.  He returned to the hospital within 24 hours due to persistent erythema, worsening right lower extremity pain and slightly worsening swelling.    Past Medical History    History reviewed. No pertinent past medical history.    Past Surgical History   Past Surgical History:   Procedure Laterality Date    ARTHROPLASTY REVISION HIP Right 05/01/2019    ARTHROPLASTY REVISION KNEE Right 2019    CARDIOVERSION  07/01/2019    7/10/2019    CARDIOVERSION  09/12/2019    CATARACT EXTRACTION Bilateral 06/2021    CV LEFT ATRIAL APPENDAGE CLOSURE N/A 1/12/2023    Procedure: Left Atrial Appendage Closure;  Surgeon: Mitesh Graff MD;  Location: [facility]    DENTAL SURGERY      Oral Surgery Tooth Extraction;  Implant    EP ABLATION PULMONARY VEIN ISOLATION N/A 3/19/2024    Procedure: Ablation Atrial Fibrillation;  Surgeon: Benedicto Barfield MD;  Location: [facility]    SC ABLATE HEART DYSRHYTHM FOCUS  08/19/2013    Description: Catheter Ablation Atrial Fibrillation;  Recorded: 11/16/2013;  Comments: PVI Aug 19, 2013 (Cryo to all 4 PV's)    TOOTH EXTRACTION      implant    ZZC TOTAL HIP ARTHROPLASTY Right 05/08/2019     Procedure: RIGHT TOTAL HIP ARTHROPLASTY DIRECT ANTERIOR APPROACH;  Surgeon: Mario Woodruff MD;  Location: [facility];  Service: Orthopedics    [facility] TOTAL KNEE ARTHROPLASTY Right 06/21/2017    Procedure: 2)  RIGHT TOTAL KNEE ARTHROPLASTY;  Surgeon: Mario VICKERS MD;  Location: [facility];  Service: Orthopedics       Prior to Admission Medications   Prior to Admission Medications   Prescriptions Last Dose Informant Patient Reported? Taking?   Apixaban Starter Pack (ELIQUIS DVT/PE STARTER PACK) 5 MG TBPK 4/3/2025 Morning  No Yes   Sig: Take 10 mg by mouth 2 times daily for 7 days, THEN 5 mg 2 times daily for 23 days. Continue as directed by provider.   amoxicillin (AMOXIL) 500 MG tablet   Yes No   Sig: TAKE FOUR TS PO 1 HOUR B DAPP   aspirin 81 MG EC tablet 4/2/2025 Morning  No Yes   Sig: Take 1 tablet (81 mg) by mouth daily   cefdinir (OMNICEF) 300 MG capsule 4/3/2025 Morning  No Yes   Sig: Take 1 capsule (300 mg) by mouth 2 times daily for 10 days.   clindamycin (CLEOCIN) 150 MG capsule 4/3/2025 Morning  No Yes   Sig: Take 3 capsules (450 mg) by mouth 3 times daily for 10 days.   hydrochlorothiazide (HYDRODIURIL) 25 MG tablet 4/3/2025 Morning  No Yes   Sig: TAKE 1 TABLET BY MOUTH EVERY DAY IN THE MORNING FOR HIGH BLOOD PRESSURE   levothyroxine (SYNTHROID/LEVOTHROID) 125 MCG tablet 4/3/2025 Morning  No Yes   Sig: Take 1 tablet (125 mcg) by mouth every morning (before breakfast).   lisinopril (ZESTRIL) 40 MG tablet 4/3/2025 Morning  No Yes   Sig: Take 1 tablet (40 mg) by mouth daily. for high blood pressure   omeprazole (PRILOSEC) 20 MG DR capsule 4/3/2025 Morning  No Yes   Sig: TAKE 1 CAPSULE BY MOUTH DAILY BEFORE BREAKFAST   rosuvastatin (CRESTOR) 10 MG tablet 4/3/2025 Morning  No Yes   Sig: Take 1 tablet (10 mg) by mouth daily      Facility-Administered Medications: None          Physical Exam   Vital Signs: Temp: 97.9  F (36.6  C) Temp src: Oral BP: 114/70 Pulse: 80   Resp: 25 SpO2: 95 %       Weight: 259 lbs 0 oz    Physical Exam  Constitutional:       General: He is not in acute distress.     Appearance: He is not ill-appearing or toxic-appearing.   Cardiovascular:      Rate and Rhythm: Normal rate. Rhythm irregular.      Heart sounds: No murmur heard.  Pulmonary:      Effort: Pulmonary effort is normal. No respiratory distress.   Musculoskeletal:      Right lower leg: Edema present.   Skin:     General: Skin is warm and dry.      Findings: Rash present.   Neurological:      Mental Status: He is alert.   Psychiatric:         Mood and Affect: Mood normal.         Thought Content: Thought content normal.          Medical Decision Making       78 MINUTES SPENT BY ME on the date of service doing chart review, history, exam, documentation & further activities per the note.      Data     I have personally reviewed the following data over the past 24 hrs:    13.9 (H)  \   15.3   / 160     134 (L) 95 (L) 31.2 (H) /  142 (H)   3.3 (L) 23 1.45 (H) \     ALT: 33 AST: 44 AP: 76 TBILI: 1.2   ALB: 3.5 TOT PROTEIN: 6.9 LIPASE: 16     Trop: 17 BNP: 379     Procal: 0.20 CRP: 213.00 (H) Lactic Acid: 1.7         Imaging results reviewed over the past 24 hrs:   Recent Results (from the past 24 hours)   CT Femur Thigh Right w Contrast    Narrative    EXAM: CT FEMUR THIGH RIGHT W CONTRAST, CT TIBIA FIBULA LOWER LEG RIGHT W CONTRAST  LOCATION: [facility]  DATE: 4/3/2025    INDICATION: Leg pain, redness, swelling.  COMPARISON: Ultrasound 4/2/2025  TECHNIQUE: IV contrast. Axial, sagittal and coronal thin-section reconstruction. Dose reduction techniques were used.   CONTRAST: Isovue 370 75mL    FINDINGS:     THIGH-FEMUR:  -Hip and knee arthroplasties with associated metallic artifact. No fracture. No signs of loosening. No focal bony abnormality to suggest osteomyelitis.    No significant soft tissue abnormality. No fluid collection. Prominent lymph nodes proximal right thigh and right inguinal  region, most with fatty yossi, or probably reactive or incidental.    Colonic diverticula. Contrast in the urinary bladder.    LOWER LEG-TIBIA-FIBULA:  -Poorly defined subcutaneous soft tissue stranding involving most of the lower leg, most marked distally and anteromedially, could represent cellulitis. No fluid collection.    No focal bony abnormality to suggest osteomyelitis. No fracture.    Venous varicosities.      Impression    IMPRESSION:  1.  Subcutaneous soft tissue stranding involving most of the lower leg could represent cellulitis.  2.  No abscess.  3.  No CT evidence of osteomyelitis.     CT Tibia Fibula Lower Leg Right w Contrast    Narrative    EXAM: CT FEMUR THIGH RIGHT W CONTRAST, CT TIBIA FIBULA LOWER LEG RIGHT W CONTRAST  LOCATION: [facility]  DATE: 4/3/2025    INDICATION: Leg pain, redness, swelling.  COMPARISON: Ultrasound 4/2/2025  TECHNIQUE: IV contrast. Axial, sagittal and coronal thin-section reconstruction. Dose reduction techniques were used.   CONTRAST: Isovue 370 75mL    FINDINGS:     THIGH-FEMUR:  -Hip and knee arthroplasties with associated metallic artifact. No fracture. No signs of loosening. No focal bony abnormality to suggest osteomyelitis.    No significant soft tissue abnormality. No fluid collection. Prominent lymph nodes proximal right thigh and right inguinal region, most with fatty yossi, or probably reactive or incidental.    Colonic diverticula. Contrast in the urinary bladder.    LOWER LEG-TIBIA-FIBULA:  -Poorly defined subcutaneous soft tissue stranding involving most of the lower leg, most marked distally and anteromedially, could represent cellulitis. No fluid collection.    No focal bony abnormality to suggest osteomyelitis. No fracture.    Venous varicosities.      Impression    IMPRESSION:  1.  Subcutaneous soft tissue stranding involving most of the lower leg could represent cellulitis.  2.  No abscess.  3.  No CT evidence of osteomyelitis.

## 2025-04-03 NOTE — ED NOTES
"[facility] ED Handoff Report    ED Chief Complaint: Right leg pain, swelling, redness    ED Diagnosis:  (L03.115) Cellulitis of right lower leg    (Z79.01) Anticoagulated by anticoagulation treatment - Eliquis    (A41.9) Sepsis without acute organ dysfunction, due to unspecified organism (H)    PMH:  History reviewed. No pertinent past medical history.     Code Status:  Full Code     Falls Risk: Yes Band: Applied    Current Living Situation/Residence: lives in a house with spouse    Elimination Status: Continent: Yes     Activity Level: 2 assist - patient has not tried to get up since being in ER due to right leg pain. Normally walks fine when he is not in pain but he said he cannot currently put any weight on his leg.     Patient's Preferred Language:  English     Needed: No    Vital Signs:  /70   Pulse 80   Temp 97.9  F (36.6  C) (Oral)   Resp 25   Ht 1.803 m (5' 11\")   Wt 117.5 kg (259 lb)   SpO2 95%   BMI 36.12 kg/m       Cardiac Rhythm: Sinus rhythm w/ PAC    Pain Score: 4/10 (when in bed), 10/10 when putting weight on R leg    Is the Patient Confused:  No    Last Food or Drink: Drank water today    Assessment and Plan of Care:  Patient presents to ED with right leg redness, swelling and pain. Patient was actually seen in ED yesterday for this and diagnosed with cellulitis. Discussion was had about admitting but patient opted to discharge to home. Patient returned today because pain has worsened as well as increased redness and swelling to RLE that he can no longer put weight on it. Additionally, patient was diagnosed yesterday with bilat Pe's and started on Eliquis. He is not complaining of any chest pain or SOB. Only complaint is RLE pain. Treated with IV ABX in ER. Potassium 3.3 - received 10 mEq IV. On potassium replacement protocol. Also received 2g Magnesium in ER. Alert and oriented x4. Pleasant. Wife at bedside.     Tests Performed: Done: Labs and Imaging    Treatments " Provided:  IV abx, IV K+ and Mg, 1L IVF, morphine 4mg.    Family Dynamics/Concerns: No    Belongings Checklist Done and Signed by Patient: Yes    Belongings Sent with Patient: Yes    Boarding medications sent with patient: n/a    Additional Information: n/a     RN: Laurie Dotson RN   4/3/2025 3:54 PM

## 2025-04-03 NOTE — PHARMACY-VANCOMYCIN DOSING SERVICE
Pharmacy Vancomycin Initial Note  Date of Service April 3, 2025  Patient's  1955  69 year old, male    Indication: Sepsis and Skin and Soft Tissue Infection    Current estimated CrCl = Estimated Creatinine Clearance: 64.5 mL/min (A) (based on SCr of 1.45 mg/dL (H)).    Creatinine for last 3 days  2025: 11:44 AM Creatinine 1.25 mg/dL  4/3/2025: 10:59 AM Creatinine 1.45 mg/dL    Recent Vancomycin Level(s) for last 3 days  No results found for requested labs within last 3 days.      Vancomycin IV Administrations (past 72 hours)        No vancomycin orders with administrations in past 72 hours.                    Nephrotoxins and other renal medications (From now, onward)      Start     Dose/Rate Route Frequency Ordered Stop    25 1300  piperacillin-tazobactam (ZOSYN) 3.375 g vial to attach to  mL bag         3.375 g  over 30 Minutes Intravenous ONCE 25 1230      25 1230  vancomycin (VANCOCIN) 2,500 mg in 0.9% NaCl 525 mL intermittent infusion         2,500 mg  over 120 Minutes Intravenous ONCE 25 1220              Contrast Orders - past 72 hours (72h ago, onward)      Start     Dose/Rate Route Frequency Stop    25 1230  iopamidol (ISOVUE-370) solution 90 mL         90 mL Intravenous ONCE 25 1209                  Plan:  Give vancomycin  2500 mg IV x 1 in ED.   If Vancomycin to continue as inpatient, please order pharmacy to dose consult.    Pablo Kaiser [facility]

## 2025-04-03 NOTE — PHARMACY-ADMISSION MEDICATION HISTORY
Pharmacist Admission Medication History    Admission medication history is complete. The information provided in this note is only as accurate as the sources available at the time of the update.    Information Source(s): Patient and CareEverywhere/SureScripts via in-person    Pertinent Information: Was taking asa daily prior to starting Apixaban.    Changes made to PTA medication list:  Added: None  Deleted: None  Changed: None    Allergies reviewed with patient and updates made in EHR: yes    Medication History Completed By: Pablo Kiaser RPH 4/3/2025 12:28 PM    PTA Med List   Medication Sig Last Dose/Taking    Apixaban Starter Pack (ELIQUIS DVT/PE STARTER PACK) 5 MG TBPK Take 10 mg by mouth 2 times daily for 7 days, THEN 5 mg 2 times daily for 23 days. Continue as directed by provider. 4/3/2025 Morning    aspirin 81 MG EC tablet Take 1 tablet (81 mg) by mouth daily 4/2/2025 Morning    cefdinir (OMNICEF) 300 MG capsule Take 1 capsule (300 mg) by mouth 2 times daily for 10 days. 4/3/2025 Morning    clindamycin (CLEOCIN) 150 MG capsule Take 3 capsules (450 mg) by mouth 3 times daily for 10 days. 4/3/2025 Morning    hydrochlorothiazide (HYDRODIURIL) 25 MG tablet TAKE 1 TABLET BY MOUTH EVERY DAY IN THE MORNING FOR HIGH BLOOD PRESSURE 4/3/2025 Morning    levothyroxine (SYNTHROID/LEVOTHROID) 125 MCG tablet Take 1 tablet (125 mcg) by mouth every morning (before breakfast). 4/3/2025 Morning    lisinopril (ZESTRIL) 40 MG tablet Take 1 tablet (40 mg) by mouth daily. for high blood pressure 4/3/2025 Morning    omeprazole (PRILOSEC) 20 MG DR capsule TAKE 1 CAPSULE BY MOUTH DAILY BEFORE BREAKFAST 4/3/2025 Morning    rosuvastatin (CRESTOR) 10 MG tablet Take 1 tablet (10 mg) by mouth daily 4/3/2025 Morning

## 2025-04-04 ENCOUNTER — APPOINTMENT (OUTPATIENT)
Dept: OCCUPATIONAL THERAPY | Facility: CLINIC | Age: 70
DRG: 602 | End: 2025-04-04
Attending: STUDENT IN AN ORGANIZED HEALTH CARE EDUCATION/TRAINING PROGRAM
Payer: COMMERCIAL

## 2025-04-04 LAB
ANION GAP SERPL CALCULATED.3IONS-SCNC: 11 MMOL/L (ref 7–15)
BASOPHILS # BLD AUTO: 0.1 10E3/UL (ref 0–0.2)
BASOPHILS NFR BLD AUTO: 0 %
BUN SERPL-MCNC: 21.3 MG/DL (ref 8–23)
CALCIUM SERPL-MCNC: 8.1 MG/DL (ref 8.8–10.4)
CHLORIDE SERPL-SCNC: 101 MMOL/L (ref 98–107)
CREAT SERPL-MCNC: 1.22 MG/DL (ref 0.67–1.17)
EGFRCR SERPLBLD CKD-EPI 2021: 65 ML/MIN/1.73M2
EOSINOPHIL # BLD AUTO: 0.1 10E3/UL (ref 0–0.7)
EOSINOPHIL NFR BLD AUTO: 0 %
ERYTHROCYTE [DISTWIDTH] IN BLOOD BY AUTOMATED COUNT: 13.7 % (ref 10–15)
GLUCOSE SERPL-MCNC: 125 MG/DL (ref 70–99)
HCO3 SERPL-SCNC: 23 MMOL/L (ref 22–29)
HCT VFR BLD AUTO: 38.5 % (ref 40–53)
HGB BLD-MCNC: 13.1 G/DL (ref 13.3–17.7)
IMM GRANULOCYTES # BLD: 0.1 10E3/UL
IMM GRANULOCYTES NFR BLD: 1 %
LYMPHOCYTES # BLD AUTO: 1.4 10E3/UL (ref 0.8–5.3)
LYMPHOCYTES NFR BLD AUTO: 11 %
MCH RBC QN AUTO: 30.5 PG (ref 26.5–33)
MCHC RBC AUTO-ENTMCNC: 34 G/DL (ref 31.5–36.5)
MCV RBC AUTO: 90 FL (ref 78–100)
MONOCYTES # BLD AUTO: 1.1 10E3/UL (ref 0–1.3)
MONOCYTES NFR BLD AUTO: 8 %
NEUTROPHILS # BLD AUTO: 10.2 10E3/UL (ref 1.6–8.3)
NEUTROPHILS NFR BLD AUTO: 79 %
NRBC # BLD AUTO: 0 10E3/UL
NRBC BLD AUTO-RTO: 0 /100
PLATELET # BLD AUTO: 159 10E3/UL (ref 150–450)
POTASSIUM SERPL-SCNC: 3.3 MMOL/L (ref 3.4–5.3)
POTASSIUM SERPL-SCNC: 3.5 MMOL/L (ref 3.4–5.3)
RBC # BLD AUTO: 4.3 10E6/UL (ref 4.4–5.9)
SODIUM SERPL-SCNC: 135 MMOL/L (ref 135–145)
WBC # BLD AUTO: 12.9 10E3/UL (ref 4–11)

## 2025-04-04 PROCEDURE — 85004 AUTOMATED DIFF WBC COUNT: CPT | Performed by: STUDENT IN AN ORGANIZED HEALTH CARE EDUCATION/TRAINING PROGRAM

## 2025-04-04 PROCEDURE — 97535 SELF CARE MNGMENT TRAINING: CPT | Mod: GO

## 2025-04-04 PROCEDURE — 99233 SBSQ HOSP IP/OBS HIGH 50: CPT | Performed by: STUDENT IN AN ORGANIZED HEALTH CARE EDUCATION/TRAINING PROGRAM

## 2025-04-04 PROCEDURE — 80048 BASIC METABOLIC PNL TOTAL CA: CPT | Performed by: STUDENT IN AN ORGANIZED HEALTH CARE EDUCATION/TRAINING PROGRAM

## 2025-04-04 PROCEDURE — 250N000011 HC RX IP 250 OP 636: Performed by: STUDENT IN AN ORGANIZED HEALTH CARE EDUCATION/TRAINING PROGRAM

## 2025-04-04 PROCEDURE — 97166 OT EVAL MOD COMPLEX 45 MIN: CPT | Mod: GO

## 2025-04-04 PROCEDURE — 84132 ASSAY OF SERUM POTASSIUM: CPT | Performed by: STUDENT IN AN ORGANIZED HEALTH CARE EDUCATION/TRAINING PROGRAM

## 2025-04-04 PROCEDURE — 250N000013 HC RX MED GY IP 250 OP 250 PS 637: Performed by: STUDENT IN AN ORGANIZED HEALTH CARE EDUCATION/TRAINING PROGRAM

## 2025-04-04 PROCEDURE — 36415 COLL VENOUS BLD VENIPUNCTURE: CPT | Performed by: STUDENT IN AN ORGANIZED HEALTH CARE EDUCATION/TRAINING PROGRAM

## 2025-04-04 PROCEDURE — 82374 ASSAY BLOOD CARBON DIOXIDE: CPT | Performed by: STUDENT IN AN ORGANIZED HEALTH CARE EDUCATION/TRAINING PROGRAM

## 2025-04-04 PROCEDURE — 258N000003 HC RX IP 258 OP 636: Performed by: STUDENT IN AN ORGANIZED HEALTH CARE EDUCATION/TRAINING PROGRAM

## 2025-04-04 PROCEDURE — 82565 ASSAY OF CREATININE: CPT | Performed by: STUDENT IN AN ORGANIZED HEALTH CARE EDUCATION/TRAINING PROGRAM

## 2025-04-04 PROCEDURE — 120N000001 HC R&B MED SURG/OB

## 2025-04-04 RX ORDER — POTASSIUM CHLORIDE 1500 MG/1
40 TABLET, EXTENDED RELEASE ORAL ONCE
Status: COMPLETED | OUTPATIENT
Start: 2025-04-04 | End: 2025-04-04

## 2025-04-04 RX ORDER — POTASSIUM CHLORIDE 1500 MG/1
20 TABLET, EXTENDED RELEASE ORAL ONCE
Status: COMPLETED | OUTPATIENT
Start: 2025-04-04 | End: 2025-04-04

## 2025-04-04 RX ADMIN — ONDANSETRON 4 MG: 2 INJECTION, SOLUTION INTRAMUSCULAR; INTRAVENOUS at 08:32

## 2025-04-04 RX ADMIN — CALCIUM CARBONATE (ANTACID) CHEW TAB 500 MG 1000 MG: 500 CHEW TAB at 16:58

## 2025-04-04 RX ADMIN — POTASSIUM CHLORIDE 40 MEQ: 1500 TABLET, EXTENDED RELEASE ORAL at 08:36

## 2025-04-04 RX ADMIN — VANCOMYCIN HYDROCHLORIDE 1250 MG: 5 INJECTION, POWDER, LYOPHILIZED, FOR SOLUTION INTRAVENOUS at 10:20

## 2025-04-04 RX ADMIN — CALCIUM CARBONATE (ANTACID) CHEW TAB 500 MG 1000 MG: 500 CHEW TAB at 20:00

## 2025-04-04 RX ADMIN — ACETAMINOPHEN 650 MG: 325 TABLET, FILM COATED ORAL at 20:09

## 2025-04-04 RX ADMIN — PIPERACILLIN AND TAZOBACTAM 3.38 G: 3; .375 INJECTION, POWDER, FOR SOLUTION INTRAVENOUS at 10:20

## 2025-04-04 RX ADMIN — SODIUM CHLORIDE: 9 INJECTION, SOLUTION INTRAVENOUS at 03:20

## 2025-04-04 RX ADMIN — POTASSIUM CHLORIDE 20 MEQ: 1500 TABLET, EXTENDED RELEASE ORAL at 16:43

## 2025-04-04 RX ADMIN — LEVOTHYROXINE SODIUM 125 MCG: 0.12 TABLET ORAL at 08:36

## 2025-04-04 RX ADMIN — CALCIUM CARBONATE (ANTACID) CHEW TAB 500 MG 1000 MG: 500 CHEW TAB at 08:33

## 2025-04-04 RX ADMIN — PIPERACILLIN AND TAZOBACTAM 3.38 G: 3; .375 INJECTION, POWDER, FOR SOLUTION INTRAVENOUS at 03:12

## 2025-04-04 RX ADMIN — ROSUVASTATIN CALCIUM 10 MG: 10 TABLET, FILM COATED ORAL at 08:36

## 2025-04-04 RX ADMIN — ONDANSETRON 4 MG: 2 INJECTION, SOLUTION INTRAMUSCULAR; INTRAVENOUS at 16:58

## 2025-04-04 RX ADMIN — PANTOPRAZOLE SODIUM 40 MG: 40 TABLET, DELAYED RELEASE ORAL at 08:36

## 2025-04-04 RX ADMIN — APIXABAN 10 MG: 5 TABLET, FILM COATED ORAL at 08:36

## 2025-04-04 RX ADMIN — APIXABAN 10 MG: 5 TABLET, FILM COATED ORAL at 20:08

## 2025-04-04 RX ADMIN — HYDROMORPHONE HYDROCHLORIDE 1 MG: 2 TABLET ORAL at 04:11

## 2025-04-04 RX ADMIN — PIPERACILLIN AND TAZOBACTAM 3.38 G: 3; .375 INJECTION, POWDER, FOR SOLUTION INTRAVENOUS at 20:09

## 2025-04-04 ASSESSMENT — ACTIVITIES OF DAILY LIVING (ADL)
ADLS_ACUITY_SCORE: 45
ADLS_ACUITY_SCORE: 31
ADLS_ACUITY_SCORE: 41
ADLS_ACUITY_SCORE: 25
ADLS_ACUITY_SCORE: 31
ADLS_ACUITY_SCORE: 31
ADLS_ACUITY_SCORE: 25
ADLS_ACUITY_SCORE: 45
ADLS_ACUITY_SCORE: 31
ADLS_ACUITY_SCORE: 31
ADLS_ACUITY_SCORE: 45
ADLS_ACUITY_SCORE: 45
ADLS_ACUITY_SCORE: 31
ADLS_ACUITY_SCORE: 41
ADLS_ACUITY_SCORE: 25
ADLS_ACUITY_SCORE: 41
ADLS_ACUITY_SCORE: 25
ADLS_ACUITY_SCORE: 41
ADLS_ACUITY_SCORE: 25
ADLS_ACUITY_SCORE: 25
ADLS_ACUITY_SCORE: 31

## 2025-04-04 NOTE — PLAN OF CARE
A/O x 4, VSS on RA. RLE pain 3/10; managed with rest and elevation. K+ protocol; replaced w/ recheck at 1300 today. Tolerating regular diet.     Continuing IV Vanco and Zosyn. Episode of nausea after breakfast; prn Ondansetron administered along with a cool washcloth and sips of ice water; relieved.     Discharge pending.

## 2025-04-04 NOTE — PLAN OF CARE
"Goal Outcome Evaluation:       A&O, VSS, Came to unit around 1930. Red swollen RLE. Unable to put weight on leg due to pain per pt. Pain is \"manageable\" when in bed. PRN tums given for stomach upset.                  Problem: Adult Inpatient Plan of Care  Goal: Optimal Comfort and Wellbeing  Outcome: Progressing     "

## 2025-04-04 NOTE — PROGRESS NOTES
04/04/25 1500   Appointment Info   Signing Clinician's Name / Credentials (OT) GAGANDEEP Chan/L, CLT   Rehab Comments (OT) OT eval   Living Environment   People in Home spouse   Current Living Arrangements house   Home Accessibility stairs within home   Transportation Anticipated family or friend will provide   Self-Care   Usual Activity Tolerance moderate   Current Activity Tolerance fair   Equipment Currently Used at Home walker, standard;grab bar, tub/shower;crutches  (WIS on upper level; RTS)   Activity/Exercise/Self-Care Comment Pt is typically independent with ADL at home.   General Information   Onset of Illness/Injury or Date of Surgery 04/03/25   Referring Physician Dr. Roque   Patient/Family Therapy Goal Statement (OT) hopes to return home   Additional Occupational Profile Info/Pertinent History of Current Problem 69 year old male admitted on 4/3/2025. He has a past medical history significant for hypertension, hypothyroidism, ascending aortic aneurysm, atrial fibrillation status post Watchman device, myasthenia gravis, GERD, chronic right lower extremity lymphedema who presented to the hospital on 4/2/2025 with right lower extremity swelling, pain and mild shortness of breath. Currently being treated for possible right lower extremity cellulitis with IV antibiotic.   Cognitive Status Examination   Affect/Mental Status (Cognitive) WFL   Visual Perception   Visual Impairment/Limitations WFL   Sensory   Sensory Quick Adds sensation intact   Pain Assessment   Patient Currently in Pain   (pt has incr pain with mvmt)   Posture   Posture not impaired   Range of Motion Comprehensive   General Range of Motion no range of motion deficits identified   Strength Comprehensive (MMT)   Comment, General Manual Muscle Testing (MMT) Assessment generalized weakness   Muscle Tone Assessment   Muscle Tone Quick Adds No deficits were identified   Coordination   Upper Extremity Coordination No deficits were  identified   Bed Mobility   Comment (Bed Mobility) Mod A   Transfers   Transfer Comments Mod A   Balance   Balance Comments decreased   Activities of Daily Living   BADL Assessment/Intervention lower body dressing;toileting   Lower Body Dressing Assessment/Training   [place] Level (Lower Body Dressing) dependent (less than 25% patient effort)   Toileting   [place] Level (Toileting) dependent (less than 25% patient effort)   Clinical Impression   Criteria for Skilled Therapeutic Interventions Met (OT) Yes, treatment indicated   OT Diagnosis decreased ADL independence/safety.   Influenced by the following impairments cellulitis of R LE   OT Problem List-Impairments impacting ADL activity tolerance impaired;balance;flexibility;mobility;strength;pain   Assessment of Occupational Performance 3-5 Performance Deficits   Identified Performance Deficits dressing, toileting, bathing, functional mobility, bed mobility   Planned Therapy Interventions (OT) ADL retraining;balance training;bed mobility training;transfer training;strengthening   Clinical Decision Making Complexity (OT) detailed assessment/moderate complexity   Risk & Benefits of therapy have been explained care plan/treatment goals reviewed;patient;spouse/significant other   OT Total Evaluation Time   OT Eval, Moderate Complexity Minutes (20062) 10   OT Goals   Therapy Frequency (OT) 5 times/week   OT Predicted Duration/Target Date for Goal Attainment 04/10/25   OT Goals Lower Body Dressing;Toilet Transfer/Toileting   OT: Lower Body Dressing Supervision/stand-by assist   OT: Toilet Transfer/Toileting Supervision/stand-by assist;cleaning and garment management   Interventions   Interventions Quick Adds Self-Care/Home Management   Self-Care/Home Management   Self-Care/Home Mgmt/ADL, Compensatory, Meal Prep Minutes (57651) 20   Symptoms Noted During/After Treatment (Meal Preparation/Planning Training) shortness of breath   Treatment Detail/Skilled  Intervention Pt in bed and agreeable  to OT. Supine to sit with cues for tech and AE for lifting leg. Sat EOB with incr pain and pt unable to don socks and needed MaxA for donning at this time d/t pain/flexibility. Pt STS with Sophia/FWW/cues for tech and took few steps to chair and STS with CGA/FWW/cues for hand placement/tech. Alarms on/all needs in reach. Pt needed incr time/effort second to fatigue/pain and pt states SOB is from last week and not new.   OT Discharge Planning   OT Plan LE dress with AE;Toileting-walk to toilet; bed mob with AE   OT Discharge Recommendation (DC Rec) (S)  home with assist  (pending PT progress for [facility] home)   OT Rationale for DC Rec Good support and moving safely   OT Brief overview of current status MaxA for donning socks and Sophia for transfers.   OT Total Distance Amb During Session (feet) 2   OT Equipment Needed at Discharge   (none)   Total Session Time   Timed Code Treatment Minutes 20   Total Session Time (sum of timed and untimed services) 30

## 2025-04-04 NOTE — PROGRESS NOTES
[facility]    Medicine Progress Note - Hospitalist Service    Date of Admission:  4/3/2025    Assessment & Plan   Brett Hopkins is a 69 year old male admitted on 4/3/2025. He has a past medical history significant for hypertension, hypothyroidism, ascending aortic aneurysm, atrial fibrillation status post Watchman device, myasthenia gravis, GERD, chronic right lower extremity lymphedema who presented to the hospital on 4/2/2025 with right lower extremity swelling, pain and mild shortness of breath. Currently being treated for possible right lower extremity cellulitis with IV antibiotic. Symptoms slowly improving.  Possible discharge in 2 to 3 days.    Right lower extremity swelling/pain  Possible right lower extremity cellulitis  At this point the etiology of right lower extremity swelling, erythema and pain is most likely due to cellulitis.  No clear signs and symptoms to suggest necrotizing fasciitis.   Patient was on oral antibiotic.   DVT ruled out  Right lower extremity arterial ultrasound without significant obstruction or stenosis    Plan  Continue broad-spectrum antibiotic with vancomycin and Zosyn  Continue to watch for any symptoms to suggest necrotizing fasciitis such as worsening pain, fever, edema, crepitus--> if the patient developed any--> consult surgery for further evaluation  If symptoms are not improving within the next 24 hours, will consult infectious disease.  PT/OT    Recently diagnosed mild pulmonary embolism  Diagnosed on 4/2/2025  Started on Eliquis 10 mg twice daily.    Plan  Continue Eliquis 10 mg twice daily  Continue to monitor hemoglobin    Hypothyroidism  Patient reported strict adherence to his medication  TSH mildly elevated with high T4 on 4/2.  Per patient, his provider is aware of the results and there has been some adjustment to his levothyroxine a few weeks ago    Plan  Continue home levothyroxine    Hypertension  At home on hydrochlorothiazide 25  "mg daily and lisinopril 40 mg daily  Blood pressures currently around 130/70    Plan  Continue to hold home antihypertensives.    Acute kidney injury  Baseline creatinine around 1.1.  Creatinine on 4/3 was 1.4  Creatinine improving over the next 24 hours.    Plan  Stop IV fluid  Recheck BMP on 4/5    GERD.    Continue home medications    Mild hyponatremia (resolved)      Hypokalemia    Plan  Replete as per protocol                      Diet: Combination Diet Regular Diet Adult    DVT Prophylaxis: DOAC  Wang Catheter: Not present  Lines: None     Cardiac Monitoring: None  Code Status: Full Code      Clinically Significant Risk Factors Present on Admission        # Hypokalemia: Lowest K = 3.3 mmol/L in last 2 days, will replace as needed  # Hyponatremia: Lowest Na = 134 mmol/L in last 2 days, will monitor as appropriate  # Hypochloremia: Lowest Cl = 95 mmol/L in last 2 days, will monitor as appropriate  # Hypocalcemia: Lowest Ca = 8.1 mg/dL in last 2 days, will monitor and replace as appropriate      # Drug Induced Coagulation Defect: home medication list includes an anticoagulant medication  # Drug Induced Platelet Defect: home medication list includes an antiplatelet medication   # Hypertension: Noted on problem list           # Obesity: Estimated body mass index is 35.51 kg/m  as calculated from the following:    Height as of this encounter: 1.803 m (5' 11\").    Weight as of this encounter: 115.5 kg (254 lb 10.1 oz).              Social Drivers of Health            Disposition Plan     Medically Ready for Discharge: Anticipated in 2-4 Days             JO ROSARIO MD  Hospitalist Service  [facility]  Securely message with [employer] (more info)  Text page via [employer] Paging/Directory   ______________________________________________________________________    Interval History   Right lower extremity pain slightly improved since yesterday.  Denies any shortness of breath.  Denies any fever.  " Denies any chest pain.    Physical Exam   Vital Signs: Temp: 99.2  F (37.3  C) Temp src: Oral BP: 135/78 Pulse: 79   Resp: 17 SpO2: 93 % O2 Device: None (Room air)    Weight: 254 lbs 10.1 oz    Physical Exam  Constitutional:       General: He is not in acute distress.     Appearance: He is not toxic-appearing.   Pulmonary:      Effort: Pulmonary effort is normal.   Musculoskeletal:      Right lower leg: Edema present.      Comments: Right lower extremity erythema slightly improved since yesterday.  Persistent edema of the right lower extremity below knee level.  Persistent warmth.  Tenderness to palpation improved.   Neurological:      Mental Status: He is alert.   Psychiatric:         Mood and Affect: Mood normal.         Thought Content: Thought content normal.          Medical Decision Making       55 MINUTES SPENT BY ME on the date of service doing chart review, history, exam, documentation & further activities per the note.      Data     I have personally reviewed the following data over the past 24 hrs:    12.9 (H)  \   13.1 (L)   / 159     135 101 21.3 /  125 (H)   3.3 (L) 23 1.22 (H) \     Trop: 17 BNP: N/A     Procal: 0.20 CRP: N/A Lactic Acid: N/A         Imaging results reviewed over the past 24 hrs:   Recent Results (from the past 24 hours)   US Lower Extremity Arterial Duplex Right    Narrative    EXAM: US LOWER EXTREMITY ARTERIAL DUPLEX RIGHT  LOCATION: [facility]  DATE: 4/3/2025    INDICATION: Reported history of peripheral vascular disease, right lower extremity pain, erythema and swelling.  COMPARISON: None.  TECHNIQUE: Duplex utilizing 2D gray-scale imaging, Doppler interrogation with color-flow and spectral waveform analysis.    FINDINGS:     RIGHT LOWER EXTREMITY ARTERIAL ASSESSMENT:  External iliac artery: 149 cm/s, triphasic  Common femoral artery: 145 cm/s, triphasic  Profunda femoris artery: 89 cm/s, triphasic  SFA (proximal): 167 cm/s, monophasic  SFA (mid): 119  cm/s, triphasic  SFA (distal): 130 cm/s, monophasic  Popliteal artery: 157 cm/s, monophasic  Anterior tibial artery: 96 cm/s, biphasic  Posterior tibial artery: 77 cm/s, biphasic  Dorsalis pedis artery: 85 cm/s, biphasic      Impression    IMPRESSION:  Patent right lower extremity arteries without high-grade stenosis. Monophasic flow noted within the proximal and distal SFA as well as the popliteal artery.

## 2025-04-04 NOTE — PHARMACY-VANCOMYCIN DOSING SERVICE
"Pharmacy Vancomycin Initial Note  Date of Service April 3, 2025  Patient's  1955  69 year old, male    Indication: Skin and Soft Tissue Infection    Current estimated CrCl = Estimated Creatinine Clearance: 64.5 mL/min (A) (based on SCr of 1.45 mg/dL (H)).    Creatinine for last 3 days  2025: 11:44 AM Creatinine 1.25 mg/dL  4/3/2025: 10:59 AM Creatinine 1.45 mg/dL    Recent Vancomycin Level(s) for last 3 days  No results found for requested labs within last 3 days.      Vancomycin IV Administrations (past 72 hours)                     vancomycin (VANCOCIN) 2,500 mg in 0.9% NaCl 525 mL intermittent infusion (mg) 2,500 mg New Bag 25 1324                    Nephrotoxins and other renal medications (From now, onward)      Start     Dose/Rate Route Frequency Ordered Stop    25 1100  vancomycin (VANCOCIN) 1,250 mg in 0.9% NaCl 262.5 mL intermittent infusion         1,250 mg  over 90 Minutes Intravenous EVERY 18 HOURS 25 1900  piperacillin-tazobactam (ZOSYN) 3.375 g vial to attach to  mL bag        Note to Pharmacy: For [facility], [facility] and [facility]: For Zosyn-naive patients, use the \"Zosyn initial dose + extended infusion\" order panel.    3.375 g  over 240 Minutes Intravenous EVERY 8 HOURS 25 1541              Contrast Orders - past 72 hours (72h ago, onward)      Start     Dose/Rate Route Frequency Stop    25 1230  iopamidol (ISOVUE-370) solution 90 mL         90 mL Intravenous ONCE 25 1209            [employer] Prediction of Planned Initial Vancomycin Regimen  Loading dose: 2500 mg iv x 1 ED at 4/3/25 at ~1300  Regimen: 1250 mg IV every 18 hours.  Start time: 13:24 on 2025  Exposure target: AUC24 (range)400-600 mg/L.hr   AUC24,ss: 494 mg/L.hr  Probability of AUC24 > 400: 72 %  Ctrough,ss: 16 mg/L  Probability of Ctrough,ss > 20: 31 %  Probability of nephrotoxicity (Lodise BENNY ): 11 %          Plan:  Start vancomycin  1250  mg IV q18h.   Vancomycin " monitoring method: AUC  Vancomycin therapeutic monitoring goal: 400-600 mg*h/L  Pharmacy will check vancomycin levels as appropriate in 1-3 Days.    Serum creatinine levels will be ordered daily for the first week of therapy and at least twice weekly for subsequent weeks.      Amna Mcconnell, NikkiD

## 2025-04-04 NOTE — PLAN OF CARE
Problem: Adult Inpatient Plan of Care  Goal: Optimal Comfort and Wellbeing  Outcome: Progressing   Goal Outcome Evaluation:           A/Ox4. VSS on RA. Denied any HA, SOB, chest pain, N/V.     Rated pain at a moderate pain level. Stated that Oral dialudid was effective.     Tele monitoring reading during this shift were SR with BBB/occasional PVCs. NWB BLE. Pt stated that he felt urgency, but could not void.   and straight cath for 850 ml. Pt stated that the last time he was in the hospital he had to be straight cath once and would have no further issues.

## 2025-04-05 ENCOUNTER — APPOINTMENT (OUTPATIENT)
Dept: OCCUPATIONAL THERAPY | Facility: CLINIC | Age: 70
DRG: 602 | End: 2025-04-05
Payer: COMMERCIAL

## 2025-04-05 ENCOUNTER — APPOINTMENT (OUTPATIENT)
Dept: PHYSICAL THERAPY | Facility: CLINIC | Age: 70
DRG: 602 | End: 2025-04-05
Attending: STUDENT IN AN ORGANIZED HEALTH CARE EDUCATION/TRAINING PROGRAM
Payer: COMMERCIAL

## 2025-04-05 LAB
ANION GAP SERPL CALCULATED.3IONS-SCNC: 10 MMOL/L (ref 7–15)
BASOPHILS # BLD AUTO: 0 10E3/UL (ref 0–0.2)
BASOPHILS NFR BLD AUTO: 0 %
BUN SERPL-MCNC: 16.8 MG/DL (ref 8–23)
CALCIUM SERPL-MCNC: 8.4 MG/DL (ref 8.8–10.4)
CHLORIDE SERPL-SCNC: 105 MMOL/L (ref 98–107)
CREAT SERPL-MCNC: 1.07 MG/DL (ref 0.67–1.17)
CRP SERPL-MCNC: 188 MG/L
EGFRCR SERPLBLD CKD-EPI 2021: 76 ML/MIN/1.73M2
EOSINOPHIL # BLD AUTO: 0.2 10E3/UL (ref 0–0.7)
EOSINOPHIL NFR BLD AUTO: 2 %
ERYTHROCYTE [DISTWIDTH] IN BLOOD BY AUTOMATED COUNT: 13.8 % (ref 10–15)
GLUCOSE SERPL-MCNC: 110 MG/DL (ref 70–99)
HCO3 SERPL-SCNC: 24 MMOL/L (ref 22–29)
HCT VFR BLD AUTO: 38.7 % (ref 40–53)
HGB BLD-MCNC: 13.1 G/DL (ref 13.3–17.7)
IMM GRANULOCYTES # BLD: 0.2 10E3/UL
IMM GRANULOCYTES NFR BLD: 2 %
LYMPHOCYTES # BLD AUTO: 1.7 10E3/UL (ref 0.8–5.3)
LYMPHOCYTES NFR BLD AUTO: 14 %
MCH RBC QN AUTO: 30.8 PG (ref 26.5–33)
MCHC RBC AUTO-ENTMCNC: 33.9 G/DL (ref 31.5–36.5)
MCV RBC AUTO: 91 FL (ref 78–100)
MONOCYTES # BLD AUTO: 0.9 10E3/UL (ref 0–1.3)
MONOCYTES NFR BLD AUTO: 8 %
NEUTROPHILS # BLD AUTO: 8.6 10E3/UL (ref 1.6–8.3)
NEUTROPHILS NFR BLD AUTO: 74 %
NRBC # BLD AUTO: 0 10E3/UL
NRBC BLD AUTO-RTO: 0 /100
PLATELET # BLD AUTO: 165 10E3/UL (ref 150–450)
POTASSIUM SERPL-SCNC: 3.4 MMOL/L (ref 3.4–5.3)
RBC # BLD AUTO: 4.26 10E6/UL (ref 4.4–5.9)
SODIUM SERPL-SCNC: 139 MMOL/L (ref 135–145)
WBC # BLD AUTO: 11.6 10E3/UL (ref 4–11)

## 2025-04-05 PROCEDURE — 99222 1ST HOSP IP/OBS MODERATE 55: CPT | Performed by: INTERNAL MEDICINE

## 2025-04-05 PROCEDURE — 86140 C-REACTIVE PROTEIN: CPT | Performed by: STUDENT IN AN ORGANIZED HEALTH CARE EDUCATION/TRAINING PROGRAM

## 2025-04-05 PROCEDURE — 97161 PT EVAL LOW COMPLEX 20 MIN: CPT | Mod: GP | Performed by: PHYSICAL THERAPIST

## 2025-04-05 PROCEDURE — 85025 COMPLETE CBC W/AUTO DIFF WBC: CPT | Performed by: STUDENT IN AN ORGANIZED HEALTH CARE EDUCATION/TRAINING PROGRAM

## 2025-04-05 PROCEDURE — 80048 BASIC METABOLIC PNL TOTAL CA: CPT | Performed by: STUDENT IN AN ORGANIZED HEALTH CARE EDUCATION/TRAINING PROGRAM

## 2025-04-05 PROCEDURE — 97530 THERAPEUTIC ACTIVITIES: CPT | Mod: GP | Performed by: PHYSICAL THERAPIST

## 2025-04-05 PROCEDURE — 250N000011 HC RX IP 250 OP 636: Performed by: STUDENT IN AN ORGANIZED HEALTH CARE EDUCATION/TRAINING PROGRAM

## 2025-04-05 PROCEDURE — 250N000013 HC RX MED GY IP 250 OP 250 PS 637: Performed by: STUDENT IN AN ORGANIZED HEALTH CARE EDUCATION/TRAINING PROGRAM

## 2025-04-05 PROCEDURE — 258N000003 HC RX IP 258 OP 636: Performed by: STUDENT IN AN ORGANIZED HEALTH CARE EDUCATION/TRAINING PROGRAM

## 2025-04-05 PROCEDURE — 97535 SELF CARE MNGMENT TRAINING: CPT | Mod: GO

## 2025-04-05 PROCEDURE — 120N000001 HC R&B MED SURG/OB

## 2025-04-05 PROCEDURE — 36415 COLL VENOUS BLD VENIPUNCTURE: CPT | Performed by: STUDENT IN AN ORGANIZED HEALTH CARE EDUCATION/TRAINING PROGRAM

## 2025-04-05 PROCEDURE — 99233 SBSQ HOSP IP/OBS HIGH 50: CPT | Performed by: STUDENT IN AN ORGANIZED HEALTH CARE EDUCATION/TRAINING PROGRAM

## 2025-04-05 RX ORDER — TAMSULOSIN HYDROCHLORIDE 0.4 MG/1
0.4 CAPSULE ORAL DAILY
Status: DISCONTINUED | OUTPATIENT
Start: 2025-04-05 | End: 2025-04-08 | Stop reason: HOSPADM

## 2025-04-05 RX ORDER — OMEPRAZOLE 20 MG/1
20 CAPSULE, DELAYED RELEASE ORAL
Status: DISCONTINUED | OUTPATIENT
Start: 2025-04-05 | End: 2025-04-07

## 2025-04-05 RX ORDER — LIDOCAINE HYDROCHLORIDE 20 MG/ML
JELLY TOPICAL ONCE
Status: COMPLETED | OUTPATIENT
Start: 2025-04-05 | End: 2025-04-06

## 2025-04-05 RX ADMIN — OMEPRAZOLE 20 MG: 20 CAPSULE, DELAYED RELEASE ORAL at 09:20

## 2025-04-05 RX ADMIN — PIPERACILLIN AND TAZOBACTAM 3.38 G: 3; .375 INJECTION, POWDER, FOR SOLUTION INTRAVENOUS at 11:06

## 2025-04-05 RX ADMIN — APIXABAN 10 MG: 5 TABLET, FILM COATED ORAL at 20:46

## 2025-04-05 RX ADMIN — PIPERACILLIN AND TAZOBACTAM 3.38 G: 3; .375 INJECTION, POWDER, FOR SOLUTION INTRAVENOUS at 20:46

## 2025-04-05 RX ADMIN — TAMSULOSIN HYDROCHLORIDE 0.4 MG: 0.4 CAPSULE ORAL at 12:20

## 2025-04-05 RX ADMIN — LEVOTHYROXINE SODIUM 125 MCG: 0.12 TABLET ORAL at 08:26

## 2025-04-05 RX ADMIN — APIXABAN 10 MG: 5 TABLET, FILM COATED ORAL at 08:26

## 2025-04-05 RX ADMIN — VANCOMYCIN HYDROCHLORIDE 1500 MG: 5 INJECTION, POWDER, LYOPHILIZED, FOR SOLUTION INTRAVENOUS at 04:26

## 2025-04-05 RX ADMIN — ROSUVASTATIN CALCIUM 10 MG: 10 TABLET, FILM COATED ORAL at 08:26

## 2025-04-05 RX ADMIN — PIPERACILLIN AND TAZOBACTAM 3.38 G: 3; .375 INJECTION, POWDER, FOR SOLUTION INTRAVENOUS at 04:16

## 2025-04-05 ASSESSMENT — ACTIVITIES OF DAILY LIVING (ADL)
ADLS_ACUITY_SCORE: 41
ADLS_ACUITY_SCORE: 41
ADLS_ACUITY_SCORE: 40
ADLS_ACUITY_SCORE: 41
ADLS_ACUITY_SCORE: 40
ADLS_ACUITY_SCORE: 41
ADLS_ACUITY_SCORE: 41
ADLS_ACUITY_SCORE: 40
ADLS_ACUITY_SCORE: 40
ADLS_ACUITY_SCORE: 41
ADLS_ACUITY_SCORE: 40
ADLS_ACUITY_SCORE: 41
ADLS_ACUITY_SCORE: 41
ADLS_ACUITY_SCORE: 40
ADLS_ACUITY_SCORE: 41
ADLS_ACUITY_SCORE: 41
ADLS_ACUITY_SCORE: 40

## 2025-04-05 NOTE — PROGRESS NOTES
[facility]    Medicine Progress Note - Hospitalist Service    Date of Admission:  4/3/2025    Assessment & Plan   Brett Hopkins is a 69 year old male admitted on 4/3/2025. He has a past medical history significant for hypertension, hypothyroidism, ascending aortic aneurysm, atrial fibrillation status post Watchman device, myasthenia gravis, GERD, chronic right lower extremity lymphedema who presented to the hospital on 4/2/2025 with right lower extremity swelling, pain and mild shortness of breath. Currently being treated for possible right lower extremity cellulitis with IV antibiotic. Symptoms slowly improving.  Possible discharge in 2 to 3 days.    Right lower extremity swelling/pain  Possible right lower extremity cellulitis  At this point the etiology of right lower extremity swelling, erythema and pain is most likely due to cellulitis.  No clear signs and symptoms to suggest necrotizing fasciitis.   Patient was on oral antibiotic.   DVT ruled out  Right lower extremity arterial ultrasound without significant obstruction or stenosis.  Erythema very slowly improving over the last 3 days.    Plan  Consult infectious disease  Continue broad-spectrum antibiotic with vancomycin and Zosyn  Appreciate PT OT recommendations    Recently diagnosed mild pulmonary embolism  Diagnosed on 4/2/2025  Started on Eliquis 10 mg twice daily.    Plan  Continue Eliquis 10 mg twice daily    Hypothyroidism  Patient reported strict adherence to his medication  TSH mildly elevated with high T4 on 4/2.  Per patient, his provider is aware of the results and there has been some adjustment to his levothyroxine a few weeks ago    Plan  Continue home levothyroxine    Hypertension  At home on hydrochlorothiazide 25 mg daily and lisinopril 40 mg daily  Blood pressures currently around 130/70    Plan  Continue to hold home hydrochlorothiazide and lisinopril    Acute kidney injury (resolved)  Baseline creatinine  "around 1.1.  Creatinine on 4/3 was 1.4    Plan  No further intervention    GERD.    Continue home medications    Mild hyponatremia (resolved)    Hypokalemia    Plan  Replete as per protocol                      Diet: Combination Diet Regular Diet Adult    DVT Prophylaxis: DOAC  Wang Catheter: Not present  Lines: None     Cardiac Monitoring: None  Code Status: Full Code      Clinically Significant Risk Factors        # Hypokalemia: Lowest K = 3.3 mmol/L in last 2 days, will replace as needed              # Hypertension: Noted on problem list            # Obesity: Estimated body mass index is 35.51 kg/m  as calculated from the following:    Height as of this encounter: 1.803 m (5' 11\").    Weight as of this encounter: 115.5 kg (254 lb 10.1 oz)., PRESENT ON ADMISSION            Social Drivers of Health            Disposition Plan     Medically Ready for Discharge: Anticipated in 2-4 Days             JO ROSARIO MD  Hospitalist Service  [facility]  Securely message with [employer] (more info)  Text page via [employer] Paging/Directory   ______________________________________________________________________    Interval History   No significant events.  Stated that he has some pain in his right lower extremity while doing exercises with PT or OT.  Nevertheless, he stated that the pain at rest has significant improved.  Denies any chest pain.  Denies any nausea or vomiting.  Denies any fever or chills.    Physical Exam   Vital Signs: Temp: 98.5  F (36.9  C) Temp src: Oral BP: 130/76 Pulse: 70   Resp: 18 SpO2: 96 % O2 Device: None (Room air) Oxygen Delivery: 2 LPM  Weight: 254 lbs 10.1 oz    Physical Exam  Constitutional:       General: He is not in acute distress.     Appearance: He is not toxic-appearing.   Musculoskeletal:      Right lower leg: Edema present.      Comments: Right lower extremity erythema slightly improved since yesterday.  Persistent edema of the right lower extremity below knee " level.  Persistent warmth.     Neurological:      Mental Status: He is alert.   Psychiatric:         Mood and Affect: Mood normal.         Thought Content: Thought content normal.          Medical Decision Making       51 MINUTES SPENT BY ME on the date of service doing chart review, history, exam, documentation & further activities per the note.      Data     I have personally reviewed the following data over the past 24 hrs:    11.6 (H)  \   13.1 (L)   / 165     139 105 16.8 /  110 (H)   3.4 24 1.07 \     Procal: N/A CRP: 188.00 (H) Lactic Acid: N/A         Imaging results reviewed over the past 24 hrs:   No results found for this or any previous visit (from the past 24 hours).

## 2025-04-05 NOTE — PHARMACY-VANCOMYCIN DOSING SERVICE
Regimen: 1750 mg IV every 18 hours.  Start time: 22:26 on 04/05/2025  Exposure target: AUC24 (range)400-600 mg/L.hr   AUC24,ss: 551 mg/L.hr  Probability of AUC24 > 400: 81 %  Ctrough,ss: 16.7 mg/L  Probability of Ctrough,ss > 20: 36 %  Probability of nephrotoxicity (Lodise BENNY 2009): 12 %

## 2025-04-05 NOTE — PROGRESS NOTES
Goal Outcome Evaluation:       Patient is A&O X 4, VSS: on 2 L via NC. 1/10 pain, denies chest pain, SOB, dizziness, nausea. K+ protocol, tolerating diet, oral intake, voiding spontaneously, no  BM this shift. Assist of 2 with gait belt and walker to bedside commode. PIV infusing Zosyn. Bed alarm on, call light within reach. Discharge pending.     Problem: Adult Inpatient Plan of Care  Goal: Optimal Comfort and Wellbeing  Outcome: Progressing  Intervention: Monitor Pain and Promote Comfort  Recent Flowsheet Documentation  Taken 4/5/2025 0015 by Margie Arce, RN  Pain Management Interventions:   care clustered   quiet environment facilitated   rest  Intervention: Provide Person-Centered Care  Recent Flowsheet Documentation  Taken 4/5/2025 0015 by Margie Arce, RN  Trust Relationship/Rapport:   care explained   questions answered   reassurance provided   questions encouraged     Problem: Pain Acute  Goal: Optimal Pain Control and Function  Outcome: Progressing  Intervention: Develop Pain Management Plan  Recent Flowsheet Documentation  Taken 4/5/2025 0015 by Margie Arce, RN  Pain Management Interventions:   care clustered   quiet environment facilitated   rest  Intervention: Prevent or Manage Pain  Recent Flowsheet Documentation  Taken 4/5/2025 0015 by Margie Arce, RN  Sensory Stimulation Regulation:   care clustered   lighting decreased   quiet environment promoted  Medication Review/Management: medications reviewed

## 2025-04-05 NOTE — PLAN OF CARE
Problem: Infection  Goal: Absence of Infection Signs and Symptoms  Outcome: Progressing     Problem: Urinary Retention  Goal: Effective Urinary Elimination  Outcome: Progressing   Goal Outcome Evaluation:         Pt is getting IV antibiotics.  ID has also been consulted.  Pt worked with PT/OT today and is up in the chair feeling a little more normal.  Pt has not voided.  Started on Flomax  Bladder scan at 11am was 408.  Updated MD and will re-scan bladder 2 hours after Flomax was given.  Pt appetite is good today and is finally eating full meals after not having an appetite for a few days.  Rt leg is still red and swollen.  Appears to decreasing.

## 2025-04-05 NOTE — CONSULTS
INFECTIOUS DISEASE CONSULT NOTE    Date: 04/05/2025   CHIEF COMPLAINT:   Chief Complaint   Patient presents with    Generalized Weakness    Leg Pain        ================================================================  SUBJECTIVE    HPI  69m with RLE swelling/redness.  PMH HTN, AAA, a fib s/p watchman, myasthenia gravis, chronic RLE lymphedema, morbid obesity    He presented 4/2 with RLE swelling, redness, and pain.  Started with chills and sweats, then rash developed fairly quickly.  Failed outpatient cefdinir + clindamycin so came to ED.  Has been responding, albeit slowly, to vanc + pip-tazo.        Past Medical History  Active Ambulatory Problems     Diagnosis Date Noted    Varicose Veins     Persistent atrial fibrillation     Aneurysm Of The Ascending Aorta     Hypothyroidism     Status post ablation of atrial fibrillation 07/28/2015    GERD (gastroesophageal reflux disease) 09/10/2018    AA (alopecia areata) 09/10/2018    Hypertension 04/26/2019    Lymphedema 06/01/2020    Obesity (BMI 35.0-39.9) with comorbidity (H) 11/20/2020    Thyroid nodule 01/05/2022    Myasthenia gravis (H) 01/04/2023    Presence of Watchman left atrial appendage closure device 01/12/2023    Hyperlipidemia 12/19/2023    Gout 11/06/2024    Pulmonary embolism (H) 04/02/2025     Resolved Ambulatory Problems     Diagnosis Date Noted    No Resolved Ambulatory Problems     No Additional Past Medical History       Past Surgical History  He   has a past surgical history that includes Pr Ablate Heart Dysrhythm Focus (08/19/2013); TOTAL KNEE ARTHROPLASTY (Right, 06/21/2017); Arthroplasty revision hip (Right, 05/01/2019); TOTAL HIP ARTHROPLASTY (Right, 05/08/2019); Cardioversion (07/01/2019); Cardioversion (09/12/2019); Tooth Extraction; Dental surgery; Cataract Extraction (Bilateral, 06/2021); Arthroplasty revision knee (Right, 2019); Left Atrial Appendage Closure (N/A, 1/12/2023); and Ablation Atrial Fibrillation (N/A, 3/19/2024).    Social  "History  he   reports that he has never smoked. He has been exposed to tobacco smoke. He has never used smokeless tobacco. He reports current alcohol use of about 5.0 standard drinks of alcohol per week. He reports that he does not use drugs.    Family History  Reviewed and non-contributory  family history includes Atrial fibrillation in his brother, brother, father, mother, and sister; CABG in his brother; Cerebrovascular Disease in his brother and sister; Dementia in his mother; Diabetes Type 2  in his brother and mother; Kidney failure in his brother; Parkinsonism in his sister and sister; Rheumatoid Arthritis in his brother and father.    Social Needs  Social History     Social History Narrative    Diet-regular            Exercise-walk, limited by R leg, R knee        Volunteer,        ================================================================  OBJECTIVE  /76 (BP Location: Right arm)   Pulse 70   Temp 98.5  F (36.9  C) (Oral)   Resp 18   Ht 1.803 m (5' 11\")   Wt 115.5 kg (254 lb 10.1 oz)   SpO2 96%   BMI 35.51 kg/m      Physical Exam  General: alert, cooperative, no apparent distress  HEENT: atraumatic, normocephalic, no scleral icterus, moist mucus membranes, no cervical lymphadenopathy  Heart: Normal rate, regular rhythm  Lungs: good inspiratory effort  Abdomen: soft, non-tender, non-distended  Extremities: RLE with bright erythema diffusely, decreased in size compared to prior picture  4/3 RLE      Pertinent labs  CBC RESULTS:   Recent Labs   Lab Test 04/05/25  0600   WBC 11.6*   RBC 4.26*   HGB 13.1*   HCT 38.7*   MCV 91   MCH 30.8   MCHC 33.9   RDW 13.8           Imaging  4/3 CT tib/fib  1.  Subcutaneous soft tissue stranding involving most of the lower leg could represent cellulitis.  2.  No abscess.  3.  No CT evidence of osteomyelitis.    Microbiology data  4/2 blood culture: NGTD      I have personally reviewed the relevant laboratory, imaging, and microbiology " data  ================================================================  Assessment  Brett Hopkins is a 69 year old with RLE swelling/redness.  PMH HTN, AAA, a fib s/p watchman, myasthenia gravis, chronic RLE lymphedema, morbid obesity    He presented 4/2 with RLE swelling, redness, and pain.  Started with chills and sweats, then rash developed fairly quickly.  Failed outpatient cefdinir + clindamycin so came to ED.  Has been responding, albeit slowly, to vanc + pip-tazo.    Slow response likely due to lymphedema.  He is improving and inflammatory markers are trending downwards.      Impression  # RLE cellulitis  # Hx RLE lymphedema  # morbid obesity  # Sulfa allergy history (childhood reaction)    ================================================================  Plan  - Vanc + pip-tazo  - Given slow response and lymphedema history, may need longer treatment with majority of treatment as IV  - ID will follow    Lawson Acharya MD, MPH  [facility]   Office Telephone [phone].  Fax [phone]  [facility] paging

## 2025-04-05 NOTE — PROGRESS NOTES
04/05/25 1000   Appointment Info   Signing Clinician's Name / Credentials (PT) Champ Hebert PT   Living Environment   People in Home spouse   Current Living Arrangements house   Home Accessibility stairs to enter home;stairs within home   Number of Stairs, Main Entrance 2   Stair Railings, Main Entrance none   Number of Stairs, Within Home, Primary greater than 10 stairs   Stair Railings, Within Home, Primary railing on right side (ascending);other (see comments)  (both sides bottom half)   Transportation Anticipated family or friend will provide   Self-Care   Usual Activity Tolerance moderate   Current Activity Tolerance poor   Equipment Currently Used at Home walker, rolling;cane, straight   Fall history within last six months no   Activity/Exercise/Self-Care Comment Patient is indep with ADL's and no AD at baseline. He has SEC and RW from previous R YA and TKA   General Information   Onset of Illness/Injury or Date of Surgery 04/03/25   Referring Physician Augustin Roque MD   Patient/Family Therapy Goals Statement (PT) d/c home   Pertinent History of Current Problem (include personal factors and/or comorbidities that impact the POC) 69yoM with history of paroxysmal a-fib (s/p ablation & watchman), obesity, GERD, HLD, hypothyroidism who was seen here in the ED yesterday with RLE redness/swelling and shortness of breath; found to have small bilateral pulmonary emboli---no DVT on US. Treated for RLE cellulitis (Zosyn/clindamycin in ED and cefdinir/clinda prescriptions for home) and given Eliquis. Doing well and wanted to treat as outpatient. Reports worsening RLE pain/redness/swelling despite taking 2 rounds of the prescribed PO antibiotics; no chest pain or shortness of breath. Unable to bear any weight on this right leg. Saw PCP in clinic and sent to the ED for admission.    RLE cellulitis   Existing Precautions/Restrictions fall   Cognition   Affect/Mental Status (Cognition) WNL   Pain Assessment   Patient  Currently in Pain Yes, see Vital Sign flowsheet   Integumentary/Edema   Integumentary/Edema other (describe)  (RLE swelling, redness)   Range of Motion (ROM)   ROM Comment R knee flexion limited d/t pain and swelling   Strength (Manual Muscle Testing)   Strength (Manual Muscle Testing) Deficits observed during functional mobility   Bed Mobility   Bed Mobility supine-sit;sit-supine   Supine-Sit [place] (Bed Mobility) minimum assist (75% patient effort)   Sit-Supine [place] (Bed Mobility) minimum assist (75% patient effort)   Bed Mobility Limitations impaired ability to control trunk for mobility   Comment, (Bed Mobility) use of leg  to assist RLE   Transfers   Transfers sit-stand transfer   Sit-Stand Transfer   Sit-Stand [place] (Transfers) minimum assist (75% patient effort);verbal cues   Assistive Device (Sit-Stand Transfers) walker, front-wheeled   Comment, (Sit-Stand Transfer) verbal cues for hands   Clinical Impression   Criteria for Skilled Therapeutic Intervention Yes, treatment indicated   PT Diagnosis (PT) Impaired functional mobility   Influenced by the following impairments weakness, pain   Functional limitations due to impairments transfers, gait and steps   Clinical Presentation (PT Evaluation Complexity) stable   Clinical Presentation Rationale Patient presents as medically diagnosed   Clinical Decision Making (Complexity) low complexity   Planned Therapy Interventions (PT) home exercise program;gait training;stair training;transfer training   Risk & Benefits of therapy have been explained patient;spouse/significant other;evaluation/treatment results reviewed;care plan/treatment goals reviewed;participants voiced agreement with care plan;participants included   PT Total Evaluation Time   PT Eval, Low Complexity Minutes (65646) 10   Physical Therapy Goals   PT Frequency 6x/week   PT Predicted Duration/Target Date for Goal Attainment 04/12/25   PT Goals Transfers;Gait;Stairs   PT:  Transfers Modified independent;Sit to/from stand;Assistive device   PT: Gait Modified independent;Assistive device;Rolling walker;100 feet   PT: Stairs Supervision/stand-by assist;Greater than 10 stairs;Rail on right;Assistive device   Interventions   Interventions Quick Adds Therapeutic Activity;Gait Training   Therapeutic Activity   Therapeutic Activities: dynamic activities to improve functional performance Minutes (06360) 15   Symptoms Noted During/After Treatment Increased pain;Fatigue   Treatment Detail/Skilled Intervention Dangled x 10 minutes with SBA. Sit to stand with RW with min A x 2 reps, patient was incontinent of stool and stood for 2+minutes with RW support with NA present for hygiene and to change bedding.   PT Discharge Planning   PT Plan LE strength/ROM, transfers, gait and steps   PT Discharge Recommendation (DC Rec) (S)  home with assist;home with home care physical therapy   PT Rationale for DC Rec Patient has low activity tolerance, many steps to d/c home and requires min assist with bed mobility and transfers.   PT Brief overview of current status Supine to/from sit with Kimber and sit to stand with RW with min A   PT Total Distance Amb During Session (feet) 0   PT Equipment Needed at Discharge cane, straight;walker, rolling   Physical Therapy Time and Intention   Timed Code Treatment Minutes 15   Total Session Time (sum of timed and untimed services) 25

## 2025-04-06 ENCOUNTER — APPOINTMENT (OUTPATIENT)
Dept: PHYSICAL THERAPY | Facility: CLINIC | Age: 70
DRG: 602 | End: 2025-04-06
Payer: COMMERCIAL

## 2025-04-06 LAB
CRP SERPL-MCNC: 145 MG/L
POTASSIUM SERPL-SCNC: 3.4 MMOL/L (ref 3.4–5.3)
POTASSIUM SERPL-SCNC: 3.8 MMOL/L (ref 3.4–5.3)
VANCOMYCIN SERPL-MCNC: 25.2 UG/ML

## 2025-04-06 PROCEDURE — 80202 ASSAY OF VANCOMYCIN: CPT | Performed by: STUDENT IN AN ORGANIZED HEALTH CARE EDUCATION/TRAINING PROGRAM

## 2025-04-06 PROCEDURE — 99233 SBSQ HOSP IP/OBS HIGH 50: CPT | Performed by: STUDENT IN AN ORGANIZED HEALTH CARE EDUCATION/TRAINING PROGRAM

## 2025-04-06 PROCEDURE — 99232 SBSQ HOSP IP/OBS MODERATE 35: CPT | Performed by: INTERNAL MEDICINE

## 2025-04-06 PROCEDURE — 84132 ASSAY OF SERUM POTASSIUM: CPT | Performed by: STUDENT IN AN ORGANIZED HEALTH CARE EDUCATION/TRAINING PROGRAM

## 2025-04-06 PROCEDURE — 97530 THERAPEUTIC ACTIVITIES: CPT | Mod: GP | Performed by: PHYSICAL THERAPIST

## 2025-04-06 PROCEDURE — 36415 COLL VENOUS BLD VENIPUNCTURE: CPT | Performed by: STUDENT IN AN ORGANIZED HEALTH CARE EDUCATION/TRAINING PROGRAM

## 2025-04-06 PROCEDURE — 120N000001 HC R&B MED SURG/OB

## 2025-04-06 PROCEDURE — 258N000003 HC RX IP 258 OP 636: Performed by: STUDENT IN AN ORGANIZED HEALTH CARE EDUCATION/TRAINING PROGRAM

## 2025-04-06 PROCEDURE — 86140 C-REACTIVE PROTEIN: CPT | Performed by: INTERNAL MEDICINE

## 2025-04-06 PROCEDURE — 250N000011 HC RX IP 250 OP 636: Mod: JZ | Performed by: STUDENT IN AN ORGANIZED HEALTH CARE EDUCATION/TRAINING PROGRAM

## 2025-04-06 PROCEDURE — 250N000013 HC RX MED GY IP 250 OP 250 PS 637: Performed by: STUDENT IN AN ORGANIZED HEALTH CARE EDUCATION/TRAINING PROGRAM

## 2025-04-06 RX ORDER — POTASSIUM CHLORIDE 1500 MG/1
40 TABLET, EXTENDED RELEASE ORAL ONCE
Status: DISCONTINUED | OUTPATIENT
Start: 2025-04-06 | End: 2025-04-06

## 2025-04-06 RX ORDER — POTASSIUM CHLORIDE 1.5 G/1.58G
40 POWDER, FOR SOLUTION ORAL ONCE
Status: COMPLETED | OUTPATIENT
Start: 2025-04-06 | End: 2025-04-06

## 2025-04-06 RX ORDER — POTASSIUM CHLORIDE 1500 MG/1
40 TABLET, EXTENDED RELEASE ORAL ONCE
Status: COMPLETED | OUTPATIENT
Start: 2025-04-06 | End: 2025-04-06

## 2025-04-06 RX ORDER — HYDROMORPHONE HYDROCHLORIDE 1 MG/ML
0.5 INJECTION, SOLUTION INTRAMUSCULAR; INTRAVENOUS; SUBCUTANEOUS
Status: DISCONTINUED | OUTPATIENT
Start: 2025-04-06 | End: 2025-04-08 | Stop reason: HOSPADM

## 2025-04-06 RX ORDER — POTASSIUM CHLORIDE 1.5 G/1.58G
20 POWDER, FOR SOLUTION ORAL ONCE
Status: COMPLETED | OUTPATIENT
Start: 2025-04-06 | End: 2025-04-06

## 2025-04-06 RX ADMIN — ROSUVASTATIN CALCIUM 10 MG: 10 TABLET, FILM COATED ORAL at 08:06

## 2025-04-06 RX ADMIN — POTASSIUM CHLORIDE 40 MEQ: 1.5 POWDER, FOR SOLUTION ORAL at 08:22

## 2025-04-06 RX ADMIN — POTASSIUM CHLORIDE 20 MEQ: 1.5 POWDER, FOR SOLUTION ORAL at 14:57

## 2025-04-06 RX ADMIN — ACETAMINOPHEN 650 MG: 325 TABLET, FILM COATED ORAL at 13:38

## 2025-04-06 RX ADMIN — PIPERACILLIN AND TAZOBACTAM 3.38 G: 3; .375 INJECTION, POWDER, FOR SOLUTION INTRAVENOUS at 11:16

## 2025-04-06 RX ADMIN — APIXABAN 10 MG: 5 TABLET, FILM COATED ORAL at 19:30

## 2025-04-06 RX ADMIN — LEVOTHYROXINE SODIUM 125 MCG: 0.12 TABLET ORAL at 06:53

## 2025-04-06 RX ADMIN — HYDROMORPHONE HYDROCHLORIDE 0.5 MG: 1 INJECTION, SOLUTION INTRAMUSCULAR; INTRAVENOUS; SUBCUTANEOUS at 09:39

## 2025-04-06 RX ADMIN — OMEPRAZOLE 20 MG: 20 CAPSULE, DELAYED RELEASE ORAL at 06:53

## 2025-04-06 RX ADMIN — PIPERACILLIN AND TAZOBACTAM 3.38 G: 3; .375 INJECTION, POWDER, FOR SOLUTION INTRAVENOUS at 22:44

## 2025-04-06 RX ADMIN — Medication 1750 MG: at 19:31

## 2025-04-06 RX ADMIN — HYDROMORPHONE HYDROCHLORIDE 1 MG: 2 TABLET ORAL at 13:37

## 2025-04-06 RX ADMIN — APIXABAN 10 MG: 5 TABLET, FILM COATED ORAL at 08:06

## 2025-04-06 RX ADMIN — TAMSULOSIN HYDROCHLORIDE 0.4 MG: 0.4 CAPSULE ORAL at 08:06

## 2025-04-06 RX ADMIN — Medication 1750 MG: at 01:24

## 2025-04-06 RX ADMIN — CALCIUM CARBONATE (ANTACID) CHEW TAB 500 MG 1000 MG: 500 CHEW TAB at 00:27

## 2025-04-06 RX ADMIN — HYDROMORPHONE HYDROCHLORIDE 1 MG: 2 TABLET ORAL at 01:04

## 2025-04-06 RX ADMIN — PIPERACILLIN AND TAZOBACTAM 3.38 G: 3; .375 INJECTION, POWDER, FOR SOLUTION INTRAVENOUS at 04:22

## 2025-04-06 RX ADMIN — HYDROMORPHONE HYDROCHLORIDE 2 MG: 2 TABLET ORAL at 08:05

## 2025-04-06 RX ADMIN — ACETAMINOPHEN 650 MG: 325 TABLET, FILM COATED ORAL at 21:56

## 2025-04-06 ASSESSMENT — ACTIVITIES OF DAILY LIVING (ADL)
ADLS_ACUITY_SCORE: 41
ADLS_ACUITY_SCORE: 42
ADLS_ACUITY_SCORE: 41
ADLS_ACUITY_SCORE: 42
ADLS_ACUITY_SCORE: 41
ADLS_ACUITY_SCORE: 41
ADLS_ACUITY_SCORE: 45
ADLS_ACUITY_SCORE: 41
ADLS_ACUITY_SCORE: 42
ADLS_ACUITY_SCORE: 42
ADLS_ACUITY_SCORE: 40
ADLS_ACUITY_SCORE: 42
ADLS_ACUITY_SCORE: 45
ADLS_ACUITY_SCORE: 42
ADLS_ACUITY_SCORE: 42
ADLS_ACUITY_SCORE: 45
ADLS_ACUITY_SCORE: 42
ADLS_ACUITY_SCORE: 41
ADLS_ACUITY_SCORE: 41
ADLS_ACUITY_SCORE: 45
ADLS_ACUITY_SCORE: 41
ADLS_ACUITY_SCORE: 41
ADLS_ACUITY_SCORE: 42

## 2025-04-06 NOTE — PLAN OF CARE
A/O. VSS on RA. 1 dose PO dilaudid given for pain in RLE. Last void @ 0050; PVR 54. Call light within reach. Able to make needs known. Calls appropriately.     Goal Outcome Evaluation:    Problem: Adult Inpatient Plan of Care  Goal: Optimal Comfort and Wellbeing  Outcome: Progressing     Problem: Pain Acute  Goal: Optimal Pain Control and Function  Outcome: Progressing  Intervention: Prevent or Manage Pain  Recent Flowsheet Documentation  Taken 4/6/2025 0104 by Chrissy Dotson, RN  Medication Review/Management: medications reviewed     Problem: Urinary Retention  Goal: Effective Urinary Elimination  Outcome: Progressing

## 2025-04-06 NOTE — PROGRESS NOTES
[facility]    Medicine Progress Note - Hospitalist Service    Date of Admission:  4/3/2025    Assessment & Plan   Brett Hopkins is a 69 year old male admitted on 4/3/2025. He has a past medical history significant for hypertension, hypothyroidism, ascending aortic aneurysm, atrial fibrillation status post Watchman device, myasthenia gravis, GERD, chronic right lower extremity lymphedema who presented to the hospital on 4/2/2025 with right lower extremity swelling, pain and mild shortness of breath. Currently being treated for right lower extremity cellulitis with IV antibiotic. Symptoms slowly improving.  Possible discharge to TCU in 2 to 3 days.    Right lower extremity swelling/pain  Possible right lower extremity cellulitis  DVT ruled out  Right lower extremity arterial ultrasound without significant obstruction or stenosis.  Erythema very slowly improving over the last 4 days.  Pain slowly improving.     Plan  Continue current antibiotic.  Appreciate infectious disease recommendations  Continue broad-spectrum antibiotic with vancomycin and Zosyn    Recently diagnosed mild pulmonary embolism  Diagnosed on 4/2/2025  Started on Eliquis 10 mg twice daily.    Plan  Continue Eliquis 10 mg twice daily    Hypothyroidism  Patient reported strict adherence to his medication  TSH mildly elevated with high T4 on 4/2.  Per patient, his provider is aware of the results and there has been some adjustment to his levothyroxine a few weeks ago    Plan  Continue home levothyroxine    Hypertension  At home on hydrochlorothiazide 25 mg daily and lisinopril 40 mg daily  Blood pressures currently around 130/70    Plan  Continue to hold home hydrochlorothiazide and lisinopril    Acute kidney injury (resolved)  Baseline creatinine around 1.1.  Creatinine on 4/3 was 1.4    Plan  No further intervention    GERD    Plan  Continue home medications    Urinary retention  Started on Flomax on 4/5.  Patient is able  "to urinate.    Plan  Continue to monitor    Hypokalemia    Plan  Replete as per protocol              Diet: Combination Diet Regular Diet Adult    DVT Prophylaxis: DOAC  Wang Catheter: Not present  Lines: None     Cardiac Monitoring: None  Code Status: Full Code      Clinically Significant Risk Factors                     # Hypertension: Noted on problem list            # Obesity: Estimated body mass index is 35.51 kg/m  as calculated from the following:    Height as of this encounter: 1.803 m (5' 11\").    Weight as of this encounter: 115.5 kg (254 lb 10.1 oz)., PRESENT ON ADMISSION            Social Drivers of Health            Disposition Plan     Medically Ready for Discharge: Anticipated in 2-4 Days             JO ROSARIO MD  Hospitalist Service  [facility]  Securely message with [employer] (more info)  Text page via [employer] Paging/Directory   ______________________________________________________________________    Interval History   This morning, patient was feeling slightly better. Right lower extremity pain was slightly better.  No chest pain or shortness of breath.  No nausea or vomiting.  Had an episode of severe pain around 9 AM which resolved after receiving Dilaudid.       Physical Exam   Vital Signs: Temp: 98.6  F (37  C) Temp src: Oral BP: 130/75 Pulse: 72   Resp: 18 SpO2: 94 % O2 Device: None (Room air)    Weight: 254 lbs 10.1 oz    Physical Exam  Constitutional:       General: He is not in acute distress.     Appearance: He is not ill-appearing or toxic-appearing.   Pulmonary:      Effort: Pulmonary effort is normal. No respiratory distress.   Musculoskeletal:      Right lower leg: Edema present.      Comments: Right lower extremity erythema slightly improved since yesterday.  Persistent edema of the right lower extremity below knee level.  Persistent warmth.     Neurological:      Mental Status: He is alert.   Psychiatric:         Mood and Affect: Mood normal.         " Behavior: Behavior normal.         Thought Content: Thought content normal.          Medical Decision Making       54 MINUTES SPENT BY ME on the date of service doing chart review, history, exam, documentation & further activities per the note.      Data     I have personally reviewed the following data over the past 24 hrs:    N/A  \   N/A   / N/A     N/A N/A N/A /  N/A   3.4 N/A N/A \       Imaging results reviewed over the past 24 hrs:   No results found for this or any previous visit (from the past 24 hours).

## 2025-04-06 NOTE — PHARMACY-VANCOMYCIN DOSING SERVICE
Regimen: 1750 mg IV every 18 hours.  Start time: 19:24 on 04/06/2025  Exposure target: AUC24 (range)400-600 mg/L.hr   AUC24,ss: 562 mg/L.hr  Probability of AUC24 > 400: 95 %  Ctrough,ss: 17.2 mg/L  Probability of Ctrough,ss > 20: 33 %  Probability of nephrotoxicity (Lodise BENNY 2009): 13 %

## 2025-04-06 NOTE — PROGRESS NOTES
"INFECTIOUS DISEASE FOLLOW UP NOTE  Date: 04/06/2025     ================================================================  SUBJECTIVE  Doing okay, did have lots of pain this morning.    ================================================================  OBJECTIVE  /75 (BP Location: Right arm)   Pulse 72   Temp 98.6  F (37  C) (Oral)   Resp 18   Ht 1.803 m (5' 11\")   Wt 115.5 kg (254 lb 10.1 oz)   SpO2 94%   BMI 35.51 kg/m      Physical Exam  General: alert, cooperative, no apparent distress  HEENT: atraumatic, normocephalic, no scleral icterus, moist mucus membranes, no cervical lymphadenopathy  Heart: Normal rate, regular rhythm  Lungs: good inspiratory effort  Abdomen: soft, non-tender, non-distended  Extremities: RLE with bright erythema diffusely, decreased in size compared to prior picture  4/3 RLE      Pertinent labs  CBC RESULTS:   Recent Labs   Lab Test 04/05/25  0600   WBC 11.6*   RBC 4.26*   HGB 13.1*   HCT 38.7*   MCV 91   MCH 30.8   MCHC 33.9   RDW 13.8           Imaging  4/3 CT tib/fib  1.  Subcutaneous soft tissue stranding involving most of the lower leg could represent cellulitis.  2.  No abscess.  3.  No CT evidence of osteomyelitis.     Microbiology data  4/2 blood culture: NGTD        I have personally reviewed the relevant laboratory, imaging, and microbiology data  ================================================================  Assessment  Brett Hopkins is a 69 year old with RLE swelling/redness.  PMH HTN, AAA, a fib s/p watchman, myasthenia gravis, chronic RLE lymphedema, morbid obesity     He presented 4/2 with RLE swelling, redness, and pain.  Started with chills and sweats, then rash developed fairly quickly.  Failed outpatient cefdinir + clindamycin so came to ED.  Has been responding, albeit slowly, to vanc + pip-tazo.     Slow response likely due to lymphedema.  He is slowly improving and inflammatory markers are trending downwards.        Impression  # RLE " cellulitis (non-purulent)  # Hx RLE lymphedema  # morbid obesity  # Sulfa allergy history (childhood reaction)     ================================================================  Plan  - Vanc + pip-tazo  - Given slow response and lymphedema history, may need longer treatment with majority of treatment as IV  - ID will follow    Lawson Acharya MD, MPH  [facility]   Office Telephone [phone].  Fax [phone]  [facility] paging

## 2025-04-06 NOTE — PLAN OF CARE
"Goal Outcome Evaluation:      Plan of Care Reviewed With: patient    Overall Patient Progress: improvingOverall Patient Progress: improving    Outcome Evaluation: Pt alert and oriented x 4. 0800 - Pt denies pain to right leg as rest, 9/10 with movement. Premedicated with PO dilaudid at this time prior to therapy. 0847 The patient suddenly c/o 10/10 shooting pain to the right leg. Pt anxious and describes his pain as \"the worst he has ever had\". Dr. Roque notified and he orders IV dilaudid to give to help with the pain. Pt was encouraged to get out of the chair and elevate his leg in the bed. PT helped me transfer in back to the bed. His right leg is red, hot and has +3 edema throughout. After the IV dilaudid the patient reported the pain subsided. Patient reported to this writer that the patient can now urinate without issue, flomax continued.       Problem: Adult Inpatient Plan of Care  Goal: Optimal Comfort and Wellbeing  Outcome: Progressing  Intervention: Monitor Pain and Promote Comfort  Recent Flowsheet Documentation  Taken 4/6/2025 0951 by Rigo Mark RN  Pain Management Interventions: (legs continues to be elevated) medication offered but refused  Taken 4/6/2025 0939 by Rigo Mark, RN  Pain Management Interventions: (elevation)   medication (see MAR)   repositioned  Taken 4/6/2025 0847 by Rigo Mark RN  Pain Management Interventions: MD notified (comment)  Taken 4/6/2025 0805 by Rigo Mark RN  Pain Management Interventions: premedicated for activity     Problem: Urinary Retention  Goal: Effective Urinary Elimination  Outcome: Progressing     Rigo Mark RN, ONC    "

## 2025-04-06 NOTE — PLAN OF CARE
"  Problem: Adult Inpatient Plan of Care  Goal: Optimal Comfort and Wellbeing  Outcome: Progressing     Problem: Pain Acute  Goal: Optimal Pain Control and Function  Outcome: Progressing     Goal Outcome Evaluation:  Pt is alert and oriented, calls appropriately for needs. Stand by assist with ambulation. Vitals signs are stable, afebrile, oxygen WNL on room air. Pt is tolerating regular diet. IV is SL between ABX. Pt voided last at 1815, was then bladder scanned for 167. Pt tried to void again at 2200, but was unable. He wants to wait a little longer then to try again. He states \"I have a shy bladder and sometimes I can't get it to come out.\" Pt is having right leg pain, no PRN's given for this, pt repositions to relieve.     Plan: Pain control, IV ABX    Bam Daily RN  April 5, 2025, 10:32 PM        "

## 2025-04-06 NOTE — PLAN OF CARE
Problem: Adult Inpatient Plan of Care  Goal: Plan of Care Review  Description: The Plan of Care Review/Shift note should be completed every shift.  The Outcome Evaluation is a brief statement about your assessment that the patient is improving, declining, or no change.  This information will be displayed automatically on your shiftnote.  Outcome: Not Progressing   Goal Outcome Evaluation:  Right leg remains moderately edematous with erythema. Reporting moderate intermittent pain and utilizing PRN PO Dilaudid and Tylenol. Remains on IV Zosyn and Vancomycin.

## 2025-04-07 ENCOUNTER — APPOINTMENT (OUTPATIENT)
Dept: OCCUPATIONAL THERAPY | Facility: CLINIC | Age: 70
DRG: 602 | End: 2025-04-07
Payer: COMMERCIAL

## 2025-04-07 ENCOUNTER — APPOINTMENT (OUTPATIENT)
Dept: PHYSICAL THERAPY | Facility: CLINIC | Age: 70
DRG: 602 | End: 2025-04-07
Payer: COMMERCIAL

## 2025-04-07 LAB
ALBUMIN UR-MCNC: 10 MG/DL
APPEARANCE UR: CLEAR
BACTERIA BLD CULT: NO GROWTH
BACTERIA BLD CULT: NO GROWTH
BILIRUB UR QL STRIP: NEGATIVE
COLOR UR AUTO: ABNORMAL
GLUCOSE UR STRIP-MCNC: NEGATIVE MG/DL
HGB UR QL STRIP: NEGATIVE
HOLD SPECIMEN: NORMAL
KETONES UR STRIP-MCNC: NEGATIVE MG/DL
LEUKOCYTE ESTERASE UR QL STRIP: NEGATIVE
MUCOUS THREADS #/AREA URNS LPF: PRESENT /LPF
NITRATE UR QL: NEGATIVE
PH UR STRIP: 5 [PH] (ref 5–7)
POTASSIUM SERPL-SCNC: 3.6 MMOL/L (ref 3.4–5.3)
RBC URINE: 1 /HPF
SP GR UR STRIP: 1.01 (ref 1–1.03)
SQUAMOUS EPITHELIAL: <1 /HPF
UROBILINOGEN UR STRIP-MCNC: NORMAL MG/DL
WBC URINE: 1 /HPF

## 2025-04-07 PROCEDURE — 97535 SELF CARE MNGMENT TRAINING: CPT | Mod: GO

## 2025-04-07 PROCEDURE — 99232 SBSQ HOSP IP/OBS MODERATE 35: CPT | Performed by: STUDENT IN AN ORGANIZED HEALTH CARE EDUCATION/TRAINING PROGRAM

## 2025-04-07 PROCEDURE — 81001 URINALYSIS AUTO W/SCOPE: CPT | Performed by: STUDENT IN AN ORGANIZED HEALTH CARE EDUCATION/TRAINING PROGRAM

## 2025-04-07 PROCEDURE — 250N000013 HC RX MED GY IP 250 OP 250 PS 637: Performed by: STUDENT IN AN ORGANIZED HEALTH CARE EDUCATION/TRAINING PROGRAM

## 2025-04-07 PROCEDURE — 258N000003 HC RX IP 258 OP 636: Performed by: STUDENT IN AN ORGANIZED HEALTH CARE EDUCATION/TRAINING PROGRAM

## 2025-04-07 PROCEDURE — 99232 SBSQ HOSP IP/OBS MODERATE 35: CPT | Performed by: INTERNAL MEDICINE

## 2025-04-07 PROCEDURE — 120N000001 HC R&B MED SURG/OB

## 2025-04-07 PROCEDURE — 84132 ASSAY OF SERUM POTASSIUM: CPT | Performed by: STUDENT IN AN ORGANIZED HEALTH CARE EDUCATION/TRAINING PROGRAM

## 2025-04-07 PROCEDURE — 97116 GAIT TRAINING THERAPY: CPT | Mod: GP

## 2025-04-07 PROCEDURE — 36415 COLL VENOUS BLD VENIPUNCTURE: CPT | Performed by: STUDENT IN AN ORGANIZED HEALTH CARE EDUCATION/TRAINING PROGRAM

## 2025-04-07 PROCEDURE — 97530 THERAPEUTIC ACTIVITIES: CPT | Mod: GP

## 2025-04-07 PROCEDURE — 250N000011 HC RX IP 250 OP 636: Performed by: STUDENT IN AN ORGANIZED HEALTH CARE EDUCATION/TRAINING PROGRAM

## 2025-04-07 RX ORDER — PANTOPRAZOLE SODIUM 40 MG/1
40 TABLET, DELAYED RELEASE ORAL
Status: DISCONTINUED | OUTPATIENT
Start: 2025-04-08 | End: 2025-04-08

## 2025-04-07 RX ORDER — POTASSIUM CHLORIDE 1.5 G/1.58G
20 POWDER, FOR SOLUTION ORAL ONCE
Status: COMPLETED | OUTPATIENT
Start: 2025-04-07 | End: 2025-04-07

## 2025-04-07 RX ORDER — LISINOPRIL 40 MG/1
40 TABLET ORAL DAILY
Status: DISCONTINUED | OUTPATIENT
Start: 2025-04-07 | End: 2025-04-08 | Stop reason: HOSPADM

## 2025-04-07 RX ADMIN — APIXABAN 10 MG: 5 TABLET, FILM COATED ORAL at 09:15

## 2025-04-07 RX ADMIN — ACETAMINOPHEN 650 MG: 325 TABLET, FILM COATED ORAL at 21:49

## 2025-04-07 RX ADMIN — PIPERACILLIN AND TAZOBACTAM 3.38 G: 3; .375 INJECTION, POWDER, FOR SOLUTION INTRAVENOUS at 14:51

## 2025-04-07 RX ADMIN — PIPERACILLIN AND TAZOBACTAM 3.38 G: 3; .375 INJECTION, POWDER, FOR SOLUTION INTRAVENOUS at 05:19

## 2025-04-07 RX ADMIN — APIXABAN 10 MG: 5 TABLET, FILM COATED ORAL at 21:44

## 2025-04-07 RX ADMIN — POTASSIUM CHLORIDE 20 MEQ: 1.5 POWDER, FOR SOLUTION ORAL at 09:15

## 2025-04-07 RX ADMIN — LISINOPRIL 40 MG: 40 TABLET ORAL at 11:14

## 2025-04-07 RX ADMIN — ROSUVASTATIN CALCIUM 10 MG: 10 TABLET, FILM COATED ORAL at 09:15

## 2025-04-07 RX ADMIN — LEVOTHYROXINE SODIUM 125 MCG: 0.12 TABLET ORAL at 09:15

## 2025-04-07 RX ADMIN — TAMSULOSIN HYDROCHLORIDE 0.4 MG: 0.4 CAPSULE ORAL at 09:15

## 2025-04-07 RX ADMIN — HYDROMORPHONE HYDROCHLORIDE 2 MG: 2 TABLET ORAL at 09:24

## 2025-04-07 RX ADMIN — ACETAMINOPHEN 650 MG: 325 TABLET, FILM COATED ORAL at 05:25

## 2025-04-07 RX ADMIN — Medication 1750 MG: at 12:47

## 2025-04-07 RX ADMIN — PIPERACILLIN AND TAZOBACTAM 3.38 G: 3; .375 INJECTION, POWDER, FOR SOLUTION INTRAVENOUS at 21:49

## 2025-04-07 RX ADMIN — OMEPRAZOLE 20 MG: 20 CAPSULE, DELAYED RELEASE ORAL at 09:15

## 2025-04-07 ASSESSMENT — ACTIVITIES OF DAILY LIVING (ADL)
DEPENDENT_IADLS:: INDEPENDENT
ADLS_ACUITY_SCORE: 42
ADLS_ACUITY_SCORE: 45
ADLS_ACUITY_SCORE: 42
ADLS_ACUITY_SCORE: 42
ADLS_ACUITY_SCORE: 45
ADLS_ACUITY_SCORE: 42
ADLS_ACUITY_SCORE: 45
ADLS_ACUITY_SCORE: 42
ADLS_ACUITY_SCORE: 45

## 2025-04-07 NOTE — CONSULTS
Care Management Initial Consult    General Information  Assessment completed with: Patient, Spouse or significant other,    Type of CM/SW Visit: Initial Assessment    Primary Care Provider verified and updated as needed: Yes   Readmission within the last 30 days: no previous admission in last 30 days      Reason for Consult: discharge planning  Advance Care Planning: Advance Care Planning Reviewed: no concerns identified          Communication Assessment  Patient's communication style: spoken language (English or Bilingual)    Hearing Difficulty or Deaf: no   Wear Glasses or Blind: yes    Cognitive  Cognitive/Neuro/Behavioral: WDL                      Living Environment:   People in home: spouse     Current living Arrangements:        Able to return to prior arrangements: yes       Family/Social Support:  Care provided by: self  Provides care for: no one  Marital Status:   Support system: Wife          Description of Support System: Supportive, Involved    Support Assessment: Adequate family and caregiver support    Current Resources:   Patient receiving home care services: No        Community Resources:    Equipment currently used at home: walker, rolling, cane, straight  Supplies currently used at home:      Employment/Financial:  Employment Status: retired        Financial Concerns: none           Does the patient's insurance plan have a 3 day qualifying hospital stay waiver?  No    Lifestyle & Psychosocial Needs:  Social Drivers of Health     Food Insecurity: Low Risk  (4/3/2025)    Food Insecurity     Within the past 12 months, did you worry that your food would run out before you got money to buy more?: No     Within the past 12 months, did the food you bought just not last and you didn t have money to get more?: No   Depression: Not at risk (4/3/2025)    PHQ-2     PHQ-2 Score: 0   Housing Stability: Low Risk  (4/3/2025)    Housing Stability     Do you have housing? : Yes     Are you worried about  losing your housing?: No   Tobacco Use: Low Risk  (4/3/2025)    Patient History     Smoking Tobacco Use: Never     Smokeless Tobacco Use: Never     Passive Exposure: Past   Financial Resource Strain: Low Risk  (4/3/2025)    Financial Resource Strain     Within the past 12 months, have you or your family members you live with been unable to get utilities (heat, electricity) when it was really needed?: No   Alcohol Use: Not At Risk (10/1/2024)    AUDIT-C     Frequency of Alcohol Consumption: 4 or more times a week     Average Number of Drinks: 1 or 2     Frequency of Binge Drinking: Never   Transportation Needs: Low Risk  (4/3/2025)    Transportation Needs     Within the past 12 months, has lack of transportation kept you from medical appointments, getting your medicines, non-medical meetings or appointments, work, or from getting things that you need?: No   Physical Activity: Sufficiently Active (11/3/2024)    Exercise Vital Sign     Days of Exercise per Week: 3 days     Minutes of Exercise per Session: 60 min   Interpersonal Safety: Low Risk  (4/3/2025)    Interpersonal Safety     Do you feel physically and emotionally safe where you currently live?: Yes     Within the past 12 months, have you been hit, slapped, kicked or otherwise physically hurt by someone?: No     Within the past 12 months, have you been humiliated or emotionally abused in other ways by your partner or ex-partner?: No   Stress: No Stress Concern Present (11/3/2024)    [employer] - Occupational Stress Questionnaire     Feeling of Stress : Not at all   Social Connections: Socially Integrated (11/3/2024)    Social Connection and Isolation Panel [NHANES]     Frequency of Communication with Friends and Family: Never     Frequency of Social Gatherings with Friends and Family: More than three times a week     Attends Mosque Services: More than 4 times per year     Active Member of Clubs or Organizations: Yes     Attends  Club or Organization Meetings: More than 4 times per year     Marital Status:    Health Literacy: Not on file       Functional Status:  Prior to admission patient needed assistance:   Dependent ADLs:: Independent  Dependent IADLs:: Independent  Assesssment of Functional Status: Not at baseline with ADL Functioning, Not at baseline with mobility, Not at  functional baseline, Needs placement in a SNF/TCF for rehabilitation    Mental Health Status:  Mental Health Status: No Current Concerns       Chemical Dependency Status:                Values/Beliefs:  Spiritual, Cultural Beliefs, Cheondoism Practices, Values that affect care: yes  Description of Beliefs that Will Affect Care: Sabianism       Values/Beliefs Comment: Orthodox values    Discussed  Partnership in Safe Discharge Planning  document with patient/family: Yes:     Additional Information:  Met with Brett and his wife Ankita, has 2 level home with bedrooms upstairs. Brett would like homecare  therapies or willing to go to TCU if better option   Educated on TCU options sent referrals to [facility] and the [facility] per Brett request. Wife supportive and available to transport home if discharges home. Lisette has received prior auth and clinicals Faxed     Next Steps:     Accepted at [facility]   [facility]ette Auth received [id number]  7 days 4/8-4/14   PICC line if IV ATB at discharge     Alia Pittman RN

## 2025-04-07 NOTE — PROGRESS NOTES
[facility]    Medicine Progress Note - Hospitalist Service    Date of Admission:  4/3/2025    Assessment & Plan   Brett Hopkins is a 69 year old male admitted on 4/3/2025. He has a past medical history significant for hypertension, hypothyroidism, ascending aortic aneurysm, atrial fibrillation status post Watchman device, myasthenia gravis, GERD, chronic right lower extremity lymphedema who presented to the hospital on 4/2/2025 with right lower extremity swelling, pain and mild shortness of breath. Currently being treated for right lower extremity cellulitis with IV antibiotic. Symptoms slowly improving.  Possible discharge to TCU in 2 to 3 days, pending clearance by infectious disease.    Right lower extremity swelling/pain  Possible right lower extremity cellulitis  DVT ruled out  Right lower extremity arterial ultrasound without significant obstruction or stenosis.  Erythema very slowly improving over the last 4 days.    Plan  Continue current antibiotic.  Appreciate infectious disease recommendations  Continue broad-spectrum antibiotic with vancomycin and Zosyn    Recently diagnosed mild pulmonary embolism  Diagnosed on 4/2/2025  Started on Eliquis 10 mg twice daily.    Plan  Continue Eliquis 10 mg twice daily--> transition to 5 mg twice daily on/9    Hypothyroidism  Patient reported strict adherence to his medication  TSH mildly elevated with high T4 on 4/2.  Per patient, his provider is aware of the results and there has been some adjustment to his levothyroxine a few weeks ago    Plan  Continue home levothyroxine    Hypertension  At home on hydrochlorothiazide 25 mg daily and lisinopril 40 mg daily  Blood pressures currently around 130/70    Plan  Restart home lisinopril  Continue to hold home hydrochlorothiazide    Acute kidney injury (resolved)  Baseline creatinine around 1.1.  Creatinine on 4/3 was 1.4    Plan  No further intervention    GERD    Plan  Continue home  "medications    Urinary retention  Started on Flomax on 4/5.  Patient is able to urinate.    Plan  Continue to monitor    Hypokalemia  Last potassium stable around 3.5.  Patient would like to stop hypokalemia protocol.              Diet: Combination Diet Regular Diet Adult    DVT Prophylaxis: DOAC  Wang Catheter: Not present  Lines: None     Cardiac Monitoring: None  Code Status: Full Code      Clinically Significant Risk Factors                     # Hypertension: Noted on problem list            # Obesity: Estimated body mass index is 35.51 kg/m  as calculated from the following:    Height as of this encounter: 1.803 m (5' 11\").    Weight as of this encounter: 115.5 kg (254 lb 10.1 oz)., PRESENT ON ADMISSION            Social Drivers of Health            Disposition Plan     Medically Ready for Discharge: Anticipated in 2-4 Days             JO ROSARIO MD  Hospitalist Service  [facility]  Securely message with Dispatch (more info)  Text page via [employer] Paging/Directory   ______________________________________________________________________    Interval History   Feeling better.  Right lower extremity pain and swelling improving.  Denies any chest pain, shortness of breath, nausea, vomiting or lightheadedness.      Physical Exam   Vital Signs: Temp: 98.7  F (37.1  C) Temp src: Oral BP: 134/75 Pulse: 68   Resp: 16 SpO2: 94 % O2 Device: None (Room air)    Weight: 254 lbs 10.1 oz    Physical Exam  Constitutional:       General: He is not in acute distress.     Appearance: He is not ill-appearing or toxic-appearing.   Pulmonary:      Effort: Pulmonary effort is normal. No respiratory distress.   Musculoskeletal:      Right lower leg: Edema present.      Comments: Right lower extremity erythema slowly improving.  Slight improvement of right lower extremity edema.   Neurological:      Mental Status: He is alert.   Psychiatric:         Mood and Affect: Mood normal.         Behavior: Behavior " normal.         Thought Content: Thought content normal.          Medical Decision Making       48 MINUTES SPENT BY ME on the date of service doing chart review, history, exam, documentation & further activities per the note.      Data     I have personally reviewed the following data over the past 24 hrs:    N/A  \   N/A   / N/A     N/A N/A N/A /  N/A   3.6 N/A N/A \     Procal: N/A CRP: 145.00 (H) Lactic Acid: N/A         Imaging results reviewed over the past 24 hrs:   No results found for this or any previous visit (from the past 24 hours).

## 2025-04-07 NOTE — PLAN OF CARE
Goal Outcome Evaluation:      Plan of Care Reviewed With: patient, spouse    Overall Patient Progress: improvingOverall Patient Progress: improving    Outcome Evaluation: A&O. Slightly hypertensive. Home BP med resumed by MD. Pain managed with PRN dilaudid. RLE remains estefani with 3+ edema. Redness improving. On IV ABX. Pt eager to work toward discharge. A1-with gait belt for pivot transfers. Trying not to bear weight on RLE. Awaiting therapies and TCU placement.  Pt states he is unable to void urine into any type of urinal/cup. Refuse straight cath. Unable to get UA for those reasons.     Problem: Infection  Goal: Absence of Infection Signs and Symptoms  Outcome: Progressing     Problem: Urinary Retention  Goal: Effective Urinary Elimination  Outcome: Progressing     Problem: Delirium  Goal: Optimal Coping  Outcome: Progressing

## 2025-04-07 NOTE — PLAN OF CARE
Pt is AxOx4. VSS on RA. Reporting some achy pain in right leg, PRN tylenol given. Right leg maintained redness, swelling and warmth. On k protocol. Regular diet. Ax1 with GBW. PIV infusing antibiotics, IV zosyn given.    Problem: Infection  Goal: Absence of Infection Signs and Symptoms  Outcome: Progressing     Problem: Adult Inpatient Plan of Care  Goal: Optimal Comfort and Wellbeing  Outcome: Progressing

## 2025-04-07 NOTE — PROGRESS NOTES
"INFECTIOUS DISEASE FOLLOW UP NOTE  Date: 04/07/2025     ================================================================  SUBJECTIVE  Leg looks better than picture below.  I mentioned sometimes we add steroids to these cases, he had back luck BP wise with steroids in past.     ================================================================  OBJECTIVE  /75 (BP Location: Right arm)   Pulse 68   Temp 98.7  F (37.1  C) (Oral)   Resp 16   Ht 1.803 m (5' 11\")   Wt 115.5 kg (254 lb 10.1 oz)   SpO2 94%   BMI 35.51 kg/m      Physical Exam  General: alert, cooperative, no apparent distress  HEENT: atraumatic, normocephalic, no scleral icterus, moist mucus membranes, no cervical lymphadenopathy  Heart: Normal rate, regular rhythm  Lungs: good inspiratory effort  Abdomen: soft, non-tender, non-distended  Extremities: RLE with bright erythema diffusely, decreased in size compared to prior picture  4/3 RLE      Pertinent labs  CBC RESULTS:   Recent Labs   Lab Test 04/05/25  0600   WBC 11.6*   RBC 4.26*   HGB 13.1*   HCT 38.7*   MCV 91   MCH 30.8   MCHC 33.9   RDW 13.8           Imaging  4/3 CT tib/fib  1.  Subcutaneous soft tissue stranding involving most of the lower leg could represent cellulitis.  2.  No abscess.  3.  No CT evidence of osteomyelitis.     Microbiology data  4/2 blood culture: NGTD        I have personally reviewed the relevant laboratory, imaging, and microbiology data  ================================================================  Assessment  Brett Hopkins is a 69 year old with RLE swelling/redness.  PMH HTN, AAA, a fib s/p watchman, myasthenia gravis, chronic RLE lymphedema, morbid obesity     He presented 4/2 with RLE swelling, redness, and pain.  Started with chills and sweats, then rash developed fairly quickly.  Failed outpatient cefdinir + clindamycin so came to ED.  Has been responding, albeit slowly, to vanc + pip-tazo.     Slow response likely due to lymphedema.  He is " slowly improving and inflammatory markers are trending downwards.        Impression  # RLE cellulitis (non-purulent)  # Hx RLE lymphedema  # morbid obesity  # Sulfa allergy history (childhood reaction)     ================================================================  Plan  - Vanc + pip-tazo  - Given slow response and lymphedema history, may need longer treatment with majority of treatment as IV  - ID will follow      KENZIE Gleason MD  [facility]   Office Telephone [phone].  Fax [phone]  [facility] paging

## 2025-04-07 NOTE — PLAN OF CARE
Pt alert and oriented. No pain medications needed this shift. Regular diet. One assist with belt and walker. Voiding spontaneously. Pt is concerned about his home being ready when he discharges. Care management order placed.      Alexi Chin RN on 4/6/2025 at 9:19 PM

## 2025-04-07 NOTE — PLAN OF CARE
Problem: Infection  Goal: Absence of Infection Signs and Symptoms  Outcome: Progressing     Problem: Pain Acute  Goal: Optimal Pain Control and Function  Outcome: Unable to Meet  Intervention: Develop Pain Management Plan  Recent Flowsheet Documentation  Taken 4/7/2025 0924 by Corrine Nagy, RN  Pain Management Interventions:   medication (see MAR)   distraction   premedicated for activity   pillow support provided   quiet environment facilitated   repositioned   relaxation techniques promoted   rest  Intervention: Prevent or Manage Pain  Recent Flowsheet Documentation  Taken 4/7/2025 0930 by Corrine Nagy, RN  Sensory Stimulation Regulation:   care clustered   quiet environment promoted  Sleep/Rest Enhancement:   relaxation techniques promoted   therapeutic touch utilized  Bowel Elimination Promotion:   adequate fluid intake promoted   ambulation promoted  Medication Review/Management:   medications reviewed   high-risk medications identified   Goal Outcome Evaluation:  Pt has been ambulating with assist of one with walker and gait belt.     Pt redness and swelling has improved, marked redness.     Pt pain controlled by po pain medication, rest, elevation. Pain 2/10 at rest and 8/10 with activity. Unable to assess pain at peak effectiveness related to transfer to another floor, RN updated about last pain medication and reassessment.

## 2025-04-07 NOTE — PLAN OF CARE
Goal Outcome Evaluation:      Plan of Care Reviewed With: patient, spouse    Overall Patient Progress: improvingOverall Patient Progress: improving    Outcome Evaluation: PLan to DC TCU vs Home with Homecare

## 2025-04-08 ENCOUNTER — TELEPHONE (OUTPATIENT)
Dept: FAMILY MEDICINE | Facility: CLINIC | Age: 70
End: 2025-04-08
Payer: COMMERCIAL

## 2025-04-08 ENCOUNTER — PATIENT OUTREACH (OUTPATIENT)
Dept: CARE COORDINATION | Facility: CLINIC | Age: 70
End: 2025-04-08
Payer: COMMERCIAL

## 2025-04-08 ENCOUNTER — APPOINTMENT (OUTPATIENT)
Dept: OCCUPATIONAL THERAPY | Facility: CLINIC | Age: 70
DRG: 602 | End: 2025-04-08
Payer: COMMERCIAL

## 2025-04-08 ENCOUNTER — APPOINTMENT (OUTPATIENT)
Dept: PHYSICAL THERAPY | Facility: CLINIC | Age: 70
DRG: 602 | End: 2025-04-08
Payer: COMMERCIAL

## 2025-04-08 VITALS
SYSTOLIC BLOOD PRESSURE: 131 MMHG | RESPIRATION RATE: 16 BRPM | HEIGHT: 71 IN | WEIGHT: 275 LBS | OXYGEN SATURATION: 95 % | DIASTOLIC BLOOD PRESSURE: 82 MMHG | HEART RATE: 70 BPM | BODY MASS INDEX: 38.5 KG/M2 | TEMPERATURE: 98.1 F

## 2025-04-08 LAB
BASOPHILS # BLD AUTO: 0.1 10E3/UL (ref 0–0.2)
BASOPHILS NFR BLD AUTO: 1 %
CREAT SERPL-MCNC: 1.35 MG/DL (ref 0.67–1.17)
EGFRCR SERPLBLD CKD-EPI 2021: 58 ML/MIN/1.73M2
EOSINOPHIL # BLD AUTO: 0.2 10E3/UL (ref 0–0.7)
EOSINOPHIL NFR BLD AUTO: 2 %
ERYTHROCYTE [DISTWIDTH] IN BLOOD BY AUTOMATED COUNT: 13.9 % (ref 10–15)
HCT VFR BLD AUTO: 37.3 % (ref 40–53)
HGB BLD-MCNC: 12.6 G/DL (ref 13.3–17.7)
IMM GRANULOCYTES # BLD: 0.3 10E3/UL
IMM GRANULOCYTES NFR BLD: 3 %
LYMPHOCYTES # BLD AUTO: 1.6 10E3/UL (ref 0.8–5.3)
LYMPHOCYTES NFR BLD AUTO: 15 %
MCH RBC QN AUTO: 30.7 PG (ref 26.5–33)
MCHC RBC AUTO-ENTMCNC: 33.8 G/DL (ref 31.5–36.5)
MCV RBC AUTO: 91 FL (ref 78–100)
MONOCYTES # BLD AUTO: 0.8 10E3/UL (ref 0–1.3)
MONOCYTES NFR BLD AUTO: 7 %
NEUTROPHILS # BLD AUTO: 7.5 10E3/UL (ref 1.6–8.3)
NEUTROPHILS NFR BLD AUTO: 72 %
NRBC # BLD AUTO: 0 10E3/UL
NRBC BLD AUTO-RTO: 0 /100
PLATELET # BLD AUTO: 255 10E3/UL (ref 150–450)
POTASSIUM SERPL-SCNC: 3.7 MMOL/L (ref 3.4–5.3)
RBC # BLD AUTO: 4.11 10E6/UL (ref 4.4–5.9)
VANCOMYCIN SERPL-MCNC: 12.8 UG/ML
WBC # BLD AUTO: 10.4 10E3/UL (ref 4–11)

## 2025-04-08 PROCEDURE — 97116 GAIT TRAINING THERAPY: CPT | Mod: GP

## 2025-04-08 PROCEDURE — 36415 COLL VENOUS BLD VENIPUNCTURE: CPT | Performed by: STUDENT IN AN ORGANIZED HEALTH CARE EDUCATION/TRAINING PROGRAM

## 2025-04-08 PROCEDURE — 85004 AUTOMATED DIFF WBC COUNT: CPT | Performed by: STUDENT IN AN ORGANIZED HEALTH CARE EDUCATION/TRAINING PROGRAM

## 2025-04-08 PROCEDURE — 80202 ASSAY OF VANCOMYCIN: CPT | Performed by: STUDENT IN AN ORGANIZED HEALTH CARE EDUCATION/TRAINING PROGRAM

## 2025-04-08 PROCEDURE — 85041 AUTOMATED RBC COUNT: CPT | Performed by: STUDENT IN AN ORGANIZED HEALTH CARE EDUCATION/TRAINING PROGRAM

## 2025-04-08 PROCEDURE — 97535 SELF CARE MNGMENT TRAINING: CPT | Mod: GO

## 2025-04-08 PROCEDURE — 82565 ASSAY OF CREATININE: CPT | Performed by: STUDENT IN AN ORGANIZED HEALTH CARE EDUCATION/TRAINING PROGRAM

## 2025-04-08 PROCEDURE — 99239 HOSP IP/OBS DSCHRG MGMT >30: CPT | Performed by: STUDENT IN AN ORGANIZED HEALTH CARE EDUCATION/TRAINING PROGRAM

## 2025-04-08 PROCEDURE — 250N000013 HC RX MED GY IP 250 OP 250 PS 637: Performed by: STUDENT IN AN ORGANIZED HEALTH CARE EDUCATION/TRAINING PROGRAM

## 2025-04-08 PROCEDURE — 84132 ASSAY OF SERUM POTASSIUM: CPT | Performed by: STUDENT IN AN ORGANIZED HEALTH CARE EDUCATION/TRAINING PROGRAM

## 2025-04-08 PROCEDURE — 250N000011 HC RX IP 250 OP 636: Performed by: STUDENT IN AN ORGANIZED HEALTH CARE EDUCATION/TRAINING PROGRAM

## 2025-04-08 PROCEDURE — 99232 SBSQ HOSP IP/OBS MODERATE 35: CPT | Performed by: INTERNAL MEDICINE

## 2025-04-08 RX ORDER — TAMSULOSIN HYDROCHLORIDE 0.4 MG/1
0.4 CAPSULE ORAL DAILY
Qty: 30 CAPSULE | Refills: 0 | Status: SHIPPED | OUTPATIENT
Start: 2025-04-09 | End: 2025-04-10

## 2025-04-08 RX ORDER — LISINOPRIL 40 MG/1
40 TABLET ORAL DAILY
Status: SHIPPED
Start: 2025-04-10

## 2025-04-08 RX ORDER — ACETAMINOPHEN 325 MG/1
650 TABLET ORAL EVERY 4 HOURS PRN
Qty: 30 TABLET | Refills: 0 | Status: SHIPPED | OUTPATIENT
Start: 2025-04-08

## 2025-04-08 RX ORDER — OMEPRAZOLE 20 MG/1
20 CAPSULE, DELAYED RELEASE ORAL
Status: DISCONTINUED | OUTPATIENT
Start: 2025-04-08 | End: 2025-04-08 | Stop reason: HOSPADM

## 2025-04-08 RX ORDER — HYDROCHLOROTHIAZIDE 25 MG/1
TABLET ORAL
Qty: 90 TABLET | Refills: 2 | Status: SHIPPED | OUTPATIENT
Start: 2025-04-16

## 2025-04-08 RX ADMIN — LISINOPRIL 40 MG: 40 TABLET ORAL at 08:57

## 2025-04-08 RX ADMIN — OMEPRAZOLE 20 MG: 20 CAPSULE, DELAYED RELEASE ORAL at 09:15

## 2025-04-08 RX ADMIN — APIXABAN 10 MG: 5 TABLET, FILM COATED ORAL at 08:57

## 2025-04-08 RX ADMIN — Medication 1750 MG: at 11:40

## 2025-04-08 RX ADMIN — PIPERACILLIN AND TAZOBACTAM 3.38 G: 3; .375 INJECTION, POWDER, FOR SOLUTION INTRAVENOUS at 06:21

## 2025-04-08 RX ADMIN — ROSUVASTATIN CALCIUM 10 MG: 10 TABLET, FILM COATED ORAL at 08:56

## 2025-04-08 RX ADMIN — ACETAMINOPHEN 650 MG: 325 TABLET, FILM COATED ORAL at 02:25

## 2025-04-08 RX ADMIN — LEVOTHYROXINE SODIUM 125 MCG: 0.12 TABLET ORAL at 06:45

## 2025-04-08 RX ADMIN — TAMSULOSIN HYDROCHLORIDE 0.4 MG: 0.4 CAPSULE ORAL at 08:58

## 2025-04-08 ASSESSMENT — ACTIVITIES OF DAILY LIVING (ADL)
ADLS_ACUITY_SCORE: 45
ADLS_ACUITY_SCORE: 42
ADLS_ACUITY_SCORE: 45
ADLS_ACUITY_SCORE: 45
ADLS_ACUITY_SCORE: 42
ADLS_ACUITY_SCORE: 45

## 2025-04-08 NOTE — PROGRESS NOTES
"INFECTIOUS DISEASE FOLLOW UP NOTE  Date: 04/08/2025     ================================================================  SUBJECTIVE  Leg is about as good as I expect we can get it while hospitalized.   ================================================================  OBJECTIVE  /82 (BP Location: Right arm)   Pulse 70   Temp 98.1  F (36.7  C) (Oral)   Resp 16   Ht 1.803 m (5' 11\")   Wt 124.7 kg (275 lb)   SpO2 95%   BMI 38.35 kg/m      Physical Exam  General: alert, cooperative, no apparent distress  HEENT: atraumatic, normocephalic, no scleral icterus, moist mucus membranes, no cervical lymphadenopathy  Heart: Normal rate, regular rhythm  Lungs: good inspiratory effort  Abdomen: soft, non-tender, non-distended  Extremities: RLE with bright erythema diffusely, decreased in size compared to prior picture  4/3 RLE      Pertinent labs  CBC RESULTS:   Recent Labs   Lab Test 04/05/25  0600   WBC 11.6*   RBC 4.26*   HGB 13.1*   HCT 38.7*   MCV 91   MCH 30.8   MCHC 33.9   RDW 13.8           Imaging  4/3 CT tib/fib  1.  Subcutaneous soft tissue stranding involving most of the lower leg could represent cellulitis.  2.  No abscess.  3.  No CT evidence of osteomyelitis.     Microbiology data  4/2 blood culture: NGTD        I have personally reviewed the relevant laboratory, imaging, and microbiology data  ================================================================  Assessment  Brett Hopkins is a 69 year old with RLE swelling/redness.  PMH HTN, AAA, a fib s/p watchman, myasthenia gravis, chronic RLE lymphedema, morbid obesity     He presented 4/2 with RLE swelling, redness, and pain.  Started with chills and sweats, then rash developed fairly quickly.  Failed outpatient cefdinir + clindamycin so came to ED.  Has been responding, albeit slowly, to vanc + pip-tazo.     Slow response likely due to lymphedema.  He is slowly improving and inflammatory markers are trending downwards.        Impression  # " RLE cellulitis (non-purulent)  # Hx RLE lymphedema  # morbid obesity  # Sulfa allergy history (childhood reaction)     ================================================================  Plan  - do the orals he has at home, complete the scripts (cefdinir, clinda)  --get first available appt with Floresita Solorio RN at the [facility] vascular clinic for lymphedema treatment    Ok to swim  Ok to go      KENZIE Gleason MD  [facility]   Office Telephone [phone].  Fax [phone]  [facility] paging

## 2025-04-08 NOTE — PLAN OF CARE
Problem: Infection  Goal: Absence of Infection Signs and Symptoms  Outcome: Progressing    Problem: Urinary Retention  Goal: Effective Urinary Elimination  Outcome: Progressing     Problem: Pain Acute  Goal: Optimal Pain Control and Function  Outcome: Progressing    Patient is alert and orientated times four. Pain managed with prn acetaminophen. RLE continues to be red, swollen, and, warm to touch. Is elevated on pillows. Continues on IV antibiotics with PIV saline locked between administrations. Bed alarm in place for patient's safety.

## 2025-04-08 NOTE — PHARMACY-VANCOMYCIN DOSING SERVICE
"Pharmacy Vancomycin Note  Date of Service 2025  Patient's  1955   69 year old, male    Indication: Skin and Soft Tissue Infection  Day of Therapy: 6  Current vancomycin regimen:  1750 mg IV q18h  Current vancomycin monitoring method: AUC  Current vancomycin therapeutic monitoring goal: 400-600 mg*h/L    [employer] Prediction of Current Vancomycin Regimen  Loading dose: N/A  Regimen: 1750 mg IV every 18 hours.  Start time: 11:14 on 2025  Exposure target: AUC24 (range)400-600 mg/L.hr   AUC24,ss: 546 mg/L.hr  Probability of AUC24 > 400: 100 %  Ctrough,ss: 15.7 mg/L  Probability of Ctrough,ss > 20: 4 %  Probability of nephrotoxicity (Lodise BENNY ): 11 %      Current estimated CrCl = Estimated Creatinine Clearance: 71.4 mL/min (A) (based on SCr of 1.35 mg/dL (H)).    Creatinine for last 3 days  2025:  7:22 AM Creatinine 1.35 mg/dL    Recent Vancomycin Levels (past 3 days)  2025:  5:29 AM Vancomycin 25.2 ug/mL  2025: 10:10 AM Vancomycin 12.8 ug/mL    Vancomycin IV Administrations (past 72 hours)                     vancomycin (VANCOCIN) 1,750 mg in 0.9% NaCl 517.5 mL intermittent infusion (mg) 1,750 mg New Bag 25 1247     1,750 mg New Bag 25 1931     1,750 mg New Bag  0124                    Nephrotoxins and other renal medications (From now, onward)      Start     Dose/Rate Route Frequency Ordered Stop    25 1030  lisinopril (ZESTRIL) tablet 40 mg        Note to Pharmacy: PTA Sig:Take 1 tablet (40 mg) by mouth daily. for high blood pressure      40 mg Oral DAILY 25 1022      25 2330  vancomycin (VANCOCIN) 1,750 mg in 0.9% NaCl 517.5 mL intermittent infusion         1,750 mg  over 2 Hours Intravenous EVERY 18 HOURS 25 0837      25 1900  piperacillin-tazobactam (ZOSYN) 3.375 g vial to attach to  mL bag        Note to Pharmacy: For [facility], [facility] and [facility]: For Zosyn-naive patients, use the \"Zosyn initial dose + extended infusion\" order panel.    " 3.375 g  over 240 Minutes Intravenous EVERY 8 HOURS 04/03/25 1541                 Contrast Orders - past 72 hours (72h ago, onward)      None            Interpretation of levels and current regimen:  Vancomycin level is reflective of -600    Has serum creatinine changed greater than 50% in last 72 hours: Yes    Urine output:  unable to determine      Plan:  Continue Current Dose - had increase in creatinine since previous level, however vancomycin level WNL.   Alternative regimen from [employer] is 1,000mg Q12H but lower [id number] and only slightly lower probability of toxicity (10% vs 11% risk)    Vancomycin monitoring method: AUC  Vancomycin therapeutic monitoring goal: 400-600 mg*h/L  Pharmacy will check vancomycin levels as appropriate in 1-3 Days.  Serum creatinine levels will be ordered daily for the first week of therapy and at least twice weekly for subsequent weeks.    Janette Ibarra, PharmD

## 2025-04-08 NOTE — DISCHARGE SUMMARY
"[facility]  Hospitalist Discharge Summary      Date of Admission:  4/3/2025  Date of Discharge:  4/8/2025  Discharging Provider: JO ROSARIO MD  Discharge Service: Hospitalist Service    Discharge Diagnoses   Right lower extremity cellulitis  Right lower extremity lymphedema  Recently diagnosed pulmonary embolism  Hypertension  Acute kidney injury      Clinically Significant Risk Factors     # Obesity: Estimated body mass index is 38.35 kg/m  as calculated from the following:    Height as of this encounter: 1.803 m (5' 11\").    Weight as of this encounter: 124.7 kg (275 lb).       Follow-ups Needed After Discharge   Follow-up Appointments       Hospital Follow-up with Existing Primary Care Provider (PCP)          Schedule Primary Care visit within: 7 Days   Recommended labs and Imaging (to be ordered by Primary Care Provider): CBC, BMP               Unresulted Labs Ordered in the Past 30 Days of this Admission       No orders found from 3/4/2025 to 4/4/2025.            Discharge Disposition   Discharged to home  Condition at discharge: Stable    Hospital Course   Brett Hopkins is a 69 year old male admitted on 4/3/2025. He has a past medical history significant for hypertension, hypothyroidism, ascending aortic aneurysm, atrial fibrillation status post Watchman device, myasthenia gravis, GERD, chronic right lower extremity lymphedema who presented to the hospital on 4/2/2025 with right lower extremity swelling, pain and mild shortness of breath.  He was diagnosed with right lower extremity cellulitis, treated with IV vancomycin and Zosyn.  Symptoms improved over the last 3 days.  Cleared for discharge home on oral antibiotic from infectious disease standpoint.    Right lower extremity swelling/pain  Possible right lower extremity cellulitis  DVT ruled out  Right lower extremity arterial ultrasound without significant obstruction or stenosis    Plan  Continue home cefdinir clindamycin " (has a course of 9 days left at home)  Follow-up with the vascular clinic for lymphedema treatment  Follow-up with primary care physician in 1 week repeat CBC and BMP    Recently diagnosed mild pulmonary embolism  Unclear if its provoked or unprovoked.  Diagnosed on 4/2/2025.  Started on Eliquis 10 mg twice daily.  Continue Eliquis 10 mg twice daily during this hospitalization.    Plan  Transition to Eliquis 5 mg twice daily on 4/8--> educated the patient about the importance of continuing to take his Eliquis until further evaluation by his primary care physician or hematologist.  A new prescription was sent to his pharmacy.  Follow-up with primary care physician for further evaluation and management including hypercoagulable workup    Hypothyroidism  Patient reported strict adherence to his medication  TSH mildly elevated with high T4 on 4/2.  Per patient, his provider is aware of the results and there has been some adjustment to his levothyroxine a few weeks ago    Plan  Continue home levothyroxine  Follow-up with primary care physician    Hypertension  At home on hydrochlorothiazide 25 mg daily and lisinopril 40 mg daily  Blood pressures currently around 130/70  Was resumed on lisinopril on 4/7.    Plan  Hold lisinopril for 2 days.  Hold hydrochlorothiazide until following up with primary care physician in 1 week to ensure that kidney function is stable.    Acute kidney injury   Baseline creatinine around 1.1.  Creatinine presentation 1.2, worsened to 1.4 and then improved to around 1, however, worsened again to 1.3.  Could related to restarting home lisinopril.    Plan  Follow-up with primary care physician in 1 week and repeat BMP    GERD    Plan  Continue home medications    Urinary retention  Started on Flomax on 4/5.  Urinary retention resolved.  4/8, denies any difficulty with urination.    Plan  Continue Flomax on discharge    Hypokalemia  Potassium stable around 3.7.        Consultations [facility]  Stay   PHARMACY TO DOSE VANCO  PHARMACY TO DOSE VANCO  PHYSICAL THERAPY ADULT IP CONSULT  OCCUPATIONAL THERAPY ADULT IP CONSULT  INFECTIOUS DISEASES IP CONSULT  CARE MANAGEMENT / SOCIAL WORK IP CONSULT    Code Status   Full Code    Time Spent on this Encounter   I, JO ROSARIO MD, personally saw the patient today and spent greater than 30 minutes discharging this patient.       JO ROSARIO MD  [address] [address]  Phone: [phone]  Fax: [phone]  ______________________________________________________________________    Physical Exam   Vital Signs: Temp: 98.1  F (36.7  C) Temp src: Oral BP: 131/82 Pulse: 70   Resp: 16 SpO2: 95 % O2 Device: None (Room air)    Weight: 275 lbs 0 oz  Physical Exam  Constitutional:       General: He is not in acute distress.     Appearance: He is not toxic-appearing.   Pulmonary:      Effort: Pulmonary effort is normal.   Musculoskeletal:      Right lower leg: Edema present.      Left lower leg: Edema present.      Comments: Bilateral lower extremity edema worse on the right side.  Right lower extremity erythema, warmth and mild tenderness to palpation below knee level improving over the last 3 days.   Skin:     General: Skin is warm and dry.   Neurological:      Mental Status: He is alert.   Psychiatric:         Mood and Affect: Mood normal.         Thought Content: Thought content normal.             Primary Care Physician   Magdaleno Bennett    Discharge Orders      Primary Care - Care Coordination Referral      Home Care Referral      Reason for your hospital stay    Right leg swelling, pain due to cellulitis.     Activity    Your activity upon discharge: activity as tolerated     Diet    Follow this diet upon discharge: Current Diet:Orders Placed This Encounter      Combination Diet Regular Diet Adult     Hospital Follow-up with Existing Primary Care Provider (PCP)            Significant Results  and Procedures   Most Recent 3 CBC's:  Recent Labs   Lab Test 04/08/25  0722 04/05/25  0600 04/04/25  0559   WBC 10.4 11.6* 12.9*   HGB 12.6* 13.1* 13.1*   MCV 91 91 90    165 159     Most Recent 3 BMP's:  Recent Labs   Lab Test 04/08/25  0722 04/07/25  0608 04/06/25  1339 04/06/25  0529 04/05/25  0600 04/04/25  1305 04/04/25  0559 04/03/25  1649 04/03/25  1059   NA  --   --   --   --  139  --  135  --  134*   POTASSIUM 3.7 3.6 3.8   < > 3.4   < > 3.3*   < > 3.3*   CHLORIDE  --   --   --   --  105  --  101  --  95*   CO2  --   --   --   --  24  --  23  --  23   BUN  --   --   --   --  16.8  --  21.3  --  31.2*   CR 1.35*  --   --   --  1.07  --  1.22*  --  1.45*   ANIONGAP  --   --   --   --  10  --  11  --  16*   YFN  --   --   --   --  8.4*  --  8.1*  --  8.9   GLC  --   --   --   --  110*  --  125*  --  142*    < > = values in this interval not displayed.   ,   Results for orders placed or performed during the hospital encounter of 04/03/25   CT Femur Thigh Right w Contrast    Narrative    EXAM: CT FEMUR THIGH RIGHT W CONTRAST, CT TIBIA FIBULA LOWER LEG RIGHT W CONTRAST  LOCATION: [facility]  DATE: 4/3/2025    INDICATION: Leg pain, redness, swelling.  COMPARISON: Ultrasound 4/2/2025  TECHNIQUE: IV contrast. Axial, sagittal and coronal thin-section reconstruction. Dose reduction techniques were used.   CONTRAST: Isovue 370 75mL    FINDINGS:     THIGH-FEMUR:  -Hip and knee arthroplasties with associated metallic artifact. No fracture. No signs of loosening. No focal bony abnormality to suggest osteomyelitis.    No significant soft tissue abnormality. No fluid collection. Prominent lymph nodes proximal right thigh and right inguinal region, most with fatty yossi, or probably reactive or incidental.    Colonic diverticula. Contrast in the urinary bladder.    LOWER LEG-TIBIA-FIBULA:  -Poorly defined subcutaneous soft tissue stranding involving most of the lower leg, most marked distally  and anteromedially, could represent cellulitis. No fluid collection.    No focal bony abnormality to suggest osteomyelitis. No fracture.    Venous varicosities.      Impression    IMPRESSION:  1.  Subcutaneous soft tissue stranding involving most of the lower leg could represent cellulitis.  2.  No abscess.  3.  No CT evidence of osteomyelitis.     CT Tibia Fibula Lower Leg Right w Contrast    Narrative    EXAM: CT FEMUR THIGH RIGHT W CONTRAST, CT TIBIA FIBULA LOWER LEG RIGHT W CONTRAST  LOCATION: [facility]  DATE: 4/3/2025    INDICATION: Leg pain, redness, swelling.  COMPARISON: Ultrasound 4/2/2025  TECHNIQUE: IV contrast. Axial, sagittal and coronal thin-section reconstruction. Dose reduction techniques were used.   CONTRAST: Isovue 370 75mL    FINDINGS:     THIGH-FEMUR:  -Hip and knee arthroplasties with associated metallic artifact. No fracture. No signs of loosening. No focal bony abnormality to suggest osteomyelitis.    No significant soft tissue abnormality. No fluid collection. Prominent lymph nodes proximal right thigh and right inguinal region, most with fatty yossi, or probably reactive or incidental.    Colonic diverticula. Contrast in the urinary bladder.    LOWER LEG-TIBIA-FIBULA:  -Poorly defined subcutaneous soft tissue stranding involving most of the lower leg, most marked distally and anteromedially, could represent cellulitis. No fluid collection.    No focal bony abnormality to suggest osteomyelitis. No fracture.    Venous varicosities.      Impression    IMPRESSION:  1.  Subcutaneous soft tissue stranding involving most of the lower leg could represent cellulitis.  2.  No abscess.  3.  No CT evidence of osteomyelitis.     US Lower Extremity Arterial Duplex Right    Narrative    EXAM: US LOWER EXTREMITY ARTERIAL DUPLEX RIGHT  LOCATION: [facility]  DATE: 4/3/2025    INDICATION: Reported history of peripheral vascular disease, right lower extremity  pain, erythema and swelling.  COMPARISON: None.  TECHNIQUE: Duplex utilizing 2D gray-scale imaging, Doppler interrogation with color-flow and spectral waveform analysis.    FINDINGS:     RIGHT LOWER EXTREMITY ARTERIAL ASSESSMENT:  External iliac artery: 149 cm/s, triphasic  Common femoral artery: 145 cm/s, triphasic  Profunda femoris artery: 89 cm/s, triphasic  SFA (proximal): 167 cm/s, monophasic  SFA (mid): 119 cm/s, triphasic  SFA (distal): 130 cm/s, monophasic  Popliteal artery: 157 cm/s, monophasic  Anterior tibial artery: 96 cm/s, biphasic  Posterior tibial artery: 77 cm/s, biphasic  Dorsalis pedis artery: 85 cm/s, biphasic      Impression    IMPRESSION:  Patent right lower extremity arteries without high-grade stenosis. Monophasic flow noted within the proximal and distal SFA as well as the popliteal artery.       Discharge Medications   Current Discharge Medication List        START taking these medications    Details   acetaminophen (TYLENOL) 325 MG tablet Take 2 tablets (650 mg) by mouth every 4 hours as needed for mild pain or other (and adjunct with moderate or severe pain or per patient request).  Qty: 30 tablet, Refills: 0    Associated Diagnoses: Cellulitis, unspecified cellulitis site      apixaban ANTICOAGULANT (ELIQUIS) 5 MG tablet Take 1 tablet (5 mg) by mouth 2 times daily.  Qty: 60 tablet, Refills: 1    Associated Diagnoses: Pulmonary embolism, other, unspecified chronicity, unspecified whether acute cor pulmonale present (H)      tamsulosin (FLOMAX) 0.4 MG capsule Take 1 capsule (0.4 mg) by mouth daily.  Qty: 30 capsule, Refills: 0    Associated Diagnoses: Urinary retention           CONTINUE these medications which have CHANGED    Details   hydrochlorothiazide (HYDRODIURIL) 25 MG tablet TAKE 1 TABLET BY MOUTH EVERY DAY IN THE MORNING FOR HIGH BLOOD PRESSURE  Qty: 90 tablet, Refills: 2    Comments: Hold until you follow-up with your primary care physician to check your kidney  function.  Associated Diagnoses: Essential hypertension      lisinopril (ZESTRIL) 40 MG tablet Take 1 tablet (40 mg) by mouth daily. for high blood pressure    Associated Diagnoses: Essential hypertension           CONTINUE these medications which have NOT CHANGED    Details   aspirin 81 MG EC tablet Take 1 tablet (81 mg) by mouth daily    Associated Diagnoses: Persistent atrial fibrillation (H)      cefdinir (OMNICEF) 300 MG capsule Take 1 capsule (300 mg) by mouth 2 times daily for 10 days.  Qty: 20 capsule, Refills: 0      clindamycin (CLEOCIN) 150 MG capsule Take 3 capsules (450 mg) by mouth 3 times daily for 10 days.  Qty: 90 capsule, Refills: 0      levothyroxine (SYNTHROID/LEVOTHROID) 125 MCG tablet Take 1 tablet (125 mcg) by mouth every morning (before breakfast).  Qty: 90 tablet, Refills: 1    Associated Diagnoses: Hypothyroidism due to Hashimoto thyroiditis      omeprazole (PRILOSEC) 20 MG DR capsule TAKE 1 CAPSULE BY MOUTH DAILY BEFORE BREAKFAST  Qty: 90 capsule, Refills: 1    Associated Diagnoses: Esophagitis      rosuvastatin (CRESTOR) 10 MG tablet Take 1 tablet (10 mg) by mouth daily  Qty: 90 tablet, Refills: 3      amoxicillin (AMOXIL) 500 MG tablet TAKE FOUR TS PO 1 HOUR B DAPP           STOP taking these medications       Apixaban Starter Pack (ELIQUIS DVT/PE STARTER PACK) 5 MG TBPK Comments:   Reason for Stopping:             Allergies   Allergies   Allergen Reactions    Sulfa (Sulfonamide Antibiotics) [Sulfa Antibiotics] Rash     Rash - turned purple  , Around age 32

## 2025-04-08 NOTE — PROGRESS NOTES
Care Management Discharge Note    Discharge Date: 04/08/2025       Discharge Disposition: Home, Home Care    Discharge Services:      Discharge DME:      Discharge Transportation: family or friend will provide    Private pay costs discussed: Not applicable    Does the patient's insurance plan have a 3 day qualifying hospital stay waiver?  Yes     Which insurance plan 3 day waiver is available? Alternative insurance waiver    Will the waiver be used for post-acute placement? No    PAS Confirmation Code: [id number]  Patient/family educated on Medicare website which has current facility and service quality ratings: yes    Education Provided on the Discharge Plan: Yes  Persons Notified of Discharge Plans: Pt and [facility]  Patient/Family in Agreement with the Plan: yes    Handoff Referral Completed: No, handoff not indicated or clinically appropriate    Additional Information:  Pt will not need IV abx.  Plan is for pt to discharge home.  Discussed recommendation for home care and pt in agreement.  Referral made and accepted by [facility].    Family will transport home.    OSCAR Hanna

## 2025-04-08 NOTE — DISCHARGE INSTRUCTIONS
Get first available appt with Floresita Solorio RN at the [facility] vascular clinic for lymphedema treatment.([phone])

## 2025-04-08 NOTE — PROGRESS NOTES
Patient discharged home via ride from family. Reviewed AVS with patient and family bedside. Clarified with ID that patient will resume home supply of abx so no fill Is needed, patient understanding of the plan. PIV removed. Left with all of patient belongings.

## 2025-04-08 NOTE — PROGRESS NOTES
SPIRITUAL HEALTH SERVICES Progress Note                Writer visited with pt Brett Hopkins due to LOS, visit to introduce SHS, scope of practice and assess Champ's emotional spiritual wellbeing.    Patient/Family Understanding of Illness and Goals of Care - Champ admitted for pain associated with cellulitis.  He shared he anticipates discharge later this afternoon.     Distress and Loss - Non expressed     Strengths, Coping, and Resources - Champ has a robust support system, between, family, friends, his men's group and [employer] community.    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  He is a member of [facility] and has been visited by his      Plan of Care - No plans to follow but SHS available per request     Javi Antonio M.Div., [facility]  Staff    [phone]   Spiritual Health Services is available 24/7 for emergent requests and consults, either by paging the on-call  or by entering an ASAP/STAT consult in [employer], which will also page the on-call .

## 2025-04-08 NOTE — PROGRESS NOTES
[facility]  Hospital Monitoring    Clinical Data: Patient was identified from [facility] payor report and is currently admitted to the inpatient facility below:    Facility Name: [facility]     Assessment/Plan: Patient will be monitored by Ambulatory Care Management to identify patient's discharge plans/need. Care  will review chart every 2-3 days to monitor for discharge date and disposition.    Misa Morgan

## 2025-04-08 NOTE — PLAN OF CARE
Physical Therapy Discharge Summary    Reason for therapy discharge:    All goals and outcomes met, no further needs identified.    Progress towards therapy goal(s). See goals on Care Plan in [facility] electronic health record for goal details.  Goals met    Therapy recommendation(s):    Continued therapy is recommended.  Rationale/Recommendations:  continued PT with home PT to improve functional mobility.

## 2025-04-09 ENCOUNTER — PATIENT OUTREACH (OUTPATIENT)
Dept: CARE COORDINATION | Facility: CLINIC | Age: 70
End: 2025-04-09
Payer: COMMERCIAL

## 2025-04-09 ENCOUNTER — TELEPHONE (OUTPATIENT)
Dept: VASCULAR SURGERY | Facility: CLINIC | Age: 70
End: 2025-04-09
Payer: COMMERCIAL

## 2025-04-09 NOTE — TELEPHONE ENCOUNTER
Caller: Champ    Provider: none, pt instructed to schedule with Floresita Solorio per hospital discharge.     Detailed reason for call: Patient states he was told to schedule with Floresita WATERS upon discharge of the hospital for cellulitis and lymphedema.  No referral was placed that I can see.  Please review and advise for scheduling.  Patient was insistent that he was told to get in this week.      Best phone number to contact: [phone]    Best time to contact: any    Ok to leave a detailed message: No    Ok to speak to authorized person if needed: No      (Noted to patient if reason is related to wound or incision, to please send a photo via email or [employer].)

## 2025-04-09 NOTE — PROGRESS NOTES
"Clinic Care Coordination Contact  Transitions of Care Outreach  Chief Complaint   Patient presents with    Clinic Care Coordination - Post Hospital       Most Recent Admission Date: 4/3/2025   Most Recent Admission Diagnosis: Cellulitis of right lower leg - L03.115  Anticoagulated by anticoagulation treatment - Z79.01  Sepsis without acute organ dysfunction, due to unspecified organism (H) - A41.9     Most Recent Discharge Date: 4/8/2025   Most Recent Discharge Diagnosis: Cellulitis of right lower leg - L03.115  Anticoagulated by anticoagulation treatment - Z79.01  Sepsis without acute organ dysfunction, due to unspecified organism (H) - A41.9  Essential hypertension - I10  Pulmonary embolism, other, unspecified chronicity, unspecified whether acute cor pulmonale present (H) - I26.99  Cellulitis, unspecified cellulitis site - L03.90  Urinary retention - R33.9     Transitions of Care Assessment    Discharge Assessment  How are you doing now that you are home?: spoke with Champ.  He stated he is doing okay.  Currently sitting and resting in the recliner with his leg elevated.  However he stated when he walks it \"hurts like hell\".  He has all of his follow up appointments scheduled.  Denies any questions related to medication managment.  Denies any CCC needs.  Gave information for 24/7 nurse triage  How are your symptoms? (Red Flag symptoms escalate to triage hotline per guidelines): Improved  Do you know how to contact your clinic care team if you have future questions or changes to your health status? : Yes  Does the patient have their discharge instructions? : Yes  Does the patient have questions regarding their discharge instructions? : No  Were you started on any new medications or were there changes to any of your previous medications? : Yes  Does the patient have all of their medications?: Yes  Do you have questions regarding any of your medications? : No  Do you have all of your needed medical supplies or " equipment (DME)?  (i.e. oxygen tank, CPAP, cane, etc.): Yes    Post-op (CHW CTA Only)  If the patient had a surgery or procedure, do they have any questions for a nurse?: No    Post-op (Clinicians Only)  Did the patient have surgery or a procedure: No        Follow up Plan     Discharge Follow-Up  Discharge follow up appointment scheduled in alignment with recommended follow up timeframe or Transitions of Risk Category? (Low = within 30 days; Moderate= within 14 days; High= within 7 days): Yes  Discharge Follow Up Appointment Date: 04/10/25  Discharge Follow Up Appointment Scheduled with?: Primary Care Provider    Future Appointments   Date Time Provider Department Center   4/10/2025 10:30 AM Magdaleno Bennett MD [id number] [facility]   4/11/2025  8:00 AM [facility]       Outpatient Plan as outlined on AVS reviewed with patient.    For any urgent concerns, please contact our 24 hour nurse triage line: [phone] ([phone])       Dorene Matthews RN

## 2025-04-09 NOTE — TELEPHONE ENCOUNTER
Reason for Call:  Appointment Request    Patient requesting this type of appt:  Hospital/ED Follow-Up     Requested provider: Magdaleno Bennett    Reason patient unable to be scheduled: Not within requested timeframe    When does patient want to be seen/preferred time: 3-7 days    Comments: Has to have Hosp follow up scheduled within 7 days     Could we send this information to you in [facility] or would you prefer to receive a phone call?:   Patient would prefer a phone call   Okay to leave a detailed message?: Yes at Cell number on file:    Telephone Information:   Mobile [phone]       Call taken on 4/8/2025 at 8:53 PM by Annika Kimball

## 2025-04-09 NOTE — PROGRESS NOTES
[facility]  Hospital Discharge    Clinical Data: Per chart review, patient has discharged from [facility] and Home to home/community setting.    Assessment/Plan: Transitional Care Management (TCM) program initiated to prompt post-discharge outreach call by CCR team member.     Misa Morgan  [facility]

## 2025-04-09 NOTE — TELEPHONE ENCOUNTER
Vascular Referral Intake    Appointment note (to be pasted into appt note. Also add where additional info is located ie: outside images pushed to PACS, in Epic, sent to HIM, etc):   Referred by pt for lymphedema (per chart review, lymphedema without wounds)    Specialty: Wound Clinic OR Vascular Medicine - whichever has 1st availability; feel pt is going to want to see Floresita based on call/hospital discharge    Specific Provider if Necessary:  Dr. Veronica Maloney or Floresita Solorio NP    Visit Type: New    Time Frame: Next Available - pt requesting this week. Schedule in Floresita's next available slot and then can add to cancellation list    Testing/Imaging Needed Before Consult: none    Jael or bed needed: Unknown- please verify     *Schedulers: Please send welcome letter to patient after appointment(s) scheduled*

## 2025-04-10 ENCOUNTER — TELEPHONE (OUTPATIENT)
Dept: FAMILY MEDICINE | Facility: CLINIC | Age: 70
End: 2025-04-10

## 2025-04-10 ENCOUNTER — OFFICE VISIT (OUTPATIENT)
Dept: FAMILY MEDICINE | Facility: CLINIC | Age: 70
End: 2025-04-10
Payer: COMMERCIAL

## 2025-04-10 ENCOUNTER — MEDICAL CORRESPONDENCE (OUTPATIENT)
Dept: HEALTH INFORMATION MANAGEMENT | Facility: CLINIC | Age: 70
End: 2025-04-10

## 2025-04-10 VITALS
WEIGHT: 275 LBS | BODY MASS INDEX: 38.5 KG/M2 | DIASTOLIC BLOOD PRESSURE: 83 MMHG | RESPIRATION RATE: 24 BRPM | TEMPERATURE: 97.7 F | HEIGHT: 71 IN | SYSTOLIC BLOOD PRESSURE: 136 MMHG | OXYGEN SATURATION: 97 % | HEART RATE: 73 BPM

## 2025-04-10 DIAGNOSIS — E66.01 MORBID OBESITY (H): ICD-10-CM

## 2025-04-10 DIAGNOSIS — L03.115 CELLULITIS OF RIGHT LOWER LEG: Primary | ICD-10-CM

## 2025-04-10 DIAGNOSIS — I48.19 PERSISTENT ATRIAL FIBRILLATION (H): ICD-10-CM

## 2025-04-10 DIAGNOSIS — R21 RASH: ICD-10-CM

## 2025-04-10 DIAGNOSIS — I26.99 OTHER ACUTE PULMONARY EMBOLISM WITHOUT ACUTE COR PULMONALE (H): ICD-10-CM

## 2025-04-10 DIAGNOSIS — G70.00 MYASTHENIA GRAVIS (H): ICD-10-CM

## 2025-04-10 DIAGNOSIS — I89.0 LYMPHEDEMA: ICD-10-CM

## 2025-04-10 PROBLEM — Z79.01 ANTICOAGULATED BY ANTICOAGULATION TREATMENT: Status: RESOLVED | Noted: 2025-04-03 | Resolved: 2025-04-10

## 2025-04-10 PROBLEM — A41.9 SEPSIS WITHOUT ACUTE ORGAN DYSFUNCTION, DUE TO UNSPECIFIED ORGANISM (H): Status: RESOLVED | Noted: 2025-04-03 | Resolved: 2025-04-10

## 2025-04-10 RX ORDER — HYDROMORPHONE HYDROCHLORIDE 2 MG/1
2 TABLET ORAL EVERY 6 HOURS PRN
Qty: 25 TABLET | Refills: 0 | Status: SHIPPED | OUTPATIENT
Start: 2025-04-10 | End: 2025-04-17

## 2025-04-10 NOTE — TELEPHONE ENCOUNTER
[facility]  Home Care is calling regarding an established patient with [facility].  Requesting orders from: Magdaleno Bennett  RN APPROVED: RN able to provide verbal orders.  Home Care will send orders for signature.  RN will close encounter.  Is this a request for a temporary pause in the home care episode?  No    Orders Requested    Physical Therapy  1 x week for 5 weeks  QOW x 4 weeks    Phone number Home Care can be reached at: [phone]  Okay to leave a detailed message?: Yes      JOSSELINE HOOD RN

## 2025-04-10 NOTE — PROGRESS NOTES
Assessment & Plan     (L03.115) Cellulitis of right lower leg  (primary encounter diagnosis)  Comment: Recent hospitalization for right lower extremity cellulitis  Plan: HYDROmorphone (DILAUDID) 2 MG tablet             (I26.99) Pulmonary embolism  (H)  Comment: Patient also with a recent diagnosis of bilateral pulmonary emboli  Plan:      (I89.0) Lymphedema  Comment: Longstanding issue of the right upper leg  Plan:      (R21) Rash  Comment: Patient is having a rash outside of the area of the cellulitis, I am certainly concerned about the possibility that he is having an allergic reaction to one of his antibiotic  Plan:      (G70.00) Myasthenia gravis (H)  Comment: Stable  Plan:      (I48.19) Persistent atrial fibrillation (H)  Comment: Status post numerous interventions including a Watchman  Plan:      (E66.01) Morbid obesity (H)  Comment: Elevated BMI with comorbidity  Plan:      PLAN:  1.  For the right lower extremity cellulitis, I would like the patient to finish out the clindamycin and the cefdinir,, he has about 3 days left, if however the patient has a much worsening of his rash and may need to consider alternative antibiotic treatment  2.  The patient is already taking Tylenol I am going to add a small quantity of Dilaudid 2 mg every 6 hours as needed  3.  Patient has an appointment tomorrow with the lymphedema specialist  4.  I discussed with the patient that treatment is going to take time and will probably be up to a month or so before he has a significant improvement to his overall symptoms  5.  I would like to have the patient on my schedule for next week in case we need to do any further adjustments otherwise the patient is going to need to be seen in the next 3 to 6 months for follow-up of his comorbidities including the pulmonary embolism  6.  The longitudinal plan of care for the diagnosis(es)/condition(s) as documented were addressed during this visit. Due to the added complexity in care, I will  continue to support Champ in the subsequent management and with ongoing continuity of care.  7.  Of note, 41 minutes were spent with the patient reviewing his recent hospitalization, course of treatment prognosis and the like.          MED REC REQUIRED  Post Medication Reconciliation Status: discharge medications reconciled and changed, per note/orders        Subjective   Champ is a 69 year old, presenting for the following health issues:  Hospital F/U (Hospital follow up still a lot of pain, new rash just showed up this morning)        4/10/2025    10:18 AM   Additional Questions   Roomed by Afsaneh   Accompanied by family     HPI    The patient comes in with his wife for follow-up hospitalization.  Patient was hospitalized April 3 through April 8 where he had an emergency room visit just prior to that, he was diagnosed essentially at the same time with a bilaterally pulmonary embolus he had been feeling short of breath, and also diagnosed with right lower extremity cellulitis.    Patient is now on Eliquis and I told him he needs to be on that for minimum of 3 and maybe up to 6 months he has not had a prior clot.    Patient has been on blood thinners previously because of a history of atrial fibrillation.    Patient has a significant amount of pain and swelling of his right leg though it has improved somewhat and that it is not hot to the touch is only slightly warm but quite painful of note he was treated in the hospital with Dilaudid which he found helpful however he was discharged without any medication he is on Tylenol only which is only somewhat effective.    The patient is on both cefdinir and clindamycin he is actually started to develop a rash and some itching in the upper leg lower abdominal area, I told the patient that I am hoping that we can finish out the antibiotics without too much further complication or difficulty in case he is beginning to have an allergic reaction since I like to get the cellulitis  "treated.    I explained to the patient that it is going to take weeks maybe even a month or so for the pain and swelling to go down    The patient also has pre-existing lymphedema mainly of the right upper part of the leg he is going to be seen tomorrow actually by lymphedema specialist he may benefit from the addition of a diuretic.                4/9/2025   Post Discharge Outreach   How are you doing now that you are home? spoke with Champ.  He stated he is doing okay.  Currently sitting and resting in the recliner with his leg elevated.  However he stated when he walks it \"hurts like hell\".  He has all of his follow up appointments scheduled.  Denies any questions related to medication managment.  Denies any CCC needs.  Gave information for 24/7 nurse triage   How are your symptoms? (Red Flag symptoms escalate to triage hotline per guidelines) Improved   Does the patient have their discharge instructions?  Yes   Does the patient have questions regarding their discharge instructions?  No   Were you started on any new medications or were there changes to any of your previous medications?  Yes   Does the patient have all of their medications? Yes   Do you have questions regarding any of your medications?  No   Do you have all of your needed medical supplies or equipment (DME)?  (i.e. oxygen tank, CPAP, cane, etc.) Yes   Discharge Follow Up Appointment Date 4/10/2025   Discharge Follow Up Appointment Scheduled with? Primary Care Provider       Hospital Follow-up Visit:    Hospital/Nursing Home/IP Rehab Facility: [facility]  Most Recent Admission Date: 4/3/2025   Most Recent Admission Diagnosis: Cellulitis of right lower leg - L03.115  Anticoagulated by anticoagulation treatment - Z79.01  Sepsis without acute organ dysfunction, due to unspecified organism (H) - A41.9     Most Recent Discharge Date: 4/8/2025   Most Recent Discharge Diagnosis: Cellulitis of right lower leg - " "L03.115  Anticoagulated by anticoagulation treatment - Z79.01  Sepsis without acute organ dysfunction, due to unspecified organism (H) - A41.9  Essential hypertension - I10  Pulmonary embolism, other, unspecified chronicity, unspecified whether acute cor pulmonale present (H) - I26.99  Cellulitis, unspecified cellulitis site - L03.90  Urinary retention - R33.9   Was the patient in the ICU or did the patient experience delirium during hospitalization?  No  Do you have any other stressors you would like to discuss with your provider? No    Problems taking medications regularly:  None  Medication changes since discharge: cefdinir 300 mg capsule, clindamycin 150 mg capsule  Problems adhering to non-medication therapy:  None    Summary of hospitalization:  [facility] discharge summary reviewed  Diagnostic Tests/Treatments reviewed.  Follow up needed: none  Other Healthcare Providers Involved in Patient s Care:         None  Update since discharge: improved.         Plan of care communicated with patient                     Objective    /83 (BP Location: Left arm, Patient Position: Sitting, Cuff Size: Adult Regular)   Pulse 73   Temp 97.7  F (36.5  C) (Oral)   Resp 24   Ht 1.803 m (5' 11\")   Wt 124.7 kg (275 lb)   SpO2 97%   BMI 38.35 kg/m    Body mass index is 38.35 kg/m .  Physical Exam               Signed Electronically by: Magdaleno Bennett MD    "

## 2025-04-11 ENCOUNTER — TELEPHONE (OUTPATIENT)
Dept: FAMILY MEDICINE | Facility: CLINIC | Age: 70
End: 2025-04-11
Payer: COMMERCIAL

## 2025-04-11 NOTE — TELEPHONE ENCOUNTER
General Call    Contacts       Contact Date/Time Type Contact Phone/Fax    04/11/2025 07:38 AM CDT Phone (Incoming) Champ Hopkins (Self) [phone] (M)          Reason for Call:  Follow Up to 4/10/25 visit    What are your questions or concerns:  Patient calls reporting spread of rash that he believes if from antibiotics.  He notes that Dr. Bennett assessed rash yesterday and was hoping he could finish antibiotics. He notes that this morning rash has spread to back and tops of legs.   Pt requesting alternative oral antibiotic.     He also notes that Dr. Bennett prescribed Dilaudid q6hrs PRN. He notes that he is seeing minimal relief from Dilaudid. Wondering if dose can be increased or if there is another option for pain management.     RN discussed with patient that if he is experiencing worsening symptoms that he should be seen in ED. Patient states that he has a complex case right now and would like to have PCP advise if possible.       Using [facility] [address].     Date of last appointment with provider: 4/10/25    Could we send this information to you in [facility] or would you prefer to receive a phone call?:   Patient would prefer a phone call   Okay to leave a detailed message?: Yes at Cell number on file:    Telephone Information:   Mobile [phone]

## 2025-04-11 NOTE — TELEPHONE ENCOUNTER
Of the two antibiotics he is on, I'm not sure which one he may be allergic to, so have them stop both the [facility] staff and the clindamycin, I faxed in a different antibiotic called doxycycline, which is unrelated to what he's already on.

## 2025-04-11 NOTE — TELEPHONE ENCOUNTER
"Patient is understanding and agreeable with new antibiotic.     But is still in a lot of pain.    Pain medication, is not touching pain of right lower leg. Pain is like putting foot in vise and crushing it 4-6/10. Elevating was not working then it push's into calf. Pain is whole lower leg. Only 3 hours of sleep last night. After taking Dilaudid, relief kicks in about 90 minutes later but only effective for 4 hours if you subtract the first 90 minutes.    Patient is also taking tylenol. 1000 mg q 6 hours. Discuss taking this at altering times of Dilaudid instead of with.    Patient did not go to lymphedema today due to pain. He has not gone to this service yet. Last episode when went for knee surgery was not a good experience.     Patient is using water exercises and a \"tactile pump\" at home to try and move fluid and help with pain. Patient is doing ankle pumps. PT is coming one time a week.     SOPHIE Quigley  [facility]  [phone]    [facility]   Monday  - Thursday 7 AM - 6 PM    Friday  7 AM - 5 PM     -Please call your clinic for assistance from a nurse after hours.   "

## 2025-04-14 NOTE — TELEPHONE ENCOUNTER
Call to patient and relayed PCP detailed message. Patient will continue with doxycycline. Has 3 doses left. Discussed home remedies for rash. Patient will call back if it worsens. Next appointment with PCP is 4/16/25.

## 2025-04-14 NOTE — TELEPHONE ENCOUNTER
"RN talked with patient about pain. Pt reporting a very slow improvement. He states that when leg is elevated, pain is fine 1-2/10. However, when he ambulates, there is a lot of discomfort rating 7/10. He denies any more episodes of it feeling like it's in a vice. He states that he feels \"trapped\" right now d/t pain with ambulation and expresses fear that infection will not improve.    Rash and itchiness not going away - not improving since he stopped taking abx on Friday. Started new abx Friday afternoon.     Pt is planning on going to vascular appointment tomorrow and PCP on Wednesday.     SOPHIE Madsen  [facility]            "

## 2025-04-14 NOTE — TELEPHONE ENCOUNTER
Update noted, for now I want him to stay the course on the doxycycline, if the rash is an allergic reaction to the previous antibiotics this should start to resolve.

## 2025-04-15 ENCOUNTER — OFFICE VISIT (OUTPATIENT)
Dept: VASCULAR SURGERY | Facility: CLINIC | Age: 70
End: 2025-04-15
Attending: NURSE PRACTITIONER
Payer: COMMERCIAL

## 2025-04-15 ENCOUNTER — TELEPHONE (OUTPATIENT)
Dept: VASCULAR SURGERY | Facility: CLINIC | Age: 70
End: 2025-04-15

## 2025-04-15 VITALS
OXYGEN SATURATION: 96 % | DIASTOLIC BLOOD PRESSURE: 71 MMHG | RESPIRATION RATE: 18 BRPM | TEMPERATURE: 98.3 F | HEART RATE: 87 BPM | SYSTOLIC BLOOD PRESSURE: 119 MMHG

## 2025-04-15 DIAGNOSIS — Z79.01 CHRONIC ANTICOAGULATION: ICD-10-CM

## 2025-04-15 DIAGNOSIS — E66.01 MORBID OBESITY (H): ICD-10-CM

## 2025-04-15 DIAGNOSIS — L03.90 RECURRENT CELLULITIS: ICD-10-CM

## 2025-04-15 DIAGNOSIS — I87.2 VENOUS INSUFFICIENCY: Primary | ICD-10-CM

## 2025-04-15 DIAGNOSIS — Z86.711 HISTORY OF PULMONARY EMBOLISM: ICD-10-CM

## 2025-04-15 DIAGNOSIS — I87.303 VENOUS HYPERTENSION OF BOTH LOWER EXTREMITIES: ICD-10-CM

## 2025-04-15 DIAGNOSIS — B35.1 ONYCHOMYCOSIS: ICD-10-CM

## 2025-04-15 DIAGNOSIS — T50.905S ADVERSE EFFECT OF DRUG, SEQUELA: ICD-10-CM

## 2025-04-15 DIAGNOSIS — L84 CORN OR CALLUS: ICD-10-CM

## 2025-04-15 DIAGNOSIS — I89.0 SECONDARY LYMPHEDEMA: ICD-10-CM

## 2025-04-15 PROCEDURE — G0463 HOSPITAL OUTPT CLINIC VISIT: HCPCS | Performed by: NURSE PRACTITIONER

## 2025-04-15 PROCEDURE — 11721 DEBRIDE NAIL 6 OR MORE: CPT | Performed by: NURSE PRACTITIONER

## 2025-04-15 PROCEDURE — 11055 PARING/CUTG B9 HYPRKER LES 1: CPT | Performed by: NURSE PRACTITIONER

## 2025-04-15 ASSESSMENT — PAIN SCALES - GENERAL: PAINLEVEL_OUTOF10: SEVERE PAIN (10)

## 2025-04-15 NOTE — TELEPHONE ENCOUNTER
Patient is open to [facility] for PT only. Referral sent to [facility] to add on nursing care for 2 layer wrap changes 2x per week.

## 2025-04-15 NOTE — PROGRESS NOTES
[facility] Consult Note    Date of Service: April 15, 2025     Requesting Provider: Dr. Magdaleno Bennett    Chief Complaint: right leg swelling; cellulitis    History of Present Illness: Brett Hokpins is being seen at [facility] today regarding right leg swelling; cellulitis. They arrive to the clinic today with wife; Winifred; he used a walker to get to the room. The patient reports that 2 weeks ago he woke up with flu like symptoms; 4 days later he developed swelling and pain in the right ankle; he went to the ER and was found to have cellulitis; he was prescribed antibiotics; these did not help; continued to worsen; he went back to the ER and was admitted for 6 days on IV antibtiotics. He was discharged home on x2 oral antibiotics. cefdinir and clindamycin he developed a severe body rash does not know which one caused this; was switched to doxycyline instead. He had knee replacement in 2017; revision in 2019; hip replacement in 2019; all on the right side. He was diagnosed with lymphedema. He was seen by Dr. Orlando at our clinic in the past; it was recommended that he wear compression daily; he states he could not get this on; he had venous studies done this demonstrated deep and superficial incompetence; study done 2020; he did not see Dr. Max. He had a few sessions with lymphedema therapy; he did not feel this was helpful. He has lymphedema pump Tactile he finds this helpful. He goes to the [facility] 2 days per week with water therapy. He has not been able to do this for 2 weeks since he first got sick. Was currently/previously using no dressings. Reports pain of 10/10; currently using dillaudid 2-3 times per day for pain. Has used nothing as compression in the past, is currently using nothing for compression. Denies any fevers, chills, or generalized ill feeling.  Sleeps in a recliner with legs elevated however still bent at the waist; legs are not higher than the  heart. Denies history of Vein Procedures. Positive history of Joint Replacement and Cellulitis. I personally reviewed outside imaging, lab work, and progress noted through Care Everywhere and outside records. Never smoker. No diabetes. He is on chronic anticoagulation for PE; no dvt found on ultrasound. He was previously on this for afib; had watchman and was taken off this. He was discharged home from the hospital and was on . He is a retired CPA.  Had arterial studies doen 4/3/25 no high grad stenosis found; however there was monophasic flow noted in the proximal and distal SFA popliteal artery.    Review of Systems:   Constitutional:  Malaise   EENTM: positive for glasses;  negative Marshall  GI:  negative for nausea/vomiting;   negative for constipation   negative diarrhea;   negative for fecal incontinence  negative weight loss  :   negative dysuria,  negative incontinence  MS: patient is ambulatory;  does use assistive devices  Cardiac: positive afib  Respiratory:  negative SOB  Endocrine:  negative  diabetes  Psych: positive  depression/anxiety    Past Medical History:   Past Medical History:   Diagnosis Date    Aneurysm Of The Ascending Aorta     Mild dilatation (4.1 cm) by MRI in Aug 2013  2022-4.0 cm  Cardiology  Consider repeat 2024, 2025         Cellulitis of right lower leg 04/03/2025    Diagnosed and hospitalized April 2025      GERD (gastroesophageal reflux disease) 09/10/2018    History of a hiatal hernia  Omeprazole daily  Breakthrough symptoms by 2 PM daily         Hypertension 04/26/2019    Dx Apr 2019  Metoprolol, also for atrial fibrillation.  Lisinopril, April 2019  hydrochlorothiazide Oct 2020         Lymphedema 06/01/2020    Right leg, after surgery  Dr. Orlando, et al  Compression stockings, pneumatic pump-somewhat helpful  Lasix, Nov 2020, but as long December 2020, neither were helpful.  Massage      Myasthenia gravis (H) 01/04/2023    Dx 2022  Double vision  Neurology, Opthalmology             Obesity (BMI 35.0-39.9) with comorbidity (H) 11/20/2020    Persistent atrial fibrillation     Dx 2007  Symptomatic, SANDERS -NO longer symptomatic  CV with first episode  [id number] - VASc risk score = 1 (HTN)  Failed Rhythmol > Flecainide    PVI August 19, 2013, repeat Ablation Feb 2024 April 2019 recurrent AF-Eliquis  Cardioversion, September 2019, Nov 2023.  Eliquis  Watchman, Jan 2023         Presence of Watchman left atrial appendage closure device 01/12/2023 1/12/23 - 27 mm Watchman FLX device      Pulmonary embolism (H) 04/02/2025    Dx April 2025  Eliquis      Status post ablation of atrial fibrillation 07/28/2015    PVI August 19, 2013 (cryo-PVI only)         Thyroid nodule 01/05/2022    Incidental diagnosis December 2021  2 nodules, 2.1 and 2.4 cm maximum dimension  FNA results 2023, benign  Ultrasound, Dec 2023 stable  US-Nov 2024-stable, no FNA  Recommend follow-up 2025, 2027 and 2029      Varicose Veins     no symptoms             Surgical History:   Past Surgical History:   Procedure Laterality Date    ARTHROPLASTY REVISION HIP Right 05/01/2019    ARTHROPLASTY REVISION KNEE Right 2019    CARDIOVERSION  07/01/2019    7/10/2019    CARDIOVERSION  09/12/2019    CATARACT EXTRACTION Bilateral 06/2021    CV LEFT ATRIAL APPENDAGE CLOSURE N/A 1/12/2023    Procedure: Left Atrial Appendage Closure;  Surgeon: Mitesh Graff MD;  Location: [facility]    DENTAL SURGERY      Oral Surgery Tooth Extraction;  Implant    EP ABLATION PULMONARY VEIN ISOLATION N/A 3/19/2024    Procedure: Ablation Atrial Fibrillation;  Surgeon: Benedicto Barfield MD;  Location: [facility]    OK ABLATE HEART DYSRHYTHM FOCUS  08/19/2013    Description: Catheter Ablation Atrial Fibrillation;  Recorded: 11/16/2013;  Comments: PVI Aug 19, 2013 (Cryo to all 4 PV's)    TOOTH EXTRACTION      implant    ZZC TOTAL HIP ARTHROPLASTY Right 05/08/2019    Procedure: RIGHT TOTAL HIP ARTHROPLASTY DIRECT ANTERIOR APPROACH;  Surgeon:  Mario Woodruff MD;  Location: [facility];  Service: Orthopedics    [facility] TOTAL KNEE ARTHROPLASTY Right 06/21/2017    Procedure: 2)  RIGHT TOTAL KNEE ARTHROPLASTY;  Surgeon: Mario VICKERS MD;  Location: [facility];  Service: Orthopedics        Medications:   Current Outpatient Medications   Medication Sig Dispense Refill    acetaminophen (TYLENOL) 325 MG tablet Take 2 tablets (650 mg) by mouth every 4 hours as needed for mild pain or other (and adjunct with moderate or severe pain or per patient request). 30 tablet 0    amoxicillin (AMOXIL) 500 MG tablet TAKE FOUR TS PO 1 HOUR B DAPP      apixaban ANTICOAGULANT (ELIQUIS) 5 MG tablet Take 1 tablet (5 mg) by mouth 2 times daily. 60 tablet 1    aspirin 81 MG EC tablet Take 1 tablet (81 mg) by mouth daily      doxycycline hyclate (VIBRAMYCIN) 100 MG capsule Take 1 capsule (100 mg) by mouth 2 times daily for 5 days. 10 capsule 0    [START ON 4/16/2025] hydrochlorothiazide (HYDRODIURIL) 25 MG tablet TAKE 1 TABLET BY MOUTH EVERY DAY IN THE MORNING FOR HIGH BLOOD PRESSURE 90 tablet 2    HYDROmorphone (DILAUDID) 2 MG tablet Take 1 tablet (2 mg) by mouth every 6 hours as needed for pain. 25 tablet 0    levothyroxine (SYNTHROID/LEVOTHROID) 125 MCG tablet Take 1 tablet (125 mcg) by mouth every morning (before breakfast). 90 tablet 1    lisinopril (ZESTRIL) 40 MG tablet Take 1 tablet (40 mg) by mouth daily. for high blood pressure      omeprazole (PRILOSEC) 20 MG DR capsule TAKE 1 CAPSULE BY MOUTH DAILY BEFORE BREAKFAST 90 capsule 1    rosuvastatin (CRESTOR) 10 MG tablet Take 1 tablet (10 mg) by mouth daily 90 tablet 3     No current facility-administered medications for this visit.       Allergies:   Allergies   Allergen Reactions    Sulfa (Sulfonamide Antibiotics) [Sulfa Antibiotics] Rash     Rash - turned purple  , Around age 32       Family history:   Family History   Problem Relation Age of Onset    Atrial fibrillation Mother     Dementia Mother          dx 80s    Diabetes Type 2  Mother         dx 60s/70s    Atrial fibrillation Father     Rheumatoid Arthritis Father     Atrial fibrillation Sister     Cerebrovascular Disease Sister     Parkinsonism Sister     Parkinsonism Sister     Atrial fibrillation Brother     Rheumatoid Arthritis Brother     Cerebrovascular Disease Brother     CABG Brother     Kidney failure Brother         Dialysis    Atrial fibrillation Brother     Diabetes Type 2  Brother         Dx 40's        Social History:   Social History     Socioeconomic History    Marital status:      Spouse name: Not on file    Number of children: 2    Years of education: Not on file    Highest education level: Not on file   Occupational History    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   Tobacco Use    Smoking status: Never     Passive exposure: Past    Smokeless tobacco: Never   Vaping Use    Vaping status: Never Used   Substance and Sexual Activity    Alcohol use: Yes     Alcohol/week: 5.0 standard drinks of alcohol     Types: 5 Cans of beer per week     Comment: minimal    Drug use: Never    Sexual activity: Yes     Partners: Female   Other Topics Concern    Not on file   Social History Narrative    Diet-regular            Exercise-walk, limited by R leg, R knee        Volunteer,      Social Drivers of Health     Financial Resource Strain: Low Risk  (4/3/2025)    Financial Resource Strain     Within the past 12 months, have you or your family members you live with been unable to get utilities (heat, electricity) when it was really needed?: No   Food Insecurity: Low Risk  (4/3/2025)    Food Insecurity     Within the past 12 months, did you worry that your food would run out before you got money to buy more?: No     Within the past 12 months, did the food you bought just not last and you didn t have money to get more?: No   Transportation Needs: Low Risk  (4/3/2025)    Transportation Needs     Within the past 12 months, has lack of transportation  kept you from medical appointments, getting your medicines, non-medical meetings or appointments, work, or from getting things that you need?: No   Physical Activity: Sufficiently Active (11/3/2024)    Exercise Vital Sign     Days of Exercise per Week: 3 days     Minutes of Exercise per Session: 60 min   Stress: No Stress Concern Present (11/3/2024)    [employer] - Occupational Stress Questionnaire     Feeling of Stress : Not at all   Social Connections: Socially Integrated (11/3/2024)    Social Connection and Isolation Panel [NHANES]     Frequency of Communication with Friends and Family: Never     Frequency of Social Gatherings with Friends and Family: More than three times a week     Attends Alevism Services: More than 4 times per year     Active Member of Clubs or Organizations: Yes     Attends Club or Organization Meetings: More than 4 times per year     Marital Status:    Interpersonal Safety: Low Risk  (4/3/2025)    Interpersonal Safety     Do you feel physically and emotionally safe where you currently live?: Yes     Within the past 12 months, have you been hit, slapped, kicked or otherwise physically hurt by someone?: No     Within the past 12 months, have you been humiliated or emotionally abused in other ways by your partner or ex-partner?: No   Housing Stability: Low Risk  (4/3/2025)    Housing Stability     Do you have housing? : Yes     Are you worried about losing your housing?: No        Physical Exam  Vitals: /71   Pulse 87   Temp 98.3  F (36.8  C) (Oral)   Resp 18   SpO2 96%   Weight is 0 lbs 0 oz          There is no height or weight on file to calculate BMI.  General: This is a 69 year old male who appears their reported age, not in acute distress  Head: normocephalic, Atraumatic; wearing glasses; non-icteric sclera; no exudate; mild hearing loss   Respiratory:  unlabored breathing; no cough   Skin: Uniformly warm and dry  Psych: Alert and oriented  x4; calm and cooperative throughout exam; he is visibly upset and overwhelmed with the situation; near tears several times during the exam  Extremities: BLE with +3 pitting edema; levoid rash bilaterally; skin intact; no weeping; no ulcers; well moisturized; no crusting; no scaling; no fluctuance; no crepitus; right leg is warmer to touch and with more erythema; strength testing revealed 4/4 to BLEs.    Sensation: Intact to pinprick and light touch throughout lower extremities bilaterally     Peripheral Vascular: normal dorsalis pedis, posterior tibial pulses to bilateral feet , using a handheld doppler these were strong and biphasic in nature.  Good capillary refill. No unusual venous distention. Positive for spider veins, telangiectasias, hemosiderin deposition or hyperpigmentation, and fibrosis or scarring    Nails 1-5 bilaterally  elongated, thick, yellow with subungal debris. Trophic changes noted including diminished hair growth and shiny skin.    Corn between toes right 4,5      Circumferential volume measures:            6/1/2020     8:00 AM 8/31/2020    10:00 AM   Circumferential Measures   Right just above MTP 25 24.5   Right Ankle 26.6 26.6   Right Widest Calf 46.8 46.7   Right Thigh Up 10cm 59.4 58.6   Right Knee to Ankle 34    Left - just above MTP 24.3 24.6   Left Ankle 27 26.3   Left Widest Calf 45 45.3   Left Thigh Up 10cm 54.8 53   Left Knee to Ankle 34        Ulceration(s)/Wound(s):          Laboratory studies:     I personally reviewed the following lab results today and those on care everywhere, if indicated     CRP   Date Value Ref Range Status   09/10/2019 0.5 0.0 - 0.8 mg/dL Final      Erythrocyte Sedimentation Rate   Date Value Ref Range Status   07/15/2024 7 0 - 20 mm/hr Final      Last Renal Panel:  Sodium   Date Value Ref Range Status   04/05/2025 139 135 - 145 mmol/L Final     Potassium   Date Value Ref Range Status   04/08/2025 3.7 3.4 - 5.3 mmol/L Final   02/04/2022 4.7 3.5 - 5.0 mmol/L  "Final     Chloride   Date Value Ref Range Status   04/05/2025 105 98 - 107 mmol/L Final   02/04/2022 103 98 - 107 mmol/L Final     Carbon Dioxide (CO2)   Date Value Ref Range Status   04/05/2025 24 22 - 29 mmol/L Final   02/04/2022 29 22 - 31 mmol/L Final     Anion Gap   Date Value Ref Range Status   04/05/2025 10 7 - 15 mmol/L Final   02/04/2022 8 5 - 18 mmol/L Final     Glucose   Date Value Ref Range Status   04/05/2025 110 (H) 70 - 99 mg/dL Final   02/04/2022 89 70 - 125 mg/dL Final     Urea Nitrogen   Date Value Ref Range Status   04/05/2025 16.8 8.0 - 23.0 mg/dL Final   02/04/2022 20 8 - 22 mg/dL Final     Creatinine   Date Value Ref Range Status   04/08/2025 1.35 (H) 0.67 - 1.17 mg/dL Final     GFR Estimate   Date Value Ref Range Status   04/08/2025 58 (L) >60 mL/min/1.73m2 Final     Comment:     eGFR calculated using 2021 CKD-EPI equation.   12/21/2020 >60 >60 mL/min/1.73m2 Final     Calcium   Date Value Ref Range Status   04/05/2025 8.4 (L) 8.8 - 10.4 mg/dL Final     Albumin   Date Value Ref Range Status   04/03/2025 3.5 3.5 - 5.2 g/dL Final      Lab Results   Component Value Date    WBC 10.4 04/08/2025     Lab Results   Component Value Date    RBC 4.11 04/08/2025     Lab Results   Component Value Date    HGB 12.6 04/08/2025     Lab Results   Component Value Date    HCT 37.3 04/08/2025     No components found for: \"MCT\"  Lab Results   Component Value Date    MCV 91 04/08/2025     Lab Results   Component Value Date    MCH 30.7 04/08/2025     Lab Results   Component Value Date    MCHC 33.8 04/08/2025     Lab Results   Component Value Date    RDW 13.9 04/08/2025     Lab Results   Component Value Date     04/08/2025      No results found for: \"A1C\"   TSH   Date Value Ref Range Status   04/02/2025 7.47 (H) 0.30 - 4.20 uIU/mL Final   02/04/2022 0.26 (L) 0.30 - 5.00 uIU/mL Final      No results found for: \"VITDT\"       Impression:    Encounter Diagnoses   Name Primary?    Venous insufficiency Yes    Venous " hypertension of both lower extremities     Secondary lymphedema     Morbid obesity (H)     Recurrent cellulitis     History of pulmonary embolism     Chronic anticoagulation     Onychomycosis     Corn or callus     Adverse effect of drug, sequela            Assessment/Plan:  1. Debridement: na    2. Treatment: wound treatment will include irrigation and dressings to promote autolytic debridement which will include: lotion twice per day with wrap changes.    If for some reason the patient is not able to get your dressing(s) changed as outlined above (due to illness, lack of supplies, lack of help) please do the following: remove old, soiled dressings; wash the ulcers with saline; pat dry; apply ABD pad or other absorbant pad and secure with rolled gauze; avoid tape directly on your skin; Patient instructed to call the clinic as soon as possible to let us know what the current issues are in receiving ulcer care.    3. Secondary Lymphedema &Edema:We spoke at length regarding their swelling, potential causes, and treatment options. Answered all questions. Their swelling is likely multifactorial including but not limited to the following: their weight, dependency of the limbs for most hours of the day, reduced activity with reduced calf pump mechanism, venous hypertension, valvular incompetence, history of DVT, and history of joint replacements. I would like to first reduce their swelling down using 2 layer compression wraps (possibly need to increase to 4 layer will see his response) and then transition them into compression garments; such as compression stockings or velcro wraps. I stressed that compression is the critical treatment here and what he has been lacking. He did push back several times and state he cannot don/doff these; he will need to find a way; there are donning devices; and velcro wraps; and his wife to help support him. He will need to elevate more; recommend sleeping in bed at night; if not possible  at this time due to pain then go to bed several times a day to get the legs above the level of the heart for good venous and lymphatic return. When feeling better should resume using lymphedema pump 1-2 times per day life long. Explained that this is a life long condition; there is no cure; and he will need to be doing compression; exercises; pump for the rest of his life to reduce risk of recurrent cellulitis. We will add on home care nurse to his PT home care; to come out and wrap the legs twice per week. I will reorder the venous insufficiency study at a later date he is too overwhelmed to even talk about this today. Continue to take their diuretics per their PMD recommendations. He may benefit from increasing these for a short course.     Patient presents with moderate, bilateral  lower extremity secondary lymphedema.     Patient requires a standard-fit knee high compression stocking and/or velcro wraps.      If a 2 layer or 4 layer compression wrap is being used; these are safe to have on for ONLY 7 days. If for some reason the patient is not able to get the wrap(s) changed (due to illness; lack of supplies, lack of help, lack of transportation) please do the following: unwrap the old 2 or 4 layer compression wrap; avoid using scissors as you could cut your skin and cause ulcers; use tubular compression when available. Call to reschedule your home care or clinic visit appointment as soon as possible.    4. Offloading: na    5. Nutrition: focus on weight loss; lower carbohydrates; increase protein; low sodium    6. Wound Etiology: no wounds     7. Nails: nails 1-5 bilaterally were debrided and filed smooth; tolerated well, no complications.     8. Callous: x1 corn  was pared using a #15 blade scalpel; this was done to evaluate for underlying ulceration; reduce pressure; patient comfort; and to reduce risk of future ulceration formation. The skin was intact underneath and patient tolerated well; no complications.      9. Drug reaction with body itching: recommend benadryl 25-50mg 3 times per day prn; follow up with pcp tomorrow.     The longitudinal plan of care for the diagnosis(es)/condition(s) as documented were addressed during this visit. Due to the added complexity in care, I will continue to support Champ in the subsequent management and with ongoing continuity of care.      60 minutes spent by me on the date of the encounter doing chart review, review of outside records, review of test results, patient visit, documentation, and discussion with family     Patient to return to clinic in 2-3 week(s) for re-evaluation. They were instructed to call the clinic sooner with any further questions or concerns. Answered all questions.          Floresita Solorio NP   [facility]  [phone]      This note was electronically signed by Floresita Solorio NP

## 2025-04-15 NOTE — PATIENT INSTRUCTIONS
We will add on home care nurse to your home PT     Need to elevate the legs above the level of the heart 4 times per day for 40 minutes each    Take Benadryl 25-50mg up to 3 times per day for body wide itching and rash    We will wrap your legs with 2 layer compression bandaging  Change twice per week    Ok to begin using lymphedema pump when pain under better control

## 2025-04-16 ENCOUNTER — OFFICE VISIT (OUTPATIENT)
Dept: FAMILY MEDICINE | Facility: CLINIC | Age: 70
End: 2025-04-16
Payer: COMMERCIAL

## 2025-04-16 VITALS
SYSTOLIC BLOOD PRESSURE: 120 MMHG | DIASTOLIC BLOOD PRESSURE: 73 MMHG | TEMPERATURE: 98.3 F | RESPIRATION RATE: 14 BRPM | OXYGEN SATURATION: 96 % | WEIGHT: 273 LBS | HEIGHT: 71 IN | HEART RATE: 79 BPM | BODY MASS INDEX: 38.22 KG/M2

## 2025-04-16 DIAGNOSIS — I89.0 LYMPHEDEMA: Primary | ICD-10-CM

## 2025-04-16 DIAGNOSIS — I26.99 OTHER ACUTE PULMONARY EMBOLISM WITHOUT ACUTE COR PULMONALE (H): ICD-10-CM

## 2025-04-16 DIAGNOSIS — L03.115 CELLULITIS OF RIGHT LOWER LEG: ICD-10-CM

## 2025-04-16 RX ORDER — HYDROMORPHONE HYDROCHLORIDE 2 MG/1
2 TABLET ORAL EVERY 6 HOURS PRN
Qty: 25 TABLET | Refills: 0 | Status: SHIPPED | OUTPATIENT
Start: 2025-04-16

## 2025-04-16 NOTE — TELEPHONE ENCOUNTER
Spoke with intake at [facility] and it should not be a problem adding on SN. She does have the orders and will update the team.

## 2025-04-16 NOTE — PROGRESS NOTES
Assessment & Plan     (I89.0) Lymphedema  (primary encounter diagnosis)  Comment: Chronic longstanding, affecting the right upper leg comment patient now with right lower extremity edema secondary to cellulitis  Plan:      (L03.115) Cellulitis of right lower leg  Comment: Slowly improving  Plan: HYDROmorphone (DILAUDID) 2 MG tablet, PRIMARY         CARE FOLLOW-UP SCHEDULING             (I26.99) Pulmonary embolism  (H)  Comment: Patient also with a recent diagnosis on a blood thinner  Plan:      PLAN:  1.  In terms of the cellulitis and lower extremity lymphedema, explained to the patient that these are going to improve it may take some time it is possible he will have residual swelling of the right lower leg  2.  Patient is also followed regularly at the lymphedema clinic  3.  I did continue the Dilaudid, he may need this for a bit longer until the pain subsides to the right lower leg  4.  1 month follow-up  5.  The longitudinal plan of care for the diagnosis(es)/condition(s) as documented were addressed during this visit. Due to the added complexity in care, I will continue to support Champ in the subsequent management and with ongoing continuity of care.             Subjective   Champ is a 69 year old, presenting for the following health issues:  RECHECK (Foot and leg right), Recheck Medication, and Thyroid Problem      4/16/2025    10:23 AM   Additional Questions   Roomed by Dennis   Accompanied by Wife     History of Present Illness       Reason for visit:  Cellulitis follow-up    He eats 2-3 servings of fruits and vegetables daily.He consumes 0 sweetened beverage(s) daily.He exercises with enough effort to increase his heart rate 9 or less minutes per day.  He exercises with enough effort to increase his heart rate 3 or less days per week.   He is taking medications regularly.      Patient comes in with his wife for follow-up of his cellulitis and other medical issues.    I saw the patient last week after  "hospitalization for both right leg cellulitis as well as a pulmonary embolus.  Patient was having a rash at that time, which only worsened therefore several days ago I switched his antibiotics from Omnicef and clindamycin to doxycycline, he also has started to take some Benadryl and the rash and itching have mainly resolved.    I suspect that one of the antibiotics caused an allergic type reaction but I do not know which ones I am not adding it, and of note I do not believe he needs any further antibiotic treatment    Patient has a known underlying history of lymphedema this preexisted the diagnosis of cellulitis he saw the lymphedema specialist yesterday they rewrapped his legs he has follow-up with them.    I reassured the patient who is feeling a bit discouraged that the swelling in the leg is going to go down the pain is going to improve he possibly may have residual swelling in that leg I think we will have to say with time.    Patient also has a history of a pulmonary embolus he is on Eliquis and is going to be on this for at least 3 probably closer to 6 months.    Patient has been taking Dilaudid he is not always sure how helpful this is but I would recommend he continue this medication though I do not think it is going to be long-term he is not having any constipation from this.              4/9/2025   Post Discharge Outreach   How are you doing now that you are home? spoke with Champ.  He stated he is doing okay.  Currently sitting and resting in the recliner with his leg elevated.  However he stated when he walks it \"hurts like hell\".  He has all of his follow up appointments scheduled.  Denies any questions related to medication managment.  Denies any CCC needs.  Gave information for 24/7 nurse triage   How are your symptoms? (Red Flag symptoms escalate to triage hotline per guidelines) Improved   Does the patient have their discharge instructions?  Yes   Does the patient have questions regarding their " "discharge instructions?  No   Were you started on any new medications or were there changes to any of your previous medications?  Yes   Does the patient have all of their medications? Yes   Do you have questions regarding any of your medications?  No   Do you have all of your needed medical supplies or equipment (DME)?  (i.e. oxygen tank, CPAP, cane, etc.) Yes   Discharge Follow Up Appointment Date 4/10/2025   Discharge Follow Up Appointment Scheduled with? Primary Care Provider                           Objective    /73   Pulse 79   Temp 98.3  F (36.8  C) (Oral)   Resp 14   Ht 1.803 m (5' 11\")   Wt 123.8 kg (273 lb)   SpO2 96%   BMI 38.08 kg/m    Body mass index is 38.08 kg/m .  Physical Exam               Signed Electronically by: Magdaleno Bennett MD    "

## 2025-04-21 ENCOUNTER — PATIENT OUTREACH (OUTPATIENT)
Dept: CARE COORDINATION | Facility: CLINIC | Age: 70
End: 2025-04-21
Payer: COMMERCIAL

## 2025-04-23 ENCOUNTER — TELEPHONE (OUTPATIENT)
Dept: VASCULAR SURGERY | Facility: CLINIC | Age: 70
End: 2025-04-23
Payer: COMMERCIAL

## 2025-04-23 DIAGNOSIS — L03.90 RECURRENT CELLULITIS: ICD-10-CM

## 2025-04-23 DIAGNOSIS — I89.0 SECONDARY LYMPHEDEMA: ICD-10-CM

## 2025-04-23 DIAGNOSIS — I87.303 VENOUS HYPERTENSION OF BOTH LOWER EXTREMITIES: Primary | ICD-10-CM

## 2025-04-23 DIAGNOSIS — I87.2 VENOUS INSUFFICIENCY: ICD-10-CM

## 2025-04-23 NOTE — TELEPHONE ENCOUNTER
Champ called and concerned about a blister that popped and has broken open on the side of his ankle and is also concerned for infection. He mentioned home care not having an order to apply a dressing to the area. Informed pt on s/s and that usually if there is a concern for infection home care will reach out. He says he is really worried since he is unable to see it. Reassured and told to continue compression wraps and elevate above the heart and says he has been doing that. Pt also expresses concern about supplies and no ordered until Friday. Told him we would reach out to Floresita and can fax an updated order to home care.

## 2025-04-23 NOTE — TELEPHONE ENCOUNTER
Updated order and faxed to [facility] home care. Called home care to update also.

## 2025-04-29 ENCOUNTER — OFFICE VISIT (OUTPATIENT)
Dept: VASCULAR SURGERY | Facility: CLINIC | Age: 70
End: 2025-04-29
Attending: NURSE PRACTITIONER
Payer: COMMERCIAL

## 2025-04-29 ENCOUNTER — TELEPHONE (OUTPATIENT)
Dept: VASCULAR SURGERY | Facility: CLINIC | Age: 70
End: 2025-04-29

## 2025-04-29 VITALS
OXYGEN SATURATION: 98 % | HEIGHT: 71 IN | SYSTOLIC BLOOD PRESSURE: 136 MMHG | TEMPERATURE: 98.2 F | WEIGHT: 255 LBS | BODY MASS INDEX: 35.7 KG/M2 | DIASTOLIC BLOOD PRESSURE: 84 MMHG | HEART RATE: 80 BPM

## 2025-04-29 DIAGNOSIS — I87.303 VENOUS HYPERTENSION OF BOTH LOWER EXTREMITIES: Primary | ICD-10-CM

## 2025-04-29 DIAGNOSIS — E66.01 MORBID OBESITY (H): ICD-10-CM

## 2025-04-29 DIAGNOSIS — Z86.711 HISTORY OF PULMONARY EMBOLISM: ICD-10-CM

## 2025-04-29 DIAGNOSIS — I87.2 VENOUS INSUFFICIENCY: ICD-10-CM

## 2025-04-29 DIAGNOSIS — Z79.01 CHRONIC ANTICOAGULATION: ICD-10-CM

## 2025-04-29 DIAGNOSIS — I89.0 SECONDARY LYMPHEDEMA: ICD-10-CM

## 2025-04-29 DIAGNOSIS — L03.90 RECURRENT CELLULITIS: ICD-10-CM

## 2025-04-29 PROCEDURE — G0463 HOSPITAL OUTPT CLINIC VISIT: HCPCS | Mod: 25 | Performed by: NURSE PRACTITIONER

## 2025-04-29 PROCEDURE — 11042 DBRDMT SUBQ TIS 1ST 20SQCM/<: CPT | Performed by: NURSE PRACTITIONER

## 2025-04-29 RX ORDER — LIDOCAINE 50 MG/G
OINTMENT TOPICAL DAILY PRN
Status: ACTIVE | OUTPATIENT
Start: 2025-04-29

## 2025-04-29 RX ADMIN — LIDOCAINE: 50 OINTMENT TOPICAL at 09:11

## 2025-04-29 ASSESSMENT — PAIN SCALES - GENERAL: PAINLEVEL_OUTOF10: NO PAIN (0)

## 2025-04-29 NOTE — PROGRESS NOTES
Clinic Administered Medication Documentation    Patient was given lidocaine 5% ointment to right medial ankle. Prior to medication administration, verified patient's identity using patient's name and date of birth.    TARIK SERNA RN

## 2025-04-29 NOTE — TELEPHONE ENCOUNTER
"Patient called back upset. He feels like he has been sent on a \"wild goose eveline\" all day. He is currently at [facility] in [place], writer faxed compression order and office notes to them.  "

## 2025-04-29 NOTE — TELEPHONE ENCOUNTER
Patient received prescription for velcro wraps from Floresita Solorio today. He went upstairs to [facility], who directed him to  [facility], who then told him to call [facility].  [facility] told him they do not have velcro compression.  He called  [facility]otics again, who told them they would call back but hasn't heard anything yet.    Brett [phone]

## 2025-04-29 NOTE — PATIENT INSTRUCTIONS
"We will contact [facility] lymphedema pump to see if they can get the different pant sleeve    We will continue home care twice per week to change the right ankle dressing    Continue to elevate the legs 40 minutes 4 times per day    We will stop wrapping with 2 layer and transition to Velcro wraps; you can use your white compression stockings underneath as the liner    We will have you schedule an appt with Diann villar on 3rd floor of the [facility] building call [phone] to make appt    We will get you scheduled for venous insufficiency ultrasound    Wound care supplies were not ordered or needed today. Home care to order        Wound Care Instructions    2 TIMES PER WEEK and as needed, Cleanse your right ankle wound(s) with Dilute hibiclens 30cc in 500cc NS.    Pat Dry with non-sterile gauze    Apply Lotion to the intact skin surrounding your wound and other dry skin locations. Some good lotions include: Remedy Skin Repair Cream, Sarna, Vanicream or Cetaphil    Primary Dressing: Apply xeroform into/onto the wounds    Secondary dressing: Cover with gauze    Secure with non-sterile roll gauze (4\" x 75\" roll) and tape (1\" roll tape) as needed; avoid adhesive directly on the skin    Compression: compression stocking 15mmHG as liner; velcro on top; ok to remove at bedtime; put on first thing in the morning    It is not ok to get your wound wet in the bath or shower; cover the wound with saran wrap      If for some reason you are not able to get your dressing(s) changed as outlined above (due to illness, lack of supplies, lack of help) please do the following: remove old, soiled dressings; wash the wounds with saline; pat dry; apply ABD pad or other absorbant pad and secure with rolled gauze; avoid tape directly on your skin; Call the clinic as soon as possible to let us know what the current issues are in receiving wound care [phone].      SEEK MEDICAL CARE IF:  You have an increase in " swelling, pain, or redness around the wound.  You have an increase in the amount of pus coming from the wound.  There is a bad smell coming from the wound.  The wound appears to be worsening/enlarging  You have a fever greater than 101.5 F      It is ok to continue current wound care treatment/products for the next 2-3 days until new wound care supplies are ordered and arrive. If longer than this please contact our office at [phone].    If you have a 2 layer or 4 layer compression wrap on these are safe to have on for ONLY 7 days. If for some reason you are not able to get the wrap(s) changed (due to illness; lack of supplies, lack of help, lack of transportation) please do the following: unwrap the old 2 or 4 layer compression wrap; avoid using scissors as you could cut your skin and cause wounds; use tubular compression when available. Call to reschedule your home care or clinic visit appointment as soon as possible.    Please NOTE: if you are 15 minutes late to your arrival time, you will have to be rescheduled. Please call our clinic as soon as possible if you know you will not be able to get to your appointment at [phone]. If you fail to show up to 3 scheduled clinic appointments you will be dismissed from our clinic.              We want to hear from you!  In the next few weeks, you should receive a call or email to complete a survey about your visit at [facility]. Please help us improve your appointment experience by letting us know how we did today. We strive to make your experience good and value any ways in which we could do better.      We value your input and suggestions.    Thank you for choosing the [facility]!

## 2025-04-29 NOTE — PROGRESS NOTES
Follow up Vascular Visit       Date of Service:04/29/25      Chief Complaint: BLE swelling; recurrent cellulitis; new blistering      Pt returns to [facility] with regards to their BLE swelling; new blistering.  They arrive today with spouse; he was able to was able to walk unassisted to the room. They are currently using xerform; gauze to the wounds. This is being done by home care twice per week. They are using 2 layer bilaterally for compression. They are feeling well today. Denies fevers, chills. No shortness of breath. They feel the blistering is due to the wraps being applied with the foot not flexed and then the patient doing calf pump exercises; this became bunched up. He cannot don 20-30mmHG compression stockings. He has not started using his lymphedema pump.     Allergies:   Allergies   Allergen Reactions    Sulfa (Sulfonamide Antibiotics) [Sulfa Antibiotics] Rash     Rash - turned purple  , Around age 32       Medications:   Current Outpatient Medications:     acetaminophen (TYLENOL) 325 MG tablet, Take 2 tablets (650 mg) by mouth every 4 hours as needed for mild pain or other (and adjunct with moderate or severe pain or per patient request)., Disp: 30 tablet, Rfl: 0    amoxicillin (AMOXIL) 500 MG tablet, TAKE FOUR TS PO 1 HOUR B DAPP, Disp: , Rfl:     apixaban ANTICOAGULANT (ELIQUIS) 5 MG tablet, Take 1 tablet (5 mg) by mouth 2 times daily., Disp: 60 tablet, Rfl: 1    aspirin 81 MG EC tablet, Take 1 tablet (81 mg) by mouth daily, Disp: , Rfl:     hydrochlorothiazide (HYDRODIURIL) 25 MG tablet, TAKE 1 TABLET BY MOUTH EVERY DAY IN THE MORNING FOR HIGH BLOOD PRESSURE, Disp: 90 tablet, Rfl: 2    levothyroxine (SYNTHROID/LEVOTHROID) 125 MCG tablet, Take 1 tablet (125 mcg) by mouth every morning (before breakfast)., Disp: 90 tablet, Rfl: 1    lisinopril (ZESTRIL) 40 MG tablet, Take 1 tablet (40 mg) by mouth daily. for high blood pressure, Disp: , Rfl:     omeprazole (PRILOSEC) 20 MG  DR capsule, TAKE 1 CAPSULE BY MOUTH DAILY BEFORE BREAKFAST, Disp: 90 capsule, Rfl: 1    rosuvastatin (CRESTOR) 10 MG tablet, Take 1 tablet (10 mg) by mouth daily, Disp: 90 tablet, Rfl: 3    HYDROmorphone (DILAUDID) 2 MG tablet, Take 1 tablet (2 mg) by mouth every 6 hours as needed for pain., Disp: 25 tablet, Rfl: 0    Current Facility-Administered Medications:     lidocaine (XYLOCAINE) 5 % ointment, , Topical, Daily PRN, Floresita Solorio, NP, Given at 04/29/25 0911    History:   Past Medical History:   Diagnosis Date    Aneurysm Of The Ascending Aorta     Mild dilatation (4.1 cm) by MRI in Aug 2013  2022-4.0 cm  Cardiology  Consider repeat 2024, 2025         Cellulitis of right lower leg 04/03/2025    Diagnosed and hospitalized April 2025      GERD (gastroesophageal reflux disease) 09/10/2018    History of a hiatal hernia  Omeprazole daily  Breakthrough symptoms by 2 PM daily         Hypertension 04/26/2019    Dx Apr 2019  Metoprolol, also for atrial fibrillation.  Lisinopril, April 2019  hydrochlorothiazide Oct 2020         Lymphedema 06/01/2020    Right leg, after surgery  Dr. Orlando, et al  Compression stockings, pneumatic pump-somewhat helpful  Lasix, Nov 2020, but as long December 2020, neither were helpful.  Massage      Myasthenia gravis (H) 01/04/2023    Dx 2022  Double vision  Neurology, Opthalmology            Obesity (BMI 35.0-39.9) with comorbidity (H) 11/20/2020    Persistent atrial fibrillation     Dx 2007  Symptomatic, SANDERS -NO longer symptomatic  CV with first episode  BFR0YS8 - VASc risk score = 1 (HTN)  Failed Rhythmol > Flecainide    PVI August 19, 2013, repeat Ablation Feb 2024 April 2019 recurrent AF-Eliquis  Cardioversion, September 2019, Nov 2023.  Lizette Correia, Jan 2023         Presence of Watchman left atrial appendage closure device 01/12/2023 1/12/23 - 27 mm Watchman FLX device      Pulmonary embolism (H) 04/02/2025    Dx April 2025  Eliquis      Status post ablation of atrial  "fibrillation 07/28/2015    PVI August 19, 2013 (cryo-PVI only)         Thyroid nodule 01/05/2022    Incidental diagnosis December 2021  2 nodules, 2.1 and 2.4 cm maximum dimension  FNA results 2023, benign  Ultrasound, Dec 2023 stable  US-Nov 2024-stable, no FNA  Recommend follow-up 2025, 2027 and 2029      Varicose Veins     no symptoms            Physical Exam:    /84   Pulse 80   Temp 98.2  F (36.8  C) (Oral)   Ht 5' 11\" (1.803 m)   Wt 255 lb (115.7 kg)   SpO2 98%   BMI 35.57 kg/m      General:  Patient presents to clinic in no apparent distress.  Head: normocephalic atraumatic  Psychiatric:  Alert and oriented x3.   Respiratory: unlabored breathing; no cough  Integumentary:  Skin is uniformly warm, dry and pink.    Ulcer #1 Location: right medial ankle  Size: 4.5L x 4W x 0.1depth.  no sinus tract present, Wound base: unroofed blisters; red; viable wound bed underneath  no undermining present. Ulcer is full thickness. There is moderate drainage. Periwound: no denudement, erythema, induration, maceration or warmth.      BLE swelling is down; no longer with inflammation or erythema; no weeping; no pain with palpation    VASC Wound Left medial ankle (Active)   Pre Size Length 4.5 04/29/25 0900   Pre Size Width 4 04/29/25 0900   Pre Size Depth 0.1 04/29/25 0900   Pre Total Sq cm 18 04/29/25 0900   Description scattered 04/29/25 0900   Number of days: 0            Circumferential volume measures:          6/1/2020     8:00 AM 8/31/2020    10:00 AM 4/29/2025     9:00 AM   Circumferential Measures   Right just above MTP 25 24.5 23.8   Right Ankle 26.6 26.6 25.5   Right Widest Calf 46.8 46.7 43.5   Right Thigh Up 10cm 59.4 58.6    Right Knee to Ankle 34     Left - just above MTP 24.3 24.6 24   Left Ankle 27 26.3 24.5   Left Widest Calf 45 45.3 41.8   Left Thigh Up 10cm 54.8 53    Left Knee to Ankle 34         Labs:    I personally reviewed the following lab results today and those on care everywhere    CRP " "  Date Value Ref Range Status   09/10/2019 0.5 0.0 - 0.8 mg/dL Final      Erythrocyte Sedimentation Rate   Date Value Ref Range Status   07/15/2024 7 0 - 20 mm/hr Final      Last Renal Panel:  Sodium   Date Value Ref Range Status   04/05/2025 139 135 - 145 mmol/L Final     Potassium   Date Value Ref Range Status   04/08/2025 3.7 3.4 - 5.3 mmol/L Final   02/04/2022 4.7 3.5 - 5.0 mmol/L Final     Chloride   Date Value Ref Range Status   04/05/2025 105 98 - 107 mmol/L Final   02/04/2022 103 98 - 107 mmol/L Final     Carbon Dioxide (CO2)   Date Value Ref Range Status   04/05/2025 24 22 - 29 mmol/L Final   02/04/2022 29 22 - 31 mmol/L Final     Anion Gap   Date Value Ref Range Status   04/05/2025 10 7 - 15 mmol/L Final   02/04/2022 8 5 - 18 mmol/L Final     Glucose   Date Value Ref Range Status   04/05/2025 110 (H) 70 - 99 mg/dL Final   02/04/2022 89 70 - 125 mg/dL Final     Urea Nitrogen   Date Value Ref Range Status   04/05/2025 16.8 8.0 - 23.0 mg/dL Final   02/04/2022 20 8 - 22 mg/dL Final     Creatinine   Date Value Ref Range Status   04/08/2025 1.35 (H) 0.67 - 1.17 mg/dL Final     GFR Estimate   Date Value Ref Range Status   04/08/2025 58 (L) >60 mL/min/1.73m2 Final     Comment:     eGFR calculated using 2021 CKD-EPI equation.   12/21/2020 >60 >60 mL/min/1.73m2 Final     Calcium   Date Value Ref Range Status   04/05/2025 8.4 (L) 8.8 - 10.4 mg/dL Final     Albumin   Date Value Ref Range Status   04/03/2025 3.5 3.5 - 5.2 g/dL Final      Lab Results   Component Value Date    WBC 10.4 04/08/2025     Lab Results   Component Value Date    RBC 4.11 04/08/2025     Lab Results   Component Value Date    HGB 12.6 04/08/2025     Lab Results   Component Value Date    HCT 37.3 04/08/2025     No components found for: \"MCT\"  Lab Results   Component Value Date    MCV 91 04/08/2025     Lab Results   Component Value Date    MCH 30.7 04/08/2025     Lab Results   Component Value Date    MCHC 33.8 04/08/2025     Lab Results   Component " "Value Date    RDW 13.9 04/08/2025     Lab Results   Component Value Date     04/08/2025      No results found for: \"A1C\"   TSH   Date Value Ref Range Status   04/02/2025 7.47 (H) 0.30 - 4.20 uIU/mL Final   02/04/2022 0.26 (L) 0.30 - 5.00 uIU/mL Final      No results found for: \"VITDT\"                Impression:  Encounter Diagnoses   Name Primary?    Venous hypertension of both lower extremities Yes          4/29/2025 BLE     4/15/25 BLE          4/29/2025 right medial           Are any of these ulcers new today: yes blistering on the right leg    Assessment/Plan:          1. Debridement: Nursing staff removed the old dressing and cleanse the wound(s) with specified solution. After discussion of risk factors and verbal consent was obtained 2% Lidocaine HCL jelly was applied, under clean conditions, the right ankle ulceration(s) were debrided using currette. Devitalized and nonviable tissue, along with any fibrin and slough, was removed to improve granulation tissue formation, stimulate wound healing, decrease overall bacteria load, disrupt biofilm formation and decrease edge senescence.  Total excisional debridement was 18 sq cm from the epidermis/dermis area and into the subcutaneous tissue with a depth of 0.1 cm.   Ulcers were improved afterwards and .  Measures were unchanged after debridement.       2.  Ulcer treatment: ulcer treatment will include irrigation and dressings to promote autolytic debridement which will include:will continue home care will use xeroform; gauze; rolled gauze; change twice per week. If for some reason the patient is not able to get their dressing(s) changed as outlined above (due to illness, lack of supplies, lack of help) please do the following: remove old, soiled dressings; wash the ulcers with saline; pat dry; apply ABD pad or other absorbant pad and secure with rolled gauze; avoid tape directly on your skin; patient instructed to call the clinic as soon as possible " to let us know what the current issues are in receiving ulcer care. Stable            3. Edema: swelling is down; will transition him to compression garments. Spoke at length about the need to wear this every day consistently to keep his legs healthy; he does not want to do this. We will have him use 15mmHG compression stocking as the liner as he does not want to wear the black liner stocking that came with the velcro; feels he's allergic to the dye and does not want to wear black socks with tennis shoes; then wear velcro on top. Needs to elevate 4 times per day. Can resume lymphedema pump; he would like this for both legs; will reach out to [employer] to see if they can do this for him. Will update his venous insufficiency study and refer to Dr. Max pending results. The compression wraps were applied today in clinic.     Patient presents with moderate, bilateral  lower extremity secondary lymphedema.      Patient requires a standard-fit knee high compression stocking and/or velcro wraps    Secondary Lymphedema &Edema: continue elevation and velcro wraps; needs to wear these consistently; replace every 6 months. The compression wraps were applied today in clinic. Patient presents with severe, bilateral secondary lymphedema. Patient requires daytime and nighttime compression garments; I have prescribed velcro wraps to accommodate and deliver gradient compression throughout the affected extremities. Quantity of 3 is needed for each leg for proper wash and wear.         If a 2 layer or 4 layer compression wrap is being used; these are safe to have on for ONLY 7 days. If for some reason the patient is not able to get the wrap(s) changed (due to illness; lack of supplies, lack of help, lack of transportation) please do the following: unwrap the old 2 or 4 layer compression wrap; avoid using scissors as you could cut your skin and cause ulcers; use tubular compression when available. Call to reschedule your home  care or clinic visit appointment as soon as possible.  Stable            4. Nutrition: down nearly 20 pounds in 2 weeks; they did not increase the diuretic           5. Offloading: na          6. Wound Etiology: venous     Patient will follow up with me in 4 weeks for reevaluation. They were instructed to call the clinic sooner with any signs or symptoms of infection or any further questions/concerns. Answered all questions.          Floresita Solorio DNP, RN, CNP, CWOCN, CFCN, CLT  [facility]   [phone]        This note was electronically signed by Floresita Solorio NP

## 2025-04-29 NOTE — TELEPHONE ENCOUNTER
Patient called back again, he is very frustrated. He would prefer to go to [facility] rather than [facility].   Updated patient we will call him back tomorrow.

## 2025-04-30 ENCOUNTER — MEDICAL CORRESPONDENCE (OUTPATIENT)
Dept: HEALTH INFORMATION MANAGEMENT | Facility: CLINIC | Age: 70
End: 2025-04-30
Payer: COMMERCIAL

## 2025-04-30 ENCOUNTER — TELEPHONE (OUTPATIENT)
Dept: FAMILY MEDICINE | Facility: CLINIC | Age: 70
End: 2025-04-30
Payer: COMMERCIAL

## 2025-04-30 DIAGNOSIS — Z53.9 DIAGNOSIS NOT YET DEFINED: Primary | ICD-10-CM

## 2025-04-30 PROCEDURE — G0180 MD CERTIFICATION HHA PATIENT: HCPCS | Performed by: FAMILY MEDICINE

## 2025-04-30 NOTE — TELEPHONE ENCOUNTER
Spoke with Ulises brooks for patient to order liners or compression off [employer]. For liners- pt can order 15-20 mmHg or 20-30mmHg. If just getting compression stockings, he should use 20-30mmHg. Pt will have their spouse order off [employer]. At this time, he does not want to try the tubular compression. Pt will call if they have any further questions or concerns.

## 2025-04-30 NOTE — TELEPHONE ENCOUNTER
Fax  Fax ID: [phone]    [facility]  Date: 04/30/2025  Time: 5:00 PM  Fax: [phone]    Dennis KEARNEY

## 2025-04-30 NOTE — TELEPHONE ENCOUNTER
"[facility] Pt has order for Velcro Compression that was placed yesterday by [facility].     Pt declined taking a pair home in wound clinic yesterday with [facility]'s team. Pt will need to schedule an appt to be sized by [facility].     Called and spoke with patient. Pt voiced frustration of spending his whole day trying to get velcro compression. Did let patient know that he needs to have an appointment to be fitted for the velcro for the appropriate size. Pt concerned that his appt is not until 5/21/25, and stated \"this whole organization is screwed up. Are we suppose to go 3 weeks without wearing compression\". Did let patient know that he could come to [facility] and  a pair of the velcro and liner that he was fitted for while in clinic. Patient stated that he can't wear the black liner socks, as they cause his toes to itch, and reports that he does not want to get cellulitis again. Pt stated that he has a pair of velcro and a pair of while liner socks, but the liner socks are only going to last another week. He stated that they can't wash a pair of socks every single night. Pt went to multiple locations yesterday and were given different answers at each place.  Will discuss with Floresita Solorio if patient can utilize liner socks from [employer], or if patient can use the tubular compression until he can get fitted in May.       "

## 2025-05-05 DIAGNOSIS — I26.99 PULMONARY EMBOLISM, OTHER, UNSPECIFIED CHRONICITY, UNSPECIFIED WHETHER ACUTE COR PULMONALE PRESENT (H): ICD-10-CM

## 2025-05-06 ENCOUNTER — TELEPHONE (OUTPATIENT)
Dept: VASCULAR SURGERY | Facility: CLINIC | Age: 70
End: 2025-05-06
Payer: COMMERCIAL

## 2025-05-06 ENCOUNTER — MEDICAL CORRESPONDENCE (OUTPATIENT)
Dept: HEALTH INFORMATION MANAGEMENT | Facility: CLINIC | Age: 70
End: 2025-05-06
Payer: COMMERCIAL

## 2025-05-06 NOTE — TELEPHONE ENCOUNTER
Caller: Bebeto Ríos    Provider: Floresita Solorio NP    Detailed reason for call: FYI- Patient's blister/wound is now closed. No need to call back unless questions.    Best phone number to contact: [phone]

## 2025-05-06 NOTE — TELEPHONE ENCOUNTER
Spoke with Michoacano and relayed message from Floresita. She will call and let him know to see what he would like to do on his follow-up and vein comp that is still recommended. She reports that Champ has been wearing his compression stockings but does not like the velcro wraps because they cause his legs to itch even with the liners. She encourage use of Aquaphor. They will be discharging from [facility] on Friday.

## 2025-05-07 ENCOUNTER — TELEPHONE (OUTPATIENT)
Dept: FAMILY MEDICINE | Facility: CLINIC | Age: 70
End: 2025-05-07
Payer: COMMERCIAL

## 2025-05-07 NOTE — TELEPHONE ENCOUNTER
"S-(situation):   Patient reporting concerns about a new blister forming and popping. Patient wanting to know what to do for treatment and follow up. Patient is concerned about redeveloping cellulitis.     B-(background):   05/02/2025: Patient was discharged from [facility] care services. Patient did not have any blisters at time of discharge.     A-(assessment):   Last night patient developed a blister and it popped. Patients spouse dressed blister in Xeroform, applied gauze, and roll gauze to secure.    Patient reporting they communicated with Vascular and they \"do not do wound care there.\"    R-(recommendations):   Writer communicated directly with Vascular clinic RN. The vascular clinic patient communicated with prior to calling the clinic is the vascular wound care clinic. Patient has an appointment for follow up on Monday, 05/12/2025. Vascular RN gave patient recommendations and education for wound management. Keep wound covered. Patient does not have to use Xeroform. Apply gauze and roll gauze to secure. Apply compression to legs.     PCP to defer wound recommendations to vascular and is agreeable with vasculars recommendations.     _______    Writer returned call to patient relaying recommendations. Patient expressed concern regarding Velcro compression. Writer provided max education on compression. Patient is agreeable to daily cleaning and dressing changes. Patient was not agreeable with compression application.     Jo Kessler RN  "

## 2025-05-07 NOTE — TELEPHONE ENCOUNTER
Patient calls stating he has developed blisters again and says the compression is causing this. He reports that this seems to be a vicious cycle that his wounds will heal and then he'll use compression and his legs will itch, he will scratch them, and he will develop blisters again. He states that he knows what the books says and that compression is the answer, but he would like someone to think outside the box, and that compression may not be the answer for him. Discussed current wound care plan with him. Advised to further discuss this at visit scheduled on Monday with Floresita Solorio DNP. He thinks he needs sooner care, and may see UC or PCP.

## 2025-05-08 ENCOUNTER — MEDICAL CORRESPONDENCE (OUTPATIENT)
Dept: HEALTH INFORMATION MANAGEMENT | Facility: CLINIC | Age: 70
End: 2025-05-08
Payer: COMMERCIAL

## 2025-05-12 ENCOUNTER — OFFICE VISIT (OUTPATIENT)
Dept: VASCULAR SURGERY | Facility: CLINIC | Age: 70
End: 2025-05-12
Attending: NURSE PRACTITIONER
Payer: COMMERCIAL

## 2025-05-12 VITALS
HEIGHT: 71 IN | BODY MASS INDEX: 35.84 KG/M2 | TEMPERATURE: 98 F | HEART RATE: 74 BPM | DIASTOLIC BLOOD PRESSURE: 86 MMHG | RESPIRATION RATE: 12 BRPM | SYSTOLIC BLOOD PRESSURE: 127 MMHG | WEIGHT: 256 LBS

## 2025-05-12 DIAGNOSIS — E66.01 MORBID OBESITY (H): ICD-10-CM

## 2025-05-12 DIAGNOSIS — Z86.711 HISTORY OF PULMONARY EMBOLISM: ICD-10-CM

## 2025-05-12 DIAGNOSIS — I87.2 VENOUS INSUFFICIENCY: ICD-10-CM

## 2025-05-12 DIAGNOSIS — Z79.01 CHRONIC ANTICOAGULATION: ICD-10-CM

## 2025-05-12 DIAGNOSIS — I87.303 VENOUS HYPERTENSION OF BOTH LOWER EXTREMITIES: Primary | ICD-10-CM

## 2025-05-12 DIAGNOSIS — I89.0 SECONDARY LYMPHEDEMA: ICD-10-CM

## 2025-05-12 DIAGNOSIS — L03.90 RECURRENT CELLULITIS: ICD-10-CM

## 2025-05-12 PROCEDURE — 11042 DBRDMT SUBQ TIS 1ST 20SQCM/<: CPT | Performed by: NURSE PRACTITIONER

## 2025-05-12 PROCEDURE — 3079F DIAST BP 80-89 MM HG: CPT | Performed by: NURSE PRACTITIONER

## 2025-05-12 PROCEDURE — 1125F AMNT PAIN NOTED PAIN PRSNT: CPT | Performed by: NURSE PRACTITIONER

## 2025-05-12 PROCEDURE — 3074F SYST BP LT 130 MM HG: CPT | Performed by: NURSE PRACTITIONER

## 2025-05-12 RX ADMIN — LIDOCAINE: 50 OINTMENT TOPICAL at 07:30

## 2025-05-12 ASSESSMENT — PAIN SCALES - GENERAL: PAINLEVEL_OUTOF10: MILD PAIN (3)

## 2025-05-12 NOTE — LETTER
[facility]  [address] [place] [address] [address]  [place], [place] [address]  [phone]      Fax [phone]    [facility]           FAX: [phone]            Customer Service: [phone]        Account #: [id number]    Wound Dressing Rx and Order Form  Order Status: new  Verbal: Debbi  Date: May 12, 2025     Brett OLGA Hopkins  Gender: male  : 1955  [address]  [facility] [address]  [phone] (home)     Medical Record: [id number]  Primary Care Provider: Magdaleno Bennett      ICD-10-CM    1. Venous hypertension of both lower extremities  I87.303 DEBRIDE SKIN/SUBQ TISSUE     DEBRIDE SKIN/SUBQ TISSUE      2. Venous insufficiency  I87.2 DEBRIDE SKIN/SUBQ TISSUE     DEBRIDE SKIN/SUBQ TISSUE      3. Recurrent cellulitis  L03.90 DEBRIDE SKIN/SUBQ TISSUE     DEBRIDE SKIN/SUBQ TISSUE      4. Secondary lymphedema  I89.0 DEBRIDE SKIN/SUBQ TISSUE     DEBRIDE SKIN/SUBQ TISSUE      5. History of pulmonary embolism  Z86.711 DEBRIDE SKIN/SUBQ TISSUE     DEBRIDE SKIN/SUBQ TISSUE      6. Morbid obesity (H)  E66.01 DEBRIDE SKIN/SUBQ TISSUE     DEBRIDE SKIN/SUBQ TISSUE      7. Chronic anticoagulation  Z79.01 DEBRIDE SKIN/SUBQ TISSUE     DEBRIDE SKIN/SUBQ TISSUE            Insurance Info:  INSURER: Payor: [facility] / Plan: [facility] MEDICARE ADVANTAGE / Product Type: Medicare /   Policy ID#:  [id number]  SECONDARY INSURANCE:    Secondary Policy ID#:  N/A        Physician Info:   Name:  AUBRIE BETH     Dept Address/Phones:   [address] [place] [address], [address]  [facility] [id number] [phone]  Fax: [phone]    Lymphedema circumferential measurements (in cm):      2020     8:00 AM 2020    10:00 AM 2025     9:00 AM 2025     7:00 AM   Circumferential Measures   Right just above MTP 25 24.5 23.8 23.2   Right Ankle 26.6 26.6 25.5 25.5   Right Widest Calf 46.8 46.7 43.5 42   Right Thigh Up 10cm 59.4 58.6     Right Knee to Ankle 34      Left - just above MTP 24.3 24.6 24 23   Left  "Ankle 27 26.3 24.5 25   Left Widest Calf 45 45.3 41.8 41.5   Left Thigh Up 10cm 54.8 53     Left Knee to Ankle 34            Wound info:  VASC Wound Right medial ankle (Active)   Pre Size Length 1.5 05/12/25 0700   Pre Size Width 3 05/12/25 0700   Pre Size Depth 0.1 05/12/25 0700   Pre Total Sq cm 4.5 05/12/25 0700   Description scattered 04/29/25 0900   Number of days: 13       VASC Wound right lateral lower extremity (Active)   Pre Size Length 0.5 05/12/25 0700   Pre Size Width 0.5 05/12/25 0700   Pre Size Depth 0.1 05/12/25 0700   Pre Total Sq cm 0.25 05/12/25 0700   Number of days: 0        Drainage: moderate  Thickness:  full  Duration of Need: 30 DAYS  Days Supply: 30 DAYS  Start Date: 5/12/2025  Starter Kit, Ancillary Kit (saline, gloves, gauze): yes  Qualifying wound/Debridement: Yes     DISPENSE AS WRITTEN.   Call [phone]. Please call patient for out-of-pocket costs and options.      Dressing Type Brand Size Frequency of change  Quantity   Primary xeroform  5\"x9\" DAILY and as needed 10   secondary Non-sterile square gauze  4\"x4\" DAILY and as needed 1 loaf    Sof form roll gauze  4\"x75\" DAILY and as needed 30   tape paper  1\" DAILY and as needed 1     DISPENSE AS WRITTEN. Call [phone] with questions.     Wound Care Instructions    Every 1-2 days your wife will , Cleanse your right ankle wound(s) with Dilute hibiclens 30cc in 500cc NS.    Primary Dressing: Apply xeroform 5\"x9\" into/onto the wounds    Secondary dressing: Cover with gauze 4\"x4\"    Secure with non-sterile roll gauze (4\" x 75\" roll) and tape (1\" roll tape) as needed; avoid adhesive directly on the skin    OK to forward to covered supplier.    Electronically Signed Physician:  AUBRIE BETH             Date: May 12, 2025    "

## 2025-05-12 NOTE — PATIENT INSTRUCTIONS
"Wound care supplies were ordered today through [employer] and if you are not receiving your supplies or have a question on your bill please contact Cayetano Cooper [phone]. Please allow 2-5 business days for delivery of supplies. You may get a call from a 1-800 # if there are additional information John needs. It is important to  or return their call. PLEASE NOTE: If you need to return your supplies, you MUST call customer service within 15 days of delivery date.     Some other topicals to try to help with the itching:            Wound Care Instructions    Every 1-2 days your wife will , Cleanse your right ankle wound(s) with Dilute hibiclens 30cc in 500cc NS.    Pat Dry with non-sterile gauze    Apply Lotion to the intact skin surrounding your wound and other dry skin locations. Some good lotions include: Remedy Skin Repair Cream, Sarna, Vanicream or Cetaphil    Primary Dressing: Apply xeroform into/onto the wounds    Secondary dressing: Cover with gauze    Secure with non-sterile roll gauze (4\" x 75\" roll) and tape (1\" roll tape) as needed; avoid adhesive directly on the skin    Compression: light weight compression stocking then velcro wraps bilaterally    It is not ok to get your wound wet in the bath or shower      If for some reason you are not able to get your dressing(s) changed as outlined above (due to illness, lack of supplies, lack of help) please do the following: remove old, soiled dressings; wash the wounds with saline; pat dry; apply ABD pad or other absorbant pad and secure with rolled gauze; avoid tape directly on your skin; Call the clinic as soon as possible to let us know what the current issues are in receiving wound care [phone].      SEEK MEDICAL CARE IF:  You have an increase in swelling, pain, or redness around the wound.  You have an increase in the amount of pus coming from the wound.  There is a bad smell coming from the wound.  The wound appears to be worsening/enlarging  You " have a fever greater than 101.5 F      It is ok to continue current wound care treatment/products for the next 2-3 days until new wound care supplies are ordered and arrive. If longer than this please contact our office at [phone].    If you have a 2 layer or 4 layer compression wrap on these are safe to have on for ONLY 7 days. If for some reason you are not able to get the wrap(s) changed (due to illness; lack of supplies, lack of help, lack of transportation) please do the following: unwrap the old 2 or 4 layer compression wrap; avoid using scissors as you could cut your skin and cause wounds; use tubular compression when available. Call to reschedule your home care or clinic visit appointment as soon as possible.    Please NOTE: if you are 15 minutes late to your arrival time, you will have to be rescheduled. Please call our clinic as soon as possible if you know you will not be able to get to your appointment at [phone]. If you fail to show up to 3 scheduled clinic appointments you will be dismissed from our clinic.              We want to hear from you!  In the next few weeks, you should receive a call or email to complete a survey about your visit at [facility]. Please help us improve your appointment experience by letting us know how we did today. We strive to make your experience good and value any ways in which we could do better.      We value your input and suggestions.    Thank you for choosing the [facility]!        It is recommended that you do not get your ulcer wet when showering.  Listed below are several ways of keeping it dry when you shower.     1. Wrap it with [employer] Se[employer] plastic wrap.  It can be found in the stores where the plastic wraps or tin foil is kept.               2.  Some people take a bath and hang their leg/foot out of the tub.                        3  Put your leg in a plastic bag and tape it on.           4. You can purchase a  shower cover for casts at some pharmacies and through the Internet.            5. Take a Bed Bath or wash up at the sink

## 2025-05-12 NOTE — PROGRESS NOTES
Clinic Administered Medication Documentation    Patient was given lidocaine 5% ointment to wounds prior to debridment. Prior to medication administration, verified patient's identity using patient's name and date of birth.    Long Elizondo RN

## 2025-05-12 NOTE — PROGRESS NOTES
Follow up Vascular Visit       Date of Service:05/12/25      Chief Complaint: BLE swelling; right ankle ulcers    Pt returns to [facility] with regards to their BLE swelling; right ankle ulcers.  They arrive today with spouse. He was transitioned into velcro wraps; he did not like the liner stocking that came with the velcro so he purchased cotton crew compression stocking 15-20mmHG as the liner. He was discharged from home care stating that he was no longer home bound and they felt the wounds were healed; the patient is not so sure that the wounds were healed. He called the clinic last week to ask what he should be putting on his wounds.  He was concerned his cellulitis has returned however he is not running fevers and there is no redness. He called our clinic and was instructed to use xeroform and gauze until he was seen. They are feeling well today. Denies fevers, chills. No shortness of breath. He is scheduled for venous insufficiency studies on 5/28/25. He has been trying to get new compression garments and has run into road blocks with issues getting appts; and insurance coverage.     Allergies:   Allergies   Allergen Reactions    Sulfa (Sulfonamide Antibiotics) [Sulfa Antibiotics] Rash     Rash - turned purple  , Around age 32       Medications:   Current Outpatient Medications:     amoxicillin (AMOXIL) 500 MG tablet, TAKE FOUR TS PO 1 HOUR B DAPP, Disp: , Rfl:     apixaban ANTICOAGULANT (ELIQUIS) 5 MG tablet, Take 1 tablet (5 mg) by mouth 2 times daily., Disp: 60 tablet, Rfl: 1    aspirin 81 MG EC tablet, Take 1 tablet (81 mg) by mouth daily, Disp: , Rfl:     hydrochlorothiazide (HYDRODIURIL) 25 MG tablet, TAKE 1 TABLET BY MOUTH EVERY DAY IN THE MORNING FOR HIGH BLOOD PRESSURE, Disp: 90 tablet, Rfl: 2    levothyroxine (SYNTHROID/LEVOTHROID) 125 MCG tablet, Take 1 tablet (125 mcg) by mouth every morning (before breakfast)., Disp: 90 tablet, Rfl: 1    lisinopril (ZESTRIL) 40 MG  tablet, Take 1 tablet (40 mg) by mouth daily. for high blood pressure, Disp: , Rfl:     omeprazole (PRILOSEC) 20 MG DR capsule, TAKE 1 CAPSULE BY MOUTH DAILY BEFORE BREAKFAST, Disp: 90 capsule, Rfl: 1    rosuvastatin (CRESTOR) 10 MG tablet, Take 1 tablet (10 mg) by mouth daily, Disp: 90 tablet, Rfl: 3    acetaminophen (TYLENOL) 325 MG tablet, Take 2 tablets (650 mg) by mouth every 4 hours as needed for mild pain or other (and adjunct with moderate or severe pain or per patient request)., Disp: 30 tablet, Rfl: 0    HYDROmorphone (DILAUDID) 2 MG tablet, Take 1 tablet (2 mg) by mouth every 6 hours as needed for pain., Disp: 25 tablet, Rfl: 0    Current Facility-Administered Medications:     lidocaine (XYLOCAINE) 5 % ointment, , Topical, Daily PRN, Floresita Solorio, NP, Given at 05/12/25 0730    History:   Past Medical History:   Diagnosis Date    Aneurysm Of The Ascending Aorta     Mild dilatation (4.1 cm) by MRI in Aug 2013  2022-4.0 cm  Cardiology  Consider repeat 2024, 2025         Cellulitis of right lower leg 04/03/2025    Diagnosed and hospitalized April 2025      GERD (gastroesophageal reflux disease) 09/10/2018    History of a hiatal hernia  Omeprazole daily  Breakthrough symptoms by 2 PM daily         Hypertension 04/26/2019    Dx Apr 2019  Metoprolol, also for atrial fibrillation.  Lisinopril, April 2019  hydrochlorothiazide Oct 2020         Lymphedema 06/01/2020    Right leg, after surgery  Dr. Orlando, et al  Compression stockings, pneumatic pump-somewhat helpful  Lasix, Nov 2020, but as long December 2020, neither were helpful.  Massage      Myasthenia gravis (H) 01/04/2023    Dx 2022  Double vision  Neurology, Opthalmology            Obesity (BMI 35.0-39.9) with comorbidity (H) 11/20/2020    Persistent atrial fibrillation     Dx 2007  Symptomatic, SANDERS -NO longer symptomatic  CV with first episode  [id number] - VASc risk score = 1 (HTN)  Failed Rhythmol > Flecainide    PVI August 19, 2013, repeat Ablation Feb  "2024 April 2019 recurrent AF-Eliquis  Cardioversion, September 2019, Nov 2023.  Eliquis  Watchman, Jan 2023         Presence of Watchman left atrial appendage closure device 01/12/2023 1/12/23 - 27 mm Watchman FLX device      Pulmonary embolism (H) 04/02/2025    Dx April 2025  Eliquis      Status post ablation of atrial fibrillation 07/28/2015    PVI August 19, 2013 (cryo-PVI only)         Thyroid nodule 01/05/2022    Incidental diagnosis December 2021  2 nodules, 2.1 and 2.4 cm maximum dimension  FNA results 2023, benign  Ultrasound, Dec 2023 stable  US-Nov 2024-stable, no FNA  Recommend follow-up 2025, 2027 and 2029      Varicose Veins     no symptoms            Physical Exam:    /86   Pulse 74   Temp 98  F (36.7  C)   Resp 12   Ht 5' 11\" (1.803 m)   Wt 256 lb (116.1 kg)   BMI 35.70 kg/m      General:  Patient presents to clinic in no apparent distress.  Head: normocephalic atraumatic  Psychiatric:  Alert and oriented x3.   Respiratory: unlabored breathing; no cough  Integumentary:  Skin is uniformly warm, dry and pink.    Ulcer #1 Location: right medial ankle  Size: 1.5L x 3W x 0.1depth.  no sinus tract present, Wound base: slough  no undermining present. Ulcer is full thickness. There is moderate drainage. Periwound: no denudement, erythema, induration, maceration or warmth.      Ulcer #2  Location: right lateral  ankle  Size: 0.5x0.5x 0.1depth.  no sinus tract present, Wound base: slough  no undermining present. Ulcer is full thickness. There is moderate drainage. Periwound: no denudement, erythema, induration, maceration or warmth.      Areas of excoriation on the right leg; peeling skin on the plantar foot    Swelling well controlled    VASC Wound Right medial ankle (Active)   Pre Size Length 1.5 05/12/25 0700   Pre Size Width 3 05/12/25 0700   Pre Size Depth 0.1 05/12/25 0700   Pre Total Sq cm 4.5 05/12/25 0700   Description scattered 04/29/25 0900   Number of days: 13       VASC Wound right " lateral lower extremity (Active)   Pre Size Length 0.5 05/12/25 0700   Pre Size Width 0.5 05/12/25 0700   Pre Size Depth 0.1 05/12/25 0700   Pre Total Sq cm 0.25 05/12/25 0700   Number of days: 0            Circumferential volume measures:          6/1/2020     8:00 AM 8/31/2020    10:00 AM 4/29/2025     9:00 AM 5/12/2025     7:00 AM   Circumferential Measures   Right just above MTP 25 24.5 23.8 23.2   Right Ankle 26.6 26.6 25.5 25.5   Right Widest Calf 46.8 46.7 43.5 42   Right Thigh Up 10cm 59.4 58.6     Right Knee to Ankle 34      Left - just above MTP 24.3 24.6 24 23   Left Ankle 27 26.3 24.5 25   Left Widest Calf 45 45.3 41.8 41.5   Left Thigh Up 10cm 54.8 53     Left Knee to Ankle 34          Labs:    I personally reviewed the following lab results today and those on care everywhere    CRP   Date Value Ref Range Status   09/10/2019 0.5 0.0 - 0.8 mg/dL Final      Erythrocyte Sedimentation Rate   Date Value Ref Range Status   07/15/2024 7 0 - 20 mm/hr Final      Last Renal Panel:  Sodium   Date Value Ref Range Status   04/05/2025 139 135 - 145 mmol/L Final     Potassium   Date Value Ref Range Status   04/08/2025 3.7 3.4 - 5.3 mmol/L Final   02/04/2022 4.7 3.5 - 5.0 mmol/L Final     Chloride   Date Value Ref Range Status   04/05/2025 105 98 - 107 mmol/L Final   02/04/2022 103 98 - 107 mmol/L Final     Carbon Dioxide (CO2)   Date Value Ref Range Status   04/05/2025 24 22 - 29 mmol/L Final   02/04/2022 29 22 - 31 mmol/L Final     Anion Gap   Date Value Ref Range Status   04/05/2025 10 7 - 15 mmol/L Final   02/04/2022 8 5 - 18 mmol/L Final     Glucose   Date Value Ref Range Status   04/05/2025 110 (H) 70 - 99 mg/dL Final   02/04/2022 89 70 - 125 mg/dL Final     Urea Nitrogen   Date Value Ref Range Status   04/05/2025 16.8 8.0 - 23.0 mg/dL Final   02/04/2022 20 8 - 22 mg/dL Final     Creatinine   Date Value Ref Range Status   04/08/2025 1.35 (H) 0.67 - 1.17 mg/dL Final     GFR Estimate   Date Value Ref Range Status  "  04/08/2025 58 (L) >60 mL/min/1.73m2 Final     Comment:     eGFR calculated using 2021 CKD-EPI equation.   12/21/2020 >60 >60 mL/min/1.73m2 Final     Calcium   Date Value Ref Range Status   04/05/2025 8.4 (L) 8.8 - 10.4 mg/dL Final     Albumin   Date Value Ref Range Status   04/03/2025 3.5 3.5 - 5.2 g/dL Final      Lab Results   Component Value Date    WBC 10.4 04/08/2025     Lab Results   Component Value Date    RBC 4.11 04/08/2025     Lab Results   Component Value Date    HGB 12.6 04/08/2025     Lab Results   Component Value Date    HCT 37.3 04/08/2025     No components found for: \"MCT\"  Lab Results   Component Value Date    MCV 91 04/08/2025     Lab Results   Component Value Date    MCH 30.7 04/08/2025     Lab Results   Component Value Date    MCHC 33.8 04/08/2025     Lab Results   Component Value Date    RDW 13.9 04/08/2025     Lab Results   Component Value Date     04/08/2025      No results found for: \"A1C\"   TSH   Date Value Ref Range Status   04/02/2025 7.47 (H) 0.30 - 4.20 uIU/mL Final   02/04/2022 0.26 (L) 0.30 - 5.00 uIU/mL Final      No results found for: \"VITDT\"                Impression:  Encounter Diagnoses   Name Primary?    Venous hypertension of both lower extremities Yes    Venous insufficiency     Recurrent cellulitis     Secondary lymphedema     History of pulmonary embolism     Morbid obesity (H)     Chronic anticoagulation                              Are any of these ulcers new today: No; Location: na    Assessment/Plan:          1. Debridement: Nursing staff removed the old dressing and cleanse the wound(s) with specified solution. After discussion of risk factors and verbal consent was obtained 2% Lidocaine HCL jelly was applied, under clean conditions, the right leg ulceration(s) were debrided using currette. Devitalized and nonviable tissue, along with any fibrin and slough, was removed to improve granulation tissue formation, stimulate wound healing, decrease overall bacteria " load, disrupt biofilm formation and decrease edge senescence.  Total excisional debridement was 4.75 sq cm from the epidermis/dermis area and into the subcutaneous tissue with a depth of 0.1 cm.   Ulcers were improved afterwards and .  Measures were unchanged after debridement.       2.  Ulcer treatment: ulcer treatment will include irrigation and dressings to promote autolytic debridement which will include:they did not want to come to clinic twice per week; they did not want to have home care ordered again as he is no longer home bound; agreed to have wife change every 1-2 days; will use xeroform; gauze; rolled gauze If for some reason the patient is not able to get their dressing(s) changed as outlined above (due to illness, lack of supplies, lack of help) please do the following: remove old, soiled dressings; wash the ulcers with saline; pat dry; apply ABD pad or other absorbant pad and secure with rolled gauze; avoid tape directly on your skin; patient instructed to call the clinic as soon as possible to let us know what the current issues are in receiving ulcer care. Stable            3. Edema: will have venous insufficiency ultrasound in 2 weeks; will continue with light weight cotton crew stocking 15-20mmhg and velcro on top; he has been to 3 different compression fitters; awaiting insurance coverage. The compression wraps were applied today in clinic.     Patient presents with moderate, bilateral  lower extremity secondary lymphedema.      Patient requires a standard-fit knee high compression stocking and/or velcro wraps    Secondary Lymphedema &Edema: continue elevation and velcro wraps; needs to wear these consistently; replace every 6 months. The compression wraps were applied today in clinic. Patient presents with severe, bilateral secondary lymphedema. Patient requires daytime and nighttime compression garments; I have prescribed velcro wraps to accommodate and deliver gradient compression  throughout the affected extremities. Quantity of 3 is needed for each leg for proper wash and wear.         If a 2 layer or 4 layer compression wrap is being used; these are safe to have on for ONLY 7 days. If for some reason the patient is not able to get the wrap(s) changed (due to illness; lack of supplies, lack of help, lack of transportation) please do the following: unwrap the old 2 or 4 layer compression wrap; avoid using scissors as you could cut your skin and cause ulcers; use tubular compression when available. Call to reschedule your home care or clinic visit appointment as soon as possible.  Stable            4. Nutrition: focus on weight loss           5. Offloading: avoid pressure over the wound area; not wearing boots          6. Wound Etiology: venous stasis; cellulitis         7. Itching: showed the patient the skin damage incurred from scratching and risk for infeciton; recommend cortisone 10; benadryl oral and topical; gold bond medicated cream     Patient will follow up with me in 2 weeks for reevaluation. They were instructed to call the clinic sooner with any signs or symptoms of infection or any further questions/concerns. Answered all questions.          Floresita Solorio DNP, RN, CNP, CWOCN, CFCN, CLT  [facility]   [phone]        This note was electronically signed by Floresita Solorio NP

## 2025-05-14 ENCOUNTER — OFFICE VISIT (OUTPATIENT)
Dept: FAMILY MEDICINE | Facility: CLINIC | Age: 70
End: 2025-05-14
Payer: COMMERCIAL

## 2025-05-14 VITALS
RESPIRATION RATE: 14 BRPM | DIASTOLIC BLOOD PRESSURE: 73 MMHG | BODY MASS INDEX: 35.84 KG/M2 | SYSTOLIC BLOOD PRESSURE: 119 MMHG | OXYGEN SATURATION: 97 % | WEIGHT: 256 LBS | HEART RATE: 66 BPM | HEIGHT: 71 IN | TEMPERATURE: 97.7 F

## 2025-05-14 DIAGNOSIS — E06.3 HYPOTHYROIDISM DUE TO HASHIMOTO THYROIDITIS: ICD-10-CM

## 2025-05-14 DIAGNOSIS — I26.99 OTHER ACUTE PULMONARY EMBOLISM WITHOUT ACUTE COR PULMONALE (H): ICD-10-CM

## 2025-05-14 DIAGNOSIS — L03.115 CELLULITIS OF RIGHT LOWER LEG: ICD-10-CM

## 2025-05-14 DIAGNOSIS — I89.0 LYMPHEDEMA: Primary | ICD-10-CM

## 2025-05-14 PROCEDURE — 36415 COLL VENOUS BLD VENIPUNCTURE: CPT | Performed by: FAMILY MEDICINE

## 2025-05-14 NOTE — PROGRESS NOTES
"  Assessment & Plan     (I89.0) Lymphedema  (primary encounter diagnosis)  Comment: Patient with a longstanding history of right upper leg lymphedema, stable  Plan:      (L03.115) Cellulitis of right lower leg  Comment: Patient with a recent cellulitis right lower leg has now improved significantly  Plan:      (I26.99) Pulmonary embolism  (H)  Comment: Patient diagnosed with a pulmonary embolus in April of this year on Eliquis  Plan: PRIMARY CARE FOLLOW-UP SCHEDULING             (E06.3) Hypothyroidism   Comment: Patient is on thyroid replacement   Plan: PRIMARY CARE FOLLOW-UP SCHEDULING, CANCELED:         TSH             PLAN:  1.  Patient is followed regularly at the vascular clinic for his lymphedema and recent cellulitis  2.  Patient will finish out about a 4-month course of Eliquis and then discontinue  3.  Recheck TSH  4.  Patient is due for Medicare wellness exam in 6-month  5.  The longitudinal plan of care for the diagnosis(es)/condition(s) as documented were addressed during this visit. Due to the added complexity in care, I will continue to support Champ in the subsequent management and with ongoing continuity of care.            BMI  Estimated body mass index is 35.7 kg/m  as calculated from the following:    Height as of this encounter: 1.803 m (5' 11\").    Weight as of this encounter: 116.1 kg (256 lb).             Subjective   Champ is a 69 year old, presenting for the following health issues:  RECHECK (1 month follow up from cellulitis, right foot and leg  ) and Recheck Medication        5/14/2025    10:00 AM   Additional Questions   Roomed by Dennis     History of Present Illness       Reason for visit:  Follow up for cellulitis    He eats 2-3 servings of fruits and vegetables daily.He consumes 0 sweetened beverage(s) daily.He exercises with enough effort to increase his heart rate 30 to 60 minutes per day.  He exercises with enough effort to increase his heart rate 3 or less days per week.   He is taking " "medications regularly.   - Name: Champ Hopkins, age: 69 years, gender: male  - Previously had home healthcare, no longer receiving it  - Swelling in lower abdomen completely resolved, no longer experiencing swelling  - Chronic swelling previously in upper abdomen  - Infection was in the lower part of the lower abdomen  - Weight loss of 22 lbs over two weeks, attributed to loss of water weight  - History of knee surgery, operated twice, prefers knee surgery over cellulitis due to pain severity  - Cellulitis was more painful than knee surgery, caused inability to walk initially  - Pain in ankle, mild and more tolerable than a month ago  - Itchiness due to wrapping, experienced severe itching about a week and a half ago  - Blisters present on legs, no swelling observed from knee down  - Ultrasound performed previously by Dr. Orlando, signs of vascular disease noted but not treated  - Blood clots diagnosed in lungs last month, currently on Eliquis  - Previously on Eliquis after ablation, discontinued after nearly a full bottle left  - No shortness of breath reported recently  - Thyroid retested in January, dose was off, planned retesting postponed due to other health issues  - Suspected cause of leg infection due to winter dryness and scratching on ankle in January or February                  Objective    /73   Pulse 66   Temp 97.7  F (36.5  C) (Oral)   Resp 14   Ht 1.803 m (5' 11\")   Wt 116.1 kg (256 lb)   SpO2 97%   BMI 35.70 kg/m    Body mass index is 35.7 kg/m .  Physical Exam               Signed Electronically by: Magdaleno Bennett MD    "

## 2025-05-15 LAB — TSH SERPL DL<=0.005 MIU/L-ACNC: 3.75 UIU/ML (ref 0.3–4.2)

## 2025-05-21 ENCOUNTER — TELEPHONE (OUTPATIENT)
Dept: VASCULAR SURGERY | Facility: CLINIC | Age: 70
End: 2025-05-21
Payer: COMMERCIAL

## 2025-05-21 NOTE — TELEPHONE ENCOUNTER
"Per Floresita Solorio DNP, she would like to discuss this more in depth with patient at his visit on 05/28/2025. Message was relayed to patient, and he spent 5 minutes swearing during phone call stating, \"the quality of my life is shit. Someone there needs to think outside the box.\" Offered to schedule him with a different wound care provider, but notified that he would still be encouraged to wear compression. He states \"no one there thinks outside of the box, you tell Floresita I am pissed. I am so pissed, like very much so.\" Offered patient to speak with patient relations as he stated that he would call "[employer] because \"something has to happen hear about my damn blisters, I have no swelling, tell her I am upset, just tell her that, every damn time I call or come in I'm told to keep wearing compression, but I keep getting damn blisters, I am upset.\" Patient took patient relations number, and said send Floresita this message. He promptly disconnected call.      "

## 2025-05-21 NOTE — TELEPHONE ENCOUNTER
Received call from patient very concerned about his legs. He had multiple questions that writer was unable to answer. Patient states he's wearing compression as advised. Patient is upset that he can't get back to his water exercise as this is his main form of exercise. He would like Floresita to answer if able.    Why do the blisters heal, and then a new one will come?  Is the dressing causing this, or the compression?     Will review with Floresita Solorio DNP to answer questions specifically.

## 2025-05-28 ENCOUNTER — TELEPHONE (OUTPATIENT)
Dept: VASCULAR SURGERY | Facility: CLINIC | Age: 70
End: 2025-05-28

## 2025-05-28 ENCOUNTER — ANCILLARY PROCEDURE (OUTPATIENT)
Dept: VASCULAR ULTRASOUND | Facility: CLINIC | Age: 70
End: 2025-05-28
Attending: NURSE PRACTITIONER
Payer: COMMERCIAL

## 2025-05-28 ENCOUNTER — RESULTS FOLLOW-UP (OUTPATIENT)
Dept: VASCULAR SURGERY | Facility: CLINIC | Age: 70
End: 2025-05-28

## 2025-05-28 ENCOUNTER — OFFICE VISIT (OUTPATIENT)
Dept: VASCULAR SURGERY | Facility: CLINIC | Age: 70
End: 2025-05-28
Attending: NURSE PRACTITIONER
Payer: COMMERCIAL

## 2025-05-28 VITALS — HEART RATE: 63 BPM | DIASTOLIC BLOOD PRESSURE: 84 MMHG | SYSTOLIC BLOOD PRESSURE: 150 MMHG | OXYGEN SATURATION: 100 %

## 2025-05-28 DIAGNOSIS — I87.303 VENOUS HYPERTENSION OF BOTH LOWER EXTREMITIES: Primary | ICD-10-CM

## 2025-05-28 DIAGNOSIS — I89.0 SECONDARY LYMPHEDEMA: ICD-10-CM

## 2025-05-28 DIAGNOSIS — I87.2 VENOUS INSUFFICIENCY: ICD-10-CM

## 2025-05-28 DIAGNOSIS — L03.90 RECURRENT CELLULITIS: ICD-10-CM

## 2025-05-28 DIAGNOSIS — I87.303 VENOUS HYPERTENSION OF BOTH LOWER EXTREMITIES: ICD-10-CM

## 2025-05-28 DIAGNOSIS — Z79.01 CHRONIC ANTICOAGULATION: ICD-10-CM

## 2025-05-28 DIAGNOSIS — Z86.711 HISTORY OF PULMONARY EMBOLISM: ICD-10-CM

## 2025-05-28 DIAGNOSIS — E66.01 MORBID OBESITY (H): ICD-10-CM

## 2025-05-28 PROCEDURE — 93970 EXTREMITY STUDY: CPT

## 2025-05-28 PROCEDURE — 93970 EXTREMITY STUDY: CPT | Mod: 26 | Performed by: SURGERY

## 2025-05-28 ASSESSMENT — PAIN SCALES - GENERAL: PAINLEVEL_OUTOF10: NO PAIN (0)

## 2025-05-28 NOTE — TELEPHONE ENCOUNTER
Lymphedema pump order and required documentation faxed to Mitchell at [facility].

## 2025-05-28 NOTE — PROGRESS NOTES
Follow up Vascular Visit       Date of Service:05/28/25      Chief Complaint: BLE swelling; recurrent blistering to the right ankle       Pt returns to [facility] with regards to their bLE swelling; recurrent blistering to the right ankle.  They arrive today alone. They are currently using nothing to the wounds. When he gets blistering he will dress these with xeroform and gauze. They are using compression stockings for compression. He feels the velcro wraps contributed to the blistering and stopped using these and just wearing compression stockings.  They are feeling well today. Denies fevers, chills. No shortness of breath.     Allergies:   Allergies   Allergen Reactions    Sulfa (Sulfonamide Antibiotics) [Sulfa Antibiotics] Rash     Rash - turned purple  , Around age 32       Medications:   Current Outpatient Medications:     amoxicillin (AMOXIL) 500 MG tablet, TAKE FOUR TS PO 1 HOUR B DAPP, Disp: , Rfl:     apixaban ANTICOAGULANT (ELIQUIS) 5 MG tablet, Take 1 tablet (5 mg) by mouth 2 times daily., Disp: 60 tablet, Rfl: 1    aspirin 81 MG EC tablet, Take 1 tablet (81 mg) by mouth daily, Disp: , Rfl:     hydrochlorothiazide (HYDRODIURIL) 25 MG tablet, TAKE 1 TABLET BY MOUTH EVERY DAY IN THE MORNING FOR HIGH BLOOD PRESSURE, Disp: 90 tablet, Rfl: 2    levothyroxine (SYNTHROID/LEVOTHROID) 125 MCG tablet, Take 1 tablet (125 mcg) by mouth every morning (before breakfast)., Disp: 90 tablet, Rfl: 1    lisinopril (ZESTRIL) 40 MG tablet, Take 1 tablet (40 mg) by mouth daily. for high blood pressure, Disp: , Rfl:     omeprazole (PRILOSEC) 20 MG DR capsule, TAKE 1 CAPSULE BY MOUTH DAILY BEFORE BREAKFAST, Disp: 90 capsule, Rfl: 1    rosuvastatin (CRESTOR) 10 MG tablet, Take 1 tablet (10 mg) by mouth daily, Disp: 90 tablet, Rfl: 3    Current Facility-Administered Medications:     lidocaine (XYLOCAINE) 5 % ointment, , Topical, Daily PRN, Floresita Solorio, NP, Given at 05/12/25 0730    History:   Past  Medical History:   Diagnosis Date    AA (alopecia areata) 09/10/2018    Area of hair loss back of scalp  Dermatology         Aneurysm Of The Ascending Aorta     Mild dilatation (4.1 cm) by MRI in Aug 2013  2022-4.0 cm  Cardiology  Consider repeat 2024, 2025         Cellulitis of right lower leg 04/03/2025    Diagnosed and hospitalized April 2025      GERD (gastroesophageal reflux disease) 09/10/2018    History of a hiatal hernia  Omeprazole daily  Breakthrough symptoms by 2 PM daily         Gout 11/06/2024    Dx July 2024  Left hand swelling  Uric Acid 7.7      Hyperlipidemia 12/19/2023    Crestor      Hypertension 04/26/2019    Dx Apr 2019  Metoprolol, also for atrial fibrillation.  Lisinopril, April 2019  hydrochlorothiazide Oct 2020         Hypothyroidism     Thyroid replacement      Lymphedema 06/01/2020    Right leg, after surgery  Dr. Orlando, et al  Compression stockings, pneumatic pump-somewhat helpful  Lasix, Nov 2020, but as long December 2020, neither were helpful.  Massage      Myasthenia gravis (H) 01/04/2023    Dx 2022  Double vision  Neurology, Opthalmology            Obesity (BMI 35.0-39.9) with comorbidity (H) 11/20/2020    Persistent atrial fibrillation     Dx 2007  Symptomatic, SANDERS -NO longer symptomatic  CV with first episode  [id number] - VASc risk score = 1 (HTN)  Failed Rhythmol > Flecainide    PVI August 19, 2013, repeat Ablation Feb 2024 April 2019 recurrent AF-Eliquis  Cardioversion, September 2019, Nov 2023.  Eliquis  Watchman, Jan 2023         Presence of Watchman left atrial appendage closure device 01/12/2023 1/12/23 - 27 mm Watchman FLX device      Pulmonary embolism (H) 04/02/2025    Dx April 2025  Eliquis      Status post ablation of atrial fibrillation 07/28/2015    PVI August 19, 2013 (cryo-PVI only)         Thyroid nodule 01/05/2022    Incidental diagnosis December 2021  2 nodules, 2.1 and 2.4 cm maximum dimension  FNA results 2023, benign  Ultrasound, Dec 2023 stable  US-Nov  2024-stable, no FNA  Recommend follow-up 2025, 2027 and 2029      Varicose Veins     no symptoms            Physical Exam:    BP (!) 150/84 (BP Location: Left arm)   Pulse 63   SpO2 100%     General:  Patient presents to clinic in no apparent distress.  Head: normocephalic atraumatic  Psychiatric:  Alert and oriented x3.   Respiratory: unlabored breathing; no cough  Integumentary:  Skin is uniformly warm, dry and pink.    Extremities: swelling stable; skin intact; no weeping; no erythema; no pain with palpation    VASC Wound Right medial ankle (Active)   Pre Size Length 1 05/28/25 1048   Pre Size Width 1.2 05/28/25 1048   Pre Size Depth 0.1 05/28/25 1048   Pre Total Sq cm 1.2 05/28/25 1048   Description scattered 04/29/25 0900   Number of days: 29       VASC Wound right lateral lower extremity (Active)   Pre Size Length 0.5 05/12/25 0700   Pre Size Width 0.5 05/12/25 0700   Pre Size Depth 0.1 05/12/25 0700   Pre Total Sq cm 0.25 05/12/25 0700   Number of days: 16            Circumferential volume measures:          6/1/2020     8:00 AM 8/31/2020    10:00 AM 4/29/2025     9:00 AM 5/12/2025     7:00 AM 5/28/2025    10:51 AM   Circumferential Measures   Right just above MTP 25 24.5 23.8 23.2 23.4   Right Ankle 26.6 26.6 25.5 25.5 25.4   Right Widest Calf 46.8 46.7 43.5 42 43   Right Thigh Up 10cm 59.4 58.6      Right Knee to Ankle 34       Left - just above MTP 24.3 24.6 24 23 23.7   Left Ankle 27 26.3 24.5 25 24.6   Left Widest Calf 45 45.3 41.8 41.5 42.5   Left Thigh Up 10cm 54.8 53      Left Knee to Ankle 34           Labs:    I personally reviewed the following lab results today and those on care everywhere    CRP   Date Value Ref Range Status   09/10/2019 0.5 0.0 - 0.8 mg/dL Final      Erythrocyte Sedimentation Rate   Date Value Ref Range Status   07/15/2024 7 0 - 20 mm/hr Final      Last Renal Panel:  Sodium   Date Value Ref Range Status   04/05/2025 139 135 - 145 mmol/L Final     Potassium   Date Value Ref  "Range Status   04/08/2025 3.7 3.4 - 5.3 mmol/L Final   02/04/2022 4.7 3.5 - 5.0 mmol/L Final     Chloride   Date Value Ref Range Status   04/05/2025 105 98 - 107 mmol/L Final   02/04/2022 103 98 - 107 mmol/L Final     Carbon Dioxide (CO2)   Date Value Ref Range Status   04/05/2025 24 22 - 29 mmol/L Final   02/04/2022 29 22 - 31 mmol/L Final     Anion Gap   Date Value Ref Range Status   04/05/2025 10 7 - 15 mmol/L Final   02/04/2022 8 5 - 18 mmol/L Final     Glucose   Date Value Ref Range Status   04/05/2025 110 (H) 70 - 99 mg/dL Final   02/04/2022 89 70 - 125 mg/dL Final     Urea Nitrogen   Date Value Ref Range Status   04/05/2025 16.8 8.0 - 23.0 mg/dL Final   02/04/2022 20 8 - 22 mg/dL Final     Creatinine   Date Value Ref Range Status   04/08/2025 1.35 (H) 0.67 - 1.17 mg/dL Final     GFR Estimate   Date Value Ref Range Status   04/08/2025 58 (L) >60 mL/min/1.73m2 Final     Comment:     eGFR calculated using 2021 CKD-EPI equation.   12/21/2020 >60 >60 mL/min/1.73m2 Final     Calcium   Date Value Ref Range Status   04/05/2025 8.4 (L) 8.8 - 10.4 mg/dL Final     Albumin   Date Value Ref Range Status   04/03/2025 3.5 3.5 - 5.2 g/dL Final      Lab Results   Component Value Date    WBC 10.4 04/08/2025     Lab Results   Component Value Date    RBC 4.11 04/08/2025     Lab Results   Component Value Date    HGB 12.6 04/08/2025     Lab Results   Component Value Date    HCT 37.3 04/08/2025     No components found for: \"MCT\"  Lab Results   Component Value Date    MCV 91 04/08/2025     Lab Results   Component Value Date    MCH 30.7 04/08/2025     Lab Results   Component Value Date    MCHC 33.8 04/08/2025     Lab Results   Component Value Date    RDW 13.9 04/08/2025     Lab Results   Component Value Date     04/08/2025      No results found for: \"A1C\"   TSH   Date Value Ref Range Status   05/14/2025 3.75 0.30 - 4.20 uIU/mL Final   02/04/2022 0.26 (L) 0.30 - 5.00 uIU/mL Final      No results found for: \"VITDT\"        "         Impression:  Encounter Diagnoses   Name Primary?    Venous hypertension of both lower extremities Yes    Venous insufficiency     Recurrent cellulitis     Secondary lymphedema     History of pulmonary embolism     Morbid obesity (H)     Chronic anticoagulation       4/15/25 BLE    5/28/2025 BLE                       Are any of these ulcers new today: No; Location: na    Assessment/Plan:          1. Debridement: not done     2.  Ulcer treatment: ulcer treatment will include irrigation and dressings to promote autolytic debridement which will include:no dressings needed. If new blisters occur resume xeroform; gauze; rolled gauze. If for some reason the patient is not able to get their dressing(s) changed as outlined above (due to illness, lack of supplies, lack of help) please do the following: remove old, soiled dressings; wash the ulcers with saline; pat dry; apply ABD pad or other absorbant pad and secure with rolled gauze; avoid tape directly on your skin; patient instructed to call the clinic as soon as possible to let us know what the current issues are in receiving ulcer care. Stable            3. Edema: he has not heard back from [employer]; he was fine with moving forward with [employer]; will send order; encouraged elevation; encouraged swimming and aquaaerobics; his ultrasound demonstrated incompetent b/l SSV; will have him see Dr. Max; he feels the velcro wraps were causing the blistering; he would like to stick with the white cotton crew 20-30mmHG compression stocking. The compression wraps were applied today in clinic.     Patient presents with moderate, bilateral  lower extremity secondary lymphedema.      Patient requires a standard-fit knee high compression stocking and/or velcro wraps    Secondary Lymphedema &Edema: continue elevation and velcro wraps; needs to wear these consistently; replace every 6 months. The compression wraps were applied today in clinic. Patient presents with severe,  bilateral secondary lymphedema. Patient requires daytime and nighttime compression garments; I have prescribed velcro wraps to accommodate and deliver gradient compression throughout the affected extremities. Quantity of 3 is needed for each leg for proper wash and wear.         If a 2 layer or 4 layer compression wrap is being used; these are safe to have on for ONLY 7 days. If for some reason the patient is not able to get the wrap(s) changed (due to illness; lack of supplies, lack of help, lack of transportation) please do the following: unwrap the old 2 or 4 layer compression wrap; avoid using scissors as you could cut your skin and cause ulcers; use tubular compression when available. Call to reschedule your home care or clinic visit appointment as soon as possible.  Stable       Patient has chronic lymphedema (I89.0) and presents with hyperpigmentation, fibrotic tissue; significant pain from fluid accumulation; skin contracture associated with chronic lymphedema and fibrosis. The patient has tried exercise, elevation, and compression stockings/wraps without significant improvement for the past 4 weeks and has skin discoloration. Measurements were taken on 4/29/25 and on 5/28/2025. I am recommending a [employer] lymphedema pump to better manage the patients chronic condition from home.   Patient has a diagnosis of Lymphedema i89.0 - ICD 10 code i89.0  2.  Compression stockings; compression wraps;  elevation, and exercise has been tried for 4 weeks with little or no improvement in the condition  3.  Clinical Findings (skin changes, skin discoloration, hyperpigmentation, etc): There are chronic swelling skin changes noted today on exam including: hyperpigmentation; hemosiderosis; fibrosis; skin discoloration; hyperkeratosis.  4. Reassessment Summary: After completing 4 weeks of conservative therapy including compression, elevation, and exercise; the patient continues to have issues with swelling in the legs; the  patient would benefit from the addition of a lymphedema pump at this time.     Patient has Chronic Lymphedema and is being re-evaluated today following a 4-week trial of daily compression, elevation, and regular exercise. Their Lymphedema has not improved, and symptoms persist. They were trialed on an  device and did not receive satisfactory outcomes. Due to the patients significant symptoms and unique characteristics of fibrotic tissue, the patient will need the advanced  with manual programmability and appropriate garments to better treat their stage III lymphedema.      Circumferential volume measures:          6/1/2020     8:00 AM 8/31/2020    10:00 AM 4/29/2025     9:00 AM 5/12/2025     7:00 AM 5/28/2025    10:51 AM   Circumferential Measures   Right just above MTP 25 24.5 23.8 23.2 23.4   Right Ankle 26.6 26.6 25.5 25.5 25.4   Right Widest Calf 46.8 46.7 43.5 42 43   Right Thigh Up 10cm 59.4 58.6      Right Knee to Ankle 34       Left - just above MTP 24.3 24.6 24 23 23.7   Left Ankle 27 26.3 24.5 25 24.6   Left Widest Calf 45 45.3 41.8 41.5 42.5   Left Thigh Up 10cm 54.8 53      Left Knee to Ankle 34                    4. Nutrition: focus on weight loss           5. Offloading: na          6. Wound Etiology: venous     Patient will follow up with me in prn weeks for reevaluation. Will have him schedule with Dr. Max next available.  They were instructed to call the clinic sooner with any signs or symptoms of infection or any further questions/concerns. Answered all questions.          Floresita Solorio DNP, RN, CNP, CWOCN, CFCN, CLT  [facility]   [phone]        This note was electronically signed by Floresita Solorio NP

## 2025-05-28 NOTE — PATIENT INSTRUCTIONS
No dressings needed    Ok to swim/pool therapy    Will order new lymphedema pump from [employer]    Will have you see Dr. Max to further discuss your ultrasound results    Continue to wear your compression stockings or velcro wraps every day; put them on first thing in the morning and remove at bedtime    Replace your compression stockings every 3-4 months; these garments will lose their elasticity and become ineffective    Replace velcro wraps every 1-2 years    Elevate your legs periodically throughout the day, 30-60 minutes 1-3 times per day    Apply lotion to your legs 1-2 times per day; some good name brands are Cetaphil, Sarna, Aveeno, VaniCream, CeraVe    Continue to walk and exercise    If you are taking a diuretic continue to do so at the direction of your primary care provider    Make an appointment at the vascular clinic again if you have worsening swelling, need a prescription for new compression garments; and/or develop new wounds

## 2025-06-12 ENCOUNTER — TRANSFERRED RECORDS (OUTPATIENT)
Dept: HEALTH INFORMATION MANAGEMENT | Facility: CLINIC | Age: 70
End: 2025-06-12
Payer: COMMERCIAL

## 2025-06-16 ENCOUNTER — TRANSFERRED RECORDS (OUTPATIENT)
Dept: HEALTH INFORMATION MANAGEMENT | Facility: CLINIC | Age: 70
End: 2025-06-16
Payer: COMMERCIAL

## 2025-06-25 ENCOUNTER — TELEPHONE (OUTPATIENT)
Dept: CARDIOLOGY | Facility: CLINIC | Age: 70
End: 2025-06-25
Payer: COMMERCIAL

## 2025-06-25 DIAGNOSIS — I25.10 CORONARY ARTERY DISEASE INVOLVING NATIVE CORONARY ARTERY OF NATIVE HEART WITHOUT ANGINA PECTORIS: ICD-10-CM

## 2025-06-25 DIAGNOSIS — I10 PRIMARY HYPERTENSION: Primary | ICD-10-CM

## 2025-06-25 DIAGNOSIS — I10 ESSENTIAL HYPERTENSION: ICD-10-CM

## 2025-06-25 DIAGNOSIS — E78.2 MIXED HYPERLIPIDEMIA: ICD-10-CM

## 2025-06-25 RX ORDER — ROSUVASTATIN CALCIUM 10 MG/1
10 TABLET, COATED ORAL DAILY
Qty: 90 TABLET | Refills: 0 | Status: SHIPPED | OUTPATIENT
Start: 2025-06-25

## 2025-06-25 NOTE — TELEPHONE ENCOUNTER
[facility] Call Center    Phone Message    May a detailed message be left on voicemail: yes     Reason for Call: Other: Pt stated he does not need general cardio and feels its a waste of his time since he sees Dr Barfield and Nicolle and wishes to stay with this team only , please remove orders for follow up with Cyndee and Dov  and reach out to pt with any questions or concerns      Action Taken: Other: Cardio    Travel Screening: Not Applicable     Date of Service:

## 2025-06-25 NOTE — TELEPHONE ENCOUNTER
Medication Refill    Contacts       Contact Date/Time Type Contact Phone/Fax    06/25/2025 09:08 AM CDT Phone (Incoming) Sandeep Champ ESPINOZA (Self) [phone] (M)        What medication are you calling about (include dose and sig)?:     lisinopril (ZESTRIL) 40 MG tablet     Preferred Pharmacy:   [facility] [id number] [place] [address] [address] [place] [address] [address] [place] [address]  Phone: [phone] Fax: [phone]      Controlled Substance Agreement on file:   CSA -- Patient Level:    CSA: None found at the patient level.       Who prescribed the medication?: Augustin Roque MD, Magdaleno Bennett MD     Do you need a refill? Yes    Patient offered an appointment? No, Last seen 5/14/25    Do you have any questions or concerns?  Yes: In April, Pt was in Hospital. Hospitalist sent in new Rx's for both Lisinopril and hydrochlorothiazide (HYDRODIURIL) 25 MG tablet, effectively cancelling both prescriptions that were on file at [facility]. Can be reinstated? Has enough Lisinopril to get through this week, but both prescriptions need to be fixed.     [facility] or would you prefer to receive a phone call?:   Patient would prefer a phone call   Okay to leave a detailed message?: Yes at Cell number on file:    Telephone Information:   Mobile [phone]

## 2025-06-30 RX ORDER — LISINOPRIL 40 MG/1
40 TABLET ORAL DAILY
Qty: 90 TABLET | Refills: 1 | Status: SHIPPED | OUTPATIENT
Start: 2025-06-30

## 2025-07-01 NOTE — PATIENT INSTRUCTIONS
No dressings needed    Ok to swim/pool therapy    Will order new lymphedema pump from [employer]    Will have you see Dr. Max to further discuss your ultrasound results    Continue to wear your compression stockings or velcro wraps every day; put them on first thing in the morning and remove at bedtime    Replace your compression stockings every 3-4 months; these garments will lose their elasticity and become ineffective    Replace velcro wraps every 1-2 years    Elevate your legs periodically throughout the day, 30-60 minutes 1-3 times per day    Apply lotion to your legs 1-2 times per day; some good name brands are Cetaphil, Sarna, Aveeno, VaniCream, CeraVe    Continue to walk and exercise    If you are taking a diuretic continue to do so at the direction of your primary care provider    Make an appointment at the vascular clinic again if you have worsening swelling, need a prescription for new compression garments; and/or develop new wounds    Pre-Procedure Instructions for Varicose Vein Ablation   We look forward to scheduling a varicose vein procedure for you. First, we will submit a prior authorization to your insurance company if required. This typically takes 10-14 days. We will contact you once we have gotten the approval to schedule the procedure.  The following is helpful information for you regarding your treatment:  **Important: A  will be needed post procedure.  (Unable to use Taxi or Uber).  Please allow 1-2 hours for your vein procedure appointment.  Take your routine medications as you normally would except for blood thinners. Aspirin is ok to continue.  If you take Warfarin, Xarelto, or Eliquis this will need to be HELD prior to procedure according to primary care provider or cardiology who prescribes this medication. Please notify us if you take this medication.  We have thigh high compression stocking sizes medium to X-large that will be applied immediately after the procedure. You will  need to provide your own if one of these sizes will not fit. Another option is to bring in knee high compression and biker compression shorts.   Please wear comfortable clothing.  We recommend that you bring a change of undergarment in case it gets stained by the cleansing solution.  Feel free to bring a personal music player or a CD to listen to during your procedure.  It is advised not to fly within 3 weeks after the procedure.  You do not have to fast prior to this procedure.  For any questions regarding your procedure please call [phone] to speak with the nurse.  If you would like a Good [place] Estimate for your upcoming procedure contact Cost of Care Estimates at [phone], advocates are available Monday through Friday 8am - 4:30pm.    Radiofrequency Ablation Codes:   - [address]02 for first vein in either leg  Varicose veins may be a sign of something more severe - venous reflux disease.  Healthy leg veins have valves that keep blood flowing to the heart. Venous reflux disease develops when the valves stop working properly and allow blood to flow backward (i.e., reflux) and pool in the lower leg veins.   If venous reflux disease is left untreated, symptoms can worsen over time. Your doctor can help you understand if you have this condition.     Superficial venous reflux disease may cause the following signs and symptoms in your legs:  Varicose veins  Aching  Swelling  Cramping  Heaviness or tiredness  Itching  Restlessness  Open skin sores    Superficial venous reflux disease treatment aims to reduce or stop the backward flow of blood. The following may be prescribed to treat your superficial venous reflux disease. Your doctor can help you decide which treatment is best for you:   Compression stockings   Removing diseased vein   Closing diseased vein (through thermal or non-thermal treatment)     Radiofrequency Ablation (RFA) Treatment for Varicose Veins  Radiofrequency ablation (RFA) is a thermal procedure  to treat varicose veins. It uses heat created from radiofrequency (RF). During RFA treatment, RF heat is sent into your vein through a thin, flexible tube (catheter). This closes off blood flow in the main problem vein.           Getting ready for your treatment  Tell your provider if you:  Are pregnant or think you may be pregnant  Are breastfeeding  Smoke, use alcohol or street drugs on a regular basis  Have any allergies or intolerances to certain medicines. Explain what reaction you have had to these medicines in the past.      The day of your treatment  The treatment takes 45 to 60 minutes. The entire treatment (including time to prepare and recover) takes about 1 to 3 hours. You can go home the same day. For the treatment:   You'll lie down on a hospital bed.  An imaging method, such as ultrasound, is used to guide the procedure.  The leg to be treated is injected with numbing medicine.  Once your leg is numb, a needle makes a small hole (puncture) in the vein to be treated.  The catheter with the RF heat source is inserted into your vein.  More numbing medicine may be injected around your vein.  Once the catheter is in the right position, it is then slowly drawn backward. As the catheter sends out heat, the vein is closed off.  In some cases, other side branch varicose veins may be removed or tied off through a few small cuts (incisions).  When the treatment is done, the catheter is removed. Pressure is applied to the insertion site to stop any bleeding. An elastic compression stocking or a bandage may then be put on your leg.       Recovering at home  Once at home, follow all the instructions you've been given. Be sure to:  Check for signs of infection at the catheter insertion sites (see below)  Wear thigh high compressions as directed  Keep your legs raised (elevated) as directed  Walk a few times a day  Avoid heavy exercise, lifting, and standing for long periods as advised. No lifting >20lbs for 2 weeks.    Avoid air travel, hot baths, saunas, or whirlpools as advised  Do not drive or operate heavy machinery for 24 hours after the procedure  Leakage from the numbing medications the first few hours is normal.          Call your healthcare provider if you have any of the following:  Fever of 100.4 F (38 C) or higher, or as directed by your provider  Chest pain or trouble breathing  Signs of infection at the catheter insertion site. These include increased redness or swelling (inflammation), warmth, increasing pain, bleeding, or bad-smelling discharge.  Severe numbness or tingling in the treated leg  Severe pain or swelling in the treated leg      Follow-up  You'll have an ultrasound within the same week as the procedure to check for problems, such as blood clots. You will follow up with the provider after 6 weeks.   Risks and possible complications   These include the following:  Bleeding, Infection, Blood clots, Damage to the nerves in the treated area, Irritation or burning of the skin over the treated vein. Treatment doesn't improve the look or the symptoms of the problem veins  Risks of any medicines used during the treatment

## 2025-07-07 DIAGNOSIS — K20.90 ESOPHAGITIS: ICD-10-CM

## 2025-07-08 RX ORDER — OMEPRAZOLE 20 MG/1
20 CAPSULE, DELAYED RELEASE ORAL
Qty: 90 CAPSULE | Refills: 2 | Status: SHIPPED | OUTPATIENT
Start: 2025-07-08

## 2025-07-16 ENCOUNTER — TELEPHONE (OUTPATIENT)
Dept: VASCULAR SURGERY | Facility: CLINIC | Age: 70
End: 2025-07-16
Payer: COMMERCIAL

## 2025-07-16 ENCOUNTER — OFFICE VISIT (OUTPATIENT)
Dept: VASCULAR SURGERY | Facility: CLINIC | Age: 70
End: 2025-07-16
Attending: SPECIALIST
Payer: COMMERCIAL

## 2025-07-16 VITALS
TEMPERATURE: 98.2 F | HEART RATE: 73 BPM | SYSTOLIC BLOOD PRESSURE: 127 MMHG | DIASTOLIC BLOOD PRESSURE: 86 MMHG | OXYGEN SATURATION: 99 %

## 2025-07-16 DIAGNOSIS — I83.893 SYMPTOMATIC VARICOSE VEINS OF BOTH LOWER EXTREMITIES: Primary | ICD-10-CM

## 2025-07-16 DIAGNOSIS — E06.3 HYPOTHYROIDISM DUE TO HASHIMOTO THYROIDITIS: ICD-10-CM

## 2025-07-16 DIAGNOSIS — I89.0 SECONDARY LYMPHEDEMA: ICD-10-CM

## 2025-07-16 DIAGNOSIS — I87.2 VENOUS INSUFFICIENCY: ICD-10-CM

## 2025-07-16 DIAGNOSIS — E66.01 MORBID OBESITY (H): ICD-10-CM

## 2025-07-16 PROCEDURE — G0463 HOSPITAL OUTPT CLINIC VISIT: HCPCS | Performed by: SPECIALIST

## 2025-07-16 RX ORDER — LEVOTHYROXINE SODIUM 125 UG/1
TABLET ORAL
Qty: 90 TABLET | Refills: 1 | Status: SHIPPED | OUTPATIENT
Start: 2025-07-16

## 2025-07-16 ASSESSMENT — PAIN SCALES - GENERAL: PAINLEVEL_OUTOF10: MODERATE PAIN (4)

## 2025-07-16 NOTE — PROGRESS NOTES
Floresita Jean-Baptiste patient, Wounds healed,symptomatic varicose veins and swelling. 5/28/25 US reviewed right SSV reflux. Eliquis for one more month history of a PE and has a watchman. Right SSV RFA [employer] medicare advantage

## 2025-07-16 NOTE — PROGRESS NOTES
[facility] Vein Consult      Assessment:     1. varicose veins, bilateral   2. spider veins, bilateral   3. Insuffiencey of right short saphenous vein,     4. Secondary lymphedema    Plan:     1. Treatment options of conservative therapy of stockings use, exercise, weight loss, elevating legs when possible.    2. Script for compression stockings 20-30 mm hg  3. Ultrasound to evaluate legs for incompetency of both deep and superficial system .   4. Surgical treatment   Endovenous closure ofright, short saphenous vein     Risks and benefits of surgical intervention including infection, burns, dvt, thrombophlebitis, not closing, recurrence, numbness and nerve injury and need for further intervention were all discused    5. Follow up: for procedure if approved .   6. Call for any questions concerns or issues    Subjective:      Brett Hopkins is a 69 year old male  who was referred by Magdaleno Bennett  for evaluation of varicose veins. Symptoms include pain, aching, fatigue, burning, edema, and dermatitis. Patient has history of leg swelling, pain and vein issues that have progressed. Pain and symptoms have affected daily living and work activities needing medications. Here for evaluation today. Stocking use with compression stockings of 20-30 mm hg or greater for greater then 3 months.  Patient is  patient of [facility] who comes in for consultation secondary secondary to an ultrasound which she which she obtained.    Allergies:Sulfa (sulfonamide antibiotics) [sulfa antibiotics]    Past Medical History:   Diagnosis Date    AA (alopecia areata) 09/10/2018    Area of hair loss back of scalp  Dermatology         Aneurysm Of The Ascending Aorta     Mild dilatation (4.1 cm) by MRI in Aug 2013  2022-4.0 cm  Cardiology  Consider repeat 2024, 2025         Cellulitis of right lower leg 04/03/2025    Diagnosed and hospitalized April 2025      GERD (gastroesophageal reflux disease) 09/10/2018     History of a hiatal hernia  Omeprazole daily  Breakthrough symptoms by 2 PM daily         Gout 11/06/2024    Dx July 2024  Left hand swelling  Uric Acid 7.7      Hyperlipidemia 12/19/2023    Crestor      Hypertension 04/26/2019    Dx Apr 2019  Metoprolol, also for atrial fibrillation.  Lisinopril, April 2019  hydrochlorothiazide Oct 2020         Hypothyroidism     Thyroid replacement      Lymphedema 06/01/2020    Right leg, after surgery  Dr. Orlando, et al  Compression stockings, pneumatic pump-somewhat helpful  Lasix, Nov 2020, but as long December 2020, neither were helpful.  Massage      Myasthenia gravis (H) 01/04/2023    Dx 2022  Double vision  Neurology, Opthalmology            Obesity (BMI 35.0-39.9) with comorbidity (H) 11/20/2020    Persistent atrial fibrillation     Dx 2007  Symptomatic, SANDERS -NO longer symptomatic  CV with first episode  [id number] - VASc risk score = 1 (HTN)  Failed Rhythmol > Flecainide    PVI August 19, 2013, repeat Ablation Feb 2024 April 2019 recurrent AF-Eliquis  Cardioversion, September 2019, Nov 2023.  Eliquis  Watchman, Jan 2023         Presence of Watchman left atrial appendage closure device 01/12/2023 1/12/23 - 27 mm Watchman FLX device      Pulmonary embolism (H) 04/02/2025    Dx April 2025  Eliquis      Status post ablation of atrial fibrillation 07/28/2015    PVI August 19, 2013 (cryo-PVI only)         Thyroid nodule 01/05/2022    Incidental diagnosis December 2021  2 nodules, 2.1 and 2.4 cm maximum dimension  FNA results 2023, benign  Ultrasound, Dec 2023 stable  US-Nov 2024-stable, no FNA  Recommend follow-up 2025, 2027 and 2029      Varicose Veins     no symptoms            Past Surgical History:   Procedure Laterality Date    ARTHROPLASTY REVISION HIP Right 05/01/2019    ARTHROPLASTY REVISION KNEE Right 2019    CARDIOVERSION  07/01/2019    7/10/2019    CARDIOVERSION  09/12/2019    CATARACT EXTRACTION Bilateral 06/2021    CV LEFT ATRIAL APPENDAGE CLOSURE N/A 1/12/2023     Procedure: Left Atrial Appendage Closure;  Surgeon: Mitesh Graff MD;  Location: Health system LAB CV    DENTAL SURGERY      Oral Surgery Tooth Extraction;  Implant    EP ABLATION PULMONARY VEIN ISOLATION N/A 3/19/2024    Procedure: Ablation Atrial Fibrillation;  Surgeon: Benedicto Barfield MD;  Location: Health system LAB CV    AL ABLATE HEART DYSRHYTHM FOCUS  08/19/2013    Description: Catheter Ablation Atrial Fibrillation;  Recorded: 11/16/2013;  Comments: PVI Aug 19, 2013 (Cryo to all 4 PV's)    TOOTH EXTRACTION      implant    [facility] TOTAL HIP ARTHROPLASTY Right 05/08/2019    Procedure: RIGHT TOTAL HIP ARTHROPLASTY DIRECT ANTERIOR APPROACH;  Surgeon: Mario Woodruff MD;  Location: [facility];  Service: Orthopedics    [facility] TOTAL KNEE ARTHROPLASTY Right 06/21/2017    Procedure: 2)  RIGHT TOTAL KNEE ARTHROPLASTY;  Surgeon: Mario VICKERS MD;  Location: [facility] OR;  Service: Orthopedics         Current Outpatient Medications:     amoxicillin (AMOXIL) 500 MG tablet, TAKE FOUR TS PO 1 HOUR B DAPP, Disp: , Rfl:     apixaban ANTICOAGULANT (ELIQUIS) 5 MG tablet, Take 1 tablet (5 mg) by mouth 2 times daily., Disp: 60 tablet, Rfl: 1    aspirin 81 MG EC tablet, Take 1 tablet (81 mg) by mouth daily, Disp: , Rfl:     hydrochlorothiazide (HYDRODIURIL) 25 MG tablet, TAKE 1 TABLET BY MOUTH EVERY DAY IN THE MORNING FOR HIGH BLOOD PRESSURE, Disp: 90 tablet, Rfl: 2    levothyroxine (SYNTHROID/LEVOTHROID) 125 MCG tablet, Take 1 tablet (125 mcg) by mouth every morning (before breakfast)., Disp: 90 tablet, Rfl: 1    lisinopril (ZESTRIL) 40 MG tablet, Take 1 tablet (40 mg) by mouth daily. for high blood pressure, Disp: 90 tablet, Rfl: 1    omeprazole (PRILOSEC) 20 MG DR capsule, TAKE 1 CAPSULE BY MOUTH DAILY BEFORE BREAKFAST, Disp: 90 capsule, Rfl: 2    rosuvastatin (CRESTOR) 10 MG tablet, Take 1 tablet (10 mg) by mouth daily., Disp: 90 tablet, Rfl: 0    Current Facility-Administered Medications:     lidocaine  (XYLOCAINE) 5 % ointment, , Topical, Daily PRN, Wheaton, Floresita, NP, Given at 05/12/25 0730     Family History   Problem Relation Age of Onset    Atrial fibrillation Mother     Dementia Mother         dx 80s    Diabetes Type 2  Mother         dx 60s/70s    Atrial fibrillation Father     Rheumatoid Arthritis Father     Atrial fibrillation Sister     Cerebrovascular Disease Sister     Parkinsonism Sister     Parkinsonism Sister     Atrial fibrillation Brother     Rheumatoid Arthritis Brother     Cerebrovascular Disease Brother     CABG Brother     Kidney failure Brother         Dialysis    Atrial fibrillation Brother     Diabetes Type 2  Brother         Dx 40's        reports that he has never smoked. He has been exposed to tobacco smoke. He has never used smokeless tobacco. He reports current alcohol use of about 5.0 standard drinks of alcohol per week. He reports that he does not use drugs.      Review of Systems:    Pertinent items are noted in HPI.  Patient has symptomatic veins and changes of bilateral legs. These have progressed to the point of causing symptoms on a daily basis. This causes issues with daily activities and chores such as mowing lawn, outdoor upkeep, and standing for long lengths of time       Objective:     Vitals:    07/16/25 0937   BP: 127/86   Pulse: 73   Temp: 98.2  F (36.8  C)   SpO2: 99%     There is no height or weight on file to calculate BMI.    EXAM:  GENERAL: This is a well-developed 69 year old male who appears his stated age  HEAD: normocephalic  HEENT: Pupils equal and reactive bilaterally  MOUTH: mucus membranes intact  NEUROLOGIC: Focally intact, nonfocal, alert and oriented x 3  INTEGUMENT: No open lesions or ulcers  VASCULAR: Pulses intact, symmetrical upper and lower extremities. There areskin changes consistent with chronic venous insufficiency. Varicose veins present in bilateral greater saphenous distribution. Spider veins present bilateral.                      Patient  presents with mild to moderate, bilateral  lower extremity secondary lymphedema.     Patient requires a standard-fit knee high compression stocking     Side:: Bilateral  Patient reported symptoms  Vein Appearance: Moderately noticable  Heaviness: none of the time  Achiness: a little of the time  Swelling: a good bit of the time  Throbbing: none of the time  Itching: all of the time  Impact on work/activity: Moderately reduced work/activity    Imaging:    Reviewed and my interpretation of the US is right short saphenous vein insuffiencey.    Exam Information    Exam Date Exam Time Accession # Performing Department Results    5/28/25 10:44 AM [id number] [facility] [place]      PACS Images     Show images for US Venous Competency Bilateral     Study Result    Narrative & Impression   BILATERAL Venous Insufficiency Ultrasound (Date: 05/28/25)    BILATERAL Lower Extremity          Indication: Bilateral edema     Previous: none     Patient History: Swelling, Stasis, and Morbid Obesity     Presenting Symptoms:  Swelling, Varicose Veins, Pain, and Stasis     Technique:   Supine and Reverse Trendelenburg Ultrasound of the Deep and Superficial Veins with Valsalva and Compression Augmentation Maneuvers. Duplex Imaging is performed utilizing gray-scale, Two-dimensional images, color-flow imaging, Doppler waveform analysis, and Spectral doppler imaging done with provacative maneuvers.      Incompetency Criteria:  Deep vein reflux reported when greater than 1000 msec flow reversal. Superficial vein reflux reported when greater than 500 msec flow reversal.  vein reflux reported as greater than 350 msec flow reversal.      Right  Leg Deep Veins    CFV SFJ DFV PROX FV   PROX FV MID FV DIST POP V. PERON.   V. PTV'S   Compressibility  (FC,PC,NC) FC FC FC FC FC FC FC FC FC   Reflux +   - - - - -   -         Right Leg Superficial Veins  Location SFJ PROX THIGH MID THIGH KNEE MID  CALF   GSV (mm) 8 7 6 6 5   Reflux + - - - -   AASV (mm) 4           Reflux -           PASV (mm) NV           Reflux NV              Location SPJ PROX CALF MID CALF   SSV (mm) 7 6 6   Reflux + + -      Left  Leg Deep Veins    CFV SFJ DFV PROX FV   PROX FV MID FV DIST POP V. PERON.   V. PTV'S   Compressibility  (FC,PC,NC) FC FC FC FC FC FC FC FC FC   Reflux -   - - + - -   -         Left Leg Superficial Veins  Location SFJ PROX THIGH MID THIGH KNEE MID CALF   GSV (mm) 8 5 5 6 7   Reflux - - - - -   AASV (mm) 5           Reflux -           PASV (mm) NV           Reflux NV              Location SPJ PROX CALF MID CALF   SSV (mm) 5 4 5   Reflux + - -      Comments: No incompetent  veins visualized.       Right SSV is straight enough to ablate.      Impression:       Right Deep Vein Findings: Patent deep venous system with no evidence of DVT.  Segmental incompetence of the right common femoral vein.  Remainder of the deep veins of the right lower extremity are competent.       Left Deep Vein Findings: Patent deep venous system with no evidence of DVT.  Deep veins of the left lower extremity are competent.       Superficial Vein Findings:      Right Greater Saphenous Vein: Segmental incompetence of the great saphenous vein at the saphenofemoral junction.  Remainder of the right great saphenous vein is competent..     Right Small Saphenous Vein: Patent Small Saphenous vein with Reflux noted at the sapheno-popliteal junction and in the proximal calf.  with a Maximum diameter of 7 mm at the saphenopopliteal junction.     Left Greater Saphenous Vein: Patent Greater Saphenous Vein without evidence of reflux.     Left Small Saphenous Vein: Patent Small Saphenous vein with Reflux noted at the saphenous popliteal junction with a Maximum diameter of 5 mm.     Perforating and Accessory Veins: Right anterior accessory saphenous vein is patent and competent.  Posterior accessory saphenous vein is not visualized.  Left  anterior accessory saphenous vein is patent and competent.  Posterior accessory saphenous vein is not visualized.  No incompetent perforators noted.     Reference:     Compressibility: FC= Fully compressible, PC= Partially compressible, NC= Non-compressible, NV= Not Visualized     Reflux: (+) Incompetent  (-) Competent, (NV) = Not Visualized     Interpretation criteria:          Duration of Retrograde flow (milliseconds)  Category Deep Veins Superficial Veins  veins   Competent < 1000ms < 500ms < 350ms   Incompetent > 1000ms > 500ms > 350ms      LINSEY GAXIOLA M.D., FACS, [facility]           Thomas Max MD  General Surgery [phone]  Vascular Surgery [phone]

## 2025-07-16 NOTE — PROGRESS NOTES
Vascular Clinic Rooming Questions      Patient is here for consult for Varicose Veins. The patient does  wear compression stockings. The patient has varicose veins that are problematic in bilateral legs. Patient states their varicose veins are bothersome when standing, sitting, working, and household chores. The patient has been using medications for their leg discomfort.      /86   Pulse 73   Temp 98.2  F (36.8  C)   SpO2 99%     The provider has been notified that the patient has no concerns.     Questions patient would like addressed today are: N/A.    Refills are needed: N/A    Has homecare services and agency name:  No

## 2025-07-16 NOTE — TELEPHONE ENCOUNTER
Vein Prior Authorization Form    Vascular NPI: [id number] Tax ID: [id number]    Ordering/Performing Physician: Dr. Thomas Max NPI: [id number]  Payor: [place] [place]  Procedure Endovenous radiofrequency ablation of the right short saphenous vein  CPT: 36475 x1  Diagnosis Code: I83.891 Symptomatic varicose veins of right lower extremity and I87.2 Venous insufficiency    Need 2 days for procedure?: No  Blood Thinners and plan for holding, continuing and/or bridging: Eliquis: plan to discontinue 8/16/25 due to watchman. If yes, please route to vascular triage pool once scheduled to get approval on hold.   Other instructions: Eliquis

## 2025-07-17 ENCOUNTER — TRANSFERRED RECORDS (OUTPATIENT)
Dept: HEALTH INFORMATION MANAGEMENT | Facility: CLINIC | Age: 70
End: 2025-07-17
Payer: COMMERCIAL

## (undated) DEVICE — GUIDEWIRE JTIP 3MM .035 180CM IQ35F180J3

## (undated) DEVICE — Device

## (undated) DEVICE — SHEATH REPROCESSED AGILIS EP MED CURVE 71CM 408310

## (undated) DEVICE — INTRO MICRO MINI STICK 5FR STIFF NITINOL

## (undated) DEVICE — CLOSURE DEVICE 6FR VASC PROGLIDE MEDICATED SUTURE 12673-03

## (undated) DEVICE — SHEATH PINNACLE 9/25/038 RSS905

## (undated) DEVICE — ELECTRODE DEFIB CADENCE 22550R

## (undated) DEVICE — INTRO SHEATH 8FRX10CM PINNACLE RSS802

## (undated) DEVICE — SYR ANGIOGRAPHY MULTIUSE KIT ACIST 014612

## (undated) DEVICE — ELECTRODE ADULT PACING MULTI P-211-M1

## (undated) DEVICE — TUBING KIT COOL POINT

## (undated) DEVICE — INTRO SHEATH TERUMO 10FRX25CM PINNACLE RSS006

## (undated) DEVICE — CATH MAPPING ADVISOR HD GRID SE D-AVHD-DF16

## (undated) DEVICE — CUSTOM PACK CORONARY SAN5BCRHEA

## (undated) DEVICE — CUSTOM PACK EP

## (undated) DEVICE — TRANSPAC IV MONITORING KIT WI SAFESET RESERVOIR AND TWO BLOOD SAMPLING PORTS 84" TUBING DISPOSABLE TRANSDUCER

## (undated) DEVICE — SHEATH GUIDING VERSACROSS D1 CURVE L85 CM L180 CBL VXAK0003

## (undated) DEVICE — CATH DIAG IMPULSE 6FR PIG 155 SINGLE

## (undated) DEVICE — MANIFOLD KIT ANGIO AUTOMATED 014613

## (undated) DEVICE — CATHETER ICE VIEWFLEX XTRA

## (undated) DEVICE — INTRODUCER CHECK FLO 16FRX30CM .038

## (undated) DEVICE — KIT ENSITE SURFACE ELECTRODE ENSITE-SEK-5-01

## (undated) DEVICE — SHEATH WITH DILATOR ACCESS SYSTEM FXD CRV DBL M635TU80020

## (undated) DEVICE — TRANSDUCER TRAY ARTERIAL 42646-06

## (undated) DEVICE — CATH TRANSSEPTAL VERSACROSS 45D 63X230CM VXSK0032

## (undated) DEVICE — KIT HAND CONTROL ACIST 014644 AR-P54

## (undated) DEVICE — CATHETER IRRIGATED ABLATION BIDIRECTIONAL D-F CURVE 8FR

## (undated) DEVICE — INTRO MICRO MINI STICK 4FR STIFF NITINOL 45-753

## (undated) RX ORDER — HEPARIN SODIUM 1000 [USP'U]/ML
INJECTION, SOLUTION INTRAVENOUS; SUBCUTANEOUS
Status: DISPENSED
Start: 2023-01-12

## (undated) RX ORDER — ACETAMINOPHEN 325 MG/1
TABLET ORAL
Status: DISPENSED
Start: 2024-03-19

## (undated) RX ORDER — FENTANYL CITRATE-0.9 % NACL/PF 10 MCG/ML
PLASTIC BAG, INJECTION (ML) INTRAVENOUS
Status: DISPENSED
Start: 2023-01-12

## (undated) RX ORDER — FENTANYL CITRATE-0.9 % NACL/PF 10 MCG/ML
PLASTIC BAG, INJECTION (ML) INTRAVENOUS
Status: DISPENSED
Start: 2024-03-19

## (undated) RX ORDER — IBUPROFEN 600 MG/1
TABLET, FILM COATED ORAL
Status: DISPENSED
Start: 2024-03-19

## (undated) RX ORDER — LIDOCAINE HYDROCHLORIDE AND EPINEPHRINE 10; 10 MG/ML; UG/ML
INJECTION, SOLUTION INFILTRATION; PERINEURAL
Status: DISPENSED
Start: 2024-03-19

## (undated) RX ORDER — METHOHEXITAL IN WATER/PF 100MG/10ML
SYRINGE (ML) INTRAVENOUS
Status: DISPENSED
Start: 2024-02-27

## (undated) RX ORDER — HEPARIN SODIUM 1000 [USP'U]/ML
INJECTION, SOLUTION INTRAVENOUS; SUBCUTANEOUS
Status: DISPENSED
Start: 2024-03-19

## (undated) RX ORDER — PROTAMINE SULFATE 10 MG/ML
INJECTION, SOLUTION INTRAVENOUS
Status: DISPENSED
Start: 2024-03-19

## (undated) RX ORDER — METHOHEXITAL IN WATER/PF 100MG/10ML
SYRINGE (ML) INTRAVENOUS
Status: DISPENSED
Start: 2023-11-17

## (undated) RX ORDER — HEPARIN SODIUM 10000 [USP'U]/100ML
INJECTION, SOLUTION INTRAVENOUS
Status: DISPENSED
Start: 2024-03-19

## (undated) RX ORDER — ASPIRIN 81 MG/1
TABLET ORAL
Status: DISPENSED
Start: 2023-01-12

## (undated) RX ORDER — LIDOCAINE HYDROCHLORIDE 10 MG/ML
INJECTION, SOLUTION EPIDURAL; INFILTRATION; INTRACAUDAL; PERINEURAL
Status: DISPENSED
Start: 2024-03-19

## (undated) RX ORDER — FENTANYL CITRATE 50 UG/ML
INJECTION, SOLUTION INTRAMUSCULAR; INTRAVENOUS
Status: DISPENSED
Start: 2024-03-19

## (undated) RX ORDER — FENTANYL CITRATE 50 UG/ML
INJECTION, SOLUTION INTRAMUSCULAR; INTRAVENOUS
Status: DISPENSED
Start: 2023-01-12

## (undated) RX ORDER — KETAMINE HYDROCHLORIDE 10 MG/ML
INJECTION INTRAMUSCULAR; INTRAVENOUS
Status: DISPENSED
Start: 2023-01-12

## (undated) RX ORDER — FUROSEMIDE 10 MG/ML
INJECTION INTRAMUSCULAR; INTRAVENOUS
Status: DISPENSED
Start: 2024-03-19